# Patient Record
Sex: MALE | Race: WHITE | NOT HISPANIC OR LATINO | Employment: UNEMPLOYED | URBAN - METROPOLITAN AREA
[De-identification: names, ages, dates, MRNs, and addresses within clinical notes are randomized per-mention and may not be internally consistent; named-entity substitution may affect disease eponyms.]

---

## 2017-01-03 ENCOUNTER — OFFICE VISIT (OUTPATIENT)
Dept: OTOLARYNGOLOGY | Facility: CLINIC | Age: 52
End: 2017-01-03

## 2017-01-03 VITALS — BODY MASS INDEX: 24.12 KG/M2 | HEIGHT: 75 IN | WEIGHT: 194 LBS

## 2017-01-03 DIAGNOSIS — D23.39 PAPILLOMA OF NOSE: Primary | ICD-10-CM

## 2017-01-03 DIAGNOSIS — R05.3 CHRONIC COUGH: ICD-10-CM

## 2017-01-03 RX ORDER — BENZONATATE 100 MG/1
100 CAPSULE ORAL 3 TIMES DAILY PRN
Qty: 42 CAPSULE | Refills: 1 | Status: SHIPPED | OUTPATIENT
Start: 2017-01-03 | End: 2017-03-21

## 2017-01-03 ASSESSMENT — PAIN SCALES - GENERAL: PAINLEVEL: MILD PAIN (3)

## 2017-01-03 NOTE — Clinical Note
1/3/2017       RE: Micah Nunez  [address]  [place] [address]     Dear Colleague,    Thank you for referring your patient, Micah Nunez, to the [facility] at [facility]. Please see a copy of my visit note below.    HISTORY OF PRESENT ILLNESS:  Micah Nunez is here for follow-up today.  He is 51 years of age.  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  We spent about 10 minutes today explaining to him inverting papillomas versus polyps.  We have tried to explain this decently well in the past, and we tried again to make the distinction that inverting papillomas are a little bit more of a potential health problem than just simple nasal polyps.  Although both can return, papillomas are usually caused by viruses and things of this nature.  In any case, he comes back now because he is having some bleeding from his nose on the left side with the cold winter weather where his house is dry.  He takes some bacitracin or similar emollients for this presently.  He has no other current complaints.   ROS t:  We looked at the ROS sheet and All other systems were reviewed and are negative.chronic cough for 2-3 weeks as well.        PHYSICAL EXAMINATION:  The patient is alert, oriented x3 and pleasant.  Skin of the face, lips, and neck on him is quite normal.  Oral cavity and oropharynx is clear.        Procedure:Nasal cavity exam with an endoscope reveals that the right side of the nose is entirely clear throughout.  The left side of the nose   shows a small amount of papillomas, maybe 4-5 mm or so, along the floor of the nose by the inferior turbinate but there are no other abnormalities noted.  The ostiomeatal complex is clear.  Neck exam shows no masses, adenopathy or thyromegaly.  Small othere anterior foci as well.      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   These are inverting papillomas.  I will see him again in about six weeks.  We have given him more emollients.  We take these out with an endoscope in the OR under MAC.   They are really towards the front of his nose rather than the posterior. Tessalon perrles given.      FO/ms       Again, thank you for allowing me to participate in the care of your patient.      Sincerely,    Bobo Renteria MD

## 2017-01-03 NOTE — NURSING NOTE
Relevant Diagnosis: nasal papilloma  Teaching Topic:excise of nasal papilloma  Person(s) involved in teaching: Patient     Teaching Concerns Addressed:  Pre op teaching included the need for an H&P, NPO status pre op, hospital routines, expected recovery, activity  restrictions, antimicrobial scrub, s/s of infection, pain control methods and the importance of follow up appointments.  The patient voiced an understanding of all instructions and will call with questions.     Motivation Level:  Asks Questions:   Yes  Eager to Learn:   Yes  Cooperative:   Yes  Receptive (willing/able to accept information):   Yes     Patient  demonstrates understanding of the following:  Reason for the appointment, diagnosis and treatment plan:   Yes  Knowledge of proper use of medications and conditions for which they are ordered (with special attention to potential side effects or drug interactions):   Yes  Which situations necessitate calling provider and whom to contact:   Yes        Proper use and care of  (medical equip, care aids, etc.):   NA  Nutritional needs and diet plan:   Yes  Pain management techniques:   Yes  Patient instructed on hand hygiene:  Yes  How and/when to access community resources:   NA     Infection Prevention:  Patient   demonstrates understanding of the following:  Surgical procedure site care taught   Signs and symptoms of infection taught Yes  Wound care taught Yes     Instructional Materials Used/Given: Pre op booklet.

## 2017-01-03 NOTE — PATIENT INSTRUCTIONS
Nurse teaching given on nasal pappiloma and the patient expresses understanding and acceptance of instructions. Piter Pascual 1/3/2017 11:15 AM

## 2017-01-03 NOTE — NURSING NOTE
Chief Complaint   Patient presents with     RECHECK     following up tumor     Zenia Orourke Medical Assistant

## 2017-01-03 NOTE — PROGRESS NOTES
HISTORY OF PRESENT ILLNESS:  Micah Nunez is here for follow-up today.  He is 51 years of age.  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  We spent about 10 minutes today explaining to him inverting papillomas versus polyps.  We have tried to explain this decently well in the past, and we tried again to make the distinction that inverting papillomas are a little bit more of a potential health problem than just simple nasal polyps.  Although both can return, papillomas are usually caused by viruses and things of this nature.  In any case, he comes back now because he is having some bleeding from his nose on the left side with the cold winter weather where his house is dry.  He takes some bacitracin or similar emollients for this presently.  He has no other current complaints.   ROS t:  We looked at the ROS sheet and All other systems were reviewed and are negative.chronic cough for 2-3 weeks as well.        PHYSICAL EXAMINATION:  The patient is alert, oriented x3 and pleasant.  Skin of the face, lips, and neck on him is quite normal.  Oral cavity and oropharynx is clear.        Procedure:Nasal cavity exam with an endoscope reveals that the right side of the nose is entirely clear throughout.  The left side of the nose   shows a small amount of papillomas, maybe 4-5 mm or so, along the floor of the nose by the inferior turbinate but there are no other abnormalities noted.  The ostiomeatal complex is clear.  Neck exam shows no masses, adenopathy or thyromegaly.  Small othere anterior foci as well.      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  These are inverting papillomas.  I will see him again in about six weeks.  We have given him more emollients.  We take these out with an endoscope in the OR under MAC.   They are really towards the front of his nose rather than the posterior. Tessalon perrles given.      FO/ms

## 2017-01-30 ENCOUNTER — OFFICE VISIT (OUTPATIENT)
Dept: FAMILY MEDICINE | Facility: CLINIC | Age: 52
End: 2017-01-30
Payer: COMMERCIAL

## 2017-01-30 VITALS
DIASTOLIC BLOOD PRESSURE: 81 MMHG | HEIGHT: 75 IN | HEART RATE: 86 BPM | TEMPERATURE: 97.1 F | BODY MASS INDEX: 24.25 KG/M2 | OXYGEN SATURATION: 97 % | SYSTOLIC BLOOD PRESSURE: 135 MMHG | WEIGHT: 195 LBS

## 2017-01-30 DIAGNOSIS — L20.9 ATOPIC DERMATITIS, UNSPECIFIED TYPE: ICD-10-CM

## 2017-01-30 DIAGNOSIS — Z01.818 PREOP GENERAL PHYSICAL EXAM: Primary | ICD-10-CM

## 2017-01-30 DIAGNOSIS — J34.89 NASAL MASS: ICD-10-CM

## 2017-01-30 PROCEDURE — 99214 OFFICE O/P EST MOD 30 MIN: CPT | Performed by: FAMILY MEDICINE

## 2017-01-30 RX ORDER — TRIAMCINOLONE ACETONIDE 1 MG/G
OINTMENT TOPICAL
Qty: 80 G | Refills: 0 | Status: SHIPPED | OUTPATIENT
Start: 2017-01-30 | End: 2017-06-26

## 2017-01-30 NOTE — PATIENT INSTRUCTIONS
Before Your Surgery      Call your surgeon if there is any change in your health. This includes signs of a cold or flu (such as a sore throat, runny nose, cough, rash or fever).    Do not smoke, drink alcohol or take over the counter medicine (unless your surgeon or primary care doctor tells you to) for the 24 hours before and after surgery.    If you take prescribed drugs: Follow your doctor s orders about which medicines to take and which to stop until after surgery.    Eating and drinking prior to surgery: follow the instructions from your surgeon  Take a shower or bath the night before surgery. Use the soap your surgeon gave you to gently clean your skin. If you do not have soap from your surgeon, use your regular soap. Do not shave or scrub the surgery site.  Wear clean pajamas and have clean sheets on your bed.   Presurgery Checklist  You are scheduled to have surgery. The healthcare staff will try to make your stay comfortable. Use the guidelines below to remind yourself what to do before surgery. Be sure to follow any specific pre-op instructions from your surgeon or nurse.  Preparing for Surgery    Ask your surgeon if you ll need a blood transfusion during surgery and if so, how to prepare for it. In some cases, you can donate blood before surgery. If needed, this blood can be given back (transfused) to you during or after surgery.    If you are having abdominal surgery, ask what you need to do to clear your bowel.    Tell your surgeon if you have allergies to any medications or foods.    Arrange for an adult family member or friend to drive you home after surgery. If possible, have someone ready to help you at home as you recover.    Call the surgeon if you get a cold, fever, sore throat, diarrhea, or other health problem just before surgery. Your surgeon can decide whether or not to postpone the surgery.  Medications    Tell your surgeon about all medications you take, including prescription and  over-the-counter products such as herbal remedies and vitamins. Ask if you should continue taking them.    If you take ibuprofen, naproxen, or  blood thinners  such as aspirin, clopidogrel (Plavix), or warfarin (Coumadin), ask your surgeon whether you should stop taking them and how long before surgery you should stop.    You may be told to take antibiotics just before surgery to prevent infection. If so, follow instructions carefully on how to take them.    If you are told to take medications called anticoagulants to prevent blood clots after surgery, be sure to follow the instructions on how to take them.  Stop Smoking  If you smoke, healing may take longer. So at least 2 week(s) before surgery, stop smoking.  Bathing or Showering Before Surgery    If instructed, wash with antibacterial soap. Afterward, do not use lotions or powders.    If you are having surgery on the head, you may be asked to shampoo with antibacterial soap. Follow instructions for doing so.  Do Not Remove Hair from the Surgery Site  Do not shave hair from the incision site, unless you are given specific instructions to do so. Usually, if hair needs to be removed, it will be done at the hospital right before surgery.  Don t Eat or Drink    Your doctor will tell you when to stop eating and drinking. If you do not follow your doctor's instructions, your procedure may be postponed or rescheduled for another day.    If your surgeon tells you to continue any medications, take them with small sips of water.    You can brush your teeth and rinse your mouth, but don t swallow any water.  Day of Surgery    Do not wear makeup. Do not use perfume, deodorant, or hairspray. Remove nail polish and artificial nails.    Leave jewelry (including rings), watches, and other valuables at home.    Be sure to bring health insurance cards or forms and a photo ID.    Bring a list of your medications (include the name, dose, how often you take them, and the time last  dose was taken).    Arrive on time at the hospital or surgery facility.    [id number] Regina Garcia, [address] [place] [address], [place] [address]. All rights reserved. This information is not intended as a substitute for professional medical care. Always follow your healthcare professional's instructions.

## 2017-01-30 NOTE — PROGRESS NOTES
[facility]  [address] [address]  [phone]  Dept: [phone]    PRE-OP EVALUATION:  Today's date: 2017    Micah Nunez (: 1965) presents for pre-operative evaluation assessment as requested by Dr. Bobo Renteria.  He requires evaluation and anesthesia risk assessment prior to undergoing surgery/procedure for treatment of Left Nasal Pappiloma Removal .  Proposed procedure: NASAL ENDOSCOPY    Date of Surgery/ Procedure: 17  Time of Surgery/ Procedure: 8:25am   Hospital/Surgical Facility: [facility]    Primary Physician: Yovany White  Type of Anesthesia Anticipated: Monitor anesthesia care     Patient has a Health Care Directive or Living Will:  NO    1. NO - Do you have a history of heart attack, stroke, stent, bypass or surgery on an artery in the head, neck, heart or legs?  2. NO - Do you ever have any pain or discomfort in your chest?  3. NO - Do you have a history of  Heart Failure?  4. NO - Are you troubled by shortness of breath when: walking on the level, up a slight hill or at night?  5. NO - Do you currently have a cold, bronchitis or other respiratory infection?  6. NO - Do you have a cough, shortness of breath or wheezing?  7. NO - Do you sometimes get pains in the calves of your legs when you walk?  8. NO - Do you or anyone in your family have previous history of blood clots?  9. NO - Do you or does anyone in your family have a serious bleeding problem such as prolonged bleeding following surgeries or cuts?  10. NO - Have you ever had problems with anemia or been told to take iron pills?  11. NO - Have you had any abnormal blood loss such as black, tarry or bloody stools, or abnormal vaginal bleeding?  12. NO - Have you ever had a blood transfusion?  13. NO - Have you or any of your relatives ever had problems with anesthesia?  14. NO - Do you have sleep apnea, excessive snoring or daytime drowsiness?  15. NO - Do you have any  prosthetic heart valves?  16. NO - Do you have prosthetic joints?  17. NO - Is there any chance that you may be pregnant?      HPI:                                                      Brief HPI related to upcoming procedure: Patient is a 51 yr old male here for a pre op evaluation. He has a history of nasal polyps and they reoccur. He denies any acute symptoms today and denies anycough or URI symptoms. Patient reports that he has had no fevers or chills.   He is also requesting refills on his triamcinolone and will like the higher quantity.   No other complaints today.      See problem list for active medical problems.  Problems all longstanding and stable, except as noted/documented.  See ROS for pertinent symptoms related to these conditions.                                                                                                  .    MEDICAL HISTORY:                                                      Patient Active Problem List    Diagnosis Date Noted     Tobacco use disorder 08/12/2015     Priority: Medium     Chemical dependency (H) 07/27/2015     Priority: Medium     Papilloma of nose 03/10/2014     Priority: Medium     Nasal mass 12/17/2013     Priority: Medium     CARDIOVASCULAR SCREENING; LDL GOAL LESS THAN 130 12/11/2013     Priority: Medium      History reviewed. No pertinent past medical history.  Past Surgical History   Procedure Laterality Date     Arthrodesis toe(s)  12/20/2013     Procedure: ARTHRODESIS TOE(S);  Arthrodesis first metatarsophalangeal joint left foot;  Surgeon: Cortez Hayward DPM;  Location: [facility] OR     Endoscopic sinus surgery  1/6/2014     Procedure: ENDOSCOPIC SINUS SURGERY;  Functional Endoscopic Sinus Surgery;  Surgeon: Bobo Renteria MD;  Location:  OR     Endoscopic sinus surgery  7/21/2014     Procedure: ENDOSCOPIC SINUS SURGERY;  Surgeon: Bobo Renteria MD;  Location:  OR     Arthrodesis foot Left 2/17/2015     Procedure: ARTHRODESIS FOOT;   Surgeon: Cortez Hayward DPM;  Location: [facility] OR     Endoscopic sinus surgery N/A 4/6/2015     Procedure: ENDOSCOPIC SINUS SURGERY;  Surgeon: Bobo Renteria MD;  Location:  OR     Endoscopic sinus surgery N/A 8/17/2015     Procedure: ENDOSCOPIC SINUS SURGERY;  Surgeon: Bobo Renteria MD;  Location:  OR     Current Outpatient Prescriptions   Medication Sig Dispense Refill     triamcinolone (KENALOG) 0.1 % ointment Apply sparingly to affected area three times daily for 14 days. 80 g 0     triamcinolone (KENALOG) 0.1 % ointment Apply sparingly to affected area daily 30 g 0     benzonatate (TESSALON) 100 MG capsule Take 1 capsule (100 mg) by mouth 3 times daily as needed for cough 42 capsule 1     OTC products: None, except as noted above    Allergies   Allergen Reactions     Nkda [No Known Drug Allergies]       Latex Allergy: NO    Social History   Substance Use Topics     Smoking status: Current Every Day Smoker -- 1.00 packs/day for 30 years     Types: Cigarettes     Last Attempt to Quit: 04/27/2015     Smokeless tobacco: Never Used     Alcohol Use: No     History   Drug Use     Yes     Comment: 7 years quit Cocaine/methamphetamines       REVIEW OF SYSTEMS:                                                    C: NEGATIVE for fever, chills, change in weight  I: NEGATIVE for worrisome rashes, moles or lesions  E: NEGATIVE for vision changes or irritation  ENT/MOUTH: NEGATIVE for ear, mouth and throat problems and POSITIVE for nasal polyps  R: NEGATIVE for significant cough or SOB  CV: NEGATIVE for chest pain, palpitations or peripheral edema  GI: NEGATIVE for nausea, abdominal pain, heartburn, or change in bowel habits  : NEGATIVE for frequency, dysuria, or hematuria  M: NEGATIVE for significant arthralgias or myalgia  N: NEGATIVE for weakness, dizziness or paresthesias  H: NEGATIVE for bleeding problems  P: NEGATIVE for changes in mood or affect    EXAM:                                             "        /81 mmHg  Pulse 86  Temp(Src) 97.1  F (36.2  C) (Tympanic)  Ht 6' 3\" (1.905 m)  Wt 195 lb (88.451 kg)  BMI 24.37 kg/m2  SpO2 97%    GENERAL APPEARANCE: healthy, alert and no distress     EYES: EOMI, - PERRL     HENT: ear canals and TM's normal and nose and mouth without ulcers or lesions     NECK: no adenopathy, no asymmetry, masses, or scars and thyroid normal to palpation     RESP: lungs clear to auscultation - no rales, rhonchi or wheezes     CV: regular rates and rhythm, normal S1 S2, no S3 or S4 and no murmur, click or rub -     ABDOMEN:  soft, nontender, no HSM or masses and bowel sounds normal     SKIN: no suspicious lesions or rashes     PSYCH: mentation appears normal. and affect normal/bright    DIAGNOSTICS:                                                    EKG: Not indicated due to non-vascular surgery and low risk of event (age <65 and without cardiac risk factors)    Recent Labs   Lab Test  08/12/15   0757  03/30/15   1306  02/10/15   0840   HGB  14.6  14.2   --    PLT  287  318   --    NA   --    --   144   POTASSIUM   --    --   4.1   CR   --    --   0.93        IMPRESSION:                                                    Reason for surgery/procedure: Nasal polyp   Diagnosis/reason for consult: Pre op evaluation    The proposed surgical procedure is considered LOW risk.    REVISED CARDIAC RISK INDEX  The patient has the following serious cardiovascular risks for perioperative complications such as (MI, PE, VFib and 3  AV Block):  No serious cardiac risks  INTERPRETATION: 0 risks: Class I (very low risk - 0.4% complication rate)    The patient has the following additional risks for perioperative complications:  No identified additional risks      ICD-10-CM    1. Preop general physical exam Z01.818    2. Atopic dermatitis, unspecified type L20.9 triamcinolone (KENALOG) 0.1 % ointment   3. Nasal mass R22.0        RECOMMENDATIONS:                                                  "     --Patient is to take all scheduled medications on the day of surgery EXCEPT for modifications listed below.    APPROVAL GIVEN to proceed with proposed procedure, without further diagnostic evaluation       Signed Electronically by: Yovany White MD    Copy of this evaluation report is provided to requesting physician.    [facility] Preop Guidelines

## 2017-01-30 NOTE — MR AVS SNAPSHOT
After Visit Summary   1/30/2017    Micah Nunez    MRN: [id number]           Patient Information     Date Of Birth          1965        Visit Information        Provider Department      1/30/2017 10:00 AM Yovany White MD [facility]        Today's Diagnoses     Preop general physical exam    -  1     Atopic dermatitis, unspecified type           Care Instructions      Before Your Surgery      Call your surgeon if there is any change in your health. This includes signs of a cold or flu (such as a sore throat, runny nose, cough, rash or fever).    Do not smoke, drink alcohol or take over the counter medicine (unless your surgeon or primary care doctor tells you to) for the 24 hours before and after surgery.    If you take prescribed drugs: Follow your doctor s orders about which medicines to take and which to stop until after surgery.    Eating and drinking prior to surgery: follow the instructions from your surgeon  Take a shower or bath the night before surgery. Use the soap your surgeon gave you to gently clean your skin. If you do not have soap from your surgeon, use your regular soap. Do not shave or scrub the surgery site.  Wear clean pajamas and have clean sheets on your bed.   Presurgery Checklist  You are scheduled to have surgery. The healthcare staff will try to make your stay comfortable. Use the guidelines below to remind yourself what to do before surgery. Be sure to follow any specific pre-op instructions from your surgeon or nurse.  Preparing for Surgery    Ask your surgeon if you ll need a blood transfusion during surgery and if so, how to prepare for it. In some cases, you can donate blood before surgery. If needed, this blood can be given back (transfused) to you during or after surgery.    If you are having abdominal surgery, ask what you need to do to clear your bowel.    Tell your surgeon if you have allergies to any medications or  foods.    Arrange for an adult family member or friend to drive you home after surgery. If possible, have someone ready to help you at home as you recover.    Call the surgeon if you get a cold, fever, sore throat, diarrhea, or other health problem just before surgery. Your surgeon can decide whether or not to postpone the surgery.  Medications    Tell your surgeon about all medications you take, including prescription and over-the-counter products such as herbal remedies and vitamins. Ask if you should continue taking them.    If you take ibuprofen, naproxen, or  blood thinners  such as aspirin, clopidogrel (Plavix), or warfarin (Coumadin), ask your surgeon whether you should stop taking them and how long before surgery you should stop.    You may be told to take antibiotics just before surgery to prevent infection. If so, follow instructions carefully on how to take them.    If you are told to take medications called anticoagulants to prevent blood clots after surgery, be sure to follow the instructions on how to take them.  Stop Smoking  If you smoke, healing may take longer. So at least 2 week(s) before surgery, stop smoking.  Bathing or Showering Before Surgery    If instructed, wash with antibacterial soap. Afterward, do not use lotions or powders.    If you are having surgery on the head, you may be asked to shampoo with antibacterial soap. Follow instructions for doing so.  Do Not Remove Hair from the Surgery Site  Do not shave hair from the incision site, unless you are given specific instructions to do so. Usually, if hair needs to be removed, it will be done at the hospital right before surgery.  Don t Eat or Drink    Your doctor will tell you when to stop eating and drinking. If you do not follow your doctor's instructions, your procedure may be postponed or rescheduled for another day.    If your surgeon tells you to continue any medications, take them with small sips of water.    You can brush your  teeth and rinse your mouth, but don t swallow any water.  Day of Surgery    Do not wear makeup. Do not use perfume, deodorant, or hairspray. Remove nail polish and artificial nails.    Leave jewelry (including rings), watches, and other valuables at home.    Be sure to bring health insurance cards or forms and a photo ID.    Bring a list of your medications (include the name, dose, how often you take them, and the time last dose was taken).    Arrive on time at the hospital or surgery facility.    [id number] [facility], [address] [place] [address]. All rights reserved. This information is not intended as a substitute for professional medical care. Always follow your healthcare professional's instructions.            Follow-ups after your visit        Your next 10 appointments already scheduled     Jan 30, 2017 10:00 AM   Pre-Op physical with Yovany [facility] MD   [facility]    [address] [place] [id number]   [phone]            Feb 06, 2017   Procedure with Bobo Renteria MD   [facility] ([facility])    [address] [place] [address]  5th [facility] [address] [phone]           Located in the Clinics and Surgery Center at [address] [place] [address].   parking is very convenient and highly recommended.  is a $6 flat rate fee; no tips accepted.  Both  and self parkers should enter the main arrival plaza from [facility]; parking attendants will direct you based on your parking preference.            Feb 28, 2017  2:00 PM   (Arrive by 1:45 PM)   Return Visit with Bobo Renteria MD   [facility] ([facility])    [address] [place] [address]  4th [facility] [address] [phone]              Who to contact     If you have questions or need follow up information about today's clinic  "visit or your schedule please contact [facility] directly at [phone].  Normal or non-critical lab and imaging results will be communicated to you by MyChart, letter or phone within 4 business days after the clinic has received the results. If you do not hear from us within 7 days, please contact the clinic through [facility] or phone. If you have a critical or abnormal lab result, we will notify you by phone as soon as possible.  Submit refill requests through [facility] or call your pharmacy and they will forward the refill request to us. Please allow 3 business days for your refill to be completed.          Additional Information About Your Visit        MyChart Information     Sermo lets you send messages to your doctor, view your test results, renew your prescriptions, schedule appointments and more. To sign up, go to [email or web address] . Click on \"Log in\" on the left side of the screen, which will take you to the Welcome page. Then click on \"Sign up Now\" on the right side of the page.     You will be asked to enter the access code listed below, as well as some personal information. Please follow the directions to create your username and password.     Your access code is: [email or web address]  Expires: 2017  9:59 AM     Your access code will  in 90 days. If you need help or a new code, please call your [place] clinic or [phone].        Care EveryWhere ID     This is your Care EveryWhere ID. This could be used by other organizations to access your [place] medical records  [id number]        Your Vitals Were     Pulse Temperature Height BMI (Body Mass Index) Pulse Oximetry       86 97.1  F (36.2  C) (Tympanic) 6' 3\" (1.905 m) 24.37 kg/m2 97%        Blood Pressure from Last 3 Encounters:   17 135/81   16 144/86   11/09/15 132/79    Weight from Last 3 Encounters:   17 195 lb (88.451 kg)   17 194 lb (87.998 kg)   16 198 lb 8 oz (90.039 kg)            "   Today, you had the following     No orders found for display         Today's Medication Changes          These changes are accurate as of: 1/30/17  9:59 AM.  If you have any questions, ask your nurse or doctor.               These medicines have changed or have updated prescriptions.        Dose/Directions    * triamcinolone 0.1 % ointment   Commonly known as:  KENALOG   This may have changed:  Another medication with the same name was added. Make sure you understand how and when to take each.   Used for:  Rash and nonspecific skin eruption   Changed by:  Yovany White MD        Apply sparingly to affected area daily   Quantity:  30 g   Refills:  0       * triamcinolone 0.1 % ointment   Commonly known as:  KENALOG   This may have changed:  You were already taking a medication with the same name, and this prescription was added. Make sure you understand how and when to take each.   Used for:  Atopic dermatitis, unspecified type   Changed by:  Yovany White MD        Apply sparingly to affected area three times daily for 14 days.   Quantity:  80 g   Refills:  0       * Notice:  This list has 2 medication(s) that are the same as other medications prescribed for you. Read the directions carefully, and ask your doctor or other care provider to review them with you.         Where to get your medicines      These medications were sent to [facility] [address] [address]     Phone:  [phone]    - triamcinolone 0.1 % ointment             Primary Care Provider Office Phone # Fax #    Yovany White [phone]       [address] [address]        Thank you!     Thank you for choosing [facility]  for your care. Our goal is always to provide you with excellent care. Hearing back from our patients is one way we can continue to improve our services.  Please take a few minutes to complete the written survey that you may receive in the mail after your visit with us. Thank you!             Your Updated Medication List - Protect others around you: Learn how to safely use, store and throw away your medicines at www.disposemymeds.org.          This list is accurate as of: 1/30/17  9:59 AM.  Always use your most recent med list.                   Brand Name Dispense Instructions for use    benzonatate 100 MG capsule    TESSALON    42 capsule    Take 1 capsule (100 mg) by mouth 3 times daily as needed for cough       * triamcinolone 0.1 % ointment    KENALOG    30 g    Apply sparingly to affected area daily       * triamcinolone 0.1 % ointment    KENALOG    80 g    Apply sparingly to affected area three times daily for 14 days.       * Notice:  This list has 2 medication(s) that are the same as other medications prescribed for you. Read the directions carefully, and ask your doctor or other care provider to review them with you.

## 2017-02-06 ENCOUNTER — HOSPITAL ENCOUNTER (OUTPATIENT)
Facility: AMBULATORY SURGERY CENTER | Age: 52
End: 2017-02-06
Attending: OTOLARYNGOLOGY

## 2017-02-06 ENCOUNTER — ANESTHESIA (OUTPATIENT)
Dept: SURGERY | Facility: AMBULATORY SURGERY CENTER | Age: 52
End: 2017-02-06

## 2017-02-06 ENCOUNTER — ANESTHESIA EVENT (OUTPATIENT)
Dept: SURGERY | Facility: AMBULATORY SURGERY CENTER | Age: 52
End: 2017-02-06

## 2017-02-06 VITALS
OXYGEN SATURATION: 97 % | BODY MASS INDEX: 24.25 KG/M2 | HEART RATE: 75 BPM | WEIGHT: 195 LBS | HEIGHT: 75 IN | SYSTOLIC BLOOD PRESSURE: 141 MMHG | RESPIRATION RATE: 16 BRPM | TEMPERATURE: 98.1 F | DIASTOLIC BLOOD PRESSURE: 92 MMHG

## 2017-02-06 DIAGNOSIS — D23.39 PAPILLOMA OF NOSE: Primary | ICD-10-CM

## 2017-02-06 RX ORDER — ECHINACEA PURPUREA EXTRACT 125 MG
2 TABLET ORAL 3 TIMES DAILY
Qty: 30 ML | Refills: 0 | Status: SHIPPED
Start: 2017-02-06 | End: 2017-03-21

## 2017-02-06 RX ORDER — MEPERIDINE HYDROCHLORIDE 25 MG/ML
12.5 INJECTION INTRAMUSCULAR; INTRAVENOUS; SUBCUTANEOUS
Status: DISCONTINUED | OUTPATIENT
Start: 2017-02-06 | End: 2017-02-07 | Stop reason: HOSPADM

## 2017-02-06 RX ORDER — PROPOFOL 10 MG/ML
INJECTION, EMULSION INTRAVENOUS CONTINUOUS PRN
Status: DISCONTINUED | OUTPATIENT
Start: 2017-02-06 | End: 2017-02-06

## 2017-02-06 RX ORDER — OXYCODONE HYDROCHLORIDE 5 MG/1
5-10 TABLET ORAL ONCE
Status: COMPLETED | OUTPATIENT
Start: 2017-02-06 | End: 2017-02-06

## 2017-02-06 RX ORDER — KETAMINE HYDROCHLORIDE 10 MG/ML
INJECTION, SOLUTION INTRAMUSCULAR; INTRAVENOUS PRN
Status: DISCONTINUED | OUTPATIENT
Start: 2017-02-06 | End: 2017-02-06

## 2017-02-06 RX ORDER — SODIUM CHLORIDE, SODIUM LACTATE, POTASSIUM CHLORIDE, CALCIUM CHLORIDE 600; 310; 30; 20 MG/100ML; MG/100ML; MG/100ML; MG/100ML
INJECTION, SOLUTION INTRAVENOUS CONTINUOUS PRN
Status: DISCONTINUED | OUTPATIENT
Start: 2017-02-06 | End: 2017-02-06

## 2017-02-06 RX ORDER — ECHINACEA PURPUREA EXTRACT 125 MG
2 TABLET ORAL DAILY PRN
Qty: 30 ML | Refills: 0 | Status: SHIPPED
Start: 2017-02-06 | End: 2017-02-06

## 2017-02-06 RX ORDER — LIDOCAINE HYDROCHLORIDE 20 MG/ML
INJECTION, SOLUTION INFILTRATION; PERINEURAL PRN
Status: DISCONTINUED | OUTPATIENT
Start: 2017-02-06 | End: 2017-02-06

## 2017-02-06 RX ORDER — LIDOCAINE HYDROCHLORIDE AND EPINEPHRINE 10; 10 MG/ML; UG/ML
INJECTION, SOLUTION INFILTRATION; PERINEURAL PRN
Status: DISCONTINUED | OUTPATIENT
Start: 2017-02-06 | End: 2017-02-06 | Stop reason: HOSPADM

## 2017-02-06 RX ORDER — ONDANSETRON 2 MG/ML
4 INJECTION INTRAMUSCULAR; INTRAVENOUS EVERY 30 MIN PRN
Status: DISCONTINUED | OUTPATIENT
Start: 2017-02-06 | End: 2017-02-07 | Stop reason: HOSPADM

## 2017-02-06 RX ORDER — OXYCODONE HYDROCHLORIDE 5 MG/1
5-10 TABLET ORAL EVERY 4 HOURS PRN
Qty: 20 TABLET | Refills: 0 | Status: SHIPPED | OUTPATIENT
Start: 2017-02-06 | End: 2017-03-21

## 2017-02-06 RX ORDER — NALOXONE HYDROCHLORIDE 0.4 MG/ML
.1-.4 INJECTION, SOLUTION INTRAMUSCULAR; INTRAVENOUS; SUBCUTANEOUS
Status: DISCONTINUED | OUTPATIENT
Start: 2017-02-06 | End: 2017-02-07 | Stop reason: HOSPADM

## 2017-02-06 RX ORDER — ONDANSETRON 4 MG/1
4 TABLET, ORALLY DISINTEGRATING ORAL EVERY 30 MIN PRN
Status: DISCONTINUED | OUTPATIENT
Start: 2017-02-06 | End: 2017-02-07 | Stop reason: HOSPADM

## 2017-02-06 RX ORDER — FENTANYL CITRATE 50 UG/ML
25-50 INJECTION, SOLUTION INTRAMUSCULAR; INTRAVENOUS
Status: DISCONTINUED | OUTPATIENT
Start: 2017-02-06 | End: 2017-02-07 | Stop reason: HOSPADM

## 2017-02-06 RX ORDER — ONDANSETRON 2 MG/ML
INJECTION INTRAMUSCULAR; INTRAVENOUS PRN
Status: DISCONTINUED | OUTPATIENT
Start: 2017-02-06 | End: 2017-02-06

## 2017-02-06 RX ORDER — SODIUM CHLORIDE, SODIUM LACTATE, POTASSIUM CHLORIDE, CALCIUM CHLORIDE 600; 310; 30; 20 MG/100ML; MG/100ML; MG/100ML; MG/100ML
INJECTION, SOLUTION INTRAVENOUS CONTINUOUS
Status: DISCONTINUED | OUTPATIENT
Start: 2017-02-06 | End: 2017-02-07 | Stop reason: HOSPADM

## 2017-02-06 RX ORDER — OXYMETAZOLINE HYDROCHLORIDE 0.05 G/100ML
SPRAY NASAL PRN
Status: DISCONTINUED | OUTPATIENT
Start: 2017-02-06 | End: 2017-02-06 | Stop reason: HOSPADM

## 2017-02-06 RX ORDER — GLYCOPYRROLATE 0.2 MG/ML
INJECTION, SOLUTION INTRAMUSCULAR; INTRAVENOUS PRN
Status: DISCONTINUED | OUTPATIENT
Start: 2017-02-06 | End: 2017-02-06

## 2017-02-06 RX ORDER — LABETALOL HYDROCHLORIDE 5 MG/ML
INJECTION, SOLUTION INTRAVENOUS PRN
Status: DISCONTINUED | OUTPATIENT
Start: 2017-02-06 | End: 2017-02-06

## 2017-02-06 RX ORDER — ACETAMINOPHEN 325 MG/1
325-650 TABLET ORAL EVERY 4 HOURS PRN
Qty: 20 TABLET | Refills: 0 | Status: SHIPPED
Start: 2017-02-06 | End: 2017-07-27

## 2017-02-06 RX ADMIN — KETAMINE HYDROCHLORIDE 20 MG: 10 INJECTION, SOLUTION INTRAMUSCULAR; INTRAVENOUS at 08:34

## 2017-02-06 RX ADMIN — GLYCOPYRROLATE 0.2 MG: 0.2 INJECTION, SOLUTION INTRAMUSCULAR; INTRAVENOUS at 08:30

## 2017-02-06 RX ADMIN — OXYCODONE HYDROCHLORIDE 5 MG: 5 TABLET ORAL at 09:33

## 2017-02-06 RX ADMIN — ONDANSETRON 4 MG: 2 INJECTION INTRAMUSCULAR; INTRAVENOUS at 08:34

## 2017-02-06 RX ADMIN — PROPOFOL 150 MCG/KG/MIN: 10 INJECTION, EMULSION INTRAVENOUS at 08:32

## 2017-02-06 RX ADMIN — LIDOCAINE HYDROCHLORIDE 80 MG: 20 INJECTION, SOLUTION INFILTRATION; PERINEURAL at 08:32

## 2017-02-06 RX ADMIN — SODIUM CHLORIDE, SODIUM LACTATE, POTASSIUM CHLORIDE, CALCIUM CHLORIDE: 600; 310; 30; 20 INJECTION, SOLUTION INTRAVENOUS at 08:24

## 2017-02-06 RX ADMIN — LABETALOL HYDROCHLORIDE 5 MG: 5 INJECTION, SOLUTION INTRAVENOUS at 08:51

## 2017-02-06 RX ADMIN — KETAMINE HYDROCHLORIDE 10 MG: 10 INJECTION, SOLUTION INTRAMUSCULAR; INTRAVENOUS at 08:39

## 2017-02-06 NOTE — ANESTHESIA CARE TRANSFER NOTE
Patient: Micah Nunez    Procedure(s):  Excision of left intranasal papillomas, Right nasal endoscopy - Wound Class: II-Clean Contaminated    Diagnosis: Nasal Pappiloma  Diagnosis Additional Information: No value filed.    Anesthesia Type:   MAC     Note:  Airway :Room Air  Patient transferred to:Phase II  Comments: Awake, spont resp, vss, iv patent  128/78  78  98%  Report to RN      Vitals: (Last set prior to Anesthesia Care Transfer)    CRNA VITALS  2/6/2017 0839 - 2/6/2017 0915      2/6/2017             Pulse: 69    SpO2: 98 %    Resp Rate (set): 10                Electronically Signed By: NEHEMIAS Wooten CRNA  February 6, 2017  9:15 AM

## 2017-02-06 NOTE — IP AVS SNAPSHOT
[facility]    [address] [place] [address]    Phone:  [phone]    Fax:  [phone]                                       After Visit Summary   2/6/2017    Micah Nunez    MRN: [id number]           After Visit Summary Signature Page     I have received my discharge instructions, and my questions have been answered. I have discussed any challenges I see with this plan with the nurse or doctor.    ..........................................................................................................................................  Patient/Patient Representative Signature      ..........................................................................................................................................  Patient Representative Print Name and Relationship to Patient    ..................................................               ................................................  Date                                            Time    ..........................................................................................................................................  Reviewed by Signature/Title    ...................................................              ..............................................  Date                                                            Time

## 2017-02-06 NOTE — OP NOTE
PREOPERATIVE DIAGNOSIS:  Nasal papilloma.        POSTOPERATIVE DIAGNOSIS:  Nasal papilloma.      PROCEDURES PERFORMED:     1) Endoscopic removal of  Left nasal septum papilloma   2.  Right nasal cavity endoscopy exam.      ATTENDING SURGEON:  Bobo Renteria MD      ASSISTANT SURGEON:  Romeo Nicole MD      ANESTHESIA:  MAC.      ESTIMATED BLOOD LOSS:  5 mL      INDICATIONS:  Mr. 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  He underwent multiple resections prior with the last surgery on 08/17/2015.  The path came back negative for malignancy.  He is here today for resection of the papilloma.      PROCEDURE DESCRIPTION:  After properly identifying the patient and obtaining signed informed consent, the patient was transferred to the operative room.  MAC anesthesia was induced, and the patient was ventilated through a nasal cannula.  The patient was placed in a supine position, and the table was turned 90 degrees to facilitate the procedure.  A time-out was carried immediately prior to the procedure to confirm the identity of the patient and correctness of the procedure.  The patient was draped in the usual clean fashion.  Afrin-soaked pledgets and cocaine-soaked pledgets were placed into the patient's bilateral nasal cavities for hemostasis and local anesthesia.  After adequate time, the pledgets were removed, and a 0 degree endoscope was used to inspect the bilateral nasal cavities.  The right nasal cavity was noted to have a septal deviation towards the right, but otherwise the mucosa was normal without evidence of papilloma.  The left nasal cavity was noted to have multiple papillomas at the anterior septum, lateral anterior nasal wall, nasal floor and nasal roof.  All these lesions appeared to be anterior to the middle turbinate head.  One-percent lidocaine with 1:100,000 epinephrine was then injected into the base of the lesions.  The lesions were then removed using  a straight Chao-Cut.  Hemostasis was achieved with cocaine-soaked pledgets.  The samples were sent for pathology.  This concluded the procedure.  The patient was then turned back to the Anesthesia staff and awakened without difficulties.  Dr. Renteria was present during the entire procedure.      COMPLICATIONS:  None.      SPECIMENS:  Left nasal papilloma.      INTRAOPERATIVE FINDINGS:   1.  Multiple papillomatous lesions noted at the left nasal cavity involving the anterior septum, lateral nasal wall, superior nasal roof and nasal floor.   2.  Right nasal cavity was examined under endoscope and noted to be normal.      DISPOSITION:  The patient will be discharged home today with oxycodone and Tylenol for pain.  Nasal saline spray will be prescribed for nasal hygiene.  The patient will be placed on nasal precautions and will follow up in the ENT Clinic with Dr. Renteria on 2017.      Dictated by Romeo Nicole MD   Resident         MARLENE RENTERIA MD       As dictated by ROMEO NICOLE MD            D: 2017 09:25   T: 2017 12:50   MT: parker      Name:     DALLAS HOLLOWAY   MRN:      [id number]        Account:        [id number]   :      1965           Procedure Date: 2017      Document: [id number]

## 2017-02-06 NOTE — BRIEF OP NOTE
[facility]    Brief Operative Note    Pre-operative diagnosis: Nasal Pappiloma  Post-operative diagnosis Same  Procedure: Procedure(s):  Excision of left intranasal papillomas, Right nasal endoscopy - Wound Class: II-Clean Contaminated  Surgeon: Surgeon(s) and Role:     * Bobo Renteria MD - Primary  Anesthesia: Monitor Anesthesia Care   Estimated blood loss: Minimal  Drains: None  Specimens:   ID Type Source Tests Collected by Time Destination   A : Left nasal papillomas-rule out dysplagia Tissue Nose SURGICAL PATHOLOGY EXAM Bobo Renteria MD 2/6/2017  9:06 AM      Findings:   Multiple nasal papillomatous lesions in left anterior nasal cavity. Right nasal cavity normal.  Complications: None.  Implants: None.

## 2017-02-06 NOTE — ANESTHESIA PREPROCEDURE EVALUATION
Anesthesia Evaluation     . Pt has had prior anesthetic. Type: General      ROS/MED HX    ENT/Pulmonary:  - neg pulmonary ROS     Neurologic:  - neg neurologic ROS     Cardiovascular:  - neg cardiovascular ROS   (+) ----. : . . . :. . Previous cardiac testing date:results:date: results:ECG reviewed date: results:NSR date: results:          METS/Exercise Tolerance:  >4 METS   Hematologic:  - neg hematologic  ROS       Musculoskeletal:  - neg musculoskeletal ROS       GI/Hepatic:  - neg GI/hepatic ROS       Renal/Genitourinary:  - ROS Renal section negative       Endo:  - neg endo ROS       Psychiatric:  - neg psychiatric ROS       Infectious Disease:  - neg infectious disease ROS       Malignancy:      - no malignancy   Other:    - neg other ROS           Physical Exam  Normal systems: cardiovascular, pulmonary and dental    Airway   Mallampati: I  TM distance: >3 FB  Neck ROM: full    Dental     Cardiovascular   Rhythm and rate: regular and normal      Pulmonary    breath sounds clear to auscultation                    Anesthesia Plan      History & Physical Review  History and physical reviewed and following examination; no interval change.    ASA Status:  2 .    NPO Status:  > 8 hours    Plan for MAC with Propofol induction. Reason for MAC:  Deep or markedly invasive procedure (G8)  PONV prophylaxis:  Ondansetron (or other 5HT-3)       Postoperative Care  Postoperative pain management:  Multi-modal analgesia.      Consents  Anesthetic plan, risks, benefits and alternatives discussed with:  Patient..                          .

## 2017-02-06 NOTE — DISCHARGE INSTRUCTIONS
[facility]  Home Care Following Anesthesia  For 24 hours after surgery:  1. Get plenty of rest.  A responsible adult must stay with you for at least 24 hours after you leave the surgery center.  2. Do not drive or use heavy equipment.  If you have weakness or tingling, don't drive or use heavy equipment until this feeling goes away.   3. Do not drink alcohol.   4. Avoid strenuous or risky activities.  Ask for help when climbing stairs.  5. You may feel lightheaded.  IF so, sit for a few minutes before standing.  Have someone help you get up.   6. If you have nausea (feel sick to your stomach): Drink only clear liquids such as apple juice, ginger ale, broth or 7-Up.  Rest may also help.  Be sure to drink enough fluids.  Move to a regular diet as you feel able.   7. You may have a slight fever.  Call the doctor if your fever is over 100 F (37.7 C) (taken under the tongue) or lasts longer than 24 hours.  8. You may have a dry mouth, a sore throat, muscle aches or trouble sleeping. These should go away after 24 hours.  9. Do not make important or legal decisions.   Tips for taking pain medications  To get the best pain relief possible, remember these points:    Take pain medications as directed, before pain becomes severe.    Pain medication can upset your stomach: taking it with food may help.    Constipation is a common side effect of pain medication. Drink plenty of  fluids.    Eat foods high in fiber. Take a stool softener if recommended by your doctor or pharmacist.    Do not drink alcohol, drive or operate machinery while taking pain medications.    Ask about other ways to control pain, such as with heat, ice or relaxation.    Call a doctor for any of the followin. Signs of infection (fever, growing tenderness at the surgery site, a large amount of drainage or bleeding, severe pain, foul-smelling drainage, redness, swelling).  2. It has been over 8 to 10 hours since  "surgery and you are still not able to urinate (pass water).  3. Headache for over 24 hours.    Your doctor is:   Dr. Bobo Renteria, ENT Otolaryngology: [phone]               Or dial [phone] and ask for the resident on call for:  ENT Otolaryngology  For emergency care, call the:  [facility]:  [phone] (TTY for hearing impaired: [phone])    1. Medications:  - Resume your home medications. Take pain medications when needed as indicated.  - Use nasal saline spray     2. Wound care:  - Use nasal sling to catch drainage as needed    3. For the next week, no heavy lifting more than 15 pounds, no strenuous activities. No nose blowing. Sneeze with your mouth open.    4. Please call MD or come to the ED for shortness of breath, trouble breathing, inability to tolerate liquids, or signs of infection such as fevers or redness.    Call the [phone] clinic number if you have any questions and concerns during the date and call [phone] at night and ask for \"ENT resident on call\".    6. Follow up with Dr. Renteria as previously scheduled on 2/28/17    "

## 2017-02-06 NOTE — ANESTHESIA POSTPROCEDURE EVALUATION
Patient: Micah Nunez    Procedure(s):  Excision of left intranasal papillomas, Right nasal endoscopy - Wound Class: II-Clean Contaminated    Diagnosis:Nasal Pappiloma  Diagnosis Additional Information: No value filed.    Anesthesia Type:  MAC    Note:  Anesthesia Post Evaluation    Patient location during evaluation: Phase 2  Patient participation: Able to fully participate in evaluation  Level of consciousness: awake and alert  Pain management: adequate  Airway patency: patent  Cardiovascular status: acceptable  Respiratory status: acceptable  Hydration status: acceptable  PONV: none     Anesthetic complications: None          Last vitals:  Filed Vitals:    02/06/17 0918 02/06/17 0933 02/06/17 0951   BP: 128/78 132/96 141/92   Pulse:      Temp: 36.5  C (97.7  F) 36.3  C (97.4  F) 36.7  C (98.1  F)   Resp: 16 16 16   SpO2: 96% 96% 97%         Electronically Signed By: Jordan Cheema DO  February 6, 2017  10:29 AM

## 2017-02-06 NOTE — IP AVS SNAPSHOT
MRN:[id number]                      After Visit Summary   2/6/2017    Micah Nunez    MRN: [id number]           Thank you!     Thank you for choosing [facility] for your care. Our goal is always to provide you with excellent care. Hearing back from our patients is one way we can continue to improve our services. Please take a few minutes to complete the written survey that you may receive in the mail after you visit with us. Thank you!        Patient Information     Date Of Birth          1965        About your hospital stay     You were admitted on:  February 6, 2017 You last received care in the:  [facility]    You were discharged on:  February 6, 2017       Who to Call     For medical emergencies, please call 911.  For non-urgent questions about your medical care, please call your primary care provider or clinic, [phone]  For questions related to your surgery, please call your surgery clinic        Attending Provider     Provider    Bobo Renteria MD       Primary Care Provider Office Phone # Fax #    Rajeshphi Vanita White [phone]       [place] [address]        After Care Instructions     Diet Instructions       Resume pre procedure diet            Discharge Instructions - Lifting restrictions       Lifting Restrictions 15 pounds until seen at Post-op follow up appointment                  Your next 10 appointments already scheduled     Feb 28, 2017  2:00 PM   (Arrive by 1:45 PM)   Return Visit with Bobo Renteria MD   [facility] ([facility])    [address] [address] [phone]              Further instructions from your care team       [facility]  Home Care Following Anesthesia  For 24 hours after surgery:  1. Get plenty of rest.  A responsible adult  must stay with you for at least 24 hours after you leave the surgery center.  2. Do not drive or use heavy equipment.  If you have weakness or tingling, don't drive or use heavy equipment until this feeling goes away.   3. Do not drink alcohol.   4. Avoid strenuous or risky activities.  Ask for help when climbing stairs.  5. You may feel lightheaded.  IF so, sit for a few minutes before standing.  Have someone help you get up.   6. If you have nausea (feel sick to your stomach): Drink only clear liquids such as apple juice, ginger ale, broth or 7-Up.  Rest may also help.  Be sure to drink enough fluids.  Move to a regular diet as you feel able.   7. You may have a slight fever.  Call the doctor if your fever is over 100 F (37.7 C) (taken under the tongue) or lasts longer than 24 hours.  8. You may have a dry mouth, a sore throat, muscle aches or trouble sleeping. These should go away after 24 hours.  9. Do not make important or legal decisions.   Tips for taking pain medications  To get the best pain relief possible, remember these points:    Take pain medications as directed, before pain becomes severe.    Pain medication can upset your stomach: taking it with food may help.    Constipation is a common side effect of pain medication. Drink plenty of  fluids.    Eat foods high in fiber. Take a stool softener if recommended by your doctor or pharmacist.    Do not drink alcohol, drive or operate machinery while taking pain medications.    Ask about other ways to control pain, such as with heat, ice or relaxation.    Call a doctor for any of the followin. Signs of infection (fever, growing tenderness at the surgery site, a large amount of drainage or bleeding, severe pain, foul-smelling drainage, redness, swelling).  2. It has been over 8 to 10 hours since surgery and you are still not able to urinate (pass water).  3. Headache for over 24 hours.    Your doctor is:   Dr. Bobo Renteria, ENT Otolaryngology:  "[phone]               Or dial [phone] and ask for the resident on call for:  ENT Otolaryngology  For emergency care, call the:  [facility]:  [phone] (TTY for hearing impaired: [phone])    1. Medications:  - Resume your home medications. Take pain medications when needed as indicated.  - Use nasal saline spray     2. Wound care:  - Use nasal sling to catch drainage as needed    3. For the next week, no heavy lifting more than 15 pounds, no strenuous activities. No nose blowing. Sneeze with your mouth open.    4. Please call MD or come to the ED for shortness of breath, trouble breathing, inability to tolerate liquids, or signs of infection such as fevers or redness.    Call the [phone] clinic number if you have any questions and concerns during the date and call [phone] at night and ask for \"ENT resident on call\".    6. Follow up with Dr. Renteria as previously scheduled on 2/28/17      Pending Results     No orders found from 2/5/2017 to 2/7/2017.            Admission Information        Provider Department Dept Phone    2/6/2017 Bobo Renteria MD  [facility] [phone]      Your Vitals Were     Blood Pressure Pulse Temperature    128/91 mmHg 75 97.6  F (36.4  C) (Oral)    Respirations Height Weight    16 1.905 m (6' 3\") 88.451 kg (195 lb)    BMI (Body Mass Index) Pulse Oximetry       24.37 kg/m2 95%       [employer] Information     [employer] is an electronic gateway that provides easy, online access to your medical records. With [employer] you can request a clinic appointment, read your test results, renew a prescription or communicate with your care team.     To sign up for [employer] visit the website at [email or web address]   You will be asked to enter the access code listed below, as well as some personal information. Please follow the directions to create your username and password.     Your access code is: [email or web address]  Expires: 4/30/2017  9:59 AM     Your " access code will  in 90 days. If you need help or a new code, please contact your [facility] or call [phone] for assistance.        Care EveryWhere ID     This is your Care EveryWhere ID. This could be used by other organizations to access your [facility] medical records  [id number]           Review of your medicines      START taking        Dose / Directions    acetaminophen 325 MG tablet   Commonly known as:  TYLENOL   Used for:  Papilloma of nose        Dose:  325-650 mg   Take 1-2 tablets (325-650 mg) by mouth every 4 hours as needed for other (mild pain)   Quantity:  20 tablet   Refills:  0       oxyCODONE 5 MG IR tablet   Commonly known as:  ROXICODONE   Used for:  Papilloma of nose        Dose:  5-10 mg   Take 1-2 tablets (5-10 mg) by mouth every 4 hours as needed for pain maximum 12 tablet(s) per day   Quantity:  20 tablet   Refills:  0       sodium chloride 0.65 % nasal spray   Commonly known as:  OCEAN NASAL SPRAY   Used for:  Papilloma of nose        Dose:  2 spray   Spray 2 sprays into both nostrils 3 times daily   Quantity:  30 mL   Refills:  0         CONTINUE these medicines which have NOT CHANGED        Dose / Directions    benzonatate 100 MG capsule   Commonly known as:  TESSALON   Used for:  Chronic cough        Dose:  100 mg   Take 1 capsule (100 mg) by mouth 3 times daily as needed for cough   Quantity:  42 capsule   Refills:  1       * triamcinolone 0.1 % ointment   Commonly known as:  KENALOG   Used for:  Rash and nonspecific skin eruption        Apply sparingly to affected area daily   Quantity:  30 g   Refills:  0       * triamcinolone 0.1 % ointment   Commonly known as:  KENALOG   Used for:  Atopic dermatitis, unspecified type        Apply sparingly to affected area three times daily for 14 days.   Quantity:  80 g   Refills:  0       * Notice:  This list has 2 medication(s) that are the same as other medications prescribed for you. Read the  directions carefully, and ask your doctor or other care provider to review them with you.         Where to get your medicines      These medications were sent to [facility] [place], [place] - [address]  [address], [place] [address]    Hours:  TRANSPLANT PHONE NUMBER [phone] Phone:  [phone]    - acetaminophen 325 MG tablet  - sodium chloride 0.65 % nasal spray      Some of these will need a paper prescription and others can be bought over the counter. Ask your nurse if you have questions.     Bring a paper prescription for each of these medications    - oxyCODONE 5 MG IR tablet             Protect others around you: Learn how to safely use, store and throw away your medicines at www.disposemymeds.org.             Medication List: This is a list of all your medications and when to take them. Check marks below indicate your daily home schedule. Keep this list as a reference.      Medications           Morning Afternoon Evening Bedtime As Needed    acetaminophen 325 MG tablet   Commonly known as:  TYLENOL   Take 1-2 tablets (325-650 mg) by mouth every 4 hours as needed for other (mild pain)                                benzonatate 100 MG capsule   Commonly known as:  TESSALON   Take 1 capsule (100 mg) by mouth 3 times daily as needed for cough                                oxyCODONE 5 MG IR tablet   Commonly known as:  ROXICODONE   Take 1-2 tablets (5-10 mg) by mouth every 4 hours as needed for pain maximum 12 tablet(s) per day                                sodium chloride 0.65 % nasal spray   Commonly known as:  OCEAN NASAL SPRAY   Spray 2 sprays into both nostrils 3 times daily                                * triamcinolone 0.1 % ointment   Commonly known as:  KENALOG   Apply sparingly to affected area daily                                * triamcinolone 0.1 % ointment   Commonly known as:  KENALOG   Apply sparingly to affected area three  times daily for 14 days.                                * Notice:  This list has 2 medication(s) that are the same as other medications prescribed for you. Read the directions carefully, and ask your doctor or other care provider to review them with you.

## 2017-02-08 ENCOUNTER — TELEPHONE (OUTPATIENT)
Dept: OTOLARYNGOLOGY | Facility: CLINIC | Age: 52
End: 2017-02-08

## 2017-02-08 DIAGNOSIS — G89.18 POSTOPERATIVE PAIN: Primary | ICD-10-CM

## 2017-02-08 RX ORDER — OXYCODONE HYDROCHLORIDE 5 MG/1
TABLET ORAL
Qty: 20 TABLET | Refills: 0 | Status: SHIPPED | OUTPATIENT
Start: 2017-02-08 | End: 2017-03-21

## 2017-02-08 NOTE — TELEPHONE ENCOUNTER
Patient calls stating he is still experiencing significant pain-rates his pain as 7/10 on Pain Scale. He is s/p removal of papilloma of nose on 2/6/17 by Dr. Bobo Renteria. Patient states Dr. Renteria has performed surgery on him in the past and has always prescribed qty of 40 tabs oxycodone. Our Resident Romeo Nicole MD prescribed qty of 20. Ok per Dr. Renteria for 20 additional tabs. Rx taken down to our Pharmacy and patient informed he will need come to [facility] for his Oxycodone as Pharmacy requires a paper copy of narcotics.    Winter Rosa, RN Care Coordinator  [facility]  Direct contact: [phone]

## 2017-02-09 LAB — COPATH REPORT: NORMAL

## 2017-02-28 ENCOUNTER — OFFICE VISIT (OUTPATIENT)
Dept: OTOLARYNGOLOGY | Facility: CLINIC | Age: 52
End: 2017-02-28

## 2017-02-28 VITALS — WEIGHT: 196 LBS | BODY MASS INDEX: 24.5 KG/M2

## 2017-02-28 DIAGNOSIS — J34.89 NASAL VESTIBULITIS: Primary | ICD-10-CM

## 2017-02-28 RX ORDER — MUPIROCIN 20 MG/G
OINTMENT TOPICAL
Qty: 22 G | Refills: 0 | Status: SHIPPED | OUTPATIENT
Start: 2017-02-28 | End: 2017-03-10

## 2017-02-28 ASSESSMENT — PAIN SCALES - GENERAL: PAINLEVEL: MILD PAIN (3)

## 2017-02-28 NOTE — NURSING NOTE
Chief Complaint   Patient presents with     RECHECK     powt op     Zenia Orourke Medical Assistant

## 2017-02-28 NOTE — MR AVS SNAPSHOT
After Visit Summary   2017    Micah Nunez    MRN: [id number]           Patient Information     Date Of Birth          1965        Visit Information        Provider Department      2017 2:00 PM Bobo Renteria MD [facility]        Today's Diagnoses     Nasal vestibulitis    -  1       Follow-ups after your visit        Who to contact     Please call your clinic at [phone] to:    Ask questions about your health    Make or cancel appointments    Discuss your medicines    Learn about your test results    Speak to your doctor   If you have compliments or concerns about an experience at your clinic, or if you wish to file a complaint, please contact [facility] Patient Relations at [phone] or email us at [email or web address]         Additional Information About Your Visit        MyChart Information     [employer] is an electronic gateway that provides easy, online access to your medical records. With [employer], you can request a clinic appointment, read your test results, renew a prescription or communicate with your care team.     To sign up for [facility] visit the website at [email or web address]   You will be asked to enter the access code listed below, as well as some personal information. Please follow the directions to create your username and password.     Your access code is: [email or web address]  Expires: 2017  9:59 AM     Your access code will  in 90 days. If you need help or a new code, please contact your [facility] or call [phone] for assistance.        Care EveryWhere ID     This is your Care EveryWhere ID. This could be used by other organizations to access your [place] medical records  [id number]        Your Vitals Were     BMI (Body Mass Index)                   24.5 kg/m2            Blood Pressure from Last 3 Encounters:   17 (!) 141/92   17  135/81   01/14/16 144/86    Weight from Last 3 Encounters:   02/28/17 88.9 kg (196 lb)   02/06/17 88.5 kg (195 lb)   01/30/17 88.5 kg (195 lb)              Today, you had the following     No orders found for display         Today's Medication Changes          These changes are accurate as of: 2/28/17  2:24 PM.  If you have any questions, ask your nurse or doctor.               Start taking these medicines.        Dose/Directions    mupirocin 2 % ointment   Commonly known as:  BACTROBAN   Used for:  Nasal vestibulitis   Started by:  Bobo Renteria MD        Apply a small amount to affected ear 2 times a day for 10 days   Quantity:  22 g   Refills:  0            Where to get your medicines      These medications were sent to [facility] [address] [address]     Phone:  [phone]     mupirocin 2 % ointment                Primary Care Provider Office Phone # Fax #    Yovany White [phone]       [address] [address]        Thank you!     Thank you for choosing [facility]  for your care. Our goal is always to provide you with excellent care. Hearing back from our patients is one way we can continue to improve our services. Please take a few minutes to complete the written survey that you may receive in the mail after your visit with us. Thank you!             Your Updated Medication List - Protect others around you: Learn how to safely use, store and throw away your medicines at www.disposemymeds.org.          This list is accurate as of: 2/28/17  2:24 PM.  Always use your most recent med list.                   Brand Name Dispense Instructions for use    acetaminophen 325 MG tablet    TYLENOL    20 tablet    Take 1-2 tablets (325-650 mg) by mouth every 4 hours as needed for other (mild pain)       benzonatate 100 MG capsule    TESSALON    42 capsule     Take 1 capsule (100 mg) by mouth 3 times daily as needed for cough       mupirocin 2 % ointment    BACTROBAN    22 g    Apply a small amount to affected ear 2 times a day for 10 days       * oxyCODONE 5 MG IR tablet    ROXICODONE    20 tablet    Take 1-2 tablets (5-10 mg) by mouth every 4 hours as needed for pain maximum 12 tablet(s) per day       * oxyCODONE 5 MG IR tablet    ROXICODONE    20 tablet    Take 1-2 tabs(5-10mg)Q4H PRN pain. Maximum of 12 tabs per day       sodium chloride 0.65 % nasal spray    OCEAN NASAL SPRAY    30 mL    Spray 2 sprays into both nostrils 3 times daily       * triamcinolone 0.1 % ointment    KENALOG    30 g    Apply sparingly to affected area daily       * triamcinolone 0.1 % ointment    KENALOG    80 g    Apply sparingly to affected area three times daily for 14 days.       * Notice:  This list has 4 medication(s) that are the same as other medications prescribed for you. Read the directions carefully, and ask your doctor or other care provider to review them with you.

## 2017-02-28 NOTE — LETTER
2/28/2017       RE: Micah Nunez  [address]  [place] [address]     Dear Colleague,    Thank you for referring your patient, Micah Nunez, to the [facility] at [facility]. Please see a copy of my visit note below.    HISTORY OF PRESENT ILLNESS:  Micah Nunez is a pleasant gentleman who is 51 years of age.  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  We did local resection again on him in the  nasal vestibule area.  These were removed.  He has had a little bit of bleeding since the time of surgery, but no other masses or lesions or difficulty breathing through his nose has occurred.      PHYSICAL EXAMINATION:  The patient is alert, oriented x3 and is very pleasant to interact with.  Extraocular motions are intact.  Sclerae are anicteric.  Oral cavity is clear.  Nasal cavity is examined on the left side, and there is some crusting that I see in there.      PROCEDURE:  I sprayed it out with lidocaine and Afrin and then went ahead and used a fair amount of bacitracin inside the nose as well to cover over these areas that were dried out.  He tolerated this very well.  Neck exam is negative as well.      ASSESSMENT:  Patient with a history of inverting papillomas, doing well.      PLAN:  I will see him again in three months.         Again, thank you for allowing me to participate in the care of your patient.      Sincerely,    Bobo Renteria MD

## 2017-02-28 NOTE — PROGRESS NOTES
HISTORY OF PRESENT ILLNESS:  Micah Nunez is a pleasant gentleman who is 51 years of age.  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  We did local resection again on him in the  nasal vestibule area.  These were removed.  He has had a little bit of bleeding since the time of surgery, but no other masses or lesions or difficulty breathing through his nose has occurred.      PHYSICAL EXAMINATION:  The patient is alert, oriented x3 and is very pleasant to interact with.  Extraocular motions are intact.  Sclerae are anicteric.  Oral cavity is clear.  Nasal cavity is examined on the left side, and there is some crusting that I see in there.      PROCEDURE:  I sprayed it out with lidocaine and Afrin and then went ahead and used a fair amount of bacitracin inside the nose as well to cover over these areas that were dried out.  He tolerated this very well.  Neck exam is negative as well.      ASSESSMENT:  Patient with a history of inverting papillomas, doing well.      PLAN:  I will see him again in three months.

## 2017-03-21 ENCOUNTER — HOSPITAL ENCOUNTER (EMERGENCY)
Facility: CLINIC | Age: 52
Discharge: HOME | End: 2017-03-21
Attending: EMERGENCY MEDICINE | Admitting: EMERGENCY MEDICINE
Payer: COMMERCIAL

## 2017-03-21 VITALS
DIASTOLIC BLOOD PRESSURE: 97 MMHG | HEART RATE: 94 BPM | SYSTOLIC BLOOD PRESSURE: 146 MMHG | OXYGEN SATURATION: 97 % | WEIGHT: 195 LBS | RESPIRATION RATE: 18 BRPM | BODY MASS INDEX: 24.37 KG/M2 | TEMPERATURE: 97.5 F

## 2017-03-21 DIAGNOSIS — L03.116 CELLULITIS OF LEFT LOWER EXTREMITY: ICD-10-CM

## 2017-03-21 LAB
ANION GAP SERPL CALCULATED.3IONS-SCNC: 6 MMOL/L (ref 3–14)
BASOPHILS # BLD AUTO: 0 10E9/L (ref 0–0.2)
BASOPHILS NFR BLD AUTO: 0.3 %
BUN SERPL-MCNC: 14 MG/DL (ref 7–30)
CALCIUM SERPL-MCNC: 8.4 MG/DL (ref 8.5–10.1)
CHLORIDE SERPL-SCNC: 105 MMOL/L (ref 94–109)
CO2 SERPL-SCNC: 30 MMOL/L (ref 20–32)
CREAT SERPL-MCNC: 0.98 MG/DL (ref 0.66–1.25)
DIFFERENTIAL METHOD BLD: NORMAL
EOSINOPHIL # BLD AUTO: 0.1 10E9/L (ref 0–0.7)
EOSINOPHIL NFR BLD AUTO: 0.8 %
ERYTHROCYTE [DISTWIDTH] IN BLOOD BY AUTOMATED COUNT: 13.8 % (ref 10–15)
GFR SERPL CREATININE-BSD FRML MDRD: 80 ML/MIN/1.7M2
GLUCOSE SERPL-MCNC: 95 MG/DL (ref 70–99)
HCT VFR BLD AUTO: 44 % (ref 40–53)
HGB BLD-MCNC: 14.6 G/DL (ref 13.3–17.7)
IMM GRANULOCYTES # BLD: 0 10E9/L (ref 0–0.4)
IMM GRANULOCYTES NFR BLD: 0.2 %
LYMPHOCYTES # BLD AUTO: 2.3 10E9/L (ref 0.8–5.3)
LYMPHOCYTES NFR BLD AUTO: 21.5 %
MCH RBC QN AUTO: 29.7 PG (ref 26.5–33)
MCHC RBC AUTO-ENTMCNC: 33.2 G/DL (ref 31.5–36.5)
MCV RBC AUTO: 89 FL (ref 78–100)
MONOCYTES # BLD AUTO: 0.8 10E9/L (ref 0–1.3)
MONOCYTES NFR BLD AUTO: 7.6 %
NEUTROPHILS # BLD AUTO: 7.3 10E9/L (ref 1.6–8.3)
NEUTROPHILS NFR BLD AUTO: 69.6 %
PLATELET # BLD AUTO: 245 10E9/L (ref 150–450)
POTASSIUM SERPL-SCNC: 3.7 MMOL/L (ref 3.4–5.3)
RBC # BLD AUTO: 4.92 10E12/L (ref 4.4–5.9)
SODIUM SERPL-SCNC: 141 MMOL/L (ref 133–144)
WBC # BLD AUTO: 10.5 10E9/L (ref 4–11)

## 2017-03-21 PROCEDURE — 99284 EMERGENCY DEPT VISIT MOD MDM: CPT | Mod: 25

## 2017-03-21 PROCEDURE — 85025 COMPLETE CBC W/AUTO DIFF WBC: CPT | Performed by: EMERGENCY MEDICINE

## 2017-03-21 PROCEDURE — 80048 BASIC METABOLIC PNL TOTAL CA: CPT | Performed by: EMERGENCY MEDICINE

## 2017-03-21 PROCEDURE — 96374 THER/PROPH/DIAG INJ IV PUSH: CPT

## 2017-03-21 PROCEDURE — 99284 EMERGENCY DEPT VISIT MOD MDM: CPT | Performed by: EMERGENCY MEDICINE

## 2017-03-21 PROCEDURE — 25000128 H RX IP 250 OP 636: Performed by: EMERGENCY MEDICINE

## 2017-03-21 RX ORDER — CEFAZOLIN SODIUM 2 G/100ML
2 INJECTION, SOLUTION INTRAVENOUS ONCE
Status: COMPLETED | OUTPATIENT
Start: 2017-03-21 | End: 2017-03-21

## 2017-03-21 RX ORDER — CEPHALEXIN 500 MG/1
1000 CAPSULE ORAL 4 TIMES DAILY
Qty: 56 CAPSULE | Refills: 0 | Status: SHIPPED | OUTPATIENT
Start: 2017-03-21 | End: 2017-03-28

## 2017-03-21 RX ADMIN — CEFAZOLIN SODIUM 2 G: 2 INJECTION, SOLUTION INTRAVENOUS at 21:00

## 2017-03-21 NOTE — ED AVS SNAPSHOT
[facility]    [address] [facility] [address]    Phone:  [phone]    Fax:  [phone]                                       Micah Nunez   MRN: [id number]    Department:  [facility]   Date of Visit:  3/21/2017           Patient Information     Date Of Birth          1965        Your diagnoses for this visit were:     Cellulitis of left lower extremity        You were seen by Trent Graves MD.        Discharge Instructions         Discharge Instructions for Cellulitis  You have been diagnosed with cellulitis. This is an infection in the deepest layer of the skin. In some cases, the infection also affects the muscle. Cellulitis is caused by bacteria. The bacteria can enter the body through broken skin. This can happen with a cut, scratch, animal bite, or an insect bite that has been scratched. You may have been treated in the hospital with antibiotics and fluids. You will likely be given a prescription for antibiotics to take at home. This sheet will help you take care of yourself at home.  Home Care  When you are home:    Take the prescribed antibiotic medication you are given as directed until it is gone. Take it even if you feel better. It treats the infection and stops it from returning. Not taking all of the medication can make future infections hard to treat.    Keep the infected area clean.    When possible, raise the infected area above the level of your heart. This helps keep swelling down.    Talk to your doctor if you are in pain. Ask what kind of over-the-counter medication you can take for pain.    Apply clean bandages as advised.    Take your temperature once a day for a week.    Wash your hands often to prevent spreading the infection.  In the future, wash your hands before and after you touch cuts, scratches, or bandages. This will help prevent infection.   When to Call Your Doctor  Call your doctor immediately if you  have any of the following:    Vomiting    Fever of100.4 F (38 C) or higher, or as directed by your health care provider    Shaking chills    Redness that gets worse in or around the infected area    Swelling of the infected area    Pain that gets worse in or around the infected area    Difficulty or pain when moving the joints above or below the infected area    Discharge or pus draining from the area     1589-7101 [employer]. [address] [place] [address], [place] [address]. All rights reserved. This information is not intended as a substitute for professional medical care. Always follow your healthcare professional's instructions.      Warm packs 5-6 times daily    24 Hour Appointment Hotline       To make an appointment at any [facility] clinic, call [phone] ([phone]). If you don't have a family doctor or clinic, we will help you find one. [facility] clinics are conveniently located to serve the needs of you and your family.             Review of your medicines      START taking        Dose / Directions Last dose taken    cephALEXin 500 MG capsule   Commonly known as:  KEFLEX   Dose:  1000 mg   Quantity:  56 capsule        Take 2 capsules (1,000 mg) by mouth 4 times daily for 7 days   Refills:  0          Our records show that you are taking the medicines listed below. If these are incorrect, please call your family doctor or clinic.        Dose / Directions Last dose taken    acetaminophen 325 MG tablet   Commonly known as:  TYLENOL   Dose:  325-650 mg   Quantity:  20 tablet        Take 1-2 tablets (325-650 mg) by mouth every 4 hours as needed for other (mild pain)   Refills:  0        triamcinolone 0.1 % ointment   Commonly known as:  KENALOG   Quantity:  80 g        Apply sparingly to affected area three times daily for 14 days.   Refills:  0                Prescriptions were sent or printed at these locations (1 Prescription)                   [facility] [address]  [facility]   [address] [facility] [address]    Telephone:  [phone]   Fax:  [phone]   Hours:                  E-Prescribed (1 of 1)         cephALEXin (KEFLEX) 500 MG capsule                Procedures and tests performed during your visit     Basic metabolic panel    CBC with platelets, differential      Orders Needing Specimen Collection     None      Pending Results     No orders found from 3/19/2017 to 3/22/2017.            Pending Culture Results     No orders found from 3/19/2017 to 3/22/2017.             Test Results from your hospital stay     3/21/2017  9:02 PM - Interface, Flexilab Results      Component Results     Component Value Ref Range & Units Status    WBC 10.5 4.0 - 11.0 10e9/L Final    RBC Count 4.92 4.4 - 5.9 10e12/L Final    Hemoglobin 14.6 13.3 - 17.7 g/dL Final    Hematocrit 44.0 40.0 - 53.0 % Final    MCV 89 78 - 100 fl Final    MCH 29.7 26.5 - 33.0 pg Final    MCHC 33.2 31.5 - 36.5 g/dL Final    RDW 13.8 10.0 - 15.0 % Final    Platelet Count 245 150 - 450 10e9/L Final    Diff Method Automated Method  Final    % Neutrophils 69.6 % Final    % Lymphocytes 21.5 % Final    % Monocytes 7.6 % Final    % Eosinophils 0.8 % Final    % Basophils 0.3 % Final    % Immature Granulocytes 0.2 % Final    Absolute Neutrophil 7.3 1.6 - 8.3 10e9/L Final    Absolute Lymphocytes 2.3 0.8 - 5.3 10e9/L Final    Absolute Monocytes 0.8 0.0 - 1.3 10e9/L Final    Absolute Eosinophils 0.1 0.0 - 0.7 10e9/L Final    Absolute Basophils 0.0 0.0 - 0.2 10e9/L Final    Abs Immature Granulocytes 0.0 0 - 0.4 10e9/L Final         3/21/2017  9:16 PM - Interface, Flexilab Results      Component Results     Component Value Ref Range & Units Status    Sodium 141 133 - 144 mmol/L Final    Potassium 3.7 3.4 - 5.3 mmol/L Final    Chloride 105 94 - 109 mmol/L Final    Carbon Dioxide 30 20 - 32 mmol/L Final    Anion Gap 6 3 - 14 mmol/L Final    Glucose 95 70 - 99 mg/dL Final    Urea Nitrogen 14 7 - 30 mg/dL Final     "Creatinine 0.98 0.66 - 1.25 mg/dL Final    GFR Estimate 80 >60 mL/min/1.7m2 Final    Non  GFR Calc    GFR Estimate If Black >90   GFR Calc   >60 mL/min/1.7m2 Final    Calcium 8.4 (L) 8.5 - 10.1 mg/dL Final                Thank you for choosing [facility]       Thank you for choosing [facility] for your care. Our goal is always to provide you with excellent care. Hearing back from our patients is one way we can continue to improve our services. Please take a few minutes to complete the written survey that you may receive in the mail after you visit with us. Thank you!        [employer] Information     [employer] lets you send messages to your doctor, view your test results, renew your prescriptions, schedule appointments and more. To sign up, go to [email or web address] . Click on \"Log in\" on the left side of the screen, which will take you to the Welcome page. Then click on \"Sign up Now\" on the right side of the page.     You will be asked to enter the access code listed below, as well as some personal information. Please follow the directions to create your username and password.     Your access code is: [email or web address]  Expires: 2017 10:59 AM     Your access code will  in 90 days. If you need help or a new code, please call your [facility] clinic or [phone].        Care EveryWhere ID     This is your Care EveryWhere ID. This could be used by other organizations to access your [facility] medical records  [id number]        After Visit Summary       This is your record. Keep this with you and show to your community pharmacist(s) and doctor(s) at your next visit.                  "

## 2017-03-21 NOTE — ED AVS SNAPSHOT
[facility] Emergency Department    [address] [facility] [address]    Phone:  [phone]    Fax:  [phone]                                       Micah Nunez   MRN: [id number]    Department:  [facility] Emergency Department   Date of Visit:  3/21/2017           After Visit Summary Signature Page     I have received my discharge instructions, and my questions have been answered. I have discussed any challenges I see with this plan with the nurse or doctor.    ..........................................................................................................................................  Patient/Patient Representative Signature      ..........................................................................................................................................  Patient Representative Print Name and Relationship to Patient    ..................................................               ................................................  Date                                            Time    ..........................................................................................................................................  Reviewed by Signature/Title    ...................................................              ..............................................  Date                                                            Time
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
## 2017-03-22 NOTE — DISCHARGE INSTRUCTIONS
Discharge Instructions for Cellulitis  You have been diagnosed with cellulitis. This is an infection in the deepest layer of the skin. In some cases, the infection also affects the muscle. Cellulitis is caused by bacteria. The bacteria can enter the body through broken skin. This can happen with a cut, scratch, animal bite, or an insect bite that has been scratched. You may have been treated in the hospital with antibiotics and fluids. You will likely be given a prescription for antibiotics to take at home. This sheet will help you take care of yourself at home.  Home Care  When you are home:    Take the prescribed antibiotic medication you are given as directed until it is gone. Take it even if you feel better. It treats the infection and stops it from returning. Not taking all of the medication can make future infections hard to treat.    Keep the infected area clean.    When possible, raise the infected area above the level of your heart. This helps keep swelling down.    Talk to your doctor if you are in pain. Ask what kind of over-the-counter medication you can take for pain.    Apply clean bandages as advised.    Take your temperature once a day for a week.    Wash your hands often to prevent spreading the infection.  In the future, wash your hands before and after you touch cuts, scratches, or bandages. This will help prevent infection.   When to Call Your Doctor  Call your doctor immediately if you have any of the following:    Vomiting    Fever of100.4 F (38 C) or higher, or as directed by your health care provider    Shaking chills    Redness that gets worse in or around the infected area    Swelling of the infected area    Pain that gets worse in or around the infected area    Difficulty or pain when moving the joints above or below the infected area    Discharge or pus draining from the area     0676-3838 [employer]. [address] [place] [address], [place] [address]. All rights reserved. This  information is not intended as a substitute for professional medical care. Always follow your healthcare professional's instructions.      Warm packs 5-6 times daily
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
## 2017-03-22 NOTE — ED NOTES
DC instructions reviewed with pt states understanding. Escorted pt to pharmacy to get RX. Warning signs reviewed for return to ED. Redness outlined prior to DC.

## 2017-04-05 ENCOUNTER — HOSPITAL ENCOUNTER (EMERGENCY)
Facility: CLINIC | Age: 52
Discharge: HOME | End: 2017-04-05
Attending: STUDENT IN AN ORGANIZED HEALTH CARE EDUCATION/TRAINING PROGRAM | Admitting: STUDENT IN AN ORGANIZED HEALTH CARE EDUCATION/TRAINING PROGRAM
Payer: COMMERCIAL

## 2017-04-05 VITALS
TEMPERATURE: 97.7 F | RESPIRATION RATE: 16 BRPM | WEIGHT: 200 LBS | OXYGEN SATURATION: 98 % | HEIGHT: 76 IN | HEART RATE: 85 BPM | SYSTOLIC BLOOD PRESSURE: 155 MMHG | BODY MASS INDEX: 24.36 KG/M2 | DIASTOLIC BLOOD PRESSURE: 98 MMHG

## 2017-04-05 DIAGNOSIS — K40.90 UNILATERAL INGUINAL HERNIA WITHOUT OBSTRUCTION OR GANGRENE, RECURRENCE NOT SPECIFIED: ICD-10-CM

## 2017-04-05 PROCEDURE — 99283 EMERGENCY DEPT VISIT LOW MDM: CPT | Performed by: STUDENT IN AN ORGANIZED HEALTH CARE EDUCATION/TRAINING PROGRAM

## 2017-04-05 PROCEDURE — 99282 EMERGENCY DEPT VISIT SF MDM: CPT

## 2017-04-05 RX ORDER — HYDROCODONE BITARTRATE AND ACETAMINOPHEN 5; 325 MG/1; MG/1
1-2 TABLET ORAL EVERY 4 HOURS PRN
Qty: 12 TABLET | Refills: 0 | Status: SHIPPED | OUTPATIENT
Start: 2017-04-05 | End: 2017-04-13

## 2017-04-05 NOTE — ED AVS SNAPSHOT
[facility] Emergency [facility]    [address] [facility] [address]    Phone:  [phone]    Fax:  [phone]                                       Micah Nunez   MRN: [id number]    Department:  [facility]   Date of Visit:  4/5/2017           Patient Information     Date Of Birth          1965        Your diagnoses for this visit were:     Unilateral inguinal hernia without obstruction or gangrene, recurrence not specified        You were seen by Frandy La DO.      Follow-up Information     Follow up with [facility] In 3 days.    Specialty:  Surgery    Why:  Followup for reevaluation and managment plan.    Contact information:    [address] [place] [address] [phone]    Additional information:    The medical center is located at   [address] [facility] (between [address] and   [address] in [place], [place] miles north   of [place]).      Discharge References/Attachments     HERNIA (ADULT) (ENGLISH)      24 Hour Appointment Hotline       To make an appointment at any [facility] clinic, call [phone] ([phone]). If you don't have a family doctor or clinic, we will help you find one. [facility] clinics are conveniently located to serve the needs of you and your family.          ED Discharge Orders     GENERAL SURG ADULT REFERRAL       Your provider has referred you to: [facility]: [facility] [place] [phone]   [email or web address]    Please be aware that coverage of these services is subject to the terms and limitations of your health insurance plan.  Call member services at your health plan with any benefit or coverage questions.      Please bring the following with you to your appointment:    (1) Any X-Rays, CTs or MRIs which have been performed.  Contact the facility where they were done to arrange for  prior to your scheduled appointment.   (2) List of  current medications   (3) This referral request   (4) Any documents/labs given to you for this referral                     Review of your medicines      START taking        Dose / Directions Last dose taken    HYDROcodone-acetaminophen 5-325 MG per tablet   Commonly known as:  NORCO   Dose:  1-2 tablet   Quantity:  12 tablet        Take 1-2 tablets by mouth every 4 hours as needed for moderate to severe pain   Refills:  0          Our records show that you are taking the medicines listed below. If these are incorrect, please call your family doctor or clinic.        Dose / Directions Last dose taken    acetaminophen 325 MG tablet   Commonly known as:  TYLENOL   Dose:  325-650 mg   Quantity:  20 tablet        Take 1-2 tablets (325-650 mg) by mouth every 4 hours as needed for other (mild pain)   Refills:  0        triamcinolone 0.1 % ointment   Commonly known as:  KENALOG   Quantity:  80 g        Apply sparingly to affected area three times daily for 14 days.   Refills:  0                Prescriptions were sent or printed at these locations (1 Prescription)                   Other Prescriptions                Printed at Department/Unit printer (1 of 1)         HYDROcodone-acetaminophen (NORCO) 5-325 MG per tablet                Orders Needing Specimen Collection     None      Pending Results     No orders found from 4/3/2017 to 4/6/2017.            Pending Culture Results     No orders found from 4/3/2017 to 4/6/2017.            Test Results From Your Hospital Stay               Thank you for choosing [facility]       Thank you for choosing [facility] for your care. Our goal is always to provide you with excellent care. Hearing back from our patients is one way we can continue to improve our services. Please take a few minutes to complete the written survey that you may receive in the mail after you visit with us. Thank you!        [employer] Information     [employer] lets you send messages to your doctor, view your test  "results, renew your prescriptions, schedule appointments and more. To sign up, go to [email or web address] . Click on \"Log in\" on the left side of the screen, which will take you to the Welcome page. Then click on \"Sign up Now\" on the right side of the page.     You will be asked to enter the access code listed below, as well as some personal information. Please follow the directions to create your username and password.     Your access code is: [email or web address]  Expires: 2017 10:59 AM     Your access code will  in 90 days. If you need help or a new code, please call your [facility] clinic or [phone].        Care EveryWhere ID     This is your Care EveryWhere ID. This could be used by other organizations to access your [facility] medical records  [id number]        After Visit Summary       This is your record. Keep this with you and show to your community pharmacist(s) and doctor(s) at your next visit.                  "

## 2017-04-05 NOTE — ED AVS SNAPSHOT
[facility] Emergency Department    [address] [facility] [address]    Phone:  [phone]    Fax:  [phone]                                       Micah Nunez   MRN: [id number]    Department:  [facility] Emergency Department   Date of Visit:  4/5/2017           After Visit Summary Signature Page     I have received my discharge instructions, and my questions have been answered. I have discussed any challenges I see with this plan with the nurse or doctor.    ..........................................................................................................................................  Patient/Patient Representative Signature      ..........................................................................................................................................  Patient Representative Print Name and Relationship to Patient    ..................................................               ................................................  Date                                            Time    ..........................................................................................................................................  Reviewed by Signature/Title    ...................................................              ..............................................  Date                                                            Time

## 2017-04-06 NOTE — ED PROVIDER NOTES
History     Chief Complaint   Patient presents with     Abdominal Pain     Pt noticed bulge around umbilicus - now with pain and discomfort - states waxes and wanes with heavy lifting etc.      HPI  Micah Nunez is a 51 year old male who presents for evaluation of intermittent left lower abdominal pain with palpable bulge. Patient explains that over the past month he has noticed occasional bold from a his left inguinal region, sometimes painful and tender to palpation, but not actively causing discomfort. Symptoms seemingly correlate with ambulatory activities but does not notice exacerbation with heavy lifting. He denies extension of abdominal bulge into the scrotal or testicular region. He feels somewhat constipated but yet regular frequency of bowel movements without blood. He denies fever/chills, upper abdominal pain, nausea/vomiting, testicular pain or genitourinary symptoms. Of note, he denies history of abdominal surgeries.    I have reviewed the Medications, Allergies, Past Medical and Surgical History, and Social History in the Epic system.    Patient Active Problem List   Diagnosis     CARDIOVASCULAR SCREENING; LDL GOAL LESS THAN 130     Nasal mass     Papilloma of nose     Chemical dependency (H)     Tobacco use disorder       Past Surgical History:   Procedure Laterality Date     ARTHRODESIS FOOT Left 2/17/2015    Procedure: ARTHRODESIS FOOT;  Surgeon: Cortez Hayward DPM;  Location: [facility]     ARTHRODESIS TOE(S)  12/20/2013    Procedure: ARTHRODESIS TOE(S);  Arthrodesis first metatarsophalangeal joint left foot;  Surgeon: Cortez Hayward DPM;  Location: [facility]     ENDOSCOPIC SINUS SURGERY  1/6/2014    Procedure: ENDOSCOPIC SINUS SURGERY;  Functional Endoscopic Sinus Surgery;  Surgeon: Bobo Renteria MD;  [facility]     ENDOSCOPIC SINUS SURGERY  7/21/2014    Procedure: ENDOSCOPIC SINUS SURGERY;  Surgeon: Bobo Renteria MD;  [facility]     ENDOSCOPIC SINUS  SURGERY N/A 4/6/2015    Procedure: ENDOSCOPIC SINUS SURGERY;  Surgeon: Bobo Renteria MD;  [facility]     ENDOSCOPIC SINUS SURGERY N/A 8/17/2015    Procedure: ENDOSCOPIC SINUS SURGERY;  Surgeon: Bobo Renteria MD;  [facility]     NASAL ENDOSCOPY Bilateral 2/6/2017    Procedure: NASAL ENDOSCOPY;  Surgeon: Bobo Renteria MD;  Location:  OR       Social History     Social History     Marital status: Single     Spouse name: N/A     Number of children: N/A     Years of education: N/A     Occupational History     Not on file.     Social History Main Topics     Smoking status: Current Every Day Smoker     Packs/day: 0.50     Years: 30.00     Types: Cigarettes     Smokeless tobacco: Never Used     Alcohol use No      Comment: QUIT MARCH 2016     Drug use: No      Comment: 7 years quit Cocaine/methamphetamines     Sexual activity: Not Currently     Partners: Female     Other Topics Concern     Parent/Sibling W/ Cabg, Mi Or Angioplasty Before 65f 55m? No      Service No     Blood Transfusions No     Caffeine Concern No     Occupational Exposure No     Hobby Hazards No     Sleep Concern No     Stress Concern No     Weight Concern No     Special Diet No     Back Care No     Exercise Yes     Bike Helmet No     Seat Belt Yes     Social History Narrative       Family History   Problem Relation Age of Onset     DIABETES Mother 40     C.A.D. Father 72     Unknown/Adopted No family hx of      Depression No family hx of      Anxiety Disorder No family hx of      Schizophrenia No family hx of      Bipolar Disorder No family hx of      Suicide No family hx of      Substance Abuse No family hx of      Dementia No family hx of      [place] Disease No family hx of      Parkinsonism No family hx of      Autism Spectrum Disorder No family hx of      Intellectual Disability (Mental Retardation) No family hx of      MENTAL ILLNESS No family hx of        Most Recent Immunizations   Administered  "Date(s) Administered     Influenza (IIV3) 12/01/2014     Influenza Vaccine IM 3yrs+ 4 Valent IIV4 12/03/2013     Mantoux 03/03/2014     TDAP Vaccine (Adacel) 12/03/2013       Review of Systems  Constitutional: Negative for fever or chills.  Respiratory: Negative for cough or shortness of breath.  Cardiovascular: Negative for chest pain.  Gastrointestinal: Positive for occasional palpable left lower abdominal bulge with pain. Denies nausea or vomiting.  Genitourinary: Negative for dysuria, hematuria, testicular pain or penile discharge.  Musculoskeletal: Negative for back pain or recent injuries.  Skin: Negative for rash.    All others reviewed and are negative.      Physical Exam   BP: (!) 155/98  Pulse: 85  Temp: 97.7  F (36.5  C)  Resp: 16  Height: 193 cm (6' 4\")  Weight: 90.7 kg (200 lb)  SpO2: 98 %  Physical Exam  Constitutional: Well developed, well nourished. Appears nontoxic and in no acute distress. Resting comfortably on the gurney.  Head: Normocephalic and atraumatic. Symmetric in appearance.  Eyes: Conjunctivae are normal.  Neck: Neck supple.  Cardiovascular: No cyanosis.   Respiratory: Effort normal, no respiratory distress.   Gastrointestinal: Soft, nondistended abdomen. Nontender and without guarding. No rigidity or rebound tenderness. Negative McBurney's point. Negative for Moralez's sign. No palpable pulsatile mass.  Genitourinary: Typical appearance of  penis without evidence of lesions, ulcerations, discharge, or lymphadenopathy. Positive for subtle left-sided inguinal hernia without tenderness or crepitus. No obvious hydrocele, varicocele, testicular swelling or mass. Testicles are oriented vertically. No tenderness to palpation of epididymal region.  Musculoskeletal: Moves all extremities spontaneously and without complaint.  Neuro: Patient is alert.  Skin: Skin is warm and dry, not diaphoretic.      ED Course     ED Course     Procedures             Critical Care time:  none               Labs " Ordered and Resulted from Time of ED Arrival Up to the Time of Departure from the ED - No data to display    Assessments & Plan (with Medical Decision Making)   Micah Nunez is a 51 year old male who presents to the department for evaluation of one month of left sided lower abdominal bulge with occasional discomfort. Patient appears to have a reducible left-sided inguinal hernia without signs of incarceration or strangulation. He has discomfort it generally lasts anywhere from 1-3 hours before resolving, however there is no pain or tenderness in the department. Recommend that he follow up with outpatient surgery clinic to schedule elective correction. Referral order placed.    Prior to discharge, I made it clear that illness can unexpectedly develop/progress so he has been instructed to return to the emergency department for reevaluation of evolving symptoms, fever, persistent pain, tenderness of hernia site, or other concerns. He seems comfortable with the discharge plan we discussed including follow up.    Disclaimer: This note consists of symbols derived from keyboarding, dictation, and/or voice recognition software. As a result, there may be errors in the script that have gone undetected.  Please consider this when interpreting information found in the chart.        I have reviewed the nursing notes.    I have reviewed the findings, diagnosis, plan and need for follow up with the patient.    New Prescriptions    HYDROCODONE-ACETAMINOPHEN (NORCO) 5-325 MG PER TABLET    Take 1-2 tablets by mouth every 4 hours as needed for moderate to severe pain       Final diagnoses:   Unilateral inguinal hernia without obstruction or gangrene, recurrence not specified       4/5/2017   [facility] EMERGENCY DEPARTMENT     Frandy La DO  04/05/17 2210

## 2017-04-07 ENCOUNTER — OFFICE VISIT (OUTPATIENT)
Dept: SURGERY | Facility: CLINIC | Age: 52
End: 2017-04-07
Payer: COMMERCIAL

## 2017-04-07 VITALS
SYSTOLIC BLOOD PRESSURE: 147 MMHG | TEMPERATURE: 97.4 F | WEIGHT: 200 LBS | DIASTOLIC BLOOD PRESSURE: 89 MMHG | HEART RATE: 82 BPM | BODY MASS INDEX: 24.36 KG/M2 | HEIGHT: 76 IN

## 2017-04-07 DIAGNOSIS — K40.20 BILATERAL INGUINAL HERNIA WITHOUT OBSTRUCTION OR GANGRENE, RECURRENCE NOT SPECIFIED: ICD-10-CM

## 2017-04-07 DIAGNOSIS — Z01.810 PREOP CARDIOVASCULAR EXAM: Primary | ICD-10-CM

## 2017-04-07 DIAGNOSIS — R10.32 GROIN PAIN, LEFT: ICD-10-CM

## 2017-04-07 PROCEDURE — 99204 OFFICE O/P NEW MOD 45 MIN: CPT | Performed by: SURGERY

## 2017-04-07 RX ORDER — HYDROCODONE BITARTRATE AND ACETAMINOPHEN 5; 325 MG/1; MG/1
1-2 TABLET ORAL EVERY 6 HOURS PRN
Qty: 30 TABLET | Refills: 0 | Status: SHIPPED | OUTPATIENT
Start: 2017-04-07 | End: 2017-04-07

## 2017-04-07 NOTE — MR AVS SNAPSHOT
"              After Visit Summary   4/7/2017    Micah Nunez    MRN: [id number]           Patient Information     Date Of Birth          1965        Visit Information        Provider Department      4/7/2017 9:30 AM Shay Mercado MD [facility]        Today's Diagnoses     Preop cardiovascular exam    -  1    Groin pain, left        Bilateral inguinal hernia without obstruction or gangrene, recurrence not specified          Care Instructions    Per Dr. Mercado's instructions        Follow-ups after your visit        Your next 10 appointments already scheduled     Apr 12, 2017   Procedure with Shay Mercado MD   [facility] PeriOP [facility] (--)    [address] [address] [phone]           The medical center is located at [address] (between [address] in [place], [place] miles north of [place]).              Who to contact     If you have questions or need follow up information about today's clinic visit or your schedule please contact [facility] directly at [phone].  Normal or non-critical lab and imaging results will be communicated to you by [employer], letter or phone within 4 business days after the clinic has received the results. If you do not hear from us within 7 days, please contact the clinic through [employer] or phone. If you have a critical or abnormal lab result, we will notify you by phone as soon as possible.  Submit refill requests through [employer] or call your pharmacy and they will forward the refill request to us. Please allow 3 business days for your refill to be completed.          Additional Information About Your Visit        [facility] [facility] Suite Information     [employer] lets you send messages to your doctor, view your test results, renew your prescriptions, schedule appointments and more. To sign up, go to [email or web address] . Click on \"Log in\" on the left side of the screen, which will take you to the Welcome page. " "Then click on \"Sign up Now\" on the right side of the page.     You will be asked to enter the access code listed below, as well as some personal information. Please follow the directions to create your username and password.     Your access code is: [email or web address]  Expires: 2017 10:59 AM     Your access code will  in 90 days. If you need help or a new code, please call your [facility] clinic or [phone].        Care EveryWhere ID     This is your Care EveryWhere ID. This could be used by other organizations to access your [facility] medical records  [id number]        Your Vitals Were     Pulse Temperature Height BMI (Body Mass Index)          82 97.4  F (36.3  C) (Oral) 1.93 m (6' 4\") 24.34 kg/m2         Blood Pressure from Last 3 Encounters:   17 147/89   17 (!) 155/98   17 (!) 146/97    Weight from Last 3 Encounters:   17 90.7 kg (200 lb)   17 90.7 kg (200 lb)   17 88.5 kg (195 lb)              We Performed the Following     EKG 12-lead complete w/read - Clinics        Primary Care Provider Office Phone # Fax #    Yovany White [phone]       [facility] [address] [address]        Thank you!     Thank you for choosing [facility]  for your care. Our goal is always to provide you with excellent care. Hearing back from our patients is one way we can continue to improve our services. Please take a few minutes to complete the written survey that you may receive in the mail after your visit with us. Thank you!             Your Updated Medication List - Protect others around you: Learn how to safely use, store and throw away your medicines at www.disposemymeds.org.          This list is accurate as of: 17 11:59 PM.  Always use your most recent med list.                   Brand Name Dispense Instructions for use    acetaminophen 325 MG tablet    TYLENOL    20 tablet    Take 1-2 tablets (325-650 mg) " by mouth every 4 hours as needed for other (mild pain)       HYDROcodone-acetaminophen 5-325 MG per tablet    NORCO    12 tablet    Take 1-2 tablets by mouth every 4 hours as needed for moderate to severe pain       triamcinolone 0.1 % ointment    KENALOG    80 g    Apply sparingly to affected area three times daily for 14 days.

## 2017-04-07 NOTE — LETTER
SURGERYPLANNING/SCHEDULING WORKSHEET                              [facility]  [address] [address] [phone]                          Micah Nunez                :  1965  MRN:  [id number]  Phone: [phone] (home)     Same Day Surgery   Surgeon: Shay Mercado MD  Diagnosis:   Bilateral inguinal hernias  Allergies:  Nkda [no known drug allergies]   A preoperative evaluation and physical will be done by this office.   ====================================================  Surgical Procedure:  General Surgery: Laparoscopic bilateral inguinal hernia repair  Length of Procedure:  1 hour  Type of anesthesia:  General  The proposed surgical procedure is considered LOW risk.  Date of Procedure:___17____    Time: ____9:30am__________       Special Equipment: None  Informed Consent Obtained and Signed:  NO  ====================================================  Instructions to Same Day Surgery Staff  Pneumoboots  Preop Antibiotic:  Ancef 2 gm IV  pre-op within one hour prior to incision For > 80 kg  Preop Pain Meds:  None  Preop Orders:  Routine Standing Orders.  ====================================================  Instructions to the patient:  Preop physical exam scheduled (within 30 days or 7 days prior) with:  Dr. Sifuentes___________________  Clinic:  ____________________                                         Date______________Time_________________________  Come to the hospital at: _[facility] will call with arrival time_________________  HOME PREPARATION:   Shower with Hibiclens the night before or the morning of surgery, gently cleaning skin from neck to feet  Bathe and brush teeth the morning of surgery.  Take medications with a sip of water the morning of surgery:   Check with DrJuan if taking insulin.  May have  a light meal, toast and clear liquids, up to 8 hrs before surgery  May have clear liquids (liquids  one can read through) up to 4 hrs before surgery  NOTHING after 4 hrs before surgery  Stop aspirin 7-10 days before surgery  Stop NSAIDS (Ibuproven, Naproxen, etc) 5 days before surgery  Stop Plavix 7-10 days before surgery  Need to arrange for a     Shay Mercado MD    4/7/2017  This form was electronically signed at chart closure                                                                        Chart Copy

## 2017-04-07 NOTE — PROGRESS NOTES
Asked by Dr. White to consult on patient regarding his left groin mass.    51-year-old male complaining of left groin mass for the past month. Patient does not remember an inciting event but does remember this showed up rather rapidly. It is been increasing in size and is causing him daily discomfort, 2 out of 10. There is some radiation to the left testicle. Pain is considered dull and achy. No other associated symptoms. No aggravating or relieving factors.    Patient Active Problem List   Diagnosis     CARDIOVASCULAR SCREENING; LDL GOAL LESS THAN 130     Nasal mass     Papilloma of nose     Chemical dependency (H)     Tobacco use disorder       History reviewed. No pertinent past medical history.    Past Surgical History:   Procedure Laterality Date     ARTHRODESIS FOOT Left 2/17/2015    Procedure: ARTHRODESIS FOOT;  Surgeon: Cortez Hayward DPM;  Location: [facility]     ARTHRODESIS TOE(S)  12/20/2013    Procedure: ARTHRODESIS TOE(S);  Arthrodesis first metatarsophalangeal joint left foot;  Surgeon: Cortez Hayward DPM;  Location: [facility]     ENDOSCOPIC SINUS SURGERY  1/6/2014    Procedure: ENDOSCOPIC SINUS SURGERY;  Functional Endoscopic Sinus Surgery;  Surgeon: Bobo Renteria MD;  [facility]     ENDOSCOPIC SINUS SURGERY  7/21/2014    Procedure: ENDOSCOPIC SINUS SURGERY;  Surgeon: Bobo Renteria MD;  [facility]     ENDOSCOPIC SINUS SURGERY N/A 4/6/2015    Procedure: ENDOSCOPIC SINUS SURGERY;  Surgeon: Bobo Renteria MD;  [facility]     ENDOSCOPIC SINUS SURGERY N/A 8/17/2015    Procedure: ENDOSCOPIC SINUS SURGERY;  Surgeon: Bobo Renteria MD;  Location:  OR     NASAL ENDOSCOPY Bilateral 2/6/2017    Procedure: NASAL ENDOSCOPY;  Surgeon: Bobo Renteria MD;  Location:  OR       Family History   Problem Relation Age of Onset     DIABETES Mother 40     C.A.D. Father 72     Unknown/Adopted No family hx of      Depression No family hx of      Anxiety Disorder  No family hx of      Schizophrenia No family hx of      Bipolar Disorder No family hx of      Suicide No family hx of      Substance Abuse No family hx of      Dementia No family hx of      [place] Disease No family hx of      Parkinsonism No family hx of      Autism Spectrum Disorder No family hx of      Intellectual Disability (Mental Retardation) No family hx of      MENTAL ILLNESS No family hx of        Social History   Substance Use Topics     Smoking status: Current Every Day Smoker     Packs/day: 0.50     Years: 30.00     Types: Cigarettes     Smokeless tobacco: Never Used     Alcohol use No      Comment: QUIT MARCH 2016        Smoking - Counseled patient on the effects of smoking on surgical issues such as wound healing and infection rates.    History   Drug Use No     Comment: 7 years quit Cocaine/methamphetamines       Current Outpatient Prescriptions   Medication Sig Dispense Refill     HYDROcodone-acetaminophen (NORCO) 5-325 MG per tablet Take 1-2 tablets by mouth every 6 hours as needed 30 tablet 0     HYDROcodone-acetaminophen (NORCO) 5-325 MG per tablet Take 1-2 tablets by mouth every 4 hours as needed for moderate to severe pain 12 tablet 0     acetaminophen (TYLENOL) 325 MG tablet Take 1-2 tablets (325-650 mg) by mouth every 4 hours as needed for other (mild pain) 20 tablet 0     triamcinolone (KENALOG) 0.1 % ointment Apply sparingly to affected area three times daily for 14 days. 80 g 0       Allergies   Allergen Reactions     Nkda [No Known Drug Allergies]        CBC  Recent Labs   Lab Test  03/21/17 2055   WBC  10.5   RBC  4.92   HGB  14.6   HCT  44.0   MCV  89   MCH  29.7   MCHC  33.2   RDW  13.8   PLT  245       BMP  Recent Labs   Lab Test  03/21/17 2055   NA  141   POTASSIUM  3.7   KEVIN  8.4*   CHLORIDE  105   CO2  30   BUN  14   CR  0.98   GLC  95     ROS  Constitutional - Denies fevers, weight loss, malaise, lethargy  Neuro - Denies tremors or seizures  Pulmon - Denies SOB, dyspnea,  "hemoptysis, chronic cough or use of an inhaler  CV - Denies CP, SOB, lower extremity edema, difficulty w/ stairs, has never used NTG  GI - Denies hematemesis, BRBPR, melena, chronic diarrhea or epigastric pain   - Denies hematuria, difficulty voiding, h/o STDs  Hematology - Denies blood clotting disorders, chronic anemias  Dermatology - No melanomas or skin cancers  Rheumatology - No h/o RA  Pysch - Denies depression, bipolar d/o or schizophrenia    Exam:  Patient Vitals for the past 24 hrs:   BP Temp Temp src Pulse Height Weight   04/07/17 0939 147/89 97.4  F (36.3  C) Oral 82 1.93 m (6' 4\") 90.7 kg (200 lb)       General - Alert and Oriented X4, NAD, well nourished  HEENT - Normocephalic, atraumatic, PERRLA, Nose midline, Throat without lesions  Neck - supple, no LAD, Thyroid normal, Carotids without bruits  Lungs - Clear to auscultation bilaterally with good inspiratory effort, no tactile fremitus  CV - Heart RRR, no lift's, thrills, murmurs, rubs, or gallops. Carotid, radial, and femoral pulses 2+ bilaterally  Abdomen - Soft, non-tender, +BS, no hepatosplenomegaly, no palpable masses  Groins - 2+ pulses bilaterally and no LAD, palpable reducible mass on left, palpable strong impulse on right  Neuro - Full ROM, Strength 5/5 and major muscle groups, sensation intact  Extremities - No cyanosis, clubbing or edema    Assessment and plan: 51-year-old male with bilateral inguinal hernias. Patient is a good candidate for laparoscopic bilateral inguinal hernia repair. Risks benefits alternatives and consultations were discussed with the patient including the possibility of infection, bleeding, or hernia recurrence. Patient understood and wished to proceed. I ordered a preoperative EKG. PATIENT IS CLEARED FOR SURGERY.    Shay Mercado MD     EKG normal colon and no additional workup required. Cleared for surgery.    Shay Mercado MD   "

## 2017-04-07 NOTE — NURSING NOTE
"Initial /89 (BP Location: Right arm, Patient Position: Chair, Cuff Size: Adult Regular)  Pulse 82  Temp 97.4  F (36.3  C) (Oral)  Ht 1.93 m (6' 4\")  Wt 90.7 kg (200 lb)  BMI 24.34 kg/m2 Estimated body mass index is 24.34 kg/(m^2) as calculated from the following:    Height as of this encounter: 1.93 m (6' 4\").    Weight as of this encounter: 90.7 kg (200 lb). .    Sussy Garcia MA    "

## 2017-04-10 ENCOUNTER — ANESTHESIA EVENT (OUTPATIENT)
Dept: SURGERY | Facility: CLINIC | Age: 52
End: 2017-04-10
Payer: COMMERCIAL

## 2017-04-12 ENCOUNTER — HOSPITAL ENCOUNTER (OUTPATIENT)
Facility: CLINIC | Age: 52
Discharge: HOME | End: 2017-04-12
Attending: SURGERY | Admitting: SURGERY
Payer: COMMERCIAL

## 2017-04-12 ENCOUNTER — ANESTHESIA (OUTPATIENT)
Dept: SURGERY | Facility: CLINIC | Age: 52
End: 2017-04-12
Payer: COMMERCIAL

## 2017-04-12 ENCOUNTER — TELEPHONE (OUTPATIENT)
Dept: NURSING | Facility: CLINIC | Age: 52
End: 2017-04-12

## 2017-04-12 VITALS
BODY MASS INDEX: 24.36 KG/M2 | RESPIRATION RATE: 16 BRPM | DIASTOLIC BLOOD PRESSURE: 83 MMHG | HEART RATE: 67 BPM | HEIGHT: 76 IN | WEIGHT: 200 LBS | TEMPERATURE: 97.7 F | SYSTOLIC BLOOD PRESSURE: 142 MMHG | OXYGEN SATURATION: 94 %

## 2017-04-12 DIAGNOSIS — G89.18 POST-OP PAIN: Primary | ICD-10-CM

## 2017-04-12 PROCEDURE — 25000125 ZZHC RX 250: Performed by: NURSE ANESTHETIST, CERTIFIED REGISTERED

## 2017-04-12 PROCEDURE — 25800025 ZZH RX 258: Performed by: NURSE ANESTHETIST, CERTIFIED REGISTERED

## 2017-04-12 PROCEDURE — 37000008 ZZH ANESTHESIA TECHNICAL FEE, 1ST 30 MIN: Performed by: SURGERY

## 2017-04-12 PROCEDURE — 36000056 ZZH SURGERY LEVEL 3 1ST 30 MIN: Performed by: SURGERY

## 2017-04-12 PROCEDURE — 25000128 H RX IP 250 OP 636: Performed by: NURSE ANESTHETIST, CERTIFIED REGISTERED

## 2017-04-12 PROCEDURE — 71000027 ZZH RECOVERY PHASE 2 EACH 15 MINS: Performed by: SURGERY

## 2017-04-12 PROCEDURE — C1781 MESH (IMPLANTABLE): HCPCS | Performed by: SURGERY

## 2017-04-12 PROCEDURE — 27110028 ZZH OR GENERAL SUPPLY NON-STERILE: Performed by: SURGERY

## 2017-04-12 PROCEDURE — 71000012 ZZH RECOVERY PHASE 1 LEVEL 1 FIRST HR: Performed by: SURGERY

## 2017-04-12 PROCEDURE — 37000009 ZZH ANESTHESIA TECHNICAL FEE, EACH ADDTL 15 MIN: Performed by: SURGERY

## 2017-04-12 PROCEDURE — 25000566 ZZH SEVOFLURANE, EA 15 MIN: Performed by: SURGERY

## 2017-04-12 PROCEDURE — 25000125 ZZHC RX 250: Performed by: SURGERY

## 2017-04-12 PROCEDURE — 25000132 ZZH RX MED GY IP 250 OP 250 PS 637: Performed by: SURGERY

## 2017-04-12 PROCEDURE — 27210794 ZZH OR GENERAL SUPPLY STERILE: Performed by: SURGERY

## 2017-04-12 PROCEDURE — 36000058 ZZH SURGERY LEVEL 3 EA 15 ADDTL MIN: Performed by: SURGERY

## 2017-04-12 PROCEDURE — 49650 LAP ING HERNIA REPAIR INIT: CPT | Mod: 50 | Performed by: SURGERY

## 2017-04-12 PROCEDURE — 25000128 H RX IP 250 OP 636: Performed by: SURGERY

## 2017-04-12 PROCEDURE — 40000305 ZZH STATISTIC PRE PROC ASSESS I: Performed by: SURGERY

## 2017-04-12 DEVICE — MESH PROGRIP LAPAROSCOPIC 5.9X3.9" PARIETEX SELF-FIX LPG1510: Type: IMPLANTABLE DEVICE | Site: INGUINAL | Status: FUNCTIONAL

## 2017-04-12 RX ORDER — FENTANYL CITRATE 50 UG/ML
INJECTION, SOLUTION INTRAMUSCULAR; INTRAVENOUS PRN
Status: DISCONTINUED | OUTPATIENT
Start: 2017-04-12 | End: 2017-04-12

## 2017-04-12 RX ORDER — DEXAMETHASONE SODIUM PHOSPHATE 4 MG/ML
4 INJECTION, SOLUTION INTRA-ARTICULAR; INTRALESIONAL; INTRAMUSCULAR; INTRAVENOUS; SOFT TISSUE EVERY 10 MIN PRN
Status: DISCONTINUED | OUTPATIENT
Start: 2017-04-12 | End: 2017-04-12 | Stop reason: HOSPADM

## 2017-04-12 RX ORDER — FENTANYL CITRATE 50 UG/ML
25-50 INJECTION, SOLUTION INTRAMUSCULAR; INTRAVENOUS
Status: DISCONTINUED | OUTPATIENT
Start: 2017-04-12 | End: 2017-04-12 | Stop reason: HOSPADM

## 2017-04-12 RX ORDER — NEOSTIGMINE METHYLSULFATE 1 MG/ML
VIAL (ML) INJECTION PRN
Status: DISCONTINUED | OUTPATIENT
Start: 2017-04-12 | End: 2017-04-12

## 2017-04-12 RX ORDER — LIDOCAINE 40 MG/G
CREAM TOPICAL
Status: DISCONTINUED | OUTPATIENT
Start: 2017-04-12 | End: 2017-04-12 | Stop reason: HOSPADM

## 2017-04-12 RX ORDER — LIDOCAINE HYDROCHLORIDE 10 MG/ML
INJECTION, SOLUTION INFILTRATION; PERINEURAL PRN
Status: DISCONTINUED | OUTPATIENT
Start: 2017-04-12 | End: 2017-04-12

## 2017-04-12 RX ORDER — PROPOFOL 10 MG/ML
INJECTION, EMULSION INTRAVENOUS PRN
Status: DISCONTINUED | OUTPATIENT
Start: 2017-04-12 | End: 2017-04-12

## 2017-04-12 RX ORDER — KETOROLAC TROMETHAMINE 30 MG/ML
INJECTION, SOLUTION INTRAMUSCULAR; INTRAVENOUS PRN
Status: DISCONTINUED | OUTPATIENT
Start: 2017-04-12 | End: 2017-04-12

## 2017-04-12 RX ORDER — BUPIVACAINE HYDROCHLORIDE AND EPINEPHRINE 2.5; 5 MG/ML; UG/ML
INJECTION, SOLUTION INFILTRATION; PERINEURAL PRN
Status: DISCONTINUED | OUTPATIENT
Start: 2017-04-12 | End: 2017-04-12 | Stop reason: HOSPADM

## 2017-04-12 RX ORDER — ONDANSETRON 2 MG/ML
INJECTION INTRAMUSCULAR; INTRAVENOUS PRN
Status: DISCONTINUED | OUTPATIENT
Start: 2017-04-12 | End: 2017-04-12

## 2017-04-12 RX ORDER — SODIUM CHLORIDE, SODIUM LACTATE, POTASSIUM CHLORIDE, CALCIUM CHLORIDE 600; 310; 30; 20 MG/100ML; MG/100ML; MG/100ML; MG/100ML
INJECTION, SOLUTION INTRAVENOUS CONTINUOUS
Status: DISCONTINUED | OUTPATIENT
Start: 2017-04-12 | End: 2017-04-12 | Stop reason: HOSPADM

## 2017-04-12 RX ORDER — OXYCODONE AND ACETAMINOPHEN 5; 325 MG/1; MG/1
1-2 TABLET ORAL EVERY 6 HOURS PRN
Qty: 60 TABLET | Refills: 0 | Status: SHIPPED | OUTPATIENT
Start: 2017-04-12 | End: 2017-06-15

## 2017-04-12 RX ORDER — NALOXONE HYDROCHLORIDE 0.4 MG/ML
.1-.4 INJECTION, SOLUTION INTRAMUSCULAR; INTRAVENOUS; SUBCUTANEOUS
Status: DISCONTINUED | OUTPATIENT
Start: 2017-04-12 | End: 2017-04-12 | Stop reason: HOSPADM

## 2017-04-12 RX ORDER — DEXAMETHASONE SODIUM PHOSPHATE 4 MG/ML
INJECTION, SOLUTION INTRA-ARTICULAR; INTRALESIONAL; INTRAMUSCULAR; INTRAVENOUS; SOFT TISSUE PRN
Status: DISCONTINUED | OUTPATIENT
Start: 2017-04-12 | End: 2017-04-12

## 2017-04-12 RX ORDER — OXYCODONE AND ACETAMINOPHEN 5; 325 MG/1; MG/1
1-2 TABLET ORAL EVERY 4 HOURS PRN
Status: DISCONTINUED | OUTPATIENT
Start: 2017-04-12 | End: 2017-04-12 | Stop reason: HOSPADM

## 2017-04-12 RX ORDER — MEPERIDINE HYDROCHLORIDE 50 MG/ML
INJECTION INTRAMUSCULAR; INTRAVENOUS; SUBCUTANEOUS PRN
Status: DISCONTINUED | OUTPATIENT
Start: 2017-04-12 | End: 2017-04-12

## 2017-04-12 RX ORDER — CEFAZOLIN SODIUM 1 G/3ML
1 INJECTION, POWDER, FOR SOLUTION INTRAMUSCULAR; INTRAVENOUS SEE ADMIN INSTRUCTIONS
Status: DISCONTINUED | OUTPATIENT
Start: 2017-04-12 | End: 2017-04-12 | Stop reason: HOSPADM

## 2017-04-12 RX ORDER — GLYCOPYRROLATE 0.2 MG/ML
INJECTION, SOLUTION INTRAMUSCULAR; INTRAVENOUS PRN
Status: DISCONTINUED | OUTPATIENT
Start: 2017-04-12 | End: 2017-04-12

## 2017-04-12 RX ORDER — ONDANSETRON 2 MG/ML
4 INJECTION INTRAMUSCULAR; INTRAVENOUS EVERY 30 MIN PRN
Status: DISCONTINUED | OUTPATIENT
Start: 2017-04-12 | End: 2017-04-12 | Stop reason: HOSPADM

## 2017-04-12 RX ORDER — HYDROMORPHONE HYDROCHLORIDE 1 MG/ML
.3-.5 INJECTION, SOLUTION INTRAMUSCULAR; INTRAVENOUS; SUBCUTANEOUS EVERY 10 MIN PRN
Status: DISCONTINUED | OUTPATIENT
Start: 2017-04-12 | End: 2017-04-12 | Stop reason: HOSPADM

## 2017-04-12 RX ORDER — CEFAZOLIN SODIUM 2 G/100ML
2 INJECTION, SOLUTION INTRAVENOUS
Status: COMPLETED | OUTPATIENT
Start: 2017-04-12 | End: 2017-04-12

## 2017-04-12 RX ORDER — ONDANSETRON 4 MG/1
4 TABLET, ORALLY DISINTEGRATING ORAL EVERY 30 MIN PRN
Status: DISCONTINUED | OUTPATIENT
Start: 2017-04-12 | End: 2017-04-12 | Stop reason: HOSPADM

## 2017-04-12 RX ORDER — MEPERIDINE HYDROCHLORIDE 25 MG/ML
12.5 INJECTION INTRAMUSCULAR; INTRAVENOUS; SUBCUTANEOUS
Status: DISCONTINUED | OUTPATIENT
Start: 2017-04-12 | End: 2017-04-12 | Stop reason: HOSPADM

## 2017-04-12 RX ADMIN — DEXAMETHASONE SODIUM PHOSPHATE 4 MG: 4 INJECTION, SOLUTION INTRA-ARTICULAR; INTRALESIONAL; INTRAMUSCULAR; INTRAVENOUS; SOFT TISSUE at 09:57

## 2017-04-12 RX ADMIN — FENTANYL CITRATE 150 MCG: 50 INJECTION, SOLUTION INTRAMUSCULAR; INTRAVENOUS at 10:13

## 2017-04-12 RX ADMIN — ONDANSETRON 4 MG: 2 INJECTION INTRAMUSCULAR; INTRAVENOUS at 10:47

## 2017-04-12 RX ADMIN — LIDOCAINE HYDROCHLORIDE 1 ML: 10 INJECTION, SOLUTION INFILTRATION; PERINEURAL at 08:56

## 2017-04-12 RX ADMIN — ROCURONIUM BROMIDE 30 MG: 10 INJECTION INTRAVENOUS at 09:57

## 2017-04-12 RX ADMIN — PROPOFOL 130 MG: 10 INJECTION, EMULSION INTRAVENOUS at 09:57

## 2017-04-12 RX ADMIN — SODIUM CHLORIDE, POTASSIUM CHLORIDE, SODIUM LACTATE AND CALCIUM CHLORIDE: 600; 310; 30; 20 INJECTION, SOLUTION INTRAVENOUS at 08:56

## 2017-04-12 RX ADMIN — MIDAZOLAM HYDROCHLORIDE 2 MG: 1 INJECTION, SOLUTION INTRAMUSCULAR; INTRAVENOUS at 09:53

## 2017-04-12 RX ADMIN — GLYCOPYRROLATE 0.2 MG: 0.2 INJECTION, SOLUTION INTRAMUSCULAR; INTRAVENOUS at 10:18

## 2017-04-12 RX ADMIN — CEFAZOLIN SODIUM 2 G: 2 INJECTION, SOLUTION INTRAVENOUS at 09:53

## 2017-04-12 RX ADMIN — MEPERIDINE HYDROCHLORIDE 50 MG: 50 INJECTION, SOLUTION INTRAMUSCULAR; INTRAVENOUS; SUBCUTANEOUS at 10:36

## 2017-04-12 RX ADMIN — ROCURONIUM BROMIDE 10 MG: 10 INJECTION INTRAVENOUS at 10:24

## 2017-04-12 RX ADMIN — LIDOCAINE HYDROCHLORIDE 50 MG: 10 INJECTION, SOLUTION INFILTRATION; PERINEURAL at 09:57

## 2017-04-12 RX ADMIN — FENTANYL CITRATE 100 MCG: 50 INJECTION, SOLUTION INTRAMUSCULAR; INTRAVENOUS at 09:57

## 2017-04-12 RX ADMIN — KETOROLAC TROMETHAMINE 30 MG: 30 INJECTION, SOLUTION INTRAMUSCULAR at 10:48

## 2017-04-12 RX ADMIN — Medication 4 MG: at 10:54

## 2017-04-12 RX ADMIN — OXYCODONE HYDROCHLORIDE AND ACETAMINOPHEN 2 TABLET: 5; 325 TABLET ORAL at 12:32

## 2017-04-12 RX ADMIN — FENTANYL CITRATE 100 MCG: 50 INJECTION, SOLUTION INTRAMUSCULAR; INTRAVENOUS at 09:53

## 2017-04-12 RX ADMIN — GLYCOPYRROLATE 0.6 MG: 0.2 INJECTION, SOLUTION INTRAMUSCULAR; INTRAVENOUS at 10:54

## 2017-04-12 ASSESSMENT — LIFESTYLE VARIABLES: TOBACCO_USE: 1

## 2017-04-12 NOTE — ANESTHESIA PREPROCEDURE EVALUATION
Anesthesia Evaluation     . Pt has had prior anesthetic. Type: General    No history of anesthetic complications          ROS/MED HX    ENT/Pulmonary:     (+)tobacco use, Current use 1 packs/day  , . .    Neurologic:  - neg neurologic ROS     Cardiovascular:  - neg cardiovascular ROS       METS/Exercise Tolerance:  >4 METS   Hematologic:  - neg hematologic  ROS       Musculoskeletal:  - neg musculoskeletal ROS       GI/Hepatic:  - neg GI/hepatic ROS       Renal/Genitourinary:  - ROS Renal section negative       Endo:  - neg endo ROS       Psychiatric:  - neg psychiatric ROS       Infectious Disease:  - neg infectious disease ROS       Malignancy:      - no malignancy   Other: Comment: Hx chem dep                    Physical Exam  Normal systems: cardiovascular and pulmonary    Airway   Mallampati: I  TM distance: >3 FB  Neck ROM: full    Dental   (+) upper dentures    Cardiovascular       Pulmonary                     Anesthesia Plan      History & Physical Review  History and physical reviewed and following examination; no interval change.    ASA Status:  2 .    NPO Status:  > 8 hours    Plan for General and ETT with Intravenous and Propofol induction. Maintenance will be Balanced.    PONV prophylaxis:  Ondansetron (or other 5HT-3) and Dexamethasone or Solumedrol       Postoperative Care  Postoperative pain management:  IV analgesics and Oral pain medications.      Consents  Anesthetic plan, risks, benefits and alternatives discussed with:  Patient.  Use of blood products discussed: No .   .                          .

## 2017-04-12 NOTE — BRIEF OP NOTE
PreOp Dx: L>R inguinal hernias    PostOp Dx: Same    Procedure: lap bilat ing hernia    Surgeon: VICKEY Mercado    Anesthesia: GET Reash CRNA    Findings: L>R inguinal hernias    IVF: 800ml    UOP: n/a    EBL: 5ml      Shay Mercado MD

## 2017-04-12 NOTE — IP AVS SNAPSHOT
MRN:[id number]                      After Visit Summary   4/12/2017    Micah Nunez    MRN: [id number]           Thank you!     Thank you for choosing [facility] for your care. Our goal is always to provide you with excellent care. Hearing back from our patients is one way we can continue to improve our services. Please take a few minutes to complete the written survey that you may receive in the mail after you visit with us. Thank you!        Patient Information     Date Of Birth          1965        About your hospital stay     You were admitted on:  April 12, 2017 You last received care in the:  [facility] PreOP/Phase II    You were discharged on:  April 12, 2017       Who to Call     For medical emergencies, please call 911.  For non-urgent questions about your medical care, please call your primary care provider or clinic, [phone]  For questions related to your surgery, please call your surgery clinic        Attending Provider     Provider Specialty    Shay Mercado MD Surgery       Primary Care Provider Office Phone # Fax #    [place] Vanita White [phone]       [address] [address]        Further instructions from your care team                           Same Day Surgery Discharge Instructions  Special Precautions After Surgery - Adult    1. It is not unusual to feel lightheaded or faint, up to 24 hours after surgery or while taking pain medication.  If you have these symptoms; sit for a few minutes before standing and have someone assist you when getting up.  2. You should rest and relax for the next 24 hours and must have someone stay with you for at least 24 hours after your discharge.  3. DO NOT DRIVE any vehicle or operate mechanical equipment for 24 hours following the end of your surgery.  DO NOT DRIVE while taking narcotic pain medications that have been prescribed by your physician.  If you had a  limb operated on, you must be able to use it fully to drive.  4. DO NOT drink alcoholic beverages for 24 hours following surgery or while taking prescription pain medication.  5. Drink clear liquids (apple juice, ginger ale, broth, 7-Up, etc.).  Progress to your regular diet as you feel able.  6. Any questions call your physician and do not make important decisions for 24 hours.    __________________________________________________________________________________________________________________________________  IMPORTANT NUMBERS:    [facility] Main Number:  [phone], [phone]  Pharmacy:  [phone]  Same Day Surgery:  [phone], Monday - Friday until 8:30 p.m.  Urgent Care:  [phone]  Emergency Room:  [phone]      [facility]:  [phone]                                                                             [facility]:  [phone] option 1  [facility]:  [phone]     OB Clinic:  [phone]   Surgery Specialty Clinic:  [phone]   Home Medical Equipment: [phone]  [facility]:  [phone]    Wash incisions daily with soap and water. Some mild redness or swelling is expected. If draining, cover with dry gauze.    No lifting restrictions.  Ok to lift as pain allows.    Okay to use ice pack over wound as necessary for comfort.    Use pain medication as necessary but avoid constipating side effects. Ibuprofen okay but avoid Tylenol as your pain medication already contains this.    Diet as tolerated. No restrictions.    Follow up with Dr Mercado in 1-2 weeks.    Most people take the rest of the week off and return to work the following Monday. You may return sooner as pain allows. During your follow-up appointment, Dr. Mercado will give you a formal letter for your work with any restrictions detailed.  All disability or other such paperwork will be addressed at that time.                  Pending Results     No orders found from  "4/10/2017 to 2017.            Admission Information     Date & Time Provider Department Dept. Phone    2017 Shay Mercado MD [facility] PreOP/Phase [phone]      Your Vitals Were     Blood Pressure Pulse Temperature Respirations Height Weight    141/84 67 97.7  F (36.5  C) (Oral) 16 1.93 m (6' 4\") 90.7 kg (200 lb)    Pulse Oximetry BMI (Body Mass Index)                94% 24.34 kg/m2          [employer] Information     [employer] lets you send messages to your doctor, view your test results, renew your prescriptions, schedule appointments and more. To sign up, go to [email or web address] . Click on \"Log in\" on the left side of the screen, which will take you to the Welcome page. Then click on \"Sign up Now\" on the right side of the page.     You will be asked to enter the access code listed below, as well as some personal information. Please follow the directions to create your username and password.     Your access code is: [email or web address]  Expires: 2017 10:59 AM     Your access code will  in 90 days. If you need help or a new code, please call your [facility] clinic or [phone].        Care EveryWhere ID     This is your Care EveryWhere ID. This could be used by other organizations to access your [facility] medical records  [id number]           Review of your medicines      START taking        Dose / Directions    oxyCODONE-acetaminophen 5-325 MG per tablet   Commonly known as:  PERCOCET   Used for:  Post-op pain        Dose:  1-2 tablet   Take 1-2 tablets by mouth every 6 hours as needed   Quantity:  60 tablet   Refills:  0         CONTINUE these medicines which have NOT CHANGED        Dose / Directions    acetaminophen 325 MG tablet   Commonly known as:  TYLENOL   Used for:  Papilloma of nose        Dose:  325-650 mg   Take 1-2 tablets (325-650 mg) by mouth every 4 hours as needed for other (mild pain)   Quantity:  20 tablet   Refills:  0       HYDROcodone-acetaminophen 5-325 MG per " tablet   Commonly known as:  NORCO        Dose:  1-2 tablet   Take 1-2 tablets by mouth every 4 hours as needed for moderate to severe pain   Quantity:  12 tablet   Refills:  0       triamcinolone 0.1 % ointment   Commonly known as:  KENALOG   Used for:  Atopic dermatitis, unspecified type        Apply sparingly to affected area three times daily for 14 days.   Quantity:  80 g   Refills:  0            Where to get your medicines      Some of these will need a paper prescription and others can be bought over the counter. Ask your nurse if you have questions.     Bring a paper prescription for each of these medications     oxyCODONE-acetaminophen 5-325 MG per tablet                Protect others around you: Learn how to safely use, store and throw away your medicines at www.disposemymeds.org.             Medication List: This is a list of all your medications and when to take them. Check marks below indicate your daily home schedule. Keep this list as a reference.      Medications           Morning Afternoon Evening Bedtime As Needed    acetaminophen 325 MG tablet   Commonly known as:  TYLENOL   Take 1-2 tablets (325-650 mg) by mouth every 4 hours as needed for other (mild pain)                                HYDROcodone-acetaminophen 5-325 MG per tablet   Commonly known as:  NORCO   Take 1-2 tablets by mouth every 4 hours as needed for moderate to severe pain                                oxyCODONE-acetaminophen 5-325 MG per tablet   Commonly known as:  PERCOCET   Take 1-2 tablets by mouth every 6 hours as needed                                triamcinolone 0.1 % ointment   Commonly known as:  KENALOG   Apply sparingly to affected area three times daily for 14 days.

## 2017-04-12 NOTE — IP AVS SNAPSHOT
[facility] PreOP/Phase II    [address] [address]    Phone:  [phone]    Fax:  [phone]                                       After Visit Summary   4/12/2017    Micah Nunez    MRN: [id number]           After Visit Summary Signature Page     I have received my discharge instructions, and my questions have been answered. I have discussed any challenges I see with this plan with the nurse or doctor.    ..........................................................................................................................................  Patient/Patient Representative Signature      ..........................................................................................................................................  Patient Representative Print Name and Relationship to Patient    ..................................................               ................................................  Date                                            Time    ..........................................................................................................................................  Reviewed by Signature/Title    ...................................................              ..............................................  Date                                                            Time

## 2017-04-12 NOTE — ANESTHESIA POSTPROCEDURE EVALUATION
Patient: Micah Nunez    Procedure(s):  Laparoscopic Bilateral Inguinal Hernia Repair - Wound Class: I-Clean    Diagnosis:bilateral inguinal hernias  Diagnosis Additional Information: No value filed.    Anesthesia Type:  General, ETT    Note:  Anesthesia Post Evaluation    Patient location during evaluation: Bedside  Patient participation: Able to fully participate in evaluation  Level of consciousness: awake and alert  Pain management: adequate  Airway patency: patent  Cardiovascular status: acceptable  Respiratory status: acceptable  Hydration status: acceptable  PONV: none     Anesthetic complications: None          Last vitals:  Vitals:    04/12/17 1205 04/12/17 1212 04/12/17 1228   BP:  141/84 138/79   Pulse:      Resp:  16 16   Temp:      SpO2: 96% 94%          Electronically Signed By: Trish Vicente CRNA, APRN CRNA  April 12, 2017  12:40 PM

## 2017-04-12 NOTE — H&P (VIEW-ONLY)
History     Chief Complaint   Patient presents with     Abdominal Pain     Pt noticed bulge around umbilicus - now with pain and discomfort - states waxes and wanes with heavy lifting etc.      HPI  Micah Nunez is a 51 year old male who presents for evaluation of intermittent left lower abdominal pain with palpable bulge. Patient explains that over the past month he has noticed occasional bold from a his left inguinal region, sometimes painful and tender to palpation, but not actively causing discomfort. Symptoms seemingly correlate with ambulatory activities but does not notice exacerbation with heavy lifting. He denies extension of abdominal bulge into the scrotal or testicular region. He feels somewhat constipated but yet regular frequency of bowel movements without blood. He denies fever/chills, upper abdominal pain, nausea/vomiting, testicular pain or genitourinary symptoms. Of note, he denies history of abdominal surgeries.    I have reviewed the Medications, Allergies, Past Medical and Surgical History, and Social History in the Epic system.    Patient Active Problem List   Diagnosis     CARDIOVASCULAR SCREENING; LDL GOAL LESS THAN 130     Nasal mass     Papilloma of nose     Chemical dependency (H)     Tobacco use disorder       Past Surgical History:   Procedure Laterality Date     ARTHRODESIS FOOT Left 2/17/2015    Procedure: ARTHRODESIS FOOT;  Surgeon: Cortez Hayward DPM;  Location: [facility] OR     ARTHRODESIS TOE(S)  12/20/2013    Procedure: ARTHRODESIS TOE(S);  Arthrodesis first metatarsophalangeal joint left foot;  Surgeon: Cortez Hayward DPM;  Location: [facility] OR     ENDOSCOPIC SINUS SURGERY  1/6/2014    Procedure: ENDOSCOPIC SINUS SURGERY;  Functional Endoscopic Sinus Surgery;  Surgeon: Bobo Renteria MD;  [facility]     ENDOSCOPIC SINUS SURGERY  7/21/2014    Procedure: ENDOSCOPIC SINUS SURGERY;  Surgeon: Bobo Renteria MD;  [facility]     ENDOSCOPIC SINUS  SURGERY N/A 4/6/2015    Procedure: ENDOSCOPIC SINUS SURGERY;  Surgeon: Bobo Renteria MD;  [facility]     ENDOSCOPIC SINUS SURGERY N/A 8/17/2015    Procedure: ENDOSCOPIC SINUS SURGERY;  Surgeon: Bobo Renteria MD;  [facility]     NASAL ENDOSCOPY Bilateral 2/6/2017    Procedure: NASAL ENDOSCOPY;  Surgeon: Bobo Renteria MD;  Location:  OR       Social History     Social History     Marital status: Single     Spouse name: N/A     Number of children: N/A     Years of education: N/A     Occupational History     Not on file.     Social History Main Topics     Smoking status: Current Every Day Smoker     Packs/day: 0.50     Years: 30.00     Types: Cigarettes     Smokeless tobacco: Never Used     Alcohol use No      Comment: QUIT MARCH 2016     Drug use: No      Comment: 7 years quit Cocaine/methamphetamines     Sexual activity: Not Currently     Partners: Female     Other Topics Concern     Parent/Sibling W/ Cabg, Mi Or Angioplasty Before 65f 55m? No      Service No     Blood Transfusions No     Caffeine Concern No     Occupational Exposure No     Hobby Hazards No     Sleep Concern No     Stress Concern No     Weight Concern No     Special Diet No     Back Care No     Exercise Yes     Bike Helmet No     Seat Belt Yes     Social History Narrative       Family History   Problem Relation Age of Onset     DIABETES Mother 40     C.A.D. Father 72     Unknown/Adopted No family hx of      Depression No family hx of      Anxiety Disorder No family hx of      Schizophrenia No family hx of      Bipolar Disorder No family hx of      Suicide No family hx of      Substance Abuse No family hx of      Dementia No family hx of      Long Disease No family hx of      Parkinsonism No family hx of      Autism Spectrum Disorder No family hx of      Intellectual Disability (Mental Retardation) No family hx of      MENTAL ILLNESS No family hx of        Most Recent Immunizations   Administered  "Date(s) Administered     Influenza (IIV3) 12/01/2014     Influenza Vaccine IM 3yrs+ 4 Valent IIV4 12/03/2013     Mantoux 03/03/2014     TDAP Vaccine (Adacel) 12/03/2013       Review of Systems  Constitutional: Negative for fever or chills.  Respiratory: Negative for cough or shortness of breath.  Cardiovascular: Negative for chest pain.  Gastrointestinal: Positive for occasional palpable left lower abdominal bulge with pain. Denies nausea or vomiting.  Genitourinary: Negative for dysuria, hematuria, testicular pain or penile discharge.  Musculoskeletal: Negative for back pain or recent injuries.  Skin: Negative for rash.    All others reviewed and are negative.      Physical Exam   BP: (!) 155/98  Pulse: 85  Temp: 97.7  F (36.5  C)  Resp: 16  Height: 193 cm (6' 4\")  Weight: 90.7 kg (200 lb)  SpO2: 98 %  Physical Exam  Constitutional: Well developed, well nourished. Appears nontoxic and in no acute distress. Resting comfortably on the gurney.  Head: Normocephalic and atraumatic. Symmetric in appearance.  Eyes: Conjunctivae are normal.  Neck: Neck supple.  Cardiovascular: No cyanosis.   Respiratory: Effort normal, no respiratory distress.   Gastrointestinal: Soft, nondistended abdomen. Nontender and without guarding. No rigidity or rebound tenderness. Negative McBurney's point. Negative for Moralez's sign. No palpable pulsatile mass.  Genitourinary: Typical appearance of  penis without evidence of lesions, ulcerations, discharge, or lymphadenopathy. Positive for subtle left-sided inguinal hernia without tenderness or crepitus. No obvious hydrocele, varicocele, testicular swelling or mass. Testicles are oriented vertically. No tenderness to palpation of epididymal region.  Musculoskeletal: Moves all extremities spontaneously and without complaint.  Neuro: Patient is alert.  Skin: Skin is warm and dry, not diaphoretic.      ED Course     ED Course     Procedures             Critical Care time:  none               Labs " Ordered and Resulted from Time of ED Arrival Up to the Time of Departure from the ED - No data to display    Assessments & Plan (with Medical Decision Making)   Micah Nunez is a 51 year old male who presents to the department for evaluation of one month of left sided lower abdominal bulge with occasional discomfort. Patient appears to have a reducible left-sided inguinal hernia without signs of incarceration or strangulation. He has discomfort it generally lasts anywhere from 1-3 hours before resolving, however there is no pain or tenderness in the department. Recommend that he follow up with outpatient surgery clinic to schedule elective correction. Referral order placed.    Prior to discharge, I made it clear that illness can unexpectedly develop/progress so he has been instructed to return to the emergency department for reevaluation of evolving symptoms, fever, persistent pain, tenderness of hernia site, or other concerns. He seems comfortable with the discharge plan we discussed including follow up.    Disclaimer: This note consists of symbols derived from keyboarding, dictation, and/or voice recognition software. As a result, there may be errors in the script that have gone undetected.  Please consider this when interpreting information found in the chart.        I have reviewed the nursing notes.    I have reviewed the findings, diagnosis, plan and need for follow up with the patient.    New Prescriptions    HYDROCODONE-ACETAMINOPHEN (NORCO) 5-325 MG PER TABLET    Take 1-2 tablets by mouth every 4 hours as needed for moderate to severe pain       Final diagnoses:   Unilateral inguinal hernia without obstruction or gangrene, recurrence not specified       4/5/2017   [facility] EMERGENCY DEPARTMENT     Frandy La DO  04/05/17 2210

## 2017-04-12 NOTE — DISCHARGE INSTRUCTIONS
Same Day Surgery Discharge Instructions  Special Precautions After Surgery - Adult    1. It is not unusual to feel lightheaded or faint, up to 24 hours after surgery or while taking pain medication.  If you have these symptoms; sit for a few minutes before standing and have someone assist you when getting up.  2. You should rest and relax for the next 24 hours and must have someone stay with you for at least 24 hours after your discharge.  3. DO NOT DRIVE any vehicle or operate mechanical equipment for 24 hours following the end of your surgery.  DO NOT DRIVE while taking narcotic pain medications that have been prescribed by your physician.  If you had a limb operated on, you must be able to use it fully to drive.  4. DO NOT drink alcoholic beverages for 24 hours following surgery or while taking prescription pain medication.  5. Drink clear liquids (apple juice, ginger ale, broth, 7-Up, etc.).  Progress to your regular diet as you feel able.  6. Any questions call your physician and do not make important decisions for 24 hours.    __________________________________________________________________________________________________________________________________  IMPORTANT NUMBERS:    [facility] Main Number:  [phone], [phone]  Pharmacy:  [phone]  Same Day Surgery:  [phone], Monday - Friday until 8:30 p.m.  Urgent Care:  [phone]  Emergency Room:  [phone]      [facility]:  [phone]                                                                             [facility]:  [phone] option 1  [facility]:  [phone]     [facility] Clinic:  [phone]   [facility]gery Specialty Clinic:  [phone]   Home Medical Equipment: [phone]  [facility]:  [phone]    Wash incisions daily with soap and water. Some mild redness or swelling is expected. If draining, cover with dry gauze.    No lifting restrictions.  Ok to lift as  pain allows.    Okay to use ice pack over wound as necessary for comfort.    Use pain medication as necessary but avoid constipating side effects. Ibuprofen okay but avoid Tylenol as your pain medication already contains this.    Diet as tolerated. No restrictions.    Follow up with Dr Mercado in 1-2 weeks.    Most people take the rest of the week off and return to work the following Monday. You may return sooner as pain allows. During your follow-up appointment, Dr. Mercado will give you a formal letter for your work with any restrictions detailed.  All disability or other such paperwork will be addressed at that time.

## 2017-04-12 NOTE — H&P (VIEW-ONLY)
Asked by Dr. White to consult on patient regarding his left groin mass.    51-year-old male complaining of left groin mass for the past month. Patient does not remember an inciting event but does remember this showed up rather rapidly. It is been increasing in size and is causing him daily discomfort, 2 out of 10. There is some radiation to the left testicle. Pain is considered dull and achy. No other associated symptoms. No aggravating or relieving factors.    Patient Active Problem List   Diagnosis     CARDIOVASCULAR SCREENING; LDL GOAL LESS THAN 130     Nasal mass     Papilloma of nose     Chemical dependency (H)     Tobacco use disorder       History reviewed. No pertinent past medical history.    Past Surgical History:   Procedure Laterality Date     ARTHRODESIS FOOT Left 2/17/2015    Procedure: ARTHRODESIS FOOT;  Surgeon: Cortez Hayward DPM;  Location: [facility]     ARTHRODESIS TOE(S)  12/20/2013    Procedure: ARTHRODESIS TOE(S);  Arthrodesis first metatarsophalangeal joint left foot;  Surgeon: Cortez Hayward DPM;  Location: [facility]     ENDOSCOPIC SINUS SURGERY  1/6/2014    Procedure: ENDOSCOPIC SINUS SURGERY;  Functional Endoscopic Sinus Surgery;  Surgeon: Bobo Renteria MD;  [facility]     ENDOSCOPIC SINUS SURGERY  7/21/2014    Procedure: ENDOSCOPIC SINUS SURGERY;  Surgeon: Bobo Renteria MD;  [facility]     ENDOSCOPIC SINUS SURGERY N/A 4/6/2015    Procedure: ENDOSCOPIC SINUS SURGERY;  Surgeon: Bobo Renteria MD;  [facility]     ENDOSCOPIC SINUS SURGERY N/A 8/17/2015    Procedure: ENDOSCOPIC SINUS SURGERY;  Surgeon: Bobo Renteria MD;  Location:  OR     NASAL ENDOSCOPY Bilateral 2/6/2017    Procedure: NASAL ENDOSCOPY;  Surgeon: Bobo Renteria MD;  Location:  OR       Family History   Problem Relation Age of Onset     DIABETES Mother 40     C.A.D. Father 72     Unknown/Adopted No family hx of      Depression No family hx of      Anxiety Disorder  No family hx of      Schizophrenia No family hx of      Bipolar Disorder No family hx of      Suicide No family hx of      Substance Abuse No family hx of      Dementia No family hx of      [place] Disease No family hx of      Parkinsonism No family hx of      Autism Spectrum Disorder No family hx of      Intellectual Disability (Mental Retardation) No family hx of      MENTAL ILLNESS No family hx of        Social History   Substance Use Topics     Smoking status: Current Every Day Smoker     Packs/day: 0.50     Years: 30.00     Types: Cigarettes     Smokeless tobacco: Never Used     Alcohol use No      Comment: QUIT MARCH 2016        Smoking - Counseled patient on the effects of smoking on surgical issues such as wound healing and infection rates.    History   Drug Use No     Comment: 7 years quit Cocaine/methamphetamines       Current Outpatient Prescriptions   Medication Sig Dispense Refill     HYDROcodone-acetaminophen (NORCO) 5-325 MG per tablet Take 1-2 tablets by mouth every 6 hours as needed 30 tablet 0     HYDROcodone-acetaminophen (NORCO) 5-325 MG per tablet Take 1-2 tablets by mouth every 4 hours as needed for moderate to severe pain 12 tablet 0     acetaminophen (TYLENOL) 325 MG tablet Take 1-2 tablets (325-650 mg) by mouth every 4 hours as needed for other (mild pain) 20 tablet 0     triamcinolone (KENALOG) 0.1 % ointment Apply sparingly to affected area three times daily for 14 days. 80 g 0       Allergies   Allergen Reactions     Nkda [No Known Drug Allergies]        CBC  Recent Labs   Lab Test  03/21/17 2055   WBC  10.5   RBC  4.92   HGB  14.6   HCT  44.0   MCV  89   MCH  29.7   MCHC  33.2   RDW  13.8   PLT  245       BMP  Recent Labs   Lab Test  03/21/17 2055   NA  141   POTASSIUM  3.7   KEVIN  8.4*   CHLORIDE  105   CO2  30   BUN  14   CR  0.98   GLC  95     ROS  Constitutional - Denies fevers, weight loss, malaise, lethargy  Neuro - Denies tremors or seizures  Pulmon - Denies SOB, dyspnea,  "hemoptysis, chronic cough or use of an inhaler  CV - Denies CP, SOB, lower extremity edema, difficulty w/ stairs, has never used NTG  GI - Denies hematemesis, BRBPR, melena, chronic diarrhea or epigastric pain   - Denies hematuria, difficulty voiding, h/o STDs  Hematology - Denies blood clotting disorders, chronic anemias  Dermatology - No melanomas or skin cancers  Rheumatology - No h/o RA  Pysch - Denies depression, bipolar d/o or schizophrenia    Exam:  Patient Vitals for the past 24 hrs:   BP Temp Temp src Pulse Height Weight   04/07/17 0939 147/89 97.4  F (36.3  C) Oral 82 1.93 m (6' 4\") 90.7 kg (200 lb)       General - Alert and Oriented X4, NAD, well nourished  HEENT - Normocephalic, atraumatic, PERRLA, Nose midline, Throat without lesions  Neck - supple, no LAD, Thyroid normal, Carotids without bruits  Lungs - Clear to auscultation bilaterally with good inspiratory effort, no tactile fremitus  CV - Heart RRR, no lift's, thrills, murmurs, rubs, or gallops. Carotid, radial, and femoral pulses 2+ bilaterally  Abdomen - Soft, non-tender, +BS, no hepatosplenomegaly, no palpable masses  Groins - 2+ pulses bilaterally and no LAD, palpable reducible mass on left, palpable strong impulse on right  Neuro - Full ROM, Strength 5/5 and major muscle groups, sensation intact  Extremities - No cyanosis, clubbing or edema    Assessment and plan: 51-year-old male with bilateral inguinal hernias. Patient is a good candidate for laparoscopic bilateral inguinal hernia repair. Risks benefits alternatives and consultations were discussed with the patient including the possibility of infection, bleeding, or hernia recurrence. Patient understood and wished to proceed. I ordered a preoperative EKG. PATIENT IS CLEARED FOR SURGERY.    Shay Mercado MD     EKG normal colon and no additional workup required. Cleared for surgery.    Shay Mercado MD   "

## 2017-04-12 NOTE — ANESTHESIA CARE TRANSFER NOTE
Patient: Micah Nunez    Procedure(s):  Laparoscopic Bilateral Inguinal Hernia Repair - Wound Class: I-Clean    Diagnosis: bilateral inguinal hernias  Diagnosis Additional Information: No value filed.    Anesthesia Type:   General, ETT     Note:  Airway :Nasal Cannula  Patient transferred to:PACU  Comments: Patient's VSS. Spontaneous respirations. Patient awake and oriented. IV patent. Report to RN.      Vitals: (Last set prior to Anesthesia Care Transfer)    CRNA VITALS  4/12/2017 1042 - 4/12/2017 1117      4/12/2017             Pulse: 83    SpO2: 99 %    Resp Rate (observed): 8                Electronically Signed By: NEHEMIAS Dumont CRNA  April 12, 2017  11:17 AM

## 2017-04-13 ENCOUNTER — OFFICE VISIT (OUTPATIENT)
Dept: SURGERY | Facility: CLINIC | Age: 52
End: 2017-04-13
Payer: COMMERCIAL

## 2017-04-13 VITALS
HEART RATE: 91 BPM | DIASTOLIC BLOOD PRESSURE: 80 MMHG | TEMPERATURE: 97.5 F | SYSTOLIC BLOOD PRESSURE: 136 MMHG | RESPIRATION RATE: 18 BRPM

## 2017-04-13 DIAGNOSIS — G89.18 POST-OP PAIN: Primary | ICD-10-CM

## 2017-04-13 PROCEDURE — 99024 POSTOP FOLLOW-UP VISIT: CPT | Performed by: SURGERY

## 2017-04-13 ASSESSMENT — PAIN SCALES - GENERAL: PAINLEVEL: EXTREME PAIN (8)

## 2017-04-13 NOTE — MR AVS SNAPSHOT
"              After Visit Summary   4/13/2017    Micah Nunez    MRN: [id number]           Patient Information     Date Of Birth          1965        Visit Information        Provider Department      4/13/2017 1:15 PM Shay Mercado MD [facility]        Today's Diagnoses     Post-op pain    -  1       Follow-ups after your visit        Follow-up notes from your care team     Return if symptoms worsen or fail to improve.      Your next 10 appointments already scheduled     Apr 13, 2017  1:15 PM CDT   Return Visit with Shay Mercado MD   [facility] ([facility])    [address] [address]   [phone]              Who to contact     If you have questions or need follow up information about today's clinic visit or your schedule please contact [facility] directly at [phone].  Normal or non-critical lab and imaging results will be communicated to you by MyChart, letter or phone within 4 business days after the clinic has received the results. If you do not hear from us within 7 days, please contact the clinic through MyChart or phone. If you have a critical or abnormal lab result, we will notify you by phone as soon as possible.  Submit refill requests through [employer] or call your pharmacy and they will forward the refill request to us. Please allow 3 business days for your refill to be completed.          Additional Information About Your Visit        MyChart Information     [employer] lets you send messages to your doctor, view your test results, renew your prescriptions, schedule appointments and more. To sign up, go to [email or web address] . Click on \"Log in\" on the left side of the screen, which will take you to the Welcome page. Then click on \"Sign up Now\" on the right side of the page.     You will be asked to enter the access code listed below, as well as some personal information. Please follow the directions to " create your username and password.     Your access code is: [email or web address]  Expires: 2017 10:59 AM     Your access code will  in 90 days. If you need help or a new code, please call your [facility] or [phone].        Care EveryWhere ID     This is your Care EveryWhere ID. This could be used by other organizations to access your [place] medical records  [id number]        Your Vitals Were     Pulse Temperature Respirations             91 97.5  F (36.4  C) (Oral) 18          Blood Pressure from Last 3 Encounters:   17 136/80   17 142/83   17 147/89    Weight from Last 3 Encounters:   17 90.7 kg (200 lb)   17 90.7 kg (200 lb)   17 90.7 kg (200 lb)              Today, you had the following     No orders found for display       Primary Care Provider Office Phone # Fax #    P[place] Vanita White [phone]       [facility] [address] [address]        Thank you!     Thank you for choosing [facility]  for your care. Our goal is always to provide you with excellent care. Hearing back from our patients is one way we can continue to improve our services. Please take a few minutes to complete the written survey that you may receive in the mail after your visit with us. Thank you!             Your Updated Medication List - Protect others around you: Learn how to safely use, store and throw away your medicines at www.disposemymeds.org.          This list is accurate as of: 17  1:12 PM.  Always use your most recent med list.                   Brand Name Dispense Instructions for use    acetaminophen 325 MG tablet    TYLENOL    20 tablet    Take 1-2 tablets (325-650 mg) by mouth every 4 hours as needed for other (mild pain)       oxyCODONE-acetaminophen 5-325 MG per tablet    PERCOCET    60 tablet    Take 1-2 tablets by mouth every 6 hours as needed       triamcinolone 0.1 % ointment    KENALOG    80 g     Apply sparingly to affected area three times daily for 14 days.

## 2017-04-13 NOTE — OP NOTE
DATE OF PROCEDURE:  04/12/2017      PREOPERATIVE DIAGNOSIS:  Bilateral inguinal hernias.      POSTOPERATIVE DIAGNOSIS:  Bilateral inguinal hernias.      PROCEDURE:  Laparoscopic bilateral inguinal hernia repair.      SURGEON:  Shay Mercado MD      ASSISTANT:  ZAIN York (needed for expertise in camera operation, retraction, hemostasis and wound closure).      ANESTHESIA:  General.      ANESTHESIOLOGIST:  Sharon Ward CRNA      FINDINGS:  Left greater than right inguinal hernias, successfully repaired laparoscopically.      INDICATIONS:  Micah Nunez is a 51-year-old male seen in my clinic complaining of a left groin mass.  On physical exam, he a large left inguinal hernia as well as a smaller one on the right.      CONSENT:  Risks, benefits, alternatives and complications were discussed with the patient, including the possibility of infection, bleeding, or hernia recurrence.  The patient understood and wished to proceed.      PROCEDURE:  The patient was taken to the operating room and placed in supine position.  General endotracheal anesthesia was induced and surgical timeout was performed.  His lower abdomen was clipped of extraneous hair and cleaned and draped in a sterile manner.  Marcaine 0.25% with epinephrine was used to anesthetize all port sites.  A small subumbilical curvilinear incision was made and the subcutaneous tissues dissected to the fascia.  The anterior fascia of the right rectus muscle was opened, revealing the muscle beneath.  This was pulled laterally, revealing the posterior fascia.  A dissecting balloon trocar was inserted into the preperitoneal space and insufflated under direct vision.  This was then removed and a 10 mm trocar placed into the preperitoneal space.  Carbon dioxide was insufflated to a pressure of 15 mmHg and the patient was placed into Trendelenburg position.  Two additional midline 5 mm trocars were also placed.  Attention was first turned to the midline.   The loose areolar tissue covering the pubic arch was removed revealing the bony structure.  This dissection continued laterally to the right until the right pelvic sidewall musculature was located and this hernia sac was then swept to the bottom of the operative field and removed from the inguinal canal and spermatic cord.  A piece of Covidien ProGrip mesh was then unrolled from superior to inferior, completely covering the inferior defect as well as the Hesselbach's triangle inferior margin of the mesh tucked easily under the peritoneum.  Attention was then turned to the left side.  This hernia was quite large on this side was an indirect.  Sac was removed from the inguinal canal along with a large cord lipoma.  This was swept to the bottom of the operative field.  All cord structures were identified.  The patient also had a direct component in Hesselbach's triangle.  Again, all this tissue was cleared to make room for the mesh.  A piece of Covidien ProGrip mesh was then unrolled from superior to inferior, overlapping the right-sided mesh slightly at midline and covering up Hesselbach's triangle and the inferior defect.  Again, the inferior margin of the mesh lay smoothly against the pelvic sidewall musculature and tucked under the parietoperitoneum.  Finally, 1 liter of warm normal saline solution was used to irrigate out the preperitoneal space.  All trocars were then removed and the air allowed to desufflate.  The fascial defect of the subumbilical port was closed using an 0 Vicryl suture in a figure-of-eight fashion, and this was bolstered with a second 0 Vicryl on top of that and reinjected with Marcaine.  All wounds were irrigated with normal saline and the skin closed using 4-0 Vicryl in a subcuticular fashion and dressed with Dermabond.  The patient tolerated the procedure well, was extubated on the table and transferred to the PACU in stable condition.      PLAN:  Following a stable postoperative course,  the patient will be discharged to home with a regular diet, activity as tolerated.      DISABILITY:  None.      MEDICATIONS:  Prescription written for Percocet.      INSTRUCTIONS:  The patient advised to wash his wounds daily with soap and water.  Follow up with me in 1-2 weeks.      ESTIMATED BLOOD LOSS:  5 mL.      INTRAVENOUS FLUIDS:  800 mL.         GOPI LAMBERT             D: 2017 10:58   T: 2017 20:54   MT: [id number]      Name:     DALLAS HOLLOWAY   MRN:      [id number]        Account:        [id number]   :      1965           Procedure Date: 2017      Document: [id number]       cc: Yovany White MD

## 2017-04-13 NOTE — TELEPHONE ENCOUNTER
"Call Type: Triage Call    Presenting Problem: \"My son had bilateral Inguinal Hernia Surgery  today and he had a hard time urinating, this mika.\" Father says that  when pt. did urinate, bright red blood was urinated. Father says  that he is concerned about this.  Triage Note:  Guideline Title: Postoperative Problems  Recommended Disposition: Call Provider Immediately  Original Inclination: Wanted to speak with a nurse  Override Disposition:  Intended Action: Call PCP/HCP  Physician Contacted: No  Any other unexpected urinary symptoms following urinary tract or abdominal surgery  within timeframe specified by provider ?  YES  Signs of dehydration ? NO  Unconscious now ? NO  Abdominal bloating ? NO  New onset OR worsening bleeding from incision requiring pressure to control ? NO  Depression and no other symptoms ? NO  Severe breathing problems ? NO  Wound separation AND internal organs protrude through wound or surgical incision  (evisceration) ? NO  Hives /Urticaria /Rash ? NO  New or worsening signs and symptoms that may indicate shock ? NO  Neck lump/swelling ? NO  Coughing up large amount of obvious blood (not blood-streaked sputum) ? NO  New seizure now or within last 6 hours ? NO  Continuous or heavy bleeding from operative site and NOT controlled with 10  minutes of steady pressure ? NO  Pain not relieved by pain medication when taken as directed ? NO  Passing red, black or tarry material from rectum AND onset of new signs and  symptoms of hypovolemia ? NO  Wearing cast or splint AND new or worsening pain, swelling, numbness, tingling,  coolness or change in color that is NOT improved by elevation for 30 minutes OR  not resolved within 2 hours ? NO  Vomiting red, bloody or coffee-ground material, more than streaks of blood or  scant amount (not following nosebleed within past day) ? NO  New onset severe pain and pale, discolored or cool below the surgical site  compared to the other extremity ? NO  New or " worsening breathing problems ? NO  Heavy vaginal bleeding (soaking 1 pad/tampon every hour for 2 hours or more) ? NO  Chest discomfort associated with shortness of breath, sweating, odd heartbeats or  different heart rate, nausea, vomiting, lightheadedness, or fainting lasting 5 or  more minutes now or within the last hour ? NO  Chest pain spreading to the shoulders, neck, jaw, in one or both arms, stomach or  back lasting 5 or more minutes now or within the last hour. Pain is NOT  associated with taking a deep breath or a productive cough, movement, or touch to  a localized area. ? NO  Signs and symptoms of anaphylaxis ? NO  Questions or concerns about postoperative wound ? NO  Pressure, fullness, squeezing sensation or pain anywhere in the chest lasting 5 or  more minutes now or within the last hour. Pain is NOT associated with taking a  deep breath or a productive cough, movement, or touch to a localized area on the  chest. ? NO  Sudden onset of focal neurological changes (difficulty speaking, numbness,  weakness, paralysis, loss of coordination, or change in vision such as double  vision or loss of visual field) ? NO  Sudden onset of shortness of breath, chest pain and cough with blood tinged sputum  ? NO  No urination for 12 or more hours ? NO  Abdominal pain, any blood in vomitus or stool, abdominal bloating, new fever or  other cautionary symptoms began after GI surgery or procedure and is lasting  longer than defined in provider specified discharge information. ? NO  Abdominal pain, any blood in vomitus or stool, abdominal bloating, new fever or  other cautionary symptoms began after GI surgery or procedure and is lasting  longer than defined in provider specified discharge information. ? NO  New onset of unbearable pain within last 24 hours ? NO  Any temperature elevation in an immunocompromised individual OR frail elderly with  signs of dehydration ? NO  Unable to retain food or fluids for 24 hours or more  due to recurrent vomiting ? NO  Physician Instructions:  Care Advice: Call  if signs and symptoms of shock develop (such as  unable to stand due to faintness, dizziness, or lightheadedness  new onset of confusion  slow to respond or difficult to awaken  skin is pale, gray, cool, or moist to touch  severe weakness  loss of consciousness).  Go to ED immediately if having dull aching pain, numbness or tingling in  saddle area (perineal) or in legs  IMMEDIATE ACTION  SYMPTOM / CONDITION MANAGEMENT  Avoid fluids containing caffeine (coffee, tea, cola or chocolate), alcohol,  or citrus juice because they may irritate the bladder.  Use of tobacco can  also be a bladder irritant.  Continue to follow your provider's post-procedure instructions for home.  See another provider immediately if unable to talk with your provider  within 1 hour. Consider the closest urgent care or ED if an office or  clinic is not available. Another adult should drive.  Increase intake of fluids. Try to drink 8 oz. (.2 liter) every hour when  awake, unless on restricted fluids for other medical reasons. Include at  least two 8 oz. (.2 liter) glasses of unsweetened cranberry juice each day.  Take sips of fluid or eat ice chips if nauseated or vomiting.

## 2017-04-13 NOTE — NURSING NOTE
"Chief Complaint   Patient presents with     Surgical Followup     Ing hernia 4/12/2017 blood and clots in urine       Initial /80 (BP Location: Left arm, Patient Position: Chair, Cuff Size: Adult Large)  Pulse 91  Temp 97.5  F (36.4  C) (Oral)  Resp 18 Estimated body mass index is 24.34 kg/(m^2) as calculated from the following:    Height as of 4/12/17: 6' 4\" (1.93 m).    Weight as of 4/12/17: 200 lb (90.7 kg).  Medication Reconciliation: complete   Sussy Leggett LPN    "

## 2017-04-13 NOTE — PROGRESS NOTES
51-year-old male is now postoperative day #1 following laparoscopic bilateral inguinal hernia repair. Patient reports 4 episodes of hematuria last night but this seems to be clearing up. This morning he had normal urination with just a few small flecks of clotted blood. He reports pain 4 out of 10 which is controlled by the Percocet. No other associated symptoms. Advised patient that bladder dysfunction following hernia surgery can happen but hematuria is rare. Advised him to push fluids and keep an eye on his urine. Advised patient that blood in the bladder is usually self-limited and will become a problem if it completely blocks off the flow of urine. Patient will return to clinic next week for more formal follow-up.    Shay Mercado MD

## 2017-04-14 ENCOUNTER — TELEPHONE (OUTPATIENT)
Dept: SURGERY | Facility: CLINIC | Age: 52
End: 2017-04-14

## 2017-04-14 NOTE — TELEPHONE ENCOUNTER
Reason for Call:  Patient is calling in states that he had hernia surgery and his scrotum is swollen 3 X normal size     Is this normal?    Detailed comments: see above    Phone Number Patient can be reached at: Home number on file [phone] (home)    Best Time: any    Can we leave a detailed message on this number? YES    Call taken on 4/14/2017 at 11:57 AM by Summer Pascual

## 2017-04-14 NOTE — TELEPHONE ENCOUNTER
Called and spoke with pt regarding note below.     S-(situation): Scrotal swelling.    B-(background): Inguinal hernia repair 4/12 with Dr Mercado.     A-(assessment): Pt states he noticed increase in swelling after being sedentary for the last 30 hrs post surgery. States pain is unchanged. Swelling decreases when he is lying down.     R-(recommendations): Instructed pt that this can be normal after this surgery and with increased activity. Also instructed to lie down with elevation and ice. Pt to seek emergent care if swelling increases, increase in pain, fever, or increased warmth to the area. Verbal understanding.     Tatyana GARCIA RN  [employer] [employer]

## 2017-04-16 ENCOUNTER — TELEPHONE (OUTPATIENT)
Dept: NURSING | Facility: CLINIC | Age: 52
End: 2017-04-16

## 2017-04-17 NOTE — TELEPHONE ENCOUNTER
Call Type: Triage Call    Presenting Problem: Caller reports that he  concerned about getting a  wound infection and  inquires if he should be using antibiotic  cream on sutures; Advised to follow wound care as instructed;  reviewed AVS instructions; Advised  that he has absorbable sutures  and they they should dissolve in 10 - 14 days; reviewed signs of  wound infection; Advised okay to take plain tylenol for pain when  RX for percocet was gone; advised okay to moisturize scrotal skin  that is  dry  from swelling.  Triage Note:  Guideline Title: Postoperative Wound Care  Recommended Disposition: Provide Home/Self Care  Original Inclination: Wanted to speak with a nurse  Override Disposition:  Intended Action: Follow Selfcare / Homecare  Physician Contacted: No  Concerned about mild irritation, bruising, or itching of wound. ?  YES  Needs suture or staple removal ? NO  New onset OR worsening bleeding from incision requiring pressure to control ? NO  Wound separation AND internal organs protrude through wound or surgical incision  (evisceration) ? NO  New or worsening signs and symptoms that may indicate shock ? NO  Continuous or heavy bleeding from operative site and NOT controlled with 10  minutes of steady pressure ? NO  Separation of wound edges without protrusion of tissue (dehiscence) ? NO  Drain does not drain as expected or comes out ? NO  Medical device (pump, infusion catheter) damaged or not functioning as expected  (leaking, not infusing, swelling at insertion site) ? NO  Noticeable localized swelling appearing as a lump near incision. ? NO  Healing of wound taking longer than surgeon provided expectations ? NO  Noticeable scarring of incision or skin changes (keloids, widened scars, and  hypertrophied scars) ? NO  More bleeding from site of instrumentation or procedure OR bleeding lasting longer  than defined in provider specified discharge information ? NO  Wound/incision is becoming progressively more  painful ? NO  Evaluated by provider and has question/concern about their condition, treatment  plan, treatment options, follow-up appointments, or other follow-up care. ? NO  New or increased redness, swelling, pain or purulent drainage (may be foul  smelling) around surgical wound or drain site ? NO  Worsening redness, pain, swelling or purulent drainage at site of recent  intravenous catheter insertion OR at other injection sites ? NO  One or more loose or missing stitches or staples, Steri-Strips or tissue adhesive  comes off in first three days post-surgery ? NO  Physician Instructions:  Care Advice: Call provider if symptoms worsen or new symptoms develop.  CAUTIONS  SYMPTOM / CONDITION MANAGEMENT  Avoid use of scented soaps, talc, shower gel or lotions around wound  because they may cause irritation.  Avoid tension on the wound by limiting movement or straining of the  surrounding tissue to reduce swelling and bruising and to keep wound edges  together to promote healing.  Keep your wound and surrounding skin clean and dry.  Wear loose, comfortable clothing to avoid pressure on the incision.  Try cool compresses to the area to relieve itching.  Call your provider if you do not get relief with home care and are unable  to tolerate the discomfort.  Avoid rubbing or scratching wound or surrounding skin. Tape or Steri-Strips  may cause some irritation to the skin.

## 2017-04-19 ENCOUNTER — OFFICE VISIT (OUTPATIENT)
Dept: SURGERY | Facility: CLINIC | Age: 52
End: 2017-04-19
Payer: COMMERCIAL

## 2017-04-19 VITALS
SYSTOLIC BLOOD PRESSURE: 143 MMHG | HEART RATE: 83 BPM | HEIGHT: 76 IN | DIASTOLIC BLOOD PRESSURE: 94 MMHG | WEIGHT: 200 LBS | TEMPERATURE: 98.1 F | BODY MASS INDEX: 24.36 KG/M2

## 2017-04-19 DIAGNOSIS — G89.18 POST-OP PAIN: Primary | ICD-10-CM

## 2017-04-19 DIAGNOSIS — K40.20 BILATERAL INGUINAL HERNIA WITHOUT OBSTRUCTION OR GANGRENE, RECURRENCE NOT SPECIFIED: ICD-10-CM

## 2017-04-19 PROCEDURE — 99024 POSTOP FOLLOW-UP VISIT: CPT | Performed by: SURGERY

## 2017-04-19 RX ORDER — OXYCODONE AND ACETAMINOPHEN 5; 325 MG/1; MG/1
1-2 TABLET ORAL EVERY 6 HOURS PRN
Qty: 45 TABLET | Refills: 0 | Status: SHIPPED | OUTPATIENT
Start: 2017-04-19 | End: 2017-06-15

## 2017-04-19 NOTE — MR AVS SNAPSHOT
"              After Visit Summary   4/19/2017    Micah Nunez    MRN: [id number]           Patient Information     Date Of Birth          1965        Visit Information        Provider Department      4/19/2017 1:00 PM Shay Mercado MD [facility]        Today's Diagnoses     Post-op pain    -  1    Bilateral inguinal hernia without obstruction or gangrene, recurrence not specified          Care Instructions    Per Dr. Mercado's instructions        Follow-ups after your visit        Follow-up notes from your care team     Return if symptoms worsen or fail to improve.      Your next 10 appointments already scheduled     Apr 20, 2017 10:20 AM CDT   New Visit with Cortez Hayward DPM   [facility] ([facility])    [address]  [address]  [place] [address] [phone]              Who to contact     If you have questions or need follow up information about today's clinic visit or your schedule please contact [facility] directly at [phone].  Normal or non-critical lab and imaging results will be communicated to you by [employer], letter or phone within 4 business days after the clinic has received the results. If you do not hear from us within 7 days, please contact the clinic through [employer] or phone. If you have a critical or abnormal lab result, we will notify you by phone as soon as possible.  Submit refill requests through [employer] or call your pharmacy and they will forward the refill request to us. Please allow 3 business days for your refill to be completed.          Additional Information About Your Visit        [email or web address] Information     [employer] lets you send messages to your doctor, view your test results, renew your prescriptions, schedule appointments and more. To sign up, go to [email or web address] . Click on \"Log in\" on the left side of the screen, which will take you to the Welcome page. Then click on " "\"Sign up Now\" on the right side of the page.     You will be asked to enter the access code listed below, as well as some personal information. Please follow the directions to create your username and password.     Your access code is: [email or web address]  Expires: 2017 10:59 AM     Your access code will  in 90 days. If you need help or a new code, please call your [facility] clinic or [phone].        Care EveryWhere ID     This is your Care EveryWhere ID. This could be used by other organizations to access your [facility] medical records  [id number]        Your Vitals Were     Pulse Temperature Height BMI (Body Mass Index)          83 98.1  F (36.7  C) (Oral) 1.93 m (6' 4\") 24.34 kg/m2         Blood Pressure from Last 3 Encounters:   17 (!) 143/94   17 136/80   17 142/83    Weight from Last 3 Encounters:   17 90.7 kg (200 lb)   17 90.7 kg (200 lb)   17 90.7 kg (200 lb)              Today, you had the following     No orders found for display         Today's Medication Changes          These changes are accurate as of: 17  1:11 PM.  If you have any questions, ask your nurse or doctor.               These medicines have changed or have updated prescriptions.        Dose/Directions    * oxyCODONE-acetaminophen 5-325 MG per tablet   Commonly known as:  PERCOCET   This may have changed:  Another medication with the same name was added. Make sure you understand how and when to take each.   Used for:  Post-op pain        Dose:  1-2 tablet   Take 1-2 tablets by mouth every 6 hours as needed   Quantity:  60 tablet   Refills:  0       * oxyCODONE-acetaminophen 5-325 MG per tablet   Commonly known as:  PERCOCET   This may have changed:  You were already taking a medication with the same name, and this prescription was added. Make sure you understand how and when to take each.   Used for:  Post-op pain   Changed by:  Shay Mercado MD        Dose:  1-2 tablet   Take 1-2 tablets by " mouth every 6 hours as needed   Quantity:  45 tablet   Refills:  0       * Notice:  This list has 2 medication(s) that are the same as other medications prescribed for you. Read the directions carefully, and ask your doctor or other care provider to review them with you.         Where to get your medicines      Some of these will need a paper prescription and others can be bought over the counter.  Ask your nurse if you have questions.     Bring a paper prescription for each of these medications     oxyCODONE-acetaminophen 5-325 MG per tablet                Primary Care Provider Office Phone # Fax #    Yovany White [phone]       [facility] [address] [address]        Thank you!     Thank you for choosing [facility]  for your care. Our goal is always to provide you with excellent care. Hearing back from our patients is one way we can continue to improve our services. Please take a few minutes to complete the written survey that you may receive in the mail after your visit with us. Thank you!             Your Updated Medication List - Protect others around you: Learn how to safely use, store and throw away your medicines at www.disposemymeds.org.          This list is accurate as of: 4/19/17  1:11 PM.  Always use your most recent med list.                   Brand Name Dispense Instructions for use    acetaminophen 325 MG tablet    TYLENOL    20 tablet    Take 1-2 tablets (325-650 mg) by mouth every 4 hours as needed for other (mild pain)       * oxyCODONE-acetaminophen 5-325 MG per tablet    PERCOCET    60 tablet    Take 1-2 tablets by mouth every 6 hours as needed       * oxyCODONE-acetaminophen 5-325 MG per tablet    PERCOCET    45 tablet    Take 1-2 tablets by mouth every 6 hours as needed       triamcinolone 0.1 % ointment    KENALOG    80 g    Apply sparingly to affected area three times daily for 14 days.       * Notice:  This list has  2 medication(s) that are the same as other medications prescribed for you. Read the directions carefully, and ask your doctor or other care provider to review them with you.

## 2017-04-19 NOTE — NURSING NOTE
"Initial BP (!) 143/94 (BP Location: Right arm, Patient Position: Chair, Cuff Size: Adult Large)  Pulse 83  Temp 98.1  F (36.7  C) (Oral)  Ht 1.93 m (6' 4\")  Wt 90.7 kg (200 lb)  BMI 24.34 kg/m2 Estimated body mass index is 24.34 kg/(m^2) as calculated from the following:    Height as of this encounter: 1.93 m (6' 4\").    Weight as of this encounter: 90.7 kg (200 lb). .    Sussy Garcia MA    "

## 2017-04-20 ENCOUNTER — OFFICE VISIT (OUTPATIENT)
Dept: PODIATRY | Facility: CLINIC | Age: 52
End: 2017-04-20
Payer: COMMERCIAL

## 2017-04-20 ENCOUNTER — RADIANT APPOINTMENT (OUTPATIENT)
Dept: GENERAL RADIOLOGY | Facility: CLINIC | Age: 52
End: 2017-04-20
Attending: PODIATRIST
Payer: COMMERCIAL

## 2017-04-20 VITALS — BODY MASS INDEX: 24.36 KG/M2 | WEIGHT: 200 LBS | HEIGHT: 76 IN

## 2017-04-20 DIAGNOSIS — M79.671 BILATERAL FOOT PAIN: Primary | ICD-10-CM

## 2017-04-20 DIAGNOSIS — M79.672 BILATERAL FOOT PAIN: Primary | ICD-10-CM

## 2017-04-20 DIAGNOSIS — M79.672 BILATERAL FOOT PAIN: ICD-10-CM

## 2017-04-20 DIAGNOSIS — M20.21 HALLUX RIGIDUS, RIGHT FOOT: ICD-10-CM

## 2017-04-20 DIAGNOSIS — M79.671 BILATERAL FOOT PAIN: ICD-10-CM

## 2017-04-20 PROCEDURE — 73630 X-RAY EXAM OF FOOT: CPT | Mod: RT

## 2017-04-20 PROCEDURE — 99213 OFFICE O/P EST LOW 20 MIN: CPT | Performed by: PODIATRIST

## 2017-04-20 NOTE — LETTER
4/20/2017       RE: Micah Nunez  [address]   [place] [address]           Dear Colleague,    Thank you for referring your patient, Micah Nunez, to the [facility]. Please see a copy of my visit note below.    PATIENT HISTORY:  Micah Nunez is a 51 year old male who presents to clinic for a painful both foot .  The patient describes the pain as sharp and aching.  The patient relates pain is located to the big toe joint of the right foot.  The patient relates the pain has been present for the past several months.  The patient relates pain with ambulation.  The patient has tried different shoes with little relief.       REVIEW OF SYSTEMS:  Constitutional, HEENT, cardiovascular, pulmonary, GI, , musculoskeletal, neuro, skin, endocrine and psych systems are negative, except as otherwise noted.     PAST MEDICAL HISTORY: No past medical history on file.     PAST SURGICAL HISTORY:   Past Surgical History:   Procedure Laterality Date     ARTHRODESIS FOOT Left 2/17/2015    Procedure: ARTHRODESIS FOOT;  Surgeon: Cortez Hayward DPM;  Location: [facility]     ARTHRODESIS TOE(S)  12/20/2013    Procedure: ARTHRODESIS TOE(S);  Arthrodesis first metatarsophalangeal joint left foot;  Surgeon: Cortez Hayward DPM;  Location: [facility]     ENDOSCOPIC SINUS SURGERY  1/6/2014    Procedure: ENDOSCOPIC SINUS SURGERY;  Functional Endoscopic Sinus Surgery;  Surgeon: Bobo Renteria MD;  [facility]     ENDOSCOPIC SINUS SURGERY  7/21/2014    Procedure: ENDOSCOPIC SINUS SURGERY;  Surgeon: Bobo Renteira MD;  [facility]     ENDOSCOPIC SINUS SURGERY N/A 4/6/2015    Procedure: ENDOSCOPIC SINUS SURGERY;  Surgeon: Bobo Renteria MD;  [facility]     ENDOSCOPIC SINUS SURGERY N/A 8/17/2015    Procedure: ENDOSCOPIC SINUS SURGERY;  Surgeon: Bobo Renteria MD;  [facility]     LAPAROSCOPIC HERNIORRHAPHY INGUINAL  BILATERAL Bilateral 4/12/2017    Procedure: LAPAROSCOPIC HERNIORRHAPHY INGUINAL BILATERAL;  Surgeon: Shay Mercado MD;  Location: [facility]     NASAL ENDOSCOPY Bilateral 2/6/2017    Procedure: NASAL ENDOSCOPY;  Surgeon: Bobo Renteria MD;  Location:  OR        MEDICATIONS:   Current Outpatient Prescriptions:      order for DME, Equipment being ordered: Dynaflex insert, Disp: 1 Units, Rfl: 0     oxyCODONE-acetaminophen (PERCOCET) 5-325 MG per tablet, Take 1-2 tablets by mouth every 6 hours as needed, Disp: 45 tablet, Rfl: 0     oxyCODONE-acetaminophen (PERCOCET) 5-325 MG per tablet, Take 1-2 tablets by mouth every 6 hours as needed, Disp: 60 tablet, Rfl: 0     acetaminophen (TYLENOL) 325 MG tablet, Take 1-2 tablets (325-650 mg) by mouth every 4 hours as needed for other (mild pain), Disp: 20 tablet, Rfl: 0     triamcinolone (KENALOG) 0.1 % ointment, Apply sparingly to affected area three times daily for 14 days., Disp: 80 g, Rfl: 0  No current facility-administered medications for this visit.     Facility-Administered Medications Ordered in Other Visits:      Self Administer Medications: Behavioral Services, , Does not apply, See Admin Instructions, Brunilda Jorgensen, [facility]     ALLERGIES:    Allergies   Allergen Reactions     Nkda [No Known Drug Allergies]         SOCIAL HISTORY:   Social History     Social History     Marital status: Single     Spouse name: N/A     Number of children: N/A     Years of education: N/A     Occupational History     Not on file.     Social History Main Topics     Smoking status: Current Every Day Smoker     Packs/day: 1.00     Years: 30.00     Types: Cigarettes     Smokeless tobacco: Never Used     Alcohol use No      Comment: QUIT MARCH 2016     Drug use: No      Comment: 7 years quit Cocaine/methamphetamines     Sexual activity: Not Currently     Partners: Female     Other Topics Concern     Parent/Sibling W/ Cabg, Mi Or Angioplasty Before 65f 55m? No      Service No      "Blood Transfusions No     Caffeine Concern No     Occupational Exposure No     Hobby Hazards No     Sleep Concern No     Stress Concern No     Weight Concern No     Special Diet No     Back Care No     Exercise Yes     Bike Helmet No     Seat Belt Yes     Social History Narrative        FAMILY HISTORY:   Family History   Problem Relation Age of Onset     DIABETES Mother 40     C.A.D. Father 72     Unknown/Adopted No family hx of      Depression No family hx of      Anxiety Disorder No family hx of      Schizophrenia No family hx of      Bipolar Disorder No family hx of      Suicide No family hx of      Substance Abuse No family hx of      Dementia No family hx of      [place] Disease No family hx of      Parkinsonism No family hx of      Autism Spectrum Disorder No family hx of      Intellectual Disability (Mental Retardation) No family hx of      MENTAL ILLNESS No family hx of         EXAM:Vitals: Ht 1.93 m (6' 4\")  Wt 90.7 kg (200 lb)  BMI 24.34 kg/m2  BMI= Body mass index is 24.34 kg/(m^2).        General appearance: Patient is alert and fully cooperative with history & exam.  No sign of distress is noted during the visit.     Psychiatric: Affect is pleasant & appropriate.  Patient appears motivated to improve health.     Respiratory: Breathing is regular & unlabored while sitting.     HEENT: Hearing is intact to spoken word.  Speech is clear.  No gross evidence of visual impairment that would impact ambulation.     Dermatologic: Skin is intact to both lower extremities without significant lesions, rash or abrasion.  No paronychia or evidence of soft tissue infection is noted.     Vascular: DP & PT pulses are intact & regular bilaterally.  No significant edema or varicosities noted.  CFT and skin temperature is normal to both lower extremities.     Neurologic: Lower extremity sensation is intact to light touch.  No evidence of weakness or contracture in the lower extremities.  No evidence of neuropathy.   "   Musculoskeletal: Patient is ambulatory without assistive device or brace.  No gross ankle deformity noted.  No foot or ankle joint effusion is noted.  Noted limited range of motion of the first metatarsal phalangeal joint on the right. One notes pain with range of motion of the first metatarsal phalangeal joint right foot. No surrounding erythema noted.    Radiographs were evaluated including AP, lateral and medial oblique views of the right and left foot reveals arthrodesis of the first metatarsophalangeal joint on the left foot. One notes degenerative changes to the first metatarsophalangeal joint, right foot. No cortical erosions or periosteal elevation.  All joint margins appear stable.  There is no apparent fracture or tumor formation noted.  There is no evidence of foreign body.    ASSESSMENT / PLAN:     ICD-10-CM    1. Bilateral foot pain M79.671 BL X-RAY FOOT 3+ VW    M79.672    2. Hallux rigidus, right foot M20.21 order for DME       I have explained to Micah  about the conditions.  We discussed the nature of the condition as well as the treatment plan and expected length of recovery.  At this time, the patient was instructed on icing, stretching, tissue massage and support.  The patient was fitted with a Dynaflex insert that will aid in offloading the tension forces to the soft tissues and prevent further inflammation.   The patient will return in four weeks for reevaluation if the symptoms do not resolve.        Disclaimer: This note consists of symbols derived from keyboarding, dictation and/or voice recognition software. As a result, there may be errors in the script that have gone undetected. Please consider this when interpreting information found in this chart.       DEBI Hayward, ERNESTINA.P.CHOCO., F.A.C.F.A.S.        Again, thank you for allowing me to participate in the care of your patient.        Sincerely,              Cortez Hayward DPM

## 2017-04-20 NOTE — MR AVS SNAPSHOT
After Visit Summary   4/20/2017    Micah Nunez    MRN: [id number]           Patient Information     Date Of Birth          1965        Visit Information        Provider Department      4/20/2017 10:20 AM Cortez Hayward DPM [facility] [place]        Today's Diagnoses     Bilateral foot pain    -  1    Hallux rigidus, right foot          Care Instructions    OSTEOARTHRITIS OF THE FOOT & ANKLE  Osteoarthritis is a condition characterized by the breakdown and eventual loss of cartilage in one or more joints. Cartilage (the connective tissue found at the end of the bones in the joints) protects and cushions the bones during movement. When cartilage deteriorates or is lost, symptoms develop that can restrict one s ability to easily perform daily activities.  Osteoarthritis is also known as degenerative arthritis, reflecting its nature to develop as part of the aging process. As the most common form of arthritis, osteoarthritis affects millions of Americans. Some people refer to osteoarthritis simply as arthritis, even though there are many different types of arthritis.  Osteoarthritis appears at various joints throughout the body, including the hands, feet, spine, hips, and knees. In the foot, the disease most frequently occurs in the big toe, although it is also often found in the midfoot and ankle.  CAUSES  Osteoarthritis is considered a  wear and tear  disease because the cartilage in the joint wears down with repeated stress and use over time. As the cartilage deteriorates and gets thinner, the bones lose their protective covering and eventually may rub together, causing pain and inflammation of the joint.  An injury may also lead to osteoarthritis, although it may take months or years after the injury for the condition to develop. For example, osteoarthritis in the big toe is often caused by kicking or jamming the toe, or by dropping something on the toe.  Osteoarthritis in the midfoot is often caused by dropping something on it, or by a sprain or fracture. In the ankle, osteoarthritis is usually caused by a fracture and occasionally by a severe sprain.  Sometimes osteoarthritis develops as a result of abnormal foot mechanics such as flat feet or high arches. A flat foot causes less stability in the ligaments (bands of tissue that connect bones), resulting in excessive strain on the joints, which can cause arthritis. A high arch is rigid and lacks mobility, causing a jamming of joints that creates an increased risk of arthritis.  SYMPTOMS  People with osteoarthritis in the foot or ankle experience, in varying degrees, one or more of the following: Pain and stiffness in the joint, swelling in or near the joint, or difficulty walking or bending the joint.   Some patients with osteoarthritis also develop a bone spur (a bony protrusion) at the affected joint. Shoe pressure may cause pain at the site of a bone spur, and in some cases blisters or calluses may form over its surface. Bone spurs can also limit the movement of the joint.    DIAGNOSIS  In diagnosing osteoarthritis, the foot and ankle surgeon will examine the foot thoroughly, looking for swelling in the joint, limited mobility, and pain with movement. In some cases, deformity and/or enlargement (spur) of the joint may be noted. X-rays may be ordered to evaluate the extent of the disease.  NON-SURGICAL TREATMENT  To help relieve symptoms, the surgeon may begin treating osteoarthritis with one or more of the following non-surgical approaches:  Oral medications. Nonsteroidal anti-inflammatory drugs (NSAIDs), such as ibuprofen, are often helpful in reducing the inflammation and pain. Occasionally a prescription for a steroid medication is needed to adequately reduce symptoms.   Orthotic devices. Custom orthotic devices (shoe inserts) are often prescribed to provide support to improve the foot s mechanics or  cushioning to help minimize pain.   Bracing. Bracing, which restricts motion and supports the joint, can reduce pain during walking and help prevent further deformity.   Immobilization. Protecting the foot from movement by wearing a cast or removable cast-boot may be necessary to allow the inflammation to resolve.   Steroid injections. In some cases, steroid injections are applied to the affected joint to deliver anti-inflammatory medication.   Physical therapy. Exercises to strengthen the muscles, especially when the osteoarthritis occurs in the ankle, may give the patient greater stability and help avoid injury that might worsen the condition.   DO I NEED SURGERY?  When osteoarthritis has progressed substantially or failed to improve with non-surgical treatment, surgery may be recommended. In advanced cases, surgery may be the only option. The goal of surgery is to decrease pain and improve function. The foot and ankle surgeon will consider a number of factors when selecting the procedure best suited to the patient s condition and lifestyle.          Follow-ups after your visit        Follow-up notes from your care team     Return if symptoms worsen or fail to improve.      Who to contact     If you have questions or need follow up information about today's clinic visit or your schedule please contact [facility] directly at [phone].  Normal or non-critical lab and imaging results will be communicated to you by MyChart, letter or phone within 4 business days after the clinic has received the results. If you do not hear from us within 7 days, please contact the clinic through [employer] or phone. If you have a critical or abnormal lab result, we will notify you by phone as soon as possible.  Submit refill requests through [employer] or call your pharmacy and they will forward the refill request to us. Please allow 3 business days for your refill to be completed.          Additional  "Information About Your Visit        MyChart Information     [employer] lets you send messages to your doctor, view your test results, renew your prescriptions, schedule appointments and more. To sign up, go to [email or web address] . Click on \"Log in\" on the left side of the screen, which will take you to the Welcome page. Then click on \"Sign up Now\" on the right side of the page.     You will be asked to enter the access code listed below, as well as some personal information. Please follow the directions to create your username and password.     Your access code is: [email or web address]  Expires: 2017 10:59 AM     Your access code will  in 90 days. If you need help or a new code, please call your [facility] clinic or [phone].        Care EveryWhere ID     This is your Care EveryWhere ID. This could be used by other organizations to access your [facility] medical records  [id number]        Your Vitals Were     Height BMI (Body Mass Index)                1.93 m (6' 4\") 24.34 kg/m2           Blood Pressure from Last 3 Encounters:   17 (!) 143/94   17 136/80   17 142/83    Weight from Last 3 Encounters:   17 90.7 kg (200 lb)   17 90.7 kg (200 lb)   17 90.7 kg (200 lb)                 Today's Medication Changes          These changes are accurate as of: 17 11:11 AM.  If you have any questions, ask your nurse or doctor.               Start taking these medicines.        Dose/Directions    order for DME   Used for:  Hallux rigidus, right foot   Started by:  Cortez Hayward DPM        Equipment being ordered: Dynaflex insert   Quantity:  1 Units   Refills:  0            Where to get your medicines      Some of these will need a paper prescription and others can be bought over the counter.  Ask your nurse if you have questions.     Bring a paper prescription for each of these medications     order for DME                Primary Care Provider Office Phone # Fax #    " Yovany White [phone]       [facility] [address] [facility] [address]        Thank you!     Thank you for choosing [facility]  for your care. Our goal is always to provide you with excellent care. Hearing back from our patients is one way we can continue to improve our services. Please take a few minutes to complete the written survey that you may receive in the mail after your visit with us. Thank you!             Your Updated Medication List - Protect others around you: Learn how to safely use, store and throw away your medicines at www.disposemymeds.org.          This list is accurate as of: 4/20/17 11:11 AM.  Always use your most recent med list.                   Brand Name Dispense Instructions for use    acetaminophen 325 MG tablet    TYLENOL    20 tablet    Take 1-2 tablets (325-650 mg) by mouth every 4 hours as needed for other (mild pain)       order for DME     1 Units    Equipment being ordered: Dynaflex insert       * oxyCODONE-acetaminophen 5-325 MG per tablet    PERCOCET    60 tablet    Take 1-2 tablets by mouth every 6 hours as needed       * oxyCODONE-acetaminophen 5-325 MG per tablet    PERCOCET    45 tablet    Take 1-2 tablets by mouth every 6 hours as needed       triamcinolone 0.1 % ointment    KENALOG    80 g    Apply sparingly to affected area three times daily for 14 days.       * Notice:  This list has 2 medication(s) that are the same as other medications prescribed for you. Read the directions carefully, and ask your doctor or other care provider to review them with you.

## 2017-04-20 NOTE — PROGRESS NOTES
PATIENT HISTORY:  Micah Nunez is a 51 year old male who presents to clinic for a painful both foot .  The patient describes the pain as sharp and aching.  The patient relates pain is located to the big toe joint of the right foot.  The patient relates the pain has been present for the past several months.  The patient relates pain with ambulation.  The patient has tried different shoes with little relief.       REVIEW OF SYSTEMS:  Constitutional, HEENT, cardiovascular, pulmonary, GI, , musculoskeletal, neuro, skin, endocrine and psych systems are negative, except as otherwise noted.     PAST MEDICAL HISTORY: No past medical history on file.     PAST SURGICAL HISTORY:   Past Surgical History:   Procedure Laterality Date     ARTHRODESIS FOOT Left 2/17/2015    Procedure: ARTHRODESIS FOOT;  Surgeon: Cortez Hayward DPM;  Location: [facility] OR     ARTHRODESIS TOE(S)  12/20/2013    Procedure: ARTHRODESIS TOE(S);  Arthrodesis first metatarsophalangeal joint left foot;  Surgeon: Cortez Hayward DPM;  Location: [facility] OR     ENDOSCOPIC SINUS SURGERY  1/6/2014    Procedure: ENDOSCOPIC SINUS SURGERY;  Functional Endoscopic Sinus Surgery;  Surgeon: Bobo Renteria MD;  [facility]     ENDOSCOPIC SINUS SURGERY  7/21/2014    Procedure: ENDOSCOPIC SINUS SURGERY;  Surgeon: Bobo Renteria MD;  [facility]     ENDOSCOPIC SINUS SURGERY N/A 4/6/2015    Procedure: ENDOSCOPIC SINUS SURGERY;  Surgeon: Bobo Renteria MD;  [facility]     ENDOSCOPIC SINUS SURGERY N/A 8/17/2015    Procedure: ENDOSCOPIC SINUS SURGERY;  Surgeon: Bobo Renteria MD;  [facility]     LAPAROSCOPIC HERNIORRHAPHY INGUINAL BILATERAL Bilateral 4/12/2017    Procedure: LAPAROSCOPIC HERNIORRHAPHY INGUINAL BILATERAL;  Surgeon: Shay Mercado MD;  Location: [facility] OR     NASAL ENDOSCOPY Bilateral 2/6/2017    Procedure: NASAL ENDOSCOPY;  Surgeon: Bobo Renteria MD;  Location:  OR        MEDICATIONS:   Current  Outpatient Prescriptions:      order for DME, Equipment being ordered: Dynaflex insert, Disp: 1 Units, Rfl: 0     oxyCODONE-acetaminophen (PERCOCET) 5-325 MG per tablet, Take 1-2 tablets by mouth every 6 hours as needed, Disp: 45 tablet, Rfl: 0     oxyCODONE-acetaminophen (PERCOCET) 5-325 MG per tablet, Take 1-2 tablets by mouth every 6 hours as needed, Disp: 60 tablet, Rfl: 0     acetaminophen (TYLENOL) 325 MG tablet, Take 1-2 tablets (325-650 mg) by mouth every 4 hours as needed for other (mild pain), Disp: 20 tablet, Rfl: 0     triamcinolone (KENALOG) 0.1 % ointment, Apply sparingly to affected area three times daily for 14 days., Disp: 80 g, Rfl: 0  No current facility-administered medications for this visit.     Facility-Administered Medications Ordered in Other Visits:      Self Administer Medications: Behavioral Services, , Does not apply, See Admin Instructions, Brunilda Jorgensen, [facility]     ALLERGIES:    Allergies   Allergen Reactions     Nkda [No Known Drug Allergies]         SOCIAL HISTORY:   Social History     Social History     Marital status: Single     Spouse name: N/A     Number of children: N/A     Years of education: N/A     Occupational History     Not on file.     Social History Main Topics     Smoking status: Current Every Day Smoker     Packs/day: 1.00     Years: 30.00     Types: Cigarettes     Smokeless tobacco: Never Used     Alcohol use No      Comment: QUIT MARCH 2016     Drug use: No      Comment: 7 years quit Cocaine/methamphetamines     Sexual activity: Not Currently     Partners: Female     Other Topics Concern     Parent/Sibling W/ Cabg, Mi Or Angioplasty Before 65f 55m? No      Service No     Blood Transfusions No     Caffeine Concern No     Occupational Exposure No     Hobby Hazards No     Sleep Concern No     Stress Concern No     Weight Concern No     Special Diet No     Back Care No     Exercise Yes     Bike Helmet No     Seat Belt Yes     Social History Narrative       "  FAMILY HISTORY:   Family History   Problem Relation Age of Onset     DIABETES Mother 40     C.A.D. Father 72     Unknown/Adopted No family hx of      Depression No family hx of      Anxiety Disorder No family hx of      Schizophrenia No family hx of      Bipolar Disorder No family hx of      Suicide No family hx of      Substance Abuse No family hx of      Dementia No family hx of      Sharkey Disease No family hx of      Parkinsonism No family hx of      Autism Spectrum Disorder No family hx of      Intellectual Disability (Mental Retardation) No family hx of      MENTAL ILLNESS No family hx of         EXAM:Vitals: Ht 1.93 m (6' 4\")  Wt 90.7 kg (200 lb)  BMI 24.34 kg/m2  BMI= Body mass index is 24.34 kg/(m^2).        General appearance: Patient is alert and fully cooperative with history & exam.  No sign of distress is noted during the visit.     Psychiatric: Affect is pleasant & appropriate.  Patient appears motivated to improve health.     Respiratory: Breathing is regular & unlabored while sitting.     HEENT: Hearing is intact to spoken word.  Speech is clear.  No gross evidence of visual impairment that would impact ambulation.     Dermatologic: Skin is intact to both lower extremities without significant lesions, rash or abrasion.  No paronychia or evidence of soft tissue infection is noted.     Vascular: DP & PT pulses are intact & regular bilaterally.  No significant edema or varicosities noted.  CFT and skin temperature is normal to both lower extremities.     Neurologic: Lower extremity sensation is intact to light touch.  No evidence of weakness or contracture in the lower extremities.  No evidence of neuropathy.     Musculoskeletal: Patient is ambulatory without assistive device or brace.  No gross ankle deformity noted.  No foot or ankle joint effusion is noted.  Noted limited range of motion of the first metatarsal phalangeal joint on the right. One notes pain with range of motion of the first " metatarsal phalangeal joint right foot. No surrounding erythema noted.    Radiographs were evaluated including AP, lateral and medial oblique views of the right and left foot reveals arthrodesis of the first metatarsophalangeal joint on the left foot. One notes degenerative changes to the first metatarsophalangeal joint, right foot. No cortical erosions or periosteal elevation.  All joint margins appear stable.  There is no apparent fracture or tumor formation noted.  There is no evidence of foreign body.    ASSESSMENT / PLAN:     ICD-10-CM    1. Bilateral foot pain M79.671 BL X-RAY FOOT 3+ VW    M79.672    2. Hallux rigidus, right foot M20.21 order for DME       I have explained to Micah  about the conditions.  We discussed the nature of the condition as well as the treatment plan and expected length of recovery.  At this time, the patient was instructed on icing, stretching, tissue massage and support.  The patient was fitted with a Dynaflex insert that will aid in offloading the tension forces to the soft tissues and prevent further inflammation.   The patient will return in four weeks for reevaluation if the symptoms do not resolve.        Disclaimer: This note consists of symbols derived from keyboarding, dictation and/or voice recognition software. As a result, there may be errors in the script that have gone undetected. Please consider this when interpreting information found in this chart.       DEBI Hayward D.P.M., [email or web address].

## 2017-04-20 NOTE — PATIENT INSTRUCTIONS
OSTEOARTHRITIS OF THE FOOT & ANKLE  Osteoarthritis is a condition characterized by the breakdown and eventual loss of cartilage in one or more joints. Cartilage (the connective tissue found at the end of the bones in the joints) protects and cushions the bones during movement. When cartilage deteriorates or is lost, symptoms develop that can restrict one s ability to easily perform daily activities.  Osteoarthritis is also known as degenerative arthritis, reflecting its nature to develop as part of the aging process. As the most common form of arthritis, osteoarthritis affects millions of Americans. Some people refer to osteoarthritis simply as arthritis, even though there are many different types of arthritis.  Osteoarthritis appears at various joints throughout the body, including the hands, feet, spine, hips, and knees. In the foot, the disease most frequently occurs in the big toe, although it is also often found in the midfoot and ankle.  CAUSES  Osteoarthritis is considered a  wear and tear  disease because the cartilage in the joint wears down with repeated stress and use over time. As the cartilage deteriorates and gets thinner, the bones lose their protective covering and eventually may rub together, causing pain and inflammation of the joint.  An injury may also lead to osteoarthritis, although it may take months or years after the injury for the condition to develop. For example, osteoarthritis in the big toe is often caused by kicking or jamming the toe, or by dropping something on the toe. Osteoarthritis in the midfoot is often caused by dropping something on it, or by a sprain or fracture. In the ankle, osteoarthritis is usually caused by a fracture and occasionally by a severe sprain.  Sometimes osteoarthritis develops as a result of abnormal foot mechanics such as flat feet or high arches. A flat foot causes less stability in the ligaments (bands of tissue that connect bones), resulting in excessive  strain on the joints, which can cause arthritis. A high arch is rigid and lacks mobility, causing a jamming of joints that creates an increased risk of arthritis.  SYMPTOMS  People with osteoarthritis in the foot or ankle experience, in varying degrees, one or more of the following: Pain and stiffness in the joint, swelling in or near the joint, or difficulty walking or bending the joint.   Some patients with osteoarthritis also develop a bone spur (a bony protrusion) at the affected joint. Shoe pressure may cause pain at the site of a bone spur, and in some cases blisters or calluses may form over its surface. Bone spurs can also limit the movement of the joint.    DIAGNOSIS  In diagnosing osteoarthritis, the foot and ankle surgeon will examine the foot thoroughly, looking for swelling in the joint, limited mobility, and pain with movement. In some cases, deformity and/or enlargement (spur) of the joint may be noted. X-rays may be ordered to evaluate the extent of the disease.  NON-SURGICAL TREATMENT  To help relieve symptoms, the surgeon may begin treating osteoarthritis with one or more of the following non-surgical approaches:  Oral medications. Nonsteroidal anti-inflammatory drugs (NSAIDs), such as ibuprofen, are often helpful in reducing the inflammation and pain. Occasionally a prescription for a steroid medication is needed to adequately reduce symptoms.   Orthotic devices. Custom orthotic devices (shoe inserts) are often prescribed to provide support to improve the foot s mechanics or cushioning to help minimize pain.   Bracing. Bracing, which restricts motion and supports the joint, can reduce pain during walking and help prevent further deformity.   Immobilization. Protecting the foot from movement by wearing a cast or removable cast-boot may be necessary to allow the inflammation to resolve.   Steroid injections. In some cases, steroid injections are applied to the affected joint to deliver  anti-inflammatory medication.   Physical therapy. Exercises to strengthen the muscles, especially when the osteoarthritis occurs in the ankle, may give the patient greater stability and help avoid injury that might worsen the condition.   DO I NEED SURGERY?  When osteoarthritis has progressed substantially or failed to improve with non-surgical treatment, surgery may be recommended. In advanced cases, surgery may be the only option. The goal of surgery is to decrease pain and improve function. The foot and ankle surgeon will consider a number of factors when selecting the procedure best suited to the patient s condition and lifestyle.

## 2017-06-15 ENCOUNTER — HOSPITAL ENCOUNTER (EMERGENCY)
Facility: CLINIC | Age: 52
Discharge: HOME | End: 2017-06-16
Attending: EMERGENCY MEDICINE | Admitting: EMERGENCY MEDICINE
Payer: COMMERCIAL

## 2017-06-15 DIAGNOSIS — N43.3 HYDROCELE IN ADULT: ICD-10-CM

## 2017-06-15 DIAGNOSIS — L08.9: ICD-10-CM

## 2017-06-15 DIAGNOSIS — S80.922D: ICD-10-CM

## 2017-06-15 DIAGNOSIS — K40.90 UNILATERAL INGUINAL HERNIA WITHOUT OBSTRUCTION OR GANGRENE, RECURRENCE NOT SPECIFIED: ICD-10-CM

## 2017-06-15 PROCEDURE — 99282 EMERGENCY DEPT VISIT SF MDM: CPT

## 2017-06-15 PROCEDURE — 99283 EMERGENCY DEPT VISIT LOW MDM: CPT | Performed by: EMERGENCY MEDICINE
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
## 2017-06-15 NOTE — ED AVS SNAPSHOT
[facility] Emergency Department    [address] [address]    Phone:  [phone]    Fax:  [phone]                                       Micah Nunez   MRN: [id number]    Department:  [facility] Emergency Department   Date of Visit:  6/15/2017           After Visit Summary Signature Page     I have received my discharge instructions, and my questions have been answered. I have discussed any challenges I see with this plan with the nurse or doctor.    ..........................................................................................................................................  Patient/Patient Representative Signature      ..........................................................................................................................................  Patient Representative Print Name and Relationship to Patient    ..................................................               ................................................  Date                                            Time    ..........................................................................................................................................  Reviewed by Signature/Title    ...................................................              ..............................................  Date                                                            Time

## 2017-06-16 VITALS
BODY MASS INDEX: 24.87 KG/M2 | HEIGHT: 75 IN | RESPIRATION RATE: 18 BRPM | WEIGHT: 200 LBS | DIASTOLIC BLOOD PRESSURE: 98 MMHG | TEMPERATURE: 97.8 F | SYSTOLIC BLOOD PRESSURE: 148 MMHG | OXYGEN SATURATION: 95 %

## 2017-06-16 PROCEDURE — 25000132 ZZH RX MED GY IP 250 OP 250 PS 637: Performed by: EMERGENCY MEDICINE

## 2017-06-16 RX ORDER — CEPHALEXIN 500 MG/1
500 CAPSULE ORAL ONCE
Status: COMPLETED | OUTPATIENT
Start: 2017-06-16 | End: 2017-06-16

## 2017-06-16 RX ORDER — CEPHALEXIN 500 MG/1
500 CAPSULE ORAL 4 TIMES DAILY
Qty: 40 CAPSULE | Refills: 0 | Status: SHIPPED | OUTPATIENT
Start: 2017-06-16 | End: 2017-06-26

## 2017-06-16 RX ADMIN — CEPHALEXIN 500 MG: 500 CAPSULE ORAL at 00:43
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
## 2017-06-21 ENCOUNTER — OFFICE VISIT (OUTPATIENT)
Dept: SURGERY | Facility: CLINIC | Age: 52
End: 2017-06-21
Payer: COMMERCIAL

## 2017-06-21 VITALS
DIASTOLIC BLOOD PRESSURE: 96 MMHG | TEMPERATURE: 97.8 F | SYSTOLIC BLOOD PRESSURE: 145 MMHG | WEIGHT: 200 LBS | HEIGHT: 76 IN | HEART RATE: 74 BPM | BODY MASS INDEX: 24.36 KG/M2

## 2017-06-21 PROCEDURE — 99024 POSTOP FOLLOW-UP VISIT: CPT | Performed by: SURGERY

## 2017-06-21 NOTE — MR AVS SNAPSHOT
After Visit Summary   6/21/2017    Micah Nunez    MRN: [id number]           Patient Information     Date Of Birth          1965        Visit Information        Provider Department      6/21/2017 10:15 AM Shay Mercado MD [facility]        Today's Diagnoses     Seroma    -  1      Care Instructions    Per Dr. Mercado's instructions          Follow-ups after your visit        Your next 10 appointments already scheduled     Jun 21, 2017 10:15 AM CDT   New Visit with Shay Mercado MD   [facility] ([facility])    [address] [address]   [phone]            Jun 26, 2017 10:00 AM CDT   SHORT with Yovany White MD   [facility] ([facility])    [address] [address]   [phone]            Jul 18, 2017  7:30 AM CDT   (Arrive by 7:15 AM)   Return Visit with Bobo Renteria MD   [facility] ([facility])    [address] [place] [address] [phone]              Who to contact     If you have questions or need follow up information about today's clinic visit or your schedule please contact [facility] directly at [phone].  Normal or non-critical lab and imaging results will be communicated to you by Butter Systemshart, letter or phone within 4 business days after the clinic has received the results. If you do not hear from us within 7 days, please contact the clinic through [employer] System[employer] or phone. If you have a critical or abnormal lab result, we will notify you by phone as soon as possible.  Submit refill requests through 15[employer]NOW or call your pharmacy and they will forward the refill request to us. Please allow 3 business days for your refill to be completed.          Additional Information About Your Visit        15MinutesNOW Information     [employer]NOW lets you send messages to your doctor, view  "your test results, renew your prescriptions, schedule appointments and more. To sign up, go to [email or web address] . Click on \"Log in\" on the left side of the screen, which will take you to the Welcome page. Then click on \"Sign up Now\" on the right side of the page.     You will be asked to enter the access code listed below, as well as some personal information. Please follow the directions to create your username and password.     Your access code is: [email or web address]  Expires: 2017  2:22 PM     Your access code will  in 90 days. If you need help or a new code, please call your [facility] clinic or [phone].        Care EveryWhere ID     This is your Care EveryWhere ID. This could be used by other organizations to access your [facility] medical records  [id number]        Your Vitals Were     Pulse Temperature Height BMI (Body Mass Index)          74 97.8  F (36.6  C) (Oral) 1.93 m (6' 4\") 24.34 kg/m2         Blood Pressure from Last 3 Encounters:   17 (!) 145/96   17 (!) 148/98   17 (!) 143/94    Weight from Last 3 Encounters:   17 90.7 kg (200 lb)   06/15/17 90.7 kg (200 lb)   17 90.7 kg (200 lb)              Today, you had the following     No orders found for display       Primary Care Provider Office Phone # Fax #    Yovany Vanita White [phone]       [facility] [address] [place] [address]        Equal Access to Services     FAUSTINA HAN : Hadii evan Costello, mumtaz estevez, chencho kaalmajackson paula. So [facility] [phone].    ATENCIÓN: Si habla español, tiene a espinoza disposición servicios gratuitos de asistencia lingüística. Llame al [phone].    We comply with applicable federal civil rights laws and [place] laws. We do not discriminate on the basis of race, color, national origin, age, disability sex, sexual orientation or gender identity.            Thank you! "     Thank you for choosing [facility]  for your care. Our goal is always to provide you with excellent care. Hearing back from our patients is one way we can continue to improve our services. Please take a few minutes to complete the written survey that you may receive in the mail after your visit with us. Thank you!             Your Updated Medication List - Protect others around you: Learn how to safely use, store and throw away your medicines at www.disposemymeds.org.          This list is accurate as of: 6/21/17 10:02 AM.  Always use your most recent med list.                   Brand Name Dispense Instructions for use Diagnosis    acetaminophen 325 MG tablet    TYLENOL    20 tablet    Take 1-2 tablets (325-650 mg) by mouth every 4 hours as needed for other (mild pain)    Papilloma of nose       cephALEXin 500 MG capsule    KEFLEX    40 capsule    Take 1 capsule (500 mg) by mouth 4 times daily for 10 days        order for DME     1 Units    Equipment being ordered: Dynaflex insert    Hallux rigidus, right foot       triamcinolone 0.1 % ointment    KENALOG    80 g    Apply sparingly to affected area three times daily for 14 days.    Atopic dermatitis, unspecified type

## 2017-06-21 NOTE — PROGRESS NOTES
"51-year-old male is now 2 months out from a laparoscopic bilateral inguinal hernia repair. During that repair of the left-sided indirect hernia was quite large. He reports a lump in his left groin that has been present almost since after surgery. It has persisted. It is not causing pain or discomfort but is present.    Exam:  Patient Vitals for the past 24 hrs:   BP Temp Temp src Pulse Height Weight   06/21/17 0953 (!) 145/96 97.8  F (36.6  C) Oral 74 1.93 m (6' 4\") 90.7 kg (200 lb)     Left groin with firm palpable nonreducible mass, nontender      Assessment and plan: A 51-year-old male with left groin mass, I suspect this is a seroma buildup from the potential space left after hernia repair. Advised patient that these things usually go away although this seems to be persisting. Recommended conservative treatment and patient will wait another couple of months and it has not gone down by then, we'll order a CT scan to evaluate and likely drain in clinic. Patient understood and would like to pursue this conservative treatment course. Return to clinic in 2 months if the mass is unchanged.    Shay Mercado MD   "

## 2017-06-26 ENCOUNTER — OFFICE VISIT (OUTPATIENT)
Dept: FAMILY MEDICINE | Facility: CLINIC | Age: 52
End: 2017-06-26
Payer: COMMERCIAL

## 2017-06-26 VITALS
HEART RATE: 83 BPM | BODY MASS INDEX: 24.36 KG/M2 | DIASTOLIC BLOOD PRESSURE: 88 MMHG | TEMPERATURE: 97.4 F | SYSTOLIC BLOOD PRESSURE: 136 MMHG | HEIGHT: 76 IN | WEIGHT: 200 LBS

## 2017-06-26 DIAGNOSIS — T30.0 BURN, FIRST DEGREE: Primary | ICD-10-CM

## 2017-06-26 DIAGNOSIS — L20.9 ATOPIC DERMATITIS, UNSPECIFIED TYPE: ICD-10-CM

## 2017-06-26 PROCEDURE — 99213 OFFICE O/P EST LOW 20 MIN: CPT | Performed by: FAMILY MEDICINE

## 2017-06-26 RX ORDER — TRIAMCINOLONE ACETONIDE 1 MG/G
OINTMENT TOPICAL
Qty: 80 G | Refills: 0 | Status: SHIPPED | OUTPATIENT
Start: 2017-06-26 | End: 2017-10-30
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
## 2017-06-26 NOTE — PATIENT INSTRUCTIONS
Thank you for choosing [facility].  You may be receiving a survey in the mail from Franky Doss regarding your visit today.  Please take a few minutes to complete and return the survey to let us know how we are doing.      If you have questions or concerns, please contact us via [employer] or you can contact your care team at [phone].    Our Clinic hours are:  Monday 6:40 am  to 7:00 pm  Tuesday -Friday 6:40 am to 5:00 pm    The [place] outpatient lab hours are:  Monday - Friday 6:10 am to 4:45 pm  Saturdays 7:00 am to 11:00 am  Appointments are required, call [phone]    If you have clinical questions after hours or would like to schedule an appointment,  call the clinic at [phone].
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
## 2017-07-18 ENCOUNTER — OFFICE VISIT (OUTPATIENT)
Dept: OTOLARYNGOLOGY | Facility: CLINIC | Age: 52
End: 2017-07-18

## 2017-07-18 VITALS — BODY MASS INDEX: 26.77 KG/M2 | HEIGHT: 73 IN | WEIGHT: 202 LBS

## 2017-07-18 DIAGNOSIS — D23.39 PAPILLOMA OF NOSE: Primary | ICD-10-CM

## 2017-07-18 ASSESSMENT — PAIN SCALES - GENERAL: PAINLEVEL: MILD PAIN (3)

## 2017-07-18 NOTE — PATIENT INSTRUCTIONS
Return in 3 months. Plan for surgery in December for cleaning out the nasal papillomas. Appointment follow up papillomas 10/10/2017 11:30     Please call/contact with further questions/concerns.     Sincerely,    HAMLET Osullivan R.N.    ENT appointment scheduling [phone] option 1  ENT Triage Team  [phone] option 3  ENT  Fax: [phone]     Concerns for after clinic hours/weekend, that can not wait until clinic is open, [phone] option 4 ask for the ENT resident on call.     Address: [facility]  ENT clinic    [address] [place] [address]   4th Floor

## 2017-07-18 NOTE — LETTER
7/18/2017       RE: Micah Nunez  [address]    [facility] [address]     Dear Colleague,    Thank you for referring your patient, Micah Nunez, to the [facility] EAR NOSE AND THROAT at [facility]. Please see a copy of my visit note below.    HISTORY OF PRESENT ILLNESS:  Micah Nunez is here for follow-up today.  He is 51 years of age.  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   Doing well and left sidedd bleeding at times       ROS t:  We looked at the ROS sheet and All other systems were reviewed and are negative.chronic cough for 2-3 weeks as well.        PHYSICAL EXAMINATION:  The patient is alert, oriented x3 and pleasant.  Skin of the face, lips, and neck on him is quite normal.  Oral cavity and oropharynx is clear. Neck without masses voice and breathing normal.         Procedure:Nasal cavity exam with an endoscope reveals that the right side of the nose is entirely clear throughout.  The left side of the nose   shows a small amount of synechiae. Along lef inferior turbinate.         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   Doing well, revisit in fall and perhaps small surgery again as welll.    FO/ms         Again, thank you for allowing me to participate in the care of your patient.      Sincerely,    Bobo Renteria MD
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
## 2017-07-18 NOTE — MR AVS SNAPSHOT
After Visit Summary   7/18/2017    Micah Nunez    MRN: [id number]           Patient Information     Date Of Birth          1965        Visit Information        Provider Department      7/18/2017 7:30 AM Bobo Renteria MD [facility] Ear Nose and Throat        Care Instructions      Return in 3 months. Plan for surgery in December for cleaning out the nasal papillomas. Appointment follow up papillomas 10/10/2017 11:30     Please call/contact with further questions/concerns.     Sincerely,    HAMLET Osullivan R.N.    ENT appointment scheduling [phone] option 1  ENT Triage Team  [phone] option 3  ENT  Fax: [phone]     Concerns for after clinic hours/weekend, that can not wait until clinic is open, [phone] option 4 ask for the ENT resident on call.     Address: [facility]  ENT clinic    [address] [place] [address]   4th Floor               Follow-ups after your visit        Follow-up notes from your care team     Return in about 3 months (around 10/18/2017).      Who to contact     Please call your clinic at [phone] to:    Ask questions about your health    Make or cancel appointments    Discuss your medicines    Learn about your test results    Speak to your doctor   If you have compliments or concerns about an experience at your clinic, or if you wish to file a complaint, please contact [facility] Patient Relations at [phone] or email us at [email or web address]         Additional Information About Your Visit        MyChart Information     [employer] is an electronic gateway that provides easy, online access to your medical records. With [employer], you can request a clinic appointment, read your test results, renew a prescription or communicate with your care team.     To sign up for [employer] visit the website at [email or web address]   You will be asked to enter the access code listed below, as well as some  "personal information. Please follow the directions to create your username and password.     Your access code is: [email or web address]  Expires: 2017  2:22 PM     Your access code will  in 90 days. If you need help or a new code, please contact your [facility] or call [phone] for assistance.        Care EveryWhere ID     This is your Care EveryWhere ID. This could be used by other organizations to access your [place] medical records  [id number]        Your Vitals Were     Height BMI (Body Mass Index)                1.85 m (6' 0.83\") 26.77 kg/m2           Blood Pressure from Last 3 Encounters:   17 136/88   17 (!) 145/96   17 (!) 148/98    Weight from Last 3 Encounters:   17 91.6 kg (202 lb)   17 90.7 kg (200 lb)   17 90.7 kg (200 lb)              Today, you had the following     No orders found for display       Primary Care Provider Office Phone # Fax #    Yovany White [phone]       [place] [address]        Equal Access to Services     FAUSTINA HAN : Hadii aad ku hadasho Somaryanaali, waaxda luqadaha, qaybta kaalmada adeegyada, waxmarcos donato. So [facility] [phone].    ATENCIÓN: Si habla español, tiene a espinoza disposición servicios gratuitos de asistencia lingüística. Llame al [phone].    We comply with applicable federal civil rights laws and [place] laws. We do not discriminate on the basis of race, color, national origin, age, disability sex, sexual orientation or gender identity.            Thank you!     Thank you for choosing [facility] EAR NOSE AND THROAT  for your care. Our goal is always to provide you with excellent care. Hearing back from our patients is one way we can continue to improve our services. Please take a few minutes to complete the written survey that you may receive in the mail after your visit with us. Thank you!   "           Your Updated Medication List - Protect others around you: Learn how to safely use, store and throw away your medicines at www.disposemymeds.org.          This list is accurate as of: 7/18/17  8:20 AM.  Always use your most recent med list.                   Brand Name Dispense Instructions for use Diagnosis    acetaminophen 325 MG tablet    TYLENOL    20 tablet    Take 1-2 tablets (325-650 mg) by mouth every 4 hours as needed for other (mild pain)    Papilloma of nose       order for DME     1 Units    Equipment being ordered: Dynaflex insert    Hallux rigidus, right foot       triamcinolone 0.1 % ointment    KENALOG    80 g    Apply sparingly to affected area three times daily for 14 days.    Atopic dermatitis, unspecified type

## 2017-07-27 ENCOUNTER — HOSPITAL ENCOUNTER (EMERGENCY)
Facility: CLINIC | Age: 52
Discharge: HOME | End: 2017-07-27
Attending: PHYSICIAN ASSISTANT | Admitting: PHYSICIAN ASSISTANT
Payer: COMMERCIAL

## 2017-07-27 VITALS
WEIGHT: 200 LBS | BODY MASS INDEX: 24.36 KG/M2 | SYSTOLIC BLOOD PRESSURE: 159 MMHG | HEIGHT: 76 IN | RESPIRATION RATE: 16 BRPM | TEMPERATURE: 97.8 F | OXYGEN SATURATION: 96 % | DIASTOLIC BLOOD PRESSURE: 99 MMHG

## 2017-07-27 DIAGNOSIS — M54.50 ACUTE MIDLINE LOW BACK PAIN WITHOUT SCIATICA: Primary | ICD-10-CM

## 2017-07-27 PROCEDURE — 25000128 H RX IP 250 OP 636: Performed by: PHYSICIAN ASSISTANT

## 2017-07-27 PROCEDURE — 99284 EMERGENCY DEPT VISIT MOD MDM: CPT | Performed by: PHYSICIAN ASSISTANT

## 2017-07-27 PROCEDURE — 99285 EMERGENCY DEPT VISIT HI MDM: CPT | Mod: 25

## 2017-07-27 PROCEDURE — 96372 THER/PROPH/DIAG INJ SC/IM: CPT

## 2017-07-27 RX ORDER — OXYCODONE AND ACETAMINOPHEN 5; 325 MG/1; MG/1
1-2 TABLET ORAL EVERY 6 HOURS PRN
Qty: 15 TABLET | Refills: 0 | Status: SHIPPED | OUTPATIENT
Start: 2017-07-27 | End: 2017-08-02

## 2017-07-27 RX ORDER — KETOROLAC TROMETHAMINE 30 MG/ML
60 INJECTION, SOLUTION INTRAMUSCULAR; INTRAVENOUS ONCE
Status: COMPLETED | OUTPATIENT
Start: 2017-07-27 | End: 2017-07-27

## 2017-07-27 RX ORDER — CYCLOBENZAPRINE HCL 10 MG
5-10 TABLET ORAL 3 TIMES DAILY PRN
Qty: 15 TABLET | Refills: 0 | Status: SHIPPED | OUTPATIENT
Start: 2017-07-27 | End: 2017-11-01

## 2017-07-27 RX ADMIN — KETOROLAC TROMETHAMINE 60 MG: 30 INJECTION, SOLUTION INTRAMUSCULAR at 13:50

## 2017-07-27 RX ADMIN — HYDROMORPHONE HYDROCHLORIDE 1 MG: 1 INJECTION, SOLUTION INTRAMUSCULAR; INTRAVENOUS; SUBCUTANEOUS at 13:46

## 2017-07-27 ASSESSMENT — ENCOUNTER SYMPTOMS
CARDIOVASCULAR NEGATIVE: 1
GASTROINTESTINAL NEGATIVE: 1
NEUROLOGICAL NEGATIVE: 1
CONSTITUTIONAL NEGATIVE: 1
BACK PAIN: 1
RESPIRATORY NEGATIVE: 1

## 2017-07-27 NOTE — ED PROVIDER NOTES
History     Chief Complaint   Patient presents with     Back Pain     onset of backpainyesterday wirking in garage, today increae pain diff walking     HPI  Micah Nunez is a 51 year old male with history of chemical dependency who presents with complaints of diffuse low back pain since yesterday.  Patient states he was working in the garage and was bending over and lifting boxes moving them from one location to another when he developed severe back pain in the middle of his low back.  He denies any radiation of pain.  He states he has never had back pain like this.  He states his back pain is worse with moving, bending, and walking.  He has been using his mother's walker to assist with ambulation.  Denies saddle anesthesia, bowel or bladder incontinence, lower extremity numbness/tingling/weakness.  Gait is steady but guarded.  Denies fevers, chills, nausea, vomiting, abdominal pain, urinary symptoms, or leg pain/swelling.  Patient attempted to take Advil without improvement in his pain.  Patient is a smoker.    I have reviewed the Medications, Allergies, Past Medical and Surgical History, and Social History in the Epic system.    Allergies:   Allergies   Allergen Reactions     Nkda [No Known Drug Allergies]          Current Facility-Administered Medications on File Prior to Encounter:  Self Administer Medications: Behavioral Services     Current Outpatient Prescriptions on File Prior to Encounter:  triamcinolone (KENALOG) 0.1 % ointment Apply sparingly to affected area three times daily for 14 days.   order for DME Equipment being ordered: Dynaflex insert       Patient Active Problem List   Diagnosis     CARDIOVASCULAR SCREENING; LDL GOAL LESS THAN 130     Nasal mass     Papilloma of nose     Chemical dependency (H)     Tobacco use disorder       Past Surgical History:   Procedure Laterality Date     ARTHRODESIS FOOT Left 2/17/2015    Procedure: ARTHRODESIS FOOT;  Surgeon: Cortez Hayward DPM;   "Location: [facility]     ARTHRODESIS TOE(S)  12/20/2013    Procedure: ARTHRODESIS TOE(S);  Arthrodesis first metatarsophalangeal joint left foot;  Surgeon: Cortez Hayward DPM;  Location: [facility]     ENDOSCOPIC SINUS SURGERY  1/6/2014    Procedure: ENDOSCOPIC SINUS SURGERY;  Functional Endoscopic Sinus Surgery;  Surgeon: Bobo Renteria MD;  Location: [facility]     ENDOSCOPIC SINUS SURGERY  7/21/2014    Procedure: ENDOSCOPIC SINUS SURGERY;  Surgeon: Bobo Renteria MD;  [facility]     ENDOSCOPIC SINUS SURGERY N/A 4/6/2015    Procedure: ENDOSCOPIC SINUS SURGERY;  Surgeon: Bobo Renteria MD;  [facility]     ENDOSCOPIC SINUS SURGERY N/A 8/17/2015    Procedure: ENDOSCOPIC SINUS SURGERY;  Surgeon: Bobo Renteria MD;  Location: [facility]     ENT SURGERY       LAPAROSCOPIC HERNIORRHAPHY INGUINAL BILATERAL Bilateral 4/12/2017    Procedure: LAPAROSCOPIC HERNIORRHAPHY INGUINAL BILATERAL;  Surgeon: Shay Mercado MD;  Location: [facility]     NASAL ENDOSCOPY Bilateral 2/6/2017    Procedure: NASAL ENDOSCOPY;  Surgeon: Bobo Renteria MD;  Location:  OR     NASAL/SINUS POLYPECTOMY       PE TUBES         Social History   Substance Use Topics     Smoking status: Current Every Day Smoker     Packs/day: 0.50     Years: 30.00     Types: Cigarettes     Start date: 1/1/1980     Smokeless tobacco: Never Used     Alcohol use No      Comment: QUIT MARCH 2016       Most Recent Immunizations   Administered Date(s) Administered     Influenza (IIV3) 12/01/2014     Influenza Vaccine IM 3yrs+ 4 Valent IIV4 12/03/2013     Mantoux 03/03/2014     TDAP Vaccine (Adacel) 12/03/2013       BMI: Estimated body mass index is 24.34 kg/(m^2) as calculated from the following:    Height as of this encounter: 1.93 m (6' 4\").    Weight as of this encounter: 90.7 kg (200 lb).      Review of Systems   Constitutional: Negative.    Respiratory: Negative.    Cardiovascular: Negative.    Gastrointestinal: Negative.    Genitourinary: " "Negative.    Musculoskeletal: Positive for back pain.   Skin: Negative.    Neurological: Negative.    All other systems reviewed and are negative.      Physical Exam   BP: 146/88  Heart Rate: 79  Temp: 97.8  F (36.6  C)  Resp: 16  Height: 193 cm (6' 4\")  Weight: 90.7 kg (200 lb)  SpO2: 98 %  Physical Exam   Constitutional: He appears well-developed and well-nourished. No distress.   HENT:   Head: Normocephalic and atraumatic.   Eyes: Conjunctivae are normal.   Neck: Normal range of motion. Neck supple.   Cardiovascular: Normal rate, regular rhythm and normal heart sounds.    Pulmonary/Chest: Effort normal and breath sounds normal.   Abdominal: Soft. There is no tenderness.   Musculoskeletal: Normal range of motion.        Cervical back: Normal. He exhibits normal range of motion, no tenderness and no bony tenderness.        Thoracic back: Normal. He exhibits normal range of motion, no tenderness and no bony tenderness.        Lumbar back: He exhibits tenderness. He exhibits normal range of motion and no bony tenderness.   No midline back tenderness on exam.  There is diffuse lumbar paraspinal muscle tenderness to palpation.     Neurological: He is alert. He has normal strength. He displays no atrophy and no tremor. No cranial nerve deficit or sensory deficit. He exhibits normal muscle tone. Coordination and gait normal.   Reflex Scores:       Patellar reflexes are 2+ on the right side and 2+ on the left side.  Skin: Skin is warm and dry. No rash noted.       ED Course     ED Course     Procedures      Assessments & Plan (with Medical Decision Making)     DDx: acute muscle strain, muscle spasm, herniated disc, cauda equina, spinal fracture, spinal stenosis, sciatica, degenerative disease, ligamentous injury, spondylolisthesis, epidural abscess, osteomyelitis, AAA, abdominal etiologies, nephrolithiasis, pyelonephritis     Pt is a 51 year old male with history of chemical dependency who presents with complaints of " diffuse low back pain since yesterday.  Patient states he was working in the garage and was bending over and lifting boxes moving them from one location to another when he developed severe back pain in the middle of his low back.  He denies any radiation of pain.  He states he has never had back pain like this.  He states his back pain is worse with moving, bending, and walking.  He has been using his mother's walker to assist with ambulation.  Pt is afebrile on arrival.  No midline back tenderness on exam.  There is diffuse lumbar paraspinal muscle tenderness to palpation.  No evidence of cauda equina.  Denies saddle anesthesia, bowel or bladder incontinence, lower extremity numbness/tingling/weakness.  Normal lower extremity strength.  Gait is steady.  Suspect muscular spasm/strain or even disc herniation.  No indication for emergent MRI imaging today as pt has no new trauma or neurologic symptoms or objective findings of weakness or signs of cauda equina on exam.  Patient was given IM Toradol and Dilaudid here.  Encouraged continued symptomatic treatments at home.  Hand-outs were provided.    Patient was sent with Flexeril and Percocet and was instructed to follow-up with PCP if no improvement in 5-7 days for continued care and management or sooner if new or worsening symptoms.  He is to return to the ED for persistent and/or worsening symptoms.  Patient expressed understanding of the diagnosis and plan and was discharged home in good condition.    I have reviewed the nursing notes.    I have reviewed the findings, diagnosis, plan and need for follow up with the patient.    Discharge Medication List as of 7/27/2017  2:28 PM      START taking these medications    Details   oxyCODONE-acetaminophen (PERCOCET) 5-325 MG per tablet Take 1-2 tablets by mouth every 6 hours as needed for pain, Disp-15 tablet, R-0, Local Print      cyclobenzaprine (FLEXERIL) 10 MG tablet Take 0.5-1 tablets (5-10 mg) by mouth 3 times daily  as needed for muscle spasms, Disp-15 tablet, R-0, E-Prescribe             Final diagnoses:   Acute midline low back pain without sciatica       7/27/2017   [facility] EMERGENCY DEPARTMENT     Dottie Veliz PA-C  07/27/17 1831       Dottie Veliz PA-C  07/27/17 1832       Dottie Veliz PA-C  07/27/17 1832

## 2017-07-27 NOTE — ED AVS SNAPSHOT
[facility] Emergency Department    [address] [address]    Phone:  [phone]    Fax:  [phone]                                       Micah Nunez   MRN: [id number]    Department:  [facility] Emergency Department   Date of Visit:  7/27/2017           Patient Information     Date Of Birth          1965        Your diagnoses for this visit were:     Acute midline low back pain without sciatica        You were seen by Dottie Veliz PA-C.      Follow-up Information     Follow up with Yovany White MD. Call in 5 days.    Specialty:  Family Practice    Why:  As needed, For persistent symptoms    Contact information:    [address] [id number] [phone]          Follow up with [facility] Emergency Department.    Specialty:  EMERGENCY MEDICINE    Why:  As needed, If symptoms worsen    Contact information:    [address] [place] [id number] [phone]    Additional information:    The medical center is located at   [address] [place] [address] (between [facility] and   [facility] [place], [place] miles north   of [place]).      Discharge References/Attachments     BACK SPRAIN/STRAIN (ENGLISH)      Future Appointments        Provider Department Dept Phone Center    10/10/2017 11:30 AM Bobo Renteria MD [facility] [phone] [facility]      24 Hour Appointment Hotline       To make an appointment at any [facility], call [phone] ([phone]). If you don't have a family doctor or clinic, we will help you find one. [facility] are conveniently located to serve the needs of you and your family.             Review of your medicines      START taking        Dose / Directions Last dose taken    cyclobenzaprine 10 MG tablet   Commonly known as:  FLEXERIL   Dose:  5-10 mg   Quantity:  15 tablet        Take 0.5-1 tablets (5-10 mg) by mouth 3 times daily as needed for  muscle spasms   Refills:  0        oxyCODONE-acetaminophen 5-325 MG per tablet   Commonly known as:  PERCOCET   Dose:  1-2 tablet   Quantity:  15 tablet        Take 1-2 tablets by mouth every 6 hours as needed for pain   Refills:  0          Our records show that you are taking the medicines listed below. If these are incorrect, please call your family doctor or clinic.        Dose / Directions Last dose taken    ADVIL PO   Dose:  800 mg        Take 800 mg by mouth daily as needed for moderate pain   Refills:  0        order for DME   Quantity:  1 Units        Equipment being ordered: Dynaflex insert   Refills:  0        triamcinolone 0.1 % ointment   Commonly known as:  KENALOG   Quantity:  80 g        Apply sparingly to affected area three times daily for 14 days.   Refills:  0                Prescriptions were sent or printed at these locations (2 Prescriptions)                   [facility] [place], [place] - [address]   [address] [address]    Telephone:  [phone]   Fax:  [phone]   Hours:                  E-Prescribed (1 of 2)         cyclobenzaprine (FLEXERIL) 10 MG tablet                 Printed at Department/Unit printer (1 of 2)         oxyCODONE-acetaminophen (PERCOCET) 5-325 MG per tablet                Orders Needing Specimen Collection     None      Pending Results     No orders found from 7/25/2017 to 7/28/2017.            Pending Culture Results     No orders found from 7/25/2017 to 7/28/2017.            Pending Results Instructions     If you had any lab results that were not finalized at the time of your Discharge, you can call the ED Lab Result RN at [phone]. You will be contacted by this team for any positive Lab results or changes in treatment. The nurses are available 7 days a week from 10A to 6:30P.  You can leave a message 24 hours per day and they will return your call.        Test Results From Your Hospital Stay               Thank you for  "choosing [facility]       Thank you for choosing [facility] for your care. Our goal is always to provide you with excellent care. Hearing back from our patients is one way we can continue to improve our services. Please take a few minutes to complete the written survey that you may receive in the mail after you visit with us. Thank you!        [employer] Information     [employer] lets you send messages to your doctor, view your test results, renew your prescriptions, schedule appointments and more. To sign up, go to [email or web address] . Click on \"Log in\" on the left side of the screen, which will take you to the Welcome page. Then click on \"Sign up Now\" on the right side of the page.     You will be asked to enter the access code listed below, as well as some personal information. Please follow the directions to create your username and password.     Your access code is: [email or web address]  Expires: 2017  2:22 PM     Your access code will  in 90 days. If you need help or a new code, please call your [facility] clinic or [phone].        Care EveryWhere ID     This is your Care EveryWhere ID. This could be used by other organizations to access your [facility] medical records  [id number]        Equal Access to Services     FAUSTINA HAN : Courtney byrdo Sosiena, waaxda luqadaha, qaybta kaalmada adeegyada, jackson donato. So [facility] [phone].    ATENCIÓN: Si habla español, tiene a espinoza disposición servicios gratuitos de asistencia lingüística. Llame al [phone].    We comply with applicable federal civil rights laws and [place] laws. We do not discriminate on the basis of race, color, national origin, age, disability sex, sexual orientation or gender identity.            After Visit Summary       This is your record. Keep this with you and show to your community pharmacist(s) and doctor(s) at your next visit.                  "

## 2017-07-27 NOTE — ED NOTES
Pt having lower back pain in both sides, started while moving boxes yesterday.  Pain has continued.  Having sharp pain with standing, makes him want to go to the ground.  Pain is only in lower back.  Denies pain radiating to back.  Came in via wheelchair.  Pt denies any tenderness in back.

## 2017-07-27 NOTE — ED NOTES
Pt having pain with standing, though doing better than he was on arrival.  Pt left via wheelchair.

## 2017-07-27 NOTE — ED AVS SNAPSHOT
[facility] Emergency Department    [address] [address]    Phone:  [phone]    Fax:  [phone]                                       Micah Nunez   MRN: [id number]    Department:  [facility] Emergency Department   Date of Visit:  7/27/2017           After Visit Summary Signature Page     I have received my discharge instructions, and my questions have been answered. I have discussed any challenges I see with this plan with the nurse or doctor.    ..........................................................................................................................................  Patient/Patient Representative Signature      ..........................................................................................................................................  Patient Representative Print Name and Relationship to Patient    ..................................................               ................................................  Date                                            Time    ..........................................................................................................................................  Reviewed by Signature/Title    ...................................................              ..............................................  Date                                                            Time

## 2017-08-02 ENCOUNTER — HOSPITAL ENCOUNTER (EMERGENCY)
Facility: CLINIC | Age: 52
Discharge: HOME | End: 2017-08-02
Attending: STUDENT IN AN ORGANIZED HEALTH CARE EDUCATION/TRAINING PROGRAM | Admitting: STUDENT IN AN ORGANIZED HEALTH CARE EDUCATION/TRAINING PROGRAM
Payer: COMMERCIAL

## 2017-08-02 VITALS
OXYGEN SATURATION: 95 % | TEMPERATURE: 97.6 F | RESPIRATION RATE: 16 BRPM | DIASTOLIC BLOOD PRESSURE: 86 MMHG | HEIGHT: 76 IN | SYSTOLIC BLOOD PRESSURE: 145 MMHG

## 2017-08-02 DIAGNOSIS — M54.50 ACUTE MIDLINE LOW BACK PAIN WITHOUT SCIATICA: ICD-10-CM

## 2017-08-02 LAB
ALBUMIN UR-MCNC: 30 MG/DL
APPEARANCE UR: ABNORMAL
BILIRUB UR QL STRIP: NEGATIVE
COLOR UR AUTO: YELLOW
GLUCOSE UR STRIP-MCNC: NEGATIVE MG/DL
HGB UR QL STRIP: NEGATIVE
KETONES UR STRIP-MCNC: 5 MG/DL
LEUKOCYTE ESTERASE UR QL STRIP: ABNORMAL
MUCOUS THREADS #/AREA URNS LPF: PRESENT /LPF
NITRATE UR QL: NEGATIVE
PH UR STRIP: 6 PH (ref 5–7)
RBC #/AREA URNS AUTO: 2 /HPF (ref 0–2)
SP GR UR STRIP: 1.03 (ref 1–1.03)
URN SPEC COLLECT METH UR: ABNORMAL
UROBILINOGEN UR STRIP-MCNC: 2 MG/DL (ref 0–2)
WBC #/AREA URNS AUTO: 10 /HPF (ref 0–2)

## 2017-08-02 PROCEDURE — 96372 THER/PROPH/DIAG INJ SC/IM: CPT

## 2017-08-02 PROCEDURE — 99284 EMERGENCY DEPT VISIT MOD MDM: CPT | Performed by: STUDENT IN AN ORGANIZED HEALTH CARE EDUCATION/TRAINING PROGRAM

## 2017-08-02 PROCEDURE — 81001 URINALYSIS AUTO W/SCOPE: CPT | Performed by: STUDENT IN AN ORGANIZED HEALTH CARE EDUCATION/TRAINING PROGRAM

## 2017-08-02 PROCEDURE — 99284 EMERGENCY DEPT VISIT MOD MDM: CPT | Mod: 25

## 2017-08-02 PROCEDURE — 25000128 H RX IP 250 OP 636: Performed by: STUDENT IN AN ORGANIZED HEALTH CARE EDUCATION/TRAINING PROGRAM

## 2017-08-02 RX ORDER — KETOROLAC TROMETHAMINE 30 MG/ML
30 INJECTION, SOLUTION INTRAMUSCULAR; INTRAVENOUS ONCE
Status: COMPLETED | OUTPATIENT
Start: 2017-08-02 | End: 2017-08-02

## 2017-08-02 RX ORDER — OXYCODONE AND ACETAMINOPHEN 5; 325 MG/1; MG/1
1 TABLET ORAL EVERY 4 HOURS PRN
Qty: 8 TABLET | Refills: 0 | Status: SHIPPED | OUTPATIENT
Start: 2017-08-02 | End: 2017-10-11

## 2017-08-02 RX ADMIN — KETOROLAC TROMETHAMINE 30 MG: 30 INJECTION, SOLUTION INTRAMUSCULAR at 17:47

## 2017-08-02 NOTE — ED AVS SNAPSHOT
[facility] Emergency Department    [address] [address]    Phone:  [phone]    Fax:  [phone]                                       Micah Nunez   MRN: [id number]    Department:  [facility] Emergency Department   Date of Visit:  8/2/2017           After Visit Summary Signature Page     I have received my discharge instructions, and my questions have been answered. I have discussed any challenges I see with this plan with the nurse or doctor.    ..........................................................................................................................................  Patient/Patient Representative Signature      ..........................................................................................................................................  Patient Representative Print Name and Relationship to Patient    ..................................................               ................................................  Date                                            Time    ..........................................................................................................................................  Reviewed by Signature/Title    ...................................................              ..............................................  Date                                                            Time

## 2017-08-02 NOTE — ED PROVIDER NOTES
History     Chief Complaint   Patient presents with     Back Pain     was seen in urgent care last week for back pain but pain is still there.      HPI  Micah Nunez is a 51 year old male who presents for evaluation of persistent low back pain. Records confirm that the patient was seen in the department on 7/27/17 for evaluation of mid line lumbar back pain after carrying heavy boxes around his garage. His pain temporarily improved prescription Flexeril and Percocet, however he states over the past 24 hours it has again increased in severity. He denies recent fever/chills, traumatic injury, sensory deficits, focal weakness, fecal incontinence or urinary retention. Of note, he denies IV drug use, HIV/AIDS, or immunocompromise status.    I have reviewed the Medications, Allergies, Past Medical and Surgical History, and Social History in the Epic system.    Patient Active Problem List   Diagnosis     CARDIOVASCULAR SCREENING; LDL GOAL LESS THAN 130     Nasal mass     Papilloma of nose     Chemical dependency (H)     Tobacco use disorder       Past Surgical History:   Procedure Laterality Date     ARTHRODESIS FOOT Left 2/17/2015    Procedure: ARTHRODESIS FOOT;  Surgeon: Cortez Hayward DPM;  Location: [facility]     ARTHRODESIS TOE(S)  12/20/2013    Procedure: ARTHRODESIS TOE(S);  Arthrodesis first metatarsophalangeal joint left foot;  Surgeon: Cortez Hayward DPM;  Location: [facility]     ENDOSCOPIC SINUS SURGERY  1/6/2014    Procedure: ENDOSCOPIC SINUS SURGERY;  Functional Endoscopic Sinus Surgery;  Surgeon: Bobo Renteria MD;  Location:  OR     ENDOSCOPIC SINUS SURGERY  7/21/2014    Procedure: ENDOSCOPIC SINUS SURGERY;  Surgeon: Bobo Renteria MD;  [facility]     ENDOSCOPIC SINUS SURGERY N/A 4/6/2015    Procedure: ENDOSCOPIC SINUS SURGERY;  Surgeon: Bobo Renteria MD;  [facility]     ENDOSCOPIC SINUS SURGERY N/A 8/17/2015    Procedure: ENDOSCOPIC SINUS SURGERY;  Surgeon:  Bobo Renteria MD;  [facility]     ENT SURGERY       LAPAROSCOPIC HERNIORRHAPHY INGUINAL BILATERAL Bilateral 4/12/2017    Procedure: LAPAROSCOPIC HERNIORRHAPHY INGUINAL BILATERAL;  Surgeon: Shay Mercado MD;  Location: [facility]     NASAL ENDOSCOPY Bilateral 2/6/2017    Procedure: NASAL ENDOSCOPY;  Surgeon: Bobo Renteria MD;  [facility]     NASAL/SINUS POLYPECTOMY       PE TUBES         Social History     Social History     Marital status: Single     Spouse name: N/A     Number of children: N/A     Years of education: N/A     Occupational History     Not on file.     Social History Main Topics     Smoking status: Current Every Day Smoker     Packs/day: 0.50     Years: 30.00     Types: Cigarettes     Start date: 1/1/1980     Smokeless tobacco: Never Used     Alcohol use No      Comment: QUIT MARCH 2016     Drug use: No      Comment: 7 years quit Cocaine/methamphetamines     Sexual activity: Yes     Partners: Female     Birth control/ protection: None     Other Topics Concern     Parent/Sibling W/ Cabg, Mi Or Angioplasty Before 65f 55m? No      Service No     Blood Transfusions No     Caffeine Concern No     Occupational Exposure No     Hobby Hazards No     Sleep Concern No     Stress Concern No     Weight Concern No     Special Diet No     Back Care No     Exercise Yes     Bike Helmet No     Seat Belt Yes     Social History Narrative       Family History   Problem Relation Age of Onset     DIABETES Mother 40     C.A.D. Father 72     Unknown/Adopted No family hx of      Depression No family hx of      Anxiety Disorder No family hx of      Schizophrenia No family hx of      Bipolar Disorder No family hx of      Suicide No family hx of      Substance Abuse No family hx of      Dementia No family hx of      Laurens Disease No family hx of      Parkinsonism No family hx of      Autism Spectrum Disorder No family hx of      Intellectual Disability (Mental Retardation) No family hx of       "MENTAL ILLNESS No family hx of      Bleeding Disorder No family hx of        Most Recent Immunizations   Administered Date(s) Administered     Influenza (IIV3) 12/01/2014     Influenza Vaccine IM 3yrs+ 4 Valent IIV4 12/03/2013     Mantoux 03/03/2014     TDAP Vaccine (Adacel) 12/03/2013         Review of Systems  Constitutional: Negative for fever or chills.  Respiratory: Negative for shortness of breath.  Cardiovascular: Negative for chest pain.  Gastrointestinal: Negative for abdominal pain, nausea, or vomiting.  Genitourinary: Negative for dysuria, hematuria, urinary retention or incontinence area  Musculoskeletal: Positive for midline lumbar back pain, nonradiating.  Neurological: Negative for sensory deficits or weakness.    All others reviewed and are negative.      Physical Exam   BP: 145/86  Heart Rate: 85  Temp: 97.6  F (36.4  C)  Resp: 16  Height: 193 cm (6' 4\")  SpO2: 95 %  Physical Exam  Constitutional: Well developed, well nourished. Appears nontoxic but moderate discomfort.  Head: Normocephalic and atraumatic. Symmetric in appearance.  Eyes: Conjunctivae are normal.  Neck: Neck supple.  Cardiovascular: No cyanosis. RRR. No audible murmurs noted.   Respiratory: Effort normal, no respiratory distress. CTAB without diminished regions.   Gastrointestinal: Soft, nondistended abdomen. Nontender and without guarding. No rigidity or rebound tenderness. Negative McBurney's point. Negative for Moralez's sign. No palpable pulsatile mass.  Musculoskeletal: Moves all extremities spontaneously and without complaint. No step-offs noted and no tenderness to palpation of midline cervical, thoracic, or lumbosacral vertebra. Hip flexion, knee extension, dorsiflexion/extensor halliucis (L5), and plantar flexion (S1) intact and equal bilaterally. Sensation of medial leg, dorsum of foot, and planter aspect of foot are intact bilaterally and without evidence of saddle anesthesia. 2/4 reflexes of knees.  Neuro: Patient is " alert.  Skin: Skin is warm and dry, not diaphoretic.  Psych: Appears to have a normal mood and affect.      ED Course     ED Course     Procedures            Critical Care time:  none               Results for orders placed or performed during the hospital encounter of 08/02/17 (from the past 24 hour(s))   UA with Microscopic   Result Value Ref Range    Color Urine Yellow     Appearance Urine Slightly Cloudy     Glucose Urine Negative NEG mg/dL    Bilirubin Urine Negative NEG    Ketones Urine 5 (A) NEG mg/dL    Specific Gravity Urine 1.029 1.003 - 1.035    Blood Urine Negative NEG    pH Urine 6.0 5.0 - 7.0 pH    Protein Albumin Urine 30 (A) NEG mg/dL    Urobilinogen mg/dL 2.0 0.0 - 2.0 mg/dL    Nitrite Urine Negative NEG    Leukocyte Esterase Urine Trace (A) NEG    Source Midstream Urine     WBC Urine 10 (H) 0 - 2 /HPF    RBC Urine 2 0 - 2 /HPF    Mucous Urine Present (A) NEG /LPF         Assessments & Plan (with Medical Decision Making)   Micah Nunez is a 51 year old male who presents to the department for evaluation of low back pain. He was seen one week ago for similar symptoms, stated that it temporarily improved before seemingly worsening over the past 24 hours. Patient does not have typical signs/symptoms of fracture, cauda equina syndrome, spinal cord compression, metastatic mass, epidural abscess/hematoma, vertebral osteomyelitis or discitis. Urinalysis without blood, unlikely pyelonephritis or ureterolithiasis. Clinical impression is that his symptoms are likely musculoskeletal in etiology, possible muscular strain or even herniated disc. Recommend that he follow up with primary care provider at scheduled appointment for Friday, A/4/17. An outpatient MRI order was also placed in case symptoms do not rapidly improve. Prior to discharge, I made it clear that illness can unexpectedly develop/progress so he has been instructed to return to the emergency department for reevaluation of evolving symptoms,  change in severity, neurologic symptoms, or other concerns. He seems comfortable with the discharge plan we discussed including follow up.    Disclaimer: This note consists of symbols derived from keyboarding, dictation, and/or voice recognition software. As a result, there may be errors in the script that have gone undetected.  Please consider this when interpreting information found in the chart.        I have reviewed the nursing notes.    I have reviewed the findings, diagnosis, plan and need for follow up with the patient.        New Prescriptions    No medications on file       Final diagnoses:   Acute midline low back pain without sciatica       8/2/2017   [facility] EMERGENCY DEPARTMENT     Frandy La DO  08/02/17 [id number]

## 2017-08-02 NOTE — ED AVS SNAPSHOT
[facility] Emergency Department    [address] [address]    Phone:  [phone]    Fax:  [phone]                                       Micah Nunez   MRN: [id number]    Department:  [facility] Emergency Department   Date of Visit:  8/2/2017           Patient Information     Date Of Birth          1965        Your diagnoses for this visit were:     Acute midline low back pain without sciatica        You were seen by Frandy La DO.      Follow-up Information     Follow up with Yovany White MD. Go in 2 days.    Specialty:  Family Practice    Why:  Followup at your scheduled appointment Friday, 8/4/17, for reevaluation and managment plan.    Contact information:    [facility]  [address] [address]  [phone]          Discharge Instructions         Possible Causes of Low Back or Leg Pain    The symptoms in your back or leg may be due to pressure on a nerve. This pressure may be caused by a damaged disk or by abnormal bone growth. Either way, you may feel pain, burning, tingling, or numbness. If you have pressure on a nerve that connects to the sciatic nerve, pain may shoot down your leg.    Pressure from the disk  Constant wear and tear can weaken a disk over time and cause back pain. The disk can then be damaged by a sudden movement or injury. If its soft center begins to bulge, the disk may press on a nerve. Or the outside of the disk may tear, and the soft center may squeeze through and pinch a nerve.    Pressure from bone  As a disk wears out, the vertebrae right above and below the disk begin to touch. This can put pressure on a nerve. Often, abnormal bone (called bone spurs) grows where the vertebrae rub against each other. This can cause the foramen or the spinal canal to narrow (called stenosis) and press against a nerve.  Date Last Reviewed: 10/4/2015    0466-7408 [employer]. [address] [place] [address]  Road, [place] [address]. All rights reserved. This information is not intended as a substitute for professional medical care. Always follow your healthcare professional's instructions.          Future Appointments        Provider Department Dept Phone Center    8/4/2017 10:20 AM Amalia Da Silva MD [facility] [phone] [facility]    10/10/2017 11:30 AM Bobo Renteria MD [facility] [phone] [facility]      24 Hour Appointment Hotline       To make an appointment at any [facility], call [phone] ([phone]). If you don't have a family doctor or clinic, we will help you find one. [facility] are conveniently located to serve the needs of you and your family.          ED Discharge Orders     MRI Lumbar spine w/o contrast       Administration of IV contrast (contrast agent, dose, and amount) will be tailored to this examination per the appropriate written protocol listed in the Protocol E-Book, or by the supervising imaging provider.                     Review of your medicines      CONTINUE these medicines which may have CHANGED, or have new prescriptions. If we are uncertain of the size of tablets/capsules you have at home, strength may be listed as something that might have changed.        Dose / Directions Last dose taken    oxyCODONE-acetaminophen 5-325 MG per tablet   Commonly known as:  PERCOCET   Dose:  1 tablet   What changed:    - how much to take  - when to take this   Quantity:  8 tablet        Take 1 tablet by mouth every 4 hours as needed for pain   Refills:  0          Our records show that you are taking the medicines listed below. If these are incorrect, please call your family doctor or clinic.        Dose / Directions Last dose taken    ADVIL PO   Dose:  800 mg        Take 800 mg by mouth daily as needed for moderate pain   Refills:  0        cyclobenzaprine 10 MG tablet   Commonly known as:  FLEXERIL   Dose:  5-10 mg   Quantity:  15 tablet         Take 0.5-1 tablets (5-10 mg) by mouth 3 times daily as needed for muscle spasms   Refills:  0        order for DME   Quantity:  1 Units        Equipment being ordered: Dynaflex insert   Refills:  0        triamcinolone 0.1 % ointment   Commonly known as:  KENALOG   Quantity:  80 g        Apply sparingly to affected area three times daily for 14 days.   Refills:  0                Prescriptions were sent or printed at these locations (1 Prescription)                   Other Prescriptions                Printed at Department/Unit printer (1 of 1)         oxyCODONE-acetaminophen (PERCOCET) 5-325 MG per tablet                Procedures and tests performed during your visit     UA with Microscopic      Orders Needing Specimen Collection     None      Pending Results     No orders found from 7/31/2017 to 8/3/2017.            Pending Culture Results     No orders found from 7/31/2017 to 8/3/2017.            Pending Results Instructions     If you had any lab results that were not finalized at the time of your Discharge, you can call the ED Lab Result RN at [phone]. You will be contacted by this team for any positive Lab results or changes in treatment. The nurses are available 7 days a week from 10A to 6:30P.  You can leave a message 24 hours per day and they will return your call.        Test Results From Your Hospital Stay        8/2/2017  6:30 PM      Component Results     Component Value Ref Range & Units Status    Color Urine Yellow  Final    Appearance Urine Slightly Cloudy  Final    Glucose Urine Negative NEG mg/dL Final    Bilirubin Urine Negative NEG Final    Ketones Urine 5 (A) NEG mg/dL Final    Specific Gravity Urine 1.029 1.003 - 1.035 Final    Blood Urine Negative NEG Final    pH Urine 6.0 5.0 - 7.0 pH Final    Protein Albumin Urine 30 (A) NEG mg/dL Final    Urobilinogen mg/dL 2.0 0.0 - 2.0 mg/dL Final    Nitrite Urine Negative NEG Final    Leukocyte Esterase Urine Trace (A) NEG Final    Source  "Midstream Urine  Final    WBC Urine 10 (H) 0 - 2 /HPF Final    RBC Urine 2 0 - 2 /HPF Final    Mucous Urine Present (A) NEG /LPF Final                Thank you for choosing [facility]       Thank you for choosing [facility] for your care. Our goal is always to provide you with excellent care. Hearing back from our patients is one way we can continue to improve our services. Please take a few minutes to complete the written survey that you may receive in the mail after you visit with us. Thank you!        [employer] Information     [employer] lets you send messages to your doctor, view your test results, renew your prescriptions, schedule appointments and more. To sign up, go to [email or web address] . Click on \"Log in\" on the left side of the screen, which will take you to the Welcome page. Then click on \"Sign up Now\" on the right side of the page.     You will be asked to enter the access code listed below, as well as some personal information. Please follow the directions to create your username and password.     Your access code is: [email or web address]  Expires: 2017  2:22 PM     Your access code will  in 90 days. If you need help or a new code, please call your [facility] clinic or [phone].        Care EveryWhere ID     This is your Care EveryWhere ID. This could be used by other organizations to access your [facility] medical records  [id number]        Equal Access to Services     FAUSTINA HAN : Hadii evan blackwood Sosiena, waaxda luqadaha, qaybta kaalmada adevictoriano, jackson arnold . So [facility] [phone].    ATENCIÓN: Si habla español, tiene a espinoza disposición servicios gratuitos de asistencia lingüística. Llame al [phone].    We comply with applicable federal civil rights laws and [place] laws. We do not discriminate on the basis of race, color, national origin, age, disability sex, sexual orientation or gender identity.            After Visit Summary       This is your record. " Keep this with you and show to your community pharmacist(s) and doctor(s) at your next visit.

## 2017-08-04 ENCOUNTER — HOSPITAL ENCOUNTER (OUTPATIENT)
Dept: MRI IMAGING | Facility: CLINIC | Age: 52
Discharge: HOME | End: 2017-08-04
Attending: STUDENT IN AN ORGANIZED HEALTH CARE EDUCATION/TRAINING PROGRAM | Admitting: STUDENT IN AN ORGANIZED HEALTH CARE EDUCATION/TRAINING PROGRAM
Payer: COMMERCIAL

## 2017-08-04 ENCOUNTER — OFFICE VISIT (OUTPATIENT)
Dept: FAMILY MEDICINE | Facility: CLINIC | Age: 52
End: 2017-08-04
Payer: COMMERCIAL

## 2017-08-04 VITALS
BODY MASS INDEX: 24.34 KG/M2 | SYSTOLIC BLOOD PRESSURE: 130 MMHG | WEIGHT: 200 LBS | TEMPERATURE: 96.9 F | DIASTOLIC BLOOD PRESSURE: 88 MMHG | HEART RATE: 86 BPM

## 2017-08-04 DIAGNOSIS — M54.50 ACUTE MIDLINE LOW BACK PAIN WITHOUT SCIATICA: ICD-10-CM

## 2017-08-04 DIAGNOSIS — Z12.11 SPECIAL SCREENING FOR MALIGNANT NEOPLASMS, COLON: Primary | ICD-10-CM

## 2017-08-04 PROCEDURE — 72148 MRI LUMBAR SPINE W/O DYE: CPT

## 2017-08-04 PROCEDURE — 99214 OFFICE O/P EST MOD 30 MIN: CPT | Performed by: FAMILY MEDICINE

## 2017-08-04 RX ORDER — IBUPROFEN 600 MG/1
600 TABLET, FILM COATED ORAL EVERY 6 HOURS
Qty: 40 TABLET | Refills: 0 | Status: SHIPPED | OUTPATIENT
Start: 2017-08-04 | End: 2019-08-13

## 2017-08-04 ASSESSMENT — PAIN SCALES - GENERAL: PAINLEVEL: MILD PAIN (3)

## 2017-08-04 NOTE — NURSING NOTE
"Initial BP (!) 134/100 (BP Location: Left arm, Patient Position: Chair, Cuff Size: Adult Large)  Pulse 86  Temp 96.9  F (36.1  C) (Tympanic)  Wt 200 lb (90.7 kg)  BMI 24.34 kg/m2 Estimated body mass index is 24.34 kg/(m^2) as calculated from the following:    Height as of 8/2/17: 6' 4\" (1.93 m).    Weight as of this encounter: 200 lb (90.7 kg). .    Va Keen    "

## 2017-08-04 NOTE — PROGRESS NOTES
a  SUBJECTIVE:                                                    Micah Nunez is 51 year old male   Chief Complaint   Patient presents with     Hospital F/U     ER 8/2/17      ED/UC Followup:    Facility:  [facility]  Date of visit: 8/2/17  Reason for visit: Back pain  Current Status: still having pain, had a MRI         Problem list and histories reviewed & adjusted, as indicated.  Additional history:  Sitting at desk at work for 3 weeks, same position.  Moving boxed in garage for dad and had pain that dropped him to the ground, lasted about an hour, went to ed.  Happened several more times.  Seen in ED twice, MRI today, not read yet.    Patient Active Problem List   Diagnosis     CARDIOVASCULAR SCREENING; LDL GOAL LESS THAN 130     Nasal mass     Papilloma of nose     Chemical dependency (H)     Tobacco use disorder     Past Surgical History:   Procedure Laterality Date     ARTHRODESIS FOOT Left 2/17/2015    Procedure: ARTHRODESIS FOOT;  Surgeon: Cortez Hayward DPM;  Location: [facility]     ARTHRODESIS TOE(S)  12/20/2013    Procedure: ARTHRODESIS TOE(S);  Arthrodesis first metatarsophalangeal joint left foot;  Surgeon: Cortez Hayward DPM;  Location: [facility]     ENDOSCOPIC SINUS SURGERY  1/6/2014    Procedure: ENDOSCOPIC SINUS SURGERY;  Functional Endoscopic Sinus Surgery;  Surgeon: Bobo Renteria MD;  Location:  OR     ENDOSCOPIC SINUS SURGERY  7/21/2014    Procedure: ENDOSCOPIC SINUS SURGERY;  Surgeon: Bobo Renteria MD;  Location:  OR     ENDOSCOPIC SINUS SURGERY N/A 4/6/2015    Procedure: ENDOSCOPIC SINUS SURGERY;  Surgeon: Bobo Renteria MD;  Location:  OR     ENDOSCOPIC SINUS SURGERY N/A 8/17/2015    Procedure: ENDOSCOPIC SINUS SURGERY;  Surgeon: Bobo Renteria MD;  Location:  OR     ENT SURGERY       LAPAROSCOPIC HERNIORRHAPHY INGUINAL BILATERAL Bilateral 4/12/2017    Procedure: LAPAROSCOPIC HERNIORRHAPHY INGUINAL BILATERAL;  Surgeon: Shay Mercado MD;   Location: [facility]     NASAL ENDOSCOPY Bilateral 2/6/2017    Procedure: NASAL ENDOSCOPY;  Surgeon: Bobo Renteria MD;  [facility]     NASAL/SINUS POLYPECTOMY       PE TUBES         Social History   Substance Use Topics     Smoking status: Current Every Day Smoker     Packs/day: 0.50     Years: 30.00     Types: Cigarettes     Start date: 1/1/1980     Smokeless tobacco: Never Used     Alcohol use No      Comment: QUIT MARCH 2016     Family History   Problem Relation Age of Onset     DIABETES Mother 40     C.A.D. Father 72     Unknown/Adopted No family hx of      Depression No family hx of      Anxiety Disorder No family hx of      Schizophrenia No family hx of      Bipolar Disorder No family hx of      Suicide No family hx of      Substance Abuse No family hx of      Dementia No family hx of      Homero Disease No family hx of      Parkinsonism No family hx of      Autism Spectrum Disorder No family hx of      Intellectual Disability (Mental Retardation) No family hx of      MENTAL ILLNESS No family hx of      Bleeding Disorder No family hx of          Current Outpatient Prescriptions   Medication Sig Dispense Refill     oxyCODONE-acetaminophen (PERCOCET) 5-325 MG per tablet Take 1 tablet by mouth every 4 hours as needed for pain 8 tablet 0     Ibuprofen (ADVIL PO) Take 800 mg by mouth daily as needed for moderate pain       cyclobenzaprine (FLEXERIL) 10 MG tablet Take 0.5-1 tablets (5-10 mg) by mouth 3 times daily as needed for muscle spasms (Patient not taking: Reported on 8/4/2017) 15 tablet 0     triamcinolone (KENALOG) 0.1 % ointment Apply sparingly to affected area three times daily for 14 days. 80 g 0     order for DME Equipment being ordered: Dynaflex insert 1 Units 0     Allergies   Allergen Reactions     Nkda [No Known Drug Allergies]      Recent Labs   Lab Test  03/21/17 2055  02/10/15   0840  12/03/13   0851   LDL   --   101  124   HDL   --   40*   --    TRIG   --   161*   --    CR  0.98   0.93   --    GFRESTIMATED  80  87   --    GFRESTBLACK  >90   GFR Calc    >90   GFR Calc     --    POTASSIUM  3.7  4.1   --       BP Readings from Last 3 Encounters:   08/04/17 130/88   08/02/17 145/86   07/27/17 (!) 159/99    Wt Readings from Last 3 Encounters:   08/04/17 200 lb (90.7 kg)   07/27/17 200 lb (90.7 kg)   07/18/17 202 lb (91.6 kg)         ROS:  Constitutional, HEENT, cardiovascular, pulmonary, gi and gu systems are negative, except as otherwise noted.    OBJECTIVE:                                                    /88  Pulse 86  Temp 96.9  F (36.1  C) (Tympanic)  Wt 200 lb (90.7 kg)  BMI 24.34 kg/m2  GENERAL APPEARANCE ADULT: Alert, no acute distress, very talkative  MS: extremities normal, no peripheral edema  back exam: normal posture, full ROM, tenderness to palpitation lower lumbar spine   SKIN: no suspicious lesions or rashes  NEURO: Alert, oriented, speech and mentation normal, Cranial nerves 2-12 are normal., Reflexes 2+ and symmetrical at biceps, triceps, brachioradialis, knees and ankles, Gait including heel, toe and tandem gait are normal  PSYCH: mentation appears normal., affect and mood normal  Diagnostic Test Results:  none      ASSESSMENT/PLAN:                                                    1. Special screening for malignant neoplasms, colon  - Fecal colorectal cancer screen (FIT); Future    2. Acute midline low back pain without sciatica  Trial physical therapy and scheduled OTC pain meds.  Awaiting MRI reading.    - PHYSICAL THERAPY REFERRAL  - ibuprofen (ADVIL/MOTRIN) 600 MG tablet; Take 1 tablet (600 mg) by mouth every 6 hours  Dispense: 40 tablet; Refill: 0    Amalia Da Silva MD  [facility]

## 2017-08-04 NOTE — NURSING NOTE
"Initial /88  Pulse 86  Temp 96.9  F (36.1  C) (Tympanic)  Wt 200 lb (90.7 kg)  BMI 24.34 kg/m2 Estimated body mass index is 24.34 kg/(m^2) as calculated from the following:    Height as of 8/2/17: 6' 4\" (1.93 m).    Weight as of this encounter: 200 lb (90.7 kg). .  Va Keen    "

## 2017-08-09 NOTE — PROGRESS NOTES
"Please inform patient that test result MRI showed multilevel degenerative disease ( \"arthritis \") The lump is a hemangioma which is a benign collection of blood vessels.  I will recommend that this be watched. He also has some disc narrowing at L3 - S1   Yovany White M.D."

## 2017-10-10 ENCOUNTER — OFFICE VISIT (OUTPATIENT)
Dept: OTOLARYNGOLOGY | Facility: CLINIC | Age: 52
End: 2017-10-10

## 2017-10-10 VITALS — HEIGHT: 76 IN | WEIGHT: 207 LBS | BODY MASS INDEX: 25.21 KG/M2

## 2017-10-10 DIAGNOSIS — D23.39 PAPILLOMA OF NOSE: Primary | ICD-10-CM

## 2017-10-10 ASSESSMENT — PAIN SCALES - GENERAL: PAINLEVEL: SEVERE PAIN (6)

## 2017-10-10 NOTE — LETTER
10/10/2017       RE: Micah Nunez  [address]    [facility] [address]     Dear Colleague,    Thank you for referring your patient, Micah Nunez, to the [facility] EAR NOSE AND THROAT at [facility]. Please see a copy of my visit note below.    HISTORY OF PRESENT ILLNESS: Micah Nunez is a 51 year old male with a history of left sided nasal papillomas and there is no some decreased breathing so probable need for additional surgery of the nasal vault. No pain or bleeding.     Last 2 Scores for Patient-Answered VHI Questionnaire  No flowsheet data found.    Last 2 Scores for Patient-Answered CSI Questionnaire  No flowsheet data found.      Last 2 Scores for Patient-Answered EAT Questionnaire  No flowsheet data found.        PAST MEDICAL HISTORY:   Past Medical History:   Diagnosis Date     Bleeding disorder (H)      Chemical dependency (H)      Skin disease        PAST SURGICAL HISTORY:   Past Surgical History:   Procedure Laterality Date     ARTHRODESIS FOOT Left 2/17/2015    Procedure: ARTHRODESIS FOOT;  Surgeon: Cortez Hayward DPM;  Location: [facility]     ARTHRODESIS TOE(S)  12/20/2013    Procedure: ARTHRODESIS TOE(S);  Arthrodesis first metatarsophalangeal joint left foot;  Surgeon: Cortez Hayward DPM;  Location: [facility]     ENDOSCOPIC SINUS SURGERY  1/6/2014    Procedure: ENDOSCOPIC SINUS SURGERY;  Functional Endoscopic Sinus Surgery;  Surgeon: Bobo Renteria MD;  Location:  OR     ENDOSCOPIC SINUS SURGERY  7/21/2014    Procedure: ENDOSCOPIC SINUS SURGERY;  Surgeon: Bobo Renteria MD;  [facility]     ENDOSCOPIC SINUS SURGERY N/A 4/6/2015    Procedure: ENDOSCOPIC SINUS SURGERY;  Surgeon: Bobo Renteria MD;  [facility]     ENDOSCOPIC SINUS SURGERY N/A 8/17/2015    Procedure: ENDOSCOPIC SINUS SURGERY;  Surgeon: Bobo Renteria MD;  [facility]     ENT SURGERY       LAPAROSCOPIC  HERNIORRHAPHY INGUINAL BILATERAL Bilateral 4/12/2017    Procedure: LAPAROSCOPIC HERNIORRHAPHY INGUINAL BILATERAL;  Surgeon: Shay Mercado MD;  Location: [facility]     NASAL ENDOSCOPY Bilateral 2/6/2017    Procedure: NASAL ENDOSCOPY;  Surgeon: Bobo Renteria MD;  [facility]     NASAL/SINUS POLYPECTOMY       PE TUBES         FAMILY HISTORY:   Family History   Problem Relation Age of Onset     DIABETES Mother 40     C.A.D. Father 72     Unknown/Adopted No family hx of      Depression No family hx of      Anxiety Disorder No family hx of      Schizophrenia No family hx of      Bipolar Disorder No family hx of      Suicide No family hx of      Substance Abuse No family hx of      Dementia No family hx of      Edwards Disease No family hx of      Parkinsonism No family hx of      Autism Spectrum Disorder No family hx of      Intellectual Disability (Mental Retardation) No family hx of      MENTAL ILLNESS No family hx of      Bleeding Disorder No family hx of        SOCIAL HISTORY:   Social History   Substance Use Topics     Smoking status: Current Every Day Smoker     Packs/day: 0.50     Years: 30.00     Types: Cigarettes     Start date: 1/1/1980     Smokeless tobacco: Never Used     Alcohol use No      Comment: QUIT MARCH 2016       REVIEW OF SYSTEMS: Ten point review of systems was performed and is negative except for:   [facility] ROS 10/10/2017   Constitutional Problems with sleep, Unexplained fever or night sweats   Neurology Numbness, Headache   Ears, Nose, Throat Nasal congestion or drainage, Sore throat, Trouble swallowing   Cardiopulmonary Cough, Chest pain   Gastrointestinal/Genitourinary Heartburn/indigestion, Unexplained nausea/vomiting, Constipation   Musculoskeletal Sore or stiff joints, Back pain   Allergy/Immunology Skin changes   Hematologic Bleeding problems   Endocrine Frequent urination        ALLERGIES: Nkda [no known drug allergies]    MEDICATIONS:   Current Outpatient Prescriptions    Medication Sig Dispense Refill     ibuprofen (ADVIL/MOTRIN) 600 MG tablet Take 1 tablet (600 mg) by mouth every 6 hours 40 tablet 0     oxyCODONE-acetaminophen (PERCOCET) 5-325 MG per tablet Take 1 tablet by mouth every 4 hours as needed for pain 8 tablet 0     Ibuprofen (ADVIL PO) Take 800 mg by mouth daily as needed for moderate pain       cyclobenzaprine (FLEXERIL) 10 MG tablet Take 0.5-1 tablets (5-10 mg) by mouth 3 times daily as needed for muscle spasms 15 tablet 0     triamcinolone (KENALOG) 0.1 % ointment Apply sparingly to affected area three times daily for 14 days. 80 g 0     order for DME Equipment being ordered: Dynaflex insert 1 Units 0         PHYSICAL EXAMINATION:  He  is awake, alert and in no apparent distress.    His tympanic membranes are clear and intact bilaterally. External auditory canals are clear.  Nasal exam shows a mild septal deviation without obstruction.  Examination of the oral cavity shows no suspicious lesions.  There is symmetric movement of the tongue and soft palate.    The oropharynx is clear.  His neck is supple without significant adenopathy.  Pulse is regular.  Upper airway is clear.  Cranial nerves II-XII are grossly intact.       Scope rigid;  Left nare with papillomas at the squamo columnar junction and along the antyer inferior turbinate and septum.    IMPRESSION:    Nasal papillomas  PLAN:    We discussed minor surgery under MAC in the next few weeks limited to the left side.     Bobo Renteria

## 2017-10-10 NOTE — NURSING NOTE
Relevant Diagnosis: history of nasal papillomas   Teaching Topic:    Person(s) involved in teaching: Patient     Teaching Concerns Addressed:  Pre op teaching included the need for an H&P, NPO status pre op, hospital routines, expected recovery, activity  restrictions, antimicrobial scrub, s/s of infection, pain control methods and the importance of follow up appointments.  The patient voiced an understanding of all instructions and will call with questions.     Motivation Level:  Asks Questions:   Yes  Eager to Learn:   Yes  Cooperative:   Yes  Receptive (willing/able to accept information):   Yes     Patient  demonstrates understanding of the following:  Reason for the appointment, diagnosis and treatment plan:   Yes  Knowledge of proper use of medications and conditions for which they are ordered (with special attention to potential side effects or drug interactions):   Yes  Which situations necessitate calling provider and whom to contact:   Yes        Proper use and care of  (medical equip, care aids, etc.):   NA  Nutritional needs and diet plan:   Yes  Pain management techniques:   Yes  Patient instructed on hand hygiene:  Yes  How and/when to access community resources:   NA     Infection Prevention:  Patient   demonstrates understanding of the following:  Surgical procedure site care taught   Signs and symptoms of infection taught Yes  Wound care taught Yes     Instructional Materials Used/Given: Pre op booklet.

## 2017-10-10 NOTE — MR AVS SNAPSHOT
After Visit Summary   10/10/2017    Micah Nunez    MRN: [id number]           Patient Information     Date Of Birth          1965        Visit Information        Provider Department      10/10/2017 11:30 AM Bobo Renteria MD [facility] Ear Nose and Throat        Today's Diagnoses     Papilloma of nose    -  1      Care Instructions    Nurse teaching given on excision of nasal papillomas and the patient expresses understanding and acceptance of instructions. Piter Pascual 10/10/2017 11:58 AM          Follow-ups after your visit        Your next 10 appointments already scheduled     Nov 06, 2017   Procedure with Bobo Renteria MD   [facility] ([facility])    [address] [place] [address]  [address] [facility] [address] [phone]           Located in the Clinics and Surgery Center at [address] [place] [address].   parking is very convenient and highly recommended.  is a $6 flat rate fee.  Both  and self parkers should enter the main arrival plaza from [facility]; parking attendants will direct you based on your parking preference.              Who to contact     Please call your clinic at [phone] to:    Ask questions about your health    Make or cancel appointments    Discuss your medicines    Learn about your test results    Speak to your doctor   If you have compliments or concerns about an experience at your clinic, or if you wish to file a complaint, please contact [facility] Patient Relations at [phone] or email us at [email or web address]         Additional Information About Your Visit        MyChart Information     Admify is an electronic gateway that provides easy, online access to your medical records. With Admify, you can request a clinic appointment, read your test results, renew a prescription or communicate with your care team.    "  To sign up for [employer] visit the website at [email or web address]   You will be asked to enter the access code listed below, as well as some personal information. Please follow the directions to create your username and password.     Your access code is: [email or web address]  Expires: 2017  6:30 AM     Your access code will  in 90 days. If you need help or a new code, please contact your [facility] or call [phone] for assistance.        Care EveryWhere ID     This is your Care EveryWhere ID. This could be used by other organizations to access your [facility] medical records  [id number]        Your Vitals Were     Height BMI (Body Mass Index)                1.93 m (6' 3.98\") 25.21 kg/m2           Blood Pressure from Last 3 Encounters:   17 130/88   17 145/86   17 (!) 159/99    Weight from Last 3 Encounters:   10/10/17 93.9 kg (207 lb)   17 90.7 kg (200 lb)   17 90.7 kg (200 lb)              We Performed the Following     NASAL ENDOSCOPY, DIAGNOSTIC     Nga-Operative Worksheet (Head & Neck)        Primary Care Provider Office Phone # Fax #    Marcellein Vanita White [phone] [address] [address]        Equal Access to Services     [facility] : Hadii eavn cerrato hadasho Somaryanaali, waaxda luqadaha, qaybta kaalmada ceci, jackson donato. So [facility] [phone].    ATENCIÓN: Si jovannila español, tiene a espinoza disposición servicios gratuitos de asistencia lingüística. Llame al [phone].    We comply with applicable federal civil rights laws and [place] laws. We do not discriminate on the basis of race, color, national origin, age, disability, sex, sexual orientation, or gender identity.            Thank you!     Thank you for choosing [facility] EAR NOSE AND THROAT  for your care. Our goal is always to provide you with excellent care. Hearing back from our patients is one way we " can continue to improve our services. Please take a few minutes to complete the written survey that you may receive in the mail after your visit with us. Thank you!             Your Updated Medication List - Protect others around you: Learn how to safely use, store and throw away your medicines at www.disposemymeds.org.          This list is accurate as of: 10/10/17  1:13 PM.  Always use your most recent med list.                   Brand Name Dispense Instructions for use Diagnosis    * ADVIL PO      Take 800 mg by mouth daily as needed for moderate pain        * ibuprofen 600 MG tablet    ADVIL/MOTRIN    40 tablet    Take 1 tablet (600 mg) by mouth every 6 hours    Acute midline low back pain without sciatica       cyclobenzaprine 10 MG tablet    FLEXERIL    15 tablet    Take 0.5-1 tablets (5-10 mg) by mouth 3 times daily as needed for muscle spasms        order for DME     1 Units    Equipment being ordered: Dynaflex insert    Hallux rigidus, right foot       oxyCODONE-acetaminophen 5-325 MG per tablet    PERCOCET    8 tablet    Take 1 tablet by mouth every 4 hours as needed for pain        triamcinolone 0.1 % ointment    KENALOG    80 g    Apply sparingly to affected area three times daily for 14 days.    Atopic dermatitis, unspecified type       * Notice:  This list has 2 medication(s) that are the same as other medications prescribed for you. Read the directions carefully, and ask your doctor or other care provider to review them with you.

## 2017-10-10 NOTE — PROGRESS NOTES
HISTORY OF PRESENT ILLNESS: Micah Nunez is a 51 year old male with a history of left sided nasal papillomas and there is no some decreased breathing so probable need for additional surgery of the nasal vault. No pain or bleeding.     Last 2 Scores for Patient-Answered VHI Questionnaire  No flowsheet data found.    Last 2 Scores for Patient-Answered CSI Questionnaire  No flowsheet data found.      Last 2 Scores for Patient-Answered EAT Questionnaire  No flowsheet data found.        PAST MEDICAL HISTORY:   Past Medical History:   Diagnosis Date     Bleeding disorder (H)      Chemical dependency (H)      Skin disease        PAST SURGICAL HISTORY:   Past Surgical History:   Procedure Laterality Date     ARTHRODESIS FOOT Left 2/17/2015    Procedure: ARTHRODESIS FOOT;  Surgeon: Cortez Hayward DPM;  Location: [facility]     ARTHRODESIS TOE(S)  12/20/2013    Procedure: ARTHRODESIS TOE(S);  Arthrodesis first metatarsophalangeal joint left foot;  Surgeon: Cortez Hayward DPM;  Location: [facility]     ENDOSCOPIC SINUS SURGERY  1/6/2014    Procedure: ENDOSCOPIC SINUS SURGERY;  Functional Endoscopic Sinus Surgery;  Surgeon: Bobo Renteria MD;  Location:  OR     ENDOSCOPIC SINUS SURGERY  7/21/2014    Procedure: ENDOSCOPIC SINUS SURGERY;  Surgeon: Bobo Renteria MD;  Location:  OR     ENDOSCOPIC SINUS SURGERY N/A 4/6/2015    Procedure: ENDOSCOPIC SINUS SURGERY;  Surgeon: Bobo Renteria MD;  Location:  OR     ENDOSCOPIC SINUS SURGERY N/A 8/17/2015    Procedure: ENDOSCOPIC SINUS SURGERY;  Surgeon: Bobo Renteria MD;  Location:  OR     ENT SURGERY       LAPAROSCOPIC HERNIORRHAPHY INGUINAL BILATERAL Bilateral 4/12/2017    Procedure: LAPAROSCOPIC HERNIORRHAPHY INGUINAL BILATERAL;  Surgeon: Shay Mercado MD;  Location: [facility]     NASAL ENDOSCOPY Bilateral 2/6/2017    Procedure: NASAL ENDOSCOPY;  Surgeon: Bobo Renteria MD;  Location:  OR     NASAL/SINUS POLYPECTOMY       PE TUBES          FAMILY HISTORY:   Family History   Problem Relation Age of Onset     DIABETES Mother 40     C.A.D. Father 72     Unknown/Adopted No family hx of      Depression No family hx of      Anxiety Disorder No family hx of      Schizophrenia No family hx of      Bipolar Disorder No family hx of      Suicide No family hx of      Substance Abuse No family hx of      Dementia No family hx of      [place] Disease No family hx of      Parkinsonism No family hx of      Autism Spectrum Disorder No family hx of      Intellectual Disability (Mental Retardation) No family hx of      MENTAL ILLNESS No family hx of      Bleeding Disorder No family hx of        SOCIAL HISTORY:   Social History   Substance Use Topics     Smoking status: Current Every Day Smoker     Packs/day: 0.50     Years: 30.00     Types: Cigarettes     Start date: 1/1/1980     Smokeless tobacco: Never Used     Alcohol use No      Comment: QUIT MARCH 2016       REVIEW OF SYSTEMS: Ten point review of systems was performed and is negative except for:   [facility] E[facility] ROS 10/10/2017   Constitutional Problems with sleep, Unexplained fever or night sweats   Neurology Numbness, Headache   Ears, Nose, Throat Nasal congestion or drainage, Sore throat, Trouble swallowing   Cardiopulmonary Cough, Chest pain   Gastrointestinal/Genitourinary Heartburn/indigestion, Unexplained nausea/vomiting, Constipation   Musculoskeletal Sore or stiff joints, Back pain   Allergy/Immunology Skin changes   Hematologic Bleeding problems   Endocrine Frequent urination        ALLERGIES: Nkda [no known drug allergies]    MEDICATIONS:   Current Outpatient Prescriptions   Medication Sig Dispense Refill     ibuprofen (ADVIL/MOTRIN) 600 MG tablet Take 1 tablet (600 mg) by mouth every 6 hours 40 tablet 0     oxyCODONE-acetaminophen (PERCOCET) 5-325 MG per tablet Take 1 tablet by mouth every 4 hours as needed for pain 8 tablet 0     Ibuprofen (ADVIL PO) Take 800 mg by mouth daily as needed for moderate  pain       cyclobenzaprine (FLEXERIL) 10 MG tablet Take 0.5-1 tablets (5-10 mg) by mouth 3 times daily as needed for muscle spasms 15 tablet 0     triamcinolone (KENALOG) 0.1 % ointment Apply sparingly to affected area three times daily for 14 days. 80 g 0     order for DME Equipment being ordered: Dynaflex insert 1 Units 0         PHYSICAL EXAMINATION:  He  is awake, alert and in no apparent distress.    His tympanic membranes are clear and intact bilaterally. External auditory canals are clear.  Nasal exam shows a mild septal deviation without obstruction.  Examination of the oral cavity shows no suspicious lesions.  There is symmetric movement of the tongue and soft palate.    The oropharynx is clear.  His neck is supple without significant adenopathy.  Pulse is regular.  Upper airway is clear.  Cranial nerves II-XII are grossly intact.       Scope rigid;  Left nare with papillomas at the squamo columnar junction and along the antyer inferior turbinate and septum.    IMPRESSION:    Nasal papillomas  PLAN:    We discussed minor surgery under MAC in the next few weeks limited to the left side.     Bobo Renteria

## 2017-10-10 NOTE — PATIENT INSTRUCTIONS
Nurse teaching given on excision of nasal papillomas and the patient expresses understanding and acceptance of instructions. Piter Pascual 10/10/2017 11:58 AM

## 2017-10-11 ENCOUNTER — OFFICE VISIT (OUTPATIENT)
Dept: SURGERY | Facility: CLINIC | Age: 52
End: 2017-10-11
Payer: COMMERCIAL

## 2017-10-11 VITALS
HEART RATE: 81 BPM | HEIGHT: 76 IN | WEIGHT: 207 LBS | SYSTOLIC BLOOD PRESSURE: 175 MMHG | TEMPERATURE: 97.5 F | BODY MASS INDEX: 25.21 KG/M2 | DIASTOLIC BLOOD PRESSURE: 109 MMHG

## 2017-10-11 DIAGNOSIS — N43.3 HYDROCELE, UNSPECIFIED HYDROCELE TYPE: Primary | ICD-10-CM

## 2017-10-11 PROCEDURE — 99213 OFFICE O/P EST LOW 20 MIN: CPT | Performed by: SURGERY

## 2017-10-11 NOTE — PROGRESS NOTES
"51-year-old male underwent a laparoscopic bilateral inguinal hernia repair several months ago. Patient developed a seroma in his left groin which has not resolved and has settled into the left scrotum. This is moderately enlarged and interferes with his activities of daily living. It is nontender.    Exam:  Patient Vitals for the past 24 hrs:   BP Temp Temp src Pulse Height Weight   10/11/17 1400 (!) 175/109 97.5  F (36.4  C) Oral 81 1.93 m (6' 4\") 93.9 kg (207 lb)     Enlargement of left hemiscrotum.      Assessment and plan: 51-year-old male with hydrocele in the left scrotum. This is likely residual hernia sac from the previous hernia repair which is filled with fluid and is not resolving on its own. I reassured the patient that this was benign and could be corrected although I would have a urologist do this as it is quite low in the scrotum. Patient understood and will likely follow up with a urologist early next year.    Shay Mercado MD   "

## 2017-10-11 NOTE — NURSING NOTE
"Initial BP (!) 175/109 (BP Location: Right arm, Patient Position: Chair, Cuff Size: Adult Regular)  Pulse 81  Temp 97.5  F (36.4  C) (Oral)  Ht 1.93 m (6' 4\")  Wt 93.9 kg (207 lb)  BMI 25.2 kg/m2 Estimated body mass index is 25.2 kg/(m^2) as calculated from the following:    Height as of this encounter: 1.93 m (6' 4\").    Weight as of this encounter: 93.9 kg (207 lb). .    Sussy Garcia MA    "

## 2017-10-11 NOTE — MR AVS SNAPSHOT
After Visit Summary   10/11/2017    Micah Nunez    MRN: [id number]           Patient Information     Date Of Birth          1965        Visit Information        Provider Department      10/11/2017 2:00 PM Shay Mercado MD [facility]        Today's Diagnoses     Hydrocele, unspecified hydrocele type    -  1      Care Instructions    Per Dr. Mercado's instructions          Follow-ups after your visit        Your next 10 appointments already scheduled     Oct 12, 2017 10:20 AM CDT   Pre-Op physical with Yovany White MD   [facility] ([facility]    [address] [facility] [address]   [phone]            Nov 06, 2017   Procedure with Bobo Renteria MD   [facility] ([facility])    [address] [place] [address]  [address]th [facility] [id number] [phone]           Located in the Clinics [facility] at [address] [place] [address].   parking is very convenient and highly recommended.  is a $6 flat rate fee.  Both  and self parkers should enter the main arrival plaza from [facility]; parking attendants will direct you based on your parking preference.            Nov 14, 2017  9:00 AM CST   (Arrive by 8:45 AM)   Return Visit with Bobo Renteria MD   [facility] ([facility])    [address] [place] [address]  4th [facility] [id number] [phone]            Nov 21, 2017 11:00 AM CST   New Visit with NATHALIE Carr MD   [facility] ([facility]    [address] [facility] [address]   [phone]              Who to contact     If you have questions or need follow up information about today's clinic visit or your schedule please contact [facility] directly at [phone].  Normal or non-critical lab and imaging results will be  "communicated to you by MyChart, letter or phone within 4 business days after the clinic has received the results. If you do not hear from us within 7 days, please contact the clinic through [employer] or phone. If you have a critical or abnormal lab result, we will notify you by phone as soon as possible.  Submit refill requests through [employer] or call your pharmacy and they will forward the refill request to us. Please allow 3 business days for your refill to be completed.          Additional Information About Your Visit        [employer] Information     [employer] lets you send messages to your doctor, view your test results, renew your prescriptions, schedule appointments and more. To sign up, go to [email or web address] . Click on \"Log in\" on the left side of the screen, which will take you to the Welcome page. Then click on \"Sign up Now\" on the right side of the page.     You will be asked to enter the access code listed below, as well as some personal information. Please follow the directions to create your username and password.     Your access code is: [email or web address]  Expires: 2017  6:30 AM     Your access code will  in 90 days. If you need help or a new code, please call your [facility] clinic or [phone].        Care EveryWhere ID     This is your Care EveryWhere ID. This could be used by other organizations to access your [facility] medical records  [id number]        Your Vitals Were     Pulse Temperature Height BMI (Body Mass Index)          81 97.5  F (36.4  C) (Oral) 1.93 m (6' 4\") 25.2 kg/m2         Blood Pressure from Last 3 Encounters:   10/11/17 (!) 175/109   17 130/88   17 145/86    Weight from Last 3 Encounters:   10/11/17 93.9 kg (207 lb)   10/10/17 93.9 kg (207 lb)   17 90.7 kg (200 lb)              Today, you had the following     No orders found for display       Primary Care Provider Office Phone # Fax #    Yovany White [phone]       " [address] [facility] [address]        Equal Access to Services     FAUSTINA HAN : Hadii aad ku hadysabelgabrielle Pravin, wafernandoda luqjasha, qamazinta kamagy horacioclintalanna, waxmarcos nachoin hayaafartun leonardtorri estrellaterrell donato. So [facility] [phone].    ATENCIÓN: Si habla español, tiene a espinoza disposición servicios gratuitos de asistencia lingüística. Llame al [phone].    We comply with applicable federal civil rights laws and [place] laws. We do not discriminate on the basis of race, color, national origin, age, disability, sex, sexual orientation, or gender identity.            Thank you!     Thank you for choosing [facility]  for your care. Our goal is always to provide you with excellent care. Hearing back from our patients is one way we can continue to improve our services. Please take a few minutes to complete the written survey that you may receive in the mail after your visit with us. Thank you!             Your Updated Medication List - Protect others around you: Learn how to safely use, store and throw away your medicines at www.disposemymeds.org.          This list is accurate as of: 10/11/17  2:30 PM.  Always use your most recent med list.                   Brand Name Dispense Instructions for use Diagnosis    * ADVIL PO      Take 800 mg by mouth daily as needed for moderate pain        * ibuprofen 600 MG tablet    ADVIL/MOTRIN    40 tablet    Take 1 tablet (600 mg) by mouth every 6 hours    Acute midline low back pain without sciatica       cyclobenzaprine 10 MG tablet    FLEXERIL    15 tablet    Take 0.5-1 tablets (5-10 mg) by mouth 3 times daily as needed for muscle spasms        order for DME     1 Units    Equipment being ordered: Dynaflex insert    Hallux rigidus, right foot       triamcinolone 0.1 % ointment    KENALOG    80 g    Apply sparingly to affected area three times daily for 14 days.    Atopic dermatitis, unspecified type       * Notice:  This list has 2 medication(s) that are the same as  other medications prescribed for you. Read the directions carefully, and ask your doctor or other care provider to review them with you.

## 2017-10-12 ENCOUNTER — OFFICE VISIT (OUTPATIENT)
Dept: FAMILY MEDICINE | Facility: CLINIC | Age: 52
End: 2017-10-12
Payer: COMMERCIAL

## 2017-10-12 VITALS
SYSTOLIC BLOOD PRESSURE: 162 MMHG | HEIGHT: 76 IN | TEMPERATURE: 97.2 F | WEIGHT: 203 LBS | DIASTOLIC BLOOD PRESSURE: 106 MMHG | BODY MASS INDEX: 24.72 KG/M2 | HEART RATE: 85 BPM

## 2017-10-12 DIAGNOSIS — Z01.818 PREOP GENERAL PHYSICAL EXAM: Primary | ICD-10-CM

## 2017-10-12 DIAGNOSIS — J33.9 NASAL POLYP: ICD-10-CM

## 2017-10-12 DIAGNOSIS — I10 BENIGN ESSENTIAL HYPERTENSION: ICD-10-CM

## 2017-10-12 PROCEDURE — 99215 OFFICE O/P EST HI 40 MIN: CPT | Performed by: FAMILY MEDICINE

## 2017-10-12 RX ORDER — LISINOPRIL/HYDROCHLOROTHIAZIDE 10-12.5 MG
1 TABLET ORAL DAILY
Qty: 30 TABLET | Refills: 3 | Status: SHIPPED | OUTPATIENT
Start: 2017-10-12 | End: 2017-10-30

## 2017-10-12 NOTE — PATIENT INSTRUCTIONS
Before Your Surgery      Call your surgeon if there is any change in your health. This includes signs of a cold or flu (such as a sore throat, runny nose, cough, rash or fever).    Do not smoke, drink alcohol or take over the counter medicine (unless your surgeon or primary care doctor tells you to) for the 24 hours before and after surgery.    If you take prescribed drugs: Follow your doctor s orders about which medicines to take and which to stop until after surgery.    Eating and drinking prior to surgery: follow the instructions from your surgeon  Take a shower or bath the night before surgery. Use the soap your surgeon gave you to gently clean your skin. If you do not have soap from your surgeon, use your regular soap. Do not shave or scrub the surgery site.  Wear clean pajamas and have clean sheets on your bed.   Presurgery Checklist  You are scheduled to have surgery. The healthcare staff will try to make your stay comfortable. Use the guidelines below to remind yourself what to do before surgery. Be sure to follow any specific pre-op instructions from your surgeon or nurse.  Preparing for Surgery  Ask your surgeon if you ll need a blood transfusion during surgery and if so, how to prepare for it. In some cases, you can donate blood before surgery. If needed, this blood can be given back (transfused) to you during or after surgery.  If you are having abdominal surgery, ask what you need to do to clear your bowel.  Tell your surgeon if you have allergies to any medications or foods.  Arrange for an adult family member or friend to drive you home after surgery. If possible, have someone ready to help you at home as you recover.  Call the surgeon if you get a cold, fever, sore throat, diarrhea, or other health problem just before surgery. Your surgeon can decide whether or not to postpone the surgery.  Medications  Tell your surgeon about all medications you take, including prescription and over-the-counter  products such as herbal remedies and vitamins. Ask if you should continue taking them.  If you take ibuprofen, naproxen, or  blood thinners  such as aspirin, clopidogrel (Plavix), or warfarin (Coumadin), ask your surgeon whether you should stop taking them and how long before surgery you should stop.  You may be told to take antibiotics just before surgery to prevent infection. If so, follow instructions carefully on how to take them.  If you are told to take medications called anticoagulants to prevent blood clots after surgery, be sure to follow the instructions on how to take them.  Stop Smoking  If you smoke, healing may take longer. So at least 2 week(s) before surgery, stop smoking.  Bathing or Showering Before Surgery  If instructed, wash with antibacterial soap. Afterward, do not use lotions or powders.  If you are having surgery on the head, you may be asked to shampoo with antibacterial soap. Follow instructions for doing so.  Do Not Remove Hair from the Surgery Site  Do not shave hair from the incision site, unless you are given specific instructions to do so. Usually, if hair needs to be removed, it will be done at the hospital right before surgery.  Don t Eat or Drink  Your doctor will tell you when to stop eating and drinking. If you do not follow your doctor's instructions, your procedure may be postponed or rescheduled for another day.  If your surgeon tells you to continue any medications, take them with small sips of water.  You can brush your teeth and rinse your mouth, but don t swallow any water.  Day of Surgery  Do not wear makeup. Do not use perfume, deodorant, or hairspray. Remove nail polish and artificial nails.  Leave jewelry (including rings), watches, and other valuables at home.  Be sure to bring health insurance cards or forms and a photo ID.  Bring a list of your medications (include the name, dose, how often you take them, and the time last dose was taken).  Arrive on time at the  hospital or surgery facility.    8518-6481 [facility], [address] [place] [address], [place] [address]. All rights reserved. This information is not intended as a substitute for professional medical care. Always follow your healthcare professional's instructions.

## 2017-10-12 NOTE — MR AVS SNAPSHOT
After Visit Summary   10/12/2017    Micah Nunez    MRN: [id number]           Patient Information     Date Of Birth          1965        Visit Information        Provider Department      10/12/2017 10:20 AM Yovany White MD [facility]        Today's Diagnoses     Preop general physical exam    -  1    Benign essential hypertension          Care Instructions      Before Your Surgery      Call your surgeon if there is any change in your health. This includes signs of a cold or flu (such as a sore throat, runny nose, cough, rash or fever).    Do not smoke, drink alcohol or take over the counter medicine (unless your surgeon or primary care doctor tells you to) for the 24 hours before and after surgery.    If you take prescribed drugs: Follow your doctor s orders about which medicines to take and which to stop until after surgery.    Eating and drinking prior to surgery: follow the instructions from your surgeon  Take a shower or bath the night before surgery. Use the soap your surgeon gave you to gently clean your skin. If you do not have soap from your surgeon, use your regular soap. Do not shave or scrub the surgery site.  Wear clean pajamas and have clean sheets on your bed.   Presurgery Checklist  You are scheduled to have surgery. The healthcare staff will try to make your stay comfortable. Use the guidelines below to remind yourself what to do before surgery. Be sure to follow any specific pre-op instructions from your surgeon or nurse.  Preparing for Surgery  Ask your surgeon if you ll need a blood transfusion during surgery and if so, how to prepare for it. In some cases, you can donate blood before surgery. If needed, this blood can be given back (transfused) to you during or after surgery.  If you are having abdominal surgery, ask what you need to do to clear your bowel.  Tell your surgeon if you have allergies to any medications or foods.  Arrange for an  adult family member or friend to drive you home after surgery. If possible, have someone ready to help you at home as you recover.  Call the surgeon if you get a cold, fever, sore throat, diarrhea, or other health problem just before surgery. Your surgeon can decide whether or not to postpone the surgery.  Medications  Tell your surgeon about all medications you take, including prescription and over-the-counter products such as herbal remedies and vitamins. Ask if you should continue taking them.  If you take ibuprofen, naproxen, or  blood thinners  such as aspirin, clopidogrel (Plavix), or warfarin (Coumadin), ask your surgeon whether you should stop taking them and how long before surgery you should stop.  You may be told to take antibiotics just before surgery to prevent infection. If so, follow instructions carefully on how to take them.  If you are told to take medications called anticoagulants to prevent blood clots after surgery, be sure to follow the instructions on how to take them.  Stop Smoking  If you smoke, healing may take longer. So at least 2 week(s) before surgery, stop smoking.  Bathing or Showering Before Surgery  If instructed, wash with antibacterial soap. Afterward, do not use lotions or powders.  If you are having surgery on the head, you may be asked to shampoo with antibacterial soap. Follow instructions for doing so.  Do Not Remove Hair from the Surgery Site  Do not shave hair from the incision site, unless you are given specific instructions to do so. Usually, if hair needs to be removed, it will be done at the hospital right before surgery.  Don t Eat or Drink  Your doctor will tell you when to stop eating and drinking. If you do not follow your doctor's instructions, your procedure may be postponed or rescheduled for another day.  If your surgeon tells you to continue any medications, take them with small sips of water.  You can brush your teeth and rinse your mouth, but don t swallow  any water.  Day of Surgery  Do not wear makeup. Do not use perfume, deodorant, or hairspray. Remove nail polish and artificial nails.  Leave jewelry (including rings), watches, and other valuables at home.  Be sure to bring health insurance cards or forms and a photo ID.  Bring a list of your medications (include the name, dose, how often you take them, and the time last dose was taken).  Arrive on time at the hospital or surgery facility.    1740-8144 [facility], [address] [place] [address]. All rights reserved. This information is not intended as a substitute for professional medical care. Always follow your healthcare professional's instructions.              Follow-ups after your visit        Your next 10 appointments already scheduled     Nov 06, 2017   Procedure with Bobo Renteria MD   [facility] ([facility] [place])    [address] [place] [address]  [address] [facility] [address] [phone]           Located in the Clinics and Surgery Center at [address] [place] [address].   parking is very convenient and highly recommended.  is a $6 flat rate fee.  Both  and self parkers should enter the main arrival plaza from [facility]; parking attendants will direct you based on your parking preference.            Nov 14, 2017  9:00 AM CST   (Arrive by 8:45 AM)   Return Visit with Bobo Renteria MD   [facility] ([facility] [place])    [address] [place] [address]  [address] [facility] [address]   [phone]            Nov 21, 2017 11:00 AM CST   New Visit with NATHALIE Carr MD   [facility] ([facility])    [address] [facility] [address] [phone]              Future tests that were ordered for you today     Open Future Orders        Priority Expected Expires Ordered    Basic metabolic panel Routine  10/12/2018 10/12/2017           "  Who to contact     If you have questions or need follow up information about today's clinic visit or your schedule please contact [facility] directly at [phone].  Normal or non-critical lab and imaging results will be communicated to you by MyChart, letter or phone within 4 business days after the clinic has received the results. If you do not hear from us within 7 days, please contact the clinic through MyChart or phone. If you have a critical or abnormal lab result, we will notify you by phone as soon as possible.  Submit refill requests through [email or web address] or call your pharmacy and they will forward the refill request to us. Please allow 3 business days for your refill to be completed.          Additional Information About Your Visit        [email or web address] Information     [email or web address] lets you send messages to your doctor, view your test results, renew your prescriptions, schedule appointments and more. To sign up, go to [email or web address] . Click on \"Log in\" on the left side of the screen, which will take you to the Welcome page. Then click on \"Sign up Now\" on the right side of the page.     You will be asked to enter the access code listed below, as well as some personal information. Please follow the directions to create your username and password.     Your access code is: [email or web address]  Expires: 2017  6:30 AM     Your access code will  in 90 days. If you need help or a new code, please call your [facility] clinic or [phone].        Care EveryWhere ID     This is your Care EveryWhere ID. This could be used by other organizations to access your [facility] medical records  [id number]        Your Vitals Were     Pulse Temperature Height BMI (Body Mass Index)          85 97.2  F (36.2  C) (Tympanic) 6' 3.75\" (1.924 m) 24.87 kg/m2         Blood Pressure from Last 3 Encounters:   10/12/17 (!) 160/94   10/11/17 (!) 175/109   17 130/88    Weight from Last 3 Encounters:   10/12/17 203 lb " (92.1 kg)   10/11/17 207 lb (93.9 kg)   10/10/17 207 lb (93.9 kg)                 Today's Medication Changes          These changes are accurate as of: 10/12/17 10:48 AM.  If you have any questions, ask your nurse or doctor.               Start taking these medicines.        Dose/Directions    lisinopril-hydrochlorothiazide 10-12.5 MG per tablet   Commonly known as:  PRINZIDE/ZESTORETIC   Used for:  Benign essential hypertension   Started by:  Yovany White MD        Dose:  1 tablet   Take 1 tablet by mouth daily   Quantity:  30 tablet   Refills:  3            Where to get your medicines      These medications were sent to [facility] [address] [facility]  [address] [place] [address]     Phone:  [phone]     lisinopril-hydrochlorothiazide 10-12.5 MG per tablet                Primary Care Provider Office Phone # Fax #    Yovany White [phone] [address] [facility] [address]        Equal Access to Services     REBECCA [facility] : Hadii evan cerrato hadasho Sosiena, waaxda luqadaha, qaybta kaalmada adevictoriano, jackson donato. So [facility] [phone].    ATENCIÓN: Si habla español, tiene a espinoza disposición servicios gratuitos de asistencia lingüística. [place] al [phone].    We comply with applicable federal civil rights laws and [place] laws. We do not discriminate on the basis of race, color, national origin, age, disability, sex, sexual orientation, or gender identity.            Thank you!     Thank you for choosing [facility]  for your care. Our goal is always to provide you with excellent care. Hearing back from our patients is one way we can continue to improve our services. Please take a few minutes to complete the written survey that you may receive in the mail after your visit with us. Thank you!             Your Updated Medication List - Protect others around you: Learn how to safely  use, store and throw away your medicines at www.disposemymeds.org.          This list is accurate as of: 10/12/17 10:48 AM.  Always use your most recent med list.                   Brand Name Dispense Instructions for use Diagnosis    * ADVIL PO      Take 800 mg by mouth daily as needed for moderate pain        * ibuprofen 600 MG tablet    ADVIL/MOTRIN    40 tablet    Take 1 tablet (600 mg) by mouth every 6 hours    Acute midline low back pain without sciatica       cyclobenzaprine 10 MG tablet    FLEXERIL    15 tablet    Take 0.5-1 tablets (5-10 mg) by mouth 3 times daily as needed for muscle spasms        lisinopril-hydrochlorothiazide 10-12.5 MG per tablet    PRINZIDE/ZESTORETIC    30 tablet    Take 1 tablet by mouth daily    Benign essential hypertension       order for DME     1 Units    Equipment being ordered: Dynaflex insert    Hallux rigidus, right foot       triamcinolone 0.1 % ointment    KENALOG    80 g    Apply sparingly to affected area three times daily for 14 days.    Atopic dermatitis, unspecified type       * Notice:  This list has 2 medication(s) that are the same as other medications prescribed for you. Read the directions carefully, and ask your doctor or other care provider to review them with you.

## 2017-10-12 NOTE — PROGRESS NOTES
[facility]  [address] [facility] [place] [address]  [phone]  Dept: [phone]    PRE-OP EVALUATION:  Today's date: 10/12/2017    Micah Nunez (: 1965) presents for pre-operative evaluation assessment as requested by Dr. Renteria.  He requires evaluation and anesthesia risk assessment prior to undergoing surgery/procedure for treatment of nasal Polyp removal  .  Proposed procedure: Excision of Left Nasal Papilloma    Date of Surgery/ Procedure: 17  Time of Surgery/ Procedure: 9:05  Hospital/Surgical Facility: [facility]    Primary Physician: Yovany White  Type of Anesthesia Anticipated: Local with MAC    Patient has a Health Care Directive or Living Will:  NO    1. NO - Do you have a history of heart attack, stroke, stent, bypass or surgery on an artery in the head, neck, heart or legs?  2. NO - Do you ever have any pain or discomfort in your chest?  3. NO - Do you have a history of  Heart Failure?  4. yes - Are you troubled by shortness of breath when: walking on the level, up a slight hill or at night?patient has had for a while   5. Yes  - Do you currently have a cold, bronchitis or other respiratory infection?URI  6. Yes  - Do you have a cough, shortness of breath or wheezing?cough and wheezing   7. NO - Do you sometimes get pains in the calves of your legs when you walk?  8. NO - Do you or anyone in your family have previous history of blood clots?  9. yes - Do you or does anyone in your family have a serious bleeding problem such as prolonged bleeding following surgeries or cuts?nose bleeds   10. NO - Have you ever had problems with anemia or been told to take iron pills?  11. NO - Have you had any abnormal blood loss such as black, tarry or bloody stools, or abnormal vaginal bleeding?  12. NO - Have you ever had a blood transfusion?  13. NO - Have you or any of your relatives ever had problems with anesthesia?  14. NO - Do you have sleep apnea,  excessive snoring or daytime drowsiness?  15. NO - Do you have any prosthetic heart valves?  16.Yes - Do you have prosthetic joints?L foot   17. NO - Is there any chance that you may be pregnant?        HPI:                                                      Brief HPI related to upcoming procedure: Patient is a 51 yr old male here for a pre op evaluation. He is having excision of left nasal polyp. Patient has had these polpys recurrently for years. He admits that the smoking is responsible for this. He is trying to quit. Patient denies any acute symptoms today . His blood pressure is quite high today. Patient reports that he has had no medication for this but he has noticed that his blood pressure has been climbing for the last couple of months. He is opened to taking medication to control this.       HYPERTENSION - Patient has longstanding history of mod-severe HTN , currently denies any symptoms referable to elevated blood pressure. Specifically denies chest pain, palpitations, dyspnea, orthopnea, PND or peripheral edema. Blood pressure readings have not been in normal range. Current medication regimen is as listed below. Patient denies any side effects of medication.                                                                                                                                                                                          .    MEDICAL HISTORY:                                                    Patient Active Problem List    Diagnosis Date Noted     Benign essential hypertension 10/18/2017     Priority: Medium     Tobacco use disorder 08/12/2015     Priority: Medium     Chemical dependency (H) 07/27/2015     Priority: Medium     Papilloma of nose 03/10/2014     Priority: Medium     Nasal mass 12/17/2013     Priority: Medium     CARDIOVASCULAR SCREENING; LDL GOAL LESS THAN 130 12/11/2013     Priority: Medium      Past Medical History:   Diagnosis Date     Bleeding disorder (H)       Chemical dependency (H)      Skin disease      Past Surgical History:   Procedure Laterality Date     ARTHRODESIS FOOT Left 2/17/2015    Procedure: ARTHRODESIS FOOT;  Surgeon: Cortez Hayward DPM;  Location: [facility]     ARTHRODESIS TOE(S)  12/20/2013    Procedure: ARTHRODESIS TOE(S);  Arthrodesis first metatarsophalangeal joint left foot;  Surgeon: Cortez Hayward DPM;  Location: [facility]     ENDOSCOPIC SINUS SURGERY  1/6/2014    Procedure: ENDOSCOPIC SINUS SURGERY;  Functional Endoscopic Sinus Surgery;  Surgeon: Bobo Renteria MD;  [facility]     ENDOSCOPIC SINUS SURGERY  7/21/2014    Procedure: ENDOSCOPIC SINUS SURGERY;  Surgeon: Bobo Renteria MD;  [facility]     ENDOSCOPIC SINUS SURGERY N/A 4/6/2015    Procedure: ENDOSCOPIC SINUS SURGERY;  Surgeon: Bobo Renteria MD;  [facility]     ENDOSCOPIC SINUS SURGERY N/A 8/17/2015    Procedure: ENDOSCOPIC SINUS SURGERY;  Surgeon: Bobo eRnteria MD;  [facility]     ENT SURGERY       LAPAROSCOPIC HERNIORRHAPHY INGUINAL BILATERAL Bilateral 4/12/2017    Procedure: LAPAROSCOPIC HERNIORRHAPHY INGUINAL BILATERAL;  Surgeon: Shay Mercado MD;  Location: [facility]     NASAL ENDOSCOPY Bilateral 2/6/2017    Procedure: NASAL ENDOSCOPY;  Surgeon: Bobo Renteria MD;  Location:  OR     NASAL/SINUS POLYPECTOMY       PE TUBES       Current Outpatient Prescriptions   Medication Sig Dispense Refill     [DISCONTINUED] lisinopril-hydrochlorothiazide (PRINZIDE/ZESTORETIC) 10-12.5 MG per tablet Take 1 tablet by mouth daily 30 tablet 3     lisinopril-hydrochlorothiazide (PRINZIDE/ZESTORETIC) 10-12.5 MG per tablet Take 1 tablet by mouth daily 90 tablet 3     triamcinolone (KENALOG) 0.1 % ointment Apply sparingly to affected area three times daily for 14 days. 80 g 0     ibuprofen (ADVIL/MOTRIN) 600 MG tablet Take 1 tablet (600 mg) by mouth every 6 hours 40 tablet 0     Ibuprofen (ADVIL PO) Take 800 mg by mouth daily as needed for  "moderate pain       cyclobenzaprine (FLEXERIL) 10 MG tablet Take 0.5-1 tablets (5-10 mg) by mouth 3 times daily as needed for muscle spasms (Patient not taking: Reported on 10/11/2017) 15 tablet 0     order for DME Equipment being ordered: Dynaflex insert 1 Units 0     OTC products: None, except as noted above    Allergies   Allergen Reactions     Nkda [No Known Drug Allergies]       Latex Allergy: NO    Social History   Substance Use Topics     Smoking status: Current Every Day Smoker     Packs/day: 0.50     Years: 30.00     Types: Cigarettes     Start date: 1/1/1980     Smokeless tobacco: Never Used     Alcohol use No      Comment: QUIT MARCH 2016     History   Drug Use No     Comment: 7 years quit Cocaine/methamphetamines       REVIEW OF SYSTEMS:                                                    C: NEGATIVE for fever, chills, change in weight  I: NEGATIVE for worrisome rashes, moles or lesions  E: NEGATIVE for vision changes or irritation  ENT/MOUTH: POSITIVE for nasal congestion and nasal polyps  R: NEGATIVE for significant cough or SOB  B: NEGATIVE for masses, tenderness or discharge  CV: NEGATIVE for chest pain, palpitations or peripheral edema  GI: NEGATIVE for nausea, abdominal pain, heartburn, or change in bowel habits  : NEGATIVE for frequency, dysuria, or hematuria  M: NEGATIVE for significant arthralgias or myalgia  H: NEGATIVE for bleeding problems  P: NEGATIVE for changes in mood or affect    EXAM:                                                    BP (!) 162/106  Pulse 85  Temp 97.2  F (36.2  C) (Tympanic)  Ht 6' 3.75\" (1.924 m)  Wt 203 lb (92.1 kg)  BMI 24.87 kg/m2    GENERAL APPEARANCE: healthy, alert and no distress     EYES: EOMI,  PERRL     HENT: ear canals and TM's normal and nose and mouth without ulcers or lesions     NECK: no adenopathy, no asymmetry, masses, or scars and thyroid normal to palpation     RESP: lungs clear to auscultation - no rales, rhonchi or wheezes     CV: regular " rates and rhythm, normal S1 S2, no S3 or S4 and no murmur, click or rub     ABDOMEN:  soft, nontender, no HSM or masses and bowel sounds normal     MS: extremities normal- no gross deformities noted, no evidence of inflammation in joints, FROM in all extremities.     SKIN: no suspicious lesions or rashes     PSYCH: mentation appears normal. and affect normal/bright     LYMPHATICS: No axillary, cervical, or supraclavicular nodes    DIAGNOSTICS:                                                    EKG: Not indicated due to non-vascular surgery and low risk of event (age <65 and without cardiac risk factors)    Recent Labs   Lab Test  03/21/17   2055  08/12/15   0757   02/10/15   0840   HGB  14.6  14.6   < >   --    PLT  245  287   < >   --    NA  141   --    --   144   POTASSIUM  3.7   --    --   4.1   CR  0.98   --    --   0.93    < > = values in this interval not displayed.        IMPRESSION:                                                    Reason for surgery/procedure: Nasal polyps   Diagnosis/reason for consult: Pre op evaluation     The proposed surgical procedure is considered LOW risk.    REVISED CARDIAC RISK INDEX  The patient has the following serious cardiovascular risks for perioperative complications such as (MI, PE, VFib and 3  AV Block):  No serious cardiac risks  INTERPRETATION: 0 risks: Class I (very low risk - 0.4% complication rate)    The patient has the following additional risks for perioperative complications:  No identified additional risks      ICD-10-CM    1. Preop general physical exam Z01.818 Basic metabolic panel   2. Benign essential hypertension I10 Basic metabolic panel     DISCONTINUED: lisinopril-hydrochlorothiazide (PRINZIDE/ZESTORETIC) 10-12.5 MG per tablet   3. Nasal polyp J33.9        RECOMMENDATIONS:                                                      --Consult hospital rounder / IM to assist post-op medical management    Cardiovascular Risk  Performs 4 METs exercise without  symptoms (Light housework (dusting, washing dishes) and Climb a flight of stairs) .       Pulmonary Risk  Incentive spirometry post op  Respiratory Therapy (Respiratory Care IP Consult)  post op  NG tube decompression if abdominal distension or significant vomiting     Patient started on blood pressure medication at this time.   Asked to come back on Wednesday for a recheck of Blood pressure.   --Patient is to take all scheduled medications on the day of surgery EXCEPT for modifications listed below.    APPROVAL Given ( patient came back for BP recheck and BP is better.) Signed Electronically by: Yovany White MD    Copy of this evaluation report is provided to requesting physician.    [facility] Preop Guidelines

## 2017-10-18 ENCOUNTER — TELEPHONE (OUTPATIENT)
Dept: FAMILY MEDICINE | Facility: CLINIC | Age: 52
End: 2017-10-18

## 2017-10-18 ENCOUNTER — OFFICE VISIT (OUTPATIENT)
Dept: FAMILY MEDICINE | Facility: CLINIC | Age: 52
End: 2017-10-18
Payer: COMMERCIAL

## 2017-10-18 VITALS — DIASTOLIC BLOOD PRESSURE: 86 MMHG | SYSTOLIC BLOOD PRESSURE: 139 MMHG | HEART RATE: 73 BPM

## 2017-10-18 DIAGNOSIS — I10 BENIGN ESSENTIAL HYPERTENSION: ICD-10-CM

## 2017-10-18 DIAGNOSIS — E87.6 HYPOKALEMIA: Primary | ICD-10-CM

## 2017-10-18 DIAGNOSIS — I10 BENIGN ESSENTIAL HYPERTENSION: Primary | ICD-10-CM

## 2017-10-18 DIAGNOSIS — Z01.818 PREOP GENERAL PHYSICAL EXAM: ICD-10-CM

## 2017-10-18 LAB
ANION GAP SERPL CALCULATED.3IONS-SCNC: 7 MMOL/L (ref 3–14)
BUN SERPL-MCNC: 15 MG/DL (ref 7–30)
CALCIUM SERPL-MCNC: 9.3 MG/DL (ref 8.5–10.1)
CHLORIDE SERPL-SCNC: 99 MMOL/L (ref 94–109)
CO2 SERPL-SCNC: 29 MMOL/L (ref 20–32)
CREAT SERPL-MCNC: 1.02 MG/DL (ref 0.66–1.25)
GFR SERPL CREATININE-BSD FRML MDRD: 77 ML/MIN/1.7M2
GLUCOSE SERPL-MCNC: 91 MG/DL (ref 70–99)
POTASSIUM SERPL-SCNC: 3.1 MMOL/L (ref 3.4–5.3)
SODIUM SERPL-SCNC: 135 MMOL/L (ref 133–144)

## 2017-10-18 PROCEDURE — 36415 COLL VENOUS BLD VENIPUNCTURE: CPT | Performed by: FAMILY MEDICINE

## 2017-10-18 PROCEDURE — 99207 ZZC NO CHARGE NURSE ONLY: CPT

## 2017-10-18 PROCEDURE — 80048 BASIC METABOLIC PNL TOTAL CA: CPT | Performed by: FAMILY MEDICINE

## 2017-10-18 NOTE — TELEPHONE ENCOUNTER
The pt is here for an RN Bp Check after a preop visit on 10/12/17. The pt was started on Lisinpril/Hctz 10/12.5mg. He reports taking this medication for 5 days without difficulty. He also reports stopping smoking x2 days.   BP checked x2 this visit. See those readings below.    10/18/17 10:55 AM  10/18/17 11:00 AM     BP  143/90   139/86     BP Location  Left arm   Left arm     Patient Position  Chair   Chair     Cuff Size  Adult Regular   Adult Regular     Pulse  77   73        Please review and advise.   Thank you,   Urvashi Le RN

## 2017-10-18 NOTE — NURSING NOTE
The pt is here for an RN Bp Check after a preop visit on 10/12/17. The pt was started on Lisinpril/Hctz 10/12.5mg. He reports taking this medication for 5 days without difficulty. He also reports stopping smoking x2 days.   BP checked x2 this visit. See those readings below.    10/18/17 10:55 AM  10/18/17 11:00 AM     BP  143/90   139/86     BP Location  Left arm   Left arm     Patient Position  Chair   Chair     Cuff Size  Adult Regular   Adult Regular     Pulse  77   73        I will route this information to the provider for review.   Urvashi Le RN

## 2017-10-18 NOTE — MR AVS SNAPSHOT
After Visit Summary   10/18/2017    Micah Nunez    MRN: [id number]           Patient Information     Date Of Birth          1965        Visit Information        Provider Department      10/18/2017 10:30 AM BETO SALDIVAR/[facility]        Today's Diagnoses     Benign essential hypertension    -  1       Follow-ups after your visit        Your next 10 appointments already scheduled     Nov 06, 2017   Procedure with Bobo Renteria MD   [facility] ([facility])    [address] [place] [address]  [address] [facility] [id number] [phone]           Located in the Clinics [facility] at [address] [place] [address].   parking is very convenient and highly recommended.  is a $6 flat rate fee.  Both  and self parkers should enter the main arrival plaza from [facility]; parking attendants will direct you based on your parking preference.            Nov 07, 2017  2:30 PM CST   New Visit with NATHALIE Carr MD   [facility]    [address] [facility] [address]   [phone]            Nov 14, 2017  9:00 AM CST   (Arrive by 8:45 AM)   Return Visit with Bobo Renteria MD   [facility] ([facility])    [address] [place] [address]  [address] [facility] [id number] [phone]              Who to contact     If you have questions or need follow up information about today's clinic visit or your schedule please contact [facility] directly at [phone].  Normal or non-critical lab and imaging results will be communicated to you by MyChart, letter or phone within 4 business days after the clinic has received the results. If you do not hear from us within 7 days, please contact the clinic through MyChart or phone. If you have a critical or abnormal lab result, we will notify you by phone  "as soon as possible.  Submit refill requests through [employer] or call your pharmacy and they will forward the refill request to us. Please allow 3 business days for your refill to be completed.          Additional Information About Your Visit        [employer] Information     [employer] lets you send messages to your doctor, view your test results, renew your prescriptions, schedule appointments and more. To sign up, go to [email or web address] . Click on \"Log in\" on the left side of the screen, which will take you to the Welcome page. Then click on \"Sign up Now\" on the right side of the page.     You will be asked to enter the access code listed below, as well as some personal information. Please follow the directions to create your username and password.     Your access code is: [email or web address]  Expires: 2017  6:30 AM     Your access code will  in 90 days. If you need help or a new code, please call your [facility] clinic or [phone].        Care EveryWhere ID     This is your Care EveryWhere ID. This could be used by other organizations to access your [facility] medical records  [id number]        Your Vitals Were     Pulse                   73            Blood Pressure from Last 3 Encounters:   10/18/17 139/86   10/12/17 (!) 162/106   10/11/17 (!) 175/109    Weight from Last 3 Encounters:   10/12/17 203 lb (92.1 kg)   10/11/17 207 lb (93.9 kg)   10/10/17 207 lb (93.9 kg)              Today, you had the following     No orders found for display       Primary Care Provider Office Phone # Fax #    Yovany White [phone] [address] [facility] [address]        Equal Access to Services     FAUSTINA HAN : Courtney Costello, mumtaz estevez, jackson bernardo. So [facility] [phone].    ATENCIÓN: Si habla español, tiene a espinoza disposición servicios gratuitos de asistencia lingüística. Llame al [phone].    We " comply with applicable federal civil rights laws and [place] laws. We do not discriminate on the basis of race, color, national origin, age, disability, sex, sexual orientation, or gender identity.            Thank you!     Thank you for choosing [facility]  for your care. Our goal is always to provide you with excellent care. Hearing back from our patients is one way we can continue to improve our services. Please take a few minutes to complete the written survey that you may receive in the mail after your visit with us. Thank you!             Your Updated Medication List - Protect others around you: Learn how to safely use, store and throw away your medicines at www.disposemymeds.org.          This list is accurate as of: 10/18/17 11:20 AM.  Always use your most recent med list.                   Brand Name Dispense Instructions for use Diagnosis    * ADVIL PO      Take 800 mg by mouth daily as needed for moderate pain        * ibuprofen 600 MG tablet    ADVIL/MOTRIN    40 tablet    Take 1 tablet (600 mg) by mouth every 6 hours    Acute midline low back pain without sciatica       cyclobenzaprine 10 MG tablet    FLEXERIL    15 tablet    Take 0.5-1 tablets (5-10 mg) by mouth 3 times daily as needed for muscle spasms        lisinopril-hydrochlorothiazide 10-12.5 MG per tablet    PRINZIDE/ZESTORETIC    30 tablet    Take 1 tablet by mouth daily    Benign essential hypertension       order for DME     1 Units    Equipment being ordered: Dynaflex insert    Hallux rigidus, right foot       triamcinolone 0.1 % ointment    KENALOG    80 g    Apply sparingly to affected area three times daily for 14 days.    Atopic dermatitis, unspecified type       * Notice:  This list has 2 medication(s) that are the same as other medications prescribed for you. Read the directions carefully, and ask your doctor or other care provider to review them with you.

## 2017-10-19 NOTE — TELEPHONE ENCOUNTER
BP noted. Asked to continue with present dose of medication . Potassium was a bit low on labs , asked to increase fluid intake and return in two weeks for a recheck of BMP

## 2017-10-24 NOTE — TELEPHONE ENCOUNTER
Patient notified.  He prefers to follow up with Dr. White next week.  He wants to discuss his blood pressure further.    Dee Dee Rayo RN

## 2017-10-30 ENCOUNTER — OFFICE VISIT (OUTPATIENT)
Dept: FAMILY MEDICINE | Facility: CLINIC | Age: 52
End: 2017-10-30
Payer: COMMERCIAL

## 2017-10-30 VITALS
TEMPERATURE: 97.5 F | HEART RATE: 90 BPM | HEIGHT: 76 IN | SYSTOLIC BLOOD PRESSURE: 138 MMHG | BODY MASS INDEX: 23.62 KG/M2 | WEIGHT: 194 LBS | DIASTOLIC BLOOD PRESSURE: 81 MMHG

## 2017-10-30 DIAGNOSIS — I10 BENIGN ESSENTIAL HYPERTENSION: ICD-10-CM

## 2017-10-30 DIAGNOSIS — L20.9 ATOPIC DERMATITIS, UNSPECIFIED TYPE: ICD-10-CM

## 2017-10-30 PROCEDURE — 99214 OFFICE O/P EST MOD 30 MIN: CPT | Performed by: FAMILY MEDICINE

## 2017-10-30 RX ORDER — LISINOPRIL/HYDROCHLOROTHIAZIDE 10-12.5 MG
1 TABLET ORAL DAILY
Qty: 90 TABLET | Refills: 3 | Status: SHIPPED | OUTPATIENT
Start: 2017-10-30 | End: 2019-01-08

## 2017-10-30 RX ORDER — TRIAMCINOLONE ACETONIDE 1 MG/G
OINTMENT TOPICAL
Qty: 80 G | Refills: 0 | Status: SHIPPED | OUTPATIENT
Start: 2017-10-30 | End: 2017-12-14

## 2017-10-30 NOTE — PROGRESS NOTES
SUBJECTIVE:   Micah Nunez is a 51 year old male who presents to clinic today for the following health issues:      Hypertension Follow-up      Outpatient blood pressures are being checked at clinic .  Results are 1385/89.    Low Salt Diet: low salt        Amount of exercise or physical activity: 2-3 days/week for an average of 15-30 minutes    Problems taking medications regularly: No    Medication side effects: none    Diet: soft food     Medication Followup of Kenalog and lisinopril/HCTZ    Taking Medication as prescribed: yes    Side Effects:  None    Medication Helping Symptoms:  yes         Patient is here for blood pressure recheck. Patient was started on Lisinopril - hydrochlorothiazide about two weeks ago and his BP is much better. He denies any side effects to the medication .   His surgery is coming up in a couple of weeks.     Problem list and histories reviewed & adjusted, as indicated.  Additional history: as documented    Patient Active Problem List   Diagnosis     CARDIOVASCULAR SCREENING; LDL GOAL LESS THAN 130     Nasal mass     Papilloma of nose     Chemical dependency (H)     Tobacco use disorder     Benign essential hypertension     Past Surgical History:   Procedure Laterality Date     ARTHRODESIS FOOT Left 2/17/2015    Procedure: ARTHRODESIS FOOT;  Surgeon: Cortez Hayward DPM;  Location: [facility]     ARTHRODESIS TOE(S)  12/20/2013    Procedure: ARTHRODESIS TOE(S);  Arthrodesis first metatarsophalangeal joint left foot;  Surgeon: Cortez Hayward DPM;  Location: [facility]     ENDOSCOPIC SINUS SURGERY  1/6/2014    Procedure: ENDOSCOPIC SINUS SURGERY;  Functional Endoscopic Sinus Surgery;  Surgeon: Bobo Renteria MD;  [facility]     ENDOSCOPIC SINUS SURGERY  7/21/2014    Procedure: ENDOSCOPIC SINUS SURGERY;  Surgeon: Bobo Renteria MD;  [facility]     ENDOSCOPIC SINUS SURGERY N/A 4/6/2015    Procedure: ENDOSCOPIC SINUS SURGERY;  Surgeon: Bobo Renteria  MD [place];  [facility]     ENDOSCOPIC SINUS SURGERY N/A 8/17/2015    Procedure: ENDOSCOPIC SINUS SURGERY;  Surgeon: Bobo Renteria MD;  [facility]     ENT SURGERY       LAPAROSCOPIC HERNIORRHAPHY INGUINAL BILATERAL Bilateral 4/12/2017    Procedure: LAPAROSCOPIC HERNIORRHAPHY INGUINAL BILATERAL;  Surgeon: Shay Mercado MD;  Location: [facility]     NASAL ENDOSCOPY Bilateral 2/6/2017    Procedure: NASAL ENDOSCOPY;  Surgeon: Bobo Renteria MD;  [facility]     NASAL/SINUS POLYPECTOMY       PE TUBES         Social History   Substance Use Topics     Smoking status: Current Every Day Smoker     Packs/day: 0.50     Years: 30.00     Types: Cigarettes     Start date: 1/1/1980     Smokeless tobacco: Never Used     Alcohol use No      Comment: QUIT MARCH 2016     Family History   Problem Relation Age of Onset     DIABETES Mother 40     C.A.D. Father 72     Unknown/Adopted No family hx of      Depression No family hx of      Anxiety Disorder No family hx of      Schizophrenia No family hx of      Bipolar Disorder No family hx of      Suicide No family hx of      Substance Abuse No family hx of      Dementia No family hx of      Hart Disease No family hx of      Parkinsonism No family hx of      Autism Spectrum Disorder No family hx of      Intellectual Disability (Mental Retardation) No family hx of      MENTAL ILLNESS No family hx of      Bleeding Disorder No family hx of          Current Outpatient Prescriptions   Medication Sig Dispense Refill     lisinopril-hydrochlorothiazide (PRINZIDE/ZESTORETIC) 10-12.5 MG per tablet Take 1 tablet by mouth daily 90 tablet 3     triamcinolone (KENALOG) 0.1 % ointment Apply sparingly to affected area three times daily for 14 days. 80 g 0     [DISCONTINUED] lisinopril-hydrochlorothiazide (PRINZIDE/ZESTORETIC) 10-12.5 MG per tablet Take 1 tablet by mouth daily 30 tablet 3     ibuprofen (ADVIL/MOTRIN) 600 MG tablet Take 1 tablet (600 mg) by mouth every 6 hours  "40 tablet 0     Ibuprofen (ADVIL PO) Take 800 mg by mouth daily as needed for moderate pain       cyclobenzaprine (FLEXERIL) 10 MG tablet Take 0.5-1 tablets (5-10 mg) by mouth 3 times daily as needed for muscle spasms (Patient not taking: Reported on 10/11/2017) 15 tablet 0     order for DME Equipment being ordered: Dynaflex insert 1 Units 0     Allergies   Allergen Reactions     Nkda [No Known Drug Allergies]      BP Readings from Last 3 Encounters:   10/30/17 138/81   10/18/17 139/86   10/12/17 (!) 162/106    Wt Readings from Last 3 Encounters:   10/30/17 194 lb (88 kg)   10/12/17 203 lb (92.1 kg)   10/11/17 207 lb (93.9 kg)                  Labs reviewed in EPIC        Reviewed and updated as needed this visit by clinical staffTobacco  Allergies  Med Hx  Surg Hx  Fam Hx  Soc Hx      Reviewed and updated as needed this visit by Provider         ROS:  Constitutional, HEENT, cardiovascular, pulmonary, gi and gu systems are negative, except as otherwise noted.      OBJECTIVE:   /81 (BP Location: Right arm, Cuff Size: Adult Regular)  Pulse 90  Temp 97.5  F (36.4  C) (Tympanic)  Ht 6' 3.75\" (1.924 m)  Wt 194 lb (88 kg)  BMI 23.77 kg/m2  Body mass index is 23.77 kg/(m^2).  GENERAL: healthy, alert and no distress  EYES: Eyes grossly normal to inspection, PERRL and conjunctivae and sclerae normal  HENT: ear canals and TM's normal, nose and mouth without ulcers or lesions  NECK: no adenopathy, no asymmetry, masses, or scars and thyroid normal to palpation  RESP: lungs clear to auscultation - no rales, rhonchi or wheezes  CV: regular rate and rhythm, normal S1 S2, no S3 or S4, no murmur, click or rub, no peripheral edema and peripheral pulses strong  ABDOMEN: soft, nontender, no hepatosplenomegaly, no masses and bowel sounds normal  MS: no gross musculoskeletal defects noted, no edema    Diagnostic Test Results:  none     ASSESSMENT/PLAN:         1. Benign essential hypertension  Medication faxed with more " refills. Asked to follow up in 1-2 months   - lisinopril-hydrochlorothiazide (PRINZIDE/ZESTORETIC) 10-12.5 MG per tablet; Take 1 tablet by mouth daily  Dispense: 90 tablet; Refill: 3    2. Atopic dermatitis, unspecified type  Medication faxed   - triamcinolone (KENALOG) 0.1 % ointment; Apply sparingly to affected area three times daily for 14 days.  Dispense: 80 g; Refill: 0    FUTURE APPOINTMENTS:       - Follow-up visit as needed    Yovany White MD  [facility]

## 2017-10-30 NOTE — MR AVS SNAPSHOT
After Visit Summary   10/30/2017    Micah Nunez    MRN: [id number]           Patient Information     Date Of Birth          1965        Visit Information        Provider Department      10/30/2017 10:00 AM Yovany White MD [facility]        Today's Diagnoses     Benign essential hypertension        Atopic dermatitis, unspecified type          Care Instructions          Thank you for choosing [facility].  You may be receiving a survey in the mail from [facility] regarding your visit today.  Please take a few minutes to complete and return the survey to let us know how we are doing.      If you have questions or concerns, please contact us via [employer] or you can contact your care team at [phone].    Our Clinic hours are:  Monday 6:40 am  to 7:00 pm  Tuesday -Friday 6:40 am to 5:00 pm    The [place] outpatient lab hours are:  Monday - Friday 6:10 am to 4:45 pm  Saturdays 7:00 am to 11:00 am  Appointments are required, call [phone]    If you have clinical questions after hours or would like to schedule an appointment,  call the clinic at [phone].          Follow-ups after your visit        Your next 10 appointments already scheduled     Nov 06, 2017   Procedure with Bobo Renteria MD   [facility] ([facility])    [address] [place] [id number] [phone]           Located in the Clinics and Surgery Center at [address] [place] [address].   parking is very convenient and highly recommended.  is a $6 flat rate fee.  Both  and self parkers should enter the main arrival plaza from [facility]; parking attendants will direct you based on your parking preference.            Nov 07, 2017  2:30 PM CST   New Visit with NATHALIE Carr MD   [facility] ([facility])    [address] [facility] [address]  "  [phone]            2017  9:00 AM CST   (Arrive by 8:45 AM)   Return Visit with Bobo Renteria MD   [facility] ([facility])    [address] [place] [address] [phone]              Who to contact     If you have questions or need follow up information about today's clinic visit or your schedule please contact [facility] directly at [phone].  Normal or non-critical lab and imaging results will be communicated to you by [employer], letter or phone within 4 business days after the clinic has received the results. If you do not hear from us within 7 days, please contact the clinic through [employer] or phone. If you have a critical or abnormal lab result, we will notify you by phone as soon as possible.  Submit refill requests through [employer] or call your pharmacy and they will forward the refill request to us. Please allow 3 business days for your refill to be completed.          Additional Information About Your Visit        [employer] Information     [employer] lets you send messages to your doctor, view your test results, renew your prescriptions, schedule appointments and more. To sign up, go to [email or web address] . Click on \"Log in\" on the left side of the screen, which will take you to the Welcome page. Then click on \"Sign up Now\" on the right side of the page.     You will be asked to enter the access code listed below, as well as some personal information. Please follow the directions to create your username and password.     Your access code is: [email or web address]  Expires: 2017  6:30 AM     Your access code will  in 90 days. If you need help or a new code, please call your [place] clinic or [phone].        Care EveryWhere ID     This is your Care EveryWhere ID. This could be used by other organizations to access your [place] medical records  [id number]        Your Vitals Were     Pulse " "Temperature Height BMI (Body Mass Index)          90 97.5  F (36.4  C) (Tympanic) 6' 3.75\" (1.924 m) 23.77 kg/m2         Blood Pressure from Last 3 Encounters:   10/30/17 138/81   10/18/17 139/86   10/12/17 (!) 162/106    Weight from Last 3 Encounters:   10/30/17 194 lb (88 kg)   10/12/17 203 lb (92.1 kg)   10/11/17 207 lb (93.9 kg)              Today, you had the following     No orders found for display         Where to get your medicines      These medications were sent to [facility] [address] [facility]  [address] [facility] [address]     Phone:  [phone]     lisinopril-hydrochlorothiazide 10-12.5 MG per tablet    triamcinolone 0.1 % ointment          Primary Care Provider Office Phone # Fax #    Marcellein Vanita White [phone] [address] [facility] [address]        Equal Access to Services     REBECCA HAN : Hadii evan blackwood Sosiena, waaxda luessence, qaybta kaalchelsy ornelas, jackson donato. [facility] [phone].    ATENCIÓN: Si habla español, tiene a espinoza disposición servicios gratuitos de asistencia lingüística. [facility] [phone].    We comply with applicable federal civil rights laws and [place] laws. We do not discriminate on the basis of race, color, national origin, age, disability, sex, sexual orientation, or gender identity.            Thank you!     Thank you for choosing [facility]  for your care. Our goal is always to provide you with excellent care. Hearing back from our patients is one way we can continue to improve our services. Please take a few minutes to complete the written survey that you may receive in the mail after your visit with us. Thank you!             Your Updated Medication List - Protect others around you: Learn how to safely use, store and throw away your medicines at www.disposemymeds.org.          This list is accurate as of: 10/30/17 10:16 AM.  Always use " your most recent med list.                   Brand Name Dispense Instructions for use Diagnosis    * ADVIL PO      Take 800 mg by mouth daily as needed for moderate pain        * ibuprofen 600 MG tablet    ADVIL/MOTRIN    40 tablet    Take 1 tablet (600 mg) by mouth every 6 hours    Acute midline low back pain without sciatica       cyclobenzaprine 10 MG tablet    FLEXERIL    15 tablet    Take 0.5-1 tablets (5-10 mg) by mouth 3 times daily as needed for muscle spasms        lisinopril-hydrochlorothiazide 10-12.5 MG per tablet    PRINZIDE/ZESTORETIC    90 tablet    Take 1 tablet by mouth daily    Benign essential hypertension       order for DME     1 Units    Equipment being ordered: Dynaflex insert    Hallux rigidus, right foot       triamcinolone 0.1 % ointment    KENALOG    80 g    Apply sparingly to affected area three times daily for 14 days.    Atopic dermatitis, unspecified type       * Notice:  This list has 2 medication(s) that are the same as other medications prescribed for you. Read the directions carefully, and ask your doctor or other care provider to review them with you.

## 2017-10-30 NOTE — PATIENT INSTRUCTIONS
Thank you for choosing [facility].  You may be receiving a survey in the mail from [facility] Doss regarding your visit today.  Please take a few minutes to complete and return the survey to let us know how we are doing.      If you have questions or concerns, please contact us via [employer] or you can contact your care team at [phone].    Our Clinic hours are:  Monday 6:40 am  to 7:00 pm  Tuesday -Friday 6:40 am to 5:00 pm    The [place] outpatient lab hours are:  Monday - Friday 6:10 am to 4:45 pm  Saturdays 7:00 am to 11:00 am  Appointments are required, call [phone]    If you have clinical questions after hours or would like to schedule an appointment,  call the clinic at [phone].

## 2017-10-30 NOTE — NURSING NOTE
"Chief Complaint   Patient presents with     Hypertension       Initial /81 (BP Location: Right arm, Cuff Size: Adult Regular)  Pulse 90  Temp 97.5  F (36.4  C) (Tympanic)  Ht 6' 3.75\" (1.924 m)  Wt 194 lb (88 kg)  BMI 23.77 kg/m2 Estimated body mass index is 23.77 kg/(m^2) as calculated from the following:    Height as of this encounter: 6' 3.75\" (1.924 m).    Weight as of this encounter: 194 lb (88 kg).  Medication Reconciliation: complete  "

## 2017-11-06 ENCOUNTER — ANESTHESIA (OUTPATIENT)
Dept: SURGERY | Facility: AMBULATORY SURGERY CENTER | Age: 52
End: 2017-11-06

## 2017-11-06 ENCOUNTER — HOSPITAL ENCOUNTER (OUTPATIENT)
Facility: AMBULATORY SURGERY CENTER | Age: 52
End: 2017-11-06
Attending: OTOLARYNGOLOGY

## 2017-11-06 ENCOUNTER — ANESTHESIA EVENT (OUTPATIENT)
Dept: SURGERY | Facility: AMBULATORY SURGERY CENTER | Age: 52
End: 2017-11-06

## 2017-11-06 VITALS
SYSTOLIC BLOOD PRESSURE: 119 MMHG | OXYGEN SATURATION: 97 % | HEIGHT: 76 IN | RESPIRATION RATE: 22 BRPM | WEIGHT: 194 LBS | HEART RATE: 73 BPM | DIASTOLIC BLOOD PRESSURE: 78 MMHG | BODY MASS INDEX: 23.62 KG/M2 | TEMPERATURE: 97.5 F

## 2017-11-06 DIAGNOSIS — G89.18 POST-OP PAIN: Primary | ICD-10-CM

## 2017-11-06 DIAGNOSIS — D23.39 PAPILLOMA OF NOSE: ICD-10-CM

## 2017-11-06 LAB — POTASSIUM SERPL-SCNC: 3.7 MMOL/L (ref 3.4–5.3)

## 2017-11-06 RX ORDER — ACETAMINOPHEN 325 MG/1
975 TABLET ORAL ONCE
Status: DISCONTINUED | OUTPATIENT
Start: 2017-11-06 | End: 2017-11-06 | Stop reason: HOSPADM

## 2017-11-06 RX ORDER — FENTANYL CITRATE 50 UG/ML
25-50 INJECTION, SOLUTION INTRAMUSCULAR; INTRAVENOUS
Status: DISCONTINUED | OUTPATIENT
Start: 2017-11-06 | End: 2017-11-06 | Stop reason: HOSPADM

## 2017-11-06 RX ORDER — KETOROLAC TROMETHAMINE 30 MG/ML
30 INJECTION, SOLUTION INTRAMUSCULAR; INTRAVENOUS EVERY 6 HOURS PRN
Status: DISCONTINUED | OUTPATIENT
Start: 2017-11-06 | End: 2017-11-07 | Stop reason: HOSPADM

## 2017-11-06 RX ORDER — SODIUM CHLORIDE, SODIUM LACTATE, POTASSIUM CHLORIDE, CALCIUM CHLORIDE 600; 310; 30; 20 MG/100ML; MG/100ML; MG/100ML; MG/100ML
INJECTION, SOLUTION INTRAVENOUS CONTINUOUS
Status: DISCONTINUED | OUTPATIENT
Start: 2017-11-06 | End: 2017-11-07 | Stop reason: HOSPADM

## 2017-11-06 RX ORDER — AMOXICILLIN 250 MG
1-2 CAPSULE ORAL 2 TIMES DAILY
Qty: 30 TABLET | Refills: 0 | Status: SHIPPED | OUTPATIENT
Start: 2017-11-06 | End: 2018-04-26

## 2017-11-06 RX ORDER — ONDANSETRON 2 MG/ML
4 INJECTION INTRAMUSCULAR; INTRAVENOUS EVERY 30 MIN PRN
Status: DISCONTINUED | OUTPATIENT
Start: 2017-11-06 | End: 2017-11-07 | Stop reason: HOSPADM

## 2017-11-06 RX ORDER — PROPOFOL 10 MG/ML
INJECTION, EMULSION INTRAVENOUS PRN
Status: DISCONTINUED | OUTPATIENT
Start: 2017-11-06 | End: 2017-11-06

## 2017-11-06 RX ORDER — OXYMETAZOLINE HYDROCHLORIDE 0.05 G/100ML
2-3 SPRAY NASAL PRN
Qty: 1 BOTTLE | Refills: 0 | Status: SHIPPED | OUTPATIENT
Start: 2017-11-06 | End: 2018-05-21

## 2017-11-06 RX ORDER — FENTANYL CITRATE 50 UG/ML
INJECTION, SOLUTION INTRAMUSCULAR; INTRAVENOUS PRN
Status: DISCONTINUED | OUTPATIENT
Start: 2017-11-06 | End: 2017-11-06

## 2017-11-06 RX ORDER — DEXAMETHASONE SODIUM PHOSPHATE 4 MG/ML
4 INJECTION, SOLUTION INTRA-ARTICULAR; INTRALESIONAL; INTRAMUSCULAR; INTRAVENOUS; SOFT TISSUE EVERY 10 MIN PRN
Status: DISCONTINUED | OUTPATIENT
Start: 2017-11-06 | End: 2017-11-07 | Stop reason: HOSPADM

## 2017-11-06 RX ORDER — ALBUTEROL SULFATE 0.83 MG/ML
2.5 SOLUTION RESPIRATORY (INHALATION) EVERY 4 HOURS PRN
Status: DISCONTINUED | OUTPATIENT
Start: 2017-11-06 | End: 2017-11-06 | Stop reason: HOSPADM

## 2017-11-06 RX ORDER — ACETAMINOPHEN 325 MG/1
650 TABLET ORAL EVERY 4 HOURS PRN
Qty: 30 TABLET | Refills: 0 | Status: SHIPPED | OUTPATIENT
Start: 2017-11-06 | End: 2019-08-13

## 2017-11-06 RX ORDER — LIDOCAINE HYDROCHLORIDE AND EPINEPHRINE 10; 10 MG/ML; UG/ML
INJECTION, SOLUTION INFILTRATION; PERINEURAL PRN
Status: DISCONTINUED | OUTPATIENT
Start: 2017-11-06 | End: 2017-11-06 | Stop reason: HOSPADM

## 2017-11-06 RX ORDER — ONDANSETRON 2 MG/ML
INJECTION INTRAMUSCULAR; INTRAVENOUS PRN
Status: DISCONTINUED | OUTPATIENT
Start: 2017-11-06 | End: 2017-11-06

## 2017-11-06 RX ORDER — OXYCODONE HYDROCHLORIDE 5 MG/1
5-10 TABLET ORAL
Status: DISCONTINUED | OUTPATIENT
Start: 2017-11-06 | End: 2017-11-07 | Stop reason: HOSPADM

## 2017-11-06 RX ORDER — LIDOCAINE HYDROCHLORIDE 20 MG/ML
INJECTION, SOLUTION INFILTRATION; PERINEURAL PRN
Status: DISCONTINUED | OUTPATIENT
Start: 2017-11-06 | End: 2017-11-06

## 2017-11-06 RX ORDER — OXYCODONE HYDROCHLORIDE 5 MG/1
5-10 TABLET ORAL
Qty: 15 TABLET | Refills: 0 | Status: SHIPPED | OUTPATIENT
Start: 2017-11-06 | End: 2017-12-12

## 2017-11-06 RX ORDER — MEPERIDINE HYDROCHLORIDE 25 MG/ML
12.5 INJECTION INTRAMUSCULAR; INTRAVENOUS; SUBCUTANEOUS
Status: DISCONTINUED | OUTPATIENT
Start: 2017-11-06 | End: 2017-11-07 | Stop reason: HOSPADM

## 2017-11-06 RX ORDER — DEXAMETHASONE SODIUM PHOSPHATE 4 MG/ML
INJECTION, SOLUTION INTRA-ARTICULAR; INTRALESIONAL; INTRAMUSCULAR; INTRAVENOUS; SOFT TISSUE PRN
Status: DISCONTINUED | OUTPATIENT
Start: 2017-11-06 | End: 2017-11-06

## 2017-11-06 RX ORDER — ONDANSETRON 4 MG/1
4 TABLET, ORALLY DISINTEGRATING ORAL EVERY 30 MIN PRN
Status: DISCONTINUED | OUTPATIENT
Start: 2017-11-06 | End: 2017-11-07 | Stop reason: HOSPADM

## 2017-11-06 RX ORDER — NALOXONE HYDROCHLORIDE 0.4 MG/ML
.1-.4 INJECTION, SOLUTION INTRAMUSCULAR; INTRAVENOUS; SUBCUTANEOUS
Status: DISCONTINUED | OUTPATIENT
Start: 2017-11-06 | End: 2017-11-07 | Stop reason: HOSPADM

## 2017-11-06 RX ORDER — GABAPENTIN 300 MG/1
300 CAPSULE ORAL ONCE
Status: DISCONTINUED | OUTPATIENT
Start: 2017-11-06 | End: 2017-11-06 | Stop reason: HOSPADM

## 2017-11-06 RX ORDER — FENTANYL CITRATE 50 UG/ML
25-50 INJECTION, SOLUTION INTRAMUSCULAR; INTRAVENOUS
Status: DISCONTINUED | OUTPATIENT
Start: 2017-11-06 | End: 2017-11-07 | Stop reason: HOSPADM

## 2017-11-06 RX ORDER — HYDROMORPHONE HYDROCHLORIDE 1 MG/ML
.3-.5 INJECTION, SOLUTION INTRAMUSCULAR; INTRAVENOUS; SUBCUTANEOUS EVERY 10 MIN PRN
Status: DISCONTINUED | OUTPATIENT
Start: 2017-11-06 | End: 2017-11-07 | Stop reason: HOSPADM

## 2017-11-06 RX ORDER — SODIUM CHLORIDE, SODIUM LACTATE, POTASSIUM CHLORIDE, CALCIUM CHLORIDE 600; 310; 30; 20 MG/100ML; MG/100ML; MG/100ML; MG/100ML
INJECTION, SOLUTION INTRAVENOUS CONTINUOUS
Status: DISCONTINUED | OUTPATIENT
Start: 2017-11-06 | End: 2017-11-06 | Stop reason: HOSPADM

## 2017-11-06 RX ORDER — PROPOFOL 10 MG/ML
INJECTION, EMULSION INTRAVENOUS CONTINUOUS PRN
Status: DISCONTINUED | OUTPATIENT
Start: 2017-11-06 | End: 2017-11-06

## 2017-11-06 RX ORDER — SODIUM CHLORIDE, SODIUM LACTATE, POTASSIUM CHLORIDE, CALCIUM CHLORIDE 600; 310; 30; 20 MG/100ML; MG/100ML; MG/100ML; MG/100ML
INJECTION, SOLUTION INTRAVENOUS CONTINUOUS PRN
Status: DISCONTINUED | OUTPATIENT
Start: 2017-11-06 | End: 2017-11-06

## 2017-11-06 RX ADMIN — PROPOFOL 40 MG: 10 INJECTION, EMULSION INTRAVENOUS at 11:06

## 2017-11-06 RX ADMIN — ONDANSETRON 4 MG: 2 INJECTION INTRAMUSCULAR; INTRAVENOUS at 11:00

## 2017-11-06 RX ADMIN — FENTANYL CITRATE 25 MCG: 50 INJECTION, SOLUTION INTRAMUSCULAR; INTRAVENOUS at 11:03

## 2017-11-06 RX ADMIN — PROPOFOL 40 MG: 10 INJECTION, EMULSION INTRAVENOUS at 11:03

## 2017-11-06 RX ADMIN — LIDOCAINE HYDROCHLORIDE 60 MG: 20 INJECTION, SOLUTION INFILTRATION; PERINEURAL at 11:00

## 2017-11-06 RX ADMIN — PROPOFOL 30 MG: 10 INJECTION, EMULSION INTRAVENOUS at 11:12

## 2017-11-06 RX ADMIN — DEXAMETHASONE SODIUM PHOSPHATE 4 MG: 4 INJECTION, SOLUTION INTRA-ARTICULAR; INTRALESIONAL; INTRAMUSCULAR; INTRAVENOUS; SOFT TISSUE at 11:00

## 2017-11-06 RX ADMIN — PROPOFOL 100 MCG/KG/MIN: 10 INJECTION, EMULSION INTRAVENOUS at 10:58

## 2017-11-06 RX ADMIN — FENTANYL CITRATE 25 MCG: 50 INJECTION, SOLUTION INTRAMUSCULAR; INTRAVENOUS at 11:19

## 2017-11-06 RX ADMIN — SODIUM CHLORIDE, SODIUM LACTATE, POTASSIUM CHLORIDE, CALCIUM CHLORIDE: 600; 310; 30; 20 INJECTION, SOLUTION INTRAVENOUS at 10:48

## 2017-11-06 ASSESSMENT — LIFESTYLE VARIABLES: TOBACCO_USE: 1

## 2017-11-06 NOTE — ANESTHESIA CARE TRANSFER NOTE
Patient: Micah Nunez    Procedure(s):  Excision of Left Nasal Papilloma - Wound Class: I-Clean    Diagnosis: Nasal Papillomas  Diagnosis Additional Information: No value filed.    Anesthesia Type:   No value filed.     Note:  Airway :Room Air  Patient transferred to:Phase II  Comments: Arrive Phase II, Stable, Airway Intact  126/83, 69,20,98%,97.5  All questions answered.  Handoff Report: Identifed the Patient, Identified the Reponsible Provider, Reviewed the pertinent medical history, Discussed the surgical course, Reviewed Intra-OP anesthesia mangement and issues during anesthesia, Set expectations for post-procedure period and Allowed opportunity for questions and acknowledgement of understanding      Vitals: (Last set prior to Anesthesia Care Transfer)    CRNA VITALS  11/6/2017 1106 - 11/6/2017 1141      11/6/2017             Pulse: 85    Ht Rate: 78    SpO2: 100 %    Resp Rate (set): 10                Electronically Signed By: NEHEMIAS Munoz CRNA  November 6, 2017  11:41 AM

## 2017-11-06 NOTE — IP AVS SNAPSHOT
MRN:[id number]                      After Visit Summary   11/6/2017    Micah Nunez    MRN: [id number]           Thank you!     Thank you for choosing [facility] for your care. Our goal is always to provide you with excellent care. Hearing back from our patients is one way we can continue to improve our services. Please take a few minutes to complete the written survey that you may receive in the mail after you visit with us. Thank you!        Patient Information     Date Of Birth          1965        About your hospital stay     You were admitted on:  November 6, 2017 [facility]    You were discharged on:  November 6, 2017       Who to Call     For medical emergencies, please call 911.  For non-urgent questions about your medical care, please call your primary care provider or clinic, [phone]  For questions related to your surgery, please call your surgery clinic        Attending Provider     Provider Bobo Mcelroy MD Otolaryngology       Primary Care Provider Office Phone # Fax #    [place] Vanita White [phone]      After Care Instructions     Diet Instructions       Resume pre procedure diet            Discharge Instructions       Patient to follow up with surgeon on 11/14/17 as scheduled            Discharge Instructions - Lifting restrictions       Lifting Restrictions 10 pounds until seen at Post-op follow up appointment            No Alcohol       For 24 hours following procedure            No Aspirin, Ibuprofen or Naproxen products       for 7 - 10 days following surgery            No blowing nose           No driving or operating machinery       until the day after procedure            Notify Physician        Seek medical attention for bleeding from nose not relieved by afrin and pinching nose for 20 minutes                  Your next 10 appointments already scheduled      Nov 07, 2017  2:30 PM CST   New Visit with NATHALIE Carr MD   [facility]    [address]  [facility] [address] [phone]            Nov 14, 2017  9:00 AM CST   (Arrive by 8:45 AM)   Return Visit with Bobo Renteria MD   [facility] ([facility])    [address]  [address] [facility] [address] [phone]              Further instructions from your care team       [facility]  Home Care Following Anesthesia  For 24 hours after surgery:  1. Get plenty of rest.  A responsible adult must stay with you for at least 24 hours after you leave the surgery center.  2. Do not drive or use heavy equipment.  If you have weakness or tingling, don't drive or use heavy equipment until this feeling goes away.   3. Do not drink alcohol.   4. Avoid strenuous or risky activities.  Ask for help when climbing stairs.  5. You may feel lightheaded.  IF so, sit for a few minutes before standing.  Have someone help you get up.   6. If you have nausea (feel sick to your stomach): Drink only clear liquids such as apple juice, ginger ale, broth or 7-Up.  Rest may also help.  Be sure to drink enough fluids.  Move to a regular diet as you feel able.   7. You may have a slight fever.  Call the doctor if your fever is over 100 F (37.7 C) (taken under the tongue) or lasts longer than 24 hours.  8. You may have a dry mouth, a sore throat, muscle aches or trouble sleeping. These should go away after 24 hours.  9. Do not make important or legal decisions.     Tips for taking pain medications  To get the best pain relief possible, remember these points:    Take pain medications as directed, before pain becomes severe.    Pain medication can upset your stomach: taking it with food may help.    Constipation is a common side effect of pain medication. Drink plenty of  fluids.    Eat foods high in  fiber. Take a stool softener if recommended by your doctor or pharmacist.    Do not drink alcohol, drive or operate machinery while taking pain medications.    Ask about other ways to control pain, such as with heat, ice or relaxation.    Tylenol/Acetaminophen Consumption  To help encourage the safe use of acetaminophen, the makers of TYLENOL  have lowered the maximum daily dose for single-ingredient Extra Strength TYLENOL  (acetaminophen) products sold in the [place] from 8 pills per day (4,000 mg) to 6 pills per day (3,000 mg). The dosing interval has also changed from 2 pills every 4-6 hours to 2 pills every 6 hours.    If you feel your pain relief is insufficient, you may take Tylenol/Acetaminophen in addition to your narcotic pain medication.     Be careful not to exceed 3,000 mg of Tylenol/Acetaminophen in a 24 hour period from all sources.    If you are taking extra strength Tylenol/acetaminophen (500 mg), the maximum dose is 6 tablets in 24 hours.    If you are taking regular strength acetaminophen (325 mg), the maximum dose is 9 tablets in 24 hours.    Call a doctor for any of the followin. Signs of infection (fever, growing tenderness at the surgery site, a large amount of drainage or bleeding, severe pain, foul-smelling drainage, redness, swelling).  2. It has been over 8 to 10 hours since surgery and you are still not able to urinate (pass water).  3. Headache for over 24 hours.  4. Numbness, tingling or weakness the day after surgery (if you had spinal anesthesia).  Your doctor is:  ***  Dr. Bobo Renteria, ENT Otolaryngology: [phone]               Or dial [phone] and ask for the resident on call for:  ENT Otolaryngology  For emergency care, call the:  [facility]:  [phone] (TTY for hearing impaired: [phone])                Pending Results     No orders found from 2017 to 2017.            Admission Information     Date & Time Provider Department Dept.  "Phone    2017 Bobo Renteria MD [facility] [phone]      Your Vitals Were     Blood Pressure Pulse Temperature Respirations Height Weight    118/78 77 97.5  F (36.4  C) (Oral) 16 1.924 m (6' 3.75\") 88 kg (194 lb)    Pulse Oximetry BMI (Body Mass Index)                98% 23.77 kg/m2          [employer] Information     [employer] is an electronic gateway that provides easy, online access to your medical records. With [employer], you can request a clinic appointment, read your test results, renew a prescription or communicate with your care team.     To sign up for [employer] visit the website at [email or web address]   You will be asked to enter the access code listed below, as well as some personal information. Please follow the directions to create your username and password.     Your access code is: [email or web address]  Expires: 2017  5:30 AM     Your access code will  in 90 days. If you need help or a new code, please contact your [facility] or call [phone] for assistance.        Care EveryWhere ID     This is your Care EveryWhere ID. This could be used by other organizations to access your [place] medical records  [id number]        Equal Access to Services     FAUSTINA HAN AH: Hadkimber blackwood Sosiena, waaxda luqadaha, qaybta kaalmada adeegyada, jackson arnold . So [facility] [phone].    ATENCIÓN: Si habla español, tiene a espinoza disposición servicios gratuitos de asistencia lingüística. Llame al [phone].    We comply with applicable federal civil rights laws and [place] laws. We do not discriminate on the basis of race, color, national origin, age, disability, sex, sexual orientation, or gender identity.               Review of your medicines      UNREVIEWED medicines. Ask your doctor about these medicines        Dose / Directions    * ADVIL PO        Dose:  800 mg   Take 800 mg by mouth daily as " needed for moderate pain   Refills:  0       * ibuprofen 600 MG tablet   Commonly known as:  ADVIL/MOTRIN   Used for:  Acute midline low back pain without sciatica        Dose:  600 mg   Take 1 tablet (600 mg) by mouth every 6 hours   Quantity:  40 tablet   Refills:  0       * Notice:  This list has 2 medication(s) that are the same as other medications prescribed for you. Read the directions carefully, and ask your doctor or other care provider to review them with you.      START taking        Dose / Directions    acetaminophen 325 MG tablet   Commonly known as:  TYLENOL   Used for:  Post-op pain        Dose:  650 mg   Take 2 tablets (650 mg) by mouth every 4 hours as needed for other (mild pain)   Quantity:  30 tablet   Refills:  0       oxyCODONE IR 5 MG tablet   Commonly known as:  ROXICODONE   Used for:  Post-op pain        Dose:  5-10 mg   Take 1-2 tablets (5-10 mg) by mouth every 3 hours as needed for pain or other (Moderate to Severe)   Quantity:  15 tablet   Refills:  0       oxymetazoline 0.05 % spray   Commonly known as:  AFRIN NASAL SPRAY   Used for:  Papilloma of nose        Dose:  2-3 spray   Spray 2-3 sprays into both nostrils as needed for other (for nasal bleeding only)   Quantity:  1 Bottle   Refills:  0       senna-docusate 8.6-50 MG per tablet   Commonly known as:  SENOKOT-S;PERICOLACE   Used for:  Post-op pain        Dose:  1-2 tablet   Take 1-2 tablets by mouth 2 times daily Take while on oral narcotics to prevent or treat constipation.   Quantity:  30 tablet   Refills:  0         CONTINUE these medicines which have NOT CHANGED        Dose / Directions    lisinopril-hydrochlorothiazide 10-12.5 MG per tablet   Commonly known as:  PRINZIDE/ZESTORETIC   Used for:  Benign essential hypertension        Dose:  1 tablet   Take 1 tablet by mouth daily   Quantity:  90 tablet   Refills:  3       order for DME   Used for:  Hallux rigidus, right foot        Equipment being ordered: Dynaflex insert    Quantity:  1 Units   Refills:  0       triamcinolone 0.1 % ointment   Commonly known as:  KENALOG   Used for:  Atopic dermatitis, unspecified type        Apply sparingly to affected area three times daily for 14 days.   Quantity:  80 g   Refills:  0            Where to get your medicines      These medications were sent to [place], [place] - [address]  [address], [facility] [address]    Hours:  TRANSPLANT PHONE NUMBER [phone] Phone:  [phone]     acetaminophen 325 MG tablet    oxymetazoline 0.05 % spray    senna-docusate 8.6-50 MG per tablet         Some of these will need a paper prescription and others can be bought over the counter. Ask your nurse if you have questions.     Bring a paper prescription for each of these medications     oxyCODONE IR 5 MG tablet                Protect others around you: Learn how to safely use, store and throw away your medicines at www.disposemymeds.org.             Medication List: This is a list of all your medications and when to take them. Check marks below indicate your daily home schedule. Keep this list as a reference.      Medications           Morning Afternoon Evening Bedtime As Needed    acetaminophen 325 MG tablet   Commonly known as:  TYLENOL   Take 2 tablets (650 mg) by mouth every 4 hours as needed for other (mild pain)                                * ADVIL PO   Take 800 mg by mouth daily as needed for moderate pain                                * ibuprofen 600 MG tablet   Commonly known as:  ADVIL/MOTRIN   Take 1 tablet (600 mg) by mouth every 6 hours                                lisinopril-hydrochlorothiazide 10-12.5 MG per tablet   Commonly known as:  PRINZIDE/ZESTORETIC   Take 1 tablet by mouth daily                                order for DME   Equipment being ordered: Dynaflex insert                                oxyCODONE IR 5 MG tablet   Commonly known as:  ROXICODONE    Take 1-2 tablets (5-10 mg) by mouth every 3 hours as needed for pain or other (Moderate to Severe)                                oxymetazoline 0.05 % spray   Commonly known as:  AFRIN NASAL SPRAY   Spray 2-3 sprays into both nostrils as needed for other (for nasal bleeding only)                                senna-docusate 8.6-50 MG per tablet   Commonly known as:  SENOKOT-S;PERICOLACE   Take 1-2 tablets by mouth 2 times daily Take while on oral narcotics to prevent or treat constipation.                                triamcinolone 0.1 % ointment   Commonly known as:  KENALOG   Apply sparingly to affected area three times daily for 14 days.                                * Notice:  This list has 2 medication(s) that are the same as other medications prescribed for you. Read the directions carefully, and ask your doctor or other care provider to review them with you.

## 2017-11-06 NOTE — OP NOTE
DATE OF PROCEDURE: 11/6/17    PREOPERATIVE DIAGNOSIS:  Left nasal papilloma.      POSTOPERATIVE DIAGNOSIS:  Left nasal papilloma.      PROCEDURE:  Endoscopic excision of left nasal papilloma.      STAFF SURGEON:  Bobo Renteria MD      :  Chris Feliz MD, PGY-3       ANESTHESIA:  MAC.      COMPLICATIONS:  None.      ESTIMATED BLOOD LOSS:  10 mL      SPECIMENS:  Left nasal papilloma for permanent pathology.      INDICATION FOR PROCEDURE:  Mr. Micah Nunez is a 51-year-old male who was seen in clinic by Dr. Renteria for ongoing left nasal papilloma.  The patient had previously undergone excision of papillomas in the past for nasal obstruction.  The patient presented with symptoms of left nasal obstruction.  The above-mentioned procedure was recommended after discussing the risks, benefits and alternatives of surgery.  The patient elected to proceed with surgery.      FINDINGS:  The patient has extensive papillomas to the left nasal cavity at the squamocolumnar junction along the anterior inferior turbinate and the septum.      DESCRIPTION OF PROCEDURE:  The patient was seen in the preoperative area, and informed consent was obtained after discussing the risks and benefits of this scheduled procedure.  The surgical site was marked.  The patient was brought into the operating room by the Anesthesia team.  The patient was identified using 3 identifiers.  The patient was placed supine on the operating table.  The Anesthesia team then began induction of MAC anesthesia.  The patient was then draped in the usual sterile fashion once adequate anesthetic was achieved.  A time-out procedure was conducted that correctly identified the patient, site and procedure.      A 0 degree endoscope was used to visualize the left nasal cavity.  The nasal cavity was anesthetized with 1% lidocaine with 1:100,000 epinephrine.  A total of 3 ml was injected.  Cocaine-soaked pledgets were then placed in the nasal  cavity for additional decongestion.  The pledgets were then removed then the zero degree endoscope was used to visualize the left nasal cavity.  There were papillomas on the left inferior turbinate and nasal septum.  Using the cut-through forceps and straight Blakesley, the papillomas were gently excised.  After adequate excision of the papillomas, the specimen was passed off the field and collected for permanent pathology.  Cocaine-soaked pledgets were then placed into the left nasal cavity for hemostasis.  The pledgets were removed, and Surgiflo was placed into the left nasal cavity.  Unfortunately, the patient coughed vigorously and coughed up most of the Surgiflo, but adequate hemostasis was noted once he stopped coughing. Additional Surgiflo was not placed in the nasal cavity.  The oral cavity and oropharynx were suctioned using an orogastric tube.  Hemostasis was achieved at the conclusion of the procedure.  All surgical counts were correct.  The patient was then turned over to the Anesthesia team for awakening.  The patient was transferred to the PACU in stable condition.      Dr. Bobo Renteria was present and scrubbed for the entire duration of the procedure.      Dictated by Jody To MD, PGY-3   Resident         BOBO RENTERIA MD       As dictated by JODY TO MD            D: 2017 11:52   T: 2017 13:05   MT: parker      Name:     DALLAS HOLLOWAY   MRN:      [id number]        Account:        [id number]   :      1965           Procedure Date: 2017      Document: [id number]

## 2017-11-06 NOTE — BRIEF OP NOTE
[facility]    Brief Operative Note    Pre-operative diagnosis: Nasal Papillomas  Post-operative diagnosis * No post-op diagnosis entered *  Procedure: Procedure(s):  Excision of Left Nasal Papilloma - Wound Class: I-Clean  Surgeon: Surgeon(s) and Role:     * Bobo Renteria MD - Primary  Anesthesia: Combined MAC with Local   Estimated blood loss: 10 ml  Drains: None  Specimens:   ID Type Source Tests Collected by Time Destination   A : Left nasal cavity papilloma Tissue Nose SURGICAL PATHOLOGY EXAM Bobo Renteria MD 11/6/2017 11:37 AM      Findings:   Left nasal cavity papillomas.  Complications: None.  Implants: None.

## 2017-11-06 NOTE — DISCHARGE INSTRUCTIONS
[facility]  Home Care Following Anesthesia  For 24 hours after surgery:  1. Get plenty of rest.  A responsible adult must stay with you for at least 24 hours after you leave the surgery center.  2. Do not drive or use heavy equipment.  If you have weakness or tingling, don't drive or use heavy equipment until this feeling goes away.   3. Do not drink alcohol.   4. Avoid strenuous or risky activities.  Ask for help when climbing stairs.  5. You may feel lightheaded.  IF so, sit for a few minutes before standing.  Have someone help you get up.   6. If you have nausea (feel sick to your stomach): Drink only clear liquids such as apple juice, ginger ale, broth or 7-Up.  Rest may also help.  Be sure to drink enough fluids.  Move to a regular diet as you feel able.   7. You may have a slight fever.  Call the doctor if your fever is over 100 F (37.7 C) (taken under the tongue) or lasts longer than 24 hours.  8. You may have a dry mouth, a sore throat, muscle aches or trouble sleeping. These should go away after 24 hours.  9. Do not make important or legal decisions.     Tips for taking pain medications  To get the best pain relief possible, remember these points:    Take pain medications as directed, before pain becomes severe.    Pain medication can upset your stomach: taking it with food may help.    Constipation is a common side effect of pain medication. Drink plenty of  fluids.    Eat foods high in fiber. Take a stool softener if recommended by your doctor or pharmacist.    Do not drink alcohol, drive or operate machinery while taking pain medications.    Ask about other ways to control pain, such as with heat, ice or relaxation.    Tylenol/Acetaminophen Consumption  To help encourage the safe use of acetaminophen, the makers of TYLENOL  have lowered the maximum daily dose for single-ingredient Extra Strength TYLENOL  (acetaminophen) products sold in the [place] from 8 pills per day  (4,000 mg) to 6 pills per day (3,000 mg). The dosing interval has also changed from 2 pills every 4-6 hours to 2 pills every 6 hours.    If you feel your pain relief is insufficient, you may take Tylenol/Acetaminophen in addition to your narcotic pain medication.     Be careful not to exceed 3,000 mg of Tylenol/Acetaminophen in a 24 hour period from all sources.    If you are taking extra strength Tylenol/acetaminophen (500 mg), the maximum dose is 6 tablets in 24 hours.    If you are taking regular strength acetaminophen (325 mg), the maximum dose is 9 tablets in 24 hours.    Call a doctor for any of the followin. Signs of infection (fever, growing tenderness at the surgery site, a large amount of drainage or bleeding, severe pain, foul-smelling drainage, redness, swelling).  2. It has been over 8 to 10 hours since surgery and you are still not able to urinate (pass water).  3. Headache for over 24 hours.  4. Numbness, tingling or weakness the day after surgery (if you had spinal anesthesia).  Your doctor is:  ***  Dr. Bobo Renteria, ENT Otolaryngology: [phone]               Or dial [phone] and ask for the resident on call for:  ENT Otolaryngology  For emergency care, call the:  [facility]:  [phone] (TTY for hearing impaired: [phone])

## 2017-11-06 NOTE — ANESTHESIA PREPROCEDURE EVALUATION
Anesthesia Evaluation     .             ROS/MED HX    ENT/Pulmonary:  - neg pulmonary ROS   (+)tobacco use, Current use , . .    Neurologic:  - neg neurologic ROS     Cardiovascular:  - neg cardiovascular ROS   (+) hypertension----. : . . . :. .       METS/Exercise Tolerance:     Hematologic:  - neg hematologic  ROS       Musculoskeletal:  - neg musculoskeletal ROS       GI/Hepatic:  - neg GI/hepatic ROS   (+) GERD       Renal/Genitourinary:  - ROS Renal section negative       Endo:  - neg endo ROS       Psychiatric:  - neg psychiatric ROS       Infectious Disease:  - neg infectious disease ROS       Malignancy:      - no malignancy   Other: Comment: Chem dep hx:  Hx of cocaine and meth. Currently sober.   - neg other ROS                 Physical Exam  Normal systems: cardiovascular, pulmonary and dental    Airway   Mallampati: II  TM distance: >3 FB  Neck ROM: full    Dental     Cardiovascular       Pulmonary                     Anesthesia Plan      History & Physical Review  History and physical reviewed and following examination; no interval change.    ASA Status:  2 .    NPO Status:  > 8 hours    Plan for MAC with Intravenous induction. Maintenance will be TIVA.  Reason for MAC:  Deep or markedly invasive procedure (G8) and Procedure to face, neck, head or breast  PONV prophylaxis:  Ondansetron (or other 5HT-3) and Dexamethasone or Solumedrol       Postoperative Care  Postoperative pain management:  IV analgesics and Oral pain medications.      Consents  Anesthetic plan, risks, benefits and alternatives discussed with:  Patient..                          .

## 2017-11-06 NOTE — ANESTHESIA POSTPROCEDURE EVALUATION
Patient: Micah Nunez    Procedure(s):  Excision of Left Nasal Papilloma - Wound Class: II-Clean Contaminated    Diagnosis:Nasal Papillomas  Diagnosis Additional Information: No value filed.    Anesthesia Type:  No value filed.    Note:  Anesthesia Post Evaluation    Patient location during evaluation: PACU  Patient participation: Able to fully participate in evaluation  Level of consciousness: awake  Pain management: adequate  Airway patency: patent  Cardiovascular status: acceptable  Respiratory status: acceptable  Hydration status: balanced  PONV: none     Anesthetic complications: None          Last vitals:  Vitals:    11/06/17 1139 11/06/17 1158 11/06/17 1209   BP: 126/83 118/78 119/78   Pulse: 74 77 73   Resp: 16  22   Temp: 36.4  C (97.5  F)     SpO2: 98% 98% 97%         Electronically Signed By: Alejandro Yoon MD  November 6, 2017  4:45 PM

## 2017-11-06 NOTE — IP AVS SNAPSHOT
[facility]    [address] [place] [address]    Phone:  [phone]    Fax:  [phone]                                       After Visit Summary   11/6/2017    Micah Nunez    MRN: [id number]           After Visit Summary Signature Page     I have received my discharge instructions, and my questions have been answered. I have discussed any challenges I see with this plan with the nurse or doctor.    ..........................................................................................................................................  Patient/Patient Representative Signature      ..........................................................................................................................................  Patient Representative Print Name and Relationship to Patient    ..................................................               ................................................  Date                                            Time    ..........................................................................................................................................  Reviewed by Signature/Title    ...................................................              ..............................................  Date                                                            Time

## 2017-11-07 ENCOUNTER — OFFICE VISIT (OUTPATIENT)
Dept: UROLOGY | Facility: CLINIC | Age: 52
End: 2017-11-07
Payer: COMMERCIAL

## 2017-11-07 VITALS
SYSTOLIC BLOOD PRESSURE: 145 MMHG | RESPIRATION RATE: 14 BRPM | HEART RATE: 89 BPM | DIASTOLIC BLOOD PRESSURE: 82 MMHG | HEIGHT: 76 IN | WEIGHT: 194 LBS | BODY MASS INDEX: 23.62 KG/M2

## 2017-11-07 DIAGNOSIS — N43.3 LEFT HYDROCELE: Primary | ICD-10-CM

## 2017-11-07 PROCEDURE — 99202 OFFICE O/P NEW SF 15 MIN: CPT | Performed by: UROLOGY

## 2017-11-07 NOTE — MR AVS SNAPSHOT
After Visit Summary   11/7/2017    Micah Nunez    MRN: [id number]           Patient Information     Date Of Birth          1965        Visit Information        Provider Department      11/7/2017 2:30 PM NATHALIE Carr MD [facility]        Today's Diagnoses     Left hydrocele    -  1      Care Instructions    Per Physician's instructions            Follow-ups after your visit        Your next 10 appointments already scheduled     Nov 10, 2017 10:15 AM CST   US TESTICULAR AND SCROTUM WITH DOPPLER LIMITED with WYUS2   [facility] Ultrasound ([facility])    [address] [facility] [address]   [phone]           Please bring a list of your medicines (including vitamins, minerals and over-the-counter drugs). Also, tell your doctor about any allergies you may have. Wear comfortable clothes and leave your valuables at home.  You do not need to do anything special to prepare for your exam.  Please call the Imaging Department at your exam site with any questions.            Nov 14, 2017  9:00 AM CST   (Arrive by 8:45 AM)   Return Visit with Bobo Renteria MD   [facility] ([facility])    [address] [place] [id number] [phone]              Future tests that were ordered for you today     Open Future Orders        Priority Expected Expires Ordered    US Testicular & Scrotum w Doppler Ltd Routine  11/7/2018 11/7/2017            Who to contact     If you have questions or need follow up information about today's clinic visit or your schedule please contact [facility] directly at [phone].  Normal or non-critical lab and imaging results will be communicated to you by MyChart, letter or phone within 4 business days after the clinic has received the results. If you do not hear from us within 7 days, please contact the clinic through MyChart or phone. If you  "have a critical or abnormal lab result, we will notify you by phone as soon as possible.  Submit refill requests through Me[employer]u or call your pharmacy and they will forward the refill request to us. Please allow 3 business days for your refill to be completed.          Additional Information About Your Visit        Anna Lozabaihart Information     Meitu lets you send messages to your doctor, view your test results, renew your prescriptions, schedule appointments and more. To sign up, go to [email or web address] . Click on \"Log in\" on the left side of the screen, which will take you to the Welcome page. Then click on \"Sign up Now\" on the right side of the page.     You will be asked to enter the access code listed below, as well as some personal information. Please follow the directions to create your username and password.     Your access code is: [email or web address]  Expires: 2017  5:30 AM     Your access code will  in 90 days. If you need help or a new code, please call your [place] clinic or [phone].        Care EveryWhere ID     This is your Care EveryWhere ID. This could be used by other organizations to access your [place] medical records  [id number]        Your Vitals Were     Pulse Respirations Height BMI (Body Mass Index)          89 14 1.924 m (6' 3.75\") 23.77 kg/m2         Blood Pressure from Last 3 Encounters:   17 145/82   17 119/78   10/30/17 138/81    Weight from Last 3 Encounters:   17 88 kg (194 lb)   17 88 kg (194 lb)   10/30/17 88 kg (194 lb)               Primary Care Provider Office Phone # Fax #    Yovany White [phone] [address] [facility] [address]        Equal Access to Services     FAUSTINA HAN : Courtney Costello, mumtaz estevez, jackson bernardo. [facility] [phone].    ATENCIÓN: Si habla darby, tiene a espinoza disposición servicios gratuitos de " asistencia lingüística. [place] al [phone].    We comply with applicable federal civil rights laws and [place] laws. We do not discriminate on the basis of race, color, national origin, age, disability, sex, sexual orientation, or gender identity.            Thank you!     Thank you for choosing [facility]  for your care. Our goal is always to provide you with excellent care. Hearing back from our patients is one way we can continue to improve our services. Please take a few minutes to complete the written survey that you may receive in the mail after your visit with us. Thank you!             Your Updated Medication List - Protect others around you: Learn how to safely use, store and throw away your medicines at www.disposemymeds.org.          This list is accurate as of: 11/7/17 11:59 PM.  Always use your most recent med list.                   Brand Name Dispense Instructions for use Diagnosis    acetaminophen 325 MG tablet    TYLENOL    30 tablet    Take 2 tablets (650 mg) by mouth every 4 hours as needed for other (mild pain)    Post-op pain       * ADVIL PO      Take 800 mg by mouth daily as needed for moderate pain        * ibuprofen 600 MG tablet    ADVIL/MOTRIN    40 tablet    Take 1 tablet (600 mg) by mouth every 6 hours    Acute midline low back pain without sciatica       lisinopril-hydrochlorothiazide 10-12.5 MG per tablet    PRINZIDE/ZESTORETIC    90 tablet    Take 1 tablet by mouth daily    Benign essential hypertension       order for DME     1 Units    Equipment being ordered: Dynaflex insert    Hallux rigidus, right foot       oxyCODONE IR 5 MG tablet    ROXICODONE    15 tablet    Take 1-2 tablets (5-10 mg) by mouth every 3 hours as needed for pain or other (Moderate to Severe)    Post-op pain       oxymetazoline 0.05 % spray    AFRIN NASAL SPRAY    1 Bottle    Spray 2-3 sprays into both nostrils as needed for other (for nasal bleeding only)    Papilloma of nose        senna-docusate 8.6-50 MG per tablet    SENOKOT-S;PERICOLACE    30 tablet    Take 1-2 tablets by mouth 2 times daily Take while on oral narcotics to prevent or treat constipation.    Post-op pain       triamcinolone 0.1 % ointment    KENALOG    80 g    Apply sparingly to affected area three times daily for 14 days.    Atopic dermatitis, unspecified type       * Notice:  This list has 2 medication(s) that are the same as other medications prescribed for you. Read the directions carefully, and ask your doctor or other care provider to review them with you.

## 2017-11-07 NOTE — NURSING NOTE
"Chief Complaint   Patient presents with     Consult For     testicular swelling since hernia repair DOS 4/12/17       Initial /82 (BP Location: Right arm, Patient Position: Chair, Cuff Size: Adult Regular)  Pulse 89  Resp 14  Ht 1.924 m (6' 3.75\")  Wt 88 kg (194 lb)  BMI 23.77 kg/m2 Estimated body mass index is 23.77 kg/(m^2) as calculated from the following:    Height as of this encounter: 1.924 m (6' 3.75\").    Weight as of this encounter: 88 kg (194 lb).  Medication Reconciliation: complete     Kassi Mario MA      "

## 2017-11-08 LAB — COPATH REPORT: NORMAL

## 2017-11-08 NOTE — PROGRESS NOTES
Appointment source: New Patient  Patient name: Micah Nunez  Urology Staff: Buster Carr MD    Subjective: This is a 51 year old year old male complaining of left scrotal enlargement following hernia surgery    Objective:  Had bilateral hernia surgery in April of this year and developed left scrotal swelling.    On examination today he has what appears to be a left hemiscrotal hydrocele. Painless to palpation.    Plan:  Scrotal ultrasound has been ordered.    Total time 20 minutes, counseling 15 minutes discussing hydrocele management

## 2017-11-10 ENCOUNTER — HOSPITAL ENCOUNTER (OUTPATIENT)
Dept: ULTRASOUND IMAGING | Facility: CLINIC | Age: 52
Discharge: HOME | End: 2017-11-10
Attending: UROLOGY | Admitting: UROLOGY
Payer: COMMERCIAL

## 2017-11-10 DIAGNOSIS — N43.3 LEFT HYDROCELE: ICD-10-CM

## 2017-11-10 PROCEDURE — 93976 VASCULAR STUDY: CPT

## 2017-11-16 ENCOUNTER — HOSPITAL ENCOUNTER (EMERGENCY)
Facility: CLINIC | Age: 52
Discharge: HOME | End: 2017-11-16
Attending: FAMILY MEDICINE | Admitting: FAMILY MEDICINE
Payer: COMMERCIAL

## 2017-11-16 ENCOUNTER — APPOINTMENT (OUTPATIENT)
Dept: ULTRASOUND IMAGING | Facility: CLINIC | Age: 52
End: 2017-11-16
Attending: FAMILY MEDICINE
Payer: COMMERCIAL

## 2017-11-16 VITALS
HEIGHT: 75 IN | OXYGEN SATURATION: 98 % | TEMPERATURE: 98.2 F | DIASTOLIC BLOOD PRESSURE: 89 MMHG | SYSTOLIC BLOOD PRESSURE: 140 MMHG | RESPIRATION RATE: 16 BRPM

## 2017-11-16 DIAGNOSIS — N43.3 LEFT HYDROCELE: ICD-10-CM

## 2017-11-16 PROCEDURE — 93976 VASCULAR STUDY: CPT

## 2017-11-16 PROCEDURE — 99284 EMERGENCY DEPT VISIT MOD MDM: CPT | Mod: Z6 | Performed by: FAMILY MEDICINE

## 2017-11-16 PROCEDURE — 99284 EMERGENCY DEPT VISIT MOD MDM: CPT | Mod: 25 | Performed by: FAMILY MEDICINE

## 2017-11-16 RX ORDER — OXYCODONE HYDROCHLORIDE 5 MG/1
5-10 TABLET ORAL EVERY 6 HOURS PRN
Qty: 20 TABLET | Refills: 0 | Status: SHIPPED | OUTPATIENT
Start: 2017-11-16 | End: 2017-12-12

## 2017-11-16 NOTE — DISCHARGE INSTRUCTIONS
Use ibuprofen 400-600 mg up to 4 times per day if needed for pain. Stop if it is causing nausea or abdominal pain.   Add acetaminophen 500 mg 1-2 pills up to every 4 hours if needed for pain.   You may add oxycodone 5 mg, 1-2 tablets up to every 6 hours if needed for pain.  Try to use this primarily only at night to help with sleep.    Do not use alcohol, operate machinery, drive, or climb on ladders for 8 hours after taking oxycodone. Use docusate (100mg) 2 times a day to prevent constipation while on narcotics.  Elevate the scrotum as much as possible above the abdomen.  Follow-up as planned next Tuesday for surgery with Dr. Carr.  Return if worsening symptoms.

## 2017-11-16 NOTE — ED AVS SNAPSHOT
[facility] Emergency Department    [address] [facility] [address]    Phone:  [phone]    Fax:  [phone]                                       Micah Nunez   MRN: [id number]    Department:  [facility] Emergency Department   Date of Visit:  11/16/2017           After Visit Summary Signature Page     I have received my discharge instructions, and my questions have been answered. I have discussed any challenges I see with this plan with the nurse or doctor.    ..........................................................................................................................................  Patient/Patient Representative Signature      ..........................................................................................................................................  Patient Representative Print Name and Relationship to Patient    ..................................................               ................................................  Date                                            Time    ..........................................................................................................................................  Reviewed by Signature/Title    ...................................................              ..............................................  Date                                                            Time

## 2017-11-16 NOTE — ED PROVIDER NOTES
History     Chief Complaint   Patient presents with     Post-op Problem     Pt had hernia surgery in April, 2017.  Had complications with surgery, swollen scrotum since then.  Pt seeling urology and told he needs another procedure.  Pain started in L hernia site and L scrotum pain, radiated around to back last night.  Pain in surgical site right now.      HPI    Micah Nunez is a 51 year old male who presents with scrotal pain.  He had bilateral laparoscopic inguinal herniorrhaphies in April of this year and following that had persistent pain.  No scrotal swelling and had consultation with Dr. Carr in urology and subsequent ultrasound.  This showed a hydrocele.  This was likely acquired from surgery.  He is going to have surgery for the hydrocele next week with Dr. Carr.  He comes in today because he's had a significant increase in the size of the scrotum and a significant increase in pain.  The pain is in the scrotum and in the area of the hydrocele and radiates up into the groin rather than the other way around.  He has not noticed a lump in the area of the prior hernia.  He has not had nausea, vomiting, abdominal pain.  He has not had dysuria or urinary frequency.    Problem List:    Patient Active Problem List    Diagnosis Date Noted     Benign essential hypertension 10/18/2017     Priority: Medium     Tobacco use disorder 08/12/2015     Priority: Medium     Chemical dependency (H) 07/27/2015     Priority: Medium     Papilloma of nose 03/10/2014     Priority: Medium     Nasal mass 12/17/2013     Priority: Medium     CARDIOVASCULAR SCREENING; LDL GOAL LESS THAN 130 12/11/2013     Priority: Medium        Past Medical History:    Past Medical History:   Diagnosis Date     Bleeding disorder (H)      Chemical dependency (H)      Gastroesophageal reflux disease      Hypertension      Skin disease        Past Surgical History:    Past Surgical History:   Procedure Laterality Date     ARTHRODESIS FOOT Left  2/17/2015    Procedure: ARTHRODESIS FOOT;  Surgeon: Cortez Hayward DPM;  Location: [facility]     ARTHRODESIS TOE(S)  12/20/2013    Procedure: ARTHRODESIS TOE(S);  Arthrodesis first metatarsophalangeal joint left foot;  Surgeon: Cortez Hayward DPM;  Location: [facility] OR     ENDOSCOPIC SINUS SURGERY  1/6/2014    Procedure: ENDOSCOPIC SINUS SURGERY;  Functional Endoscopic Sinus Surgery;  Surgeon: Bobo Renteria MD;  Location:  OR     ENDOSCOPIC SINUS SURGERY  7/21/2014    Procedure: ENDOSCOPIC SINUS SURGERY;  Surgeon: Bobo Renteria MD;  [facility]     ENDOSCOPIC SINUS SURGERY N/A 4/6/2015    Procedure: ENDOSCOPIC SINUS SURGERY;  Surgeon: Bobo Renteria MD;  Location:  OR     ENDOSCOPIC SINUS SURGERY N/A 8/17/2015    Procedure: ENDOSCOPIC SINUS SURGERY;  Surgeon: Bobo Renteria MD;  [facility]     ENT SURGERY       EXCISE NASAL MASS Left 11/6/2017    Procedure: EXCISE NASAL MASS;  Excision of Left Nasal Papilloma;  Surgeon: Bobo Renteria MD;  Location:  OR     LAPAROSCOPIC HERNIORRHAPHY INGUINAL BILATERAL Bilateral 4/12/2017    Procedure: LAPAROSCOPIC HERNIORRHAPHY INGUINAL BILATERAL;  Surgeon: Shay Mercado MD;  Location: [facility] OR     NASAL ENDOSCOPY Bilateral 2/6/2017    Procedure: NASAL ENDOSCOPY;  Surgeon: Bobo Renteria MD;  [facility]     NASAL/SINUS POLYPECTOMY       PE TUBES         Family History:    Family History   Problem Relation Age of Onset     DIABETES Mother 40     C.A.D. Father 72     Unknown/Adopted No family hx of      Depression No family hx of      Anxiety Disorder No family hx of      Schizophrenia No family hx of      Bipolar Disorder No family hx of      Suicide No family hx of      Substance Abuse No family hx of      Dementia No family hx of      [place] Disease No family hx of      Parkinsonism No family hx of      Autism Spectrum Disorder No family hx of      Intellectual Disability (Mental Retardation) No family hx of   "    MENTAL ILLNESS No family hx of      Bleeding Disorder No family hx of        Social History:  Marital Status:  Single [1]  Social History   Substance Use Topics     Smoking status: Current Every Day Smoker     Packs/day: 0.50     Years: 30.00     Types: Cigarettes     Start date: 1/1/1980     Smokeless tobacco: Never Used      Comment: 5 per day     Alcohol use No      Comment: QUIT MARCH 2016        Medications:      oxyCODONE IR (ROXICODONE) 5 MG tablet   oxyCODONE IR (ROXICODONE) 5 MG tablet   senna-docusate (SENOKOT-S;PERICOLACE) 8.6-50 MG per tablet   acetaminophen (TYLENOL) 325 MG tablet   oxymetazoline (AFRIN NASAL SPRAY) 0.05 % spray   lisinopril-hydrochlorothiazide (PRINZIDE/ZESTORETIC) 10-12.5 MG per tablet   triamcinolone (KENALOG) 0.1 % ointment   ibuprofen (ADVIL/MOTRIN) 600 MG tablet   Ibuprofen (ADVIL PO)   order for DME     Review of Systems  Further problem focused system review negative.    Physical Exam   BP: 140/87  Heart Rate: 86  Temp: 98.2  F (36.8  C)  Resp: 16  Height: 190.5 cm (6' 3\")  SpO2: 98 %      Physical Exam  Nursing note and vitals were reviewed.  Constitutional: Awake and alert, healthy appearing 51-year-old no acute distress, who does not appear acutely ill, and who answers questions appropriately and cooperates with examination.  Neurological: Alert, oriented, thought content logical, coherent   Skin: Warm, dry, no rashes.  Psychiatric: Affect broad and appropriate.  Genitourinary: The scrotum is enlarged to baseball size on the left side and significantly tender consistent with hydrocele.  I cannot adequately assess the left testicle or epididymis given the tense swelling.  There is no evidence of a hernia on the left though exam is somewhat limited by the hydrocele.  Right testicle is without significant tenderness or enlargement.  The laparoscopy incision site in the umbilicus is clean, dry, intact.      ED Course     ED Course     Procedures               Critical Care " time:  none               Results for orders placed or performed during the hospital encounter of 11/16/17   US Testicular and Scrotum    Narrative    US TESTICULAR AND SCROTUM 11/16/2017 4:09 PM    HISTORY: Left-sided pain.    TECHNIQUE: Assessment includes the testicles and epididymides. Other  potential intrascrotal abnormalities including fluid, hernia, or  varicocele are also looked for. Finally, Doppler spectral waveform  analysis of the testicles is performed.    COMPARISON: 11/10/2017.    FINDINGS: The testicles are normal and show symmetrical blood flow.  The right epididymis is normal. The left epididymis is not visualized.  There is a large complex left hydrocele present that contains several  septations. It is difficult to measure size due to it being larger  than the field of view of the ultrasound transducer. It measures at  least 7.4 cm in maximal dimension and this may be an underestimation.  It is suspected to be similar in size to what was seen before or  possibly slightly smaller. The complexity seen today however is new  from the prior study. Bilateral varicoceles are present. They weren't  readily apparent on the prior study.      Impression    IMPRESSION: Large left complex hydrocele.    WINTER DE GUZMAN MD         Assessments & Plan (with Medical Decision Making)     51-year-old male presents with large left hydrocele.  I suspect this is the source of his pain and the pain is due to rapid increase in size.  It is time in the emergency department I encouraged him to lie recumbent with the scrotum elevated above the abdomen and this significantly improved his discomfort.  I discussed his case with Dr. Llanos.  I do not see evidence that he's had recurrence of his hernia though it a little bit difficult to assess.  I think his pain is due to the increasing hydrocele.  Ultrasound showed good blood flow to the testicle.  The epididymis on that side is not able to be visualized but I have low suspicion  for epididymitis given an alternative explanation for his pain.  We will treat his pain and have him elevate the scrotum and he will follow up for surgery as planned next week.  He will return if he cannot control his pain.  He does have a history of chemical dependency but his use of narcotics in the past year has been limited and appropriately associated with acute surgical procedures and not excessive.  Given a prescription for a limited number of additional pain medication until surgery.    I have reviewed the nursing notes.    I have reviewed the findings, diagnosis, plan and need for follow up with the patient.       New Prescriptions    OXYCODONE IR (ROXICODONE) 5 MG TABLET    Take 1-2 tablets (5-10 mg) by mouth every 6 hours as needed for pain       Final diagnoses:   Left hydrocele       11/16/2017   [facility] EMERGENCY DEPARTMENT     Renan Velasco MD  11/16/17 [id number]

## 2017-11-16 NOTE — ED NOTES
Left abdominal pain radiating into left testicle started 2-3 days ago, increase pain the last 24 hours.   Pt reports unable to sleep due to pain.   Scheduled for procedure on Tuesday to drain fluid from left testicle.  Denies fevers.

## 2017-11-16 NOTE — ED AVS SNAPSHOT
[facility] Emergency Department    [address] [address]    Phone:  [phone]    Fax:  [phone]                                       Micah Nunez   MRN: [id number]    Department:  [facility] Emergency Department   Date of Visit:  11/16/2017           Patient Information     Date Of Birth          1965        Your diagnoses for this visit were:     Left hydrocele        You were seen by Renan Velasco MD.        Discharge Instructions       Use ibuprofen 400-600 mg up to 4 times per day if needed for pain. Stop if it is causing nausea or abdominal pain.   Add acetaminophen 500 mg 1-2 pills up to every 4 hours if needed for pain.   You may add oxycodone 5 mg, 1-2 tablets up to every 6 hours if needed for pain.  Try to use this primarily only at night to help with sleep.    Do not use alcohol, operate machinery, drive, or climb on ladders for 8 hours after taking oxycodone. Use docusate (100mg) 2 times a day to prevent constipation while on narcotics.  Elevate the scrotum as much as possible above the abdomen.  Follow-up as planned next Tuesday for surgery with Dr. Carr.  Return if worsening symptoms.    Future Appointments        Provider Department Dept Phone Center    11/21/2017 9:45 AM NATHALIE Carr MD, MD [facility] [phone] [facility]    12/12/2017 7:30 AM Bobo Renteria MD [facility] [phone] [facility]      24 Hour Appointment Hotline       To make an appointment at any [facility] ([facility] call [phone] ([phone]). If you don't have a family doctor or clinic, we will help you find one. [facility] are conveniently located to serve the needs of you and your family.             Review of your medicines      CONTINUE these medicines which may have CHANGED, or have new prescriptions. If we are uncertain of the size of tablets/capsules you have at home, strength may be listed as something that might have  changed.        Dose / Directions Last dose taken    * oxyCODONE IR 5 MG tablet   Commonly known as:  ROXICODONE   Dose:  5-10 mg   What changed:  Another medication with the same name was added. Make sure you understand how and when to take each.   Quantity:  15 tablet        Take 1-2 tablets (5-10 mg) by mouth every 3 hours as needed for pain or other (Moderate to Severe)   Refills:  0        * oxyCODONE IR 5 MG tablet   Commonly known as:  ROXICODONE   Dose:  5-10 mg   What changed:  You were already taking a medication with the same name, and this prescription was added. Make sure you understand how and when to take each.   Quantity:  20 tablet        Take 1-2 tablets (5-10 mg) by mouth every 6 hours as needed for pain   Refills:  0        * Notice:  This list has 2 medication(s) that are the same as other medications prescribed for you. Read the directions carefully, and ask your doctor or other care provider to review them with you.      Our records show that you are taking the medicines listed below. If these are incorrect, please call your family doctor or clinic.        Dose / Directions Last dose taken    acetaminophen 325 MG tablet   Commonly known as:  TYLENOL   Dose:  650 mg   Quantity:  30 tablet        Take 2 tablets (650 mg) by mouth every 4 hours as needed for other (mild pain)   Refills:  0        * ADVIL PO   Dose:  800 mg        Take 800 mg by mouth daily as needed for moderate pain   Refills:  0        * ibuprofen 600 MG tablet   Commonly known as:  ADVIL/MOTRIN   Dose:  600 mg   Quantity:  40 tablet        Take 1 tablet (600 mg) by mouth every 6 hours   Refills:  0        lisinopril-hydrochlorothiazide 10-12.5 MG per tablet   Commonly known as:  PRINZIDE/ZESTORETIC   Dose:  1 tablet   Quantity:  90 tablet        Take 1 tablet by mouth daily   Refills:  3        order for DME   Quantity:  1 Units        Equipment being ordered: Dynaflex insert   Refills:  0        oxymetazoline 0.05 % spray    Commonly known as:  AFRIN NASAL SPRAY   Dose:  2-3 spray   Quantity:  1 Bottle        Spray 2-3 sprays into both nostrils as needed for other (for nasal bleeding only)   Refills:  0        senna-docusate 8.6-50 MG per tablet   Commonly known as:  SENOKOT-S;PERICOLACE   Dose:  1-2 tablet   Quantity:  30 tablet        Take 1-2 tablets by mouth 2 times daily Take while on oral narcotics to prevent or treat constipation.   Refills:  0        triamcinolone 0.1 % ointment   Commonly known as:  KENALOG   Quantity:  80 g        Apply sparingly to affected area three times daily for 14 days.   Refills:  0        * Notice:  This list has 2 medication(s) that are the same as other medications prescribed for you. Read the directions carefully, and ask your doctor or other care provider to review them with you.            Prescriptions were sent or printed at these locations (1 Prescription)                   Other Prescriptions                Printed at Department/Unit printer (1 of 1)         oxyCODONE IR (ROXICODONE) 5 MG tablet                Procedures and tests performed during your visit     US Testicular and Scrotum      Orders Needing Specimen Collection     None      Pending Results     No orders found from 11/14/2017 to 11/17/2017.            Pending Culture Results     No orders found from 11/14/2017 to 11/17/2017.            Pending Results Instructions     If you had any lab results that were not finalized at the time of your Discharge, you can call the ED Lab Result RN at [phone]. You will be contacted by this team for any positive Lab results or changes in treatment. The nurses are available 7 days a week from 10A to 6:30P.  You can leave a message 24 hours per day and they will return your call.        Test Results From Your Hospital Stay        11/16/2017  4:23 PM      Narrative     US TESTICULAR AND SCROTUM 11/16/2017 4:09 PM    HISTORY: Left-sided pain.    TECHNIQUE: Assessment includes the testicles  "and epididymides. Other  potential intrascrotal abnormalities including fluid, hernia, or  varicocele are also looked for. Finally, Doppler spectral waveform  analysis of the testicles is performed.    COMPARISON: 11/10/2017.    FINDINGS: The testicles are normal and show symmetrical blood flow.  The right epididymis is normal. The left epididymis is not visualized.  There is a large complex left hydrocele present that contains several  septations. It is difficult to measure size due to it being larger  than the field of view of the ultrasound transducer. It measures at  least 7.4 cm in maximal dimension and this may be an underestimation.  It is suspected to be similar in size to what was seen before or  possibly slightly smaller. The complexity seen today however is new  from the prior study. Bilateral varicoceles are present. They weren't  readily apparent on the prior study.        Impression     IMPRESSION: Large left complex hydrocele.    WINTER DE GUZMAN MD                Thank you for choosing [facility]       Thank you for choosing [facility] for your care. Our goal is always to provide you with excellent care. Hearing back from our patients is one way we can continue to improve our services. Please take a few minutes to complete the written survey that you may receive in the mail after you visit with us. Thank you!        [employer] Information     [employer] lets you send messages to your doctor, view your test results, renew your prescriptions, schedule appointments and more. To sign up, go to [email or web address] . Click on \"Log in\" on the left side of the screen, which will take you to the Welcome page. Then click on \"Sign up Now\" on the right side of the page.     You will be asked to enter the access code listed below, as well as some personal information. Please follow the directions to create your username and password.     Your access code is: [email or web address]  Expires: 12/25/2017  5:30 AM     Your access code " will  in 90 days. If you need help or a new code, please call your [place] clinic or [phone].        Care EveryWhere ID     This is your Care EveryWhere ID. This could be used by other organizations to access your [place] medical records  [id number]        Equal Access to Services     FAUSTINA HAN : Courtney blackwood Sosiena, waaxda luqadaha, qaybta kaalmada ceci, jackson donato. So [facility] [phone].    ATENCIÓN: Si habla español, tiene a espinoza disposición servicios gratuitos de asistencia lingüística. Llame al [phone].    We comply with applicable federal civil rights laws and [place] laws. We do not discriminate on the basis of race, color, national origin, age, disability, sex, sexual orientation, or gender identity.            After Visit Summary       This is your record. Keep this with you and show to your community pharmacist(s) and doctor(s) at your next visit.

## 2017-11-21 ENCOUNTER — OFFICE VISIT (OUTPATIENT)
Dept: UROLOGY | Facility: CLINIC | Age: 52
End: 2017-11-21
Payer: COMMERCIAL

## 2017-11-21 VITALS
HEART RATE: 79 BPM | RESPIRATION RATE: 16 BRPM | WEIGHT: 194 LBS | BODY MASS INDEX: 24.12 KG/M2 | SYSTOLIC BLOOD PRESSURE: 135 MMHG | HEIGHT: 75 IN | DIASTOLIC BLOOD PRESSURE: 80 MMHG

## 2017-11-21 DIAGNOSIS — N43.3 HYDROCELE IN ADULT: Primary | ICD-10-CM

## 2017-11-21 PROCEDURE — 99207 ZZC NO CHARGE LOS: CPT | Performed by: UROLOGY

## 2017-11-21 PROCEDURE — 55000 DRAINAGE OF HYDROCELE: CPT | Performed by: UROLOGY

## 2017-11-21 NOTE — NURSING NOTE
Drained in a sterile procedure 100cc of fluid from a hydrocele aspiration performed by Dr. Carr.    Heena Gold, CMA

## 2017-11-21 NOTE — NURSING NOTE
"Chief Complaint   Patient presents with     Consult     drain hydrocele       Initial /80 (BP Location: Right arm, Patient Position: Chair, Cuff Size: Adult Regular)  Pulse 79  Resp 16  Ht 1.905 m (6' 3\")  Wt 88 kg (194 lb)  BMI 24.25 kg/m2 Estimated body mass index is 24.25 kg/(m^2) as calculated from the following:    Height as of this encounter: 1.905 m (6' 3\").    Weight as of this encounter: 88 kg (194 lb).  Medication Reconciliation: complete     Heena Gold CMA      "

## 2017-11-21 NOTE — PROGRESS NOTES
Appointment source: Established Patient  Patient name: Micah Nunez  Urology Staff: Buster Carr MD    Subjective: This is a 51 year old year old male returning for follow up of a left hydrocele    Objective:  The hydrocele was drained percutaneously.    Plan:  Return in two to three weeks for follow up.

## 2017-11-21 NOTE — MR AVS SNAPSHOT
"              After Visit Summary   11/21/2017    Micah Nunez    MRN: [id number]           Patient Information     Date Of Birth          1965        Visit Information        Provider Department      11/21/2017 9:45 AM NATHALIE Carr MD [facility]        Today's Diagnoses     Hydrocele in adult    -  1      Care Instructions    Per Physician's instructions            Follow-ups after your visit        Your next 10 appointments already scheduled     Dec 12, 2017  7:30 AM CST   (Arrive by 7:15 AM)   Return Visit with Bobo Renteria MD   [facility] ([facility])    [address] [place] [address] [phone]              Who to contact     If you have questions or need follow up information about today's clinic visit or your schedule please contact [facility] directly at [phone].  Normal or non-critical lab and imaging results will be communicated to you by MyChart, letter or phone within 4 business days after the clinic has received the results. If you do not hear from us within 7 days, please contact the clinic through MyChart or phone. If you have a critical or abnormal lab result, we will notify you by phone as soon as possible.  Submit refill requests through [employer] or call your pharmacy and they will forward the refill request to us. Please allow 3 business days for your refill to be completed.          Additional Information About Your Visit        MyChart Information     [employer] lets you send messages to your doctor, view your test results, renew your prescriptions, schedule appointments and more. To sign up, go to [email or web address] . Click on \"Log in\" on the left side of the screen, which will take you to the Welcome page. Then click on \"Sign up Now\" on the right side of the page.     You will be asked to enter the access code listed below, as well as some personal information. " "Please follow the directions to create your username and password.     Your access code is: [email or web address]  Expires: 2017  5:30 AM     Your access code will  in 90 days. If you need help or a new code, please call your [place] clinic or [phone].        Care EveryWhere ID     This is your Care EveryWhere ID. This could be used by other organizations to access your [place] medical records  [id number]        Your Vitals Were     Pulse Respirations Height BMI (Body Mass Index)          79 16 1.905 m (6' 3\") 24.25 kg/m2         Blood Pressure from Last 3 Encounters:   17 135/80   17 140/89   17 145/82    Weight from Last 3 Encounters:   17 88 kg (194 lb)   17 88 kg (194 lb)   17 88 kg (194 lb)              We Performed the Following     PUNCTURE ASPIRATION HYDROCELE W/WO MED INJECTION        Primary Care Provider Office Phone # Fax #    Marcellein Vanita White [phone] [address] [address]        Equal Access to Services     FAUSTINA HAN AH: Hadii evan cerrato hadasho Somaryanaali, waaxda luqadaha, qaybta kaalmada adeegyada, jackson donato. So [facility] [phone].    ATENCIÓN: Si habla español, tiene a espinoza disposición servicios gratuitos de asistencia lingüística. Dexterame al [phone].    We comply with applicable federal civil rights laws and [place] laws. We do not discriminate on the basis of race, color, national origin, age, disability, sex, sexual orientation, or gender identity.            Thank you!     Thank you for choosing [facility]  for your care. Our goal is always to provide you with excellent care. Hearing back from our patients is one way we can continue to improve our services. Please take a few minutes to complete the written survey that you may receive in the mail after your visit with us. Thank you!             Your Updated Medication List - Protect others around you: Learn " how to safely use, store and throw away your medicines at www.disposemymeds.org.          This list is accurate as of: 11/21/17  5:40 PM.  Always use your most recent med list.                   Brand Name Dispense Instructions for use Diagnosis    acetaminophen 325 MG tablet    TYLENOL    30 tablet    Take 2 tablets (650 mg) by mouth every 4 hours as needed for other (mild pain)    Post-op pain       * ADVIL PO      Take 800 mg by mouth daily as needed for moderate pain        * ibuprofen 600 MG tablet    ADVIL/MOTRIN    40 tablet    Take 1 tablet (600 mg) by mouth every 6 hours    Acute midline low back pain without sciatica       lisinopril-hydrochlorothiazide 10-12.5 MG per tablet    PRINZIDE/ZESTORETIC    90 tablet    Take 1 tablet by mouth daily    Benign essential hypertension       order for DME     1 Units    Equipment being ordered: Dynaflex insert    Hallux rigidus, right foot       * oxyCODONE IR 5 MG tablet    ROXICODONE    15 tablet    Take 1-2 tablets (5-10 mg) by mouth every 3 hours as needed for pain or other (Moderate to Severe)    Post-op pain       * oxyCODONE IR 5 MG tablet    ROXICODONE    20 tablet    Take 1-2 tablets (5-10 mg) by mouth every 6 hours as needed for pain        oxymetazoline 0.05 % spray    AFRIN NASAL SPRAY    1 Bottle    Spray 2-3 sprays into both nostrils as needed for other (for nasal bleeding only)    Papilloma of nose       senna-docusate 8.6-50 MG per tablet    SENOKOT-S;PERICOLACE    30 tablet    Take 1-2 tablets by mouth 2 times daily Take while on oral narcotics to prevent or treat constipation.    Post-op pain       triamcinolone 0.1 % ointment    KENALOG    80 g    Apply sparingly to affected area three times daily for 14 days.    Atopic dermatitis, unspecified type       * Notice:  This list has 4 medication(s) that are the same as other medications prescribed for you. Read the directions carefully, and ask your doctor or other care provider to review them with you.

## 2017-11-22 ENCOUNTER — TELEPHONE (OUTPATIENT)
Dept: UROLOGY | Facility: CLINIC | Age: 52
End: 2017-11-22

## 2017-11-22 NOTE — TELEPHONE ENCOUNTER
Reason for Call:  Other call back    Detailed comments: pt calling stating he had a hydrocele procedure done yesterday, this morning his left testicle is filled back up w/ fluid and where he has hernia surgery 8 months ago he is having sharp pain. Not sure if he should come back in     Phone Number Patient can be reached at: Home number on file [phone] (home)    Best Time: any     Can we leave a detailed message on this number? YES    Call taken on 11/22/2017 at 11:03 AM by Diane Santana

## 2017-11-22 NOTE — TELEPHONE ENCOUNTER
"Pt reports that he had left hydrocele drained yesterday and he reports it is filling back up today.  He mentions he is post laparoscopic bilateral inguinal hernia repair (04-12-17) and he failed to report that he has been experiencing pain in the area of hernia repair that radiates around to his back, this has been going on for the last couple of days prior to visit yesterday.  He denies doing any heavy lifting and rates his pain 6-7/10 and with activity it goes up to \"8\".  He was taking oxycodone for pain and this then caused problems with bowel movement's.  He started to take stool softener and I advised him to take Miralax as well that his colon could be full of stool which could be causing some of his discomfort.  He says that he is also having trouble with passing his urine, he says he has to sit awhile on the toilet and concentrate to pass his urine and stool.  Advised pt that he will need to be seen back in ER if his symptoms progressively worsen.  Pt would like to be seen on Monday with Dr. Carr if he can get through the holiday weekend.  Advised pt do not hesitate to be seen in urgent care/ er for worsening symptoms and he agrees with this plan.     Kenia Giraldo  [facility] RN  "

## 2017-11-22 NOTE — TELEPHONE ENCOUNTER
Dr Carr returned call.  He asked for the pt to come in this coming week for an appt.  Appt made on Tuesday at 10am, pt notified.    Lisa Zapata CMA

## 2017-11-28 ENCOUNTER — OFFICE VISIT (OUTPATIENT)
Dept: UROLOGY | Facility: CLINIC | Age: 52
End: 2017-11-28
Payer: COMMERCIAL

## 2017-11-28 VITALS — DIASTOLIC BLOOD PRESSURE: 98 MMHG | HEART RATE: 90 BPM | SYSTOLIC BLOOD PRESSURE: 138 MMHG | RESPIRATION RATE: 20 BRPM

## 2017-11-28 DIAGNOSIS — L08.9 LOCAL INFECTION OF WOUND: ICD-10-CM

## 2017-11-28 DIAGNOSIS — N43.3 HYDROCELE IN ADULT: Primary | ICD-10-CM

## 2017-11-28 DIAGNOSIS — T14.8XXA LOCAL INFECTION OF WOUND: ICD-10-CM

## 2017-11-28 PROCEDURE — 55000 DRAINAGE OF HYDROCELE: CPT | Performed by: UROLOGY

## 2017-11-28 NOTE — PROGRESS NOTES
Appointment source: Established Patient  Patient name: Micah Nunez  Urology Staff: Buster Carr MD    Subjective: This is a 51 year old year old male returning for follow up of post hernia hydrocele    Objective:  Hydrocele returned almost immediately.    Also had developed a persistent drainage at the umbilical laparoscopy wound site.    Assessment:  Laparoscopy wound infection and post hernia left hydrocele.    Plan:  Will have him see general surgery about the wound infection and will follow up in January to consider surgical excision of the hydrocele.    Total time 15 minutes, counseling 10 minutes discussing hydrocele and wound infection

## 2017-11-28 NOTE — NURSING NOTE
"Chief Complaint   Patient presents with     RECHECK     Hydrocele        Initial BP (!) 138/98 (BP Location: Right arm, Patient Position: Chair, Cuff Size: Adult Regular)  Pulse 90  Resp 20 Estimated body mass index is 24.25 kg/(m^2) as calculated from the following:    Height as of 11/21/17: 1.905 m (6' 3\").    Weight as of 11/21/17: 88 kg (194 lb).  BP completed using cuff size: regular      Lisa Zapata CMA     "

## 2017-11-28 NOTE — MR AVS SNAPSHOT
After Visit Summary   11/28/2017    Micah Nunez    MRN: [id number]           Patient Information     Date Of Birth          1965        Visit Information        Provider Department      11/28/2017 10:00 AM NATHALIE Carr MD [facility]        Today's Diagnoses     Hydrocele in adult    -  1    Local infection of wound          Care Instructions    Per Physician's instructions            Follow-ups after your visit        Your next 10 appointments already scheduled     Dec 06, 2017 10:00 AM CST   Return Visit with Shay Mercado MD   [facility] ([facility]    [address] [address]   [phone]            Dec 12, 2017  7:30 AM CST   (Arrive by 7:15 AM)   Return Visit with Bobo Renteria MD   [facility] Nose [facility] Th[facility] ([facility])    [address] [place] [address] [phone]            Jan 09, 2018 10:45 AM CST   Return Visit with NATHALIE Carr MD   [facility] ([facility]    [address] [address]   [phone]              Who to contact     If you have questions or need follow up information about today's clinic visit or your schedule please contact [facility] directly at [phone].  Normal or non-critical lab and imaging results will be communicated to you by MyChart, letter or phone within 4 business days after the clinic has received the results. If you do not hear from us within 7 days, please contact the clinic through MyChart or phone. If you have a critical or abnormal lab result, we will notify you by phone as soon as possible.  Submit refill requests through [employer] or call your pharmacy and they will forward the refill request to us. Please allow 3 business days for your refill to be completed.          Additional Information About Your Visit        MyChart Information      "[employer] lets you send messages to your doctor, view your test results, renew your prescriptions, schedule appointments and more. To sign up, go to [email or web address] . Click on \"Log in\" on the left side of the screen, which will take you to the Welcome page. Then click on \"Sign up Now\" on the right side of the page.     You will be asked to enter the access code listed below, as well as some personal information. Please follow the directions to create your username and password.     Your access code is: [email or web address]  Expires: 2017  5:30 AM     Your access code will  in 90 days. If you need help or a new code, please call your [place] clinic or [phone].        Care EveryWhere ID     This is your Care EveryWhere ID. This could be used by other organizations to access your [place] medical records  [id number]        Your Vitals Were     Pulse Respirations                90 20           Blood Pressure from Last 3 Encounters:   17 (!) 138/98   17 135/80   17 140/89    Weight from Last 3 Encounters:   17 88 kg (194 lb)   17 88 kg (194 lb)   17 88 kg (194 lb)              Today, you had the following     No orders found for display       Primary Care Provider Office Phone # Fax #    Yovany Vanita White [phone] [address] [address]        Equal Access to Services     [facility] AH: Hadii evan ku hadasho Soomaali, waaxda luqadaha, qaybta kaalmada adeegyada, jackson donato. So [facility] [phone].    ATENCIÓN: Si jovannila darby, tiene a espinoza disposición servicios gratuitos de asistencia lingüística. Llame al [phone].    We comply with applicable federal civil rights laws and [place] laws. We do not discriminate on the basis of race, color, national origin, age, disability, sex, sexual orientation, or gender identity.            Thank you!     Thank you for choosing [facility]  " for your care. Our goal is always to provide you with excellent care. Hearing back from our patients is one way we can continue to improve our services. Please take a few minutes to complete the written survey that you may receive in the mail after your visit with us. Thank you!             Your Updated Medication List - Protect others around you: Learn how to safely use, store and throw away your medicines at www.disposemymeds.org.          This list is accurate as of: 11/28/17 10:24 AM.  Always use your most recent med list.                   Brand Name Dispense Instructions for use Diagnosis    acetaminophen 325 MG tablet    TYLENOL    30 tablet    Take 2 tablets (650 mg) by mouth every 4 hours as needed for other (mild pain)    Post-op pain       * ADVIL PO      Take 800 mg by mouth daily as needed for moderate pain        * ibuprofen 600 MG tablet    ADVIL/MOTRIN    40 tablet    Take 1 tablet (600 mg) by mouth every 6 hours    Acute midline low back pain without sciatica       lisinopril-hydrochlorothiazide 10-12.5 MG per tablet    PRINZIDE/ZESTORETIC    90 tablet    Take 1 tablet by mouth daily    Benign essential hypertension       order for DME     1 Units    Equipment being ordered: Dynaflex insert    Hallux rigidus, right foot       * oxyCODONE IR 5 MG tablet    ROXICODONE    15 tablet    Take 1-2 tablets (5-10 mg) by mouth every 3 hours as needed for pain or other (Moderate to Severe)    Post-op pain       * oxyCODONE IR 5 MG tablet    ROXICODONE    20 tablet    Take 1-2 tablets (5-10 mg) by mouth every 6 hours as needed for pain        oxymetazoline 0.05 % spray    AFRIN NASAL SPRAY    1 Bottle    Spray 2-3 sprays into both nostrils as needed for other (for nasal bleeding only)    Papilloma of nose       senna-docusate 8.6-50 MG per tablet    SENOKOT-S;PERICOLACE    30 tablet    Take 1-2 tablets by mouth 2 times daily Take while on oral narcotics to prevent or treat constipation.    Post-op pain        triamcinolone 0.1 % ointment    KENALOG    80 g    Apply sparingly to affected area three times daily for 14 days.    Atopic dermatitis, unspecified type       * Notice:  This list has 4 medication(s) that are the same as other medications prescribed for you. Read the directions carefully, and ask your doctor or other care provider to review them with you.

## 2017-12-08 ENCOUNTER — OFFICE VISIT (OUTPATIENT)
Dept: SURGERY | Facility: CLINIC | Age: 52
End: 2017-12-08
Payer: COMMERCIAL

## 2017-12-08 VITALS
RESPIRATION RATE: 18 BRPM | WEIGHT: 194 LBS | BODY MASS INDEX: 24.12 KG/M2 | HEIGHT: 75 IN | SYSTOLIC BLOOD PRESSURE: 139 MMHG | HEART RATE: 84 BPM | DIASTOLIC BLOOD PRESSURE: 78 MMHG | TEMPERATURE: 97.7 F

## 2017-12-08 DIAGNOSIS — T81.40XA POSTOPERATIVE INFECTION, INITIAL ENCOUNTER: Primary | ICD-10-CM

## 2017-12-08 DIAGNOSIS — Z09 POSTOP CHECK: ICD-10-CM

## 2017-12-08 PROCEDURE — 99212 OFFICE O/P EST SF 10 MIN: CPT | Performed by: SURGERY

## 2017-12-08 RX ORDER — CEPHALEXIN 500 MG/1
500 CAPSULE ORAL 4 TIMES DAILY
Qty: 40 CAPSULE | Refills: 0 | Status: SHIPPED | OUTPATIENT
Start: 2017-12-08 | End: 2018-01-09

## 2017-12-08 NOTE — NURSING NOTE
"Chief Complaint   Patient presents with     RECHECK     bilateral inguinal herniorrhaphy site for infection - surgery done on 4/12/2017 - has pus coming out right now       Initial /78 (BP Location: Right arm, Patient Position: Chair, Cuff Size: Adult Regular)  Pulse 84  Temp 97.7  F (36.5  C) (Oral)  Resp 18  Ht 1.905 m (6' 3\")  Wt 88 kg (194 lb)  BMI 24.25 kg/m2 Estimated body mass index is 24.25 kg/(m^2) as calculated from the following:    Height as of this encounter: 1.905 m (6' 3\").    Weight as of this encounter: 88 kg (194 lb).  Medication Reconciliation: complete     Heena Gold CMA      "

## 2017-12-08 NOTE — MR AVS SNAPSHOT
"              After Visit Summary   12/8/2017    Micah Nunez    MRN: [id number]           Patient Information     Date Of Birth          1965        Visit Information        Provider Department      12/8/2017 8:45 AM Shay Mercado MD [facility]        Today's Diagnoses     Postoperative infection, initial encounter    -  1    Postop check          Care Instructions    Per Physician's instructions            Follow-ups after your visit        Your next 10 appointments already scheduled     Dec 12, 2017  7:30 AM CST   (Arrive by 7:15 AM)   Return Visit with Bobo Renteria MD   [facility] N[facility] ([facility])    [address]  4th [address]  [facility] [address] [phone]            Jan 09, 2018 10:45 AM CST   Return Visit with NATHALIE Carr MD   [facility] ([facility])    [address] [address] [phone]              Who to contact     If you have questions or need follow up information about today's clinic visit or your schedule please contact [facility] directly at [phone].  Normal or non-critical lab and imaging results will be communicated to you by MyChart, letter or phone within 4 business days after the clinic has received the results. If you do not hear from us within 7 days, please contact the clinic through [employer] or phone. If you have a critical or abnormal lab result, we will notify you by phone as soon as possible.  Submit refill requests through [employer] or call your pharmacy and they will forward the refill request to us. Please allow 3 business days for your refill to be completed.          Additional Information About Your Visit        MyChart Information     [employer] lets you send messages to your doctor, view your test results, renew your prescriptions, schedule appointments and more. To sign up, go to [email or web address] . Click on \"Log " "in\" on the left side of the screen, which will take you to the Welcome page. Then click on \"Sign up Now\" on the right side of the page.     You will be asked to enter the access code listed below, as well as some personal information. Please follow the directions to create your username and password.     Your access code is: [email or web address]  Expires: 2017  5:30 AM     Your access code will  in 90 days. If you need help or a new code, please call your [place] clinic or [phone].        Care EveryWhere ID     This is your Care EveryWhere ID. This could be used by other organizations to access your [facility] medical records  [id number]        Your Vitals Were     Pulse Temperature Respirations Height BMI (Body Mass Index)       84 97.7  F (36.5  C) (Oral) 18 1.905 m (6' 3\") 24.25 kg/m2        Blood Pressure from Last 3 Encounters:   17 139/78   17 (!) 138/98   17 135/80    Weight from Last 3 Encounters:   17 88 kg (194 lb)   17 88 kg (194 lb)   17 88 kg (194 lb)              Today, you had the following     No orders found for display         Today's Medication Changes          These changes are accurate as of: 17  9:17 AM.  If you have any questions, ask your nurse or doctor.               Start taking these medicines.        Dose/Directions    cephALEXin 500 MG capsule   Commonly known as:  KEFLEX   Used for:  Postoperative infection, initial encounter   Started by:  Shay Mercado MD        Dose:  500 mg   Take 1 capsule (500 mg) by mouth 4 times daily   Quantity:  40 capsule   Refills:  0            Where to get your medicines      These medications were sent to [facility] [place], [place] - [address]  [address] [address]     Phone:  [phone]     cephALEXin 500 MG capsule                Primary Care Provider Office Phone # Fax #    Yovany White [phone] [address] " [place] [address]        Equal Access to Services     FAUSTINA HAN : Hadii evan cerrato carsongabrielle Pravin, waaxda luqadaha, qaybta kaalmada ceci, jackson donato. So [facility] [phone].    ATENCIÓN: Si habla español, tiene a espinoza disposición servicios gratuitos de asistencia lingüística. Llame al [phone].    We comply with applicable federal civil rights laws and [place] laws. We do not discriminate on the basis of race, color, national origin, age, disability, sex, sexual orientation, or gender identity.            Thank you!     Thank you for choosing [facility]  for your care. Our goal is always to provide you with excellent care. Hearing back from our patients is one way we can continue to improve our services. Please take a few minutes to complete the written survey that you may receive in the mail after your visit with us. Thank you!             Your Updated Medication List - Protect others around you: Learn how to safely use, store and throw away your medicines at www.disposemymeds.org.          This list is accurate as of: 12/8/17  9:17 AM.  Always use your most recent med list.                   Brand Name Dispense Instructions for use Diagnosis    acetaminophen 325 MG tablet    TYLENOL    30 tablet    Take 2 tablets (650 mg) by mouth every 4 hours as needed for other (mild pain)    Post-op pain       * ADVIL PO      Take 800 mg by mouth daily as needed for moderate pain        * ibuprofen 600 MG tablet    ADVIL/MOTRIN    40 tablet    Take 1 tablet (600 mg) by mouth every 6 hours    Acute midline low back pain without sciatica       cephALEXin 500 MG capsule    KEFLEX    40 capsule    Take 1 capsule (500 mg) by mouth 4 times daily    Postoperative infection, initial encounter       lisinopril-hydrochlorothiazide 10-12.5 MG per tablet    PRINZIDE/ZESTORETIC    90 tablet    Take 1 tablet by mouth daily    Benign essential hypertension       order for DME     1 Units     Equipment being ordered: Dynaflex insert    Hallux rigidus, right foot       * oxyCODONE IR 5 MG tablet    ROXICODONE    15 tablet    Take 1-2 tablets (5-10 mg) by mouth every 3 hours as needed for pain or other (Moderate to Severe)    Post-op pain       * oxyCODONE IR 5 MG tablet    ROXICODONE    20 tablet    Take 1-2 tablets (5-10 mg) by mouth every 6 hours as needed for pain        oxymetazoline 0.05 % spray    AFRIN NASAL SPRAY    1 Bottle    Spray 2-3 sprays into both nostrils as needed for other (for nasal bleeding only)    Papilloma of nose       senna-docusate 8.6-50 MG per tablet    SENOKOT-S;PERICOLACE    30 tablet    Take 1-2 tablets by mouth 2 times daily Take while on oral narcotics to prevent or treat constipation.    Post-op pain       triamcinolone 0.1 % ointment    KENALOG    80 g    Apply sparingly to affected area three times daily for 14 days.    Atopic dermatitis, unspecified type       * Notice:  This list has 4 medication(s) that are the same as other medications prescribed for you. Read the directions carefully, and ask your doctor or other care provider to review them with you.

## 2017-12-08 NOTE — LETTER
12/8/2017         RE: Micah Nunez  [address]    [place] [address]        Dear Colleague,    Thank you for referring your patient, Micah Nunez, to the [facility]. Please see a copy of my visit note below.    Patient here for evaluation of umbilical wound. Patient had surgery 8 months ago and now reports some drainage from the umbilical incision. On physical exam it is erythematous and there is some weeping of the fluid but there is no obvious break in the skin. I feel an induration underneath the skin. Prescription written for 10 days of Keflex. Patient will follow up me again in 10 days to 2 weeks if this is not healed. I may need to open up the skin.      Shay Mercado MD     Again, thank you for allowing me to participate in the care of your patient.        Sincerely,        Shay Mercado MD

## 2017-12-08 NOTE — PROGRESS NOTES
Patient here for evaluation of umbilical wound. Patient had surgery 8 months ago and now reports some drainage from the umbilical incision. On physical exam it is erythematous and there is some weeping of the fluid but there is no obvious break in the skin. I feel an induration underneath the skin. Prescription written for 10 days of Keflex. Patient will follow up me again in 10 days to 2 weeks if this is not healed. I may need to open up the skin.      Shay Mercado MD

## 2017-12-12 ENCOUNTER — OFFICE VISIT (OUTPATIENT)
Dept: OTOLARYNGOLOGY | Facility: CLINIC | Age: 52
End: 2017-12-12

## 2017-12-12 VITALS — BODY MASS INDEX: 22.89 KG/M2 | WEIGHT: 188 LBS | HEIGHT: 76 IN

## 2017-12-12 DIAGNOSIS — D36.9 INVERTED PAPILLOMA: Primary | ICD-10-CM

## 2017-12-12 ASSESSMENT — PAIN SCALES - GENERAL: PAINLEVEL: NO PAIN (0)

## 2017-12-12 NOTE — PROGRESS NOTES
HISTORY OF PRESENT ILLNESS:  Micah Nunez is a pleasant gentleman who is 52 years of age.  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  We did local resection again on him in the  nasal vestibule area.  These were removed.   Marcia this is the best breathing has been on the left. .      PHYSICAL EXAMINATION:  The patient is alert, oriented x3 and is very pleasant to interact with.  Extraocular motions are intact.  Sclerae are anicteric.  Oral cavity is clear.  Nasal cavity is examined on the left side, and there is some crusting that I see in there. Neck exam is negative as well. Cranial nerves intact.      PROCEDURE:  I sprayed it out with lidocaine and Afrin and then went ahead and  Examined with a rigid scope left debridement of small amounts of crustin performed microscopi papilloma in a few locations less than 1mm.. In size,. .  He tolerated this very well.       ASSESSMENT:  Patient with a history of inverting papillomas, doing well.      PLAN:  I will see him again in 4 months.

## 2017-12-12 NOTE — MR AVS SNAPSHOT
After Visit Summary   12/12/2017    Micah Nunez    MRN: [id number]           Patient Information     Date Of Birth          1965        Visit Information        Provider Department      12/12/2017 7:30 AM Bobo Renteria MD [facility]        Care Instructions    Please follow up to see Dr Renteria in about 4 months.  Piter Pascual ,RN  [phone]              Follow-ups after your visit        Your next 10 appointments already scheduled     Jan 09, 2018 10:45 AM CST   Return Visit with NATHALIE Carr MD   [facility]    [address] [address]   [phone]            Apr 17, 2018 11:30 AM CDT   (Arrive by 11:15 AM)   Return Visit with MD CHOCO Blanca [facility] N[facility] Throat ([facility])    [address] [address] [phone]              Who to contact     Please call your clinic at [phone] to:    Ask questions about your health    Make or cancel appointments    Discuss your medicines    Learn about your test results    Speak to your doctor   If you have compliments or concerns about an experience at your clinic, or if you wish to file a complaint, please contact [facility] Patient Relations at [phone] or email us at [email or web address]         Additional Information About Your Visit        MyChart Information     [employer] is an electronic gateway that provides easy, online access to your medical records. With [employer], you can request a clinic appointment, read your test results, renew a prescription or communicate with your care team.     To sign up for [employer] visit the website at [email or web address]   You will be asked to enter the access code listed below, as well as some personal information. Please follow the directions to create your username and  "password.     Your access code is: [email or web address]  Expires: 2017  5:30 AM     Your access code will  in 90 days. If you need help or a new code, please contact your [facility] or call [phone] for assistance.        Care EveryWhere ID     This is your Care EveryWhere ID. This could be used by other organizations to access your [facility] medical records  [id number]        Your Vitals Were     Height BMI (Body Mass Index)                1.94 m (6' 4.38\") 22.66 kg/m2           Blood Pressure from Last 3 Encounters:   17 139/78   17 (!) 138/98   17 135/80    Weight from Last 3 Encounters:   17 85.3 kg (188 lb)   17 88 kg (194 lb)   17 88 kg (194 lb)              Today, you had the following     No orders found for display       Primary Care Provider Office Phone # Fax #    Yovany White [phone] [address] [address]        Equal Access to Services     [facility] : Hadii evan ku hadasho Somaryanaali, waaxda luqadaha, qaybta kaalmada adeegyada, jackson donato. So [facility] [phone].    ATENCIÓN: Si habla español, tiene a espinoza disposición servicios gratuitos de asistencia lingüística. Llame al [phone].    We comply with applicable federal civil rights laws and [place] laws. We do not discriminate on the basis of race, color, national origin, age, disability, sex, sexual orientation, or gender identity.            Thank you!     Thank you for choosing [facility]  for your care. Our goal is always to provide you with excellent care. Hearing back from our patients is one way we can continue to improve our services. Please take a few minutes to complete the written survey that you may receive in the mail after your visit with us. Thank you!             Your Updated Medication List - Protect others around you: Learn how to safely use, store and throw " away your medicines at www.disposemymeds.org.          This list is accurate as of: 12/12/17  8:11 AM.  Always use your most recent med list.                   Brand Name Dispense Instructions for use Diagnosis    acetaminophen 325 MG tablet    TYLENOL    30 tablet    Take 2 tablets (650 mg) by mouth every 4 hours as needed for other (mild pain)    Post-op pain       * ADVIL PO      Take 800 mg by mouth daily as needed for moderate pain        * ibuprofen 600 MG tablet    ADVIL/MOTRIN    40 tablet    Take 1 tablet (600 mg) by mouth every 6 hours    Acute midline low back pain without sciatica       cephALEXin 500 MG capsule    KEFLEX    40 capsule    Take 1 capsule (500 mg) by mouth 4 times daily    Postoperative infection, initial encounter       lisinopril-hydrochlorothiazide 10-12.5 MG per tablet    PRINZIDE/ZESTORETIC    90 tablet    Take 1 tablet by mouth daily    Benign essential hypertension       order for DME     1 Units    Equipment being ordered: Dynaflex insert    Hallux rigidus, right foot       oxymetazoline 0.05 % spray    AFRIN NASAL SPRAY    1 Bottle    Spray 2-3 sprays into both nostrils as needed for other (for nasal bleeding only)    Papilloma of nose       senna-docusate 8.6-50 MG per tablet    SENOKOT-S;PERICOLACE    30 tablet    Take 1-2 tablets by mouth 2 times daily Take while on oral narcotics to prevent or treat constipation.    Post-op pain       triamcinolone 0.1 % ointment    KENALOG    80 g    Apply sparingly to affected area three times daily for 14 days.    Atopic dermatitis, unspecified type       * Notice:  This list has 2 medication(s) that are the same as other medications prescribed for you. Read the directions carefully, and ask your doctor or other care provider to review them with you.

## 2017-12-12 NOTE — LETTER
12/12/2017       RE: Micah Nunez  [address]    [place] [address]     Dear Colleague,    Thank you for referring your patient, Micah Nunez, to the [facility] at [facility]. Please see a copy of my visit note below.    HISTORY OF PRESENT ILLNESS:  Micah Nunez is a pleasant gentleman who is 52 years of age.  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  We did local resection again on him in the  nasal vestibule area.  These were removed.   Marcia this is the best breathing has been on the left. .      PHYSICAL EXAMINATION:  The patient is alert, oriented x3 and is very pleasant to interact with.  Extraocular motions are intact.  Sclerae are anicteric.  Oral cavity is clear.  Nasal cavity is examined on the left side, and there is some crusting that I see in there. Neck exam is negative as well. Cranial nerves intact.      PROCEDURE:  I sprayed it out with lidocaine and Afrin and then went ahead and  Examined with a rigid scope left debridement of small amounts of crustin performed microscopi papilloma in a few locations less than 1mm.. In size,. .  He tolerated this very well.       ASSESSMENT:  Patient with a history of inverting papillomas, doing well.      PLAN:  I will see him again in 4 months.         Again, thank you for allowing me to participate in the care of your patient.      Sincerely,    Bobo Renteria MD

## 2017-12-14 ENCOUNTER — TELEPHONE (OUTPATIENT)
Dept: UROLOGY | Facility: CLINIC | Age: 52
End: 2017-12-14

## 2017-12-14 DIAGNOSIS — L20.9 ATOPIC DERMATITIS, UNSPECIFIED TYPE: ICD-10-CM

## 2017-12-14 NOTE — TELEPHONE ENCOUNTER
Forms obtained and will have Dr Carr assist with completion when back in clinic on Monday.     Kassi Mario MA

## 2017-12-14 NOTE — TELEPHONE ENCOUNTER
Reason for Call:  Form, our goal is to have forms completed with 72 hours, however, some forms may require a visit or additional information.    Type of letter, form or note:  medical    Who is the form from?: Patient    Where did the form come from: Patient or family brought in       What clinic location was the form placed at?: [facility] (ENT, Neurology, Rheumatology, Surgery, Urology, Vascular Surgery)    Where the form was placed: Given to MA/RN    What number is listed as a contact on the form?: [phone]       Additional comments: please complete the form and  Fax back to [phone]    Call taken on 12/14/2017 at 2:10 PM by Diane Santana

## 2017-12-18 NOTE — TELEPHONE ENCOUNTER
Kenolog 0.1% ointment    Routing refill request to provider for review/approval because:  Drug not on the [facility] refill protocol     Elvi PINTO Rn

## 2017-12-19 RX ORDER — TRIAMCINOLONE ACETONIDE 1 MG/G
OINTMENT TOPICAL
Qty: 80 G | Refills: 0 | Status: SHIPPED | OUTPATIENT
Start: 2017-12-19 | End: 2018-03-01

## 2018-01-02 ENCOUNTER — TELEPHONE (OUTPATIENT)
Dept: SURGERY | Facility: CLINIC | Age: 53
End: 2018-01-02

## 2018-01-02 NOTE — TELEPHONE ENCOUNTER
"VORB Dr Mercado;    No more Abx, pt states he should return next week to see if needs I&D.    Pt advised.  He states \"he is the doctor\" ... \"I see Dr Carr next week and he will take care of me\".  Courtney Hackett RN  [facility]    "

## 2018-01-02 NOTE — TELEPHONE ENCOUNTER
Reason for Call:  Medication or medication refill:    Do you use a [facility]?  Name of the pharmacy and phone number for the current request:  [facility] [phone]    Name of the medication requested: Keflex   Last Written Prescription Date: 12/8/2017  Last Fill Quantity: 40,  # refills: 0   Last Office Visit with [facility] or [facility] prescribing provider: 10/30/2017                                         Next 5 appointments (look out 90 days)     Jan 09, 2018 10:45 AM CST   Return Visit with [facility]ATHALIE Carr MD   [facility]    [address] [address]   [phone]                 Other request: Pt is calling asking for 7 more days of his antibiotic.  He has an appt with Dr. Carr on 1/9/2018.  Please advise.    Can we leave a detailed message on this number? YES    Phone number patient can be reached at: Home number on file [phone] (home)    Best Time: any    Call taken on 1/2/2018 at 11:44 AM by Kandace Sesay

## 2018-01-02 NOTE — TELEPHONE ENCOUNTER
"Discussed with patient reason for request.  States it is still draining and not healed yet.  Per Dr Mercado office note;  If umbilical wound not healed after 10-14 days then needs return appt to \"open it up\".  Pt advised of need for return visit if not healed.  Advised RN will inquire appt date and time with Dr Mercado and call patient back.  Courtney Hackett RN  [facility]    "

## 2018-01-09 ENCOUNTER — OFFICE VISIT (OUTPATIENT)
Dept: UROLOGY | Facility: CLINIC | Age: 53
End: 2018-01-09
Payer: COMMERCIAL

## 2018-01-09 VITALS
BODY MASS INDEX: 24.36 KG/M2 | HEART RATE: 76 BPM | SYSTOLIC BLOOD PRESSURE: 143 MMHG | DIASTOLIC BLOOD PRESSURE: 87 MMHG | WEIGHT: 200 LBS | HEIGHT: 76 IN | RESPIRATION RATE: 18 BRPM

## 2018-01-09 DIAGNOSIS — N43.3 HYDROCELE IN ADULT: Primary | ICD-10-CM

## 2018-01-09 PROCEDURE — 99213 OFFICE O/P EST LOW 20 MIN: CPT | Performed by: UROLOGY

## 2018-01-09 NOTE — NURSING NOTE
"Chief Complaint   Patient presents with     RECHECK     Hydrocele        Initial /87 (BP Location: Right arm, Patient Position: Chair, Cuff Size: Adult Regular)  Pulse 76  Resp 18  Ht 1.93 m (6' 4\")  Wt 90.7 kg (200 lb)  BMI 24.34 kg/m2 Estimated body mass index is 24.34 kg/(m^2) as calculated from the following:    Height as of this encounter: 1.93 m (6' 4\").    Weight as of this encounter: 90.7 kg (200 lb).  BP completed using cuff size: regular      Lisa Zapata CMA     "

## 2018-01-09 NOTE — MR AVS SNAPSHOT
"              After Visit Summary   1/9/2018    Micah Nunez    MRN: [id number]           Patient Information     Date Of Birth          1965        Visit Information        Provider Department      1/9/2018 10:45 AM NATHALIE Carr MD [facility]        Today's Diagnoses     Hydrocele in adult    -  1      Care Instructions    Per Physician's instructions            Follow-ups after your visit        Your next 10 appointments already scheduled     Jan 11, 2018  9:00 AM CST   Return Visit with Cortez Hayward DPM   [facility] ([facility])    [address]  [address]  [place] [address]   [phone]            Apr 17, 2018 11:30 AM CDT   (Arrive by 11:15 AM)   Return Visit with Bobo Renteria MD   [facility] ([facility] [place])    [address] [address] [phone]              Who to contact     If you have questions or need follow up information about today's clinic visit or your schedule please contact [facility] directly at [phone].  Normal or non-critical lab and imaging results will be communicated to you by MyChart, letter or phone within 4 business days after the clinic has received the results. If you do not hear from us within 7 days, please contact the clinic through [employer] or phone. If you have a critical or abnormal lab result, we will notify you by phone as soon as possible.  Submit refill requests through [employer] or call your pharmacy and they will forward the refill request to us. Please allow 3 business days for your refill to be completed.          Additional Information About Your Visit        MyChart Information     [employer] lets you send messages to your doctor, view your test results, renew your prescriptions, schedule appointments and more. To sign up, go to [email or web address] . Click on \"Log in\" on the " "left side of the screen, which will take you to the Welcome page. Then click on \"Sign up Now\" on the right side of the page.     You will be asked to enter the access code listed below, as well as some personal information. Please follow the directions to create your username and password.     Your access code is: [email or web address]  Expires: 4/10/2018 12:41 PM     Your access code will  in 90 days. If you need help or a new code, please call your [place] clinic or [phone].        Care EveryWhere ID     This is your Care EveryWhere ID. This could be used by other organizations to access your [place] medical records  [id number]        Your Vitals Were     Pulse Respirations Height BMI (Body Mass Index)          76 18 1.93 m (6' 4\") 24.34 kg/m2         Blood Pressure from Last 3 Encounters:   18 143/87   17 139/78   17 (!) 138/98    Weight from Last 3 Encounters:   18 90.7 kg (200 lb)   17 85.3 kg (188 lb)   17 88 kg (194 lb)              Today, you had the following     No orders found for display       Primary Care Provider Office Phone # Fax #    Rajeshphi Vanita White [phone] [address] [address]        Equal Access to Services     FAUSTINA HAN AH: Hadii evan cerrato hadasho Somaryanaali, waaxda luqadaha, qaybta kaalmada adeegyada, jackson arnold . So [facility] [phone].    ATENCIÓN: Si habla español, tiene a espinoza disposición servicios gratuitos de asistencia lingüística. Llame al [phone].    We comply with applicable federal civil rights laws and [place] laws. We do not discriminate on the basis of race, color, national origin, age, disability, sex, sexual orientation, or gender identity.            Thank you!     Thank you for choosing [facility]  for your care. Our goal is always to provide you with excellent care. Hearing back from our patients is one way we can continue to improve our " services. Please take a few minutes to complete the written survey that you may receive in the mail after your visit with us. Thank you!             Your Updated Medication List - Protect others around you: Learn how to safely use, store and throw away your medicines at www.disposemymeds.org.          This list is accurate as of: 1/9/18 11:59 PM.  Always use your most recent med list.                   Brand Name Dispense Instructions for use Diagnosis    acetaminophen 325 MG tablet    TYLENOL    30 tablet    Take 2 tablets (650 mg) by mouth every 4 hours as needed for other (mild pain)    Post-op pain       * ADVIL PO      Take 800 mg by mouth daily as needed for moderate pain        * ibuprofen 600 MG tablet    ADVIL/MOTRIN    40 tablet    Take 1 tablet (600 mg) by mouth every 6 hours    Acute midline low back pain without sciatica       lisinopril-hydrochlorothiazide 10-12.5 MG per tablet    PRINZIDE/ZESTORETIC    90 tablet    Take 1 tablet by mouth daily    Benign essential hypertension       order for DME     1 Units    Equipment being ordered: Dynaflex insert    Hallux rigidus, right foot       oxymetazoline 0.05 % spray    AFRIN NASAL SPRAY    1 Bottle    Spray 2-3 sprays into both nostrils as needed for other (for nasal bleeding only)    Papilloma of nose       senna-docusate 8.6-50 MG per tablet    SENOKOT-S;PERICOLACE    30 tablet    Take 1-2 tablets by mouth 2 times daily Take while on oral narcotics to prevent or treat constipation.    Post-op pain       triamcinolone 0.1 % ointment    KENALOG    80 g    APPLY SPARINGLY TO AFFECTED AREA THREE TIMES A DAY FOR 14 DAYS    Atopic dermatitis, unspecified type       * Notice:  This list has 2 medication(s) that are the same as other medications prescribed for you. Read the directions carefully, and ask your doctor or other care provider to review them with you.

## 2018-01-10 NOTE — PROGRESS NOTES
Appointment source: Established Patient  Patient name: Micah Nunez  Urology Staff: Buster Carr MD    Subjective: This is a 52 year old year old male returning for follow up of recently drained (percutaneously) hydrocele thought to be associated with recent hernia surgery.    Objective:  Hydrocele is no longer present. Also has had resolution of wound infection at umbilical port site.    Assessment:  Resolution of both the hydrocele and wound drainage.    Plan:  Return PRN.    Total time 15 minutes, counseling 10 minutes discussing hydrocele.

## 2018-01-11 ENCOUNTER — OFFICE VISIT (OUTPATIENT)
Dept: PODIATRY | Facility: CLINIC | Age: 53
End: 2018-01-11
Payer: COMMERCIAL

## 2018-01-11 VITALS — BODY MASS INDEX: 24.36 KG/M2 | HEIGHT: 76 IN | HEART RATE: 80 BPM | WEIGHT: 200 LBS

## 2018-01-11 DIAGNOSIS — M77.8 CAPSULITIS OF LEFT FOOT: ICD-10-CM

## 2018-01-11 DIAGNOSIS — M20.21 HALLUX RIGIDUS OF BOTH FEET: Primary | ICD-10-CM

## 2018-01-11 DIAGNOSIS — M20.22 HALLUX RIGIDUS OF BOTH FEET: Primary | ICD-10-CM

## 2018-01-11 PROCEDURE — 99213 OFFICE O/P EST LOW 20 MIN: CPT | Performed by: PODIATRIST

## 2018-01-11 NOTE — NURSING NOTE
"Chief Complaint   Patient presents with     RECHECK     Pt would like new orthotics        Initial Pulse 80  Ht 6' 4\" (1.93 m)  Wt 200 lb (90.7 kg)  BMI 24.34 kg/m2 Estimated body mass index is 24.34 kg/(m^2) as calculated from the following:    Height as of this encounter: 6' 4\" (1.93 m).    Weight as of this encounter: 200 lb (90.7 kg).  Medication Reconciliation: complete     Quin Hairston MA        "

## 2018-01-11 NOTE — MR AVS SNAPSHOT
After Visit Summary   1/11/2018    Micah Nunez    MRN: [id number]           Patient Information     Date Of Birth          1965        Visit Information        Provider Department      1/11/2018 9:00 AM Cortez Hayward DPM [facility]        Today's Diagnoses     Hallux rigidus of both feet    -  1      Care Instructions    Initial musculoskeletal treatment recommendation:    1.  Stretch the calf muscles as instructed once an hour.  2.  Ice the injured area in the evening; 20 min on/off.  3.  Take antiinflammatory medication as directed.  4.  Massage the soft tissues around the injured area in the morning to loosen the tissue  5.  Wear supportive foot wear and/or arch supports (rigid not cushion).      If no improvement in symptoms within four to six weeks, return to clinic for reevaluation.            Follow-ups after your visit        Additional Services     ORTHOTICS REFERRAL       Please be aware that coverage of these services is subject to the terms and limitations of your health insurance plan.  Call member services at your health plan with any benefit or coverage questions.      Please bring the following to your appointment:    >>   Any x-rays, CTs or MRIs which have been performed.  Contact the facility where they were done to arrange for  prior to your scheduled appointment.  Any new CT, MRI or other procedures ordered by your specialist must be performed at a [facility] facility or coordinated by your clinic's referral office.    >>   List of current medications   >>   This referral request   >>   Any documents/labs given to you for this referral    ==This Referral PRINTS in the [facility] (ORTHOTICS & PROSTHETICS) Central scheduling office ==     The [facility] Central Scheduling staff will contact patient to arrange appointments. Central Scheduling Phone #:  [place]  [phone]     Orthotics: Foot  "Orthotics with first ray extension                  Follow-up notes from your care team     Return if symptoms worsen or fail to improve.      Your next 10 appointments already scheduled     2018 11:30 AM CDT   (Arrive by 11:15 AM)   Return Visit with Bobo Renteria MD   [facility] ([facility] [place])    [address] [facility] [address] [phone]              Who to contact     If you have questions or need follow up information about today's clinic visit or your schedule please contact [facility] directly at [phone].  Normal or non-critical lab and imaging results will be communicated to you by MyChart, letter or phone within 4 business days after the clinic has received the results. If you do not hear from us within 7 days, please contact the clinic through MyChart or phone. If you have a critical or abnormal lab result, we will notify you by phone as soon as possible.  Submit refill requests through [employer] or call your pharmacy and they will forward the refill request to us. Please allow 3 business days for your refill to be completed.          Additional Information About Your Visit        [employer] Information     [employer] lets you send messages to your doctor, view your test results, renew your prescriptions, schedule appointments and more. To sign up, go to [email or web address] . Click on \"Log in\" on the left side of the screen, which will take you to the Welcome page. Then click on \"Sign up Now\" on the right side of the page.     You will be asked to enter the access code listed below, as well as some personal information. Please follow the directions to create your username and password.     Your access code is: [email or web address]  Expires: 4/10/2018 12:41 PM     Your access code will  in 90 days. If you need help or a new code, please call your [place] clinic or [phone].        Care " "EveryWhere ID     This is your Care EveryWhere ID. This could be used by other organizations to access your [place] medical records  [id number]        Your Vitals Were     Pulse Height BMI (Body Mass Index)             80 6' 4\" (1.93 m) 24.34 kg/m2          Blood Pressure from Last 3 Encounters:   01/09/18 143/87   12/08/17 139/78   11/28/17 (!) 138/98    Weight from Last 3 Encounters:   01/11/18 200 lb (90.7 kg)   01/09/18 200 lb (90.7 kg)   12/12/17 188 lb (85.3 kg)              We Performed the Following     ORTHOTICS REFERRAL          Today's Medication Changes          These changes are accurate as of: 1/11/18  9:18 AM.  If you have any questions, ask your nurse or doctor.               These medicines have changed or have updated prescriptions.        Dose/Directions    * order for DME   This may have changed:  Another medication with the same name was added. Make sure you understand how and when to take each.   Used for:  Hallux rigidus, right foot   Changed by:  Cortez Hayward DPM        Equipment being ordered: Dynaflex insert   Quantity:  1 Units   Refills:  0       * order for DME   This may have changed:  You were already taking a medication with the same name, and this prescription was added. Make sure you understand how and when to take each.   Used for:  Hallux rigidus of both feet   Changed by:  Cortez Hayward DPM        Equipment being ordered: Dynaflex insert   Quantity:  2 Units   Refills:  0       * Notice:  This list has 2 medication(s) that are the same as other medications prescribed for you. Read the directions carefully, and ask your doctor or other care provider to review them with you.         Where to get your medicines      Some of these will need a paper prescription and others can be bought over the counter.  Ask your nurse if you have questions.     Bring a paper prescription for each of these medications     order for DME                Primary Care Provider Office " Phone # Fax #    Rajeshphi Vanita White [phone] [address] [address]        Equal Access to Services     FAUSTINA HAN : Hadii aad ku hadysabelgabrielle Maylinsiena, wafernandoda luqmelyssa, qaybta kajodieda ceci, jackson nachoin hayaafartun leonardtorri reginapaytonterrell donato. So [facility] [phone].    ATENCIÓN: Si jovannila español, tiene a espinoza disposición servicios gratuitos de asistencia lingüística. Llame al [phone].    We comply with applicable federal civil rights laws and [place] laws. We do not discriminate on the basis of race, color, national origin, age, disability, sex, sexual orientation, or gender identity.            Thank you!     Thank you for choosing [facility]  for your care. Our goal is always to provide you with excellent care. Hearing back from our patients is one way we can continue to improve our services. Please take a few minutes to complete the written survey that you may receive in the mail after your visit with us. Thank you!             Your Updated Medication List - Protect others around you: Learn how to safely use, store and throw away your medicines at www.disposemymeds.org.          This list is accurate as of: 1/11/18  9:18 AM.  Always use your most recent med list.                   Brand Name Dispense Instructions for use Diagnosis    acetaminophen 325 MG tablet    TYLENOL    30 tablet    Take 2 tablets (650 mg) by mouth every 4 hours as needed for other (mild pain)    Post-op pain       * ADVIL PO      Take 800 mg by mouth daily as needed for moderate pain        * ibuprofen 600 MG tablet    ADVIL/MOTRIN    40 tablet    Take 1 tablet (600 mg) by mouth every 6 hours    Acute midline low back pain without sciatica       lisinopril-hydrochlorothiazide 10-12.5 MG per tablet    PRINZIDE/ZESTORETIC    90 tablet    Take 1 tablet by mouth daily    Benign essential hypertension       * order for DME     1 Units    Equipment being ordered:  Dynaflex insert    Hallux rigidus, right foot       * order for DME     2 Units    Equipment being ordered: Dynaflex insert    Hallux rigidus of both feet       oxymetazoline 0.05 % spray    AFRIN NASAL SPRAY    1 Bottle    Spray 2-3 sprays into both nostrils as needed for other (for nasal bleeding only)    Papilloma of nose       senna-docusate 8.6-50 MG per tablet    SENOKOT-S;PERICOLACE    30 tablet    Take 1-2 tablets by mouth 2 times daily Take while on oral narcotics to prevent or treat constipation.    Post-op pain       triamcinolone 0.1 % ointment    KENALOG    80 g    APPLY SPARINGLY TO AFFECTED AREA THREE TIMES A DAY FOR 14 DAYS    Atopic dermatitis, unspecified type       * Notice:  This list has 4 medication(s) that are the same as other medications prescribed for you. Read the directions carefully, and ask your doctor or other care provider to review them with you.

## 2018-01-11 NOTE — LETTER
1/11/2018         RE: Micah Nunez  [address]    [place] [address]        Dear Colleague,    Thank you for referring your patient, Micah Nunez, to the [facility]. Please see a copy of my visit note below.    Micah returns to the office for reevaluation of the right and left foot.  The patient relates following the instructions given at the last visit with noted less pain.  The patient relates overall more  improvement in pain and function of the right and left foot.  The patient relates no other problems.    PAST MEDICAL HISTORY:   Past Medical History:   Diagnosis Date     Bleeding disorder (H)      Chemical dependency (H)      Gastroesophageal reflux disease      Hypertension      Skin disease        BMI= Body mass index is 24.34 kg/(m^2).          Physical Exam:    General: The patient appears to have a pleasant mental affect.    Lower extremity physical exam:  Neurovascular status is intact with palpable pedal pulses and intact epicritic sensations.  Muscular exam is within normal limits to major muscle groups.  Integument is intact.      One notes decreased edema.  One notes pain on palpation over the second metatarsophalangeal joint bilaterally, left greater than right.  No surrounding erythema noted.         Assessment:      ICD-10-CM    1. Hallux rigidus of both feet M20.21 ORTHOTICS REFERRAL    M20.22 order for DME   2. Capsulitis of left foot M77.52        Plan:  I have explained to Micah about the conditions.  At this time, the patient was instructed on icing, stretching, tissue massage and support.  The patient was referred to [facility] for custom orthotics that will aid in offloading the tension forces to the soft tissues and prevent further inflammation.   The patient was fitted with a Dynaflex insert that will aid in offloading the tension forces to the soft tissues and prevent further  inflammation.  The patient will return in four weeks for reevaluation if the symptoms do not resolve.        Disclaimer: This note consists of symbols derived from keyboarding, dictation and/or voice recognition software. As a result, there may be errors in the script that have gone undetected. Please consider this when interpreting information found in this chart.       DEBI Hayward D.P.M., F.MOE.F.A.S.      Again, thank you for allowing me to participate in the care of your patient.        Sincerely,        Cortez Hayward DPM

## 2018-01-11 NOTE — PROGRESS NOTES
Micah returns to the office for reevaluation of the right and left foot.  The patient relates following the instructions given at the last visit with noted less pain.  The patient relates overall more  improvement in pain and function of the right and left foot.  The patient relates no other problems.    PAST MEDICAL HISTORY:   Past Medical History:   Diagnosis Date     Bleeding disorder (H)      Chemical dependency (H)      Gastroesophageal reflux disease      Hypertension      Skin disease        BMI= Body mass index is 24.34 kg/(m^2).          Physical Exam:    General: The patient appears to have a pleasant mental affect.    Lower extremity physical exam:  Neurovascular status is intact with palpable pedal pulses and intact epicritic sensations.  Muscular exam is within normal limits to major muscle groups.  Integument is intact.      One notes decreased edema.  One notes pain on palpation over the second metatarsophalangeal joint bilaterally, left greater than right.  No surrounding erythema noted.         Assessment:      ICD-10-CM    1. Hallux rigidus of both feet M20.21 ORTHOTICS REFERRAL    M20.22 order for DME   2. Capsulitis of left foot M77.52        Plan:  I have explained to Micah about the conditions.  At this time, the patient was instructed on icing, stretching, tissue massage and support.  The patient was referred to [facility] for custom orthotics that will aid in offloading the tension forces to the soft tissues and prevent further inflammation.   The patient was fitted with a Dynaflex insert that will aid in offloading the tension forces to the soft tissues and prevent further inflammation.  The patient will return in four weeks for reevaluation if the symptoms do not resolve.        Disclaimer: This note consists of symbols derived from keyboarding, dictation and/or voice recognition software. As a result, there may be errors in the script that have gone undetected.  Please consider this when interpreting information found in this chart.       DEBI Hayward D.P.M., PARTH.F.KJS.

## 2018-03-01 DIAGNOSIS — L20.9 ATOPIC DERMATITIS, UNSPECIFIED TYPE: ICD-10-CM

## 2018-03-01 RX ORDER — TRIAMCINOLONE ACETONIDE 1 MG/G
OINTMENT TOPICAL
Qty: 80 G | Refills: 0 | Status: SHIPPED | OUTPATIENT
Start: 2018-03-01 | End: 2018-04-03

## 2018-03-01 NOTE — TELEPHONE ENCOUNTER
"Requested Prescriptions   Pending Prescriptions Disp Refills     triamcinolone (KENALOG) 0.1 % ointment [Pharmacy Med Name: TRIAMCINOLONE ACETONIDE 0.1% OINT]  Last Written Prescription Date:  12/19/17  Last Fill Quantity: 80g,  # refills: 0   Last office visit: 10/30/2017 with prescribing provider:  10/12/17   Future Office Visit:     80 g 0     Sig: APPLY SPARINGLY TO AFFECTED AREA THREE TIMES A DAY FOR 14 DAYS    Topical Steroid Protocol Passed    3/1/2018  9:43 AM       Passed - Patient is age 6 or older       Passed - Authorizing prescriber's most recent note related to this medication read.       Passed - High potency steroid not ordered       Passed - Recent or future visit with authorizing provider's specialty    Patient had office visit in the last year or has a visit in the next 30 days with authorizing provider.  See \"Patient Info\" tab in inbasket, or \"Choose Columns\" in Meds & Orders section of the refill encounter.               "

## 2018-04-03 DIAGNOSIS — L20.9 ATOPIC DERMATITIS, UNSPECIFIED TYPE: ICD-10-CM

## 2018-04-03 RX ORDER — TRIAMCINOLONE ACETONIDE 1 MG/G
OINTMENT TOPICAL
Qty: 80 G | Refills: 0 | Status: SHIPPED | OUTPATIENT
Start: 2018-04-03 | End: 2019-06-03

## 2018-04-03 NOTE — TELEPHONE ENCOUNTER
Prescription approved per [facility] Refill Protocol.    Connie URBANO RN

## 2018-04-04 ENCOUNTER — OFFICE VISIT (OUTPATIENT)
Dept: FAMILY MEDICINE | Facility: CLINIC | Age: 53
End: 2018-04-04
Payer: COMMERCIAL

## 2018-04-04 VITALS
BODY MASS INDEX: 24.58 KG/M2 | SYSTOLIC BLOOD PRESSURE: 133 MMHG | WEIGHT: 191.5 LBS | HEART RATE: 77 BPM | DIASTOLIC BLOOD PRESSURE: 88 MMHG | RESPIRATION RATE: 14 BRPM | HEIGHT: 74 IN | TEMPERATURE: 97.9 F

## 2018-04-04 DIAGNOSIS — Z71.6 ENCOUNTER FOR SMOKING CESSATION COUNSELING: ICD-10-CM

## 2018-04-04 DIAGNOSIS — K92.1 BLOOD IN STOOL: Primary | ICD-10-CM

## 2018-04-04 PROCEDURE — 99213 OFFICE O/P EST LOW 20 MIN: CPT | Performed by: FAMILY MEDICINE

## 2018-04-04 NOTE — MR AVS SNAPSHOT
"              After Visit Summary   4/4/2018    Micah Nunez    MRN: [id number]           Patient Information     Date Of Birth          1965        Visit Information        Provider Department      4/4/2018 10:20 AM Yovany White MD [facility]         Follow-ups after your visit        Your next 10 appointments already scheduled     Apr 24, 2018 10:00 AM CDT   (Arrive by 9:45 AM)   Return Visit with Bobo Renteria MD   [facility] ([facility] [place])    [address] [place] [address] [phone]              Who to contact     If you have questions or need follow up information about today's clinic visit or your schedule please contact [facility] directly at [phone].  Normal or non-critical lab and imaging results will be communicated to you by MyChart, letter or phone within 4 business days after the clinic has received the results. If you do not hear from us within 7 days, please contact the clinic through MyChart or phone. If you have a critical or abnormal lab result, we will notify you by phone as soon as possible.  Submit refill requests through Zentyal or call your pharmacy and they will forward the refill request to us. Please allow 3 business days for your refill to be completed.          Additional Information About Your Visit        MyChart Information     Zentyal lets you send messages to your doctor, view your test results, renew your prescriptions, schedule appointments and more. To sign up, go to [email or web address] . Click on \"Log in\" on the left side of the screen, which will take you to the Welcome page. Then click on \"Sign up Now\" on the right side of the page.     You will be asked to enter the access code listed below, as well as some personal information. Please follow the directions to create your username and password.     Your access code is: " "[email or web address]  Expires: 4/10/2018  1:41 PM     Your access code will  in 90 days. If you need help or a new code, please call your [place] clinic or [phone].        Care EveryWhere ID     This is your Care EveryWhere ID. This could be used by other organizations to access your [place] medical records  [id number]        Your Vitals Were     Pulse Temperature Respirations Height BMI (Body Mass Index)       77 97.9  F (36.6  C) (Tympanic) 14 6' 2.37\" (1.889 m) 24.34 kg/m2        Blood Pressure from Last 3 Encounters:   18 133/88   18 143/87   17 139/78    Weight from Last 3 Encounters:   18 191 lb 8 oz (86.9 kg)   18 200 lb (90.7 kg)   18 200 lb (90.7 kg)              Today, you had the following     No orders found for display       Primary Care Provider Office Phone # Fax #    Yovany White [phone] [address] [facility] [address]        Equal Access to Services     FAUSTINA HAN AH: Hadii evan blackwood Sosiena, wafernandoda luessence, qaybta kaalmada adeclintda, jackson donato. So [facility] [phone].    ATENCIÓN: Si habla español, tiene a espinoza disposición servicios gratuitos de asistencia lingüística. Llame al [phone].    We comply with applicable federal civil rights laws and [place] laws. We do not discriminate on the basis of race, color, national origin, age, disability, sex, sexual orientation, or gender identity.            Thank you!     Thank you for choosing [facility]  for your care. Our goal is always to provide you with excellent care. Hearing back from our patients is one way we can continue to improve our services. Please take a few minutes to complete the written survey that you may receive in the mail after your visit with us. Thank you!             Your Updated Medication List - Protect others around you: Learn how to safely use, store and throw away your medicines at " www.disposemymeds.org.          This list is accurate as of 4/4/18 11:11 AM.  Always use your most recent med list.                   Brand Name Dispense Instructions for use Diagnosis    acetaminophen 325 MG tablet    TYLENOL    30 tablet    Take 2 tablets (650 mg) by mouth every 4 hours as needed for other (mild pain)    Post-op pain       ibuprofen 600 MG tablet    ADVIL/MOTRIN    40 tablet    Take 1 tablet (600 mg) by mouth every 6 hours    Acute midline low back pain without sciatica       lisinopril-hydrochlorothiazide 10-12.5 MG per tablet    PRINZIDE/ZESTORETIC    90 tablet    Take 1 tablet by mouth daily    Benign essential hypertension       order for DME     2 Units    Equipment being ordered: Dynaflex insert    Hallux rigidus of both feet       oxymetazoline 0.05 % spray    AFRIN NASAL SPRAY    1 Bottle    Spray 2-3 sprays into both nostrils as needed for other (for nasal bleeding only)    Papilloma of nose       senna-docusate 8.6-50 MG per tablet    SENOKOT-S;PERICOLACE    30 tablet    Take 1-2 tablets by mouth 2 times daily Take while on oral narcotics to prevent or treat constipation.    Post-op pain       triamcinolone 0.1 % ointment    KENALOG    80 g    APPLY SPARINGLY TO AFFECTED AREA THREE TIMES A DAY FOR 14 DAYS    Atopic dermatitis, unspecified type

## 2018-04-04 NOTE — NURSING NOTE
"Chief Complaint   Patient presents with     Forms     Patient needs some paperwork signed today.  Request for Medical Opinion.     Health Maintenance     FIT due.  Ordered 8/4/17.  Patient given another FIT test today and asked to complete in two weeks.     Rectal Problem     Patient reports blood in stool three weeks ago (second incident).  Believes he was straining to hard.       Initial /88  Pulse 77  Temp 97.9  F (36.6  C) (Tympanic)  Resp 14  Ht 6' 2.37\" (1.889 m)  Wt 191 lb 8 oz (86.9 kg)  BMI 24.34 kg/m2 Estimated body mass index is 24.34 kg/(m^2) as calculated from the following:    Height as of this encounter: 6' 2.37\" (1.889 m).    Weight as of this encounter: 191 lb 8 oz (86.9 kg).    Medication Reconciliation:  complete    Magda Forrester CMA (AAMA)  "

## 2018-04-04 NOTE — PROGRESS NOTES
SUBJECTIVE:   Micah Nunez is a 52 year old male who presents to clinic today for the following health issues:      Chief Complaint   Patient presents with     Forms     Patient needs some paperwork signed today.  Request for Medical Opinion.     Health Maintenance     FIT due.  Ordered 8/4/17.  Patient given another FIT test today and asked to complete in two weeks.     Rectal Problem     Patient reports blood in stool three weeks ago (second incident).  Believes he was straining to hard.         Blood in Stool      Duration: three weeks ago     Description (location/character/radiation): Patient usually has one BM daily, however, three weeks ago passed blood in stool for a two week period.    Intensity:  mild    Accompanying signs and symptoms: bright red blood in stool and on toilet paper.    History (similar episodes/previous evaluation): about five years ago had another episode of blood in stool.    Precipitating or alleviating factors: Rx stool softener daily.    Therapies tried and outcome: see above.     Patient is a 52 yr old male here for recheck of his symptoms and has some forms to fill out . He is needing some assistance from the [facility] while he is sorting his health issues out.   Patient also mentioned that he had blood in his tool a few weeks ago. He did say he was constipated and his stools were hard. No more episodes of blood in stool. Says he has chronic constipation and needs a stool softner on occasions to  Help him go .  Also talked about his struggle to quit smoking and he is willing to quit but will like to quit cold turkey .          Problem list and histories reviewed & adjusted, as indicated.  Additional history: as documented    Patient Active Problem List   Diagnosis     CARDIOVASCULAR SCREENING; LDL GOAL LESS THAN 130     Nasal mass     Papilloma of nose     Chemical dependency (H)     Tobacco use disorder     Benign essential hypertension     Past Surgical History:   Procedure  Laterality Date     ARTHRODESIS FOOT Left 2/17/2015    Procedure: ARTHRODESIS FOOT;  Surgeon: Cortez Hayward DPM;  Location: [facility] OR     ARTHRODESIS TOE(S)  12/20/2013    Procedure: ARTHRODESIS TOE(S);  Arthrodesis first metatarsophalangeal joint left foot;  Surgeon: Cortez Hayward DPM;  Location: [facility] OR     ENDOSCOPIC SINUS SURGERY  1/6/2014    Procedure: ENDOSCOPIC SINUS SURGERY;  Functional Endoscopic Sinus Surgery;  Surgeon: Bobo Renteria MD;  [facility]     ENDOSCOPIC SINUS SURGERY  7/21/2014    Procedure: ENDOSCOPIC SINUS SURGERY;  Surgeon: Bobo Renteria MD;  [facility]     ENDOSCOPIC SINUS SURGERY N/A 4/6/2015    Procedure: ENDOSCOPIC SINUS SURGERY;  Surgeon: Bobo Renteria MD;  Location:  OR     ENDOSCOPIC SINUS SURGERY N/A 8/17/2015    Procedure: ENDOSCOPIC SINUS SURGERY;  Surgeon: Bobo Renteria MD;  Location:  OR     ENT SURGERY       EXCISE NASAL MASS Left 11/6/2017    Procedure: EXCISE NASAL MASS;  Excision of Left Nasal Papilloma;  Surgeon: Bobo Renteria MD;  [facility]     LAPAROSCOPIC HERNIORRHAPHY INGUINAL BILATERAL Bilateral 4/12/2017    Procedure: LAPAROSCOPIC HERNIORRHAPHY INGUINAL BILATERAL;  Surgeon: Shay Mercado MD;  Location: [facility] OR     NASAL ENDOSCOPY Bilateral 2/6/2017    Procedure: NASAL ENDOSCOPY;  Surgeon: Bobo Renteria MD;  [facility] OR     NASAL/SINUS POLYPECTOMY       PE TUBES         Social History   Substance Use Topics     Smoking status: Current Every Day Smoker     Packs/day: 1.00     Years: 30.00     Types: Cigarettes     Start date: 1/1/1980     Smokeless tobacco: Never Used      Comment: 5 per day     Alcohol use No      Comment: QUIT MARCH 2016     Family History   Problem Relation Age of Onset     DIABETES Mother 40     C.A.D. Father 72     Unknown/Adopted No family hx of      Depression No family hx of      Anxiety Disorder No family hx of      Schizophrenia No family hx of      Bipolar Disorder  No family hx of      Suicide No family hx of      Substance Abuse No family hx of      Dementia No family hx of      Hammond Disease No family hx of      Parkinsonism No family hx of      Autism Spectrum Disorder No family hx of      Intellectual Disability (Mental Retardation) No family hx of      MENTAL ILLNESS No family hx of      Bleeding Disorder No family hx of          Current Outpatient Prescriptions   Medication Sig Dispense Refill     triamcinolone (KENALOG) 0.1 % ointment APPLY SPARINGLY TO AFFECTED AREA THREE TIMES A DAY FOR 14 DAYS 80 g 0     senna-docusate (SENOKOT-S;PERICOLACE) 8.6-50 MG per tablet Take 1-2 tablets by mouth 2 times daily Take while on oral narcotics to prevent or treat constipation. 30 tablet 0     lisinopril-hydrochlorothiazide (PRINZIDE/ZESTORETIC) 10-12.5 MG per tablet Take 1 tablet by mouth daily 90 tablet 3     order for DME Equipment being ordered: Dynaflex insert 2 Units 0     acetaminophen (TYLENOL) 325 MG tablet Take 2 tablets (650 mg) by mouth every 4 hours as needed for other (mild pain) 30 tablet 0     oxymetazoline (AFRIN NASAL SPRAY) 0.05 % spray Spray 2-3 sprays into both nostrils as needed for other (for nasal bleeding only) (Patient not taking: Reported on 1/11/2018) 1 Bottle 0     ibuprofen (ADVIL/MOTRIN) 600 MG tablet Take 1 tablet (600 mg) by mouth every 6 hours 40 tablet 0     Allergies   Allergen Reactions     Nkda [No Known Drug Allergies]      BP Readings from Last 3 Encounters:   04/04/18 133/88   01/09/18 143/87   12/08/17 139/78    Wt Readings from Last 3 Encounters:   04/04/18 191 lb 8 oz (86.9 kg)   01/11/18 200 lb (90.7 kg)   01/09/18 200 lb (90.7 kg)                  Labs reviewed in EPIC    Reviewed and updated as needed this visit by clinical staff  Tobacco  Allergies       Reviewed and updated as needed this visit by Provider         ROS:  Constitutional, HEENT, cardiovascular, pulmonary, gi and gu systems are negative, except as otherwise  "noted.    OBJECTIVE:     /88  Pulse 77  Temp 97.9  F (36.6  C) (Tympanic)  Resp 14  Ht 6' 2.37\" (1.889 m)  Wt 191 lb 8 oz (86.9 kg)  BMI 24.34 kg/m2  Body mass index is 24.34 kg/(m^2).  GENERAL: healthy, alert and no distress  EYES: Eyes grossly normal to inspection, PERRL and conjunctivae and sclerae normal  HENT: ear canals and TM's normal, nose and mouth without ulcers or lesions  NECK: no adenopathy, no asymmetry, masses, or scars and thyroid normal to palpation  RESP: lungs clear to auscultation - no rales, rhonchi or wheezes  CV: regular rate and rhythm, normal S1 S2, no S3 or S4, no murmur, click or rub, no peripheral edema and peripheral pulses strong  MS: no gross musculoskeletal defects noted, no edema    Diagnostic Test Results:  none     ASSESSMENT/PLAN:       (K92.1) Blood in stool  (primary encounter diagnosis)  Comment: Had one episode. He thinks it was from hard stools. He is due for a FIT test   Plan: FIT test provided     (Z71.6,  Z72.0) Encounter for smoking cessation counseling  Comment: Patient says he will like to quit cold turkey   Plan: As above    Forms filled out for patient     FUTURE APPOINTMENTS:       - Follow-up visit as needed    Yovany White MD  [facility]  "

## 2018-04-21 ENCOUNTER — HOSPITAL ENCOUNTER (EMERGENCY)
Facility: CLINIC | Age: 53
Discharge: HOME | End: 2018-04-21
Attending: EMERGENCY MEDICINE | Admitting: EMERGENCY MEDICINE
Payer: COMMERCIAL

## 2018-04-21 ENCOUNTER — APPOINTMENT (OUTPATIENT)
Dept: GENERAL RADIOLOGY | Facility: CLINIC | Age: 53
End: 2018-04-21
Attending: EMERGENCY MEDICINE
Payer: COMMERCIAL

## 2018-04-21 VITALS
DIASTOLIC BLOOD PRESSURE: 85 MMHG | SYSTOLIC BLOOD PRESSURE: 171 MMHG | HEART RATE: 77 BPM | RESPIRATION RATE: 16 BRPM | TEMPERATURE: 98 F | OXYGEN SATURATION: 97 % | HEIGHT: 75 IN

## 2018-04-21 DIAGNOSIS — S99.921A TOE INJURY, RIGHT, INITIAL ENCOUNTER: ICD-10-CM

## 2018-04-21 PROCEDURE — 73630 X-RAY EXAM OF FOOT: CPT | Mod: RT

## 2018-04-21 PROCEDURE — 99283 EMERGENCY DEPT VISIT LOW MDM: CPT

## 2018-04-21 PROCEDURE — 99283 EMERGENCY DEPT VISIT LOW MDM: CPT | Mod: Z6 | Performed by: EMERGENCY MEDICINE

## 2018-04-21 NOTE — ED AVS SNAPSHOT
[facility] Emergency Department    [address] [address]    Phone:  [phone]    Fax:  [phone]                                       Micah Nunez   MRN: [id number]    Department:  [facility] Emergency Department   Date of Visit:  4/21/2018           After Visit Summary Signature Page     I have received my discharge instructions, and my questions have been answered. I have discussed any challenges I see with this plan with the nurse or doctor.    ..........................................................................................................................................  Patient/Patient Representative Signature      ..........................................................................................................................................  Patient Representative Print Name and Relationship to Patient    ..................................................               ................................................  Date                                            Time    ..........................................................................................................................................  Reviewed by Signature/Title    ...................................................              ..............................................  Date                                                            Time

## 2018-04-21 NOTE — ED AVS SNAPSHOT
[facility] Emergency Department    [address] [address]    Phone:  [phone]    Fax:  [phone]                                       Micah Nunez   MRN: [id number]    Department:  [facility] Emergency Department   Date of Visit:  4/21/2018           Patient Information     Date Of Birth          1965        Your diagnoses for this visit were:     Toe injury, right, initial encounter- 2nd toe        You were seen by Philipp Gravse DO.        Discharge Instructions       Activity and ambulation as tolerated  Elevate to reduce swelling  Ice 20-30 minutes 3 times daily to reduce swelling      Your next 10 appointments already scheduled     Apr 24, 2018 10:00 AM CDT   (Arrive by 9:45 AM)   Return Visit with Bobo Renteria MD   [facility] ([facility] [place])    [address] [place] [id number] [phone]            Apr 26, 2018 10:00 AM CDT   New Visit with Cortez Hayward DPM   [facility] ([facility])    [address]  [address]  [facility] [address] [phone]              24 Hour Appointment Hotline       To make an appointment at any [facility], call [phone]). If you don't have a family doctor or clinic, we will help you find one. [place] clinics are conveniently located to serve the needs of you and your family.             Review of your medicines      Our records show that you are taking the medicines listed below. If these are incorrect, please call your family doctor or clinic.        Dose / Directions Last dose taken    acetaminophen 325 MG tablet   Commonly known as:  TYLENOL   Dose:  650 mg   Quantity:  30 tablet        Take 2 tablets (650 mg) by mouth every 4 hours as needed for other (mild pain)   Refills:  0        ibuprofen 600 MG tablet   Commonly known as:  ADVIL/MOTRIN   Dose:  600  mg   Quantity:  40 tablet        Take 1 tablet (600 mg) by mouth every 6 hours   Refills:  0        lisinopril-hydrochlorothiazide 10-12.5 MG per tablet   Commonly known as:  PRINZIDE/ZESTORETIC   Dose:  1 tablet   Quantity:  90 tablet        Take 1 tablet by mouth daily   Refills:  3        order for DME   Quantity:  2 Units        Equipment being ordered: Dynaflex insert   Refills:  0        oxymetazoline 0.05 % spray   Commonly known as:  AFRIN NASAL SPRAY   Dose:  2-3 spray   Quantity:  1 Bottle        Spray 2-3 sprays into both nostrils as needed for other (for nasal bleeding only)   Refills:  0        senna-docusate 8.6-50 MG per tablet   Commonly known as:  SENOKOT-S;PERICOLACE   Dose:  1-2 tablet   Quantity:  30 tablet        Take 1-2 tablets by mouth 2 times daily Take while on oral narcotics to prevent or treat constipation.   Refills:  0        triamcinolone 0.1 % ointment   Commonly known as:  KENALOG   Quantity:  80 g        APPLY SPARINGLY TO AFFECTED AREA THREE TIMES A DAY FOR 14 DAYS   Refills:  0                Procedures and tests performed during your visit     XR Foot Right G/E 3 Views      Orders Needing Specimen Collection     None      Pending Results     Date and Time Order Name Status Description    4/21/2018 2122 XR Foot Right G/E 3 Views Preliminary             Pending Culture Results     No orders found from 4/19/2018 to 4/22/2018.            Pending Results Instructions     If you had any lab results that were not finalized at the time of your Discharge, you can call the ED Lab Result RN at [phone]. You will be contacted by this team for any positive Lab results or changes in treatment. The nurses are available 7 days a week from 10A to 6:30P.  You can leave a message 24 hours per day and they will return your call.        Test Results From Your Hospital Stay        4/21/2018  9:53 PM      Narrative     FOOT THREE VIEWS RIGHT  4/21/2018 9:49 PM     HISTORY: Second toe injury.  "    COMPARISON: 2017    FINDINGS: Moderate first MTP degenerative change. The Lisfranc joint  appears unremarkable in these views. The plantar arch is unremarkable  in these views. Possible fracture of the dorsal aspect proximal  phalanx of the first digit best seen on oblique view. There are no  worrisome bony lesions.        Impression     IMPRESSION: Possible fracture of the base of the proximal phalanx of  first digit, there are some cortical irregularities here and I cannot  exclude that this is chronic.                Thank you for choosing [facility]       Thank you for choosing [facility] for your care. Our goal is always to provide you with excellent care. Hearing back from our patients is one way we can continue to improve our services. Please take a few minutes to complete the written survey that you may receive in the mail after you visit with us. Thank you!        [employer] Information     [employer] lets you send messages to your doctor, view your test results, renew your prescriptions, schedule appointments and more. To sign up, go to [email or web address] . Click on \"Log in\" on the left side of the screen, which will take you to the Welcome page. Then click on \"Sign up Now\" on the right side of the page.     You will be asked to enter the access code listed below, as well as some personal information. Please follow the directions to create your username and password.     Your access code is: [email or web address]  Expires: 2018 10:01 PM     Your access code will  in 90 days. If you need help or a new code, please call your [facility] clinic or [phone].        Care EveryWhere ID     This is your Care EveryWhere ID. This could be used by other organizations to access your [facility] medical records  [id number]        Equal Access to Services     FAUSTINA HAN AH: Courtney Costello, mumtaz estevez, jackson bernardo. So [facility] " [phone].    ATENCIÓN: Si habla español, tiene a espinoza disposición servicios gratuitos de asistencia lingüística. Llame al [phone].    We comply with applicable federal civil rights laws and [place] laws. We do not discriminate on the basis of race, color, national origin, age, disability, sex, sexual orientation, or gender identity.            After Visit Summary       This is your record. Keep this with you and show to your community pharmacist(s) and doctor(s) at your next visit.

## 2018-04-22 NOTE — DISCHARGE INSTRUCTIONS
Activity and ambulation as tolerated  Elevate to reduce swelling  Ice 20-30 minutes 3 times daily to reduce swelling

## 2018-04-22 NOTE — ED NOTES
Dropped a shelf on Right second toe yesterday, has had problems with his foot joints in the past. Has an apt with podiatrist on Thursday but reports the pain is too bad to wait until then. CMS is intact. Very tender to palpation

## 2018-04-22 NOTE — ED PROVIDER NOTES
History     Chief Complaint   Patient presents with     Foot Pain     dropped a shelf on 2nd right toe yesterday     HPI  Micah Nunez is a 52 year old male who presents with right second toe pain.  Dropped a metal shelf on the toe yesterday.  Rates pain 7/10 intensity.  Is able to bear weight but transitioning more weight to the heel versus the toe forefoot area.  No prior injury or surgery to the right foot but has had numerous osteotomies on the left foot.  Nondiabetic.  No history for neuropathy.    Problem List:    Patient Active Problem List    Diagnosis Date Noted     Benign essential hypertension 10/18/2017     Priority: Medium     Tobacco use disorder 08/12/2015     Priority: Medium     Chemical dependency (H) 07/27/2015     Priority: Medium     Papilloma of nose 03/10/2014     Priority: Medium     Nasal mass 12/17/2013     Priority: Medium     CARDIOVASCULAR SCREENING; LDL GOAL LESS THAN 130 12/11/2013     Priority: Medium        Past Medical History:    Past Medical History:   Diagnosis Date     Bleeding disorder (H)      Chemical dependency (H)      Gastroesophageal reflux disease      Hypertension      Skin disease        Past Surgical History:    Past Surgical History:   Procedure Laterality Date     ARTHRODESIS FOOT Left 2/17/2015    Procedure: ARTHRODESIS FOOT;  Surgeon: Cortez Hayward DPM;  Location: [facility]     ARTHRODESIS TOE(S)  12/20/2013    Procedure: ARTHRODESIS TOE(S);  Arthrodesis first metatarsophalangeal joint left foot;  Surgeon: Cortez Hayward DPM;  Location: [facility]     ENDOSCOPIC SINUS SURGERY  1/6/2014    Procedure: ENDOSCOPIC SINUS SURGERY;  Functional Endoscopic Sinus Surgery;  Surgeon: Bobo Renteria MD;  Location:  OR     ENDOSCOPIC SINUS SURGERY  7/21/2014    Procedure: ENDOSCOPIC SINUS SURGERY;  Surgeon: Bobo Renteria MD;  Location:  OR     ENDOSCOPIC SINUS SURGERY N/A 4/6/2015    Procedure: ENDOSCOPIC SINUS SURGERY;  Surgeon: Bobo Renteria  MD G[place];  Location: [facility]     ENDOSCOPIC SINUS SURGERY N/A 8/17/2015    Procedure: ENDOSCOPIC SINUS SURGERY;  Surgeon: Bobo Renteria MD;  [facility]: [facility] OR     ENT SURGERY       EXCISE NASAL MASS Left 11/6/2017    Procedure: EXCISE NASAL MASS;  Excision of Left Nasal Papilloma;  Surgeon: Bobo Renteria MD;  [facility]     LAPAROSCOPIC HERNIORRHAPHY INGUINAL BILATERAL Bilateral 4/12/2017    Procedure: LAPAROSCOPIC HERNIORRHAPHY INGUINAL BILATERAL;  Surgeon: Shay Mercado MD;  Location: [facility]     NASAL ENDOSCOPY Bilateral 2/6/2017    Procedure: NASAL ENDOSCOPY;  Surgeon: Bobo Renteria MD;  [facility]     NASAL/SINUS POLYPECTOMY       PE TUBES         Family History:    Family History   Problem Relation Age of Onset     DIABETES Mother 40     C.A.D. Father 72     Unknown/Adopted No family hx of      Depression No family hx of      Anxiety Disorder No family hx of      Schizophrenia No family hx of      Bipolar Disorder No family hx of      Suicide No family hx of      Substance Abuse No family hx of      Dementia No family hx of      Homero Disease No family hx of      Parkinsonism No family hx of      Autism Spectrum Disorder No family hx of      Intellectual Disability (Mental Retardation) No family hx of      MENTAL ILLNESS No family hx of      Bleeding Disorder No family hx of        Social History:  Marital Status:  Single [1]  Social History   Substance Use Topics     Smoking status: Current Every Day Smoker     Packs/day: 1.00     Years: 30.00     Types: Cigarettes     Start date: 1/1/1980     Smokeless tobacco: Never Used      Comment: 5 per day     Alcohol use No      Comment: QUIT MARCH 2016        Medications:      acetaminophen (TYLENOL) 325 MG tablet   ibuprofen (ADVIL/MOTRIN) 600 MG tablet   lisinopril-hydrochlorothiazide (PRINZIDE/ZESTORETIC) 10-12.5 MG per tablet   order for DME   oxymetazoline (AFRIN NASAL SPRAY) 0.05 % spray   senna-docusate  "(SENOKOT-S;PERICOLACE) 8.6-50 MG per tablet   triamcinolone (KENALOG) 0.1 % ointment         Review of Systems  All pertinent positives and negatives are documented in the HPI.  All others reviewed and are negative .    Physical Exam   BP: 171/85  Pulse: 77  Temp: 98  F (36.7  C)  Resp: 16  Height: 190.5 cm (6' 3\")  SpO2: 97 %      Physical Exam  Vital signs reviewed  Right to left foot comparison shows left foot to be shorter than the right foot from previous osteotomies.  Right second toe is erythematous and swollen.  No ecchymosis  No injury to the other toes or foot.  No break in skin  No sign for cellulitis  No nail injury in the second toe  ED Course     ED Course     Procedures          Results for orders placed or performed during the hospital encounter of 04/21/18   XR Foot Right G/E 3 Views    Narrative    FOOT THREE VIEWS RIGHT  4/21/2018 9:49 PM     HISTORY: Second toe injury.     COMPARISON: April 20, 2017    FINDINGS: Moderate first MTP degenerative change. The Lisfranc joint  appears unremarkable in these views. The plantar arch is unremarkable  in these views. Possible fracture of the dorsal aspect proximal  phalanx of the first digit best seen on oblique view. There are no  worrisome bony lesions.      Impression    IMPRESSION: Possible fracture of the base of the proximal phalanx of  first digit, there are some cortical irregularities here and I cannot  exclude that this is chronic.       Assessments & Plan (with Medical Decision Making)  52-year-old male presents with a injury to the second toe right foot.  Dropped a metal shelf on the toe yesterday.  Examination noted soft tissue swelling.  No break in the skin.  Sensation intact.  X-ray showed no acute osseous injury or joint injury.  X-ray did note some moderate first MTP degenerative change.  There also was a questionable fracture involving the dorsal aspect of the proximal phalanx little reexamination of the foot did not elicit any pain over " this area suspect this is a chronic finding.  Patient remains ambulatory.  Advised ice to reduce swelling.  Elevate when possible.     I have reviewed the nursing notes.    I have reviewed the findings, diagnosis, plan and need for follow up with the patient.      New Prescriptions    No medications on file       Final diagnoses:   Toe injury, right, initial encounter- 2nd toe       4/21/2018   [facility] EMERGENCY DEPARTMENT     Philipp Graves DO  04/21/18 9822

## 2018-04-24 ENCOUNTER — OFFICE VISIT (OUTPATIENT)
Dept: OTOLARYNGOLOGY | Facility: CLINIC | Age: 53
End: 2018-04-24
Payer: COMMERCIAL

## 2018-04-24 VITALS — WEIGHT: 196 LBS | HEIGHT: 74 IN | BODY MASS INDEX: 25.15 KG/M2

## 2018-04-24 DIAGNOSIS — J34.89 NASAL MASS: Primary | ICD-10-CM

## 2018-04-24 ASSESSMENT — PAIN SCALES - GENERAL: PAINLEVEL: NO PAIN (0)

## 2018-04-24 NOTE — NURSING NOTE
"Chief Complaint   Patient presents with     RECHECK     follow up nasal papilloma      Height 1.88 m (6' 2.02\"), weight 88.9 kg (196 lb).    Navjot Toney LPN    "

## 2018-04-24 NOTE — MR AVS SNAPSHOT
After Visit Summary   2018    Micah Nunez    MRN: [id number]           Patient Information     Date Of Birth          1965        Visit Information        Provider Department      2018 10:00 AM Bobo Renteria MD [facility] Ear Nose and Throat        Care Instructions    Patient will call us to schedule a lefty endoscopic removal of papillomas             Follow-ups after your visit        Your next 10 appointments already scheduled     2018 10:00 AM CDT   New Visit with Cortez Hayward DPM   [facility] ([facility])    [address]  [address]  [place] [address]   [phone]              Who to contact     Please call your clinic at [phone] to:    Ask questions about your health    Make or cancel appointments    Discuss your medicines    Learn about your test results    Speak to your doctor            Additional Information About Your Visit        MyChart Information     XDCt is an electronic gateway that provides easy, online access to your medical records. With AmberAds, you can request a clinic appointment, read your test results, renew a prescription or communicate with your care team.     To sign up for XDCt visit the website at [email or web address]   You will be asked to enter the access code listed below, as well as some personal information. Please follow the directions to create your username and password.     Your access code is: [email or web address]  Expires: 2018 10:01 PM     Your access code will  in 90 days. If you need help or a new code, please contact your [facility] or call [phone] for assistance.        Care EveryWhere ID     This is your Care EveryWhere ID. This could be used by other organizations to access your [place] medical records  [id number]        Your Vitals Were     Height BMI (Body Mass Index)                1.88 m  "(6' 2.02\") 25.15 kg/m2           Blood Pressure from Last 3 Encounters:   04/21/18 171/85   04/04/18 133/88   01/09/18 143/87    Weight from Last 3 Encounters:   04/24/18 88.9 kg (196 lb)   04/04/18 86.9 kg (191 lb 8 oz)   01/11/18 90.7 kg (200 lb)              Today, you had the following     No orders found for display       Primary Care Provider Office Phone # Fax #    [facility] Vanita White [phone] [address] [facility] [address]        Equal Access to Services     [facility] HospitalEDEN : Courtney Costello, mumtaz estevez, chencho ornelas, jackson arnold . So [facility] [phone].    ATENCIÓN: Si jovannila darby, tiene a espinoza disposición servicios gratuitos de asistencia lingüística. Llame al [phone].    We comply with applicable federal civil rights laws and [place] laws. We do not discriminate on the basis of race, color, national origin, age, disability, sex, sexual orientation, or gender identity.            Thank you!     Thank you for choosing [facility] E[facility] NOSE AND THROAT  for your care. Our goal is always to provide you with excellent care. Hearing back from our patients is one way we can continue to improve our services. Please take a few minutes to complete the written survey that you may receive in the mail after your visit with us. Thank you!             Your Updated Medication List - Protect others around you: Learn how to safely use, store and throw away your medicines at www.disposemymeds.org.          This list is accurate as of 4/24/18 10:46 AM.  Always use your most recent med list.                   Brand Name Dispense Instructions for use Diagnosis    acetaminophen 325 MG tablet    TYLENOL    30 tablet    Take 2 tablets (650 mg) by mouth every 4 hours as needed for other (mild pain)    Post-op pain       ibuprofen 600 MG tablet    ADVIL/MOTRIN    40 tablet    Take 1 tablet (600 mg) by mouth every 6 hours    Acute " midline low back pain without sciatica       lisinopril-hydrochlorothiazide 10-12.5 MG per tablet    PRINZIDE/ZESTORETIC    90 tablet    Take 1 tablet by mouth daily    Benign essential hypertension       order for DME     2 Units    Equipment being ordered: Dynaflex insert    Hallux rigidus of both feet       oxymetazoline 0.05 % spray    AFRIN NASAL SPRAY    1 Bottle    Spray 2-3 sprays into both nostrils as needed for other (for nasal bleeding only)    Papilloma of nose       senna-docusate 8.6-50 MG per tablet    SENOKOT-S;PERICOLACE    30 tablet    Take 1-2 tablets by mouth 2 times daily Take while on oral narcotics to prevent or treat constipation.    Post-op pain       triamcinolone 0.1 % ointment    KENALOG    80 g    APPLY SPARINGLY TO AFFECTED AREA THREE TIMES A DAY FOR 14 DAYS    Atopic dermatitis, unspecified type

## 2018-04-24 NOTE — LETTER
4/24/2018       RE: Micah Nunez  [address]    [place] [address]     Dear Colleague,    Thank you for referring your patient, Micah Nunez, to the [facility] EAR NOSE AND THROAT at [facility]. Please see a copy of my visit note below.    HISTORY OF PRESENT ILLNESS:  Micah Nunez is 52 years of age.  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  He has required multiple resections, but we have not wanted to do a destructive procedure inside of his nose due to the excessive crusting and things of this nature.  He has never had anything like cancer.  He is a heavy smoker.  He has tried many times to quit in the past.   He has no new complaints at the present time today except he is having a little bit of trouble breathing on the left side.      PHYSICAL EXAMINATION:  The patient is alert, oriented x3 and pleasant.  Skin of the face, lips, and neck on him is quite normal.  He has a tan today.  He was recently in [place].  No actinic changes.  Oral cavity and oropharynx is otherwise clear.  Neck exam shows no masses, adenopathy or thyromegaly.  On today's exam with an endoscope of the right nasal endoscopy is normal.  On the left side, there is clearly some papillomatosis that is blocking his nasal valve on the left side.  It is not ulcerating and it is not with poor appearing features.  It looks otherwise reasonable, but it does block his nasal valve.      ASSESSMENT:  Patient with left nasal papillomatosis. inverting papilloma.      PLAN:  We will remove this in the operating room at his convenience after he sees his family doctor about smoking cessation.      FO/ms         Again, thank you for allowing me to participate in the care of your patient.      Sincerely,    Bobo Renteria MD

## 2018-04-26 ENCOUNTER — OFFICE VISIT (OUTPATIENT)
Dept: PODIATRY | Facility: CLINIC | Age: 53
End: 2018-04-26
Payer: COMMERCIAL

## 2018-04-26 VITALS
BODY MASS INDEX: 25.15 KG/M2 | HEART RATE: 78 BPM | HEIGHT: 74 IN | DIASTOLIC BLOOD PRESSURE: 96 MMHG | WEIGHT: 196 LBS | SYSTOLIC BLOOD PRESSURE: 149 MMHG

## 2018-04-26 DIAGNOSIS — M20.21 HALLUX RIGIDUS, RIGHT FOOT: Primary | ICD-10-CM

## 2018-04-26 PROCEDURE — 99213 OFFICE O/P EST LOW 20 MIN: CPT | Performed by: PODIATRIST

## 2018-04-26 NOTE — PROGRESS NOTES
Micah returns to the office for reevaluation of the right foot.  The patient relates following the instructions given at the last visit with noted more pain.  The patient relates overall less  improvement in pain and function of the right foot.  The patient relates injuring the right foot recently.    PAST MEDICAL HISTORY:   Past Medical History:   Diagnosis Date     Benign essential hypertension 10/18/2017     Bleeding disorder (H)      CARDIOVASCULAR SCREENING; LDL GOAL LESS THAN 130 12/11/2013     Chemical dependency (H)      Gastroesophageal reflux disease      Hypertension      Nasal mass 12/17/2013     Papilloma of nose 3/10/2014     Skin disease        BMI= Body mass index is 25.15 kg/(m^2).        Physical Exam:    General: The patient appears to have a pleasant mental affect.    Lower extremity physical exam:  Neurovascular status is intact with palpable pedal pulses and intact epicritic sensations.  Muscular exam is within normal limits to major muscle groups.  Integument is intact.      One notes decreased edema.  One notes pain on palpation over the dorsal aspect of the first metatarsophalangeal joint on the right.    Radiograph evaluation including weightbearing AP, lateral and medial oblique views of the right foot reveals a fracture of the dorsal medial aspect of the proximal phalanx of the great toe.    Assessment:      ICD-10-CM    1. Hallux rigidus, right foot M20.21        Plan:  I have explained to Micah about the conditions.  At this time, the patient was instructed to wear his Dynaflex insert that will aid in offloading the tension forces to the soft tissues and prevent further inflammation.    The patient will return in four weeks for reevaluation if the symptoms do not resolve.      Disclaimer: This note consists of symbols derived from keyboarding, dictation and/or voice recognition software. As a result, there may be errors in the script that have gone undetected. Please consider this  when interpreting information found in this chart.       DEBI Hayward D.P.M., PARTH.NEEL.

## 2018-04-26 NOTE — NURSING NOTE
"Chief Complaint   Patient presents with     RECHECK     Right foot pain. 300# vikas fell on toe. XR done 4/21/18. Patient has questions about left foot       Initial BP (!) 149/96 (BP Location: Right arm, Patient Position: Sitting, Cuff Size: Adult Regular)  Pulse 78  Ht 6' 2.02\" (1.88 m)  Wt 196 lb (88.9 kg)  BMI 25.15 kg/m2 Estimated body mass index is 25.15 kg/(m^2) as calculated from the following:    Height as of this encounter: 6' 2.02\" (1.88 m).    Weight as of this encounter: 196 lb (88.9 kg).  Medication Reconciliation: complete     Carlos HERCULES CMA      "

## 2018-04-26 NOTE — LETTER
4/26/2018         RE: Micah Nunez  [address]    [place] [address]        Dear Colleague,    Thank you for referring your patient, Micah Nunez, to the [place] [facility]. Please see a copy of my visit note below.    Micah returns to the office for reevaluation of the right foot.  The patient relates following the instructions given at the last visit with noted more pain.  The patient relates overall less  improvement in pain and function of the right foot.  The patient relates injuring the right foot recently.    PAST MEDICAL HISTORY:   Past Medical History:   Diagnosis Date     Benign essential hypertension 10/18/2017     Bleeding disorder (H)      CARDIOVASCULAR SCREENING; LDL GOAL LESS THAN 130 12/11/2013     Chemical dependency (H)      Gastroesophageal reflux disease      Hypertension      Nasal mass 12/17/2013     Papilloma of nose 3/10/2014     Skin disease        BMI= Body mass index is 25.15 kg/(m^2).        Physical Exam:    General: The patient appears to have a pleasant mental affect.    Lower extremity physical exam:  Neurovascular status is intact with palpable pedal pulses and intact epicritic sensations.  Muscular exam is within normal limits to major muscle groups.  Integument is intact.      One notes decreased edema.  One notes pain on palpation over the dorsal aspect of the first metatarsophalangeal joint on the right.    Radiograph evaluation including weightbearing AP, lateral and medial oblique views of the right foot reveals a fracture of the dorsal medial aspect of the proximal phalanx of the great toe.    Assessment:      ICD-10-CM    1. Hallux rigidus, right foot M20.21        Plan:  I have explained to Micah about the conditions.  At this time, the patient was instructed to wear his Dynaflex insert that will aid in offloading the tension forces to the soft tissues and prevent further inflammation.    The patient  will return in four weeks for reevaluation if the symptoms do not resolve.      Disclaimer: This note consists of symbols derived from keyboarding, dictation and/or voice recognition software. As a result, there may be errors in the script that have gone undetected. Please consider this when interpreting information found in this chart.       DEBI Hayward D.P.M., F.MOE.F.A.S.      Again, thank you for allowing me to participate in the care of your patient.        Sincerely,        Cortez Hayward DPM

## 2018-04-26 NOTE — MR AVS SNAPSHOT
"              After Visit Summary   2018    Micah Nunez    MRN: [id number]           Patient Information     Date Of Birth          1965        Visit Information        Provider Department      2018 10:00 AM Cortez Hayward DPM [facility]        Today's Diagnoses     Hallux rigidus, right foot    -  1       Follow-ups after your visit        Who to contact     If you have questions or need follow up information about today's clinic visit or your schedule please contact [facility] directly at [phone].  Normal or non-critical lab and imaging results will be communicated to you by IP Streethart, letter or phone within 4 business days after the clinic has received the results. If you do not hear from us within 7 days, please contact the clinic through IP Streethart or phone. If you have a critical or abnormal lab result, we will notify you by phone as soon as possible.  Submit refill requests through Yorxs or call your pharmacy and they will forward the refill request to us. Please allow 3 business days for your refill to be completed.          Additional Information About Your Visit        MyChart Information     Yorxs lets you send messages to your doctor, view your test results, renew your prescriptions, schedule appointments and more. To sign up, go to [email or web address] . Click on \"Log in\" on the left side of the screen, which will take you to the Welcome page. Then click on \"Sign up Now\" on the right side of the page.     You will be asked to enter the access code listed below, as well as some personal information. Please follow the directions to create your username and password.     Your access code is: [email or web address]  Expires: 2018 10:01 PM     Your access code will  in 90 days. If you need help or a new code, please call your [place] clinic or [phone].        Care EveryWhere ID     This is your Care " "EveryWhere ID. This could be used by other organizations to access your [place] medical records  [id number]        Your Vitals Were     Pulse Height BMI (Body Mass Index)             78 6' 2.02\" (1.88 m) 25.15 kg/m2          Blood Pressure from Last 3 Encounters:   04/26/18 (!) 149/96   04/21/18 171/85   04/04/18 133/88    Weight from Last 3 Encounters:   04/26/18 196 lb (88.9 kg)   04/24/18 196 lb (88.9 kg)   04/04/18 191 lb 8 oz (86.9 kg)              Today, you had the following     No orders found for display       Primary Care Provider Office Phone # Fax #    Yovany White [phone] [address] [facility] [address] [address]        Equal Access to Services     FAUSTINA HAN : Hadii evan blackwood Sosiena, waaxda luqadaha, qaybta kaalmada adetorriyada, jackson arnold . So [facility] [phone].    ATENCIÓN: Si habla español, tiene a espinoza disposición servicios gratuitos de asistencia lingüística. Llame al [phone].    We comply with applicable federal civil rights laws and [place] laws. We do not discriminate on the basis of race, color, national origin, age, disability, sex, sexual orientation, or gender identity.            Thank you!     Thank you for choosing [place] [facility]  for your care. Our goal is always to provide you with excellent care. Hearing back from our patients is one way we can continue to improve our services. Please take a few minutes to complete the written survey that you may receive in the mail after your visit with us. Thank you!             Your Updated Medication List - Protect others around you: Learn how to safely use, store and throw away your medicines at www.disposemymeds.org.          This list is accurate as of 4/26/18 11:59 PM.  Always use your most recent med list.                   Brand Name Dispense Instructions for use Diagnosis    acetaminophen 325 MG tablet    TYLENOL    30 tablet    Take " 2 tablets (650 mg) by mouth every 4 hours as needed for other (mild pain)    Post-op pain       ibuprofen 600 MG tablet    ADVIL/MOTRIN    40 tablet    Take 1 tablet (600 mg) by mouth every 6 hours    Acute midline low back pain without sciatica       lisinopril-hydrochlorothiazide 10-12.5 MG per tablet    PRINZIDE/ZESTORETIC    90 tablet    Take 1 tablet by mouth daily    Benign essential hypertension       order for DME     2 Units    Equipment being ordered: Dynaflex insert    Hallux rigidus of both feet       oxymetazoline 0.05 % spray    AFRIN NASAL SPRAY    1 Bottle    Spray 2-3 sprays into both nostrils as needed for other (for nasal bleeding only)    Papilloma of nose       triamcinolone 0.1 % ointment    KENALOG    80 g    APPLY SPARINGLY TO AFFECTED AREA THREE TIMES A DAY FOR 14 DAYS    Atopic dermatitis, unspecified type

## 2018-05-01 ENCOUNTER — TELEPHONE (OUTPATIENT)
Dept: OTOLARYNGOLOGY | Facility: CLINIC | Age: 53
End: 2018-05-01

## 2018-05-01 DIAGNOSIS — D23.39 PAPILLOMA OF NOSE: Primary | ICD-10-CM

## 2018-05-01 NOTE — TELEPHONE ENCOUNTER
Called patient and scheduled surgery on 5/21/18 @ [facility].  Patient will schedule preop H&P with PCP. Surgery packet will be mailed.

## 2018-05-14 ENCOUNTER — OFFICE VISIT (OUTPATIENT)
Dept: FAMILY MEDICINE | Facility: CLINIC | Age: 53
End: 2018-05-14
Payer: COMMERCIAL

## 2018-05-14 VITALS
WEIGHT: 193 LBS | SYSTOLIC BLOOD PRESSURE: 133 MMHG | DIASTOLIC BLOOD PRESSURE: 85 MMHG | BODY MASS INDEX: 24.77 KG/M2 | TEMPERATURE: 97.4 F | RESPIRATION RATE: 16 BRPM | HEART RATE: 72 BPM

## 2018-05-14 DIAGNOSIS — Z12.11 SPECIAL SCREENING FOR MALIGNANT NEOPLASMS, COLON: ICD-10-CM

## 2018-05-14 DIAGNOSIS — L30.9 ECZEMA, UNSPECIFIED TYPE: ICD-10-CM

## 2018-05-14 DIAGNOSIS — Z01.818 PREOP GENERAL PHYSICAL EXAM: Primary | ICD-10-CM

## 2018-05-14 DIAGNOSIS — J33.9 NASAL POLYP: ICD-10-CM

## 2018-05-14 LAB
ANION GAP SERPL CALCULATED.3IONS-SCNC: 4 MMOL/L (ref 3–14)
BASOPHILS # BLD AUTO: 0 10E9/L (ref 0–0.2)
BASOPHILS NFR BLD AUTO: 0.4 %
BUN SERPL-MCNC: 11 MG/DL (ref 7–30)
CALCIUM SERPL-MCNC: 8.7 MG/DL (ref 8.5–10.1)
CHLORIDE SERPL-SCNC: 102 MMOL/L (ref 94–109)
CO2 SERPL-SCNC: 29 MMOL/L (ref 20–32)
CREAT SERPL-MCNC: 1.18 MG/DL (ref 0.66–1.25)
DIFFERENTIAL METHOD BLD: NORMAL
EOSINOPHIL # BLD AUTO: 0.2 10E9/L (ref 0–0.7)
EOSINOPHIL NFR BLD AUTO: 1.9 %
ERYTHROCYTE [DISTWIDTH] IN BLOOD BY AUTOMATED COUNT: 13.1 % (ref 10–15)
GFR SERPL CREATININE-BSD FRML MDRD: 65 ML/MIN/1.7M2
GLUCOSE SERPL-MCNC: 95 MG/DL (ref 70–99)
HCT VFR BLD AUTO: 47.2 % (ref 40–53)
HGB BLD-MCNC: 15.7 G/DL (ref 13.3–17.7)
LYMPHOCYTES # BLD AUTO: 2.1 10E9/L (ref 0.8–5.3)
LYMPHOCYTES NFR BLD AUTO: 21.3 %
MCH RBC QN AUTO: 30.7 PG (ref 26.5–33)
MCHC RBC AUTO-ENTMCNC: 33.3 G/DL (ref 31.5–36.5)
MCV RBC AUTO: 92 FL (ref 78–100)
MONOCYTES # BLD AUTO: 0.9 10E9/L (ref 0–1.3)
MONOCYTES NFR BLD AUTO: 9.7 %
NEUTROPHILS # BLD AUTO: 6.4 10E9/L (ref 1.6–8.3)
NEUTROPHILS NFR BLD AUTO: 66.7 %
PLATELET # BLD AUTO: 260 10E9/L (ref 150–450)
POTASSIUM SERPL-SCNC: 3.5 MMOL/L (ref 3.4–5.3)
RBC # BLD AUTO: 5.11 10E12/L (ref 4.4–5.9)
SODIUM SERPL-SCNC: 135 MMOL/L (ref 133–144)
WBC # BLD AUTO: 9.6 10E9/L (ref 4–11)

## 2018-05-14 PROCEDURE — 99214 OFFICE O/P EST MOD 30 MIN: CPT | Performed by: FAMILY MEDICINE

## 2018-05-14 PROCEDURE — 80048 BASIC METABOLIC PNL TOTAL CA: CPT | Performed by: FAMILY MEDICINE

## 2018-05-14 PROCEDURE — 85025 COMPLETE CBC W/AUTO DIFF WBC: CPT | Performed by: FAMILY MEDICINE

## 2018-05-14 PROCEDURE — 36415 COLL VENOUS BLD VENIPUNCTURE: CPT | Performed by: FAMILY MEDICINE

## 2018-05-14 RX ORDER — TRIAMCINOLONE ACETONIDE 1 MG/G
OINTMENT TOPICAL
Qty: 454 G | Refills: 0 | Status: SHIPPED | OUTPATIENT
Start: 2018-05-14 | End: 2019-01-08

## 2018-05-14 NOTE — MR AVS SNAPSHOT
After Visit Summary   5/14/2018    Micah Nunez    MRN: [id number]           Patient Information     Date Of Birth          1965        Visit Information        Provider Department      5/14/2018 10:00 AM Yovany White MD [facility]        Today's Diagnoses     Preop general physical exam    -  1    Special screening for malignant neoplasms, colon        Eczema, unspecified type          Care Instructions      Before Your Surgery      Call your surgeon if there is any change in your health. This includes signs of a cold or flu (such as a sore throat, runny nose, cough, rash or fever).    Do not smoke, drink alcohol or take over the counter medicine (unless your surgeon or primary care doctor tells you to) for the 24 hours before and after surgery.    If you take prescribed drugs: Follow your doctor s orders about which medicines to take and which to stop until after surgery.    Eating and drinking prior to surgery: follow the instructions from your surgeon  Take a shower or bath the night before surgery. Use the soap your surgeon gave you to gently clean your skin. If you do not have soap from your surgeon, use your regular soap. Do not shave or scrub the surgery site.  Wear clean pajamas and have clean sheets on your bed.   Presurgery Checklist  You are scheduled to have surgery. The healthcare staff will try to make your stay comfortable. Use the guidelines below to remind yourself what to do before surgery. Be sure to follow any specific pre-op instructions from your surgeon or nurse.  Preparing for Surgery  Ask your surgeon if you ll need a blood transfusion during surgery and if so, how to prepare for it. In some cases, you can donate blood before surgery. If needed, this blood can be given back (transfused) to you during or after surgery.  If you are having abdominal surgery, ask what you need to do to clear your bowel.  Tell your surgeon if you have  allergies to any medications or foods.  Arrange for an adult family member or friend to drive you home after surgery. If possible, have someone ready to help you at home as you recover.  Call the surgeon if you get a cold, fever, sore throat, diarrhea, or other health problem just before surgery. Your surgeon can decide whether or not to postpone the surgery.  Medications  Tell your surgeon about all medications you take, including prescription and over-the-counter products such as herbal remedies and vitamins. Ask if you should continue taking them.  If you take ibuprofen, naproxen, or  blood thinners  such as aspirin, clopidogrel (Plavix), or warfarin (Coumadin), ask your surgeon whether you should stop taking them and how long before surgery you should stop.  You may be told to take antibiotics just before surgery to prevent infection. If so, follow instructions carefully on how to take them.  If you are told to take medications called anticoagulants to prevent blood clots after surgery, be sure to follow the instructions on how to take them.  Stop Smoking  If you smoke, healing may take longer. So at least 2 week(s) before surgery, stop smoking.  Bathing or Showering Before Surgery  If instructed, wash with antibacterial soap. Afterward, do not use lotions or powders.  If you are having surgery on the head, you may be asked to shampoo with antibacterial soap. Follow instructions for doing so.  Do Not Remove Hair from the Surgery Site  Do not shave hair from the incision site, unless you are given specific instructions to do so. Usually, if hair needs to be removed, it will be done at the hospital right before surgery.  Don t Eat or Drink  Your doctor will tell you when to stop eating and drinking. If you do not follow your doctor's instructions, your procedure may be postponed or rescheduled for another day.  If your surgeon tells you to continue any medications, take them with small sips of water.  You can  brush your teeth and rinse your mouth, but don t swallow any water.  Day of Surgery  Do not wear makeup. Do not use perfume, deodorant, or hairspray. Remove nail polish and artificial nails.  Leave jewelry (including rings), watches, and other valuables at home.  Be sure to bring health insurance cards or forms and a photo ID.  Bring a list of your medications (include the name, dose, how often you take them, and the time last dose was taken).  Arrive on time at the hospital or surgery facility.    5224-3503 [employer]. [address] [place] [address]. All rights reserved. This information is not intended as a substitute for professional medical care. Always follow your healthcare professional's instructions.  This information has been modified by your health care provider with permission from the publisher.              Follow-ups after your visit        Your next 10 appointments already scheduled     May 21, 2018   Procedure with Bobo Renteria MD   [facility] ([facility])    [address] [place] [address]  [facility] [id number] [phone]           Located in the Clinics and Surgery Center at [address] [place] [address].   parking is very convenient and highly recommended.  is a $6 flat rate fee.  Both  and self parkers should enter the main arrival plaza from [facility]; parking attendants will direct you based on your parking preference.            Jun 05, 2018  3:45 PM CDT   (Arrive by 3:30 PM)   Return Visit with Bobo Renteria MD   [facility] ([facility])    [address] [place] [address]  [facility] [id number] [phone]              Future tests that were ordered for you today     Open Future Orders        Priority Expected Expires Ordered    Fecal colorectal cancer screen (FIT) Routine 6/4/2018 8/6/2018 5/14/2018            Who  "to contact     If you have questions or need follow up information about today's clinic visit or your schedule please contact [facility] directly at [phone].  Normal or non-critical lab and imaging results will be communicated to you by MyChart, letter or phone within 4 business days after the clinic has received the results. If you do not hear from us within 7 days, please contact the clinic through MyChart or phone. If you have a critical or abnormal lab result, we will notify you by phone as soon as possible.  Submit refill requests through [employer] or call your pharmacy and they will forward the refill request to us. Please allow 3 business days for your refill to be completed.          Additional Information About Your Visit        [employer] Information     [employer] lets you send messages to your doctor, view your test results, renew your prescriptions, schedule appointments and more. To sign up, go to [email or web address] . Click on \"Log in\" on the left side of the screen, which will take you to the Welcome page. Then click on \"Sign up Now\" on the right side of the page.     You will be asked to enter the access code listed below, as well as some personal information. Please follow the directions to create your username and password.     Your access code is: [email or web address]  Expires: 2018 10:01 PM     Your access code will  in 90 days. If you need help or a new code, please call your [facility] clinic or [phone].        Care EveryWhere ID     This is your Care EveryWhere ID. This could be used by other organizations to access your [facility] medical records  [id number]        Your Vitals Were     Pulse Temperature Respirations BMI (Body Mass Index)          72 97.4  F (36.3  C) (Tympanic) 16 24.77 kg/m2         Blood Pressure from Last 3 Encounters:   18 133/85   18 (!) 149/96   18 171/85    Weight from Last 3 Encounters:   18 193 lb (87.5 kg)   18 196 " lb (88.9 kg)   04/24/18 196 lb (88.9 kg)              We Performed the Following     Basic metabolic panel     CBC with platelets differential          Today's Medication Changes          These changes are accurate as of 5/14/18 10:33 AM.  If you have any questions, ask your nurse or doctor.               These medicines have changed or have updated prescriptions.        Dose/Directions    * triamcinolone 0.1 % ointment   Commonly known as:  KENALOG   This may have changed:  Another medication with the same name was added. Make sure you understand how and when to take each.   Used for:  Atopic dermatitis, unspecified type   Changed by:  Yovany White MD        APPLY SPARINGLY TO AFFECTED AREA THREE TIMES A DAY FOR 14 DAYS   Quantity:  80 g   Refills:  0       * triamcinolone 0.1 % ointment   Commonly known as:  KENALOG   This may have changed:  You were already taking a medication with the same name, and this prescription was added. Make sure you understand how and when to take each.   Used for:  Eczema, unspecified type   Changed by:  Yovany White MD        Apply sparingly to affected area three times daily for 14 days.   Quantity:  454 g   Refills:  0       * Notice:  This list has 2 medication(s) that are the same as other medications prescribed for you. Read the directions carefully, and ask your doctor or other care provider to review them with you.         Where to get your medicines      These medications were sent to [facility] [address]  [address] [facility] [address]     Phone:  [phone]     triamcinolone 0.1 % ointment                Primary Care Provider Office Phone # Fax #    Yovany hWite [phone]       [address] [facility] [address]        Equal Access to Services     FAUSTINA HAN AH: mumtaz López qaybta kaalmada adeegyada, waxay idiin hayaan adeeg  lucho burtaafartun ah. So [facility] [phone].    ATENCIÓN: Si seb pastor, tiene a espinoza disposición servicios gratuitos de asistencia lingüística. [place] foreman [phone].    We comply with applicable federal civil rights laws and [place] laws. We do not discriminate on the basis of race, color, national origin, age, disability, sex, sexual orientation, or gender identity.            Thank you!     Thank you for choosing [facility]  for your care. Our goal is always to provide you with excellent care. Hearing back from our patients is one way we can continue to improve our services. Please take a few minutes to complete the written survey that you may receive in the mail after your visit with us. Thank you!             Your Updated Medication List - Protect others around you: Learn how to safely use, store and throw away your medicines at www.disposemymeds.org.          This list is accurate as of 5/14/18 10:33 AM.  Always use your most recent med list.                   Brand Name Dispense Instructions for use Diagnosis    acetaminophen 325 MG tablet    TYLENOL    30 tablet    Take 2 tablets (650 mg) by mouth every 4 hours as needed for other (mild pain)    Post-op pain       ibuprofen 600 MG tablet    ADVIL/MOTRIN    40 tablet    Take 1 tablet (600 mg) by mouth every 6 hours    Acute midline low back pain without sciatica       lisinopril-hydrochlorothiazide 10-12.5 MG per tablet    PRINZIDE/ZESTORETIC    90 tablet    Take 1 tablet by mouth daily    Benign essential hypertension       order for DME     2 Units    Equipment being ordered: Dynaflex insert    Hallux rigidus of both feet       oxymetazoline 0.05 % spray    AFRIN NASAL SPRAY    1 Bottle    Spray 2-3 sprays into both nostrils as needed for other (for nasal bleeding only)    Papilloma of nose       * triamcinolone 0.1 % ointment    KENALOG    80 g    APPLY SPARINGLY TO AFFECTED AREA THREE TIMES A DAY FOR 14 DAYS    Atopic dermatitis, unspecified  type       * triamcinolone 0.1 % ointment    KENALOG    454 g    Apply sparingly to affected area three times daily for 14 days.    Eczema, unspecified type       * Notice:  This list has 2 medication(s) that are the same as other medications prescribed for you. Read the directions carefully, and ask your doctor or other care provider to review them with you.

## 2018-05-14 NOTE — LETTER
May 15, 2018      Micah Nunez  [address]    [facility] [id number]        Dear ,    We are writing to inform you of your test results.    Your test results fall within the expected range(s) .    Resulted Orders   CBC with platelets differential   Result Value Ref Range    WBC 9.6 4.0 - 11.0 10e9/L    RBC Count 5.11 4.4 - 5.9 10e12/L    Hemoglobin 15.7 13.3 - 17.7 g/dL    Hematocrit 47.2 40.0 - 53.0 %    MCV 92 78 - 100 fl    MCH 30.7 26.5 - 33.0 pg    MCHC 33.3 31.5 - 36.5 g/dL    RDW 13.1 10.0 - 15.0 %    Platelet Count 260 150 - 450 10e9/L    Diff Method Automated Method     % Neutrophils 66.7 %    % Lymphocytes 21.3 %    % Monocytes 9.7 %    % Eosinophils 1.9 %    % Basophils 0.4 %    Absolute Neutrophil 6.4 1.6 - 8.3 10e9/L    Absolute Lymphocytes 2.1 0.8 - 5.3 10e9/L    Absolute Monocytes 0.9 0.0 - 1.3 10e9/L    Absolute Eosinophils 0.2 0.0 - 0.7 10e9/L    Absolute Basophils 0.0 0.0 - 0.2 10e9/L   Basic metabolic panel   Result Value Ref Range    Sodium 135 133 - 144 mmol/L    Potassium 3.5 3.4 - 5.3 mmol/L    Chloride 102 94 - 109 mmol/L    Carbon Dioxide 29 20 - 32 mmol/L    Anion Gap 4 3 - 14 mmol/L    Glucose 95 70 - 99 mg/dL    Urea Nitrogen 11 7 - 30 mg/dL    Creatinine 1.18 0.66 - 1.25 mg/dL    GFR Estimate 65 >60 mL/min/1.7m2      Comment:      Non  GFR Calc    GFR Estimate If Black 78 >60 mL/min/1.7m2      Comment:       GFR Calc    Calcium 8.7 8.5 - 10.1 mg/dL       If you have any questions or concerns, please call the clinic at the number listed above.       Sincerely,        Yovany White MD/brian

## 2018-05-14 NOTE — PATIENT INSTRUCTIONS
Before Your Surgery      Call your surgeon if there is any change in your health. This includes signs of a cold or flu (such as a sore throat, runny nose, cough, rash or fever).    Do not smoke, drink alcohol or take over the counter medicine (unless your surgeon or primary care doctor tells you to) for the 24 hours before and after surgery.    If you take prescribed drugs: Follow your doctor s orders about which medicines to take and which to stop until after surgery.    Eating and drinking prior to surgery: follow the instructions from your surgeon  Take a shower or bath the night before surgery. Use the soap your surgeon gave you to gently clean your skin. If you do not have soap from your surgeon, use your regular soap. Do not shave or scrub the surgery site.  Wear clean pajamas and have clean sheets on your bed.   Presurgery Checklist  You are scheduled to have surgery. The healthcare staff will try to make your stay comfortable. Use the guidelines below to remind yourself what to do before surgery. Be sure to follow any specific pre-op instructions from your surgeon or nurse.  Preparing for Surgery  Ask your surgeon if you ll need a blood transfusion during surgery and if so, how to prepare for it. In some cases, you can donate blood before surgery. If needed, this blood can be given back (transfused) to you during or after surgery.  If you are having abdominal surgery, ask what you need to do to clear your bowel.  Tell your surgeon if you have allergies to any medications or foods.  Arrange for an adult family member or friend to drive you home after surgery. If possible, have someone ready to help you at home as you recover.  Call the surgeon if you get a cold, fever, sore throat, diarrhea, or other health problem just before surgery. Your surgeon can decide whether or not to postpone the surgery.  Medications  Tell your surgeon about all medications you take, including prescription and over-the-counter  products such as herbal remedies and vitamins. Ask if you should continue taking them.  If you take ibuprofen, naproxen, or  blood thinners  such as aspirin, clopidogrel (Plavix), or warfarin (Coumadin), ask your surgeon whether you should stop taking them and how long before surgery you should stop.  You may be told to take antibiotics just before surgery to prevent infection. If so, follow instructions carefully on how to take them.  If you are told to take medications called anticoagulants to prevent blood clots after surgery, be sure to follow the instructions on how to take them.  Stop Smoking  If you smoke, healing may take longer. So at least 2 week(s) before surgery, stop smoking.  Bathing or Showering Before Surgery  If instructed, wash with antibacterial soap. Afterward, do not use lotions or powders.  If you are having surgery on the head, you may be asked to shampoo with antibacterial soap. Follow instructions for doing so.  Do Not Remove Hair from the Surgery Site  Do not shave hair from the incision site, unless you are given specific instructions to do so. Usually, if hair needs to be removed, it will be done at the hospital right before surgery.  Don t Eat or Drink  Your doctor will tell you when to stop eating and drinking. If you do not follow your doctor's instructions, your procedure may be postponed or rescheduled for another day.  If your surgeon tells you to continue any medications, take them with small sips of water.  You can brush your teeth and rinse your mouth, but don t swallow any water.  Day of Surgery  Do not wear makeup. Do not use perfume, deodorant, or hairspray. Remove nail polish and artificial nails.  Leave jewelry (including rings), watches, and other valuables at home.  Be sure to bring health insurance cards or forms and a photo ID.  Bring a list of your medications (include the name, dose, how often you take them, and the time last dose was taken).  Arrive on time at the  hospital or surgery facility.    [id number] [employer]. [address] [place] [address], [place] [address]. All rights reserved. This information is not intended as a substitute for professional medical care. Always follow your healthcare professional's instructions.  This information has been modified by your health care provider with permission from the publisher.

## 2018-05-14 NOTE — PROGRESS NOTES
[facility]  [address] [place] [address]  [phone]  Dept: [phone]    PRE-OP EVALUATION:  Today's date: 2018    Micah Nunez (: 1965) presents for pre-operative evaluation assessment as requested by Dr. Renteria.  He requires evaluation and anesthesia risk assessment prior to undergoing surgery/procedure for treatment of Nose .    Proposed Surgery/ Procedure: Nasal Endoscopy, CO2 Laser Excision of Left Nasal Papilloma  Date of Surgery/ Procedure: 2018  Time of Surgery/ Procedure: 10:55am  Hospital/Surgical Facility: [facility]  Fax number for surgical facility:   Primary Physician: Yovany White  Type of Anesthesia Anticipated: General    Patient has a Health Care Directive or Living Will:  NO    1. NO - Do you have a history of heart attack, stroke, stent, bypass or surgery on an artery in the head, neck, heart or legs?  2. NO - Do you ever have any pain or discomfort in your chest?  3. NO - Do you have a history of  Heart Failure?  4. YES - ARE YOUR TROUBLED BY SHORTNESS OF BREATH WHEN WALKING ON THE LEVEL, UP A SLIGHT HILL OR AT NIGHT? Sometimes but patient states it is not a problem.  5. NO - Do you currently have a cold, bronchitis or other respiratory infection?  6. NO - Do you have a cough, shortness of breath or wheezing?  7. NO - Do you sometimes get pains in the calves of your legs when you walk?  8. NO - Do you or anyone in your family have previous history of blood clots?  9. NO - Do you or does anyone in your family have a serious bleeding problem such as prolonged bleeding following surgeries or cuts?  10. NO - Have you ever had problems with anemia or been told to take iron pills?  11. NO - Have you had any abnormal blood loss such as black, tarry or bloody stools, or abnormal vaginal bleeding?  12. NO - Have you ever had a blood transfusion?  13. NO - Have you or any of your relatives ever had problems with  anesthesia?  14. NO - Do you have sleep apnea, excessive snoring or daytime drowsiness?  15. NO - Do you have any prosthetic heart valves?  16. YES - DO YOU HAVE PROSTHETIC JOINTS? Titanium joint in left foot toes.  17. NO - Is there any chance that you may be pregnant?      HPI:     HPI related to upcoming procedure: Patient is a 52 yr old male here for a pre op evaluation for surgery for nasal polyps . Patient has hypertension which is well controlled on blood pressure medication . He feels well today and denies any acute symptoms .      See problem list for active medical problems.  Problems all longstanding and stable, except as noted/documented.  See ROS for pertinent symptoms related to these conditions.                                                                                                                                                          .    MEDICAL HISTORY:     Patient Active Problem List    Diagnosis Date Noted     Benign essential hypertension 10/18/2017     Priority: Medium     Tobacco use disorder 08/12/2015     Priority: Medium     Chemical dependency (H) 07/27/2015     Priority: Medium     Papilloma of nose 03/10/2014     Priority: Medium     Nasal mass 12/17/2013     Priority: Medium     CARDIOVASCULAR SCREENING; LDL GOAL LESS THAN 130 12/11/2013     Priority: Medium      Past Medical History:   Diagnosis Date     Benign essential hypertension 10/18/2017     Bleeding disorder (H)      CARDIOVASCULAR SCREENING; LDL GOAL LESS THAN 130 12/11/2013     Chemical dependency (H)      Gastroesophageal reflux disease      Hypertension      Nasal mass 12/17/2013     Papilloma of nose 3/10/2014     Skin disease      Past Surgical History:   Procedure Laterality Date     ARTHRODESIS FOOT Left 2/17/2015    Procedure: ARTHRODESIS FOOT;  Surgeon: Cortez Hayward DPM;  Location: [facility]     ARTHRODESIS TOE(S)  12/20/2013    Procedure: ARTHRODESIS TOE(S);  Arthrodesis first metatarsophalangeal  joint left foot;  Surgeon: Cortez Hayward DPM;  Location: [facility]     ENDOSCOPIC SINUS SURGERY  1/6/2014    Procedure: ENDOSCOPIC SINUS SURGERY;  Functional Endoscopic Sinus Surgery;  Surgeon: Bobo Renteria MD;  Location: [facility]     ENDOSCOPIC SINUS SURGERY  7/21/2014    Procedure: ENDOSCOPIC SINUS SURGERY;  Surgeon: Bobo Renteria MD;  Location: [facility]     ENDOSCOPIC SINUS SURGERY N/A 4/6/2015    Procedure: ENDOSCOPIC SINUS SURGERY;  Surgeon: Bobo Renteria MD;  Location: [facility]     ENDOSCOPIC SINUS SURGERY N/A 8/17/2015    Procedure: ENDOSCOPIC SINUS SURGERY;  Surgeon: Bobo Renteria MD;  Location: [facility]     ENT SURGERY       EXCISE NASAL MASS Left 11/6/2017    Procedure: EXCISE NASAL MASS;  Excision of Left Nasal Papilloma;  Surgeon: Bobo Renteria MD;  [facility]     LAPAROSCOPIC HERNIORRHAPHY INGUINAL BILATERAL Bilateral 4/12/2017    Procedure: LAPAROSCOPIC HERNIORRHAPHY INGUINAL BILATERAL;  Surgeon: Shay Mercado MD;  Location: [facility]     NASAL ENDOSCOPY Bilateral 2/6/2017    Procedure: NASAL ENDOSCOPY;  Surgeon: Bobo Renteria MD;  Location: [facility]     NASAL/SINUS POLYPECTOMY       PE TUBES       Current Outpatient Prescriptions   Medication Sig Dispense Refill     lisinopril-hydrochlorothiazide (PRINZIDE/ZESTORETIC) 10-12.5 MG per tablet Take 1 tablet by mouth daily 90 tablet 3     triamcinolone (KENALOG) 0.1 % ointment APPLY SPARINGLY TO AFFECTED AREA THREE TIMES A DAY FOR 14 DAYS 80 g 0     triamcinolone (KENALOG) 0.1 % ointment Apply sparingly to affected area three times daily for 14 days. 454 g 0     acetaminophen (TYLENOL) 325 MG tablet Take 2 tablets (650 mg) by mouth every 4 hours as needed for other (mild pain) (Patient not taking: Reported on 5/14/2018) 30 tablet 0     ibuprofen (ADVIL/MOTRIN) 600 MG tablet Take 1 tablet (600 mg) by mouth every 6 hours (Patient not taking: Reported on 4/26/2018) 40 tablet 0     order for DME Equipment being ordered:  Dynaflex insert (Patient not taking: Reported on 5/14/2018) 2 Units 0     oxymetazoline (AFRIN NASAL SPRAY) 0.05 % spray Spray 2-3 sprays into both nostrils as needed for other (for nasal bleeding only) (Patient not taking: Reported on 4/26/2018) 1 Bottle 0     OTC products: None, except as noted above    Allergies   Allergen Reactions     Nkda [No Known Drug Allergies]       Latex Allergy: NO    Social History   Substance Use Topics     Smoking status: Current Every Day Smoker     Packs/day: 1.00     Years: 30.00     Types: Cigarettes     Start date: 1/1/1980     Smokeless tobacco: Never Used      Comment: 5 per day     Alcohol use No      Comment: QUIT MARCH 2016     History   Drug Use No     Comment: 7 years quit Cocaine/methamphetamines       REVIEW OF SYSTEMS:   CONSTITUTIONAL: NEGATIVE for fever, chills, change in weight  INTEGUMENTARY/SKIN: NEGATIVE for worrisome rashes, moles or lesions  EYES: NEGATIVE for vision changes or irritation  ENT/MOUTH: POSITIVE for nasal congestion  RESP: NEGATIVE for significant cough or SOB  CV: NEGATIVE for chest pain, palpitations or peripheral edema  GI: NEGATIVE for nausea, abdominal pain, heartburn, or change in bowel habits  : NEGATIVE for frequency, dysuria, or hematuria  MUSCULOSKELETAL: NEGATIVE for significant arthralgias or myalgia  NEURO: NEGATIVE for weakness, dizziness or paresthesias  ENDOCRINE: NEGATIVE for temperature intolerance, skin/hair changes  HEME: NEGATIVE for bleeding problems  PSYCHIATRIC: NEGATIVE for changes in mood or affect    EXAM:   /85  Pulse 72  Temp 97.4  F (36.3  C) (Tympanic)  Resp 16  Wt 193 lb (87.5 kg)  BMI 24.77 kg/m2    GENERAL APPEARANCE: healthy, alert and no distress     EYES: EOMI, PERRL     HENT: ear canals and TM's normal and nose and mouth without ulcers or lesions     NECK: no adenopathy, no asymmetry, masses, or scars and thyroid normal to palpation     RESP: lungs clear to auscultation - no rales, rhonchi or  wheezes     CV: regular rates and rhythm, normal S1 S2, no S3 or S4 and no murmur, click or rub     ABDOMEN:  soft, nontender, no HSM or masses and bowel sounds normal     MS: extremities normal- no gross deformities noted, no evidence of inflammation in joints, FROM in all extremities.     SKIN: no suspicious lesions or rashes     PSYCH: mentation appears normal. and affect normal/bright    DIAGNOSTICS:   No labs or EKG required for low risk surgery (cataract, skin procedure, breast biopsy, etc)    Recent Labs   Lab Test  11/06/17   0830  10/18/17   1125  03/21/17   2055  08/12/15   0757   HGB   --    --   14.6  14.6   PLT   --    --   245  287   NA   --   135  141   --    POTASSIUM  3.7  3.1*  3.7   --    CR   --   1.02  0.98   --         IMPRESSION:   Reason for surgery/procedure: Nasal polyps  Diagnosis/reason for consult: Pre op evaluation     The proposed surgical procedure is considered LOW risk.    REVISED CARDIAC RISK INDEX  The patient has the following serious cardiovascular risks for perioperative complications such as (MI, PE, VFib and 3  AV Block):  No serious cardiac risks  INTERPRETATION: 0 risks: Class I (very low risk - 0.4% complication rate)    The patient has the following additional risks for perioperative complications:  No identified additional risks  The ASCVD Risk score (Alden LUNA Jr, et al., 2013) failed to calculate for the following reasons:    Cannot find a previous HDL lab    Cannot find a previous total cholesterol lab      ICD-10-CM    1. Preop general physical exam Z01.818 CBC with platelets differential     Basic metabolic panel   2. Special screening for malignant neoplasms, colon Z12.11 Fecal colorectal cancer screen (FIT)   3. Eczema, unspecified type L30.9 triamcinolone (KENALOG) 0.1 % ointment   4. Nasal polyp J33.9        RECOMMENDATIONS:       Cardiovascular Risk  Performs 4 METs exercise without symptoms (Light housework (dusting, washing dishes) and Climb a flight of stairs)  .       Pulmonary Risk  Incentive spirometry post op  Respiratory Therapy (Respiratory Care IP Consult)  post op  NG tube decompression if abdominal distension or significant vomiting       --Patient is to take all scheduled medications on the day of surgery EXCEPT for modifications listed below.    Anticoagulant or Antiplatelet Medication Use  ASPIRIN: Discontinue ASA 7-10 days prior to procedure to reduce bleeding risk.  It should be resumed post-operatively.  NSAIDS: Ibuprofen (Motrin):    Stop 5 days prior to surgery        APPROVAL GIVEN to proceed with proposed procedure, without further diagnostic evaluation       Signed Electronically by: Yovany White MD    Copy of this evaluation report is provided to requesting physician.    [facility] Preop Guidelines    Revised Cardiac Risk Index

## 2018-05-15 NOTE — PROGRESS NOTES
Please inform patient that test result was within normal parameters.   Thank you.     Yovany White M.D.

## 2018-05-18 ENCOUNTER — ANESTHESIA EVENT (OUTPATIENT)
Dept: SURGERY | Facility: AMBULATORY SURGERY CENTER | Age: 53
End: 2018-05-18

## 2018-05-21 ENCOUNTER — SURGERY (OUTPATIENT)
Age: 53
End: 2018-05-21

## 2018-05-21 ENCOUNTER — HOSPITAL ENCOUNTER (OUTPATIENT)
Facility: AMBULATORY SURGERY CENTER | Age: 53
End: 2018-05-21
Attending: OTOLARYNGOLOGY
Payer: COMMERCIAL

## 2018-05-21 ENCOUNTER — ANESTHESIA (OUTPATIENT)
Dept: SURGERY | Facility: AMBULATORY SURGERY CENTER | Age: 53
End: 2018-05-21

## 2018-05-21 VITALS
DIASTOLIC BLOOD PRESSURE: 97 MMHG | SYSTOLIC BLOOD PRESSURE: 144 MMHG | RESPIRATION RATE: 10 BRPM | OXYGEN SATURATION: 100 % | TEMPERATURE: 98.6 F

## 2018-05-21 DIAGNOSIS — D14.0 INVERTED PAPILLOMA OF LATERAL NASAL WALL: Primary | ICD-10-CM

## 2018-05-21 RX ORDER — SODIUM CHLORIDE, SODIUM LACTATE, POTASSIUM CHLORIDE, CALCIUM CHLORIDE 600; 310; 30; 20 MG/100ML; MG/100ML; MG/100ML; MG/100ML
INJECTION, SOLUTION INTRAVENOUS CONTINUOUS
Status: DISCONTINUED | OUTPATIENT
Start: 2018-05-21 | End: 2018-05-21 | Stop reason: HOSPADM

## 2018-05-21 RX ORDER — BACITRACIN ZINC 500 [USP'U]/G
OINTMENT TOPICAL PRN
Status: DISCONTINUED | OUTPATIENT
Start: 2018-05-21 | End: 2018-05-21 | Stop reason: HOSPADM

## 2018-05-21 RX ORDER — OXYCODONE HYDROCHLORIDE 5 MG/1
5 TABLET ORAL ONCE
Status: COMPLETED | OUTPATIENT
Start: 2018-05-21 | End: 2018-05-21

## 2018-05-21 RX ORDER — ONDANSETRON 2 MG/ML
INJECTION INTRAMUSCULAR; INTRAVENOUS PRN
Status: DISCONTINUED | OUTPATIENT
Start: 2018-05-21 | End: 2018-05-21

## 2018-05-21 RX ORDER — FENTANYL CITRATE 50 UG/ML
INJECTION, SOLUTION INTRAMUSCULAR; INTRAVENOUS PRN
Status: DISCONTINUED | OUTPATIENT
Start: 2018-05-21 | End: 2018-05-21

## 2018-05-21 RX ORDER — PROPOFOL 10 MG/ML
INJECTION, EMULSION INTRAVENOUS PRN
Status: DISCONTINUED | OUTPATIENT
Start: 2018-05-21 | End: 2018-05-21

## 2018-05-21 RX ORDER — LIDOCAINE HYDROCHLORIDE 20 MG/ML
INJECTION, SOLUTION INFILTRATION; PERINEURAL PRN
Status: DISCONTINUED | OUTPATIENT
Start: 2018-05-21 | End: 2018-05-21

## 2018-05-21 RX ORDER — OXYMETAZOLINE HYDROCHLORIDE 0.05 G/100ML
SPRAY NASAL PRN
Status: DISCONTINUED | OUTPATIENT
Start: 2018-05-21 | End: 2018-05-21 | Stop reason: HOSPADM

## 2018-05-21 RX ORDER — ACETAMINOPHEN 325 MG/1
975 TABLET ORAL ONCE
Status: COMPLETED | OUTPATIENT
Start: 2018-05-21 | End: 2018-05-21

## 2018-05-21 RX ORDER — LIDOCAINE HYDROCHLORIDE AND EPINEPHRINE 10; 10 MG/ML; UG/ML
INJECTION, SOLUTION INFILTRATION; PERINEURAL PRN
Status: DISCONTINUED | OUTPATIENT
Start: 2018-05-21 | End: 2018-05-21 | Stop reason: HOSPADM

## 2018-05-21 RX ORDER — OXYCODONE HYDROCHLORIDE 5 MG/1
5-10 TABLET ORAL
Qty: 15 TABLET | Refills: 0 | Status: SHIPPED | OUTPATIENT
Start: 2018-05-21 | End: 2018-12-13

## 2018-05-21 RX ORDER — NALOXONE HYDROCHLORIDE 0.4 MG/ML
.1-.4 INJECTION, SOLUTION INTRAMUSCULAR; INTRAVENOUS; SUBCUTANEOUS
Status: DISCONTINUED | OUTPATIENT
Start: 2018-05-21 | End: 2018-05-22 | Stop reason: HOSPADM

## 2018-05-21 RX ORDER — DEXAMETHASONE SODIUM PHOSPHATE 10 MG/ML
INJECTION, SOLUTION INTRAMUSCULAR; INTRAVENOUS PRN
Status: DISCONTINUED | OUTPATIENT
Start: 2018-05-21 | End: 2018-05-21

## 2018-05-21 RX ORDER — PROPOFOL 10 MG/ML
INJECTION, EMULSION INTRAVENOUS CONTINUOUS PRN
Status: DISCONTINUED | OUTPATIENT
Start: 2018-05-21 | End: 2018-05-21

## 2018-05-21 RX ORDER — ECHINACEA PURPUREA EXTRACT 125 MG
1 TABLET ORAL DAILY PRN
Qty: 1 BOTTLE | Refills: 1 | Status: SHIPPED | OUTPATIENT
Start: 2018-05-21 | End: 2019-01-08

## 2018-05-21 RX ORDER — FENTANYL CITRATE 50 UG/ML
25-50 INJECTION, SOLUTION INTRAMUSCULAR; INTRAVENOUS
Status: DISCONTINUED | OUTPATIENT
Start: 2018-05-21 | End: 2018-05-21 | Stop reason: HOSPADM

## 2018-05-21 RX ORDER — ONDANSETRON 2 MG/ML
4 INJECTION INTRAMUSCULAR; INTRAVENOUS EVERY 30 MIN PRN
Status: DISCONTINUED | OUTPATIENT
Start: 2018-05-21 | End: 2018-05-22 | Stop reason: HOSPADM

## 2018-05-21 RX ORDER — GABAPENTIN 300 MG/1
300 CAPSULE ORAL ONCE
Status: COMPLETED | OUTPATIENT
Start: 2018-05-21 | End: 2018-05-21

## 2018-05-21 RX ORDER — SODIUM CHLORIDE, SODIUM LACTATE, POTASSIUM CHLORIDE, CALCIUM CHLORIDE 600; 310; 30; 20 MG/100ML; MG/100ML; MG/100ML; MG/100ML
INJECTION, SOLUTION INTRAVENOUS CONTINUOUS
Status: DISCONTINUED | OUTPATIENT
Start: 2018-05-21 | End: 2018-05-22 | Stop reason: HOSPADM

## 2018-05-21 RX ORDER — HYDRALAZINE HYDROCHLORIDE 20 MG/ML
INJECTION INTRAMUSCULAR; INTRAVENOUS PRN
Status: DISCONTINUED | OUTPATIENT
Start: 2018-05-21 | End: 2018-05-21

## 2018-05-21 RX ORDER — OXYMETAZOLINE HYDROCHLORIDE 0.05 G/100ML
2-3 SPRAY NASAL 2 TIMES DAILY PRN
Qty: 1 BOTTLE | Refills: 0 | Status: SHIPPED | OUTPATIENT
Start: 2018-05-21 | End: 2019-01-08

## 2018-05-21 RX ORDER — LIDOCAINE 40 MG/G
CREAM TOPICAL
Status: DISCONTINUED | OUTPATIENT
Start: 2018-05-21 | End: 2018-05-21 | Stop reason: HOSPADM

## 2018-05-21 RX ORDER — ONDANSETRON 4 MG/1
4 TABLET, ORALLY DISINTEGRATING ORAL EVERY 30 MIN PRN
Status: DISCONTINUED | OUTPATIENT
Start: 2018-05-21 | End: 2018-05-22 | Stop reason: HOSPADM

## 2018-05-21 RX ADMIN — FENTANYL CITRATE 50 MCG: 50 INJECTION, SOLUTION INTRAMUSCULAR; INTRAVENOUS at 11:34

## 2018-05-21 RX ADMIN — LIDOCAINE HYDROCHLORIDE 100 MG: 20 INJECTION, SOLUTION INFILTRATION; PERINEURAL at 11:36

## 2018-05-21 RX ADMIN — ONDANSETRON 4 MG: 2 INJECTION INTRAMUSCULAR; INTRAVENOUS at 10:50

## 2018-05-21 RX ADMIN — OXYMETAZOLINE HYDROCHLORIDE 30 ML: 0.05 SPRAY NASAL at 10:52

## 2018-05-21 RX ADMIN — PROPOFOL: 10 INJECTION, EMULSION INTRAVENOUS at 11:29

## 2018-05-21 RX ADMIN — ACETAMINOPHEN 975 MG: 325 TABLET ORAL at 09:58

## 2018-05-21 RX ADMIN — OXYCODONE HYDROCHLORIDE 5 MG: 5 TABLET ORAL at 12:03

## 2018-05-21 RX ADMIN — DEXAMETHASONE SODIUM PHOSPHATE 10 MG: 10 INJECTION, SOLUTION INTRAMUSCULAR; INTRAVENOUS at 10:50

## 2018-05-21 RX ADMIN — GABAPENTIN 300 MG: 300 CAPSULE ORAL at 09:58

## 2018-05-21 RX ADMIN — PROPOFOL 160 MG: 10 INJECTION, EMULSION INTRAVENOUS at 10:43

## 2018-05-21 RX ADMIN — Medication 20 MG: at 11:03

## 2018-05-21 RX ADMIN — PROPOFOL 200 MCG/KG/MIN: 10 INJECTION, EMULSION INTRAVENOUS at 10:42

## 2018-05-21 RX ADMIN — BACITRACIN ZINC 1 G: 500 OINTMENT TOPICAL at 11:50

## 2018-05-21 RX ADMIN — LIDOCAINE HYDROCHLORIDE 80 MG: 20 INJECTION, SOLUTION INFILTRATION; PERINEURAL at 10:42

## 2018-05-21 RX ADMIN — Medication 50 MG: at 10:44

## 2018-05-21 RX ADMIN — HYDRALAZINE HYDROCHLORIDE 10 MG: 20 INJECTION INTRAMUSCULAR; INTRAVENOUS at 11:20

## 2018-05-21 RX ADMIN — Medication 10 MG: at 11:21

## 2018-05-21 RX ADMIN — Medication 10 MG: at 11:29

## 2018-05-21 RX ADMIN — PROPOFOL 40 MG: 10 INJECTION, EMULSION INTRAVENOUS at 10:48

## 2018-05-21 RX ADMIN — LIDOCAINE HYDROCHLORIDE AND EPINEPHRINE 2 ML: 10; 10 INJECTION, SOLUTION INFILTRATION; PERINEURAL at 11:00

## 2018-05-21 RX ADMIN — FENTANYL CITRATE 100 MCG: 50 INJECTION, SOLUTION INTRAMUSCULAR; INTRAVENOUS at 10:42

## 2018-05-21 RX ADMIN — SODIUM CHLORIDE, SODIUM LACTATE, POTASSIUM CHLORIDE, CALCIUM CHLORIDE: 600; 310; 30; 20 INJECTION, SOLUTION INTRAVENOUS at 10:36

## 2018-05-21 RX ADMIN — FENTANYL CITRATE 50 MCG: 50 INJECTION, SOLUTION INTRAMUSCULAR; INTRAVENOUS at 11:49

## 2018-05-21 ASSESSMENT — LIFESTYLE VARIABLES: TOBACCO_USE: 1

## 2018-05-21 NOTE — IP AVS SNAPSHOT
[facility]    [address] [place] [id number]    Phone:  [phone]    Fax:  [phone]                                       After Visit Summary   5/21/2018    Micah Nunez    MRN: [id number]           After Visit Summary Signature Page     I have received my discharge instructions, and my questions have been answered. I have discussed any challenges I see with this plan with the nurse or doctor.    ..........................................................................................................................................  Patient/Patient Representative Signature      ..........................................................................................................................................  Patient Representative Print Name and Relationship to Patient    ..................................................               ................................................  Date                                            Time    ..........................................................................................................................................  Reviewed by Signature/Title    ...................................................              ..............................................  Date                                                            Time

## 2018-05-21 NOTE — IP AVS SNAPSHOT
MRN:[id number]                      After Visit Summary   5/21/2018    Micah Nunez    MRN: [id number]           Thank you!     Thank you for choosing [facility] for your care. Our goal is always to provide you with excellent care. Hearing back from our patients is one way we can continue to improve our services. Please take a few minutes to complete the written survey that you may receive in the mail after you visit with us. Thank you!        Patient Information     Date Of Birth          1965        About your hospital stay     You were admitted on:  May 21, 2018 You last received care in the:  [facility]    You were discharged on:  May 21, 2018       Who to Call     For medical emergencies, please call 911.  For non-urgent questions about your medical care, please call your primary care provider or clinic, [phone]  For questions related to your surgery, please call your surgery clinic        Attending Provider     Provider Specialty    Bobo Renteria MD Otolaryngology       Primary Care Provider Office Phone # Fax #    Rajeshphi Vanita White [phone]      After Care Instructions     Diet Instructions       Resume pre procedure diet            Discharge Instructions       Patient to follow up with surgeon 6/5/18            Notify Physician        If bleeding                  Your next 10 appointments already scheduled     Jun 05, 2018  3:45 PM CDT   (Arrive by 3:30 PM)   Return Visit with Bobo Renteria MD   [facility] ([facility])    [address] [place] [address] [phone]              Further instructions from your care team       [facility]  Home Care Following Anesthesia  For 24 hours after surgery:  1. Get plenty of rest.  A responsible adult must stay with you for at least 24 hours after you leave  the surgery center.  2. Do not drive or use heavy equipment.  If you have weakness or tingling, don't drive or use heavy equipment until this feeling goes away.   3. Do not drink alcohol.   4. Avoid strenuous or risky activities.  Ask for help when climbing stairs.  5. You may feel lightheaded.  IF so, sit for a few minutes before standing.  Have someone help you get up.   6. If you have nausea (feel sick to your stomach): Drink only clear liquids such as apple juice, ginger ale, broth or 7-Up.  Rest may also help.  Be sure to drink enough fluids.  Move to a regular diet as you feel able.   7. You may have a slight fever.  Call the doctor if your fever is over 100 F (37.7 C) (taken under the tongue) or lasts longer than 24 hours.  8. You may have a dry mouth, a sore throat, muscle aches or trouble sleeping. These should go away after 24 hours.  9. Do not make important or legal decisions.            Tips for taking pain medications  To get the best pain relief possible, remember these points:    Take pain medications as directed, before pain becomes severe.    Pain medication can upset your stomach: taking it with food may help.    Constipation is a common side effect of pain medication. Drink plenty of  fluids.    Eat foods high in fiber. Take a stool softener if recommended by your doctor or pharmacist.    Do not drink alcohol, drive or operate machinery while taking pain medications.    Ask about other ways to control pain, such as with heat, ice or relaxation.    Tylenol/Acetaminophen Consumption  To help encourage the safe use of acetaminophen, the makers of TYLENOL  have lowered the maximum daily dose for single-ingredient Extra Strength TYLENOL  (acetaminophen) products sold in the [place] from 8 pills per day (4,000 mg) to 6 pills per day (3,000 mg). The dosing interval has also changed from 2 pills every 4-6 hours to 2 pills every 6 hours.    If you feel your pain relief is insufficient, you may take  Tylenol/Acetaminophen in addition to your narcotic pain medication.     Be careful not to exceed 3,000 mg of Tylenol/Acetaminophen in a 24 hour period from all sources.    If you are taking extra strength Tylenol/acetaminophen (500 mg), the maximum dose is 6 tablets in 24 hours.    If you are taking regular strength acetaminophen (325 mg), the maximum dose is 9 tablets in 24 hours.    Call a doctor for any of the followin. Signs of infection (fever, growing tenderness at the surgery site, a large amount of drainage or bleeding, severe pain, foul-smelling drainage, redness, swelling).  2. It has been over 8 to 10 hours since surgery and you are still not able to urinate (pass water).  3. Headache for over 24 hours.    Your doctor is:       Dr. Bobo Renteria, ENT Otolaryngology: [phone]               Or dial [phone] and ask for the resident on call for:  ENT Otolaryngology  For emergency care, call the:  [facility]:  [phone] (TTY for hearing impaired: [phone])                Pending Results     No orders found from 2018 to 2018.            Admission Information     Date & Time Provider Department Dept. Phone    2018 Bobo Renteria MD [facility] [phone]      Your Vitals Were     Blood Pressure Temperature Respirations Pulse Oximetry          139/85 98  F (36.7  C) (Temporal) 12 96%        MyChart Information     SecureLink is an electronic gateway that provides easy, online access to your medical records. With SecureLink, you can request a clinic appointment, read your test results, renew a prescription or communicate with your care team.     To sign up for SecureLink visit the website at [email or web address]   You will be asked to enter the access code listed below, as well as some personal information. Please follow the directions to create your username and password.     Your access code is: [email or web address]  Expires:  2018 10:01 PM     Your access code will  in 90 days. If you need help or a new code, please contact your [facility] or call [phone] for assistance.        Care EveryWhere ID     This is your Care EveryWhere ID. This could be used by other organizations to access your [facility] medical records  [id number]        Equal Access to Services     FAUSTINA HAN : Hadii aad ku hadasho Soomaali, waaxda luqadaha, qaybta kaalmada adeegyada, waxay nachoin hayaan adetorri lucho laherson . So [facility] [phone].    ATENCIÓN: Si habla español, tiene a espinoza disposición servicios gratuitos de asistencia lingüística. [place] al [phone].    We comply with applicable federal civil rights laws and [place] laws. We do not discriminate on the basis of race, color, national origin, age, disability, sex, sexual orientation, or gender identity.               Review of your medicines      START taking        Dose / Directions    oxyCODONE IR 5 MG tablet   Commonly known as:  ROXICODONE   Used for:  Inverted papilloma of lateral nasal wall        Dose:  5-10 mg   Take 1-2 tablets (5-10 mg) by mouth every 3 hours as needed for pain or other (Moderate to Severe)   Quantity:  15 tablet   Refills:  0       sodium chloride 0.65 % nasal spray   Commonly known as:  AFRIN SALINE NASAL MIST   Used for:  Inverted papilloma of lateral nasal wall        Dose:  1 spray   Spray 1 spray into both nostrils daily as needed for congestion   Quantity:  1 Bottle   Refills:  1         CONTINUE these medicines which may have CHANGED, or have new prescriptions. If we are uncertain of the size of tablets/capsules you have at home, strength may be listed as something that might have changed.        Dose / Directions    oxymetazoline 0.05 % spray   Commonly known as:  AFRIN NASAL SPRAY   This may have changed:    - when to take this  - reasons to take this   Used for:  Inverted papilloma of lateral nasal wall        Dose:   2-3 spray   Spray 2-3 sprays into both nostrils 2 times daily as needed for congestion   Quantity:  1 Bottle   Refills:  0         CONTINUE these medicines which have NOT CHANGED        Dose / Directions    acetaminophen 325 MG tablet   Commonly known as:  TYLENOL   Used for:  Post-op pain        Dose:  650 mg   Take 2 tablets (650 mg) by mouth every 4 hours as needed for other (mild pain)   Quantity:  30 tablet   Refills:  0       ibuprofen 600 MG tablet   Commonly known as:  ADVIL/MOTRIN   Used for:  Acute midline low back pain without sciatica        Dose:  600 mg   Take 1 tablet (600 mg) by mouth every 6 hours   Quantity:  40 tablet   Refills:  0       lisinopril-hydrochlorothiazide 10-12.5 MG per tablet   Commonly known as:  PRINZIDE/ZESTORETIC   Used for:  Benign essential hypertension        Dose:  1 tablet   Take 1 tablet by mouth daily   Quantity:  90 tablet   Refills:  3       order for DME   Used for:  Hallux rigidus of both feet        Equipment being ordered: Dynaflex insert   Quantity:  2 Units   Refills:  0       * triamcinolone 0.1 % ointment   Commonly known as:  KENALOG   Used for:  Atopic dermatitis, unspecified type        APPLY SPARINGLY TO AFFECTED AREA THREE TIMES A DAY FOR 14 DAYS   Quantity:  80 g   Refills:  0       * triamcinolone 0.1 % ointment   Commonly known as:  KENALOG   Used for:  Eczema, unspecified type        Apply sparingly to affected area three times daily for 14 days.   Quantity:  454 g   Refills:  0       * Notice:  This list has 2 medication(s) that are the same as other medications prescribed for you. Read the directions carefully, and ask your doctor or other care provider to review them with you.         Where to get your medicines      These medications were sent to [place], [place] - [address] [place] [address] [address] [address]    Hours:  TRANSPLANT PHONE NUMBER [phone] Phone:  [phone]      oxymetazoline 0.05 % spray    sodium chloride 0.65 % nasal spray         Some of these will need a paper prescription and others can be bought over the counter. Ask your nurse if you have questions.     Bring a paper prescription for each of these medications     oxyCODONE IR 5 MG tablet                Protect others around you: Learn how to safely use, store and throw away your medicines at www.disposemymeds.org.        Information about OPIOIDS     PRESCRIPTION OPIOIDS: WHAT YOU NEED TO KNOW   You have a prescription for an opioid (narcotic) pain medicine. Opioids can cause addiction. If you have a history of chemical dependency of any type, you are at a higher risk of becoming addicted to opioids. Only take this medicine after all other options have been tried. Take it for as short a time and as few doses as possible.     Do not:    Drive. If you drive while taking these medicines, you could be arrested for driving under the influence (DUI).    Operate heavy machinery    Do any other dangerous activities while taking these medicines.     Drink any alcohol while taking these medicines.      Take with any other medicines that contain acetaminophen. Read all labels carefully. Look for the word  acetaminophen  or  Tylenol.  Ask your pharmacist if you have questions or are unsure.    Store your pills in a secure place, locked if possible. We will not replace any lost or stolen medicine. If you don t finish your medicine, please throw away (dispose) as directed by your pharmacist. The [place] Pollution Control Agency has more information about safe disposal: https://www.pca.[facility].[place].us/living-green/managing-unwanted-medications    All opioids tend to cause constipation. Drink plenty of water and eat foods that have a lot of fiber, such as fruits, vegetables, prune juice, apple juice and high-fiber cereal. Take a laxative (Miralax, milk of magnesia, Colace, Senna) if you don t move your bowels at least every other  day.              Medication List: This is a list of all your medications and when to take them. Check marks below indicate your daily home schedule. Keep this list as a reference.      Medications           Morning Afternoon Evening Bedtime As Needed    acetaminophen 325 MG tablet   Commonly known as:  TYLENOL   Take 2 tablets (650 mg) by mouth every 4 hours as needed for other (mild pain)   Last time this was given:  975 mg on 5/21/2018  9:58 AM                                ibuprofen 600 MG tablet   Commonly known as:  ADVIL/MOTRIN   Take 1 tablet (600 mg) by mouth every 6 hours                                lisinopril-hydrochlorothiazide 10-12.5 MG per tablet   Commonly known as:  PRINZIDE/ZESTORETIC   Take 1 tablet by mouth daily                                order for DME   Equipment being ordered: Dynaflex insert                                oxyCODONE IR 5 MG tablet   Commonly known as:  ROXICODONE   Take 1-2 tablets (5-10 mg) by mouth every 3 hours as needed for pain or other (Moderate to Severe)   Last time this was given:  5 mg on 5/21/2018 12:03 PM                                oxymetazoline 0.05 % spray   Commonly known as:  AFRIN NASAL SPRAY   Spray 2-3 sprays into both nostrils 2 times daily as needed for congestion   Last time this was given:  30 mLs on 5/21/2018 10:52 AM                                sodium chloride 0.65 % nasal spray   Commonly known as:  AFRIN SALINE NASAL MIST   Spray 1 spray into both nostrils daily as needed for congestion                                * triamcinolone 0.1 % ointment   Commonly known as:  KENALOG   APPLY SPARINGLY TO AFFECTED AREA THREE TIMES A DAY FOR 14 DAYS                                * triamcinolone 0.1 % ointment   Commonly known as:  KENALOG   Apply sparingly to affected area three times daily for 14 days.                                * Notice:  This list has 2 medication(s) that are the same as other medications prescribed for you. Read the  directions carefully, and ask your doctor or other care provider to review them with you.

## 2018-05-21 NOTE — BRIEF OP NOTE
[facility]    Brief Operative Note    Pre-operative diagnosis: Nasal Papillomas  Post-operative diagnosis * No post-op diagnosis entered *  Procedure: Procedure(s):  Nasal Endoscopy, CO2 Laser Excision of Left Nasal Papilloma - Wound Class: I-Clean  Surgeon: Surgeon(s) and Role:     * Bobo Renteria MD - Primary  Anesthesia: General   Estimated blood loss: Less than 50 ml  Drains: None  Specimens: * No specimens in log *  Findings:   Papillomatous mucosa along septum and lateral nasal sidewall.   Complications: None.  Implants: None.

## 2018-05-21 NOTE — ANESTHESIA CARE TRANSFER NOTE
Patient: Micah Nunez    Procedure(s):  Nasal Endoscopy, CO2 Laser Excision of Left Nasal Papilloma - Wound Class: I-Clean    Diagnosis: Nasal Papillomas  Diagnosis Additional Information: No value filed.    Anesthesia Type:   General, LMA     Note:  Airway :Room Air  Patient transferred to:PACU  Comments: Arrive PACU, Stable, Airway Intact  142/87, 78,16,97,98.1  All questions answered.      Vitals: (Last set prior to Anesthesia Care Transfer)    CRNA VITALS  5/21/2018 1118 - 5/21/2018 1154      5/21/2018             EKG: NSR                Electronically Signed By: NEHEMIAS Logan CRNA  May 21, 2018  11:54 AM

## 2018-05-21 NOTE — DISCHARGE INSTRUCTIONS
[facility]  Home Care Following Anesthesia  For 24 hours after surgery:  1. Get plenty of rest.  A responsible adult must stay with you for at least 24 hours after you leave the surgery center.  2. Do not drive or use heavy equipment.  If you have weakness or tingling, don't drive or use heavy equipment until this feeling goes away.   3. Do not drink alcohol.   4. Avoid strenuous or risky activities.  Ask for help when climbing stairs.  5. You may feel lightheaded.  IF so, sit for a few minutes before standing.  Have someone help you get up.   6. If you have nausea (feel sick to your stomach): Drink only clear liquids such as apple juice, ginger ale, broth or 7-Up.  Rest may also help.  Be sure to drink enough fluids.  Move to a regular diet as you feel able.   7. You may have a slight fever.  Call the doctor if your fever is over 100 F (37.7 C) (taken under the tongue) or lasts longer than 24 hours.  8. You may have a dry mouth, a sore throat, muscle aches or trouble sleeping. These should go away after 24 hours.  9. Do not make important or legal decisions.            Tips for taking pain medications  To get the best pain relief possible, remember these points:    Take pain medications as directed, before pain becomes severe.    Pain medication can upset your stomach: taking it with food may help.    Constipation is a common side effect of pain medication. Drink plenty of  fluids.    Eat foods high in fiber. Take a stool softener if recommended by your doctor or pharmacist.    Do not drink alcohol, drive or operate machinery while taking pain medications.    Ask about other ways to control pain, such as with heat, ice or relaxation.    Tylenol/Acetaminophen Consumption  To help encourage the safe use of acetaminophen, the makers of TYLENOL  have lowered the maximum daily dose for single-ingredient Extra Strength TYLENOL  (acetaminophen) products sold in the [place] from 8 pills  per day (4,000 mg) to 6 pills per day (3,000 mg). The dosing interval has also changed from 2 pills every 4-6 hours to 2 pills every 6 hours.    If you feel your pain relief is insufficient, you may take Tylenol/Acetaminophen in addition to your narcotic pain medication.     Be careful not to exceed 3,000 mg of Tylenol/Acetaminophen in a 24 hour period from all sources.    If you are taking extra strength Tylenol/acetaminophen (500 mg), the maximum dose is 6 tablets in 24 hours.    If you are taking regular strength acetaminophen (325 mg), the maximum dose is 9 tablets in 24 hours.    Call a doctor for any of the followin. Signs of infection (fever, growing tenderness at the surgery site, a large amount of drainage or bleeding, severe pain, foul-smelling drainage, redness, swelling).  2. It has been over 8 to 10 hours since surgery and you are still not able to urinate (pass water).  3. Headache for over 24 hours.    Your doctor is:       Dr. Bobo Renteria, ENT Otolaryngology: [phone]               Or dial [phone] and ask for the resident on call for:  ENT Otolaryngology  For emergency care, call the:  [facility]:  [phone] (TTY for hearing impaired: [phone])

## 2018-05-21 NOTE — ANESTHESIA PREPROCEDURE EVALUATION
Anesthesia Evaluation     . Pt has had prior anesthetic. Type: MAC    No history of anesthetic complications          ROS/MED HX    ENT/Pulmonary:  - neg pulmonary ROS   (+)tobacco use, Current use , . .    Neurologic:  - neg neurologic ROS     Cardiovascular:  - neg cardiovascular ROS   (+) hypertension----. : . . . :. .       METS/Exercise Tolerance:     Hematologic:  - neg hematologic  ROS       Musculoskeletal:  - neg musculoskeletal ROS       GI/Hepatic:  - neg GI/hepatic ROS   (+) GERD       Renal/Genitourinary:  - ROS Renal section negative       Endo:  - neg endo ROS       Psychiatric:  - neg psychiatric ROS       Infectious Disease:  - neg infectious disease ROS       Malignancy:      - no malignancy   Other: Comment: Chem dep hx:  Hx of cocaine and meth. Currently sober.   - neg other ROS                                Anesthesia Plan      History & Physical Review      ASA Status:  3 .        Plan for General and LMA          Postoperative Care      Consents                          .

## 2018-05-21 NOTE — ANESTHESIA POSTPROCEDURE EVALUATION
Patient: Micah Nunez    Procedure(s):  Nasal Endoscopy, CO2 Laser Excision of Left Nasal Papilloma - Wound Class: I-Clean    Diagnosis:Nasal Papillomas  Diagnosis Additional Information: No value filed.    Anesthesia Type:  General, LMA    Note:  Anesthesia Post Evaluation    Patient location during evaluation: PACU  Patient participation: Able to fully participate in evaluation  Level of consciousness: awake and alert  Pain management: adequate  Airway patency: patent  Cardiovascular status: acceptable  Respiratory status: acceptable  Hydration status: acceptable  PONV: none     Anesthetic complications: None          Last vitals:  Vitals:    05/21/18 1200 05/21/18 1210 05/21/18 1231   BP: 139/85 139/85 (!) 144/97   Resp: 12 9 10   Temp:  37.2  C (99  F) 37  C (98.6  F)   SpO2: 96% 96% 100%         Electronically Signed By: Yuki Maria MD  May 21, 2018  1:37 PM

## 2018-05-27 ENCOUNTER — HOSPITAL ENCOUNTER (EMERGENCY)
Facility: CLINIC | Age: 53
Discharge: HOME | End: 2018-05-27
Attending: PHYSICIAN ASSISTANT | Admitting: PHYSICIAN ASSISTANT
Payer: COMMERCIAL

## 2018-05-27 VITALS
HEART RATE: 90 BPM | DIASTOLIC BLOOD PRESSURE: 78 MMHG | OXYGEN SATURATION: 96 % | WEIGHT: 195 LBS | BODY MASS INDEX: 25.02 KG/M2 | SYSTOLIC BLOOD PRESSURE: 112 MMHG | TEMPERATURE: 98.1 F | RESPIRATION RATE: 16 BRPM

## 2018-05-27 DIAGNOSIS — G89.18 ACUTE POST-OPERATIVE PAIN: ICD-10-CM

## 2018-05-27 PROCEDURE — G0463 HOSPITAL OUTPT CLINIC VISIT: HCPCS

## 2018-05-27 PROCEDURE — 99213 OFFICE O/P EST LOW 20 MIN: CPT | Performed by: PHYSICIAN ASSISTANT

## 2018-05-27 RX ORDER — OXYCODONE AND ACETAMINOPHEN 5; 325 MG/1; MG/1
1 TABLET ORAL EVERY 4 HOURS PRN
Qty: 5 TABLET | Refills: 0 | Status: SHIPPED | OUTPATIENT
Start: 2018-05-27 | End: 2018-12-13

## 2018-05-27 NOTE — ED AVS SNAPSHOT
[facility]    [address] [facility] [address]    Phone:  [phone]    Fax:  [phone]                                       Micah Nunez   MRN: [id number]    Department:  [facility]   Date of Visit:  5/27/2018           After Visit Summary Signature Page     I have received my discharge instructions, and my questions have been answered. I have discussed any challenges I see with this plan with the nurse or doctor.    ..........................................................................................................................................  Patient/Patient Representative Signature      ..........................................................................................................................................  Patient Representative Print Name and Relationship to Patient    ..................................................               ................................................  Date                                            Time    ..........................................................................................................................................  Reviewed by Signature/Title    ...................................................              ..............................................  Date                                                            Time

## 2018-05-27 NOTE — ED AVS SNAPSHOT
[facility] Emergency Department    [address] [address]    Phone:  [phone]    Fax:  [phone]                                       Micah Nunez   MRN: [id number]    Department:  [facility] Emergency Department   Date of Visit:  5/27/2018           Patient Information     Date Of Birth          1965        Your diagnoses for this visit were:     Acute post-operative pain        You were seen by Vivi Jackosn PA-C.      Follow-up Information     Follow up with Bobo Renteria MD. Call in 2 days.    Specialty:  Otolaryngology    Why:  for further medication management needs    Contact information:    [address]  [facility] [address] [phone]        Your next 10 appointments already scheduled     Jun 05, 2018  3:45 PM CDT   (Arrive by 3:30 PM)   Return Visit with Bobo Renteria MD   [facility] ([facility])    [address]  4th Floor  [place] [address] [phone]              24 Hour Appointment Hotline       To make an appointment at any [facility], call [phone] ([phone]). If you don't have a family doctor or clinic, we will help you find one. [place] clinics are conveniently located to serve the needs of you and your family.             Review of your medicines      START taking        Dose / Directions Last dose taken    oxyCODONE-acetaminophen 5-325 MG per tablet   Commonly known as:  PERCOCET   Dose:  1 tablet   Quantity:  5 tablet        Take 1 tablet by mouth every 4 hours as needed for pain   Refills:  0          Our records show that you are taking the medicines listed below. If these are incorrect, please call your family doctor or clinic.        Dose / Directions Last dose taken    acetaminophen 325 MG tablet   Commonly known as:  TYLENOL   Dose:  650 mg   Quantity:  30 tablet        Take 2 tablets (650 mg) by mouth every 4 hours as needed for  other (mild pain)   Refills:  0        ibuprofen 600 MG tablet   Commonly known as:  ADVIL/MOTRIN   Dose:  600 mg   Quantity:  40 tablet        Take 1 tablet (600 mg) by mouth every 6 hours   Refills:  0        lisinopril-hydrochlorothiazide 10-12.5 MG per tablet   Commonly known as:  PRINZIDE/ZESTORETIC   Dose:  1 tablet   Quantity:  90 tablet        Take 1 tablet by mouth daily   Refills:  3        order for DME   Quantity:  2 Units        Equipment being ordered: Dynaflex insert   Refills:  0        oxyCODONE IR 5 MG tablet   Commonly known as:  ROXICODONE   Dose:  5-10 mg   Quantity:  15 tablet        Take 1-2 tablets (5-10 mg) by mouth every 3 hours as needed for pain or other (Moderate to Severe)   Refills:  0        oxymetazoline 0.05 % spray   Commonly known as:  AFRIN NASAL SPRAY   Dose:  2-3 spray   Quantity:  1 Bottle        Spray 2-3 sprays into both nostrils 2 times daily as needed for congestion   Refills:  0        sodium chloride 0.65 % nasal spray   Commonly known as:  AFRIN SALINE NASAL MIST   Dose:  1 spray   Quantity:  1 Bottle        Spray 1 spray into both nostrils daily as needed for congestion   Refills:  1        * triamcinolone 0.1 % ointment   Commonly known as:  KENALOG   Quantity:  80 g        APPLY SPARINGLY TO AFFECTED AREA THREE TIMES A DAY FOR 14 DAYS   Refills:  0        * triamcinolone 0.1 % ointment   Commonly known as:  KENALOG   Quantity:  454 g        Apply sparingly to affected area three times daily for 14 days.   Refills:  0        * Notice:  This list has 2 medication(s) that are the same as other medications prescribed for you. Read the directions carefully, and ask your doctor or other care provider to review them with you.            Information about OPIOIDS     PRESCRIPTION OPIOIDS: WHAT YOU NEED TO KNOW   You have a prescription for an opioid (narcotic) pain medicine. Opioids can cause addiction. If you have a history of chemical dependency of any type, you are at a  higher risk of becoming addicted to opioids. Only take this medicine after all other options have been tried. Take it for as short a time and as few doses as possible.     Do not:    Drive. If you drive while taking these medicines, you could be arrested for driving under the influence (DUI).    Operate heavy machinery    Do any other dangerous activities while taking these medicines.     Drink any alcohol while taking these medicines.      Take with any other medicines that contain acetaminophen. Read all labels carefully. Look for the word  acetaminophen  or  Tylenol.  Ask your pharmacist if you have questions or are unsure.    Store your pills in a secure place, locked if possible. We will not replace any lost or stolen medicine. If you don t finish your medicine, please throw away (dispose) as directed by your pharmacist. The [place] [employer] has more information about safe disposal: https://www.pca.[facility]/living-green/managing-unwanted-medications    All opioids tend to cause constipation. Drink plenty of water and eat foods that have a lot of fiber, such as fruits, vegetables, prune juice, apple juice and high-fiber cereal. Take a laxative (Miralax, milk of magnesia, Colace, Senna) if you don t move your bowels at least every other day.         Prescriptions were sent or printed at these locations (1 Prescription)                   [address] [address]    Telephone:  [phone]   Fax:  [phone]   Hours:  TRANSPLANT PHONE NUMBER [phone]                Printed at Department/Unit printer (1 of 1)         oxyCODONE-acetaminophen (PERCOCET) 5-325 MG per tablet                Orders Needing Specimen Collection     None      Pending Results     No orders found from 5/25/2018 to 5/28/2018.            Pending Culture Results     No orders found from 5/25/2018 to 5/28/2018.  "           Pending Results Instructions     If you had any lab results that were not finalized at the time of your Discharge, you can call the ED Lab Result RN at [phone]. You will be contacted by this team for any positive Lab results or changes in treatment. The nurses are available 7 days a week from 10A to 6:30P.  You can leave a message 24 hours per day and they will return your call.        Test Results From Your Hospital Stay               Thank you for choosing [facility]       Thank you for choosing [facility] for your care. Our goal is always to provide you with excellent care. Hearing back from our patients is one way we can continue to improve our services. Please take a few minutes to complete the written survey that you may receive in the mail after you visit with us. Thank you!        [facility] Information     [facility] lets you send messages to your doctor, view your test results, renew your prescriptions, schedule appointments and more. To sign up, go to [email or web address] . Click on \"Log in\" on the left side of the screen, which will take you to the Welcome page. Then click on \"Sign up Now\" on the right side of the page.     You will be asked to enter the access code listed below, as well as some personal information. Please follow the directions to create your username and password.     Your access code is: [email or web address]  Expires: 2018 10:01 PM     Your access code will  in 90 days. If you need help or a new code, please call your [facility] clinic or [phone].        Care EveryWhere ID     This is your Care EveryWhere ID. This could be used by other organizations to access your [facility] medical records  [id number]        Equal Access to Services     REBECCA HAN : Courtney Costello, mumtaz estevez, jackson bernardo. So [facility] [phone].    ATENCIÓN: Si habla español, tiene a espinoza disposición servicios gratuitos de " asistencia lingüística. [place] al [phone].    We comply with applicable federal civil rights laws and [place] laws. We do not discriminate on the basis of race, color, national origin, age, disability, sex, sexual orientation, or gender identity.            After Visit Summary       This is your record. Keep this with you and show to your community pharmacist(s) and doctor(s) at your next visit.

## 2018-05-28 NOTE — ED PROVIDER NOTES
History     Chief Complaint   Patient presents with     Post-op Problem     had nasal surgery, reports ongoing bleeding and pain, out of meds     HPI  Micah Nunez is a 52 year old male who is in urgent care with concern over postoperative pain.  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  He states that he has had this surgery several times previously however provider elected to use an alternative technique this most recent time.  He states that his pain has been more severe in the past.  He additionally had persistent bleeding for several days however that has discontinued since last night.  He denies any fever, chills, myalgias, headache, sore throat, problems swallowing, cough, dyspnea, wheezing.  He has been attempting to treat with Tylenol.  He has not taken any OTC NSAIDs due to recommendations of the ENT provider.  Did place a call to ENT earlier this week requesting additional pain meds, however epic records do not indicate any callback was given    Problem List:    Patient Active Problem List    Diagnosis Date Noted     Benign essential hypertension 10/18/2017     Priority: Medium     Tobacco use disorder 08/12/2015     Priority: Medium     Chemical dependency (H) 07/27/2015     Priority: Medium     Papilloma of nose 03/10/2014     Priority: Medium     Nasal mass 12/17/2013     Priority: Medium     CARDIOVASCULAR SCREENING; LDL GOAL LESS THAN 130 12/11/2013     Priority: Medium        Past Medical History:    Past Medical History:   Diagnosis Date     Benign essential hypertension 10/18/2017     Bleeding disorder (H)      CARDIOVASCULAR SCREENING; LDL GOAL LESS THAN 130 12/11/2013     Chemical dependency (H)      Gastroesophageal reflux disease      Hypertension      Nasal mass 12/17/2013     Papilloma of nose 3/10/2014     Skin disease        Past Surgical History:    Past Surgical History:   Procedure Laterality Date     ARTHRODESIS FOOT Left 2/17/2015     Procedure: ARTHRODESIS FOOT;  Surgeon: Cortez Hayward DPM;  Location: [facility] OR     ARTHRODESIS TOE(S)  12/20/2013    Procedure: ARTHRODESIS TOE(S);  Arthrodesis first metatarsophalangeal joint left foot;  Surgeon: Cortez Hayward DPM;  Location: [facility] OR     ENDOSCOPIC SINUS SURGERY  1/6/2014    Procedure: ENDOSCOPIC SINUS SURGERY;  Functional Endoscopic Sinus Surgery;  Surgeon: Bobo Renteria MD;  Location:  OR     ENDOSCOPIC SINUS SURGERY  7/21/2014    Procedure: ENDOSCOPIC SINUS SURGERY;  Surgeon: Bobo Renteria MD;  Location:  OR     ENDOSCOPIC SINUS SURGERY N/A 4/6/2015    Procedure: ENDOSCOPIC SINUS SURGERY;  Surgeon: Bobo Renteria MD;  Location:  OR     ENDOSCOPIC SINUS SURGERY N/A 8/17/2015    Procedure: ENDOSCOPIC SINUS SURGERY;  Surgeon: Bobo Renteria MD;  [facility]: [facility]     ENT SURGERY       EXCISE NASAL MASS Left 11/6/2017    Procedure: EXCISE NASAL MASS;  Excision of Left Nasal Papilloma;  Surgeon: Bobo Renteria MD;  Location:  OR     LAPAROSCOPIC HERNIORRHAPHY INGUINAL BILATERAL Bilateral 4/12/2017    Procedure: LAPAROSCOPIC HERNIORRHAPHY INGUINAL BILATERAL;  Surgeon: Shay Mercado MD;  Location: [facility] OR     NASAL ENDOSCOPY Bilateral 2/6/2017    Procedure: NASAL ENDOSCOPY;  Surgeon: Bobo Renteria MD;  Location:  OR     NASAL ENDOSCOPY N/A 5/21/2018    Procedure: NASAL ENDOSCOPY;  Nasal Endoscopy, CO2 Laser Excision of Left Nasal Papilloma;  Surgeon: Bobo Renteria MD;  Location:  OR     NASAL/SINUS POLYPECTOMY       PE TUBES         Family History:    Family History   Problem Relation Age of Onset     DIABETES Mother 40     C.A.D. Father 72     Unknown/Adopted No family hx of      Depression No family hx of      Anxiety Disorder No family hx of      Schizophrenia No family hx of      Bipolar Disorder No family hx of      Suicide No family hx of      Substance Abuse No family hx of      Dementia No family hx of      [place]  Disease No family hx of      Parkinsonism No family hx of      Autism Spectrum Disorder No family hx of      Intellectual Disability (Mental Retardation) No family hx of      MENTAL ILLNESS No family hx of      Bleeding Disorder No family hx of        Social History:  Marital Status:  Single [1]  Social History   Substance Use Topics     Smoking status: Current Every Day Smoker     Packs/day: 1.00     Years: 30.00     Types: Cigarettes     Start date: 1/1/1980     Smokeless tobacco: Never Used      Comment: 5 per day     Alcohol use No      Comment: QUIT MARCH 2016        Medications:      oxyCODONE-acetaminophen (PERCOCET) 5-325 MG per tablet   acetaminophen (TYLENOL) 325 MG tablet   ibuprofen (ADVIL/MOTRIN) 600 MG tablet   lisinopril-hydrochlorothiazide (PRINZIDE/ZESTORETIC) 10-12.5 MG per tablet   order for DME   oxyCODONE IR (ROXICODONE) 5 MG tablet   oxymetazoline (AFRIN NASAL SPRAY) 0.05 % spray   sodium chloride (AFRIN SALINE NASAL MIST) 0.65 % nasal spray   triamcinolone (KENALOG) 0.1 % ointment   triamcinolone (KENALOG) 0.1 % ointment     Review of Systems  CONSTITUTIONAL:NEGATIVE for fever, chills, change in weight  INTEGUMENTARY/SKIN: NEGATIVE for worrisome rashes, moles or lesions  EYES: NEGATIVE for vision changes or irritation  ENT/MOUTH: POSITIVE for nasal pain, epistaxis NEGATIVE for sore throat, ear pain   RESP:NEGATIVE for significant cough or SOBN  Physical Exam   BP: 112/78  Pulse: 90  Temp: 98.1  F (36.7  C)  Resp: 16  Weight: 88.5 kg (195 lb)  SpO2: 96 %  Physical Exam    GENERAL APPEARANCE: healthy, alert and no distress  EYES: EOMI,  PERRL, conjunctiva clear  HENT: ear canals and TM's normal.  There is dried blood, granulation tissue present in the left nares, nares is patent.  No septal hematoma.  Left maxillary sinus tenderness to palpation.  Posterior pharynx is nonerythematous without exudate  NECK: supple, nontender, no lymphadenopathy  RESP: lungs clear to auscultation - no rales,  rhonchi or wheezes  CV: regular rates and rhythm, normal S1 S2, no murmur noted  SKIN: no suspicious lesions or rashes  52-year-old male presents to the urgent care with concern over acuteED Course     ED Course     Procedures          Critical Care time:  none        No results found for this or any previous visit (from the past 24 hour(s)).    Medications - No data to display    Assessments & Plan (with Medical Decision Making)     I have reviewed the nursing notes.    I have reviewed the findings, diagnosis, plan and need for follow up with the patient.       Discharge Medication List as of 5/27/2018  2:37 PM      START taking these medications    Details   oxyCODONE-acetaminophen (PERCOCET) 5-325 MG per tablet Take 1 tablet by mouth every 4 hours as needed for pain, Disp-5 tablet, R-0, Local Print           Final diagnoses:   Acute post-operative pain     52-year-old male presents to urgent care with concern over postoperative pain after having repair of inverted papilloma of his left nares within the last week.  He had stable vital signs upon arrival.  Physical exam findings as described above did show some postoperative changes in the left nares.  I discussed with patient that I would expect his pain to be improving at this point and would not recommend relying on opioids especially if he has not had them in the last several days.  However, I did agree to provide a small number of tablets (five) to cover until he can talk to his surgeon after Holiday weekend.  He was instructed that surgeon must manage his pain needs from here out, no additionally refills from the UC or ER.  Follow up with surgeon in two days.  Worrisome reasons to return to ER/UC sooner discussed.      Disclaimer: This note consists of symbols derived from keyboarding, dictation, and/or voice recognition software. As a result, there may be errors in the script that have gone undetected.  Please consider this when interpreting information  found in the chart.    5/27/2018   [facility] EMERGENCY DEPARTMENT     Vivi Jackson, ERIN  05/30/18 2354

## 2018-05-29 ENCOUNTER — PATIENT OUTREACH (OUTPATIENT)
Dept: CARE COORDINATION | Facility: CLINIC | Age: 53
End: 2018-05-29

## 2018-05-29 NOTE — PROGRESS NOTES
Clinic Care Coordination Contact    Situation: Patient chart reviewed by care coordinator.    Background: Patient triggered on S DC list as 50% risk score and having 2 ED visits in 90 days (6 in 1 year).  First visit  for toe pain/injury after dropping something on his foot in April, second visit was documented as ED but provider note clearly states it was UC. He was needing pain meds as he had a recent nasal surgery and ran out of pain meds.He was given 5 oxycodone to hold him until he sees surgeon for f/u.There are no other indicators on risk score.      Assessment: Patient is borderline for ED usage over the last year but has had only 1 visit in 90 days and it is not related to a chronic illness.     Plan/Recommendations: RN CC will not outreach at this time.    Nam GUZMÁN,RN- BC  Clinic Care Coordinator  [facility]  Phone: [phone]

## 2018-05-31 ENCOUNTER — TELEPHONE (OUTPATIENT)
Dept: OTOLARYNGOLOGY | Facility: CLINIC | Age: 53
End: 2018-05-31

## 2018-05-31 NOTE — TELEPHONE ENCOUNTER
The patient is doing great today, breathing well and no bloody noses. He is using the saline rinses liberally. Several days after surgery he pain was still significant so her returned to the ER on 5-27 and did receive 5 more oxycodone tabs. He is scheduled for in clinic follow up but he is asking about holding off on that for now as he has had this surgery about 1/2 dozen times before and is very familiar with healing and knowing when he is doing  Well and when he is not. This will be reviewed with Dr Renteria and we will get back to Micah.  Piter Pascual ,AICHA  [phone]

## 2018-06-14 NOTE — OP NOTE
Procedure Date: 2018      DATE OF SERVICE:  2018      PREOPERATIVE DIAGNOSIS:  Nasal papillomas.      POSTOPERATIVE DIAGNOSIS:  Inverting papillomas left nose.      PROCEDURES:  Endoscopic resection left nose papillomas.      SURGEON:  Bobo Renteria MD, David Romero.      BLOOD LOSS:  Minimal.      COMPLICATIONS:  None.      INDICATIONS FOR SURGERY:  Dallas Nunez has been undergoing local resections of inverting papillomas in his nose.  They have not turned into cancer.  They have been present in an usual place along his anterior nasal wall and along the inferior turbinates.  We have not want to do a radical resection on these for the simple reason of causing him another problem like ozena. He comes to the operating room now to have this addressed.      DESCRIPTION OF PROCEDURE:  After informed consent was obtained, the patient was brought to the operating room and general endotracheal anesthesia was established.  The nose was examined with an endoscope.  The endoscope was used to examine left nose.  We used both a CO2 laser at 5 garcia as well as upbiting forceps, straight biting forceps and we went ahead and debrided the inside of his nose of these papillomas.  There were some along the anterior septum that we took off.  There were some along the inferior turbinates we took off.  We went also into the ostiomeatal complex and the ostiomeatal complex on the left side.  There were a few at the anterior turbinate tip and these were removed as well.  They were taken out in their entirety.  We worked our way all the way back to the nasopharynx.  There was minimal to no papilloma left inside of his nose.  The patient was then awakened after Afrin and packing was applied.         BOBO RENTERIA MD             D: 2018   T: 2018   MT: MARCELINO      Name:     DALLAS NUNEZ   MRN:      [id number]        Account:        [id number]   :      1965           Procedure Date: 2018       Document: [id number]

## 2018-10-29 ENCOUNTER — HOSPITAL ENCOUNTER (EMERGENCY)
Facility: CLINIC | Age: 53
Discharge: HOME | End: 2018-10-29
Attending: PHYSICIAN ASSISTANT | Admitting: PHYSICIAN ASSISTANT
Payer: COMMERCIAL

## 2018-10-29 ENCOUNTER — APPOINTMENT (OUTPATIENT)
Dept: GENERAL RADIOLOGY | Facility: CLINIC | Age: 53
End: 2018-10-29
Attending: PHYSICIAN ASSISTANT
Payer: COMMERCIAL

## 2018-10-29 VITALS
BODY MASS INDEX: 24.38 KG/M2 | RESPIRATION RATE: 14 BRPM | DIASTOLIC BLOOD PRESSURE: 99 MMHG | SYSTOLIC BLOOD PRESSURE: 168 MMHG | TEMPERATURE: 97.9 F | OXYGEN SATURATION: 98 % | WEIGHT: 190 LBS

## 2018-10-29 DIAGNOSIS — S99.922A INJURY OF TOE, LEFT, INITIAL ENCOUNTER: ICD-10-CM

## 2018-10-29 DIAGNOSIS — T23.011A: ICD-10-CM

## 2018-10-29 DIAGNOSIS — L03.113 CELLULITIS OF RIGHT UPPER EXTREMITY: ICD-10-CM

## 2018-10-29 PROCEDURE — G0463 HOSPITAL OUTPT CLINIC VISIT: HCPCS | Performed by: PHYSICIAN ASSISTANT

## 2018-10-29 PROCEDURE — 99214 OFFICE O/P EST MOD 30 MIN: CPT | Mod: Z6 | Performed by: PHYSICIAN ASSISTANT

## 2018-10-29 PROCEDURE — 73660 X-RAY EXAM OF TOE(S): CPT | Mod: LT

## 2018-10-29 RX ORDER — CEPHALEXIN 500 MG/1
500 CAPSULE ORAL 3 TIMES DAILY
Qty: 21 CAPSULE | Refills: 0 | Status: SHIPPED | OUTPATIENT
Start: 2018-10-29 | End: 2018-11-05

## 2018-10-29 RX ORDER — MUPIROCIN 20 MG/G
OINTMENT TOPICAL 3 TIMES DAILY
Qty: 22 G | Refills: 1 | Status: SHIPPED | OUTPATIENT
Start: 2018-10-29 | End: 2018-11-03

## 2018-10-29 ASSESSMENT — ENCOUNTER SYMPTOMS: FEVER: 0
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
## 2018-10-29 NOTE — ED AVS SNAPSHOT
[facility]    [address] [facility] [address]    Phone:  [phone]    Fax:  [phone]                                       Micah Nunez   MRN: [id number]    Department:  [facility]   Date of Visit:  10/29/2018           After Visit Summary Signature Page     I have received my discharge instructions, and my questions have been answered. I have discussed any challenges I see with this plan with the nurse or doctor.    ..........................................................................................................................................  Patient/Patient Representative Signature      ..........................................................................................................................................  Patient Representative Print Name and Relationship to Patient    ..................................................               ................................................  Date                                   Time    ..........................................................................................................................................  Reviewed by Signature/Title    ...................................................              ..............................................  Date                                               Time          22EPIC Rev 08/18
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
## 2018-10-30 NOTE — DISCHARGE INSTRUCTIONS
Active range of motion exercises encouraged  Continue and finish antibiotics  Daily dressing changes until healed  Ibuprofen or Tylenol as needed for pain  Follow up with primary care provider in 2 days for recheck.     Use Medication as directed    Follow up with PCP or return to care in 1 weeks follow up for toe pain.    Return to clinic sooner or go to Emergency Room if symptoms worsen or change or signs of infection occur (redness, red streaking, warmth, increased pain, increased swelling, purulent drainage, or fevers occur.)    May use acetaminophen, ibuprofen prn.    Patient voiced understanding of instructions given.

## 2018-11-11 ENCOUNTER — HOSPITAL ENCOUNTER (EMERGENCY)
Facility: CLINIC | Age: 53
Discharge: HOME | End: 2018-11-11
Attending: FAMILY MEDICINE | Admitting: FAMILY MEDICINE
Payer: COMMERCIAL

## 2018-11-11 VITALS
DIASTOLIC BLOOD PRESSURE: 104 MMHG | OXYGEN SATURATION: 99 % | SYSTOLIC BLOOD PRESSURE: 174 MMHG | RESPIRATION RATE: 20 BRPM | TEMPERATURE: 98.6 F | HEART RATE: 87 BPM

## 2018-11-11 DIAGNOSIS — T23.011A: ICD-10-CM

## 2018-11-11 PROCEDURE — 99282 EMERGENCY DEPT VISIT SF MDM: CPT | Performed by: FAMILY MEDICINE

## 2018-11-11 PROCEDURE — 99282 EMERGENCY DEPT VISIT SF MDM: CPT | Mod: Z6 | Performed by: FAMILY MEDICINE

## 2018-11-11 NOTE — ED AVS SNAPSHOT
[facility]    [address] [facility] [address]    Phone:  [phone]    Fax:  [phone]                                       Micah Nunez   MRN: [id number]    Department:  [facility]   Date of Visit:  11/11/2018           After Visit Summary Signature Page     I have received my discharge instructions, and my questions have been answered. I have discussed any challenges I see with this plan with the nurse or doctor.    ..........................................................................................................................................  Patient/Patient Representative Signature      ..........................................................................................................................................  Patient Representative Print Name and Relationship to Patient    ..................................................               ................................................  Date                                   Time    ..........................................................................................................................................  Reviewed by Signature/Title    ...................................................              ..............................................  Date                                               Time          22EPIC Rev 08/18

## 2018-11-11 NOTE — DISCHARGE INSTRUCTIONS
Return to the Emergency Room if the following occurs:     Worsened pain, expanding redness and swelling, fever, or for any concern at anytime.    Or, follow-up with the following provider as we discussed:     Return to your primary doctor as needed.    Medications discussed:    None new.  No changes.    If you received pain-relieving or sedating medication during your time in the ER, avoid alcohol, driving automobiles, or working with machinery.  Also, a responsible adult must stay with you.        Call the Nurse Advice Line at [phone] or [phone] for any concern at anytime.
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
## 2018-11-12 ENCOUNTER — PATIENT OUTREACH (OUTPATIENT)
Dept: CARE COORDINATION | Facility: CLINIC | Age: 53
End: 2018-11-12
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
## 2018-11-13 NOTE — DISCHARGE INSTRUCTIONS
Possible Causes of Low Back or Leg Pain    The symptoms in your back or leg may be due to pressure on a nerve. This pressure may be caused by a damaged disk or by abnormal bone growth. Either way, you may feel pain, burning, tingling, or numbness. If you have pressure on a nerve that connects to the sciatic nerve, pain may shoot down your leg.    Pressure from the disk  Constant wear and tear can weaken a disk over time and cause back pain. The disk can then be damaged by a sudden movement or injury. If its soft center begins to bulge, the disk may press on a nerve. Or the outside of the disk may tear, and the soft center may squeeze through and pinch a nerve.    Pressure from bone  As a disk wears out, the vertebrae right above and below the disk begin to touch. This can put pressure on a nerve. Often, abnormal bone (called bone spurs) grows where the vertebrae rub against each other. This can cause the foramen or the spinal canal to narrow (called stenosis) and press against a nerve.  Date Last Reviewed: 10/4/2015    [id number] [employer]. [address] [place] [address], [place] [address]. All rights reserved. This information is not intended as a substitute for professional medical care. Always follow your healthcare professional's instructions.        
Detail Level: Zone
Recommendation Preamble: The following recommendations were made during the visit:

## 2018-12-13 ENCOUNTER — OFFICE VISIT (OUTPATIENT)
Dept: PODIATRY | Facility: CLINIC | Age: 53
End: 2018-12-13
Payer: COMMERCIAL

## 2018-12-13 VITALS
DIASTOLIC BLOOD PRESSURE: 92 MMHG | HEART RATE: 81 BPM | RESPIRATION RATE: 18 BRPM | HEIGHT: 74 IN | WEIGHT: 195 LBS | BODY MASS INDEX: 25.03 KG/M2 | SYSTOLIC BLOOD PRESSURE: 155 MMHG

## 2018-12-13 DIAGNOSIS — M20.21 HALLUX RIGIDUS OF BOTH FEET: Primary | ICD-10-CM

## 2018-12-13 DIAGNOSIS — M20.22 HALLUX RIGIDUS OF BOTH FEET: Primary | ICD-10-CM

## 2018-12-13 PROCEDURE — 99213 OFFICE O/P EST LOW 20 MIN: CPT | Performed by: PODIATRIST

## 2018-12-13 ASSESSMENT — MIFFLIN-ST. JEOR: SCORE: 1799.26

## 2018-12-13 NOTE — LETTER
12/13/2018         RE: Micah Nunez  [address]  [address]  [facility] [address]        Dear Colleague,    Thank you for referring your patient, Micah Nunez, to the [facility]. Please see a copy of my visit note below.    Micah returns to the office for reevaluation of the right and left foot.  The patient relates following the instructions given at the last visit with noted less pain.  The patient relates overall more  improvement in pain and function of the right and left foot.  The patient relates no other problems.    PAST MEDICAL HISTORY:   Past Medical History:   Diagnosis Date     Benign essential hypertension 10/18/2017     Bleeding disorder (H)      CARDIOVASCULAR SCREENING; LDL GOAL LESS THAN 130 12/11/2013     Chemical dependency (H)      Gastroesophageal reflux disease      Hypertension      Nasal mass 12/17/2013     Papilloma of nose 3/10/2014     Skin disease        BMI= Body mass index is 25.04 kg/m .        Physical Exam:    General: The patient appears to have a pleasant mental affect.    Lower extremity physical exam:  Neurovascular status is intact with palpable pedal pulses and intact epicritic sensations.  Muscular exam is within normal limits to major muscle groups.  Integument is intact.      One notes decreased edema.  One notes no pain on palpation over the first metatarsal phalangeal joint on the left.  No erythema noted.         Assessment:      ICD-10-CM    1. Hallux rigidus of both feet M20.21     M20.22        Plan:  I have explained to Micah about the conditions.  At this time, the patient was instructed on icing, stretching, tissue massage and support.  The patient was referred to [facility] for custom orthotics that will aid in offloading the tension forces to the soft tissues and prevent further inflammation.   The patient will return in four weeks for reevaluation if the symptoms do not  resolve.      Disclaimer: This note consists of symbols derived from keyboarding, dictation and/or voice recognition software. As a result, there may be errors in the script that have gone undetected. Please consider this when interpreting information found in this chart.       DEBI Hayward D.P.M., F.KJC.F.A.S.      Again, thank you for allowing me to participate in the care of your patient.        Sincerely,        Cortez Hayward DPM

## 2018-12-13 NOTE — PROGRESS NOTES
Micah returns to the office for reevaluation of the right and left foot.  The patient relates following the instructions given at the last visit with noted less pain.  The patient relates overall more  improvement in pain and function of the right and left foot.  The patient relates no other problems.    PAST MEDICAL HISTORY:   Past Medical History:   Diagnosis Date     Benign essential hypertension 10/18/2017     Bleeding disorder (H)      CARDIOVASCULAR SCREENING; LDL GOAL LESS THAN 130 12/11/2013     Chemical dependency (H)      Gastroesophageal reflux disease      Hypertension      Nasal mass 12/17/2013     Papilloma of nose 3/10/2014     Skin disease        BMI= Body mass index is 25.04 kg/m .        Physical Exam:    General: The patient appears to have a pleasant mental affect.    Lower extremity physical exam:  Neurovascular status is intact with palpable pedal pulses and intact epicritic sensations.  Muscular exam is within normal limits to major muscle groups.  Integument is intact.      One notes decreased edema.  One notes no pain on palpation over the first metatarsal phalangeal joint on the left.  No erythema noted.         Assessment:      ICD-10-CM    1. Hallux rigidus of both feet M20.21     M20.22        Plan:  I have explained to Micah about the conditions.  At this time, the patient was instructed on icing, stretching, tissue massage and support.  The patient was referred to [facility] for custom orthotics that will aid in offloading the tension forces to the soft tissues and prevent further inflammation.   The patient will return in four weeks for reevaluation if the symptoms do not resolve.      Disclaimer: This note consists of symbols derived from keyboarding, dictation and/or voice recognition software. As a result, there may be errors in the script that have gone undetected. Please consider this when interpreting information found in this chart.       DEBI Gerber  DIMA Hayward, PARTH.F.AJuanS.

## 2018-12-13 NOTE — NURSING NOTE
"Chief Complaint   Patient presents with     RECHECK     left foot after surgery 1 year ago and get a referral for orthotics       Initial BP (!) 155/92 (BP Location: Left arm, Patient Position: Chair, Cuff Size: Adult Regular)   Pulse 81   Resp 18   Ht 1.88 m (6' 2\")   Wt 88.5 kg (195 lb)   BMI 25.04 kg/m   Estimated body mass index is 25.04 kg/m  as calculated from the following:    Height as of this encounter: 1.88 m (6' 2\").    Weight as of this encounter: 88.5 kg (195 lb).  Medications and allergies reviewed.    Heena DO, CMA    "

## 2019-01-01 ENCOUNTER — HOSPITAL ENCOUNTER (EMERGENCY)
Facility: CLINIC | Age: 54
Discharge: HOME | End: 2019-01-01
Attending: PHYSICIAN ASSISTANT | Admitting: PHYSICIAN ASSISTANT
Payer: COMMERCIAL

## 2019-01-01 ENCOUNTER — APPOINTMENT (OUTPATIENT)
Dept: GENERAL RADIOLOGY | Facility: CLINIC | Age: 54
End: 2019-01-01
Attending: PHYSICIAN ASSISTANT
Payer: COMMERCIAL

## 2019-01-01 VITALS
HEIGHT: 76 IN | WEIGHT: 195 LBS | DIASTOLIC BLOOD PRESSURE: 103 MMHG | OXYGEN SATURATION: 94 % | TEMPERATURE: 98 F | BODY MASS INDEX: 23.75 KG/M2 | SYSTOLIC BLOOD PRESSURE: 168 MMHG

## 2019-01-01 DIAGNOSIS — J06.9 VIRAL URI WITH COUGH: ICD-10-CM

## 2019-01-01 PROCEDURE — 99213 OFFICE O/P EST LOW 20 MIN: CPT | Performed by: PHYSICIAN ASSISTANT

## 2019-01-01 PROCEDURE — G0463 HOSPITAL OUTPT CLINIC VISIT: HCPCS | Mod: 25

## 2019-01-01 PROCEDURE — 71046 X-RAY EXAM CHEST 2 VIEWS: CPT

## 2019-01-01 RX ORDER — BENZONATATE 200 MG/1
200 CAPSULE ORAL 3 TIMES DAILY PRN
Qty: 21 CAPSULE | Refills: 0 | Status: SHIPPED | OUTPATIENT
Start: 2019-01-01 | End: 2019-06-03

## 2019-01-01 ASSESSMENT — ENCOUNTER SYMPTOMS
FEVER: 1
COUGH: 1
ARTHRALGIAS: 1

## 2019-01-01 ASSESSMENT — MIFFLIN-ST. JEOR: SCORE: 1831.01

## 2019-01-01 NOTE — ED AVS SNAPSHOT
[facility] Emergency Department  [address] [facility] [address]  Phone:  [phone]  Fax:  [phone]                                    Micah Nunez   MRN: [id number]    Department:  [facility]   Date of Visit:  1/1/2019           After Visit Summary Signature Page    I have received my discharge instructions, and my questions have been answered. I have discussed any challenges I see with this plan with the nurse or doctor.    ..........................................................................................................................................  Patient/Patient Representative Signature      ..........................................................................................................................................  Patient Representative Print Name and Relationship to Patient    ..................................................               ................................................  Date                                   Time    ..........................................................................................................................................  Reviewed by Signature/Title    ...................................................              ..............................................  Date                                               Time          22EPIC Rev 08/18

## 2019-01-02 NOTE — ED PROVIDER NOTES
History     Chief Complaint   Patient presents with     Cough     for 3 days, unable to sleep, fever     HPI  Micah Nunez is a 53 year old male who presents with complaints of productive cough for the past 3 days.  Pt also c/o associated subjective fevers, congestion, and body aches.  He states he has been unable to sleep and his abdominal muscles are hurting from all his coughing.  Denies rash, neck pain/stiffness, chest pain, or shortness of breath.  Pt has history of tobacco abuse.  He denies ill contacts.      Problem List:    Patient Active Problem List    Diagnosis Date Noted     Benign essential hypertension 10/18/2017     Priority: Medium     Tobacco use disorder 08/12/2015     Priority: Medium     Chemical dependency (H) 07/27/2015     Priority: Medium     Papilloma of nose 03/10/2014     Priority: Medium     Nasal mass 12/17/2013     Priority: Medium     CARDIOVASCULAR SCREENING; LDL GOAL LESS THAN 130 12/11/2013     Priority: Medium        Past Medical History:    Past Medical History:   Diagnosis Date     Benign essential hypertension 10/18/2017     Bleeding disorder (H)      CARDIOVASCULAR SCREENING; LDL GOAL LESS THAN 130 12/11/2013     Chemical dependency (H)      Gastroesophageal reflux disease      Hypertension      Nasal mass 12/17/2013     Papilloma of nose 3/10/2014     Skin disease        Past Surgical History:    Past Surgical History:   Procedure Laterality Date     ARTHRODESIS FOOT Left 2/17/2015    Procedure: ARTHRODESIS FOOT;  Surgeon: Cortez Hayward DPM;  Location: [facility]     ARTHRODESIS TOE(S)  12/20/2013    Procedure: ARTHRODESIS TOE(S);  Arthrodesis first metatarsophalangeal joint left foot;  Surgeon: Cortez Hayward DPM;  Location: [facility]     ENDOSCOPIC SINUS SURGERY  1/6/2014    Procedure: ENDOSCOPIC SINUS SURGERY;  Functional Endoscopic Sinus Surgery;  Surgeon: Boob Renteria MD;  Location:  OR     ENDOSCOPIC SINUS SURGERY  7/21/2014    Procedure:  ENDOSCOPIC SINUS SURGERY;  Surgeon: Bobo Renteria MD;  [facility]     ENDOSCOPIC SINUS SURGERY N/A 4/6/2015    Procedure: ENDOSCOPIC SINUS SURGERY;  Surgeon: Bobo Renteria MD;  [facility]     ENDOSCOPIC SINUS SURGERY N/A 8/17/2015    Procedure: ENDOSCOPIC SINUS SURGERY;  Surgeon: Bobo Renteria MD;  [facility]     ENT SURGERY       EXCISE NASAL MASS Left 11/6/2017    Procedure: EXCISE NASAL MASS;  Excision of Left Nasal Papilloma;  Surgeon: Bobo Renteria MD;  [facility]     LAPAROSCOPIC HERNIORRHAPHY INGUINAL BILATERAL Bilateral 4/12/2017    Procedure: LAPAROSCOPIC HERNIORRHAPHY INGUINAL BILATERAL;  Surgeon: Shay Mercado MD;  Location: [facility]     NASAL ENDOSCOPY Bilateral 2/6/2017    Procedure: NASAL ENDOSCOPY;  Surgeon: Bobo Renteria MD;  [facility]     NASAL ENDOSCOPY N/A 5/21/2018    Procedure: NASAL ENDOSCOPY;  Nasal Endoscopy, CO2 Laser Excision of Left Nasal Papilloma;  Surgeon: Bobo Renteria MD;  [facility]     NASAL/SINUS POLYPECTOMY       PE TUBES         Family History:    Family History   Problem Relation Age of Onset     Diabetes Mother 40     C.A.D. Father 72     Unknown/Adopted No family hx of      Depression No family hx of      Anxiety Disorder No family hx of      Schizophrenia No family hx of      Bipolar Disorder No family hx of      Suicide No family hx of      Substance Abuse No family hx of      Dementia No family hx of      Leon Disease No family hx of      Parkinsonism No family hx of      Autism Spectrum Disorder No family hx of      Intellectual Disability (Mental Retardation) No family hx of      Mental Illness No family hx of      Bleeding Disorder No family hx of        Social History:  Marital Status:  Single [1]  Social History     Tobacco Use     Smoking status: Current Every Day Smoker     Packs/day: 1.00     Years: 30.00     Pack years: 30.00     Types: Cigarettes     Start date: 1/1/1980      "Smokeless tobacco: Never Used     Tobacco comment: 5 per day   Substance Use Topics     Alcohol use: Yes     Drug use: No     Comment: 7 years quit Cocaine/methamphetamines        Medications:      benzonatate (TESSALON) 200 MG capsule   acetaminophen (TYLENOL) 325 MG tablet   ibuprofen (ADVIL/MOTRIN) 600 MG tablet   lisinopril-hydrochlorothiazide (PRINZIDE/ZESTORETIC) 10-12.5 MG per tablet   order for DME   oxymetazoline (AFRIN NASAL SPRAY) 0.05 % spray   sodium chloride (AFRIN SALINE NASAL MIST) 0.65 % nasal spray   triamcinolone (KENALOG) 0.1 % ointment   triamcinolone (KENALOG) 0.1 % ointment         Review of Systems   Constitutional: Positive for fever.   HENT: Positive for congestion.    Respiratory: Positive for cough.    Musculoskeletal: Positive for arthralgias.   All other systems reviewed and are negative.      Physical Exam   BP: (!) 168/103  Heart Rate: 89  Temp: 98  F (36.7  C)  Height: 193 cm (6' 4\")  Weight: 88.5 kg (195 lb)  SpO2: 94 %      Physical Exam   Constitutional: He is oriented to person, place, and time. He appears well-developed and well-nourished.  Non-toxic appearance. No distress.   HENT:   Head: Normocephalic and atraumatic.   Right Ear: Hearing, tympanic membrane, external ear and ear canal normal.   Left Ear: Hearing, tympanic membrane, external ear and ear canal normal.   Nose: Mucosal edema present.   Mouth/Throat: Uvula is midline and mucous membranes are normal. No uvula swelling. Posterior oropharyngeal erythema present. No oropharyngeal exudate, posterior oropharyngeal edema or tonsillar abscesses.   Eyes: Conjunctivae and EOM are normal. Pupils are equal, round, and reactive to light.   Neck: Normal range of motion and full passive range of motion without pain. Neck supple. No neck rigidity. Normal range of motion present.   Cardiovascular: Normal rate, regular rhythm and normal heart sounds.   Pulmonary/Chest: Effort normal and breath sounds normal. No stridor. No " respiratory distress. He has no decreased breath sounds. He has no wheezes. He has no rhonchi. He has no rales.   Musculoskeletal: Normal range of motion.   Lymphadenopathy:     He has no cervical adenopathy.   Neurological: He is alert and oriented to person, place, and time.   Skin: Skin is warm and dry. No rash noted.       ED Course        Procedures    Results for orders placed or performed during the hospital encounter of 01/01/19 (from the past 24 hour(s))   XR Chest 2 Views    Narrative    XR CHEST TWO VIEWS   1/1/2019 7:52 PM     HISTORY: Cough, fevers.    COMPARISON: None.      Impression    IMPRESSION: No acute abnormality. Lungs are well-inflated and clear.  Heart size is normal.    MAEVE GEE MD       Medications - No data to display    Assessments & Plan (with Medical Decision Making)     Pt is a 53 year old male who presents with complaints of productive cough for the past 3 days.  Pt also c/o associated subjective fevers, congestion, and body aches.  He states he has been unable to sleep and his abdominal muscles are hurting from all his coughing.  Pt is afebrile on arrival.  Exam as above.  Chest x-ray was negative for pneumonia or acute pathology.  Discussed results with patient.  Encouraged symptomatic treatments at home.  Return precautions were reviewed.  Hand-outs were provided.    Patient was sent with Tessalon and was instructed to follow-up with PCP if no improvement in 5-7 days for continued care and management or sooner if new or worsening symptoms.  He is to return to the ED for persistent and/or worsening symptoms.  Patient expressed understanding of the diagnosis and plan and was discharged home in good condition.    I have reviewed the nursing notes.    I have reviewed the findings, diagnosis, plan and need for follow up with the patient.       Medication List      Started    benzonatate 200 MG capsule  Commonly known as:  TESSALON  200 mg, Oral, 3 TIMES DAILY PRN            Final  diagnoses:   Viral URI with cough       1/1/2019   [facility] EMERGENCY DEPARTMENT      Disclaimer:  This note consists of symbols derived from keyboarding, dictation and/or voice recognition software.  As a result, there may be errors in the script that have gone undetected.  Please consider this when interpreting information found in this chart.     Dottie Veliz PA-C  01/01/19 [id number]

## 2019-01-08 ENCOUNTER — OFFICE VISIT (OUTPATIENT)
Dept: UROLOGY | Facility: CLINIC | Age: 54
End: 2019-01-08
Payer: COMMERCIAL

## 2019-01-08 ENCOUNTER — OFFICE VISIT (OUTPATIENT)
Dept: FAMILY MEDICINE | Facility: CLINIC | Age: 54
End: 2019-01-08
Payer: COMMERCIAL

## 2019-01-08 VITALS
OXYGEN SATURATION: 99 % | BODY MASS INDEX: 24.9 KG/M2 | TEMPERATURE: 97.7 F | RESPIRATION RATE: 16 BRPM | HEART RATE: 91 BPM | SYSTOLIC BLOOD PRESSURE: 158 MMHG | DIASTOLIC BLOOD PRESSURE: 96 MMHG | HEIGHT: 74 IN | WEIGHT: 194 LBS

## 2019-01-08 VITALS
RESPIRATION RATE: 18 BRPM | TEMPERATURE: 97.7 F | HEART RATE: 81 BPM | SYSTOLIC BLOOD PRESSURE: 156 MMHG | DIASTOLIC BLOOD PRESSURE: 90 MMHG

## 2019-01-08 DIAGNOSIS — Z23 NEED FOR VACCINATION: ICD-10-CM

## 2019-01-08 DIAGNOSIS — Z13.220 LIPID SCREENING: ICD-10-CM

## 2019-01-08 DIAGNOSIS — N43.3 HYDROCELE IN ADULT: Primary | ICD-10-CM

## 2019-01-08 DIAGNOSIS — I10 BENIGN ESSENTIAL HYPERTENSION: ICD-10-CM

## 2019-01-08 DIAGNOSIS — Z12.11 SCREENING FOR COLON CANCER: ICD-10-CM

## 2019-01-08 DIAGNOSIS — Z00.00 ENCOUNTER FOR ROUTINE ADULT HEALTH EXAMINATION WITHOUT ABNORMAL FINDINGS: Primary | ICD-10-CM

## 2019-01-08 DIAGNOSIS — D14.0 INVERTED PAPILLOMA OF LATERAL NASAL WALL: ICD-10-CM

## 2019-01-08 DIAGNOSIS — Z11.59 ENCOUNTER FOR HEPATITIS C SCREENING TEST FOR LOW RISK PATIENT: ICD-10-CM

## 2019-01-08 PROCEDURE — 99396 PREV VISIT EST AGE 40-64: CPT | Performed by: FAMILY MEDICINE

## 2019-01-08 PROCEDURE — 99213 OFFICE O/P EST LOW 20 MIN: CPT | Performed by: UROLOGY

## 2019-01-08 RX ORDER — LISINOPRIL/HYDROCHLOROTHIAZIDE 10-12.5 MG
1 TABLET ORAL DAILY
Qty: 90 TABLET | Refills: 3 | Status: SHIPPED | OUTPATIENT
Start: 2019-01-08 | End: 2020-05-28

## 2019-01-08 RX ORDER — ECHINACEA PURPUREA EXTRACT 125 MG
1 TABLET ORAL DAILY PRN
Qty: 100 ML | Refills: 1 | Status: SHIPPED | OUTPATIENT
Start: 2019-01-08 | End: 2019-08-13

## 2019-01-08 RX ORDER — OXYMETAZOLINE HYDROCHLORIDE 0.05 G/100ML
2-3 SPRAY NASAL 2 TIMES DAILY PRN
Qty: 1 BOTTLE | Refills: 0 | Status: SHIPPED | OUTPATIENT
Start: 2019-01-08 | End: 2019-08-13

## 2019-01-08 ASSESSMENT — MIFFLIN-ST. JEOR: SCORE: 1794.73

## 2019-01-08 NOTE — PROGRESS NOTES
SUBJECTIVE:   CC: Micah Nunez is an 53 year old male who presents for preventive health visit.   Chief Complaint   Patient presents with     Physical     Hypertension     Refill Request     Blood Draw     patient is not fasting      Forms     for [facility]       Patient is a 53 yr old male here for his annual physical . He has a past medical history significant for hypertension ,inverted papilloma of lateral nasal wall . He also has tobacco dependence and has tried very hard to quit smoking. He reports that he stopped taking his blood pressure medication on his own.His blood pressure is high today.    Patient has longstanding history of mod-severe HTN , currently denies any symptoms referable to elevated blood pressure. Specifically denies chest pain, palpitations, dyspnea, orthopnea, PND or peripheral edema. Blood pressure readings have been in  high range. Current medication regimen is as listed below. Patient denies any side effects of medication.                                                                                                                                                                                          .      He will like refills on the blood pressure medication. He is also due for colon cancer screening and labs .       Healthy Habits:    Do you get at least three servings of calcium containing foods daily (dairy, green leafy vegetables, etc.)? yes    Amount of exercise or daily activities, outside of work: 5-7 day(s) per week    Problems taking medications regularly patient is out of his bp medication     Medication side effects: No    Have you had an eye exam in the past two years? yes    Do you see a dentist twice per year? yes    Do you have sleep apnea, excessive snoring or daytime drowsiness?no      Medication Followup of All medication     Taking Medication as prescribed: yes    Side Effects:  None    Medication Helping Symptoms:  yes     Hypertension  Follow-up      Outpatient blood pressures are not being checked.    Low Salt Diet: low salt      Today's PHQ-2 Score:   PHQ-2 ( 1999 [employer]) 1/8/2019 4/24/2018   Q1: Little interest or pleasure in doing things 0 0   Q2: Feeling down, depressed or hopeless 0 0   PHQ-2 Score 0 0       Abuse: Current or Past(Physical, Sexual or Emotional)- No  Do you feel safe in your environment? Yes    Social History     Tobacco Use     Smoking status: Current Every Day Smoker     Packs/day: 1.00     Years: 30.00     Pack years: 30.00     Types: Cigarettes     Start date: 1/1/1980     Smokeless tobacco: Never Used     Tobacco comment: 5 per day   Substance Use Topics     Alcohol use: Yes     If you drink alcohol do you typically have >3 drinks per day or >7 drinks per week? No                      Last PSA: No results found for: PSA    Reviewed orders with patient. Reviewed health maintenance and updated orders accordingly - Yes  Labs reviewed in EPIC  BP Readings from Last 3 Encounters:   01/08/19 156/90   01/08/19 (!) 158/96   01/01/19 (!) 168/103    Wt Readings from Last 3 Encounters:   01/08/19 88 kg (194 lb)   01/01/19 88.5 kg (195 lb)   12/13/18 88.5 kg (195 lb)                  Patient Active Problem List   Diagnosis     CARDIOVASCULAR SCREENING; LDL GOAL LESS THAN 130     Nasal mass     Papilloma of nose     Chemical dependency (H)     Tobacco use disorder     Benign essential hypertension     Past Surgical History:   Procedure Laterality Date     ARTHRODESIS FOOT Left 2/17/2015    Procedure: ARTHRODESIS FOOT;  Surgeon: Cortez Hayward DPM;  Location: [facility]     ARTHRODESIS TOE(S)  12/20/2013    Procedure: ARTHRODESIS TOE(S);  Arthrodesis first metatarsophalangeal joint left foot;  Surgeon: Cortez Hayward DPM;  Location: [facility]     ENDOSCOPIC SINUS SURGERY  1/6/2014    Procedure: ENDOSCOPIC SINUS SURGERY;  Functional Endoscopic Sinus Surgery;  Surgeon: Bobo Renteria MD;  Location:  OR     ENDOSCOPIC SINUS  SURGERY  7/21/2014    Procedure: ENDOSCOPIC SINUS SURGERY;  Surgeon: Bobo Renteria MD;  [facility]     ENDOSCOPIC SINUS SURGERY N/A 4/6/2015    Procedure: ENDOSCOPIC SINUS SURGERY;  Surgeon: Bobo Renteria MD;  [facility]     ENDOSCOPIC SINUS SURGERY N/A 8/17/2015    Procedure: ENDOSCOPIC SINUS SURGERY;  Surgeon: Bobo Renteria MD;  [facility]     ENT SURGERY       EXCISE NASAL MASS Left 11/6/2017    Procedure: EXCISE NASAL MASS;  Excision of Left Nasal Papilloma;  Surgeon: Bobo Renteria MD;  [facility]     LAPAROSCOPIC HERNIORRHAPHY INGUINAL BILATERAL Bilateral 4/12/2017    Procedure: LAPAROSCOPIC HERNIORRHAPHY INGUINAL BILATERAL;  Surgeon: Shay Mercado MD;  Location: [facility]     NASAL ENDOSCOPY Bilateral 2/6/2017    Procedure: NASAL ENDOSCOPY;  Surgeon: Bobo Renteria MD;  [facility]     NASAL ENDOSCOPY N/A 5/21/2018    Procedure: NASAL ENDOSCOPY;  Nasal Endoscopy, CO2 Laser Excision of Left Nasal Papilloma;  Surgeon: Bobo Renteria MD;  [facility]     NASAL/SINUS POLYPECTOMY       PE TUBES         Social History     Tobacco Use     Smoking status: Current Every Day Smoker     Packs/day: 1.00     Years: 30.00     Pack years: 30.00     Types: Cigarettes     Start date: 1/1/1980     Smokeless tobacco: Never Used     Tobacco comment: 5 per day   Substance Use Topics     Alcohol use: Yes     Family History   Problem Relation Age of Onset     Diabetes Mother 40     C.A.D. Father 72     Unknown/Adopted No family hx of      Depression No family hx of      Anxiety Disorder No family hx of      Schizophrenia No family hx of      Bipolar Disorder No family hx of      Suicide No family hx of      Substance Abuse No family hx of      Dementia No family hx of      Pickaway Disease No family hx of      Parkinsonism No family hx of      Autism Spectrum Disorder No family hx of      Intellectual Disability (Mental Retardation) No family hx of      Mental  Illness No family hx of      Bleeding Disorder No family hx of          Current Outpatient Medications   Medication Sig Dispense Refill     benzonatate (TESSALON) 200 MG capsule Take 1 capsule (200 mg) by mouth 3 times daily as needed for cough 21 capsule 0     ibuprofen (ADVIL/MOTRIN) 600 MG tablet Take 1 tablet (600 mg) by mouth every 6 hours 40 tablet 0     lisinopril-hydrochlorothiazide (PRINZIDE/ZESTORETIC) 10-12.5 MG tablet Take 1 tablet by mouth daily 90 tablet 3     oxymetazoline (AFRIN NASAL SPRAY) 0.05 % nasal spray Spray 2-3 sprays into both nostrils 2 times daily as needed for congestion 1 Bottle 0     triamcinolone (KENALOG) 0.1 % ointment APPLY SPARINGLY TO AFFECTED AREA THREE TIMES A DAY FOR 14 DAYS 80 g 0     acetaminophen (TYLENOL) 325 MG tablet Take 2 tablets (650 mg) by mouth every 4 hours as needed for other (mild pain) 30 tablet 0     order for DME Equipment being ordered: Dynaflex insert 2 Units 0     sodium chloride (AFRIN SALINE NASAL MIST) 0.65 % nasal spray Spray 1 spray into both nostrils daily as needed for congestion 100 mL 1     No Known Allergies    Reviewed and updated as needed this visit by clinical staff  Tobacco  Allergies  Meds  Problems  Med Hx  Surg Hx  Fam Hx         Reviewed and updated as needed this visit by Provider  Tobacco  Allergies  Meds  Problems  Med Hx  Surg Hx  Fam Hx        Past Medical History:   Diagnosis Date     Benign essential hypertension 10/18/2017     Bleeding disorder (H)      CARDIOVASCULAR SCREENING; LDL GOAL LESS THAN 130 12/11/2013     Chemical dependency (H)      Gastroesophageal reflux disease      Hypertension      Nasal mass 12/17/2013     Papilloma of nose 3/10/2014     Skin disease       Past Surgical History:   Procedure Laterality Date     ARTHRODESIS FOOT Left 2/17/2015    Procedure: ARTHRODESIS FOOT;  Surgeon: Cortez Hayward DPM;  Location: [facility]     ARTHRODESIS TOE(S)  12/20/2013    Procedure: ARTHRODESIS TOE(S);   Arthrodesis first metatarsophalangeal joint left foot;  Surgeon: Cortez Hayward DPM;  [facility]: [facility]     ENDOSCOPIC SINUS SURGERY  1/6/2014    Procedure: ENDOSCOPIC SINUS SURGERY;  Functional Endoscopic Sinus Surgery;  Surgeon: Bobo Renteria MD;  [facility]     ENDOSCOPIC SINUS SURGERY  7/21/2014    Procedure: ENDOSCOPIC SINUS SURGERY;  Surgeon: Bobo Renteria MD;  [facility]     ENDOSCOPIC SINUS SURGERY N/A 4/6/2015    Procedure: ENDOSCOPIC SINUS SURGERY;  Surgeon: Bobo Renteria MD;  [facility]     ENDOSCOPIC SINUS SURGERY N/A 8/17/2015    Procedure: ENDOSCOPIC SINUS SURGERY;  Surgeon: Bobo Renteria MD;  [facility]     ENT SURGERY       EXCISE NASAL MASS Left 11/6/2017    Procedure: EXCISE NASAL MASS;  Excision of Left Nasal Papilloma;  Surgeon: Bobo Renteria MD;  [facility]     LAPAROSCOPIC HERNIORRHAPHY INGUINAL BILATERAL Bilateral 4/12/2017    Procedure: LAPAROSCOPIC HERNIORRHAPHY INGUINAL BILATERAL;  Surgeon: Shay Mercado MD;  [facility]     NASAL ENDOSCOPY Bilateral 2/6/2017    Procedure: NASAL ENDOSCOPY;  Surgeon: Bobo Renteria MD;  [facility]     NASAL ENDOSCOPY N/A 5/21/2018    Procedure: NASAL ENDOSCOPY;  Nasal Endoscopy, CO2 Laser Excision of Left Nasal Papilloma;  Surgeon: Bobo Renteria MD;  [facility]     NASAL/SINUS POLYPECTOMY       PE TUBES         ROS:  CONSTITUTIONAL: NEGATIVE for fever, chills, change in weight  INTEGUMENTARY/SKIN: NEGATIVE for worrisome rashes, moles or lesions  EYES: NEGATIVE for vision changes or irritation  ENT: NEGATIVE for ear, mouth and throat problems  RESP: NEGATIVE for significant cough or SOB  CV: NEGATIVE for chest pain, palpitations or peripheral edema  GI: NEGATIVE for nausea, abdominal pain, heartburn, or change in bowel habits   male: negative for dysuria, hematuria, decreased urinary stream, erectile dysfunction, urethral discharge  MUSCULOSKELETAL: NEGATIVE  "for significant arthralgias or myalgia  NEURO: NEGATIVE for weakness, dizziness or paresthesias  PSYCHIATRIC: NEGATIVE for changes in mood or affect    OBJECTIVE:   BP (!) 158/96   Pulse 91   Temp 97.7  F (36.5  C) (Tympanic)   Resp 16   Ht 1.88 m (6' 2\")   Wt 88 kg (194 lb)   SpO2 99%   BMI 24.91 kg/m    EXAM:  GENERAL: healthy, alert and no distress  EYES: Eyes grossly normal to inspection, PERRL and conjunctivae and sclerae normal  HENT: ear canals and TM's normal, nose and mouth without ulcers or lesions  NECK: no adenopathy, no asymmetry, masses, or scars and thyroid normal to palpation  RESP: lungs clear to auscultation - no rales, rhonchi or wheezes  CV: regular rate and rhythm, normal S1 S2, no S3 or S4, no murmur, click or rub, no peripheral edema and peripheral pulses strong  ABDOMEN: soft, nontender, no hepatosplenomegaly, no masses and bowel sounds normal  MS: no gross musculoskeletal defects noted, no edema  SKIN: no suspicious lesions or rashes  PSYCH: mentation appears normal, affect normal/bright    Diagnostic Test Results:  No results found for this or any previous visit (from the past 24 hour(s)).    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 Asked that patient come back in two weeks for BP recheck.   Plan: lisinopril-hydrochlorothiazide         (PRINZIDE/ZESTORETIC) 10-12.5 MG tablet, Basic         metabolic panel, **Basic metabolic panel FUTURE        anytime      (Z12.11) Screening for colon cancer  Comment: .Patient will be " "notified of results  Plan: Fecal colorectal cancer screen (FIT)      (Z13.220) Lipid screening  Comment: .Patient will be notified of results  Plan: Lipid panel reflex to direct LDL Fasting, Lipid        panel reflex to direct LDL Fasting      (Z11.59) Encounter for hepatitis C screening test for low risk patient  Comment: .Patient will be notified of results  Plan: Hepatitis C Screen Reflex to RNA FUTURE         anytime            (Z23) Need for vaccination  Comment: Immunization updated  Plan: As above     COUNSELING:  Reviewed preventive health counseling, as reflected in patient instructions       Regular exercise       Healthy diet/nutrition       Vision screening       Consider Hep C screening for patients born between 1945 and 1965    BP Readings from Last 1 Encounters:   01/08/19 156/90     Estimated body mass index is 24.91 kg/m  as calculated from the following:    Height as of this encounter: 1.88 m (6' 2\").    Weight as of this encounter: 88 kg (194 lb).           reports that he has been smoking cigarettes.  He started smoking about 39 years ago. He has a 30.00 pack-year smoking history. he has never used smokeless tobacco.  Tobacco Cessation Action Plan: Shared Medical Visit / Group Education    Counseling Resources:  ATP IV Guidelines  Pooled Cohorts Equation Calculator  FRAX Risk Assessment  ICSI Preventive Guidelines  Dietary Guidelines for Americans, 2010  USDA's MyPlate  ASA Prophylaxis  Lung CA Screening    Yovany White MD  [facility]  "

## 2019-01-08 NOTE — NURSING NOTE
"Chief Complaint   Patient presents with     RECHECK     hydrocele        Initial /90 (BP Location: Right arm, Patient Position: Chair, Cuff Size: Adult Regular)   Pulse 81   Temp 97.7  F (36.5  C) (Tympanic)   Resp 18  Estimated body mass index is 24.91 kg/m  as calculated from the following:    Height as of an earlier encounter on 1/8/19: 1.88 m (6' 2\").    Weight as of an earlier encounter on 1/8/19: 88 kg (194 lb).  BP completed using cuff size: regular  Medications and allergies reviewed.      Lisa WILHELM CMA     "

## 2019-01-08 NOTE — PATIENT INSTRUCTIONS
Thank you for choosing [facility].  You may be receiving a survey in the mail from [employer] regarding your visit today.  Please take a few minutes to complete and return the survey to let us know how we are doing.      If you have questions or concerns, please contact us via [employer] or you can contact your care team at [phone].    Our Clinic hours are:  Monday 6:40 am  to 7:00 pm  Tuesday -Friday 6:40 am to 5:00 pm    The [place] outpatient lab hours are:  Monday - Friday 6:10 am to 4:45 pm  Saturdays 7:00 am to 11:00 am  Appointments are required, call [phone]    If you have clinical questions after hours or would like to schedule an appointment,  call the clinic at [phone].  Preventive Health Recommendations  Male Ages 50 - 64    Yearly exam:             See your health care provider every year in order to  o   Review health changes.   o   Discuss preventive care.    o   Review your medicines if your doctor has prescribed any.     Have a cholesterol test every 5 years, or more frequently if you are at risk for high cholesterol/heart disease.     Have a diabetes test (fasting glucose) every three years. If you are at risk for diabetes, you should have this test more often.     Have a colonoscopy at age 50, or have a yearly FIT test (stool test). These exams will check for colon cancer.      Talk with your health care provider about whether or not a prostate cancer screening test (PSA) is right for you.    You should be tested each year for STDs (sexually transmitted diseases), if you re at risk.     Shots: Get a flu shot each year. Get a tetanus shot every 10 years.     Nutrition:    Eat at least 5 servings of fruits and vegetables daily.     Eat whole-grain bread, whole-wheat pasta and brown rice instead of white grains and rice.     Get adequate Calcium and Vitamin D.     Lifestyle    Exercise for at least 150 minutes a week (30 minutes a day, 5 days a week). This will help you  control your weight and prevent disease.     Limit alcohol to one drink per day.     No smoking.     Wear sunscreen to prevent skin cancer.     See your dentist every six months for an exam and cleaning.     See your eye doctor every 1 to 2 years.

## 2019-01-09 NOTE — PROGRESS NOTES
Appointment source: Established Patient  Patient name: Micah Nunez  Urology Staff: Buster Carr MD    Subjective: This is a 53 year old year old male returning for follow up of hydrocele drainage that developed after hernia surgery.    Objective:  Repeat examination reveals only a small degree of hydrocele recurrence which is not producing any discomfort.    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    Plan:  Return PRN    Total time 15 minutes, counseling 10 minutes discussing hydrocele management

## 2019-01-16 ENCOUNTER — TELEPHONE (OUTPATIENT)
Dept: FAMILY MEDICINE | Facility: CLINIC | Age: 54
End: 2019-01-16

## 2019-01-16 NOTE — TELEPHONE ENCOUNTER
Panel Management Review      Patient has the following on his problem list:       Composite cancer screening  Chart review shows that this patient is due/due soon for the following Fecal Colorectal (FIT)  Summary:    Patient is due/failing the following:   FIT and LDL    Action needed:   calll patient in 1 mo for fit test     Type of outreach:    Phone, spoke to patient.  Patient will come in 1-17-19 for labs appt was made call in 1 mo for fit test     Questions for provider review:    None                                                                                                                                    Jaylene Conklin CMA     Chart routed to  .

## 2019-01-17 DIAGNOSIS — I10 BENIGN ESSENTIAL HYPERTENSION: ICD-10-CM

## 2019-01-17 DIAGNOSIS — Z11.59 ENCOUNTER FOR HEPATITIS C SCREENING TEST FOR LOW RISK PATIENT: ICD-10-CM

## 2019-01-17 DIAGNOSIS — Z13.220 LIPID SCREENING: ICD-10-CM

## 2019-01-17 LAB
ANION GAP SERPL CALCULATED.3IONS-SCNC: 3 MMOL/L (ref 3–14)
BUN SERPL-MCNC: 13 MG/DL (ref 7–30)
CALCIUM SERPL-MCNC: 8.9 MG/DL (ref 8.5–10.1)
CHLORIDE SERPL-SCNC: 104 MMOL/L (ref 94–109)
CHOLEST SERPL-MCNC: 220 MG/DL
CO2 SERPL-SCNC: 30 MMOL/L (ref 20–32)
CREAT SERPL-MCNC: 1.11 MG/DL (ref 0.66–1.25)
GFR SERPL CREATININE-BSD FRML MDRD: 75 ML/MIN/{1.73_M2}
GLUCOSE SERPL-MCNC: 115 MG/DL (ref 70–99)
HDLC SERPL-MCNC: 53 MG/DL
LDLC SERPL CALC-MCNC: 115 MG/DL
NONHDLC SERPL-MCNC: 167 MG/DL
POTASSIUM SERPL-SCNC: 3.9 MMOL/L (ref 3.4–5.3)
SODIUM SERPL-SCNC: 137 MMOL/L (ref 133–144)
TRIGL SERPL-MCNC: 259 MG/DL

## 2019-01-17 PROCEDURE — 36415 COLL VENOUS BLD VENIPUNCTURE: CPT | Performed by: FAMILY MEDICINE

## 2019-01-17 PROCEDURE — 80048 BASIC METABOLIC PNL TOTAL CA: CPT | Performed by: FAMILY MEDICINE

## 2019-01-17 PROCEDURE — 86803 HEPATITIS C AB TEST: CPT | Performed by: FAMILY MEDICINE

## 2019-01-17 PROCEDURE — 80061 LIPID PANEL: CPT | Performed by: FAMILY MEDICINE

## 2019-01-18 LAB — HCV AB SERPL QL IA: NONREACTIVE

## 2019-01-23 DIAGNOSIS — E78.5 HYPERLIPIDEMIA LDL GOAL <100: Primary | ICD-10-CM

## 2019-01-23 RX ORDER — ATORVASTATIN CALCIUM 20 MG/1
20 TABLET, FILM COATED ORAL DAILY
Qty: 90 TABLET | Refills: 3 | Status: SHIPPED | OUTPATIENT
Start: 2019-01-23 | End: 2020-05-28

## 2019-01-23 NOTE — RESULT ENCOUNTER NOTE
Please inform patient that test result showed very high cholesterol.I would recommend starting a cholesterol medication as this values puts patient at risk for cardiovascular events like heart attacks or strokes. I will fax this to patient's pharmacy . Glucose was also a bit high, he may be pre diabetic.         Thank you.     Yovany White M.D.

## 2019-06-03 ENCOUNTER — OFFICE VISIT (OUTPATIENT)
Dept: FAMILY MEDICINE | Facility: CLINIC | Age: 54
End: 2019-06-03
Payer: COMMERCIAL

## 2019-06-03 VITALS
HEIGHT: 74 IN | RESPIRATION RATE: 14 BRPM | OXYGEN SATURATION: 98 % | WEIGHT: 195 LBS | HEART RATE: 80 BPM | DIASTOLIC BLOOD PRESSURE: 102 MMHG | SYSTOLIC BLOOD PRESSURE: 144 MMHG | TEMPERATURE: 97.6 F | BODY MASS INDEX: 25.03 KG/M2

## 2019-06-03 DIAGNOSIS — I10 UNCONTROLLED HYPERTENSION: Primary | ICD-10-CM

## 2019-06-03 DIAGNOSIS — L20.9 ATOPIC DERMATITIS, UNSPECIFIED TYPE: ICD-10-CM

## 2019-06-03 PROCEDURE — 99213 OFFICE O/P EST LOW 20 MIN: CPT | Performed by: FAMILY MEDICINE

## 2019-06-03 RX ORDER — TRIAMCINOLONE ACETONIDE 1 MG/G
OINTMENT TOPICAL
Qty: 80 G | Refills: 1 | Status: SHIPPED | OUTPATIENT
Start: 2019-06-03 | End: 2020-11-20

## 2019-06-03 ASSESSMENT — MIFFLIN-ST. JEOR: SCORE: 1799.26

## 2019-06-03 NOTE — PATIENT INSTRUCTIONS
Thank you for choosing [facility].  You may be receiving an email and/or telephone survey request from [facility] [employer] regarding your visit today.  Please take a few minutes to respond to the survey to let us know how we are doing.      If you have questions or concerns, please contact us via [employer] [facility] [place] or you can contact your care team at [phone].    Our Clinic hours are:  Monday 6:40 am  to 7:00 pm  Tuesday -Friday 6:40 am to 5:00 pm    The [place] outpatient lab hours are:  Monday - Friday 6:10 am to 4:45 pm  Saturdays 7:00 am to 11:00 am  Appointments are required, call [phone]    If you have clinical questions after hours or would like to schedule an appointment,  call the clinic at [phone].  Patient Education

## 2019-06-03 NOTE — PROGRESS NOTES
Subjective     Micah Nunez is a 53 year old male who presents to clinic today for the following health issues:    53 yr old male here for refills on triamcinolone. He uses this for atopic dermatitis. Patient reports no side effects and cream has been quite effective.   Patient has a past medical history that is significant for hypertension. Patient states that he has not been taking his medication. His blood pressure is quite high today. He will start taking his medication again. Patient was educated on risks of uncontrolled hypertension .    HPI   Medication Followup of triamcinolone     Taking Medication as prescribed: yes    Side Effects:  None    Medication Helping Symptoms:  yes         Patient Active Problem List   Diagnosis     CARDIOVASCULAR SCREENING; LDL GOAL LESS THAN 130     Nasal mass     Papilloma of nose     Chemical dependency (H)     Tobacco use disorder     Benign essential hypertension     Past Surgical History:   Procedure Laterality Date     ARTHRODESIS FOOT Left 2/17/2015    Procedure: ARTHRODESIS FOOT;  Surgeon: Cortez Hayward DPM;  Location: [facility] OR     ARTHRODESIS TOE(S)  12/20/2013    Procedure: ARTHRODESIS TOE(S);  Arthrodesis first metatarsophalangeal joint left foot;  Surgeon: Cortez Hayward DPM;  Location: [facility] OR     ENDOSCOPIC SINUS SURGERY  1/6/2014    Procedure: ENDOSCOPIC SINUS SURGERY;  Functional Endoscopic Sinus Surgery;  Surgeon: Bobo Renteria MD;  Location:  OR     ENDOSCOPIC SINUS SURGERY  7/21/2014    Procedure: ENDOSCOPIC SINUS SURGERY;  Surgeon: Bobo Renteria MD;  Location:  OR     ENDOSCOPIC SINUS SURGERY N/A 4/6/2015    Procedure: ENDOSCOPIC SINUS SURGERY;  Surgeon: Bobo Renteria MD;  Location:  OR     ENDOSCOPIC SINUS SURGERY N/A 8/17/2015    Procedure: ENDOSCOPIC SINUS SURGERY;  Surgeon: Bobo Renteria MD;  Location:  OR     ENT SURGERY       EXCISE NASAL MASS Left 11/6/2017    Procedure: EXCISE NASAL MASS;   Excision of Left Nasal Papilloma;  Surgeon: Bobo Renteria MD;  [facility]     LAPAROSCOPIC HERNIORRHAPHY INGUINAL BILATERAL Bilateral 4/12/2017    Procedure: LAPAROSCOPIC HERNIORRHAPHY INGUINAL BILATERAL;  Surgeon: Shay Mercado MD;  Location: [facility] OR     NASAL ENDOSCOPY Bilateral 2/6/2017    Procedure: NASAL ENDOSCOPY;  Surgeon: Bobo Renteria MD;  [facility]     NASAL ENDOSCOPY N/A 5/21/2018    Procedure: NASAL ENDOSCOPY;  Nasal Endoscopy, CO2 Laser Excision of Left Nasal Papilloma;  Surgeon: Bobo Renteria MD;  [facility] OR     NASAL/SINUS POLYPECTOMY       PE TUBES         Social History     Tobacco Use     Smoking status: Current Every Day Smoker     Packs/day: 1.00     Years: 30.00     Pack years: 30.00     Types: Cigarettes     Start date: 1/1/1980     Smokeless tobacco: Never Used     Tobacco comment: 5 per day   Substance Use Topics     Alcohol use: Yes     Family History   Problem Relation Age of Onset     Diabetes Mother 40     C.A.D. Father 72     Unknown/Adopted No family hx of      Depression No family hx of      Anxiety Disorder No family hx of      Schizophrenia No family hx of      Bipolar Disorder No family hx of      Suicide No family hx of      Substance Abuse No family hx of      Dementia No family hx of      [place] Disease No family hx of      Parkinsonism No family hx of      Autism Spectrum Disorder No family hx of      Intellectual Disability (Mental Retardation) No family hx of      Mental Illness No family hx of      Bleeding Disorder No family hx of          Current Outpatient Medications   Medication Sig Dispense Refill     atorvastatin (LIPITOR) 20 MG tablet Take 1 tablet (20 mg) by mouth daily 90 tablet 3     ibuprofen (ADVIL/MOTRIN) 600 MG tablet Take 1 tablet (600 mg) by mouth every 6 hours 40 tablet 0     lisinopril-hydrochlorothiazide (PRINZIDE/ZESTORETIC) 10-12.5 MG tablet Take 1 tablet by mouth daily 90 tablet 3     sodium chloride (AFRIN  "SALINE NASAL MIST) 0.65 % nasal spray Spray 1 spray into both nostrils daily as needed for congestion 100 mL 1     triamcinolone (KENALOG) 0.1 % external ointment APPLY SPARINGLY TO AFFECTED AREA THREE TIMES A DAY FOR 14 DAYS 80 g 1     acetaminophen (TYLENOL) 325 MG tablet Take 2 tablets (650 mg) by mouth every 4 hours as needed for other (mild pain) 30 tablet 0     order for DME Equipment being ordered: Dynaflex insert 2 Units 0     oxymetazoline (AFRIN NASAL SPRAY) 0.05 % nasal spray Spray 2-3 sprays into both nostrils 2 times daily as needed for congestion 1 Bottle 0     No Known Allergies  BP Readings from Last 3 Encounters:   06/03/19 (!) 144/102   01/08/19 156/90   01/08/19 (!) 158/96    Wt Readings from Last 3 Encounters:   06/03/19 88.5 kg (195 lb)   01/08/19 88 kg (194 lb)   01/01/19 88.5 kg (195 lb)                      Reviewed and updated as needed this visit by Provider         Review of Systems   ROS COMP: Constitutional, HEENT, cardiovascular, pulmonary, gi and gu systems are negative, except as otherwise noted.      Objective    BP (!) 144/102   Pulse 80   Temp 97.6  F (36.4  C) (Tympanic)   Resp 14   Ht 1.88 m (6' 2\")   Wt 88.5 kg (195 lb)   SpO2 98%   BMI 25.04 kg/m    Body mass index is 25.04 kg/m .  Physical Exam   GENERAL: healthy, alert and no distress  EYES: Eyes grossly normal to inspection, PERRL and conjunctivae and sclerae normal  HENT: ear canals and TM's normal, nose and mouth without ulcers or lesions  NECK: no adenopathy, no asymmetry, masses, or scars and thyroid normal to palpation  RESP: lungs clear to auscultation - no rales, rhonchi or wheezes  CV: regular rate and rhythm, normal S1 S2, no S3 or S4, no murmur, click or rub, no peripheral edema and peripheral pulses strong  ABDOMEN: soft, nontender, no hepatosplenomegaly, no masses and bowel sounds normal  MS: no gross musculoskeletal defects noted, no edema    Diagnostic Test Results:  none         Assessment & Plan     1. " "Atopic dermatitis, unspecified type  Medication is refilled   - triamcinolone (KENALOG) 0.1 % external ointment; APPLY SPARINGLY TO AFFECTED AREA THREE TIMES A DAY FOR 14 DAYS  Dispense: 80 g; Refill: 1    2. Uncontrolled hypertension  Patient encouraged to start taking his medication again.He has a pre op at the end of the month. We will recheck his BP at the time.,        Tobacco Cessation:   reports that he has been smoking cigarettes.  He started smoking about 39 years ago. He has a 30.00 pack-year smoking history. He has never used smokeless tobacco.        BMI:   Estimated body mass index is 25.04 kg/m  as calculated from the following:    Height as of this encounter: 1.88 m (6' 2\").    Weight as of this encounter: 88.5 kg (195 lb).           FUTURE APPOINTMENTS:       - Follow-up visit in 3 weeks     No follow-ups on file.    Yovany White MD  [facility] [place] [facility]      "

## 2019-07-30 ENCOUNTER — TELEPHONE (OUTPATIENT)
Dept: OTOLARYNGOLOGY | Facility: CLINIC | Age: 54
End: 2019-07-30

## 2019-07-30 ENCOUNTER — OFFICE VISIT (OUTPATIENT)
Dept: OTOLARYNGOLOGY | Facility: CLINIC | Age: 54
End: 2019-07-30
Payer: COMMERCIAL

## 2019-07-30 VITALS
HEIGHT: 74 IN | RESPIRATION RATE: 15 BRPM | HEART RATE: 86 BPM | BODY MASS INDEX: 24.64 KG/M2 | WEIGHT: 192 LBS | DIASTOLIC BLOOD PRESSURE: 101 MMHG | SYSTOLIC BLOOD PRESSURE: 159 MMHG

## 2019-07-30 DIAGNOSIS — D36.9 INVERTED PAPILLOMA: Primary | ICD-10-CM

## 2019-07-30 ASSESSMENT — MIFFLIN-ST. JEOR: SCORE: 1785.66

## 2019-07-30 ASSESSMENT — PAIN SCALES - GENERAL: PAINLEVEL: MILD PAIN (3)

## 2019-07-30 NOTE — PATIENT INSTRUCTIONS
1.  You were seen in the ENT Clinic today by Dr. Renteria.  If you have any questions or concerns after your appointment, please call [phone].    2.  Plan is to move forward with a Left Sided Functional Endoscopic Sinus Surgery. This is an outpatient procedure that is done in the operating room. You will need a  the day of surgery.    3. You will need to consult with your primary care MD for a pre-op physical.  Forms for this were sent home with you today.    4. Anjana, our surgery scheduler will call you with a date and time for the procedure. You can also reach her at  [phone]    5.  Please return to the clinic one week after your surgery.        Please call our clinic for any questions, concerns, and/or worsening symptoms.      Clinic #[phone]       Option 1 for scheduling.    Thank you for allowing us to be apart of your care!    DON Lu    If you need to reach me my direct line is: [phone]      What to Expect After Endoscopic Sinus Surgery:    Bleeding:  It is normal to have some bloody discharge for the first 3-5 days after sinus surgery, especially after you irrigate your sinuses.  If steady bleeding occurs after surgery, tilt your head back slightly and breathe through your nose gently.  You may dab your nose with tissue but avoid any nose blowing.  If this does not stop the bleeding you may use Afrin spray.  Several sprays will usually stop any bleeding.  If Afrin fails to stop steady nasal bleeding then you should call our office or the on call doctor (see contact below).  Pain:  You should expect some nasal and sinus pressure and pain for the first several days after surgery.  This may feel like a sinus infection or a dull ache in your sinuses.  Extra-strength Tylenol is often all that is needed for mild post-operative discomfort. You should avoid aspirin and NSAIDs such as Motrin, Advil, and Aleve (see below). If Tylenol is not sufficient to control the pain, you should  use the post-operative pain medication prescribed by your doctor.   Fatigue:  You can expect to feel very tired for the first week after surgery.  This is normal and most patients plan on taking at least 1 week off of work to recover.  Every patient is different and some return to work sooner.  Nasal congestion and discharge:  You will have nasal congestion and discharge for the first few weeks after surgery.  Your nasal passage and breathing should return to normal 2-3 weeks after surgery.  Postoperative visits:  You will have a certain number of postoperative visits depending on what surgery you have.  During these visits we will clean your nose and sinuses of fluid and blood left behind after surgery.  These visits are very important to aid the healing process so it is essential that you attend all those scheduled for you.  There is some discomfort involved with the cleaning so it is best to take a pain medication (described above) 45 minutes before your visit.    What to Avoid After Endoscopic Sinus Surgery:  Nose Blowing and Straining:  You should avoid straining, heavy lifting (> 20 lbs) and nose blowing for at least 10 days after surgery.  Straining or nose blowing soon after surgery may cause bleeding.  You can resume 50% of your regular exercise regimen at 1 week after surgery and your normal routine 2 weeks after surgery.  Aspirin or Non-steroidal Anti-inflammatory (NSAIDs) medications:  Aspirin and NSAIDs such as Motrin, Advil, and Aleve should be stopped 2 weeks prior to surgery.  Aspirin and NSAIDs such may cause bleeding and should be avoided for 2 weeks after surgery.  Steroid Nasal Sprays:  If you were taking nasal steroid sprays prior to surgery you should avoid using these for at least 2 weeks after sinus surgery to allow the lining of the nose and sinuses to heal.  Your doctor will tell you when it is safe to restart this medicine.    POST OPERATIVE CARE INSTRUCTIONS  Nasal Saline Spray: Nasal  saline mist spray can be used every 2-3 hours after surgery and can make your nose more comfortable after surgery.  These sprays (Ayr, Ocean, Simple Saline) are over-the-counter medications and can be purchased in any pharmacy.  Sinus irrigations:  Do these only if you have been doing them already. If you have been irrigating, you can re-start the sinus irrigations with the sinus rinse kits (NeilMed Sinus Rinse Kit)  72 hours after surgery.  This should be performed at least twice daily.  .  You can expect some bloody discharge with the irrigations for the first few days after surgery.    When to Call After Surgery:  Fever after the day of surgery higher than 101 F  Constant clear watery discharge after the first week of surgery  Sudden visual changes or eye swelling  Severe headache or neck stiffness  Severe diarrhea  Steady, brisk nose bleeding that doesn t get better after using Afrin    BEFORE SURGERY:    -NO IBUPROFEN , MOTRIN, ALEVE, GARLIC SUPPLEMENTS, ASPIRIN PRODUCTS  OR FISH OIL FOR 7 DAYS PRIOR TO SURGERY. Tylenol is fine, generally.( You will need specific instructions from primary care if on blood thinners).    -Read pre-operative pamphlets/ booklets related to your procedure, call or contact the clinic with questions.    - Pre-op History and Physical to be done by primary care physician within 30 days of surgery date. This form is in the packet.    - Restrictions on food and fluid the day of surgery as per packet. Generally, nothing solid to eat 8 hours prior to the procedure. Okay to have clear liquids up to 2 hours before (this includes; water, apple juice, black coffee without any cream or sugar).    - Pre-op soap, neck down, directions are in your packet. Use the night before surgery and the day of surgery.     - You will need a  the day of surgery.    - You will need someone to remain with you for 24 hours following your procedure.       AFTER SURGERY:    - You will follow up one week after  surgery.     - Call [phone] for scheduling or general questions     For urgent needs after hours call [phone]. You will speak with the hospital  and should ask to have the ENT resident on call paged.    - Please contact our clinic if you note any of the following:          -Foul smelling drainage from your surgical site          -Persistent pain after pain medication          -Fever>100 degrees x 24 hours or longer          -Significant dizziness, especially of new onset          -Any questions or concerns about your care    Kala HOOD RN

## 2019-07-30 NOTE — LETTER
"2019       RE: Micah Nunez  [id number]  [facility] [address]     Dear Colleague,    Thank you for referring your patient, Micah Nunez, to the [facility] EAR NOSE AND THROAT at [facility]. Please see a copy of my visit note below.      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 The patient was last seen on 18 with plan for surgical intervention after the patient discussed smoking cessation with his primary care provider. Today he reports some left nasal pain and bleeding with difficulty breathing. He is still smoking.     Review of Systems:   Pertinent items are noted in HPI or as in patient entered ROS below, remainder of complete ROS is negative.    ENT ROS 2017   Constitutional -   Neurology -   Ears, Nose, Throat Nasal congestion or drainage   Cardiopulmonary Cough   Gastrointestinal/Genitourinary -   Musculoskeletal -   Allergy/Immunology -   Hematologic -   Endocrine -        Physical Exam:   BP (!) 159/101 (BP Location: Right arm, Patient Position: Sitting, Cuff Size: Adult Large)   Pulse 86   Resp 15   Ht 1.88 m (6' 2\")   Wt 87.1 kg (192 lb)   BMI 24.65 kg/m        PHYSICAL EXAMINATION:    Constitutional:  The patient was unaccompanied, well-groomed, and in no acute distress.    Skin:  Warm and pink.    Neurologic:  Alert and oriented x 3.  CN's III-XII within normal limits.  Voice normal.   Psychiatric:  The patient's affect was calm, cooperative, and appropriate.    Respiratory:  Breathing comfortably without stridor or exertion of accessory muscles.    Eyes: " Extraocular movement intact.    Head:  Normocephalic and atraumatic.  No lesions or scars.    Ears:  Pinnae and tragus non-tender.  EAC's and TM's were clear.     Nose:  Sinuses were non-tender.  Anterior rhinoscopy revealed midline septum and absence of purulence or polyps.    OC/OP:  Normal tongue, floor of mouth, buccal mucosa, and palate.  No lesions or masses on inspection or palpation.  No abnormal lymph tissue in the oropharynx.     Neck:  Supple with normal laryngeal and tracheal landmarks.  The parotid beds were without masses.  No palpable thyroid.  Lymphatic:  There is no palpable lymphadenopathy in the neck.       Fiberoptic Endoscopy:  Consent for fiberoptic laryngoscopy was obtained, and we confirmed correctness of procedure and identity of patient.  Fiberoptic laryngoscopy was indicated due to nasal papillomatosis and inverting papilloma.  The nose was topically decongested and anesthetized.  The fiberoptic laryngoscope was passed under endoscopic vision.  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     Assessment and Plan:  There are no diagnoses linked to this encounter.   Patient with a history of left nasal papillomatosis and inverting papilloma who presents for follow up. Flexible endoscopy showed extensive papillomas in the left naris. Plan for CT scan of sinuses and left sided endoscopic sinus surgery.        Scribe Disclosure:  I, Chong Chavez, am serving as a scribe to document services personally performed by Bobo Renteria MD at this visit, based upon the provider's statements to me. All documentation has been reviewed by the aforementioned provider prior to being entered into the official medical record.      Again, thank you for allowing me to participate in the care of your patient.      Sincerely,    Bobo Renteria MD

## 2019-07-30 NOTE — PROGRESS NOTES
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 The patient was last seen on 18 with plan for surgical intervention after the patient discussed smoking cessation with his primary care provider. Today he reports some left nasal pain and bleeding with difficulty breathing. He is still smoking.     Review of Systems:   Pertinent items are noted in HPI or as in patient entered ROS below, remainder of complete ROS is negative.    ENT ROS 2017   Constitutional -   Neurology -   Ears, Nose, Throat Nasal congestion or drainage   Cardiopulmonary Cough   Gastrointestinal/Genitourinary -   Musculoskeletal -   Allergy/Immunology -   Hematologic -   Endocrine -        Physical Exam:   BP (!) 159/101 (BP Location: Right arm, Patient Position: Sitting, Cuff Size: Adult Large)   Pulse 86   Resp 15   Ht 1.88 m (6' 2\")   Wt 87.1 kg (192 lb)   BMI 24.65 kg/m       PHYSICAL EXAMINATION:    Constitutional:  The patient was unaccompanied, well-groomed, and in no acute distress.    Skin:  Warm and pink.    Neurologic:  Alert and oriented x 3.  CN's III-XII within normal limits.  Voice normal.   Psychiatric:  The patient's affect was calm, cooperative, and appropriate.    Respiratory:  Breathing comfortably without stridor or exertion of accessory muscles.    Eyes: Extraocular movement intact.    Head:  Normocephalic and atraumatic.  No lesions or scars.    Ears:  Pinnae and tragus non-tender.  EAC's and TM's were clear.     Nose:  Sinuses were non-tender.  Anterior rhinoscopy revealed midline septum and absence of purulence or polyps.    OC/OP:  Normal tongue, " floor of mouth, buccal mucosa, and palate.  No lesions or masses on inspection or palpation.  No abnormal lymph tissue in the oropharynx.     Neck:  Supple with normal laryngeal and tracheal landmarks.  The parotid beds were without masses.  No palpable thyroid.  Lymphatic:  There is no palpable lymphadenopathy in the neck.       Fiberoptic Endoscopy:  Consent for fiberoptic laryngoscopy was obtained, and we confirmed correctness of procedure and identity of patient.  Fiberoptic laryngoscopy was indicated due to nasal papillomatosis and inverting papilloma.  The nose was topically decongested and anesthetized.  The fiberoptic laryngoscope was passed under endoscopic vision.  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     Assessment and Plan:  There are no diagnoses linked to this encounter.   Patient with a history of left nasal papillomatosis and inverting papilloma who presents for follow up. Flexible endoscopy showed extensive papillomas in the left naris. Plan for CT scan of sinuses and left sided endoscopic sinus surgery.        Scribe Disclosure:  Chong AUSTIN, am serving as a scribe to document services personally performed by Bobo Renteria MD at this visit, based upon the provider's statements to me. All documentation has been reviewed by the aforementioned provider prior to being entered into the official medical record.

## 2019-07-31 ENCOUNTER — TELEPHONE (OUTPATIENT)
Dept: FAMILY MEDICINE | Facility: CLINIC | Age: 54
End: 2019-07-31

## 2019-08-01 NOTE — TELEPHONE ENCOUNTER
([facility]) [place] [facility] Opinion form received, started and routed to Dr. White. Patient requests completion ASAP but does have appt 8/5/19 at 10:40am

## 2019-08-05 ENCOUNTER — OFFICE VISIT (OUTPATIENT)
Dept: FAMILY MEDICINE | Facility: CLINIC | Age: 54
End: 2019-08-05
Payer: COMMERCIAL

## 2019-08-05 VITALS
DIASTOLIC BLOOD PRESSURE: 98 MMHG | HEART RATE: 87 BPM | RESPIRATION RATE: 18 BRPM | TEMPERATURE: 97.6 F | HEIGHT: 74 IN | OXYGEN SATURATION: 99 % | SYSTOLIC BLOOD PRESSURE: 144 MMHG | WEIGHT: 188 LBS | BODY MASS INDEX: 24.13 KG/M2

## 2019-08-05 DIAGNOSIS — Z01.818 PREOP GENERAL PHYSICAL EXAM: Primary | ICD-10-CM

## 2019-08-05 DIAGNOSIS — I10 BENIGN ESSENTIAL HYPERTENSION: ICD-10-CM

## 2019-08-05 DIAGNOSIS — J33.9 NASAL POLYPS: ICD-10-CM

## 2019-08-05 PROCEDURE — 99214 OFFICE O/P EST MOD 30 MIN: CPT | Performed by: FAMILY MEDICINE

## 2019-08-05 ASSESSMENT — MIFFLIN-ST. JEOR: SCORE: 1767.51

## 2019-08-05 NOTE — NURSING NOTE
"Initial BP (!) 144/98   Pulse 87   Temp 97.6  F (36.4  C) (Tympanic)   Resp 18   Ht 1.88 m (6' 2\")   Wt 85.3 kg (188 lb)   SpO2 99%   BMI 24.14 kg/m   Estimated body mass index is 24.14 kg/m  as calculated from the following:    Height as of this encounter: 1.88 m (6' 2\").    Weight as of this encounter: 85.3 kg (188 lb). .    Va Keen    "

## 2019-08-05 NOTE — PROGRESS NOTES
[facility] [place] [facility]  [address] [facility] [address]  [phone]  Dept: [phone]    PRE-OP EVALUATION:  Today's date: 2019    Micah Nunez (: 1965) presents for pre-operative evaluation assessment as requested by Dr. Renteria.  He requires evaluation and anesthesia risk assessment prior to undergoing surgery/procedure for treatment of Left Sided  Sinus.    Proposed Surgery/ Procedure: Left Sided Functional Endoscopic Sinus Surgery with Shaver  Date of Surgery/ Procedure: 2019  Time of Surgery/ Procedure: 1:05PM  Hospital/Surgical Facility:  OR  Primary Physician: Yovany White  Type of Anesthesia Anticipated: General    Patient has a Health Care Directive or Living Will:  NO    1. NO - Do you have a history of heart attack, stroke, stent, bypass or surgery on an artery in the head, neck, heart or legs?  2. SOMETIMES - Do you ever have any pain or discomfort in your chest?  3. NO - Do you have a history of  Heart Failure?  4. YES - Are you troubled by shortness of breath when: walking on the level, up a slight hill or at night?  5. YES a cold - Do you currently have a cold, bronchitis or other respiratory infection?  6. Yes - Do you have a cough, shortness of breath or wheezing?  7. NO - Do you sometimes get pains in the calves of your legs when you walk?  8. NO - Do you or anyone in your family have previous history of blood clots?  9. NO - Do you or does anyone in your family have a serious bleeding problem such as prolonged bleeding following surgeries or cuts?  10. NO - Have you ever had problems with anemia or been told to take iron pills?  11. NO - Have you had any abnormal blood loss such as black, tarry or bloody stools, or abnormal vaginal bleeding?  12. NO - Have you ever had a blood transfusion?  13. NO - Have you or any of your relatives ever had problems with anesthesia?  14. NO - Do you have sleep apnea, excessive  snoring or daytime drowsiness?  15. NO - Do you have any prosthetic heart valves?  16. YES- Titanium Joints - Do you have prosthetic joints?  17. NO - Is there any chance that you may be pregnant?      HPI:     HPI related to upcoming procedure:     Patient is a 53 yr old male here for a pre op . He is having nasal/sinus surgery for chronic nasal  polyps. Patient has hypertension and has not been taking his medication. Patient denies any other symptoms today and feels well.       See problem list for active medical problems.  Problems all longstanding and stable, except as noted/documented.  See ROS for pertinent symptoms related to these conditions.      MEDICAL HISTORY:     Patient Active Problem List    Diagnosis Date Noted     Benign essential hypertension 10/18/2017     Priority: Medium     Tobacco use disorder 08/12/2015     Priority: Medium     Chemical dependency (H) 07/27/2015     Priority: Medium     Papilloma of nose 03/10/2014     Priority: Medium     Nasal mass 12/17/2013     Priority: Medium     CARDIOVASCULAR SCREENING; LDL GOAL LESS THAN 130 12/11/2013     Priority: Medium      Past Medical History:   Diagnosis Date     Benign essential hypertension 10/18/2017     Bleeding disorder (H)      CARDIOVASCULAR SCREENING; LDL GOAL LESS THAN 130 12/11/2013     Chemical dependency (H)      Gastroesophageal reflux disease      Hypertension      Nasal mass 12/17/2013     Papilloma of nose 3/10/2014     Skin disease      Past Surgical History:   Procedure Laterality Date     ARTHRODESIS FOOT Left 2/17/2015    Procedure: ARTHRODESIS FOOT;  Surgeon: Cortez Hayward DPM;  Location: [facility] OR     ARTHRODESIS TOE(S)  12/20/2013    Procedure: ARTHRODESIS TOE(S);  Arthrodesis first metatarsophalangeal joint left foot;  Surgeon: Cortez Hayward DPM;  Location: [facility] OR     ENDOSCOPIC SINUS SURGERY  1/6/2014    Procedure: ENDOSCOPIC SINUS SURGERY;  Functional Endoscopic Sinus Surgery;  Surgeon: Bobo Renteria  MD [place];  Location: [facility]     ENDOSCOPIC SINUS SURGERY  7/21/2014    Procedure: ENDOSCOPIC SINUS SURGERY;  Surgeon: Bobo Renteria MD;  [facility]     ENDOSCOPIC SINUS SURGERY N/A 4/6/2015    Procedure: ENDOSCOPIC SINUS SURGERY;  Surgeon: Bobo Renteria MD;  Location: [facility]     ENDOSCOPIC SINUS SURGERY N/A 8/17/2015    Procedure: ENDOSCOPIC SINUS SURGERY;  Surgeon: Bobo Renteria MD;  Location: [facility] OR     ENT SURGERY       EXCISE NASAL MASS Left 11/6/2017    Procedure: EXCISE NASAL MASS;  Excision of Left Nasal Papilloma;  Surgeon: Bobo Renteria MD;  [facility]     LAPAROSCOPIC HERNIORRHAPHY INGUINAL BILATERAL Bilateral 4/12/2017    Procedure: LAPAROSCOPIC HERNIORRHAPHY INGUINAL BILATERAL;  Surgeon: Shay Mercado MD;  Location: [facility]     NASAL ENDOSCOPY Bilateral 2/6/2017    Procedure: NASAL ENDOSCOPY;  Surgeon: Bobo Renteria MD;  [facility]     NASAL ENDOSCOPY N/A 5/21/2018    Procedure: NASAL ENDOSCOPY;  Nasal Endoscopy, CO2 Laser Excision of Left Nasal Papilloma;  Surgeon: Bobo Renteria MD;  [facility] OR     NASAL/SINUS POLYPECTOMY       PE TUBES       Current Outpatient Medications   Medication Sig Dispense Refill     acetaminophen (TYLENOL) 325 MG tablet Take 2 tablets (650 mg) by mouth every 4 hours as needed for other (mild pain) (Patient not taking: Reported on 7/30/2019) 30 tablet 0     atorvastatin (LIPITOR) 20 MG tablet Take 1 tablet (20 mg) by mouth daily 90 tablet 3     ibuprofen (ADVIL/MOTRIN) 600 MG tablet Take 1 tablet (600 mg) by mouth every 6 hours (Patient not taking: Reported on 7/30/2019) 40 tablet 0     lisinopril-hydrochlorothiazide (PRINZIDE/ZESTORETIC) 10-12.5 MG tablet Take 1 tablet by mouth daily 90 tablet 3     order for DME Equipment being ordered: Dynaflex insert (Patient not taking: Reported on 7/30/2019) 2 Units 0     oxymetazoline (AFRIN NASAL SPRAY) 0.05 % nasal spray Spray 2-3 sprays into both nostrils 2 times daily  "as needed for congestion (Patient not taking: Reported on 7/30/2019) 1 Bottle 0     sodium chloride (AFRIN SALINE NASAL MIST) 0.65 % nasal spray Spray 1 spray into both nostrils daily as needed for congestion (Patient not taking: Reported on 7/30/2019) 100 mL 1     triamcinolone (KENALOG) 0.1 % external ointment APPLY SPARINGLY TO AFFECTED AREA THREE TIMES A DAY FOR 14 DAYS (Patient not taking: Reported on 8/5/2019) 80 g 1     OTC products: None, except as noted above    No Known Allergies   Latex Allergy: NO    Social History     Tobacco Use     Smoking status: Current Every Day Smoker     Packs/day: 1.00     Years: 30.00     Pack years: 30.00     Types: Cigarettes     Start date: 1/1/1980     Smokeless tobacco: Never Used     Tobacco comment: 1 pack/day   Substance Use Topics     Alcohol use: Yes     History   Drug Use No     Comment: 7 years quit Cocaine/methamphetamines       REVIEW OF SYSTEMS:   CONSTITUTIONAL: NEGATIVE for fever, chills, change in weight  INTEGUMENTARY/SKIN: NEGATIVE for worrisome rashes, moles or lesions  EYES: NEGATIVE for vision changes or irritation  ENT/MOUTH: POSITIVE for nasal congestion and sinus pressure  RESP: NEGATIVE for significant cough or SOB  CV: NEGATIVE for chest pain, palpitations or peripheral edema  GI: NEGATIVE for nausea, abdominal pain, heartburn, or change in bowel habits  : NEGATIVE for frequency, dysuria, or hematuria  MUSCULOSKELETAL: NEGATIVE for significant arthralgias or myalgia  ENDOCRINE: NEGATIVE for temperature intolerance, skin/hair changes  HEME: NEGATIVE for bleeding problems  PSYCHIATRIC: NEGATIVE for changes in mood or affect    EXAM:   BP (!) 144/98   Pulse 87   Temp 97.6  F (36.4  C) (Tympanic)   Resp 18   Ht 1.88 m (6' 2\")   Wt 85.3 kg (188 lb)   SpO2 99%   BMI 24.14 kg/m      GENERAL APPEARANCE: healthy, alert and no distress     EYES: EOMI,  PERRL     HENT: ear canals and TM's normal and nose and mouth without ulcers or lesions     NECK: no " adenopathy, no asymmetry, masses, or scars and thyroid normal to palpation     RESP: lungs clear to auscultation - no rales, rhonchi or wheezes     CV: regular rates and rhythm, normal S1 S2, no S3 or S4 and no murmur, click or rub     ABDOMEN:  soft, nontender, no HSM or masses and bowel sounds normal     MS: extremities normal- no gross deformities noted, no evidence of inflammation in joints, FROM in all extremities.     SKIN: no suspicious lesions or rashes     PSYCH: mentation appears normal. and affect normal/bright     LYMPHATICS: No cervical adenopathy    DIAGNOSTICS:   EKG: Not indicated due to non-vascular surgery and low risk of event (age <65 and without cardiac risk factors)    Recent Labs   Lab Test 01/17/19  1013 05/14/18  1033  03/21/17  2055   HGB  --  15.7  --  14.6   PLT  --  260  --  245    135   < > 141   POTASSIUM 3.9 3.5   < > 3.7   CR 1.11 1.18   < > 0.98    < > = values in this interval not displayed.        IMPRESSION:   Reason for surgery/procedure: Nasal Polyps   Diagnosis/reason for consult: Pre op evaluation     The proposed surgical procedure is considered LOW risk.    REVISED CARDIAC RISK INDEX  The patient has the following serious cardiovascular risks for perioperative complications such as (MI, PE, VFib and 3  AV Block):  No serious cardiac risks  INTERPRETATION: 0 risks: Class I (very low risk - 0.4% complication rate)    The patient has the following additional risks for perioperative complications:  No identified additional risks      ICD-10-CM    1. Preop general physical exam Z01.818    2. Nasal polyps J33.9    3. Benign essential hypertension I10        RECOMMENDATIONS:       Cardiovascular Risk  Performs 4 METs exercise without symptoms (Light housework (dusting, washing dishes) and Climb a flight of stairs) .       Pulmonary Risk  Incentive spirometry post op  Respiratory Therapy (Respiratory Care IP Consult)  post op  NG tube decompression if abdominal distension or  significant vomiting       --Patient is to take all scheduled medications on the day of surgery EXCEPT for modifications listed below.    ACE Inhibitor or Angiotensin Receptor Blocker (ARB) Use  Ace inhibitor or Angiotensin Receptor Blocker (ARB) and should HOLD this medication for the 24 hours prior to surgery.      APPROVAL Given - Patient came in for BP recheck today and BP within normal limits.  BP Readings from Last 3 Encounters:   08/09/19 134/82   08/05/19 (!) 144/98   07/30/19 (!) 159/101        Signed Electronically by: Yovany White MD    Copy of this evaluation report is provided to requesting physician.    [facility] Preop Guidelines    Revised Cardiac Risk Index

## 2019-08-05 NOTE — NURSING NOTE
"Initial BP (!) 158/102   Pulse 87   Temp 97.6  F (36.4  C) (Tympanic)   Resp 18   Ht 1.88 m (6' 2\")   Wt 85.3 kg (188 lb)   SpO2 99%   BMI 24.14 kg/m   Estimated body mass index is 24.14 kg/m  as calculated from the following:    Height as of this encounter: 1.88 m (6' 2\").    Weight as of this encounter: 85.3 kg (188 lb). .      "

## 2019-08-09 ENCOUNTER — ALLIED HEALTH/NURSE VISIT (OUTPATIENT)
Dept: FAMILY MEDICINE | Facility: CLINIC | Age: 54
End: 2019-08-09
Payer: COMMERCIAL

## 2019-08-09 ENCOUNTER — TELEPHONE (OUTPATIENT)
Dept: FAMILY MEDICINE | Facility: CLINIC | Age: 54
End: 2019-08-09

## 2019-08-09 VITALS — SYSTOLIC BLOOD PRESSURE: 134 MMHG | DIASTOLIC BLOOD PRESSURE: 82 MMHG | HEART RATE: 74 BPM

## 2019-08-09 DIAGNOSIS — Z01.30 BP CHECK: Primary | ICD-10-CM

## 2019-08-09 PROCEDURE — 99207 ZZC NO CHARGE NURSE ONLY: CPT

## 2019-08-09 NOTE — TELEPHONE ENCOUNTER
Patient notified that PCP reports she will addend his pre op report.  Patient verbalized understanding.    Elvi PINTO Rn

## 2019-08-09 NOTE — TELEPHONE ENCOUNTER
S-(situation): BP Check    B-(background): 08/05/19 [place]: APPROVAL PENDING :BP high today , needs to return for a BP recheck in a few days , has been off his BP medications.   BP at /102 and 144/98    A-(assessment):   Vital Signs 8/9/2019 8/9/2019   Systolic 142 134   Diastolic 90 82   Pulse 74 74   Manual    No side effects noted from medication.     R-(recommendations): Routing to provider. OK to addend pre-op?     LENNY MontemayorN, RN

## 2019-08-16 ENCOUNTER — ANESTHESIA EVENT (OUTPATIENT)
Dept: SURGERY | Facility: AMBULATORY SURGERY CENTER | Age: 54
End: 2019-08-16

## 2019-08-19 ENCOUNTER — HOSPITAL ENCOUNTER (OUTPATIENT)
Facility: AMBULATORY SURGERY CENTER | Age: 54
End: 2019-08-19
Attending: OTOLARYNGOLOGY
Payer: COMMERCIAL

## 2019-08-19 ENCOUNTER — ANESTHESIA (OUTPATIENT)
Dept: SURGERY | Facility: AMBULATORY SURGERY CENTER | Age: 54
End: 2019-08-19

## 2019-08-19 VITALS
HEART RATE: 80 BPM | TEMPERATURE: 97.9 F | SYSTOLIC BLOOD PRESSURE: 143 MMHG | DIASTOLIC BLOOD PRESSURE: 90 MMHG | HEIGHT: 76 IN | RESPIRATION RATE: 16 BRPM | OXYGEN SATURATION: 96 % | BODY MASS INDEX: 22.77 KG/M2 | WEIGHT: 187 LBS

## 2019-08-19 DIAGNOSIS — D23.39 PAPILLOMA OF NOSE: Primary | ICD-10-CM

## 2019-08-19 RX ORDER — PROPOFOL 10 MG/ML
INJECTION, EMULSION INTRAVENOUS PRN
Status: DISCONTINUED | OUTPATIENT
Start: 2019-08-19 | End: 2019-08-19

## 2019-08-19 RX ORDER — BACITRACIN ZINC 500 [USP'U]/G
OINTMENT TOPICAL 2 TIMES DAILY
Qty: 14 G | Refills: 0 | Status: SHIPPED | OUTPATIENT
Start: 2019-08-19 | End: 2020-05-28

## 2019-08-19 RX ORDER — NALOXONE HYDROCHLORIDE 0.4 MG/ML
.1-.4 INJECTION, SOLUTION INTRAMUSCULAR; INTRAVENOUS; SUBCUTANEOUS
Status: DISCONTINUED | OUTPATIENT
Start: 2019-08-19 | End: 2019-08-20 | Stop reason: HOSPADM

## 2019-08-19 RX ORDER — PROPOFOL 10 MG/ML
INJECTION, EMULSION INTRAVENOUS CONTINUOUS PRN
Status: DISCONTINUED | OUTPATIENT
Start: 2019-08-19 | End: 2019-08-19

## 2019-08-19 RX ORDER — DEXAMETHASONE SODIUM PHOSPHATE 10 MG/ML
INJECTION, SOLUTION INTRAMUSCULAR; INTRAVENOUS PRN
Status: DISCONTINUED | OUTPATIENT
Start: 2019-08-19 | End: 2019-08-19

## 2019-08-19 RX ORDER — SODIUM CHLORIDE, SODIUM LACTATE, POTASSIUM CHLORIDE, CALCIUM CHLORIDE 600; 310; 30; 20 MG/100ML; MG/100ML; MG/100ML; MG/100ML
INJECTION, SOLUTION INTRAVENOUS CONTINUOUS
Status: DISCONTINUED | OUTPATIENT
Start: 2019-08-19 | End: 2019-08-20 | Stop reason: HOSPADM

## 2019-08-19 RX ORDER — LIDOCAINE 40 MG/G
CREAM TOPICAL
Status: DISCONTINUED | OUTPATIENT
Start: 2019-08-19 | End: 2019-08-19 | Stop reason: HOSPADM

## 2019-08-19 RX ORDER — FENTANYL CITRATE 50 UG/ML
INJECTION, SOLUTION INTRAMUSCULAR; INTRAVENOUS PRN
Status: DISCONTINUED | OUTPATIENT
Start: 2019-08-19 | End: 2019-08-19

## 2019-08-19 RX ORDER — OXYMETAZOLINE HYDROCHLORIDE 0.05 G/100ML
SPRAY NASAL
Qty: 1 BOTTLE | Refills: 0 | Status: SHIPPED | OUTPATIENT
Start: 2019-08-19 | End: 2020-05-28

## 2019-08-19 RX ORDER — MEPERIDINE HYDROCHLORIDE 25 MG/ML
12.5 INJECTION INTRAMUSCULAR; INTRAVENOUS; SUBCUTANEOUS
Status: DISCONTINUED | OUTPATIENT
Start: 2019-08-19 | End: 2019-08-20 | Stop reason: HOSPADM

## 2019-08-19 RX ORDER — LIDOCAINE HYDROCHLORIDE AND EPINEPHRINE 10; 10 MG/ML; UG/ML
INJECTION, SOLUTION INFILTRATION; PERINEURAL PRN
Status: DISCONTINUED | OUTPATIENT
Start: 2019-08-19 | End: 2019-08-19 | Stop reason: HOSPADM

## 2019-08-19 RX ORDER — KETOROLAC TROMETHAMINE 30 MG/ML
30 INJECTION, SOLUTION INTRAMUSCULAR; INTRAVENOUS EVERY 6 HOURS PRN
Status: DISCONTINUED | OUTPATIENT
Start: 2019-08-19 | End: 2019-08-20 | Stop reason: HOSPADM

## 2019-08-19 RX ORDER — ONDANSETRON 2 MG/ML
4 INJECTION INTRAMUSCULAR; INTRAVENOUS EVERY 30 MIN PRN
Status: DISCONTINUED | OUTPATIENT
Start: 2019-08-19 | End: 2019-08-20 | Stop reason: HOSPADM

## 2019-08-19 RX ORDER — SODIUM CHLORIDE, SODIUM LACTATE, POTASSIUM CHLORIDE, CALCIUM CHLORIDE 600; 310; 30; 20 MG/100ML; MG/100ML; MG/100ML; MG/100ML
INJECTION, SOLUTION INTRAVENOUS CONTINUOUS
Status: DISCONTINUED | OUTPATIENT
Start: 2019-08-19 | End: 2019-08-19 | Stop reason: HOSPADM

## 2019-08-19 RX ORDER — ONDANSETRON 4 MG/1
4 TABLET, ORALLY DISINTEGRATING ORAL EVERY 30 MIN PRN
Status: DISCONTINUED | OUTPATIENT
Start: 2019-08-19 | End: 2019-08-20 | Stop reason: HOSPADM

## 2019-08-19 RX ORDER — LIDOCAINE HYDROCHLORIDE 20 MG/ML
INJECTION, SOLUTION INFILTRATION; PERINEURAL PRN
Status: DISCONTINUED | OUTPATIENT
Start: 2019-08-19 | End: 2019-08-19

## 2019-08-19 RX ORDER — ONDANSETRON 2 MG/ML
INJECTION INTRAMUSCULAR; INTRAVENOUS PRN
Status: DISCONTINUED | OUTPATIENT
Start: 2019-08-19 | End: 2019-08-19

## 2019-08-19 RX ORDER — OXYMETAZOLINE HYDROCHLORIDE 0.05 G/100ML
SPRAY NASAL PRN
Status: DISCONTINUED | OUTPATIENT
Start: 2019-08-19 | End: 2019-08-19 | Stop reason: HOSPADM

## 2019-08-19 RX ORDER — FENTANYL CITRATE 50 UG/ML
25-50 INJECTION, SOLUTION INTRAMUSCULAR; INTRAVENOUS
Status: DISCONTINUED | OUTPATIENT
Start: 2019-08-19 | End: 2019-08-19 | Stop reason: HOSPADM

## 2019-08-19 RX ORDER — OXYCODONE HYDROCHLORIDE 5 MG/1
5 TABLET ORAL EVERY 6 HOURS PRN
Qty: 15 TABLET | Refills: 0 | Status: SHIPPED | OUTPATIENT
Start: 2019-08-19 | End: 2019-08-22

## 2019-08-19 RX ORDER — ACETAMINOPHEN 325 MG/1
975 TABLET ORAL ONCE
Status: COMPLETED | OUTPATIENT
Start: 2019-08-19 | End: 2019-08-19

## 2019-08-19 RX ORDER — SODIUM CHLORIDE, SODIUM LACTATE, POTASSIUM CHLORIDE, CALCIUM CHLORIDE 600; 310; 30; 20 MG/100ML; MG/100ML; MG/100ML; MG/100ML
INJECTION, SOLUTION INTRAVENOUS CONTINUOUS PRN
Status: DISCONTINUED | OUTPATIENT
Start: 2019-08-19 | End: 2019-08-19

## 2019-08-19 RX ORDER — GABAPENTIN 300 MG/1
300 CAPSULE ORAL ONCE
Status: COMPLETED | OUTPATIENT
Start: 2019-08-19 | End: 2019-08-19

## 2019-08-19 RX ORDER — OXYCODONE HYDROCHLORIDE 5 MG/1
5 TABLET ORAL EVERY 4 HOURS PRN
Status: DISCONTINUED | OUTPATIENT
Start: 2019-08-19 | End: 2019-08-20 | Stop reason: HOSPADM

## 2019-08-19 RX ADMIN — SODIUM CHLORIDE, SODIUM LACTATE, POTASSIUM CHLORIDE, CALCIUM CHLORIDE: 600; 310; 30; 20 INJECTION, SOLUTION INTRAVENOUS at 12:14

## 2019-08-19 RX ADMIN — GABAPENTIN 300 MG: 300 CAPSULE ORAL at 12:14

## 2019-08-19 RX ADMIN — SODIUM CHLORIDE, SODIUM LACTATE, POTASSIUM CHLORIDE, CALCIUM CHLORIDE: 600; 310; 30; 20 INJECTION, SOLUTION INTRAVENOUS at 13:42

## 2019-08-19 RX ADMIN — Medication 50 MG: at 13:57

## 2019-08-19 RX ADMIN — DEXAMETHASONE SODIUM PHOSPHATE 10 MG: 10 INJECTION, SOLUTION INTRAMUSCULAR; INTRAVENOUS at 13:57

## 2019-08-19 RX ADMIN — PROPOFOL 180 MG: 10 INJECTION, EMULSION INTRAVENOUS at 13:56

## 2019-08-19 RX ADMIN — FENTANYL CITRATE 50 MCG: 50 INJECTION, SOLUTION INTRAMUSCULAR; INTRAVENOUS at 14:03

## 2019-08-19 RX ADMIN — FENTANYL CITRATE 50 MCG: 50 INJECTION, SOLUTION INTRAMUSCULAR; INTRAVENOUS at 14:19

## 2019-08-19 RX ADMIN — PROPOFOL 150 MCG/KG/MIN: 10 INJECTION, EMULSION INTRAVENOUS at 13:57

## 2019-08-19 RX ADMIN — ACETAMINOPHEN 975 MG: 325 TABLET ORAL at 12:14

## 2019-08-19 RX ADMIN — ONDANSETRON 4 MG: 2 INJECTION INTRAMUSCULAR; INTRAVENOUS at 13:57

## 2019-08-19 RX ADMIN — LIDOCAINE HYDROCHLORIDE 100 MG: 20 INJECTION, SOLUTION INFILTRATION; PERINEURAL at 13:56

## 2019-08-19 ASSESSMENT — MIFFLIN-ST. JEOR: SCORE: 1794.73

## 2019-08-19 NOTE — PROGRESS NOTES
Brief ENT Note    Spoke with patient regarding discharge pain medication plan. Patient reports no chronic history of opioid abuse saying he only takes pain medication after surgeries. He also reports that his father is the planned post-op  today.    Based on the above discussion, we will prescribe 15 tablets of oxycodone to be used 5 mg q6h prn.    Lila Villegas MD PGY5

## 2019-08-19 NOTE — OR NURSING
Pt informed writer that his original plan following surgery was to drive himself home. Pt verbalizes awareness that the policy after anesthesia is that a pt is not to drive for 24 hours.  Pt states his father, who is 84, is not a good  and will not be able to drive them home today. Pt admits he doesn't feel able to drive today as planned (pt informed again that after anesthesia he is not to drive). Dr. Conway at bedside to speak with pt regarding transport home. Pt father also at bedside, pt father states he is able to drive. Pt agreeable to his father driving home. Pt also requesting narcotics to go home with. Dr. Villegas paged with pt requests. Will continue to monitor.

## 2019-08-19 NOTE — DISCHARGE INSTRUCTIONS
975 mg tylenol taken at 12:15 pm    [facility]  Home Care Following Anesthesia  For 24 hours after surgery:  1. Get plenty of rest.  A responsible adult must stay with you for at least 24 hours after you leave the surgery center.  2. Do not drive or use heavy equipment.  If you have weakness or tingling, don't drive or use heavy equipment until this feeling goes away.   3. Do not drink alcohol.   4. Avoid strenuous or risky activities.  Ask for help when climbing stairs.  5. You may feel lightheaded.  IF so, sit for a few minutes before standing.  Have someone help you get up.   6. If you have nausea (feel sick to your stomach): Drink only clear liquids such as apple juice, ginger ale, broth or 7-Up.  Rest may also help.  Be sure to drink enough fluids.  Move to a regular diet as you feel able.   7. You may have a slight fever.  Call the doctor if your fever is over 100 F (37.7 C) (taken under the tongue) or lasts longer than 24 hours.  8. You may have a dry mouth, a sore throat, muscle aches or trouble sleeping. These should go away after 24 hours.  9. Do not make important or legal decisions.               Tips for taking pain medications  To get the best pain relief possible, remember these points:    Take pain medications as directed, before pain becomes severe.    Pain medication can upset your stomach: taking it with food may help.    Constipation is a common side effect of pain medication. Drink plenty of  fluids.    Eat foods high in fiber. Take a stool softener if recommended by your doctor or pharmacist.    Do not drink alcohol, drive or operate machinery while taking pain medications.    Ask about other ways to control pain, such as with heat, ice or relaxation.    Tylenol/Acetaminophen Consumption  To help encourage the safe use of acetaminophen, the makers of TYLENOL  have lowered the maximum daily dose for single-ingredient Extra Strength TYLENOL  (acetaminophen)  products sold in the [place] from 8 pills per day (4,000 mg) to 6 pills per day (3,000 mg). The dosing interval has also changed from 2 pills every 4-6 hours to 2 pills every 6 hours.    If you feel your pain relief is insufficient, you may take Tylenol/Acetaminophen in addition to your narcotic pain medication.     Be careful not to exceed 3,000 mg of Tylenol/Acetaminophen in a 24 hour period from all sources.    If you are taking extra strength Tylenol/acetaminophen (500 mg), the maximum dose is 6 tablets in 24 hours.    If you are taking regular strength acetaminophen (325 mg), the maximum dose is 9 tablets in 24 hours.    Call a doctor for any of the followin. Signs of infection (fever, growing tenderness at the surgery site, a large amount of drainage or bleeding, severe pain, foul-smelling drainage, redness, swelling).  2. It has been over 8 to 10 hours since surgery and you are still not able to urinate (pass water).  3. Headache for over 24 hours.  4. Numbness, tingling or weakness the day after surgery (if you had spinal anesthesia).  Your doctor is:       Dr. Bobo Renteria, ENT Otolaryngology: [phone]               Or dial [phone] and ask for the resident on call for:  ENT Otolaryngology  For emergency care, call the:  [facility]:  [phone] (TTY for hearing impaired: [phone])

## 2019-08-19 NOTE — ANESTHESIA PREPROCEDURE EVALUATION
Anesthesia Pre-Procedure Evaluation    Patient: Micah Nunez   MRN:     [id number] Gender:   male   Age:    53 year old :      1965        Preoperative Diagnosis: Schneiderian Papilloma   Procedure(s):  Left Sided Functional Endoscopic Sinus Surgery with Shaver     Past Medical History:   Diagnosis Date     Benign essential hypertension 10/18/2017     Bleeding disorder (H)      CARDIOVASCULAR SCREENING; LDL GOAL LESS THAN 130 2013     Chemical dependency (H)      Gastroesophageal reflux disease      Hypertension      Nasal mass 2013     Papilloma of nose 3/10/2014     Skin disease       Past Surgical History:   Procedure Laterality Date     ARTHRODESIS FOOT Left 2015    Procedure: ARTHRODESIS FOOT;  Surgeon: oCrtez Hayward DPM;  Location: [facility] OR     ARTHRODESIS TOE(S)  2013    Procedure: ARTHRODESIS TOE(S);  Arthrodesis first metatarsophalangeal joint left foot;  Surgeon: Cortez Hayward DPM;  Location: [facility] OR     ENDOSCOPIC SINUS SURGERY  2014    Procedure: ENDOSCOPIC SINUS SURGERY;  Functional Endoscopic Sinus Surgery;  Surgeon: Bobo Renteria MD;  Location:  OR     ENDOSCOPIC SINUS SURGERY  2014    Procedure: ENDOSCOPIC SINUS SURGERY;  Surgeon: Bobo Renteria MD;  Location:  OR     ENDOSCOPIC SINUS SURGERY N/A 2015    Procedure: ENDOSCOPIC SINUS SURGERY;  Surgeon: Bobo Renteria MD;  Location:  OR     ENDOSCOPIC SINUS SURGERY N/A 2015    Procedure: ENDOSCOPIC SINUS SURGERY;  Surgeon: Bobo Renteria MD;  Location:  OR     ENT SURGERY       EXCISE NASAL MASS Left 2017    Procedure: EXCISE NASAL MASS;  Excision of Left Nasal Papilloma;  Surgeon: Bobo Renteria MD;  Location:  OR     LAPAROSCOPIC HERNIORRHAPHY INGUINAL BILATERAL Bilateral 2017    Procedure: LAPAROSCOPIC HERNIORRHAPHY INGUINAL BILATERAL;  Surgeon: Shay Mercado MD;  Location: [facility]     NASAL ENDOSCOPY Bilateral 2017     Procedure: NASAL ENDOSCOPY;  Surgeon: Bobo Retneria MD;  Location: [facility]     NASAL ENDOSCOPY N/A 5/21/2018    Procedure: NASAL ENDOSCOPY;  Nasal Endoscopy, CO2 Laser Excision of Left Nasal Papilloma;  Surgeon: Bobo Renteria MD;  Location: [facility]     NASAL/SINUS POLYPECTOMY       PE TUBES            Anesthesia Evaluation     . Pt has had prior anesthetic. Type: General    No history of anesthetic complications          ROS/MED HX    ENT/Pulmonary:  - neg pulmonary ROS     Neurologic:  - neg neurologic ROS     Cardiovascular:     (+) hypertension----. : . . . :. .       METS/Exercise Tolerance:  4 - Raking leaves, gardening   Hematologic:  - neg hematologic  ROS       Musculoskeletal:  - neg musculoskeletal ROS       GI/Hepatic:     (+) GERD       Renal/Genitourinary:  - ROS Renal section negative       Endo:  - neg endo ROS       Psychiatric:  - neg psychiatric ROS       Infectious Disease:  - neg infectious disease ROS       Malignancy:      - no malignancy   Other:                         PHYSICAL EXAM:   Mental Status/Neuro: A/A/O   Airway: Facies: Feasible  Mallampati: I  Mouth/Opening: Full  TM distance: > 6 cm  Neck ROM: Full   Respiratory: Auscultation: CTAB     Resp. Rate: Normal     Resp. Effort: Normal      CV: Rhythm: Regular  Rate: Age appropriate  Heart: Normal Sounds  Edema: None   Comments:      Dental: Normal Dentition                LABS:  CBC:   Lab Results   Component Value Date    WBC 9.6 05/14/2018    WBC 10.5 03/21/2017    HGB 15.7 05/14/2018    HGB 14.6 03/21/2017    HCT 47.2 05/14/2018    HCT 44.0 03/21/2017     05/14/2018     03/21/2017     BMP:   Lab Results   Component Value Date     01/17/2019     05/14/2018    POTASSIUM 3.9 01/17/2019    POTASSIUM 3.5 05/14/2018    CHLORIDE 104 01/17/2019    CHLORIDE 102 05/14/2018    CO2 30 01/17/2019    CO2 29 05/14/2018    BUN 13 01/17/2019    BUN 11 05/14/2018    CR 1.11 01/17/2019    CR 1.18 05/14/2018    GLC  "115 (H) 01/17/2019    GLC 95 05/14/2018     COAGS: No results found for: PTT, INR, FIBR  POC: No results found for: BGM, HCG, HCGS  OTHER:   Lab Results   Component Value Date    KEVIN 8.9 01/17/2019        Preop Vitals    BP Readings from Last 3 Encounters:   08/19/19 (!) 148/100   08/09/19 134/82   08/05/19 (!) 144/98    Pulse Readings from Last 3 Encounters:   08/09/19 74   08/05/19 87   07/30/19 86      Resp Readings from Last 3 Encounters:   08/19/19 16   08/05/19 18   07/30/19 15    SpO2 Readings from Last 3 Encounters:   08/19/19 97%   08/05/19 99%   06/03/19 98%      Temp Readings from Last 1 Encounters:   08/19/19 36.3  C (97.4  F) (Oral)    Ht Readings from Last 1 Encounters:   08/19/19 1.93 m (6' 4\")      Wt Readings from Last 1 Encounters:   08/19/19 84.8 kg (187 lb)    Estimated body mass index is 22.76 kg/m  as calculated from the following:    Height as of this encounter: 1.93 m (6' 4\").    Weight as of this encounter: 84.8 kg (187 lb).     LDA:  Peripheral IV 08/19/19 Left Lower forearm (Active)   Site Assessment WDL 8/19/2019 12:25 PM   Line Status Infusing 8/19/2019 12:25 PM   Phlebitis Scale 0-->no symptoms 8/19/2019 12:25 PM   Infiltration Scale 0 8/19/2019 12:25 PM   Extravasation? No 8/19/2019 12:25 PM   Dressing Intervention New dressing  8/19/2019 12:25 PM   Number of days: 0        Assessment:   ASA SCORE: 2        Smoking Status:  Active Smoker       - patient did not smoke on day of surgery       - instructed to abstain from smoking on day of procedure   NPO Status: NPO Appropriate     Plan:   Anes. Type:  General   Pre-Medication: None   Induction:  IV (Standard)   Airway: ETT; Oral   Access/Monitoring: PIV   Maintenance: Balanced     Postop Plan:   Postop Pain: Opioids  Postop Sedation/Airway: Not planned  Disposition: Outpatient     PONV Management:   Adult Risk Factors:, Postop Opioids   Prevention: Ondansetron, Dexamethasone     CONSENT: Direct conversation   Plan and risks discussed " with: Patient                      Dez Conway MD, MD

## 2019-08-19 NOTE — ANESTHESIA POSTPROCEDURE EVALUATION
Anesthesia POST Procedure Evaluation    Patient: Micah Nunez   MRN:     [id number] Gender:   male   Age:    53 year old :      1965        Preoperative Diagnosis: Schneiderian Papilloma   Procedure(s):  Bilateral Functional Endoscopic Sinus Surgery, Right Nasal Endoscopy and Excision of Left Nasal Mass   Postop Comments: No value filed.       Anesthesia Type:  Not documented  General    Reportable Event: NO     PAIN: Uncomplicated   Sign Out status: Comfortable, Well controlled pain     PONV: No PONV   Sign Out status:  No Nausea or Vomiting     Neuro/Psych: Uneventful perioperative course   Sign Out Status: Preoperative baseline; Age appropriate mentation     Airway/Resp.: Uneventful perioperative course   Sign Out Status: Non labored breathing, age appropriate RR; Resp. Status within EXPECTED Parameters     CV: Uneventful perioperative course   Sign Out status: Appropriate BP and perfusion indices; Appropriate HR/Rhythm     Disposition:   Sign Out in:  PACU  Disposition:  Phase II; Home  Recovery Course: Uneventful  Follow-Up: Not required           Last Anesthesia Record Vitals:  CRNA VITALS  2019 1405 - 2019 1505      2019             Pulse:  85    SpO2:  99 %    Resp Rate (observed):  10    Resp Rate (set):  10          Last PACU Vitals:  Vitals Value Taken Time   /92 2019  2:45 PM   Temp 36.3  C (97.3  F) 2019  2:38 PM   Pulse 76 2019  2:45 PM   Resp 17 2019  2:54 PM   SpO2 96 % 2019  2:54 PM   Temp src     NIBP     Pulse     SpO2     Resp     Temp     Ht Rate     Temp 2     Vitals shown include unvalidated device data.      Electronically Signed By: Dez Conway MD, MD, 2019, 3:13 PM

## 2019-08-19 NOTE — OP NOTE
DATE OF PROCEDURE: 08/19/19    SURGEON: Bobo Renteria MD    RESIDENT SURGEON: Lila Villegas MD    PRE-OPERATIVE DIAGNOSIS: Left nasal polyposis    POST-OPERATIVE DIAGNOSIS:  Same    PROCEDURE:     1. Right nasal endoscopy  2. Left nasal endoscopy with excision of left nasal cavity papillomas    ANESTHESIA: GETA    INDICATIONS: This is a 53 year old male with a longstanding history of left nasal cavity papillomatosis. Unfortunately, he continues to smoke and has developed recurrence of his left nasal cavity papillomatosis. Therefore, the above procedure was indicated. The indications, alternatives, risks, and benefits were discussed and all questions were answered.  The patient consented to the above procedure.    FINDINGS: The right nasal cavity had a very minor area of papilloma on the right posterior inferior turbinate. This was very small and non-obstructive and therefore, we decided no to intervene. The left nasal cavity had papillomatosis circumferentially in the nasal vestibule at the mucocutaneous junction. This involved the anterior septal mucosa and the head of the inferior turbinate. There was a small focus of papilloma on the mid aspect of the left inferior turbinate. The rest of the left nasal cavity was free of papilloma.     DESCRIPTION OF PROCEDURE: After informed consent was obtained in the pre-operative area, the patient was brought to the operating room and placed in the supine position.  Following induction, the patient was intubated orotracheally. Monitoring lines were placed as appropriate. The table was then turned 90 degrees in preparation for surgery.   Afrin-soaked pledgets were placed in the bilateral nasal passages.  The image-guidance system was set-up and used throughout the entirety of the case. A time-out was performed to confirm the identity of this patient, the procedure to be done, and the site of surgery. The pledgets were removed and the nose was then visualized endoscopically.  Findings as above. We then turned our attention to the left nasal cavity. Approximately 5 mL of 1% Xylocaine with 1:100,000 epinephrine was injected into the papilloma. A combination of straight and up-biting mandy-cut forceps and Blakelsy forceps were used to remove the papilloma from the anterior septal mucosa, the head of the inferior turbinate and the mid-portion of the left inferior turbinate. Suction monopolar electrocautery was used to further ablate the papilloma on the anterior septum and the mid-portion of the left inferior turbinate. Of note, we did not use cautery on the head of the left inferior turbinate so as not to cause synechiae formation in the future. The left nasal cavity was irrigated and suctioned. An orogastric tube was used to decompress the stomach contents.  This concluded our procedure and the patient was turned back over to the care of the Anesthesia team.  The patient tolerated the procedure well and no complications were encountered.    Dr. Renteria was present for all critical portions of this procedure.    Lila Villegas MD

## 2019-08-19 NOTE — BRIEF OP NOTE
[facility]    Brief Operative Note    Pre-operative diagnosis: Schneiderian Papilloma  Post-operative diagnosis * No post-op diagnosis entered *  Procedure: Procedure(s):  Bilateral Functional Endoscopic Sinus Surgery, Right Nasal Endoscopy and Excision of Left Nasal Mass  Surgeon: Surgeon(s) and Role:     * Bobo Renteria MD - Primary  Anesthesia: General   Estimated blood loss: Less than 10 ml  Drains: None  Specimens:   ID Type Source Tests Collected by Time Destination   A : Left Sinonasal contents Tissue Sinus Contents, Left SURGICAL PATHOLOGY EXAM Bobo Renteria MD 8/19/2019  2:18 PM      Findings:   See op note  Complications: None.  Implants:  * No implants in log *

## 2019-08-19 NOTE — ANESTHESIA CARE TRANSFER NOTE
Patient: Micah Nunez    Procedure(s):  Bilateral Functional Endoscopic Sinus Surgery, Right Nasal Endoscopy and Excision of Left Nasal Mass    Diagnosis: Schneiderian Papilloma  Diagnosis Additional Information: No value filed.    Anesthesia Type:   General     Note:  Airway :Room Air  Patient transferred to:PACU  Comments: VSS and WNL, comfortable, no PONV, report to Aleksandra RNHandoff Report: Identifed the Patient, Identified the Reponsible Provider, Reviewed the pertinent medical history, Discussed the surgical course, Reviewed Intra-OP anesthesia mangement and issues during anesthesia, Set expectations for post-procedure period and Allowed opportunity for questions and acknowledgement of understanding      Vitals: (Last set prior to Anesthesia Care Transfer)    CRNA VITALS  8/19/2019 1405 - 8/19/2019 1440      8/19/2019             Pulse:  85    SpO2:  99 %    Resp Rate (observed):  10    Resp Rate (set):  10                Electronically Signed By: NEHEMIAS Galloway CRNA  August 19, 2019  2:40 PM

## 2019-08-20 LAB — COPATH REPORT: NORMAL

## 2019-09-03 ENCOUNTER — TELEPHONE (OUTPATIENT)
Dept: FAMILY MEDICINE | Facility: CLINIC | Age: 54
End: 2019-09-03

## 2020-05-28 ENCOUNTER — HOSPITAL ENCOUNTER (EMERGENCY)
Facility: CLINIC | Age: 55
Discharge: HOME | End: 2020-05-28
Attending: NURSE PRACTITIONER | Admitting: NURSE PRACTITIONER
Payer: MEDICAID

## 2020-05-28 VITALS
BODY MASS INDEX: 23.75 KG/M2 | HEIGHT: 76 IN | OXYGEN SATURATION: 96 % | TEMPERATURE: 97.6 F | RESPIRATION RATE: 16 BRPM | SYSTOLIC BLOOD PRESSURE: 160 MMHG | DIASTOLIC BLOOD PRESSURE: 91 MMHG | WEIGHT: 195 LBS | HEART RATE: 79 BPM

## 2020-05-28 DIAGNOSIS — N39.0 UTI (URINARY TRACT INFECTION): ICD-10-CM

## 2020-05-28 DIAGNOSIS — R33.9 URINARY RETENTION WITH INCOMPLETE BLADDER EMPTYING: ICD-10-CM

## 2020-05-28 LAB
ALBUMIN SERPL-MCNC: 3.3 G/DL (ref 3.4–5)
ALBUMIN UR-MCNC: 300 MG/DL
ALP SERPL-CCNC: 110 U/L (ref 40–150)
ALT SERPL W P-5'-P-CCNC: 35 U/L (ref 0–70)
ANION GAP SERPL CALCULATED.3IONS-SCNC: 7 MMOL/L (ref 3–14)
APPEARANCE UR: ABNORMAL
AST SERPL W P-5'-P-CCNC: 35 U/L (ref 0–45)
BASOPHILS # BLD AUTO: 0.1 10E9/L (ref 0–0.2)
BASOPHILS NFR BLD AUTO: 0.5 %
BILIRUB SERPL-MCNC: 0.6 MG/DL (ref 0.2–1.3)
BILIRUB UR QL STRIP: NEGATIVE
BUN SERPL-MCNC: 12 MG/DL (ref 7–30)
CALCIUM SERPL-MCNC: 8.8 MG/DL (ref 8.5–10.1)
CHLORIDE SERPL-SCNC: 107 MMOL/L (ref 94–109)
CO2 SERPL-SCNC: 28 MMOL/L (ref 20–32)
COLOR UR AUTO: ABNORMAL
CREAT SERPL-MCNC: 1.01 MG/DL (ref 0.66–1.25)
DIFFERENTIAL METHOD BLD: NORMAL
EOSINOPHIL # BLD AUTO: 0.1 10E9/L (ref 0–0.7)
EOSINOPHIL NFR BLD AUTO: 0.9 %
ERYTHROCYTE [DISTWIDTH] IN BLOOD BY AUTOMATED COUNT: 13.4 % (ref 10–15)
GFR SERPL CREATININE-BSD FRML MDRD: 84 ML/MIN/{1.73_M2}
GLUCOSE SERPL-MCNC: 121 MG/DL (ref 70–99)
GLUCOSE UR STRIP-MCNC: NEGATIVE MG/DL
HCT VFR BLD AUTO: 42.6 % (ref 40–53)
HGB BLD-MCNC: 14.2 G/DL (ref 13.3–17.7)
HGB UR QL STRIP: ABNORMAL
IMM GRANULOCYTES # BLD: 0 10E9/L (ref 0–0.4)
IMM GRANULOCYTES NFR BLD: 0.3 %
INR PPP: 1.09 (ref 0.86–1.14)
KETONES UR STRIP-MCNC: NEGATIVE MG/DL
LEUKOCYTE ESTERASE UR QL STRIP: ABNORMAL
LYMPHOCYTES # BLD AUTO: 2.1 10E9/L (ref 0.8–5.3)
LYMPHOCYTES NFR BLD AUTO: 19.4 %
MCH RBC QN AUTO: 29.9 PG (ref 26.5–33)
MCHC RBC AUTO-ENTMCNC: 33.3 G/DL (ref 31.5–36.5)
MCV RBC AUTO: 90 FL (ref 78–100)
MONOCYTES # BLD AUTO: 0.8 10E9/L (ref 0–1.3)
MONOCYTES NFR BLD AUTO: 7.2 %
NEUTROPHILS # BLD AUTO: 7.9 10E9/L (ref 1.6–8.3)
NEUTROPHILS NFR BLD AUTO: 71.7 %
NITRATE UR QL: NEGATIVE
NRBC # BLD AUTO: 0 10*3/UL
NRBC BLD AUTO-RTO: 0 /100
PH UR STRIP: 6 PH (ref 5–7)
PLATELET # BLD AUTO: 254 10E9/L (ref 150–450)
POTASSIUM SERPL-SCNC: 3.4 MMOL/L (ref 3.4–5.3)
PROT SERPL-MCNC: 6.5 G/DL (ref 6.8–8.8)
RBC # BLD AUTO: 4.75 10E12/L (ref 4.4–5.9)
RBC #/AREA URNS AUTO: >182 /HPF (ref 0–2)
SODIUM SERPL-SCNC: 142 MMOL/L (ref 133–144)
SOURCE: ABNORMAL
SP GR UR STRIP: 1.01 (ref 1–1.03)
UROBILINOGEN UR STRIP-MCNC: NORMAL MG/DL (ref 0–2)
WBC # BLD AUTO: 10.9 10E9/L (ref 4–11)
WBC #/AREA URNS AUTO: 3 /HPF (ref 0–5)

## 2020-05-28 PROCEDURE — 87086 URINE CULTURE/COLONY COUNT: CPT | Performed by: NURSE PRACTITIONER

## 2020-05-28 PROCEDURE — 51702 INSERT TEMP BLADDER CATH: CPT | Performed by: NURSE PRACTITIONER

## 2020-05-28 PROCEDURE — 96375 TX/PRO/DX INJ NEW DRUG ADDON: CPT | Performed by: NURSE PRACTITIONER

## 2020-05-28 PROCEDURE — 85025 COMPLETE CBC W/AUTO DIFF WBC: CPT | Performed by: NURSE PRACTITIONER

## 2020-05-28 PROCEDURE — 99284 EMERGENCY DEPT VISIT MOD MDM: CPT | Mod: 25 | Performed by: NURSE PRACTITIONER

## 2020-05-28 PROCEDURE — 25800030 ZZH RX IP 258 OP 636: Performed by: NURSE PRACTITIONER

## 2020-05-28 PROCEDURE — 99284 EMERGENCY DEPT VISIT MOD MDM: CPT | Mod: Z6 | Performed by: NURSE PRACTITIONER

## 2020-05-28 PROCEDURE — 96361 HYDRATE IV INFUSION ADD-ON: CPT | Mod: 59 | Performed by: NURSE PRACTITIONER

## 2020-05-28 PROCEDURE — 81003 URINALYSIS AUTO W/O SCOPE: CPT | Performed by: NURSE PRACTITIONER

## 2020-05-28 PROCEDURE — 25000128 H RX IP 250 OP 636: Performed by: NURSE PRACTITIONER

## 2020-05-28 PROCEDURE — 80053 COMPREHEN METABOLIC PANEL: CPT | Performed by: NURSE PRACTITIONER

## 2020-05-28 PROCEDURE — 51798 US URINE CAPACITY MEASURE: CPT | Performed by: NURSE PRACTITIONER

## 2020-05-28 PROCEDURE — 25000125 ZZHC RX 250: Performed by: NURSE PRACTITIONER

## 2020-05-28 PROCEDURE — 85610 PROTHROMBIN TIME: CPT | Performed by: NURSE PRACTITIONER

## 2020-05-28 PROCEDURE — 96365 THER/PROPH/DIAG IV INF INIT: CPT | Performed by: NURSE PRACTITIONER

## 2020-05-28 RX ORDER — SULFAMETHOXAZOLE/TRIMETHOPRIM 800-160 MG
1 TABLET ORAL 2 TIMES DAILY
Qty: 28 TABLET | Refills: 0 | Status: SHIPPED | OUTPATIENT
Start: 2020-05-28 | End: 2020-06-11

## 2020-05-28 RX ORDER — SODIUM CHLORIDE, SODIUM LACTATE, POTASSIUM CHLORIDE, CALCIUM CHLORIDE 600; 310; 30; 20 MG/100ML; MG/100ML; MG/100ML; MG/100ML
1000 INJECTION, SOLUTION INTRAVENOUS CONTINUOUS
Status: DISCONTINUED | OUTPATIENT
Start: 2020-05-28 | End: 2020-05-29 | Stop reason: HOSPADM

## 2020-05-28 RX ORDER — LIDOCAINE HYDROCHLORIDE 20 MG/ML
JELLY TOPICAL ONCE
Status: COMPLETED | OUTPATIENT
Start: 2020-05-28 | End: 2020-05-28

## 2020-05-28 RX ORDER — CEFTRIAXONE SODIUM 1 G/50ML
1 INJECTION, SOLUTION INTRAVENOUS ONCE
Status: COMPLETED | OUTPATIENT
Start: 2020-05-28 | End: 2020-05-28

## 2020-05-28 RX ORDER — KETOROLAC TROMETHAMINE 15 MG/ML
15 INJECTION, SOLUTION INTRAMUSCULAR; INTRAVENOUS ONCE
Status: COMPLETED | OUTPATIENT
Start: 2020-05-28 | End: 2020-05-28

## 2020-05-28 RX ADMIN — SODIUM CHLORIDE, POTASSIUM CHLORIDE, SODIUM LACTATE AND CALCIUM CHLORIDE 1000 ML: 600; 310; 30; 20 INJECTION, SOLUTION INTRAVENOUS at 19:09

## 2020-05-28 RX ADMIN — SODIUM CHLORIDE 500 ML: 9 INJECTION, SOLUTION INTRAVENOUS at 21:27

## 2020-05-28 RX ADMIN — CEFTRIAXONE SODIUM 1 G: 1 INJECTION, SOLUTION INTRAVENOUS at 21:27

## 2020-05-28 RX ADMIN — KETOROLAC TROMETHAMINE 15 MG: 15 INJECTION, SOLUTION INTRAMUSCULAR; INTRAVENOUS at 21:34

## 2020-05-28 RX ADMIN — SODIUM CHLORIDE, POTASSIUM CHLORIDE, SODIUM LACTATE AND CALCIUM CHLORIDE 1000 ML: 600; 310; 30; 20 INJECTION, SOLUTION INTRAVENOUS at 20:33

## 2020-05-28 RX ADMIN — LIDOCAINE HYDROCHLORIDE: 20 JELLY TOPICAL at 20:22

## 2020-05-28 ASSESSMENT — MIFFLIN-ST. JEOR: SCORE: 1826.01

## 2020-05-28 NOTE — ED NOTES
Blood in urine since this AM, going small amounts, some urgency, feels like needs to go but only has a little blood come out each time, does not feel urge to go at this time

## 2020-05-28 NOTE — ED AVS SNAPSHOT
[facility]  [address] [address]  Phone:  [phone]  Fax:  [phone]                                    Micah Nunez   MRN: [id number]    Department:  [facility]   Date of Visit:  5/28/2020           After Visit Summary Signature Page    I have received my discharge instructions, and my questions have been answered. I have discussed any challenges I see with this plan with the nurse or doctor.    ..........................................................................................................................................  Patient/Patient Representative Signature      ..........................................................................................................................................  Patient Representative Print Name and Relationship to Patient    ..................................................               ................................................  Date                                   Time    ..........................................................................................................................................  Reviewed by Signature/Title    ...................................................              ..............................................  Date                                               Time          22EPIC Rev 08/18

## 2020-05-28 NOTE — ED PROVIDER NOTES
History     Chief Complaint   Patient presents with     Hematuria     started this AM  no fever no cough     Pain with urinatation      HPI  Micah Nunez is a 54 year old male with past medical history of alcohol dependence, tobacco use disorder, hypertension not currently on medication who presents emergency department with a 1 day history of hematuria and suprapubic discomfort.  Patient denies any previous episodes of similar etiology.  Patient reports approximately 1 month ago he had a 1 day history of suprapubic discomfort without dysuria or hematuria that resolved in 1 day.  Patient denies any fever, aches, chills, sweats, nausea, vomiting, flank pain, CVA tenderness.  Patient reports that he is last normal bowel movement was 3 days ago   Bowel movement yesterday was loose and no blood   Bowel movement today was loose and blood was noted on tissue with wiping but not noted on stool.  Patient reports drinking 3-4 servings of vodka daily; patient denies any other recreational drug use.  Patient does admit to being a tobacco user.  Patient reports feeling well otherwise and denies having seen anyone for his current symptoms.  Patient denies ear pain, eye pain, throat pain, cough, wheezing, shortness of breath, speech difficulty, mental confusion, thoughts of harming self.    Allergies:  No Known Allergies    Problem List:    Patient Active Problem List    Diagnosis Date Noted     Benign essential hypertension 10/18/2017     Priority: Medium     Tobacco use disorder 08/12/2015     Priority: Medium     Chemical dependency (H) 07/27/2015     Priority: Medium     Papilloma of nose 03/10/2014     Priority: Medium     Nasal mass 12/17/2013     Priority: Medium     CARDIOVASCULAR SCREENING; LDL GOAL LESS THAN 130 12/11/2013     Priority: Medium        Past Medical History:    Past Medical History:   Diagnosis Date     Benign essential hypertension 10/18/2017     Bleeding disorder (H)      CARDIOVASCULAR  SCREENING; LDL GOAL LESS THAN 130 12/11/2013     Chemical dependency (H)      Gastroesophageal reflux disease      Hypertension      Nasal mass 12/17/2013     Papilloma of nose 3/10/2014     Skin disease        Past Surgical History:    Past Surgical History:   Procedure Laterality Date     ARTHRODESIS FOOT Left 2/17/2015    Procedure: ARTHRODESIS FOOT;  Surgeon: Cortez Hayward DPM;  Location: [facility]     ARTHRODESIS TOE(S)  12/20/2013    Procedure: ARTHRODESIS TOE(S);  Arthrodesis first metatarsophalangeal joint left foot;  Surgeon: Cortez Hayward DPM;  Location: [facility]     ENDOSCOPIC SINUS SURGERY  1/6/2014    Procedure: ENDOSCOPIC SINUS SURGERY;  Functional Endoscopic Sinus Surgery;  Surgeon: Bobo Renteria MD;  Location:  OR     ENDOSCOPIC SINUS SURGERY  7/21/2014    Procedure: ENDOSCOPIC SINUS SURGERY;  Surgeon: Bobo Renteria MD;  Location:  OR     ENDOSCOPIC SINUS SURGERY N/A 4/6/2015    Procedure: ENDOSCOPIC SINUS SURGERY;  Surgeon: Bobo Renteria MD;  Location:  OR     ENDOSCOPIC SINUS SURGERY N/A 8/17/2015    Procedure: ENDOSCOPIC SINUS SURGERY;  Surgeon: Bobo Renteria MD;  Location:  OR     ENDOSCOPIC SINUS SURGERY Bilateral 8/19/2019    Procedure: Bilateral Functional Endoscopic Sinus Surgery, Right Nasal Endoscopy and Excision of Left Nasal Mass;  Surgeon: Bobo Renteria MD;  Location:  OR     ENT SURGERY       EXCISE NASAL MASS Left 11/6/2017    Procedure: EXCISE NASAL MASS;  Excision of Left Nasal Papilloma;  Surgeon: Bobo Renteria MD;  Location:  OR     LAPAROSCOPIC HERNIORRHAPHY INGUINAL BILATERAL Bilateral 4/12/2017    Procedure: LAPAROSCOPIC HERNIORRHAPHY INGUINAL BILATERAL;  Surgeon: Shay Mercado MD;  Location: [facility]     NASAL ENDOSCOPY Bilateral 2/6/2017    Procedure: NASAL ENDOSCOPY;  Surgeon: Bobo Renteria MD;  Location:  OR     NASAL ENDOSCOPY N/A 5/21/2018    Procedure: NASAL ENDOSCOPY;  Nasal Endoscopy, CO2 Laser  "Excision of Left Nasal Papilloma;  Surgeon: Bobo Renteria MD;  [facility]     NASAL/SINUS POLYPECTOMY       PE TUBES         Family History:    Family History   Problem Relation Age of Onset     Diabetes Mother 40     C.A.D. Father 72     Unknown/Adopted No family hx of      Depression No family hx of      Anxiety Disorder No family hx of      Schizophrenia No family hx of      Bipolar Disorder No family hx of      Suicide No family hx of      Substance Abuse No family hx of      Dementia No family hx of      [place] Disease No family hx of      Parkinsonism No family hx of      Autism Spectrum Disorder No family hx of      Intellectual Disability (Mental Retardation) No family hx of      Mental Illness No family hx of      Bleeding Disorder No family hx of        Social History:  Marital Status:  Single [1]  Social History     Tobacco Use     Smoking status: Current Every Day Smoker     Packs/day: 1.00     Years: 30.00     Pack years: 30.00     Types: Cigarettes     Start date: 1/1/1980     Smokeless tobacco: Never Used     Tobacco comment: 1 pack/day   Substance Use Topics     Alcohol use: Yes     Drug use: No     Comment: 7 years quit Cocaine/methamphetamines        Medications:    sulfamethoxazole-trimethoprim (BACTRIM DS) 800-160 MG tablet  order for DME  triamcinolone (KENALOG) 0.1 % external ointment          Review of Systems  As mentioned above in the history present illness. All other systems were reviewed and are negative.    Physical Exam   BP: (!) 155/84  Pulse: 92  Temp: 97.6  F (36.4  C)  Resp: 16  Height: 193 cm (6' 4\")  Weight: 88.5 kg (195 lb)  SpO2: 97 %      Physical Exam  Vitals signs and nursing note reviewed.   Constitutional:       General: He is not in acute distress (mild distress).     Appearance: Normal appearance. He is well-developed and normal weight. He is not ill-appearing, toxic-appearing or diaphoretic.   HENT:      Head: Normocephalic and atraumatic.      Right " Ear: Tympanic membrane, ear canal and external ear normal.      Left Ear: Tympanic membrane, ear canal and external ear normal.      Nose: Nose normal.      Mouth/Throat:      Mouth: Mucous membranes are dry.      Pharynx: Uvula midline.   Eyes:      General:         Right eye: No discharge.         Left eye: No discharge.      Conjunctiva/sclera: Conjunctivae normal.   Neck:      Thyroid: No thyromegaly.   Cardiovascular:      Rate and Rhythm: Normal rate and regular rhythm.      Heart sounds: Normal heart sounds. No murmur. No friction rub. No gallop.    Pulmonary:      Effort: Pulmonary effort is normal.      Breath sounds: Normal breath sounds. No stridor. No wheezing.   Abdominal:      General: Bowel sounds are normal. There is no distension.      Palpations: Abdomen is soft. There is no mass.      Tenderness: There is abdominal tenderness in the suprapubic area. There is no right CVA tenderness, left CVA tenderness, guarding or rebound. Negative signs include Moralez's sign.   Lymphadenopathy:      Cervical: No cervical adenopathy.   Skin:     General: Skin is warm.      Capillary Refill: Capillary refill takes less than 2 seconds.      Findings: No rash.   Neurological:      General: No focal deficit present.      Mental Status: He is alert and oriented to person, place, and time.   Psychiatric:         Mood and Affect: Mood normal.         Behavior: Behavior is cooperative.         ED Course     ED Course as of May 28 2241   Thu May 28, 2020   1955 Patient reports increased suprapubic pressure and urinary urgency.  Brought to the bathroom.  And following urination still reports urinary urgency and suprapubic pressure.  Ordered bladder scan.      2006 Bladder scan reveals 523 mL's, will order Ortez placement.      2041 Phone call with urologist and spoke with Dr. Hope.  Consultation regarding hematuria, urinary retention without signs of urosepsis but with positive signs of hematuria and leukocytes.   Recommendation is to maintain Ortez, initiate antibiotics, follow-up with urology for catheter removal in 7 to 14 days.  No need for Flomax.        Procedures    Results for orders placed or performed during the hospital encounter of 05/28/20 (from the past 24 hour(s))   UA reflex to Microscopic   Result Value Ref Range    Color Urine Red     Appearance Urine Slightly Cloudy     Glucose Urine Negative NEG^Negative mg/dL    Bilirubin Urine Negative NEG^Negative    Ketones Urine Negative NEG^Negative mg/dL    Specific Gravity Urine 1.015 1.003 - 1.035    Blood Urine Large (A) NEG^Negative    pH Urine 6.0 5.0 - 7.0 pH    Protein Albumin Urine 300 (A) NEG^Negative mg/dL    Urobilinogen mg/dL Normal 0.0 - 2.0 mg/dL    Nitrite Urine Negative NEG^Negative    Leukocyte Esterase Urine Moderate (A) NEG^Negative    Source Midstream Urine     WBC Urine 3 0 - 5 /HPF    RBC Urine >182 (H) 0 - 2 /HPF   CBC with platelets differential   Result Value Ref Range    WBC 10.9 4.0 - 11.0 10e9/L    RBC Count 4.75 4.4 - 5.9 10e12/L    Hemoglobin 14.2 13.3 - 17.7 g/dL    Hematocrit 42.6 40.0 - 53.0 %    MCV 90 78 - 100 fl    MCH 29.9 26.5 - 33.0 pg    MCHC 33.3 31.5 - 36.5 g/dL    RDW 13.4 10.0 - 15.0 %    Platelet Count 254 150 - 450 10e9/L    Diff Method Automated Method     % Neutrophils 71.7 %    % Lymphocytes 19.4 %    % Monocytes 7.2 %    % Eosinophils 0.9 %    % Basophils 0.5 %    % Immature Granulocytes 0.3 %    Nucleated RBCs 0 0 /100    Absolute Neutrophil 7.9 1.6 - 8.3 10e9/L    Absolute Lymphocytes 2.1 0.8 - 5.3 10e9/L    Absolute Monocytes 0.8 0.0 - 1.3 10e9/L    Absolute Eosinophils 0.1 0.0 - 0.7 10e9/L    Absolute Basophils 0.1 0.0 - 0.2 10e9/L    Abs Immature Granulocytes 0.0 0 - 0.4 10e9/L    Absolute Nucleated RBC 0.0    INR   Result Value Ref Range    INR 1.09 0.86 - 1.14   Comprehensive metabolic panel   Result Value Ref Range    Sodium 142 133 - 144 mmol/L    Potassium 3.4 3.4 - 5.3 mmol/L    Chloride 107 94 - 109  mmol/L    Carbon Dioxide 28 20 - 32 mmol/L    Anion Gap 7 3 - 14 mmol/L    Glucose 121 (H) 70 - 99 mg/dL    Urea Nitrogen 12 7 - 30 mg/dL    Creatinine 1.01 0.66 - 1.25 mg/dL    GFR Estimate 84 >60 mL/min/[1.73_m2]    GFR Estimate If Black >90 >60 mL/min/[1.73_m2]    Calcium 8.8 8.5 - 10.1 mg/dL    Bilirubin Total 0.6 0.2 - 1.3 mg/dL    Albumin 3.3 (L) 3.4 - 5.0 g/dL    Protein Total 6.5 (L) 6.8 - 8.8 g/dL    Alkaline Phosphatase 110 40 - 150 U/L    ALT 35 0 - 70 U/L    AST 35 0 - 45 U/L       Medications   lactated ringers BOLUS 1,000 mL (0 mLs Intravenous Stopped 5/28/20 2032)     Followed by   lactated ringers BOLUS 1,000 mL (0 mLs Intravenous Stopped 5/28/20 2127)     Followed by   lactated ringers infusion (has no administration in time range)   lidocaine (XYLOCAINE) 2 % external gel ( Topical Given 5/28/20 2022)   cefTRIAXone in d5w (ROCEPHIN) intermittent infusion 1 g (0 g Intravenous Stopped 5/28/20 2215)   0.9% sodium chloride BOLUS (0 mLs Intravenous Stopped 5/28/20 2215)   ketorolac (TORADOL) injection 15 mg (15 mg Intravenous Given 5/28/20 2134)       Assessments & Plan (with Medical Decision Making)  Micah Nunez is a 54 year old male with past medical history of alcohol dependence, tobacco use disorder, hypertension not currently on medication who presents emergency department with a 1 day history of hematuria and suprapubic discomfort.  On initial exam patient is nontoxic in appearance and appears mildly uncomfortable.  Patient's temperature is 97.6, heart rate is 92, O2 saturation 97%.  Patient has suprapubic discomfort and reporting hematuria.  Urinalysis ordered to evaluate for possible urinary tract infection and patient reporting post void urgency.  Initially patient appeared dehydrated and patient was rehydrated with fluids.  However patient continued to have post void urgency and there was concern for urinary retention.  A postvoid bladder scan was obtained and 523 mL was noted in the  bladder.  Catheter was placed  for 600 cc of urine.  Subsequently  urology was called for consultation due to complexity and reviewed that CBC was obtained and no sign of urosepsis at this time and patient not having flank pain or CVA tenderness and renal function is unremarkable at this time.  Reviewed urinalysis revealing large volumes of blood with moderate leukocyte esterase.  Discussed initiating Rocephin tonight followed up by Bactrim twice daily for 10 to 14 days and follow-up with urology.  Urologist agreed with this plan.  Did discuss possibility of Flomax and at this point in time not indicated.  Reviewed all of these findings with patient and will leave Ortez catheter in place due to the urinary retention and as per urology.  Patient given Rocephin in the emergency room.  Patient initiated on Bactrim twice daily.  Patient given instructions on catheter care.  Patient discharged in stable condition.     I have reviewed the nursing notes.    I have reviewed the findings, diagnosis, plan and need for follow up with the patient.  New Prescriptions    SULFAMETHOXAZOLE-TRIMETHOPRIM (BACTRIM DS) 800-160 MG TABLET    Take 1 tablet by mouth 2 times daily for 14 days       Final diagnoses:   UTI (urinary tract infection)   Urinary retention with incomplete bladder emptying       5/28/2020   [facility] EMERGENCY DEPARTMENT     Love Mackay, NEHEMIAS CNP  05/28/20 3470

## 2020-05-29 LAB
BACTERIA SPEC CULT: NO GROWTH
SPECIMEN SOURCE: NORMAL

## 2020-05-29 NOTE — DISCHARGE INSTRUCTIONS
Please go  the prescription at our pharmacy tonight.  Start the Bactrim tonight and drink with a large glass of water.  Continue taking this medication twice daily for 2 weeks.  You have received catheter care instructions from the nursing staff.  Return if you develop fever of 101.5 or greater.  Return if the Ortez stops draining urine.  Return if you have sudden worsening abdominal pain.  Follow-up with Dr. Carr on Tuesday for a telemedicine visit.

## 2020-05-30 ENCOUNTER — HOSPITAL ENCOUNTER (EMERGENCY)
Facility: CLINIC | Age: 55
Discharge: HOME | End: 2020-05-30
Attending: EMERGENCY MEDICINE | Admitting: EMERGENCY MEDICINE
Payer: MEDICAID

## 2020-05-30 VITALS
SYSTOLIC BLOOD PRESSURE: 143 MMHG | DIASTOLIC BLOOD PRESSURE: 72 MMHG | BODY MASS INDEX: 23.74 KG/M2 | OXYGEN SATURATION: 98 % | RESPIRATION RATE: 18 BRPM | TEMPERATURE: 98 F | WEIGHT: 195 LBS

## 2020-05-30 DIAGNOSIS — R31.0 GROSS HEMATURIA: ICD-10-CM

## 2020-05-30 PROCEDURE — 99284 EMERGENCY DEPT VISIT MOD MDM: CPT | Mod: Z6 | Performed by: EMERGENCY MEDICINE

## 2020-05-30 PROCEDURE — 99282 EMERGENCY DEPT VISIT SF MDM: CPT | Performed by: EMERGENCY MEDICINE

## 2020-05-30 NOTE — ED TRIAGE NOTES
Pt here with blood in the seals catheter for the last 2 days and is still dark blood in the seals. Pt states that he has been getting light headed also.

## 2020-05-30 NOTE — ED PROVIDER NOTES
History     Chief Complaint   Patient presents with     Catheter Problem     HPI  Micah Nunez is a 54 year old male who presents with gross hematuria.  He began having blood in his urine 2 days ago.  He is a smoker.  He was seen here had post void residual in the 500-600 cc range.  Ortez catheter placed, urinalysis greater 182 red cells, no evidence for infection, urine culture no growth to date, prophylactically started on Bactrim.  Presents again today secondary to ongoing gross hematuria.  Denies abdominal pain, flank pain, penile pain.  Reports compliance with antibiotic.  Denies seeing any significant clots in his Ortez bag.  Requests repeat instructions on caring for Ortez and bag.    Allergies:  No Known Allergies    Problem List:    Patient Active Problem List    Diagnosis Date Noted     Benign essential hypertension 10/18/2017     Priority: Medium     Tobacco use disorder 08/12/2015     Priority: Medium     Chemical dependency (H) 07/27/2015     Priority: Medium     Papilloma of nose 03/10/2014     Priority: Medium     Nasal mass 12/17/2013     Priority: Medium     CARDIOVASCULAR SCREENING; LDL GOAL LESS THAN 130 12/11/2013     Priority: Medium        Past Medical History:    Past Medical History:   Diagnosis Date     Benign essential hypertension 10/18/2017     Bleeding disorder (H)      CARDIOVASCULAR SCREENING; LDL GOAL LESS THAN 130 12/11/2013     Chemical dependency (H)      Gastroesophageal reflux disease      Hypertension      Nasal mass 12/17/2013     Papilloma of nose 3/10/2014     Skin disease        Past Surgical History:    Past Surgical History:   Procedure Laterality Date     ARTHRODESIS FOOT Left 2/17/2015    Procedure: ARTHRODESIS FOOT;  Surgeon: Cortez Hayward DPM;  Location: [facility]     ARTHRODESIS TOE(S)  12/20/2013    Procedure: ARTHRODESIS TOE(S);  Arthrodesis first metatarsophalangeal joint left foot;  Surgeon: Cortez Hayward DPM;  Location: [facility]     ENDOSCOPIC  SINUS SURGERY  1/6/2014    Procedure: ENDOSCOPIC SINUS SURGERY;  Functional Endoscopic Sinus Surgery;  Surgeon: Bobo Renteria MD;  [facility]     ENDOSCOPIC SINUS SURGERY  7/21/2014    Procedure: ENDOSCOPIC SINUS SURGERY;  Surgeon: Bobo Renteria MD;  [facility]     ENDOSCOPIC SINUS SURGERY N/A 4/6/2015    Procedure: ENDOSCOPIC SINUS SURGERY;  Surgeon: Bobo Renteria MD;  [facility]     ENDOSCOPIC SINUS SURGERY N/A 8/17/2015    Procedure: ENDOSCOPIC SINUS SURGERY;  Surgeon: Bobo Renteria MD;  [facility]     ENDOSCOPIC SINUS SURGERY Bilateral 8/19/2019    Procedure: Bilateral Functional Endoscopic Sinus Surgery, Right Nasal Endoscopy and Excision of Left Nasal Mass;  Surgeon: Bobo Renteria MD;  [facility]     ENT SURGERY       EXCISE NASAL MASS Left 11/6/2017    Procedure: EXCISE NASAL MASS;  Excision of Left Nasal Papilloma;  Surgeon: Bobo Renteria MD;  [facility]     LAPAROSCOPIC HERNIORRHAPHY INGUINAL BILATERAL Bilateral 4/12/2017    Procedure: LAPAROSCOPIC HERNIORRHAPHY INGUINAL BILATERAL;  Surgeon: Shay Mercado MD;  Location: [facility]     NASAL ENDOSCOPY Bilateral 2/6/2017    Procedure: NASAL ENDOSCOPY;  Surgeon: Bobo Renteria MD;  [facility]     NASAL ENDOSCOPY N/A 5/21/2018    Procedure: NASAL ENDOSCOPY;  Nasal Endoscopy, CO2 Laser Excision of Left Nasal Papilloma;  Surgeon: Bobo Renteria MD;  [facility]     NASAL/SINUS POLYPECTOMY       PE TUBES         Family History:    Family History   Problem Relation Age of Onset     Diabetes Mother 40     C.A.D. Father 72     Unknown/Adopted No family hx of      Depression No family hx of      Anxiety Disorder No family hx of      Schizophrenia No family hx of      Bipolar Disorder No family hx of      Suicide No family hx of      Substance Abuse No family hx of      Dementia No family hx of      [place] Disease No family hx of      Parkinsonism No family hx of       Autism Spectrum Disorder No family hx of      Intellectual Disability (Mental Retardation) No family hx of      Mental Illness No family hx of      Bleeding Disorder No family hx of        Social History:  Marital Status:  Single [1]  Social History     Tobacco Use     Smoking status: Current Every Day Smoker     Packs/day: 1.00     Years: 30.00     Pack years: 30.00     Types: Cigarettes     Start date: 1/1/1980     Smokeless tobacco: Never Used     Tobacco comment: 1 pack/day   Substance Use Topics     Alcohol use: Yes     Drug use: No     Comment: 7 years quit Cocaine/methamphetamines        Medications:    order for DME  sulfamethoxazole-trimethoprim (BACTRIM DS) 800-160 MG tablet  triamcinolone (KENALOG) 0.1 % external ointment          Review of Systems  Problem focused review of systems otherwise negative    Physical Exam   BP: (!) 143/72  Heart Rate: 105  Temp: 98  F (36.7  C)  Resp: 18  Weight: 88.5 kg (195 lb)  SpO2: 98 %      Physical Exam  Nontoxic-appearing no respiratory distress alert and oriented  Skin pink warm and dry  Abdomen soft nontender bowel sounds positive  Ortez catheter draining grossly bloody urine without clots  ED Course        Procedures               Critical Care time:  none               No results found for this or any previous visit (from the past 24 hour(s)).    Medications - No data to display    Assessments & Plan (with Medical Decision Making)  Gross hematuria smoker Ortez catheter no clots presented 2 days ago with retention.  No evidence for infection culture negative to date.  He can stop the Bactrim.  He does take Excedrin migraine and is unclear whether this has aspirin or acetaminophen, recommend discontinuing any aspirin use.  Follow-up urology.  Return criteria reviewed     I have reviewed the nursing notes.    I have reviewed the findings, diagnosis, plan and need for follow up with the patient.          New Prescriptions    No medications on file       Final  diagnoses:   Gross hematuria       5/30/2020   [facility] EMERGENCY DEPARTMENT     Trent Graves MD  05/30/20 1520

## 2020-05-30 NOTE — DISCHARGE INSTRUCTIONS
Avoid/discontinue aspirin if that is what you are taking    You may discontinue antibiotic    Follow-up urology, return here for urinary retention, fever, pain or other concern

## 2020-05-30 NOTE — ED AVS SNAPSHOT
[facility] Emergency Department  [address] [address]  Phone:  [phone]  Fax:  [phone]                                    Micah Nunez   MRN: [id number]    Department:  [facility] Emergency Department   Date of Visit:  5/30/2020           After Visit Summary Signature Page    I have received my discharge instructions, and my questions have been answered. I have discussed any challenges I see with this plan with the nurse or doctor.    ..........................................................................................................................................  Patient/Patient Representative Signature      ..........................................................................................................................................  Patient Representative Print Name and Relationship to Patient    ..................................................               ................................................  Date                                   Time    ..........................................................................................................................................  Reviewed by Signature/Title    ...................................................              ..............................................  Date                                               Time          22EPIC Rev 08/18

## 2020-06-02 ENCOUNTER — VIRTUAL VISIT (OUTPATIENT)
Dept: UROLOGY | Facility: CLINIC | Age: 55
End: 2020-06-02

## 2020-06-02 ENCOUNTER — TELEPHONE (OUTPATIENT)
Dept: UROLOGY | Facility: CLINIC | Age: 55
End: 2020-06-02

## 2020-06-02 DIAGNOSIS — R31.0 GROSS HEMATURIA: Primary | ICD-10-CM

## 2020-06-02 PROCEDURE — 99207 ZZC NO BILLABLE SERVICE THIS VISIT: CPT | Performed by: UROLOGY

## 2020-06-02 NOTE — PROGRESS NOTES
Telephone visit    Gross hematuria without UTI seen in ER on 5/30/20    No answer and no answering machine.    Will have clinic continue to reach him to schedule a cystoscopy.

## 2020-06-02 NOTE — TELEPHONE ENCOUNTER
Per Dr Carr patient is to be scheduled for a cysto on  Concha 10 2020, I have called patient, but his voice mail is not set up, and there is no answer.  julio garcia LPN

## 2020-06-02 NOTE — TELEPHONE ENCOUNTER
Signed       Telephone visit     Gross hematuria without UTI seen in ER on 5/30/20     No answer and no answering machine.     Will have clinic continue to reach him to schedule a cystoscopy.

## 2020-06-03 ENCOUNTER — HOSPITAL ENCOUNTER (EMERGENCY)
Facility: CLINIC | Age: 55
Discharge: HOME | End: 2020-06-03
Attending: NURSE PRACTITIONER | Admitting: NURSE PRACTITIONER
Payer: MEDICAID

## 2020-06-03 VITALS
SYSTOLIC BLOOD PRESSURE: 162 MMHG | OXYGEN SATURATION: 100 % | DIASTOLIC BLOOD PRESSURE: 95 MMHG | TEMPERATURE: 97.3 F | RESPIRATION RATE: 20 BRPM | BODY MASS INDEX: 23.74 KG/M2 | WEIGHT: 195 LBS | HEART RATE: 79 BPM

## 2020-06-03 DIAGNOSIS — R31.9 HEMATURIA: ICD-10-CM

## 2020-06-03 DIAGNOSIS — T83.9XXA PROBLEM WITH FOLEY CATHETER, INITIAL ENCOUNTER (H): ICD-10-CM

## 2020-06-03 PROCEDURE — 51798 US URINE CAPACITY MEASURE: CPT | Performed by: NURSE PRACTITIONER

## 2020-06-03 PROCEDURE — 99283 EMERGENCY DEPT VISIT LOW MDM: CPT | Mod: Z6 | Performed by: NURSE PRACTITIONER

## 2020-06-03 PROCEDURE — 99284 EMERGENCY DEPT VISIT MOD MDM: CPT | Performed by: NURSE PRACTITIONER

## 2020-06-03 ASSESSMENT — ENCOUNTER SYMPTOMS
APPETITE CHANGE: 0
MUSCULOSKELETAL NEGATIVE: 1
CARDIOVASCULAR NEGATIVE: 1
HEMATURIA: 1
NEUROLOGICAL NEGATIVE: 1
RESPIRATORY NEGATIVE: 1
CHILLS: 0
FEVER: 0

## 2020-06-03 NOTE — DISCHARGE INSTRUCTIONS
Drink plenty of water (10-12 glasses a day.)  Follow-up with Urology as planned 6/10/2020 for Cystoscopy. Contact them sooner for any problems [phone].  Return to the emergency department if catheter stops draining again.

## 2020-06-03 NOTE — ED AVS SNAPSHOT
[facility] Emergency Department  [address] [facility] [address]  Phone:  [phone]  Fax:  [phone]                                    Micah Nunez   MRN: [id number]    Department:  [facility] Emergency Department   Date of Visit:  6/3/2020           After Visit Summary Signature Page    I have received my discharge instructions, and my questions have been answered. I have discussed any challenges I see with this plan with the nurse or doctor.    ..........................................................................................................................................  Patient/Patient Representative Signature      ..........................................................................................................................................  Patient Representative Print Name and Relationship to Patient    ..................................................               ................................................  Date                                   Time    ..........................................................................................................................................  Reviewed by Signature/Title    ...................................................              ..............................................  Date                                               Time          22EPIC Rev 08/18

## 2020-06-03 NOTE — ED NOTES
Pt in severe abd pain and states his catheter has not drained all day.  Pt moaning.  Catheter irrigated for 250 ml return with mult clots.  Pt in no pain after irrigation.

## 2020-06-04 NOTE — ED PROVIDER NOTES
History     Chief Complaint   Patient presents with     Catheter Problem     not draining, hematuria     HPI  Micah Nunez is a 54 year old male with past medical history of alcohol dependence, tobacco use disorder, and hypertension who presents to the emergency department for evaluation of hematuria, and urinary catheter not draining earlier today. Pt noticed some small clots in catheter today and then stopped draining in the last   Patient developed hematuria and urinary retention 6 days ago.  He was evaluated here on 5/28/2020.  Blood work was unremarkable.  UA revealed large amount of blood, 300 protein, moderate leuk esterase, >182 RBCs, and 3 WBC.  He had a catheter placed.  Consult with urology was completed.  He was initially started on Bactrim, but his urine culture was not negative.  He was seen again here 4 days ago for persistent gross hematuria.  He was instructed to stop the Bactrim and to stop taking Excedrin he was taking.  Patient has an appointment on 6/10/2020 with urology (Dr. Carr) for cystoscopy.      Allergies:  No Known Allergies    Problem List:    Patient Active Problem List    Diagnosis Date Noted     Benign essential hypertension 10/18/2017     Priority: Medium     Tobacco use disorder 08/12/2015     Priority: Medium     Chemical dependency (H) 07/27/2015     Priority: Medium     Papilloma of nose 03/10/2014     Priority: Medium     Nasal mass 12/17/2013     Priority: Medium     CARDIOVASCULAR SCREENING; LDL GOAL LESS THAN 130 12/11/2013     Priority: Medium        Past Medical History:    Past Medical History:   Diagnosis Date     Benign essential hypertension 10/18/2017     Bleeding disorder (H)      CARDIOVASCULAR SCREENING; LDL GOAL LESS THAN 130 12/11/2013     Chemical dependency (H)      Gastroesophageal reflux disease      Hypertension      Nasal mass 12/17/2013     Papilloma of nose 3/10/2014     Skin disease        Past Surgical History:    Past Surgical History:    Procedure Laterality Date     ARTHRODESIS FOOT Left 2/17/2015    Procedure: ARTHRODESIS FOOT;  Surgeon: Cortez Hayward DPM;  Location: [facility] OR     ARTHRODESIS TOE(S)  12/20/2013    Procedure: ARTHRODESIS TOE(S);  Arthrodesis first metatarsophalangeal joint left foot;  Surgeon: Cortez Hayward DPM;  Location: [facility] OR     ENDOSCOPIC SINUS SURGERY  1/6/2014    Procedure: ENDOSCOPIC SINUS SURGERY;  Functional Endoscopic Sinus Surgery;  Surgeon: Bobo Renteria MD;  Location:  OR     ENDOSCOPIC SINUS SURGERY  7/21/2014    Procedure: ENDOSCOPIC SINUS SURGERY;  Surgeon: Bobo Renteria MD;  Location:  OR     ENDOSCOPIC SINUS SURGERY N/A 4/6/2015    Procedure: ENDOSCOPIC SINUS SURGERY;  Surgeon: Bobo Renteria MD;  Location:  OR     ENDOSCOPIC SINUS SURGERY N/A 8/17/2015    Procedure: ENDOSCOPIC SINUS SURGERY;  Surgeon: Bobo Renteria MD;  Location:  OR     ENDOSCOPIC SINUS SURGERY Bilateral 8/19/2019    Procedure: Bilateral Functional Endoscopic Sinus Surgery, Right Nasal Endoscopy and Excision of Left Nasal Mass;  Surgeon: Bobo Renteria MD;  Location:  OR     ENT SURGERY       EXCISE NASAL MASS Left 11/6/2017    Procedure: EXCISE NASAL MASS;  Excision of Left Nasal Papilloma;  Surgeon: Bobo Renteria MD;  Location:  OR     LAPAROSCOPIC HERNIORRHAPHY INGUINAL BILATERAL Bilateral 4/12/2017    Procedure: LAPAROSCOPIC HERNIORRHAPHY INGUINAL BILATERAL;  Surgeon: Shay Mercado MD;  Location: [facility] OR     NASAL ENDOSCOPY Bilateral 2/6/2017    Procedure: NASAL ENDOSCOPY;  Surgeon: Bobo Renteria MD;  Location:  OR     NASAL ENDOSCOPY N/A 5/21/2018    Procedure: NASAL ENDOSCOPY;  Nasal Endoscopy, CO2 Laser Excision of Left Nasal Papilloma;  Surgeon: Bobo Renteria MD;  Location: [facility]     NASAL/SINUS POLYPECTOMY       PE TUBES         Family History:    Family History   Problem Relation Age of Onset     Diabetes Mother 40     C.A.D. Father 72      Unknown/Adopted No family hx of      Depression No family hx of      Anxiety Disorder No family hx of      Schizophrenia No family hx of      Bipolar Disorder No family hx of      Suicide No family hx of      Substance Abuse No family hx of      Dementia No family hx of      Alpine Disease No family hx of      Parkinsonism No family hx of      Autism Spectrum Disorder No family hx of      Intellectual Disability (Mental Retardation) No family hx of      Mental Illness No family hx of      Bleeding Disorder No family hx of        Social History:  Marital Status:  Single [1]  Social History     Tobacco Use     Smoking status: Current Every Day Smoker     Packs/day: 1.00     Years: 30.00     Pack years: 30.00     Types: Cigarettes     Start date: 1/1/1980     Smokeless tobacco: Never Used     Tobacco comment: 1 pack/day   Substance Use Topics     Alcohol use: Yes     Drug use: No     Comment: 7 years quit Cocaine/methamphetamines        Medications:    order for DME  sulfamethoxazole-trimethoprim (BACTRIM DS) 800-160 MG tablet  triamcinolone (KENALOG) 0.1 % external ointment          Review of Systems   Constitutional: Negative for appetite change, chills and fever.   Respiratory: Negative.    Cardiovascular: Negative.    Gastrointestinal:        Abdominal pressure.   Genitourinary: Positive for hematuria.        Foler catheter not draining.   Musculoskeletal: Negative.    Neurological: Negative.        Physical Exam   BP: (!) 155/92  Pulse: 79  Heart Rate: 80  Temp: 97.3  F (36.3  C)  Resp: 20  Weight: 88.5 kg (195 lb)  SpO2: 100 %      Physical Exam  Constitutional:       General: He is not in acute distress.     Appearance: Normal appearance. He is not ill-appearing.   Cardiovascular:      Rate and Rhythm: Normal rate and regular rhythm.   Pulmonary:      Effort: Pulmonary effort is normal.      Breath sounds: Normal breath sounds.   Abdominal:      General: There is no distension.      Palpations: Abdomen is  soft.      Tenderness: There is no abdominal tenderness.   Neurological:      Mental Status: He is alert.         ED Course          Procedures             1515: At time of exam at bedside, the Nurse had already performed bladder scanner which showed 280ml.  Nurse irrigated the catheter with 250 ml and catheter returning the 250 ml of gross hematuria and multiple small clots. Appears to be draining now without problem.     No results found for this or any previous visit (from the past 24 hour(s)).    Medications - No data to display    Assessments & Plan (with Medical Decision Making)   Ortez is now draining after irrigation.  Patient symptoms improved.  No further abdominal pain. No further intervention done. Plan as follows:  Drink plenty of water (10-12 glasses a day.)  Follow-up with Urology as planned 6/10/2020 for Cystoscopy. Contact them sooner for any problems [phone].  Return to the emergency department if catheter stops draining again.    I have reviewed the nursing notes.    I have reviewed the findings, diagnosis, plan and need for follow up with the patient.      Discharge Medication List as of 6/3/2020  3:27 PM          Final diagnoses:   Problem with Ortez catheter, initial encounter (H) - obstructed, resolved   Hematuria       6/3/2020   [facility] EMERGENCY DEPARTMENT     Missy Botello APRN CNP  06/03/20 [id number]

## 2020-06-06 ENCOUNTER — NURSE TRIAGE (OUTPATIENT)
Dept: NURSING | Facility: CLINIC | Age: 55
End: 2020-06-06

## 2020-06-06 NOTE — TELEPHONE ENCOUNTER
Patient reports about 8 days ago had a urinary catheter put in for the first time at [facility] Emergency department. Patient reports he switches between a day and overnight bag. He states the overnight bag is leaking. He needs a new overnight bag for tonight. Advised patient to head back into [facility] ED to get another catheter bag. Patient verbalized understanding and had no further questions.    Viviana Villegas, RN/[facility]    Additional Information    Negative: [1] Caller is not with the adult (patient) AND [2] reporting urgent symptoms    Negative: Lab result questions    Negative: Medication questions    Negative: Caller can't be reached by phone    Negative: Caller has already spoken to PCP or another triager    Negative: RN needs further essential information from caller in order to complete triage    Negative: Requesting regular office appointment    Negative: [1] Caller requesting NON-URGENT health information AND [2] PCP's office is the best resource    Health Information question, no triage required and triager able to answer question    Protocols used: INFORMATION ONLY CALL-A-

## 2020-06-10 ENCOUNTER — OFFICE VISIT (OUTPATIENT)
Dept: UROLOGY | Facility: CLINIC | Age: 55
End: 2020-06-10
Payer: MEDICAID

## 2020-06-10 VITALS
SYSTOLIC BLOOD PRESSURE: 153 MMHG | TEMPERATURE: 96.9 F | DIASTOLIC BLOOD PRESSURE: 79 MMHG | RESPIRATION RATE: 16 BRPM | HEART RATE: 80 BPM

## 2020-06-10 DIAGNOSIS — R31.0 GROSS HEMATURIA: Primary | ICD-10-CM

## 2020-06-10 PROCEDURE — 87088 URINE BACTERIA CULTURE: CPT | Performed by: UROLOGY

## 2020-06-10 PROCEDURE — 99213 OFFICE O/P EST LOW 20 MIN: CPT | Mod: 25 | Performed by: UROLOGY

## 2020-06-10 PROCEDURE — 51798 US URINE CAPACITY MEASURE: CPT | Performed by: UROLOGY

## 2020-06-10 PROCEDURE — 87186 SC STD MICRODIL/AGAR DIL: CPT | Performed by: UROLOGY

## 2020-06-10 PROCEDURE — 87086 URINE CULTURE/COLONY COUNT: CPT | Performed by: UROLOGY

## 2020-06-10 NOTE — NURSING NOTE
"Chief Complaint   Patient presents with     Cystoscopy     Retention, Hematuria        Initial BP (!) 153/79 (BP Location: Right arm, Patient Position: Chair, Cuff Size: Adult Regular)   Pulse 80   Temp 96.9  F (36.1  C) (Tympanic)   Resp 16  Estimated body mass index is 23.74 kg/m  as calculated from the following:    Height as of 5/28/20: 1.93 m (6' 4\").    Weight as of 6/3/20: 88.5 kg (195 lb).  BP completed using cuff size: regular   Medications and allergies reviewed.      Lisa WILHELM CMA     "

## 2020-06-10 NOTE — PROGRESS NOTES
Appointment source: Established Patient  Patient name: Micah Nunez  Urology Staff: Buster Carr MD    Subjective: This is a 54 year old year old male returning for follow up of recent episode of gross hematuria.    Was seen in the ER and noted to have an elevated post void residual (500-600 ml) and gross hematuria without evidence of a UTI.    Has a history of smoking.    Last seen in urology for acute onset of left hydrocele after hernia repair.  The hydrocele was drained percutaneously and remained only minimally enlarged.    Objective:  Cystoscopy was performed today after removing the seals catheter.    There was mucosal inflammation consistent with his seals catheterization but no compelling evidence of bladder neoplasm    External genitalia were unremarkable other than the continued mild prominence of the left devante scrotum.    Assessment:  Gross hematuria without evidence of lower tract urinary neoplasm.    Plan:  Will order a CT scan of the abdomen and pelvis to complete the evaluation.    Total time 15 minutes, counseling 10 minutes discussing evaluation of hematuria.

## 2020-06-10 NOTE — PATIENT INSTRUCTIONS
Per physician instructions.    If you have questions or concerns on any instructions given to you by your provider today or if you need to schedule an appointment, you can reach us at [phone].    Thank you!

## 2020-06-10 NOTE — NURSING NOTE
Active order to obtain bladder scan? Yes   Name of ordering provider:  Bruno   Bladder scan preformed post void Yes: 120ml.  Bladder scan reveled 35ML  Provider notified?  Yes    Lisa WILHELM CMA

## 2020-06-10 NOTE — NURSING NOTE
Pt brought back to the procedure room for a cystoscopy per Dr. Carr. Informed consent obtained.    Catheter removal documentation on 6/10/2020:    Micah Nunez presents to the clinic for catheter removal.  Reason for removal: Cystoscopy   Order has been verified. Yes  Catheter successfully removed at 11:47 AM without immediate complication.  250 cc's of urine present in the catheter bag.  Urethral meatus is free of secretions and encrustation.  The patient is afebrile.    The pt was prepped in a sterile manner and 10ml of lidocaine lubricant uro jet was used.      Scope serial number: [id number] Olympus      Lisa M, CMA

## 2020-06-11 ENCOUNTER — HOSPITAL ENCOUNTER (OUTPATIENT)
Dept: CT IMAGING | Facility: CLINIC | Age: 55
Discharge: HOME | End: 2020-06-11
Attending: UROLOGY | Admitting: UROLOGY
Payer: MEDICAID

## 2020-06-11 DIAGNOSIS — R31.0 GROSS HEMATURIA: ICD-10-CM

## 2020-06-11 PROCEDURE — 74178 CT ABD&PLV WO CNTR FLWD CNTR: CPT

## 2020-06-11 PROCEDURE — 25000125 ZZHC RX 250: Performed by: RADIOLOGY

## 2020-06-11 PROCEDURE — 25000128 H RX IP 250 OP 636: Performed by: RADIOLOGY

## 2020-06-11 RX ORDER — IOPAMIDOL 755 MG/ML
95 INJECTION, SOLUTION INTRAVASCULAR ONCE
Status: COMPLETED | OUTPATIENT
Start: 2020-06-11 | End: 2020-06-11

## 2020-06-11 RX ADMIN — IOPAMIDOL 95 ML: 755 INJECTION, SOLUTION INTRAVENOUS at 14:14

## 2020-06-11 RX ADMIN — SODIUM CHLORIDE 64 ML: 9 INJECTION, SOLUTION INTRAVENOUS at 14:15

## 2020-06-12 DIAGNOSIS — Z87.440 PERSONAL HISTORY OF URINARY TRACT INFECTION: Primary | ICD-10-CM

## 2020-06-12 LAB
BACTERIA SPEC CULT: ABNORMAL
Lab: ABNORMAL
SPECIMEN SOURCE: ABNORMAL

## 2020-06-12 RX ORDER — CIPROFLOXACIN 500 MG/1
500 TABLET, FILM COATED ORAL 2 TIMES DAILY
Qty: 14 TABLET | Refills: 0 | Status: SHIPPED | OUTPATIENT
Start: 2020-06-12 | End: 2021-10-25

## 2020-06-12 NOTE — PROGRESS NOTES
Urinary tract infection not sensitive to trimethoprim sulfa.    Will send prescription to [facility] pharmacy and will ask them to contact him.

## 2020-06-13 DIAGNOSIS — R91.8 PULMONARY NODULES: Primary | ICD-10-CM

## 2020-07-13 ENCOUNTER — ALLIED HEALTH/NURSE VISIT (OUTPATIENT)
Dept: UROLOGY | Facility: CLINIC | Age: 55
End: 2020-07-13
Payer: MEDICAID

## 2020-07-13 DIAGNOSIS — R33.9 URINARY RETENTION WITH INCOMPLETE BLADDER EMPTYING: ICD-10-CM

## 2020-07-13 DIAGNOSIS — Z87.440 PERSONAL HISTORY OF URINARY TRACT INFECTION: Primary | ICD-10-CM

## 2020-07-13 PROCEDURE — 51798 US URINE CAPACITY MEASURE: CPT

## 2020-07-13 NOTE — PROGRESS NOTES
Pt void right prior to bladder scan.  Bladder scan revealed 27ml in bladder.      Pt states feels good and no signs of infection or other discomforts.    Courtney YOUNG   [facility] RN

## 2020-08-20 ENCOUNTER — HOSPITAL ENCOUNTER (EMERGENCY)
Facility: CLINIC | Age: 55
Discharge: HOME | End: 2020-08-20
Attending: EMERGENCY MEDICINE | Admitting: EMERGENCY MEDICINE
Payer: COMMERCIAL

## 2020-08-20 ENCOUNTER — APPOINTMENT (OUTPATIENT)
Dept: CT IMAGING | Facility: CLINIC | Age: 55
End: 2020-08-20
Attending: EMERGENCY MEDICINE
Payer: COMMERCIAL

## 2020-08-20 VITALS
WEIGHT: 200 LBS | SYSTOLIC BLOOD PRESSURE: 142 MMHG | HEART RATE: 68 BPM | HEIGHT: 76 IN | OXYGEN SATURATION: 95 % | DIASTOLIC BLOOD PRESSURE: 90 MMHG | TEMPERATURE: 96 F | RESPIRATION RATE: 24 BRPM | BODY MASS INDEX: 24.36 KG/M2

## 2020-08-20 DIAGNOSIS — R10.9 LEFT SIDED ABDOMINAL PAIN OF UNKNOWN CAUSE: ICD-10-CM

## 2020-08-20 DIAGNOSIS — E86.0 DEHYDRATION: ICD-10-CM

## 2020-08-20 LAB
ALBUMIN SERPL-MCNC: 4.4 G/DL (ref 3.4–5)
ALBUMIN UR-MCNC: NEGATIVE MG/DL
ALP SERPL-CCNC: 135 U/L (ref 40–150)
ALT SERPL W P-5'-P-CCNC: 30 U/L (ref 0–70)
ANION GAP SERPL CALCULATED.3IONS-SCNC: 8 MMOL/L (ref 3–14)
APPEARANCE UR: CLEAR
AST SERPL W P-5'-P-CCNC: 20 U/L (ref 0–45)
BASOPHILS # BLD AUTO: 0.1 10E9/L (ref 0–0.2)
BASOPHILS NFR BLD AUTO: 0.7 %
BILIRUB SERPL-MCNC: 1.3 MG/DL (ref 0.2–1.3)
BILIRUB UR QL STRIP: NEGATIVE
BUN SERPL-MCNC: 18 MG/DL (ref 7–30)
CALCIUM SERPL-MCNC: 9.5 MG/DL (ref 8.5–10.1)
CHLORIDE SERPL-SCNC: 106 MMOL/L (ref 94–109)
CO2 SERPL-SCNC: 25 MMOL/L (ref 20–32)
COLOR UR AUTO: YELLOW
CREAT SERPL-MCNC: 1.15 MG/DL (ref 0.66–1.25)
DIFFERENTIAL METHOD BLD: ABNORMAL
EOSINOPHIL # BLD AUTO: 0.2 10E9/L (ref 0–0.7)
EOSINOPHIL NFR BLD AUTO: 2 %
ERYTHROCYTE [DISTWIDTH] IN BLOOD BY AUTOMATED COUNT: 13.2 % (ref 10–15)
GFR SERPL CREATININE-BSD FRML MDRD: 71 ML/MIN/{1.73_M2}
GLUCOSE SERPL-MCNC: 158 MG/DL (ref 70–99)
GLUCOSE UR STRIP-MCNC: NEGATIVE MG/DL
HCT VFR BLD AUTO: 50.8 % (ref 40–53)
HGB BLD-MCNC: 16.7 G/DL (ref 13.3–17.7)
HGB UR QL STRIP: NEGATIVE
IMM GRANULOCYTES # BLD: 0 10E9/L (ref 0–0.4)
IMM GRANULOCYTES NFR BLD: 0.3 %
KETONES UR STRIP-MCNC: 5 MG/DL
LEUKOCYTE ESTERASE UR QL STRIP: NEGATIVE
LYMPHOCYTES # BLD AUTO: 2.1 10E9/L (ref 0.8–5.3)
LYMPHOCYTES NFR BLD AUTO: 17 %
MCH RBC QN AUTO: 29.9 PG (ref 26.5–33)
MCHC RBC AUTO-ENTMCNC: 32.9 G/DL (ref 31.5–36.5)
MCV RBC AUTO: 91 FL (ref 78–100)
MONOCYTES # BLD AUTO: 0.9 10E9/L (ref 0–1.3)
MONOCYTES NFR BLD AUTO: 7.5 %
NEUTROPHILS # BLD AUTO: 8.8 10E9/L (ref 1.6–8.3)
NEUTROPHILS NFR BLD AUTO: 72.5 %
NITRATE UR QL: NEGATIVE
NRBC # BLD AUTO: 0 10*3/UL
NRBC BLD AUTO-RTO: 0 /100
PH UR STRIP: 6 PH (ref 5–7)
PLATELET # BLD AUTO: 294 10E9/L (ref 150–450)
POTASSIUM SERPL-SCNC: 3.5 MMOL/L (ref 3.4–5.3)
PROT SERPL-MCNC: 8 G/DL (ref 6.8–8.8)
RBC # BLD AUTO: 5.59 10E12/L (ref 4.4–5.9)
SODIUM SERPL-SCNC: 139 MMOL/L (ref 133–144)
SOURCE: ABNORMAL
SP GR UR STRIP: >1.035 (ref 1–1.03)
UROBILINOGEN UR STRIP-MCNC: 0 MG/DL (ref 0–2)
WBC # BLD AUTO: 12.1 10E9/L (ref 4–11)

## 2020-08-20 PROCEDURE — 81003 URINALYSIS AUTO W/O SCOPE: CPT | Performed by: EMERGENCY MEDICINE

## 2020-08-20 PROCEDURE — 96361 HYDRATE IV INFUSION ADD-ON: CPT | Performed by: EMERGENCY MEDICINE

## 2020-08-20 PROCEDURE — 80053 COMPREHEN METABOLIC PANEL: CPT | Performed by: EMERGENCY MEDICINE

## 2020-08-20 PROCEDURE — 96375 TX/PRO/DX INJ NEW DRUG ADDON: CPT | Performed by: EMERGENCY MEDICINE

## 2020-08-20 PROCEDURE — 25000128 H RX IP 250 OP 636: Performed by: EMERGENCY MEDICINE

## 2020-08-20 PROCEDURE — 99285 EMERGENCY DEPT VISIT HI MDM: CPT | Mod: 25 | Performed by: EMERGENCY MEDICINE

## 2020-08-20 PROCEDURE — 85025 COMPLETE CBC W/AUTO DIFF WBC: CPT | Performed by: EMERGENCY MEDICINE

## 2020-08-20 PROCEDURE — 99285 EMERGENCY DEPT VISIT HI MDM: CPT | Mod: Z6 | Performed by: EMERGENCY MEDICINE

## 2020-08-20 PROCEDURE — 96374 THER/PROPH/DIAG INJ IV PUSH: CPT | Mod: 59 | Performed by: EMERGENCY MEDICINE

## 2020-08-20 PROCEDURE — 25000125 ZZHC RX 250: Performed by: EMERGENCY MEDICINE

## 2020-08-20 PROCEDURE — 74177 CT ABD & PELVIS W/CONTRAST: CPT

## 2020-08-20 PROCEDURE — 25800030 ZZH RX IP 258 OP 636: Performed by: EMERGENCY MEDICINE

## 2020-08-20 RX ORDER — IOPAMIDOL 755 MG/ML
97 INJECTION, SOLUTION INTRAVASCULAR ONCE
Status: COMPLETED | OUTPATIENT
Start: 2020-08-20 | End: 2020-08-20

## 2020-08-20 RX ORDER — ONDANSETRON 2 MG/ML
4 INJECTION INTRAMUSCULAR; INTRAVENOUS EVERY 30 MIN PRN
Status: DISCONTINUED | OUTPATIENT
Start: 2020-08-20 | End: 2020-08-20 | Stop reason: HOSPADM

## 2020-08-20 RX ORDER — HYDROMORPHONE HYDROCHLORIDE 1 MG/ML
0.5 INJECTION, SOLUTION INTRAMUSCULAR; INTRAVENOUS; SUBCUTANEOUS
Status: DISCONTINUED | OUTPATIENT
Start: 2020-08-20 | End: 2020-08-20 | Stop reason: HOSPADM

## 2020-08-20 RX ORDER — KETOROLAC TROMETHAMINE 15 MG/ML
15 INJECTION, SOLUTION INTRAMUSCULAR; INTRAVENOUS ONCE
Status: COMPLETED | OUTPATIENT
Start: 2020-08-20 | End: 2020-08-20

## 2020-08-20 RX ADMIN — SODIUM CHLORIDE 64 ML: 9 INJECTION, SOLUTION INTRAVENOUS at 01:25

## 2020-08-20 RX ADMIN — HYDROMORPHONE HYDROCHLORIDE 0.5 MG: 1 INJECTION, SOLUTION INTRAMUSCULAR; INTRAVENOUS; SUBCUTANEOUS at 01:07

## 2020-08-20 RX ADMIN — IOPAMIDOL 97 ML: 755 INJECTION, SOLUTION INTRAVENOUS at 01:24

## 2020-08-20 RX ADMIN — SODIUM CHLORIDE, POTASSIUM CHLORIDE, SODIUM LACTATE AND CALCIUM CHLORIDE 1000 ML: 600; 310; 30; 20 INJECTION, SOLUTION INTRAVENOUS at 01:07

## 2020-08-20 RX ADMIN — KETOROLAC TROMETHAMINE 15 MG: 15 INJECTION, SOLUTION INTRAMUSCULAR; INTRAVENOUS at 01:08

## 2020-08-20 RX ADMIN — ONDANSETRON 4 MG: 2 INJECTION INTRAMUSCULAR; INTRAVENOUS at 01:07

## 2020-08-20 ASSESSMENT — ENCOUNTER SYMPTOMS
VOMITING: 0
WEAKNESS: 0
SHORTNESS OF BREATH: 0
HEADACHES: 0
ABDOMINAL DISTENTION: 1
NAUSEA: 1
FEVER: 0
CHILLS: 0
CONFUSION: 0
CHEST TIGHTNESS: 0
DECREASED CONCENTRATION: 0
LIGHT-HEADEDNESS: 0
COUGH: 0
NUMBNESS: 0
BACK PAIN: 0
FLANK PAIN: 0
DYSURIA: 0
DIARRHEA: 0
FATIGUE: 1
ABDOMINAL PAIN: 1
CONSTIPATION: 0
APPETITE CHANGE: 0
HEMATURIA: 0

## 2020-08-20 ASSESSMENT — MIFFLIN-ST. JEOR: SCORE: 1848.69

## 2020-08-20 NOTE — ED PROVIDER NOTES
History     Chief Complaint   Patient presents with     Abdominal Pain     HPI  Micah Nunez is a 54 year old male with history of chemical dependency and hypertension presenting for evaluation of left-sided abdominal pain.  Symptoms began about 3 hours ago and reportedly have progressively worsened ever since.  He reports constant achy pressure-like pain in his left lower abdomen associate with some mild nausea.  No vomiting.  Denies pain wrapping around to his back.  Denies chest pain difficulty breathing.  Reports he has had 2 previous similar episodes of pain which lasted several hours before resolving.  The most recent episode was a few months ago and led to an episode of urinary retention requiring catheter placement.  Patient had hematuria with that without clear cause ever identified.  Patient reports he has been urinating normally with no hematuria or pain.  Reports relatively normal bowel movements recently although did not have a bowel movement today.  Has had increasing fatigue today and has not eaten much during the day today.  No known history of diverticulitis or kidney stones.    ==================================================================    CHART REVIEW:      CT UROGRAM WO & W CONTRAST 6/11/2020 2:38 PM     HISTORY: Gross hematuria.     CONTRAST:  95 mL Isovue 370.     TECHNIQUE: Abdomen and pelvis CT are performed without and with IV  contrast.     The non-IV enhanced the images are utilized mainly to assess for  urolithiasis or other pathological calcifications. On the delayed IV  enhanced portion of the study the kidneys, ureters, and bladder are  assessed.  The liver, spleen, pancreas, adrenal glands,  retroperitoneum, and abdominal aorta are also assessed. Finally,  pelvic anatomy is assessed on the pelvis portion of the CT.     Radiation dose for this scan is reduced using automated exposure  control, adjustment of mA and/or kV according to patient size, or  iterative  reconstruction technique.     COMPARISON: None.     FINDINGS:     Abdomen: There is no urolithiasis. Symmetrical excretion is seen by  the kidneys. There is a small scar in the lower pole of the left  kidney. The ureters are well opacified throughout their course to the  bladder and show no focal finding.  A small fat-containing umbilical  hernia is seen on axial image 52. There is a 4 mm noncalcified nodule  at the left lung base on axial image 6.     Pelvis: No bladder calculi are demonstrated. No focal bladder lesion  is evident. Sigmoid diverticulosis is present.                                                                      IMPRESSION:  1. No specific reason for hematuria is demonstrated.  2. This slight ill-definition of the outer bladder wall margin is  present. This could at least in part to relate to limited distention.  Cystitis is also in the differential, in the appropriate clinical  setting.  3. There is an incidental 4 mm left lung base nodule. If the patient  has no smoking history or other risk factors no further workup is  necessary. If the patient has a smoking history or other risk factors  a follow-up chest CT in 12 months is suggested.    END CHART REVIEW  ==================================================================      Allergies:  No Known Allergies    Problem List:    Patient Active Problem List    Diagnosis Date Noted     Benign essential hypertension 10/18/2017     Priority: Medium     Tobacco use disorder 08/12/2015     Priority: Medium     Chemical dependency (H) 07/27/2015     Priority: Medium     Papilloma of nose 03/10/2014     Priority: Medium     Nasal mass 12/17/2013     Priority: Medium     CARDIOVASCULAR SCREENING; LDL GOAL LESS THAN 130 12/11/2013     Priority: Medium        Past Medical History:    Past Medical History:   Diagnosis Date     Benign essential hypertension 10/18/2017     Bleeding disorder (H)      CARDIOVASCULAR SCREENING; LDL GOAL LESS THAN 130  12/11/2013     Chemical dependency (H)      Gastroesophageal reflux disease      Hypertension      Nasal mass 12/17/2013     Papilloma of nose 3/10/2014     Skin disease        Past Surgical History:    Past Surgical History:   Procedure Laterality Date     ARTHRODESIS FOOT Left 2/17/2015    Procedure: ARTHRODESIS FOOT;  Surgeon: Cortez Hayward DPM;  Location: [facility] OR     ARTHRODESIS TOE(S)  12/20/2013    Procedure: ARTHRODESIS TOE(S);  Arthrodesis first metatarsophalangeal joint left foot;  Surgeon: Cortez Hayward DPM;  Location: [facility] OR     ENDOSCOPIC SINUS SURGERY  1/6/2014    Procedure: ENDOSCOPIC SINUS SURGERY;  Functional Endoscopic Sinus Surgery;  Surgeon: Bobo Renteria MD;  Location:  OR     ENDOSCOPIC SINUS SURGERY  7/21/2014    Procedure: ENDOSCOPIC SINUS SURGERY;  Surgeon: Bobo Renteria MD;  Location:  OR     ENDOSCOPIC SINUS SURGERY N/A 4/6/2015    Procedure: ENDOSCOPIC SINUS SURGERY;  Surgeon: Bobo Renteria MD;  Location:  OR     ENDOSCOPIC SINUS SURGERY N/A 8/17/2015    Procedure: ENDOSCOPIC SINUS SURGERY;  Surgeon: Boob Renteria MD;  Location:  OR     ENDOSCOPIC SINUS SURGERY Bilateral 8/19/2019    Procedure: Bilateral Functional Endoscopic Sinus Surgery, Right Nasal Endoscopy and Excision of Left Nasal Mass;  Surgeon: Bobo Renteria MD;  Location:  OR     ENT SURGERY       EXCISE NASAL MASS Left 11/6/2017    Procedure: EXCISE NASAL MASS;  Excision of Left Nasal Papilloma;  Surgeon: Bobo Renteria MD;  Location:  OR     LAPAROSCOPIC HERNIORRHAPHY INGUINAL BILATERAL Bilateral 4/12/2017    Procedure: LAPAROSCOPIC HERNIORRHAPHY INGUINAL BILATERAL;  Surgeon: Shay Mercado MD;  Location: [facility] OR     NASAL ENDOSCOPY Bilateral 2/6/2017    Procedure: NASAL ENDOSCOPY;  Surgeon: Bobo Renteria MD;  Location:  OR     NASAL ENDOSCOPY N/A 5/21/2018    Procedure: NASAL ENDOSCOPY;  Nasal Endoscopy, CO2 Laser Excision of Left Nasal Papilloma;   Surgeon: Bobo Renteria MD;  Location: [facility]     NASAL/SINUS POLYPECTOMY       PE TUBES         Family History:    Family History   Problem Relation Age of Onset     Diabetes Mother 40     C.A.D. Father 72     Unknown/Adopted No family hx of      Depression No family hx of      Anxiety Disorder No family hx of      Schizophrenia No family hx of      Bipolar Disorder No family hx of      Suicide No family hx of      Substance Abuse No family hx of      Dementia No family hx of      Hoople Disease No family hx of      Parkinsonism No family hx of      Autism Spectrum Disorder No family hx of      Intellectual Disability (Mental Retardation) No family hx of      Mental Illness No family hx of      Bleeding Disorder No family hx of        Social History:  Marital Status:  Single [1]  Social History     Tobacco Use     Smoking status: Current Every Day Smoker     Packs/day: 1.00     Years: 30.00     Pack years: 30.00     Types: Cigarettes     Start date: 1/1/1980     Smokeless tobacco: Never Used     Tobacco comment: 1 pack/day   Substance Use Topics     Alcohol use: Yes     Drug use: No     Comment: 7 years quit Cocaine/methamphetamines        Medications:    ciprofloxacin (CIPRO) 500 MG tablet  order for DME  triamcinolone (KENALOG) 0.1 % external ointment          Review of Systems   Constitutional: Positive for fatigue. Negative for appetite change, chills and fever.   HENT: Negative for congestion.    Respiratory: Negative for cough, chest tightness and shortness of breath.    Cardiovascular: Negative for chest pain.   Gastrointestinal: Positive for abdominal distention, abdominal pain and nausea. Negative for constipation, diarrhea and vomiting.   Genitourinary: Negative for decreased urine volume, dysuria, flank pain, hematuria and urgency.   Musculoskeletal: Negative for back pain.   Skin: Negative for rash.   Neurological: Negative for weakness, light-headedness, numbness and headaches.  "  Psychiatric/Behavioral: Negative for confusion and decreased concentration.   All other systems reviewed and are negative.      Physical Exam   BP: (!) 167/102  Pulse: 74  Temp: 96  F (35.6  C)  Resp: 24  Height: 193 cm (6' 4\")  Weight: 90.7 kg (200 lb)  SpO2: 98 %      Physical Exam  Vitals signs and nursing note reviewed.   Constitutional:       Appearance: He is well-developed. He is not ill-appearing or diaphoretic.      Comments: Anxious and uncomfortable.   HENT:      Head: Atraumatic.      Mouth/Throat:      Mouth: Mucous membranes are moist.   Eyes:      Conjunctiva/sclera: Conjunctivae normal.   Cardiovascular:      Rate and Rhythm: Normal rate and regular rhythm.   Pulmonary:      Effort: Pulmonary effort is normal.      Breath sounds: Normal breath sounds.   Abdominal:      General: Abdomen is flat. Bowel sounds are normal. There is no distension.      Palpations: Abdomen is soft.      Tenderness: There is abdominal tenderness in the left upper quadrant and left lower quadrant. There is no right CVA tenderness or left CVA tenderness.   Musculoskeletal: Normal range of motion.   Skin:     General: Skin is warm and dry.      Capillary Refill: Capillary refill takes less than 2 seconds.   Neurological:      Mental Status: He is alert and oriented to person, place, and time.   Psychiatric:         Attention and Perception: Attention normal.         Mood and Affect: Mood is anxious.         ED Course        Procedures                   Results for orders placed or performed during the hospital encounter of 08/20/20 (from the past 24 hour(s))   CBC with platelets, differential   Result Value Ref Range    WBC 12.1 (H) 4.0 - 11.0 10e9/L    RBC Count 5.59 4.4 - 5.9 10e12/L    Hemoglobin 16.7 13.3 - 17.7 g/dL    Hematocrit 50.8 40.0 - 53.0 %    MCV 91 78 - 100 fl    MCH 29.9 26.5 - 33.0 pg    MCHC 32.9 31.5 - 36.5 g/dL    RDW 13.2 10.0 - 15.0 %    Platelet Count 294 150 - 450 10e9/L    Diff Method Automated " Method     % Neutrophils 72.5 %    % Lymphocytes 17.0 %    % Monocytes 7.5 %    % Eosinophils 2.0 %    % Basophils 0.7 %    % Immature Granulocytes 0.3 %    Nucleated RBCs 0 0 /100    Absolute Neutrophil 8.8 (H) 1.6 - 8.3 10e9/L    Absolute Lymphocytes 2.1 0.8 - 5.3 10e9/L    Absolute Monocytes 0.9 0.0 - 1.3 10e9/L    Absolute Eosinophils 0.2 0.0 - 0.7 10e9/L    Absolute Basophils 0.1 0.0 - 0.2 10e9/L    Abs Immature Granulocytes 0.0 0 - 0.4 10e9/L    Absolute Nucleated RBC 0.0    Comprehensive metabolic panel   Result Value Ref Range    Sodium 139 133 - 144 mmol/L    Potassium 3.5 3.4 - 5.3 mmol/L    Chloride 106 94 - 109 mmol/L    Carbon Dioxide 25 20 - 32 mmol/L    Anion Gap 8 3 - 14 mmol/L    Glucose 158 (H) 70 - 99 mg/dL    Urea Nitrogen 18 7 - 30 mg/dL    Creatinine 1.15 0.66 - 1.25 mg/dL    GFR Estimate 71 >60 mL/min/[1.73_m2]    GFR Estimate If Black 83 >60 mL/min/[1.73_m2]    Calcium 9.5 8.5 - 10.1 mg/dL    Bilirubin Total 1.3 0.2 - 1.3 mg/dL    Albumin 4.4 3.4 - 5.0 g/dL    Protein Total 8.0 6.8 - 8.8 g/dL    Alkaline Phosphatase 135 40 - 150 U/L    ALT 30 0 - 70 U/L    AST 20 0 - 45 U/L   CT Abdomen Pelvis w Contrast    Narrative    EXAM: CT ABDOMEN PELVIS W CONTRAST  LOCATION: [facility]  DATE/TIME: 8/20/2020 1:19 AM    INDICATION: Left abdominal pain.  COMPARISON: 06/11/2020.  TECHNIQUE: CT scan of the abdomen and pelvis was performed following injection of IV contrast. Multiplanar reformats were obtained. Dose reduction techniques were used.  CONTRAST: 97 mL Isovue 370    FINDINGS:   LOWER CHEST: Bibasilar atelectasis with slight thickening of the intralobular septa. Small hiatal hernia.    HEPATOBILIARY: Diffuse hepatic steatosis. Normal gallbladder.    PANCREAS: Normal.    SPLEEN: Normal.    ADRENAL GLANDS: Normal.    KIDNEYS/BLADDER: Symmetric enhancement. No hydronephrosis. Nondistended bladder.    BOWEL: No mechanical obstruction. Distal colonic diverticulosis without convincing  evidence of acute diverticulitis. No free air.    LYMPH NODES: Normal.    VASCULATURE: Tortuous abdominal aorta and iliac arteries with atherosclerotic change.    PELVIC ORGANS: Small fat-containing right inguinal hernia.    MUSCULOSKELETAL: Degenerative disc height loss in the lumbar spine.      Impression    IMPRESSION:   1.  Colonic diverticulosis without convincing evidence of acute diverticulitis.    2.  Hepatic steatosis.    3.  Thickening of the basilar intralobular septa suggestive of mild pulmonary interstitial edema.   UA reflex to Microscopic   Result Value Ref Range    Color Urine Yellow     Appearance Urine Clear     Glucose Urine Negative NEG^Negative mg/dL    Bilirubin Urine Negative NEG^Negative    Ketones Urine 5 (A) NEG^Negative mg/dL    Specific Gravity Urine >1.035 (H) 1.003 - 1.035    Blood Urine Negative NEG^Negative    pH Urine 6.0 5.0 - 7.0 pH    Protein Albumin Urine Negative NEG^Negative mg/dL    Urobilinogen mg/dL 0.0 0.0 - 2.0 mg/dL    Nitrite Urine Negative NEG^Negative    Leukocyte Esterase Urine Negative NEG^Negative    Source Midstream Urine        Medications   HYDROmorphone (PF) (DILAUDID) injection 0.5 mg (0.5 mg Intravenous Given 8/20/20 0107)   ondansetron (ZOFRAN) injection 4 mg (4 mg Intravenous Given 8/20/20 0107)   ketorolac (TORADOL) injection 15 mg (15 mg Intravenous Given 8/20/20 0108)   lactated ringers BOLUS 1,000 mL (0 mLs Intravenous Stopped 8/20/20 0215)   iopamidol (ISOVUE-370) solution 97 mL (97 mLs Intravenous Given 8/20/20 0124)   sodium chloride 0.9 % bag 500mL for CT scan flush use (64 mLs Intravenous Given 8/20/20 0125)       3:02 AM Patient re-assessed: Patient is pain-free.  Abdomen soft and nontender.  Reviewed labs and urine as well as CT results with patient.  Encouraged increased hydration through the increase specific gravity on urinalysis.      Assessments & Plan (with Medical Decision Making)  54-year-old male with history of chemical dependency,  hypertension, and hematuria previously presenting for evaluation of acute onset of severe progressive left-sided abdominal pain.  Symptoms progressively worsening over about 3 hours and were severe upon arrival in the ED.  Abdomen was tender along the left side of the abdomen.  No reported fevers or vomiting.  No reported constipation.  No known history of diverticulosis or kidney stones however he did have an episode of hematuria with urinary retention a few months back.  Labs, urinalysis, and CT obtained to evaluate for the cause of his pain.  Pain was treated with IV hydromorphone and Toradol with complete resolution of his symptoms.  CT, labs, and urine without clear cause for his pain.  Urine showed a severely elevated specific gravity consistent with dehydration.  No evidence of renal failure.  No white count and only minimally elevated neutrophil count of unclear significance.  CT showed evidence of diverticulosis without diverticulitis.  No evidence of bowel obstruction or other acute pathology.  Given his resolution of symptoms without a dangerous cause of his symptoms identified, recommended discharge with primary care follow-up     I have reviewed the nursing notes.    I have reviewed the findings, diagnosis, plan and need for follow up with the patient.       New Prescriptions    No medications on file       Final diagnoses:   Left sided abdominal pain of unknown cause   Dehydration       8/20/2020   [facility] EMERGENCY DEPARTMENT     Guerrero, César Day MD  08/20/20 [id number]

## 2020-08-20 NOTE — ED NOTES
Unable to reach ride. Pt formally discharged from department and track board based on 4 hour lapse since Dilaudid was administered and MD authorization.

## 2020-08-20 NOTE — ED TRIAGE NOTES
Periumbilical abdominal pain with onset 2-3 hours ago. Nausea, no vomtiing. Hx of pain one other time. Can't recall dx at that time. Restless, anxious, uncomfortable appearing in triage. No abdominal surgical hx.  
rolling walker

## 2020-08-20 NOTE — ED NOTES
Still has not got a hold of roommate. Writer has had to encourage pt to keep trying multiple times. Per Dr. Guerrero, pt may leave at 0500 or 4 hours after Dilaudid was administered if pt can't reach roommate before then.

## 2020-08-20 NOTE — ED AVS SNAPSHOT
[facility] Emergency Department  [address] [address]  Phone:  [phone]  Fax:  [phone]                                    Micah Nunez   MRN: [id number]    Department:  [facility] Emergency Department   Date of Visit:  8/20/2020           After Visit Summary Signature Page    I have received my discharge instructions, and my questions have been answered. I have discussed any challenges I see with this plan with the nurse or doctor.    ..........................................................................................................................................  Patient/Patient Representative Signature      ..........................................................................................................................................  Patient Representative Print Name and Relationship to Patient    ..................................................               ................................................  Date                                   Time    ..........................................................................................................................................  Reviewed by Signature/Title    ...................................................              ..............................................  Date                                               Time          22EPIC Rev 08/18

## 2020-08-20 NOTE — DISCHARGE INSTRUCTIONS
The exact cause of your abdominal pain is unclear but does not appear to be dangerous.  Please follow-up with your primary doctor next week for follow-up of your emergency department visit.  If you have any new or concerning symptoms, please return for repeat evaluation.

## 2020-08-20 NOTE — ED NOTES
Pt has been discharged from ED. Remains in room arranging ride due to Dilaudid received in the department.

## 2020-11-08 ENCOUNTER — APPOINTMENT (OUTPATIENT)
Dept: CT IMAGING | Facility: CLINIC | Age: 55
End: 2020-11-08
Attending: FAMILY MEDICINE
Payer: COMMERCIAL

## 2020-11-08 ENCOUNTER — HOSPITAL ENCOUNTER (EMERGENCY)
Facility: CLINIC | Age: 55
Discharge: HOME | End: 2020-11-08
Attending: FAMILY MEDICINE | Admitting: FAMILY MEDICINE
Payer: COMMERCIAL

## 2020-11-08 VITALS
WEIGHT: 180 LBS | DIASTOLIC BLOOD PRESSURE: 100 MMHG | SYSTOLIC BLOOD PRESSURE: 167 MMHG | OXYGEN SATURATION: 91 % | TEMPERATURE: 96.6 F | HEART RATE: 60 BPM | BODY MASS INDEX: 21.92 KG/M2 | RESPIRATION RATE: 18 BRPM | HEIGHT: 76 IN

## 2020-11-08 DIAGNOSIS — K57.32 DIVERTICULITIS OF COLON: ICD-10-CM

## 2020-11-08 DIAGNOSIS — R93.5 ABNORMAL ABDOMINAL CT SCAN: ICD-10-CM

## 2020-11-08 LAB
ALBUMIN SERPL-MCNC: 3.9 G/DL (ref 3.4–5)
ALBUMIN UR-MCNC: NEGATIVE MG/DL
ALP SERPL-CCNC: 112 U/L (ref 40–150)
ALT SERPL W P-5'-P-CCNC: 18 U/L (ref 0–70)
ANION GAP SERPL CALCULATED.3IONS-SCNC: 4 MMOL/L (ref 3–14)
APPEARANCE UR: ABNORMAL
AST SERPL W P-5'-P-CCNC: 13 U/L (ref 0–45)
BACTERIA #/AREA URNS HPF: ABNORMAL /HPF
BASOPHILS # BLD AUTO: 0.1 10E9/L (ref 0–0.2)
BASOPHILS NFR BLD AUTO: 0.4 %
BILIRUB SERPL-MCNC: 1.1 MG/DL (ref 0.2–1.3)
BILIRUB UR QL STRIP: NEGATIVE
BUN SERPL-MCNC: 17 MG/DL (ref 7–30)
CALCIUM SERPL-MCNC: 9.9 MG/DL (ref 8.5–10.1)
CHLORIDE SERPL-SCNC: 109 MMOL/L (ref 94–109)
CO2 SERPL-SCNC: 28 MMOL/L (ref 20–32)
COLOR UR AUTO: YELLOW
CREAT SERPL-MCNC: 1.09 MG/DL (ref 0.66–1.25)
CRP SERPL-MCNC: 3.2 MG/L (ref 0–8)
DIFFERENTIAL METHOD BLD: ABNORMAL
EOSINOPHIL # BLD AUTO: 0.1 10E9/L (ref 0–0.7)
EOSINOPHIL NFR BLD AUTO: 0.5 %
ERYTHROCYTE [DISTWIDTH] IN BLOOD BY AUTOMATED COUNT: 13.3 % (ref 10–15)
GFR SERPL CREATININE-BSD FRML MDRD: 76 ML/MIN/{1.73_M2}
GLUCOSE SERPL-MCNC: 116 MG/DL (ref 70–99)
GLUCOSE UR STRIP-MCNC: NEGATIVE MG/DL
HCT VFR BLD AUTO: 48.2 % (ref 40–53)
HGB BLD-MCNC: 15.8 G/DL (ref 13.3–17.7)
HGB UR QL STRIP: ABNORMAL
IMM GRANULOCYTES # BLD: 0.1 10E9/L (ref 0–0.4)
IMM GRANULOCYTES NFR BLD: 0.4 %
KETONES UR STRIP-MCNC: 80 MG/DL
LEUKOCYTE ESTERASE UR QL STRIP: ABNORMAL
LIPASE SERPL-CCNC: 51 U/L (ref 73–393)
LYMPHOCYTES # BLD AUTO: 1 10E9/L (ref 0.8–5.3)
LYMPHOCYTES NFR BLD AUTO: 7.3 %
MCH RBC QN AUTO: 29.8 PG (ref 26.5–33)
MCHC RBC AUTO-ENTMCNC: 32.8 G/DL (ref 31.5–36.5)
MCV RBC AUTO: 91 FL (ref 78–100)
MONOCYTES # BLD AUTO: 0.7 10E9/L (ref 0–1.3)
MONOCYTES NFR BLD AUTO: 5.5 %
MUCOUS THREADS #/AREA URNS LPF: PRESENT /LPF
NEUTROPHILS # BLD AUTO: 11.3 10E9/L (ref 1.6–8.3)
NEUTROPHILS NFR BLD AUTO: 85.9 %
NITRATE UR QL: NEGATIVE
NRBC # BLD AUTO: 0 10*3/UL
NRBC BLD AUTO-RTO: 0 /100
PH UR STRIP: 6 PH (ref 5–7)
PLATELET # BLD AUTO: 252 10E9/L (ref 150–450)
POTASSIUM SERPL-SCNC: 3.6 MMOL/L (ref 3.4–5.3)
PROT SERPL-MCNC: 6.9 G/DL (ref 6.8–8.8)
RBC # BLD AUTO: 5.31 10E12/L (ref 4.4–5.9)
RBC #/AREA URNS AUTO: 10 /HPF (ref 0–2)
SODIUM SERPL-SCNC: 141 MMOL/L (ref 133–144)
SOURCE: ABNORMAL
SP GR UR STRIP: 1.02 (ref 1–1.03)
UROBILINOGEN UR STRIP-MCNC: 0 MG/DL (ref 0–2)
WBC # BLD AUTO: 13.2 10E9/L (ref 4–11)
WBC #/AREA URNS AUTO: 6 /HPF (ref 0–5)

## 2020-11-08 PROCEDURE — 96361 HYDRATE IV INFUSION ADD-ON: CPT | Performed by: FAMILY MEDICINE

## 2020-11-08 PROCEDURE — 96375 TX/PRO/DX INJ NEW DRUG ADDON: CPT | Performed by: FAMILY MEDICINE

## 2020-11-08 PROCEDURE — 80053 COMPREHEN METABOLIC PANEL: CPT | Performed by: FAMILY MEDICINE

## 2020-11-08 PROCEDURE — 86140 C-REACTIVE PROTEIN: CPT | Performed by: FAMILY MEDICINE

## 2020-11-08 PROCEDURE — 258N000003 HC RX IP 258 OP 636: Performed by: FAMILY MEDICINE

## 2020-11-08 PROCEDURE — 36415 COLL VENOUS BLD VENIPUNCTURE: CPT | Performed by: FAMILY MEDICINE

## 2020-11-08 PROCEDURE — 96374 THER/PROPH/DIAG INJ IV PUSH: CPT | Performed by: FAMILY MEDICINE

## 2020-11-08 PROCEDURE — 99285 EMERGENCY DEPT VISIT HI MDM: CPT | Performed by: FAMILY MEDICINE

## 2020-11-08 PROCEDURE — 83690 ASSAY OF LIPASE: CPT | Performed by: FAMILY MEDICINE

## 2020-11-08 PROCEDURE — 74176 CT ABD & PELVIS W/O CONTRAST: CPT

## 2020-11-08 PROCEDURE — 85025 COMPLETE CBC W/AUTO DIFF WBC: CPT | Performed by: FAMILY MEDICINE

## 2020-11-08 PROCEDURE — 250N000013 HC RX MED GY IP 250 OP 250 PS 637: Performed by: FAMILY MEDICINE

## 2020-11-08 PROCEDURE — 99285 EMERGENCY DEPT VISIT HI MDM: CPT | Mod: 25 | Performed by: FAMILY MEDICINE

## 2020-11-08 PROCEDURE — 250N000011 HC RX IP 250 OP 636: Performed by: FAMILY MEDICINE

## 2020-11-08 PROCEDURE — 81001 URINALYSIS AUTO W/SCOPE: CPT | Performed by: FAMILY MEDICINE

## 2020-11-08 RX ORDER — ONDANSETRON 2 MG/ML
4 INJECTION INTRAMUSCULAR; INTRAVENOUS
Status: COMPLETED | OUTPATIENT
Start: 2020-11-08 | End: 2020-11-08

## 2020-11-08 RX ORDER — OXYCODONE HYDROCHLORIDE 5 MG/1
5 TABLET ORAL EVERY 6 HOURS PRN
Qty: 12 TABLET | Refills: 0 | Status: SHIPPED | OUTPATIENT
Start: 2020-11-08 | End: 2021-10-25

## 2020-11-08 RX ORDER — SODIUM CHLORIDE, SODIUM LACTATE, POTASSIUM CHLORIDE, CALCIUM CHLORIDE 600; 310; 30; 20 MG/100ML; MG/100ML; MG/100ML; MG/100ML
INJECTION, SOLUTION INTRAVENOUS CONTINUOUS
Status: DISCONTINUED | OUTPATIENT
Start: 2020-11-08 | End: 2020-11-08 | Stop reason: HOSPADM

## 2020-11-08 RX ORDER — METRONIDAZOLE 500 MG/1
500 TABLET ORAL 4 TIMES DAILY
Qty: 28 TABLET | Refills: 0 | Status: SHIPPED | OUTPATIENT
Start: 2020-11-08 | End: 2020-11-18

## 2020-11-08 RX ORDER — CIPROFLOXACIN 500 MG/1
500 TABLET, FILM COATED ORAL ONCE
Status: COMPLETED | OUTPATIENT
Start: 2020-11-08 | End: 2020-11-08

## 2020-11-08 RX ORDER — KETOROLAC TROMETHAMINE 15 MG/ML
10 INJECTION, SOLUTION INTRAMUSCULAR; INTRAVENOUS ONCE
Status: COMPLETED | OUTPATIENT
Start: 2020-11-08 | End: 2020-11-08

## 2020-11-08 RX ORDER — CIPROFLOXACIN 500 MG/1
500 TABLET, FILM COATED ORAL 2 TIMES DAILY
Qty: 20 TABLET | Refills: 0 | Status: SHIPPED | OUTPATIENT
Start: 2020-11-08 | End: 2020-11-18

## 2020-11-08 RX ORDER — METRONIDAZOLE 500 MG/1
500 TABLET ORAL ONCE
Status: COMPLETED | OUTPATIENT
Start: 2020-11-08 | End: 2020-11-08

## 2020-11-08 RX ORDER — HYDROMORPHONE HYDROCHLORIDE 1 MG/ML
0.5 INJECTION, SOLUTION INTRAMUSCULAR; INTRAVENOUS; SUBCUTANEOUS ONCE
Status: COMPLETED | OUTPATIENT
Start: 2020-11-08 | End: 2020-11-08

## 2020-11-08 RX ADMIN — ONDANSETRON 4 MG: 2 INJECTION INTRAMUSCULAR; INTRAVENOUS at 15:46

## 2020-11-08 RX ADMIN — CIPROFLOXACIN HYDROCHLORIDE 500 MG: 500 TABLET, FILM COATED ORAL at 19:23

## 2020-11-08 RX ADMIN — HYDROMORPHONE HYDROCHLORIDE 0.5 MG: 1 INJECTION, SOLUTION INTRAMUSCULAR; INTRAVENOUS; SUBCUTANEOUS at 15:46

## 2020-11-08 RX ADMIN — KETOROLAC TROMETHAMINE 10 MG: 15 INJECTION, SOLUTION INTRAMUSCULAR; INTRAVENOUS at 17:57

## 2020-11-08 RX ADMIN — SODIUM CHLORIDE, POTASSIUM CHLORIDE, SODIUM LACTATE AND CALCIUM CHLORIDE 1000 ML: 600; 310; 30; 20 INJECTION, SOLUTION INTRAVENOUS at 15:46

## 2020-11-08 RX ADMIN — METRONIDAZOLE 500 MG: 500 TABLET ORAL at 19:23

## 2020-11-08 ASSESSMENT — MIFFLIN-ST. JEOR: SCORE: 1757.97

## 2020-11-08 NOTE — ED AVS SNAPSHOT
[facility] Emergency Dept  [address] [address]  Phone: [phone]  Fax: [phone]                                    Micah Nunez   MRN: [id number]    Department: [facility] Emergency Dept   Date of Visit: 11/8/2020           After Visit Summary Signature Page    I have received my discharge instructions, and my questions have been answered. I have discussed any challenges I see with this plan with the nurse or doctor.    ..........................................................................................................................................  Patient/Patient Representative Signature      ..........................................................................................................................................  Patient Representative Print Name and Relationship to Patient    ..................................................               ................................................  Date                                   Time    ..........................................................................................................................................  Reviewed by Signature/Title    ...................................................              ..............................................  Date                                               Time          22EPIC Rev 08/18

## 2020-11-08 NOTE — ED PROVIDER NOTES
History     Chief Complaint   Patient presents with     Abdominal Pain     sudden onset of severer left abd pain, nauseated vomiting     HPI  Micah Nunez is a 54 year old male, past medical history is significant for hypertension, tobacco use disorder, chemical dependency, GERD, presents to the emergency department with concerns of severe left lower quadrant abdominal pain nausea vomiting.  History is obtained from the patient who identifies approximately 1 hour of severe sudden onset left lower quadrant abdominal pain associate with nausea and one episode of emesis prior to arrival.  Similar to a pain presentation he describes in the past that apparently by his account defies explanation.  He notes that the pain came on at rest, ambulation or movement does not seem to make it any worse or better.  No pain medication taken thus far.  He notes that he has been constipated occasionally but this does not feel like constipation to him.  He had normal bowel movement admittedly small earlier this morning before pain onset.  He notes no urinary symptoms such as frequency, urgency, dysuria or hematuria.  No recent trauma or injury lifting or straining etc.    Allergies:  No Known Allergies    Problem List:    Patient Active Problem List    Diagnosis Date Noted     Benign essential hypertension 10/18/2017     Priority: Medium     Tobacco use disorder 08/12/2015     Priority: Medium     Chemical dependency (H) 07/27/2015     Priority: Medium     Papilloma of nose 03/10/2014     Priority: Medium     Nasal mass 12/17/2013     Priority: Medium     CARDIOVASCULAR SCREENING; LDL GOAL LESS THAN 130 12/11/2013     Priority: Medium        Past Medical History:    Past Medical History:   Diagnosis Date     Benign essential hypertension 10/18/2017     Bleeding disorder (H)      CARDIOVASCULAR SCREENING; LDL GOAL LESS THAN 130 12/11/2013     Chemical dependency (H)      Gastroesophageal reflux disease      Hypertension       Nasal mass 12/17/2013     Papilloma of nose 3/10/2014     Skin disease        Past Surgical History:    Past Surgical History:   Procedure Laterality Date     ARTHRODESIS FOOT Left 2/17/2015    Procedure: ARTHRODESIS FOOT;  Surgeon: Cortez Hayward DPM;  Location: [facility] OR     ARTHRODESIS TOE(S)  12/20/2013    Procedure: ARTHRODESIS TOE(S);  Arthrodesis first metatarsophalangeal joint left foot;  Surgeon: Cortez Hayward DPM;  Location: [facility] OR     ENDOSCOPIC SINUS SURGERY  1/6/2014    Procedure: ENDOSCOPIC SINUS SURGERY;  Functional Endoscopic Sinus Surgery;  Surgeon: Bobo Renteria MD;  Location:  OR     ENDOSCOPIC SINUS SURGERY  7/21/2014    Procedure: ENDOSCOPIC SINUS SURGERY;  Surgeon: Bobo Renteria MD;  Location:  OR     ENDOSCOPIC SINUS SURGERY N/A 4/6/2015    Procedure: ENDOSCOPIC SINUS SURGERY;  Surgeon: Bobo Renteria MD;  Location:  OR     ENDOSCOPIC SINUS SURGERY N/A 8/17/2015    Procedure: ENDOSCOPIC SINUS SURGERY;  Surgeon: Bobo Renteria MD;  Location:  OR     ENDOSCOPIC SINUS SURGERY Bilateral 8/19/2019    Procedure: Bilateral Functional Endoscopic Sinus Surgery, Right Nasal Endoscopy and Excision of Left Nasal Mass;  Surgeon: Bobo Renteria MD;  Location:  OR     ENT SURGERY       EXCISE NASAL MASS Left 11/6/2017    Procedure: EXCISE NASAL MASS;  Excision of Left Nasal Papilloma;  Surgeon: Bobo Renteria MD;  Location:  OR     LAPAROSCOPIC HERNIORRHAPHY INGUINAL BILATERAL Bilateral 4/12/2017    Procedure: LAPAROSCOPIC HERNIORRHAPHY INGUINAL BILATERAL;  Surgeon: Shay Mercado MD;  Location: [facility] OR     NASAL ENDOSCOPY Bilateral 2/6/2017    Procedure: NASAL ENDOSCOPY;  Surgeon: Bobo Renteria MD;  Location:  OR     NASAL ENDOSCOPY N/A 5/21/2018    Procedure: NASAL ENDOSCOPY;  Nasal Endoscopy, CO2 Laser Excision of Left Nasal Papilloma;  Surgeon: Bobo Renteria MD;  Location: [facility]     NASAL/SINUS POLYPECTOMY       PE TUBES    "      Family History:    Family History   Problem Relation Age of Onset     Diabetes Mother 40     C.A.D. Father 72     Unknown/Adopted No family hx of      Depression No family hx of      Anxiety Disorder No family hx of      Schizophrenia No family hx of      Bipolar Disorder No family hx of      Suicide No family hx of      Substance Abuse No family hx of      Dementia No family hx of      Harford Disease No family hx of      Parkinsonism No family hx of      Autism Spectrum Disorder No family hx of      Intellectual Disability (Mental Retardation) No family hx of      Mental Illness No family hx of      Bleeding Disorder No family hx of        Social History:  Marital Status:  Single [1]  Social History     Tobacco Use     Smoking status: Current Every Day Smoker     Packs/day: 1.00     Years: 30.00     Pack years: 30.00     Types: Cigarettes     Start date: 1/1/1980     Smokeless tobacco: Never Used     Tobacco comment: 1 pack/day   Substance Use Topics     Alcohol use: Yes     Drug use: No     Comment: 7 years quit Cocaine/methamphetamines        Medications:         ciprofloxacin (CIPRO) 500 MG tablet       metroNIDAZOLE (FLAGYL) 500 MG tablet       oxyCODONE (ROXICODONE) 5 MG tablet       ciprofloxacin (CIPRO) 500 MG tablet       order for DME       triamcinolone (KENALOG) 0.1 % external ointment          Review of Systems   All other systems reviewed and are negative.      Physical Exam   BP: (!) 180/112  Pulse: 56  Temp: 96.6  F (35.9  C)  Resp: 18  Height: 193 cm (6' 4\")  Weight: 81.6 kg (180 lb)  SpO2: 98 %      Physical Exam  Vitals signs and nursing note reviewed.   Constitutional:       General: He is in acute distress.      Appearance: He is well-developed and normal weight. He is not ill-appearing.   HENT:      Head: Normocephalic and atraumatic.      Mouth/Throat:      Mouth: Mucous membranes are moist.      Pharynx: Oropharynx is clear.   Eyes:      Extraocular Movements: Extraocular movements " intact.      Pupils: Pupils are equal, round, and reactive to light.   Cardiovascular:      Rate and Rhythm: Normal rate and regular rhythm.   Pulmonary:      Effort: Pulmonary effort is normal.      Breath sounds: Normal breath sounds.   Abdominal:      General: Bowel sounds are normal.      Palpations: Abdomen is soft.      Tenderness: There is no abdominal tenderness.      Hernia: No hernia is present.   Skin:     General: Skin is warm and dry.      Capillary Refill: Capillary refill takes less than 2 seconds.   Neurological:      General: No focal deficit present.      Mental Status: He is alert and oriented to person, place, and time.   Psychiatric:         Mood and Affect: Mood normal.         Behavior: Behavior normal.     6:04 PM      ED Course        Procedures               Critical Care time:  none               Results for orders placed or performed during the hospital encounter of 11/08/20 (from the past 24 hour(s))   CBC with platelets differential   Result Value Ref Range    WBC 13.2 (H) 4.0 - 11.0 10e9/L    RBC Count 5.31 4.4 - 5.9 10e12/L    Hemoglobin 15.8 13.3 - 17.7 g/dL    Hematocrit 48.2 40.0 - 53.0 %    MCV 91 78 - 100 fl    MCH 29.8 26.5 - 33.0 pg    MCHC 32.8 31.5 - 36.5 g/dL    RDW 13.3 10.0 - 15.0 %    Platelet Count 252 150 - 450 10e9/L    Diff Method Automated Method     % Neutrophils 85.9 %    % Lymphocytes 7.3 %    % Monocytes 5.5 %    % Eosinophils 0.5 %    % Basophils 0.4 %    % Immature Granulocytes 0.4 %    Nucleated RBCs 0 0 /100    Absolute Neutrophil 11.3 (H) 1.6 - 8.3 10e9/L    Absolute Lymphocytes 1.0 0.8 - 5.3 10e9/L    Absolute Monocytes 0.7 0.0 - 1.3 10e9/L    Absolute Eosinophils 0.1 0.0 - 0.7 10e9/L    Absolute Basophils 0.1 0.0 - 0.2 10e9/L    Abs Immature Granulocytes 0.1 0 - 0.4 10e9/L    Absolute Nucleated RBC 0.0    Comprehensive metabolic panel   Result Value Ref Range    Sodium 141 133 - 144 mmol/L    Potassium 3.6 3.4 - 5.3 mmol/L    Chloride 109 94 - 109 mmol/L     Carbon Dioxide 28 20 - 32 mmol/L    Anion Gap 4 3 - 14 mmol/L    Glucose 116 (H) 70 - 99 mg/dL    Urea Nitrogen 17 7 - 30 mg/dL    Creatinine 1.09 0.66 - 1.25 mg/dL    GFR Estimate 76 >60 mL/min/[1.73_m2]    GFR Estimate If Black 88 >60 mL/min/[1.73_m2]    Calcium 9.9 8.5 - 10.1 mg/dL    Bilirubin Total 1.1 0.2 - 1.3 mg/dL    Albumin 3.9 3.4 - 5.0 g/dL    Protein Total 6.9 6.8 - 8.8 g/dL    Alkaline Phosphatase 112 40 - 150 U/L    ALT 18 0 - 70 U/L    AST 13 0 - 45 U/L   CRP inflammation   Result Value Ref Range    CRP Inflammation 3.2 0.0 - 8.0 mg/L   Lipase   Result Value Ref Range    Lipase 51 (L) 73 - 393 U/L   Abd/pelvis CT - no contrast - [facility] Protocol    Narrative    CT ABDOMEN PELVIS W/O CONTRAST 11/8/2020 4:22 PM    CLINICAL HISTORY: Left lower quadrant abdominal pain suspect  urolithiasis  TECHNIQUE: CT scan of the abdomen and pelvis was performed without IV  contrast. Multiplanar reformats were obtained. Dose reduction  techniques were used.  CONTRAST: None.    COMPARISON: CT abdomen and pelvis 8/20/2020    FINDINGS:   LOWER CHEST: Smooth diffuse interlobular septal thickening.    HEPATOBILIARY: Normal.    PANCREAS: Normal.    SPLEEN: Normal.    ADRENAL GLANDS: Normal.    KIDNEYS/BLADDER: No hydroureteronephrosis or urolithiasis.    BOWEL: Abnormal irregular mural thickening of the rectum from the 6  through 12:00 positions. There is also heterogeneous thickening in the  cecum. No bowel obstruction.    Mild distal colonic diverticulosis. There is short segmental mural  thickening of the distal descending proximal sigmoid colon with  adjacent stranding that is not centered about a diverticulum.    LYMPH NODES: No abdominal or pelvic lymphadenopathy.    VASCULATURE: Normal aorta normal in caliber with mild to moderate  calcified atheromatous plaque.    PELVIC ORGANS: Prostate grossly unremarkable.    OTHER: No free fluid or pneumoperitoneum.    MUSCULOSKELETAL: Small paraumbilical hernia.       Impression    IMPRESSION:   1.  Segmental mural thickening at the junction of the descending  sigmoid colon with minimal adjacent stranding that is not centered  about the diverticula. Distribution is, seen with ischemic colitis,  though infectious or inflammatory etiologies or age-indeterminate  diverticulitis not excluded.  2.  Irregular eccentric mural thickening of the rectum, malignancy  must be excluded.  3.  Irregular thickening of the cecum is also indeterminate and could  be further evaluated with optical colonoscopy.  4.  Urinary bladder wall thickening, cystitis not excluded.  5.  Findings of CHF or volume overload evidenced by interstitial  edema.    ASHELY FARR MD   UA with Microscopic reflex to Culture    Specimen: Midstream Urine   Result Value Ref Range    Color Urine Yellow     Appearance Urine Slightly Cloudy     Glucose Urine Negative NEG^Negative mg/dL    Bilirubin Urine Negative NEG^Negative    Ketones Urine 80 (A) NEG^Negative mg/dL    Specific Gravity Urine 1.023 1.003 - 1.035    Blood Urine Small (A) NEG^Negative    pH Urine 6.0 5.0 - 7.0 pH    Protein Albumin Urine Negative NEG^Negative mg/dL    Urobilinogen mg/dL 0.0 0.0 - 2.0 mg/dL    Nitrite Urine Negative NEG^Negative    Leukocyte Esterase Urine Trace (A) NEG^Negative    Source Midstream Urine     WBC Urine 6 (H) 0 - 5 /HPF    RBC Urine 10 (H) 0 - 2 /HPF    Bacteria Urine Few (A) NEG^Negative /HPF    Mucous Urine Present (A) NEG^Negative /LPF       Medications   lactated ringers BOLUS 1,000 mL (0 mLs Intravenous Stopped 11/8/20 8700)     Followed by   lactated ringers infusion (has no administration in time range)   ciprofloxacin (CIPRO) tablet 500 mg (has no administration in time range)   metroNIDAZOLE (FLAGYL) tablet 500 mg (has no administration in time range)   ondansetron (ZOFRAN) injection 4 mg (4 mg Intravenous Given 11/8/20 1546)   HYDROmorphone (PF) (DILAUDID) injection 0.5 mg (0.5 mg Intravenous Given 11/8/20 1546)    ketorolac (TORADOL) injection 10 mg (10 mg Intravenous Given 11/8/20 1757)     6:40 PM  Sticks and imaging reviewed in the room with the patient and answered his questions.  I also reviewed the patient and his presentation along with findings on CT and lab diagnostics with the on-call general surgeon .  She felt it was appropriate to treat the patient with antibiotics empirically for probable atypical diverticulitis.  Obviously given the findings on CT recommendation is for colonoscopy as soon as possible after completion of antibiotics.  She felt follow-up in clinic in a week or so with the patient's primary care provider and referral for colonoscopy at that point would be reasonable.  I spoke with the patient about this and these recommendations and he is in agreement with the plan.  I plan to treat him with Cipro and Flagyl as well as pain medication which will be oxycodone for pain not responsive to Tylenol or ibuprofen.  If he is not improving in the next 24-48 hours he will return to the emergency department.      Assessments & Plan (with Medical Decision Making)   Assessments and plan with medical decision making at the time stamp above.    Disclaimer: This note consists of symbols derived from keyboarding, dictation and/or voice recognition software. As a result, there may be errors in the script that have gone undetected. Please consider this when interpreting information found in this chart.      I have reviewed the nursing notes.    I have reviewed the findings, diagnosis, plan and need for follow up with the patient.       New Prescriptions    CIPROFLOXACIN (CIPRO) 500 MG TABLET    Take 1 tablet (500 mg) by mouth 2 times daily for 10 days    METRONIDAZOLE (FLAGYL) 500 MG TABLET    Take 1 tablet (500 mg) by mouth 4 times daily for 10 days    OXYCODONE (ROXICODONE) 5 MG TABLET    Take 1 tablet (5 mg) by mouth every 6 hours as needed for pain       Final diagnoses:   Diverticulitis of colon    Abnormal abdominal CT scan - Irregular eccentric mural thickening of the rectum, malignancy must be excluded       11/8/2020   [facility] EMERGENCY DEPT     Flaco Hill MD  11/08/20 3118

## 2020-11-09 NOTE — DISCHARGE INSTRUCTIONS
Cipro and Flagyl x10 days as directed.  Nunda diet and clear fluids until symptoms completely resolved.  Tylenol/ibuprofen for baseline pain medication.  You may use oxycodone as prescribed for breakthrough pain.  If not improving over the course the next 24-48 hours please return to the emergency department.  Please call and make an appointment with your primary care provider in 10 to 14 days for recheck and referral on for colonoscopy.

## 2020-11-20 ENCOUNTER — OFFICE VISIT (OUTPATIENT)
Dept: FAMILY MEDICINE | Facility: CLINIC | Age: 55
End: 2020-11-20
Payer: COMMERCIAL

## 2020-11-20 VITALS
OXYGEN SATURATION: 97 % | SYSTOLIC BLOOD PRESSURE: 130 MMHG | HEART RATE: 81 BPM | WEIGHT: 185 LBS | BODY MASS INDEX: 22.53 KG/M2 | HEIGHT: 76 IN | DIASTOLIC BLOOD PRESSURE: 86 MMHG | TEMPERATURE: 97.6 F | RESPIRATION RATE: 16 BRPM

## 2020-11-20 DIAGNOSIS — I10 BENIGN ESSENTIAL HYPERTENSION: ICD-10-CM

## 2020-11-20 DIAGNOSIS — K63.9 MURAL THICKENING OF COLON: Primary | ICD-10-CM

## 2020-11-20 DIAGNOSIS — L20.9 ATOPIC DERMATITIS, UNSPECIFIED TYPE: ICD-10-CM

## 2020-11-20 PROCEDURE — 99214 OFFICE O/P EST MOD 30 MIN: CPT | Performed by: FAMILY MEDICINE

## 2020-11-20 RX ORDER — LISINOPRIL/HYDROCHLOROTHIAZIDE 10-12.5 MG
1 TABLET ORAL DAILY
Qty: 90 TABLET | Refills: 3 | Status: ON HOLD | OUTPATIENT
Start: 2020-11-20 | End: 2021-11-29

## 2020-11-20 RX ORDER — TRIAMCINOLONE ACETONIDE 1 MG/G
OINTMENT TOPICAL
Qty: 80 G | Refills: 1 | Status: SHIPPED | OUTPATIENT
Start: 2020-11-20 | End: 2021-10-25

## 2020-11-20 ASSESSMENT — MIFFLIN-ST. JEOR: SCORE: 1780.65

## 2020-11-20 NOTE — PROGRESS NOTES
"Subjective     Micah Nunez is a 54 year old male who presents to clinic today for the following health issues:    HPI   Patient was seen in the ED for lower abdominal pain and CT scan showed thickening of the rectum which was concerning for malignancy, he says the pain has been on and off for months.he reports no change in bowel movements.He has not had a colonoscopy before. He is also requesting refills on his chronic medications.    ED/UC Followup:    Facility:  [facility] Emergency Dept  Date of visit: 11/08/2020  Reason for visit: Abdominal Pain   Current Status: Pt states pain has not came back. Very slight abdominal pain today and yesterday, not like before. Would like to discuss what he can do to help it from not coming back. Discuss BP meds, currently not taking any.        Hypertension Follow-up      Do you check your blood pressure regularly outside of the clinic? No     Are you following a low salt diet? Yes    Are your blood pressures ever more than 140 on the top number (systolic) OR more   than 90 on the bottom number (diastolic), for example 140/90? Yes elevated in the ED      How many servings of fruits and vegetables do you eat daily?  0-1    On average, how many sweetened beverages do you drink each day (Examples: soda, juice, sweet tea, etc.  Do NOT count diet or artificially sweetened beverages)?   3    How many days per week do you exercise enough to make your heart beat faster? 7    How many minutes a day do you exercise enough to make your heart beat faster? 30 - 60    How many days per week do you miss taking your medication? 0      Review of Systems   Constitutional, HEENT, cardiovascular, pulmonary, gi and gu systems are negative, except as otherwise noted.      Objective    /86 (BP Location: Right arm, Patient Position: Sitting, Cuff Size: Adult Large)   Pulse 81   Temp 97.6  F (36.4  C) (Tympanic)   Resp 16   Ht 1.93 m (6' 4\")   Wt 83.9 kg (185 lb)   " SpO2 97%   BMI 22.52 kg/m    Body mass index is 22.52 kg/m .  Physical Exam   GENERAL: healthy, alert and no distress  EYES: Eyes grossly normal to inspection, PERRL and conjunctivae and sclerae normal  HENT: ear canals and TM's normal, nose and mouth without ulcers or lesions  NECK: no adenopathy, no asymmetry, masses, or scars and thyroid normal to palpation  RESP: lungs clear to auscultation - no rales, rhonchi or wheezes  CV: regular rate and rhythm, normal S1 S2, no S3 or S4, no murmur, click or rub, no peripheral edema and peripheral pulses strong  ABDOMEN: soft, nontender, without hepatosplenomegaly or masses, bowel sounds normal and mild guarding in lower abdomen  MS: no gross musculoskeletal defects noted, no edema    No results found for this or any previous visit (from the past 24 hour(s)).        Assessment & Plan     Micah was seen today for er f/u.    Diagnoses and all orders for this visit:    Mural thickening of colon  -     GASTROENTEROLOGY ADULT REF PROCEDURE ONLY; Future    Benign essential hypertension  -     lisinopril-hydrochlorothiazide (ZESTORETIC) 10-12.5 MG tablet; Take 1 tablet by mouth daily    Atopic dermatitis, unspecified type  -     triamcinolone (KENALOG) 0.1 % external ointment; APPLY SPARINGLY TO AFFECTED AREA THREE TIMES A DAY FOR 14 DAYS                      FUTURE APPOINTMENTS:       - Follow-up visit in one month or sooner as needed.    Return in about 4 weeks (around 12/18/2020).    Yovany White MD  [facility]

## 2020-11-23 ENCOUNTER — HOSPITAL ENCOUNTER (OUTPATIENT)
Facility: CLINIC | Age: 55
End: 2020-11-23
Attending: SURGERY | Admitting: SURGERY
Payer: COMMERCIAL

## 2020-11-23 ENCOUNTER — VIRTUAL VISIT (OUTPATIENT)
Dept: FAMILY MEDICINE | Facility: CLINIC | Age: 55
End: 2020-11-23
Payer: COMMERCIAL

## 2020-11-23 DIAGNOSIS — U07.1 2019 NOVEL CORONAVIRUS DISEASE (COVID-19): ICD-10-CM

## 2020-11-23 DIAGNOSIS — Z20.822 EXPOSURE TO COVID-19 VIRUS: ICD-10-CM

## 2020-11-23 DIAGNOSIS — R53.83 OTHER FATIGUE: Primary | ICD-10-CM

## 2020-11-23 PROCEDURE — 99213 OFFICE O/P EST LOW 20 MIN: CPT | Mod: TEL | Performed by: NURSE PRACTITIONER

## 2020-11-23 NOTE — PROGRESS NOTES
"Micah Nunez is a 54 year old male who is being evaluated via a billable telephone visit.      The patient has been notified of following:     \"This telephone visit will be conducted via a call between you and your physician/provider. We have found that certain health care needs can be provided without the need for a physical exam.  This service lets us provide the care you need with a short phone conversation.  If a prescription is necessary we can send it directly to your pharmacy.  If lab work is needed we can place an order for that and you can then stop by our lab to have the test done at a later time.    Telephone visits are billed at different rates depending on your insurance coverage. During this emergency period, for some insurers they may be billed the same as an in-person visit.  Please reach out to your insurance provider with any questions.    If during the course of the call the physician/provider feels a telephone visit is not appropriate, you will not be charged for this service.\"    Patient has given verbal consent for Telephone visit?  Yes    What phone number would you like to be contacted at? [phone]    How would you like to obtain your AVS? Mail a copy    Subjective     Micah Nunez is a 54 year old male who presents via phone visit today for the following health issues:    HPI     He is currently being worked up for possible stomach cancer.  Concern for COVID-19  About how many days ago did these symptoms start? 1 week  Is this your first visit for this illness? Yes  In the 14 days before your symptoms started, have you had close contact with someone with COVID-19 (Coronavirus)? Yes, I have been in contact with someone who has COVID-19/Coronavirus (confirmed by lab test). Father who he takes care of is in the hospital for COVID.  Do you have a fever or chills? Yes, I felt feverish or had chills  Are you having new or worsening difficulty breathing? No  Do you have new or " worsening cough? No  Have you had any new or unexplained body aches? YES    Have you experienced any of the following NEW symptoms?    Headache: No    Sore throat: No    Loss of taste or smell: YES    Chest pain: YES    Diarrhea: No    Rash: No  What treatments have you tried? none  Who do you live with? He takes care of his parents the last 30 days. He thinks his mother has COVID also, and she will be tested in [facility].  Are you, or a household member, a healthcare worker or a ? No  Do you live in a nursing home, group home, or shelter? No  Do you have a way to get food/medications if quarantined? Yes, I have a friend or family member who can help me.        Review of Systems   See above       Objective          Vitals:  No vitals were obtained today due to virtual visit.    healthy, alert and no distress  PSYCH: Alert and oriented times 3; coherent speech, normal   rate and volume, able to articulate logical thoughts, able   to abstract reason, no tangential thoughts, no hallucinations   or delusions  His affect is normal  RESP: No cough, no audible wheezing, able to talk in full sentences  Remainder of exam unable to be completed due to telephone visits          Assessment/Plan:    Assessment & Plan     Other fatigue    - Symptomatic COVID-19 Virus (Coronavirus) by PCR; Future    Exposure to COVID-19 virus    - Symptomatic COVID-19 Virus (Coronavirus) by PCR; Future     Tobacco Cessation:   reports that he has been smoking cigarettes. He started smoking about 40 years ago. He has a 20.00 pack-year smoking history. He has never used smokeless tobacco.           See Patient Instructions  Patient Instructions   Instructions for Patients    It is recommended that you have a test for coronavirus (COVID-19). This illness can cause fever, cough and trouble breathing. Many people get a mild case and get better on their own. Some people can get very sick.     Please follow these steps:    1. We will  call to schedule your test.  2. A member of our care team will ask you some questions. Then, they will use a swab to collect samples from your nose and throat.     Our testing team will send you your test results.    How can I protect others?    Stay home and away from others (self-isolate) until:    You ve had no fever--and no medicine that reduces fever--for 1 full day (24 hours). And      Your other symptoms have resolved (gotten better). For example, your cough or breathing has improved. And     At least 10 days have passed since your symptoms started.    Stay at least 6 feet away from others. (If someone will drive you to your test, stay in the backseat, as far away from the  as you can.)     Don t go to work, school or anywhere else. When it s time for your test, go straight to the testing site. Don t make any stops on the way there or back.     Wash your hands and face often. Use soap and water.     Cover your mouth and nose with a mask, tissue or washcloth.     Don t touch anyone. No hugging, kissing or handshakes.    How can I take care of myself?    1. Get lots of rest. Drink extra fluids (unless a doctor has told you not to).     2. Take Tylenol (acetaminophen) for fever or pain. If you have liver or kidney problems, ask your family doctor if it's okay to take Tylenol.     Adults can take either:     650 mg (two 325 mg pills) every 4 to 6 hours, or     1,000 mg (two 500 mg pills) every 8 hours as needed.     Note: Don't take more than 3,000 mg in one day.   Acetaminophen is found in many medicines (both prescribed and over-the-counter medicines). Read all labels to be sure you don't take too much.   For children, check the Tylenol bottle for the right dose. The dose is based on  the child's age or weight.    3. If you have other health problems (like cancer, heart failure, an organ transplant or severe kidney disease): Call your specialty clinic if you don't feel better in the next 2  days.    4. Know when to call 911: If your breathing is so bad that it keeps you from doing normal activities, call 911 or go to the emergency room. Tell them that you've been staying home and may have COVID-19.      Thank you for taking steps to prevent the spread of this virus.  o Limit your contact with others.  o Wear a simple mask to cover your cough.  o Wash your hands well and often.  o If you need medical care, go to [email or web address] or contact your health care provider.     For more about COVID-19 and caring for yourself at home, visit the CDC website at https://www.cdc.gov/coronavirus/2019-ncov/about/steps-when-sick.html.     To learn about care at [facility], please go to [email or web address].     [facility] clinical trials (COVID-19 research studies): [email or web address]-clinical-trials.    Below are the COVID-19 hotlines at the [facility] ([facility]). Interpreters are available.     For health questions: Call 239-298-7686 or 1-633.455.7499 (7 a.m. to 7 p.m.)    For questions about schools and childcare: Call 421-229-9692 or 1-158.670.1712 (7 a.m. to 7 p.m.)        Return in about 2 weeks (around 12/7/2020) for or sooner if symptoms persist or worsen.    NEHEMIAS Klein CNP  [facility]    Phone call duration:  12   minutes

## 2020-11-23 NOTE — PATIENT INSTRUCTIONS
Instructions for Patients    It is recommended that you have a test for coronavirus (COVID-19). This illness can cause fever, cough and trouble breathing. Many people get a mild case and get better on their own. Some people can get very sick.     Please follow these steps:    1. We will call to schedule your test.  2. A member of our care team will ask you some questions. Then, they will use a swab to collect samples from your nose and throat.     Our testing team will send you your test results.    How can I protect others?    Stay home and away from others (self-isolate) until:    You ve had no fever--and no medicine that reduces fever--for 1 full day (24 hours). And      Your other symptoms have resolved (gotten better). For example, your cough or breathing has improved. And     At least 10 days have passed since your symptoms started.    Stay at least 6 feet away from others. (If someone will drive you to your test, stay in the backseat, as far away from the  as you can.)     Don t go to work, school or anywhere else. When it s time for your test, go straight to the testing site. Don t make any stops on the way there or back.     Wash your hands and face often. Use soap and water.     Cover your mouth and nose with a mask, tissue or washcloth.     Don t touch anyone. No hugging, kissing or handshakes.    How can I take care of myself?    1. Get lots of rest. Drink extra fluids (unless a doctor has told you not to).     2. Take Tylenol (acetaminophen) for fever or pain. If you have liver or kidney problems, ask your family doctor if it's okay to take Tylenol.     Adults can take either:     650 mg (two 325 mg pills) every 4 to 6 hours, or     1,000 mg (two 500 mg pills) every 8 hours as needed.     Note: Don't take more than 3,000 mg in one day.   Acetaminophen is found in many medicines (both prescribed and over-the-counter medicines). Read all labels to be sure you don't take too much.   For children,  check the Tylenol bottle for the right dose. The dose is based on  the child's age or weight.    3. If you have other health problems (like cancer, heart failure, an organ transplant or severe kidney disease): Call your specialty clinic if you don't feel better in the next 2 days.    4. Know when to call 911: If your breathing is so bad that it keeps you from doing normal activities, call 911 or go to the emergency room. Tell them that you've been staying home and may have COVID-19.      Thank you for taking steps to prevent the spread of this virus.  o Limit your contact with others.  o Wear a simple mask to cover your cough.  o Wash your hands well and often.  o If you need medical care, go to [email or web address] or contact your health care provider.     For more about COVID-19 and caring for yourself at home, visit the CDC website at https://www.cdc.gov/coronavirus/2019-ncov/about/steps-when-sick.html.     To learn about care at [facility], please go to [email or web address].     [facility] clinical trials (COVID-19 research studies): clinicalaffairs.[facility]-clinical-trials.    Below are the COVID-19 hotlines at the [facility] ([facility]). Interpreters are available.     For health questions: Call 266-507-5569 or 1-429.815.4467 (7 a.m. to 7 p.m.)    For questions about schools and childcare: Call 930-990-1745 or 1-921.377.5536 (7 a.m. to 7 p.m.)

## 2020-11-24 ENCOUNTER — ALLIED HEALTH/NURSE VISIT (OUTPATIENT)
Dept: FAMILY MEDICINE | Facility: CLINIC | Age: 55
End: 2020-11-24
Payer: COMMERCIAL

## 2020-11-24 DIAGNOSIS — Z11.59 ENCOUNTER FOR SCREENING FOR OTHER VIRAL DISEASES: Primary | ICD-10-CM

## 2020-11-24 DIAGNOSIS — Z20.822 EXPOSURE TO COVID-19 VIRUS: ICD-10-CM

## 2020-11-24 DIAGNOSIS — R53.83 OTHER FATIGUE: ICD-10-CM

## 2020-11-24 PROCEDURE — 99207 PR NO CHARGE NURSE ONLY: CPT

## 2020-11-24 PROCEDURE — U0003 INFECTIOUS AGENT DETECTION BY NUCLEIC ACID (DNA OR RNA); SEVERE ACUTE RESPIRATORY SYNDROME CORONAVIRUS 2 (SARS-COV-2) (CORONAVIRUS DISEASE [COVID-19]), AMPLIFIED PROBE TECHNIQUE, MAKING USE OF HIGH THROUGHPUT TECHNOLOGIES AS DESCRIBED BY CMS-2020-01-R: HCPCS | Performed by: NURSE PRACTITIONER

## 2020-11-25 ENCOUNTER — APPOINTMENT (OUTPATIENT)
Dept: FAMILY MEDICINE | Facility: CLINIC | Age: 55
End: 2020-11-25
Attending: NURSE PRACTITIONER
Payer: COMMERCIAL

## 2020-11-26 LAB
SARS-COV-2 RNA SPEC QL NAA+PROBE: ABNORMAL
SPECIMEN SOURCE: ABNORMAL

## 2020-11-27 ENCOUNTER — TELEPHONE (OUTPATIENT)
Dept: LAB | Facility: CLINIC | Age: 55
End: 2020-11-27

## 2020-11-27 NOTE — TELEPHONE ENCOUNTER
Coronavirus (COVID-19) Notification    Reason for call  Notify of POSITIVE  COVID-19 lab result, assess symptoms,  review [facility] recommendations    Lab Result   Lab test for 2019-nCoV rRt-PCR or SARS-COV-2 PCR  Oropharyngeal AND/OR nasopharyngeal swabs were POSITIVE for 2019-nCoV RNA [OR] SARS-COV-2 RNA (COVID-19) RNA     We have been unable to reach Patient by phone at this time to notify of their Positive COVID-19 result.  Unable to leave a voicemail due to voicemail not being set up.     POSITIVE COVID-19 Letter sent.    Lila Jaramillo, MSN, RN

## 2020-12-02 ENCOUNTER — ANESTHESIA EVENT (OUTPATIENT)
Dept: GASTROENTEROLOGY | Facility: CLINIC | Age: 55
End: 2020-12-02

## 2020-12-02 ASSESSMENT — LIFESTYLE VARIABLES: TOBACCO_USE: 1

## 2020-12-02 NOTE — ANESTHESIA PREPROCEDURE EVALUATION
Anesthesia Pre-Procedure Evaluation    Patient: Micah Nunez   MRN: [id number] : 1965          Preoperative Diagnosis: Mural thickening of colon [K63.9]    Procedure(s):  COLONOSCOPY    Past Medical History:   Diagnosis Date     Benign essential hypertension 10/18/2017     Bleeding disorder (H)      CARDIOVASCULAR SCREENING; LDL GOAL LESS THAN 130 2013     Chemical dependency (H)      Gastroesophageal reflux disease      Hypertension      Nasal mass 2013     Papilloma of nose 3/10/2014     Skin disease      Past Surgical History:   Procedure Laterality Date     ARTHRODESIS FOOT Left 2015    Procedure: ARTHRODESIS FOOT;  Surgeon: Cortez Hayward DPM;  Location: [facility]     ARTHRODESIS TOE(S)  2013    Procedure: ARTHRODESIS TOE(S);  Arthrodesis first metatarsophalangeal joint left foot;  Surgeon: Cortez Hayward DPM;  Location: [facility]     ENDOSCOPIC SINUS SURGERY  2014    Procedure: ENDOSCOPIC SINUS SURGERY;  Functional Endoscopic Sinus Surgery;  Surgeon: Bobo Renteria MD;  [facility]     ENDOSCOPIC SINUS SURGERY  2014    Procedure: ENDOSCOPIC SINUS SURGERY;  Surgeon: Bobo Renteria MD;  Location:  OR     ENDOSCOPIC SINUS SURGERY N/A 2015    Procedure: ENDOSCOPIC SINUS SURGERY;  Surgeon: Bobo Renteria MD;  [facility]     ENDOSCOPIC SINUS SURGERY N/A 2015    Procedure: ENDOSCOPIC SINUS SURGERY;  Surgeon: Bobo Renteria MD;  [facility]     ENDOSCOPIC SINUS SURGERY Bilateral 2019    Procedure: Bilateral Functional Endoscopic Sinus Surgery, Right Nasal Endoscopy and Excision of Left Nasal Mass;  Surgeon: Bobo Renteria MD;  Location:  OR     ENT SURGERY       EXCISE NASAL MASS Left 2017    Procedure: EXCISE NASAL MASS;  Excision of Left Nasal Papilloma;  Surgeon: Bobo Renteria MD;  Location:  OR     LAPAROSCOPIC HERNIORRHAPHY INGUINAL BILATERAL Bilateral 2017    Procedure:  LAPAROSCOPIC HERNIORRHAPHY INGUINAL BILATERAL;  Surgeon: Shay Mercado MD;  Location: [facility]     NASAL ENDOSCOPY Bilateral 2/6/2017    Procedure: NASAL ENDOSCOPY;  Surgeon: Bobo Renteria MD;  [facility]     NASAL ENDOSCOPY N/A 5/21/2018    Procedure: NASAL ENDOSCOPY;  Nasal Endoscopy, CO2 Laser Excision of Left Nasal Papilloma;  Surgeon: Bobo Renteria MD;  [facility]     NASAL/SINUS POLYPECTOMY       PE TUBES         Anesthesia Evaluation     . Pt has had prior anesthetic. Type: General           ROS/MED HX    ENT/Pulmonary:     (+)tobacco use, , . .    Neurologic:       Cardiovascular:     (+) hypertension----. : . . . :. .       METS/Exercise Tolerance:     Hematologic: Comments: Bleeding disorder        Musculoskeletal:         GI/Hepatic: Comment: Chemical dependency    (+) GERD       Renal/Genitourinary:         Endo:         Psychiatric:         Infectious Disease:         Malignancy:         Other:                                 Lab Results   Component Value Date    WBC 13.2 (H) 11/08/2020    HGB 15.8 11/08/2020    HCT 48.2 11/08/2020     11/08/2020    CRP 3.2 11/08/2020     11/08/2020    POTASSIUM 3.6 11/08/2020    CHLORIDE 109 11/08/2020    CO2 28 11/08/2020    BUN 17 11/08/2020    CR 1.09 11/08/2020     (H) 11/08/2020    KEVIN 9.9 11/08/2020    ALBUMIN 3.9 11/08/2020    PROTTOTAL 6.9 11/08/2020    ALT 18 11/08/2020    AST 13 11/08/2020    ALKPHOS 112 11/08/2020    BILITOTAL 1.1 11/08/2020    LIPASE 51 (L) 11/08/2020    INR 1.09 05/28/2020       Preop Vitals  BP Readings from Last 3 Encounters:   11/20/20 130/86   11/08/20 (!) 167/100   08/20/20 (!) 142/90    Pulse Readings from Last 3 Encounters:   11/20/20 81   11/08/20 60   08/20/20 68      Resp Readings from Last 3 Encounters:   11/20/20 16   11/08/20 18   08/20/20 24    SpO2 Readings from Last 3 Encounters:   11/20/20 97%   11/08/20 91%   08/20/20 95%      Temp Readings from Last 1 Encounters:   11/20/20  "36.4  C (97.6  F) (Tympanic)    Ht Readings from Last 1 Encounters:   11/20/20 1.93 m (6' 4\")      Wt Readings from Last 1 Encounters:   11/20/20 83.9 kg (185 lb)    Estimated body mass index is 22.52 kg/m  as calculated from the following:    Height as of 11/20/20: 1.93 m (6' 4\").    Weight as of 11/20/20: 83.9 kg (185 lb).                   Raul Tyson, CRNA, APRN CRNA  "

## 2020-12-07 ENCOUNTER — ANESTHESIA (OUTPATIENT)
Dept: GASTROENTEROLOGY | Facility: CLINIC | Age: 55
End: 2020-12-07

## 2020-12-10 ENCOUNTER — TELEPHONE (OUTPATIENT)
Dept: FAMILY MEDICINE | Facility: CLINIC | Age: 55
End: 2020-12-10

## 2020-12-10 NOTE — TELEPHONE ENCOUNTER
Reason for Call:  Other appointment    Detailed comments: Pt has some concerns:  He was suppose to have a colonoscopy but due to being a positive COVID results and symptoms he had to cancel.  Ct scan done.  Thinking it might be cancer.  He is wondering as he doesn't feel well about appt?, Medication? Etc.  Symptoms now are slight fever, hot/cold sweats, fatigue and sore throat.  Started not to feel feel on Nov 17th.  Please advise.      Phone Number Patient can be reached at: Home number on file [phone] (home)    Best Time: any    Can we leave a detailed message on this number? YES    Call taken on 12/10/2020 at 12:10 PM by Melissa Cartwright

## 2020-12-10 NOTE — TELEPHONE ENCOUNTER
S-(situation): Has COVID-19 is still symptomatic, had to cancel colonoscopy to r/o malignancy. Now having the return of the abdominal pain.     B-(background): per 11/20/20 dictation:  Patient was seen in the ED for lower abdominal pain and CT scan showed thickening of the rectum which was concerning for malignancy, he says the pain has been on and off for months.he reports no change in bowel movements.He has not had a colonoscopy before. He is also requesting refills on his chronic medications.    A-(assessment): Patient currently having COVID-19 symptoms again, low grade fever, sore throat, hot/cold, fatigue. Discussed hydration therapy, vitamins, electrolyte replacing solution, and plenty of rest.   Yesterday started with a flare of the abdominal pain.  Left sided pain currently, has been intermittent x 6 months, is not to the point where he needs to go to the Emergency Department, but patient is wondering if provider can prescribe something until can have the diagnostic colonoscopy?    Patient has not been able to schedule colonoscopy due to currently symptomatic for COVID-19, has had to reschedule.      R-(recommendations): Patient is wondering if there is something provider can prescribe for the pain to prevent worsening of the abdominal pain until seen by Gastro.

## 2020-12-11 NOTE — TELEPHONE ENCOUNTER
I am really reluctant to prescribe pain medication as this may indicate a more serious problem and may mask the real issue.. If pain is severe I will highly recommend he be seen in the ER even though he has COVID.

## 2021-01-08 ENCOUNTER — ANESTHESIA EVENT (OUTPATIENT)
Dept: GASTROENTEROLOGY | Facility: CLINIC | Age: 56
End: 2021-01-08
Payer: COMMERCIAL

## 2021-01-08 ASSESSMENT — LIFESTYLE VARIABLES: TOBACCO_USE: 1

## 2021-01-08 NOTE — ANESTHESIA PREPROCEDURE EVALUATION
Anesthesia Pre-Procedure Evaluation    Patient: Micah Nunez   MRN: [id number] : 1965          Preoperative Diagnosis: Mural thickening of colon [K63.9]    Procedure(s):  COLONOSCOPY    Past Medical History:   Diagnosis Date     Benign essential hypertension 10/18/2017     Bleeding disorder (H)      CARDIOVASCULAR SCREENING; LDL GOAL LESS THAN 130 2013     Chemical dependency (H)      Gastroesophageal reflux disease      Hypertension      Nasal mass 2013     Papilloma of nose 3/10/2014     Skin disease      Past Surgical History:   Procedure Laterality Date     ARTHRODESIS FOOT Left 2015    Procedure: ARTHRODESIS FOOT;  Surgeon: Cortez Hayward DPM;  Location: [facility]     ARTHRODESIS TOE(S)  2013    Procedure: ARTHRODESIS TOE(S);  Arthrodesis first metatarsophalangeal joint left foot;  Surgeon: Cortez Hayward DPM;  [facility]: [facility]     ENDOSCOPIC SINUS SURGERY  2014    Procedure: ENDOSCOPIC SINUS SURGERY;  Functional Endoscopic Sinus Surgery;  Surgeon: Bobo Renteria MD;  [facility]     ENDOSCOPIC SINUS SURGERY  2014    Procedure: ENDOSCOPIC SINUS SURGERY;  Surgeon: Bobo Renteria MD;  Location:  OR     ENDOSCOPIC SINUS SURGERY N/A 2015    Procedure: ENDOSCOPIC SINUS SURGERY;  Surgeon: Bobo Renteria MD;  [facility]     ENDOSCOPIC SINUS SURGERY N/A 2015    Procedure: ENDOSCOPIC SINUS SURGERY;  Surgeon: Bobo Renteria MD;  [facility]     ENDOSCOPIC SINUS SURGERY Bilateral 2019    Procedure: Bilateral Functional Endoscopic Sinus Surgery, Right Nasal Endoscopy and Excision of Left Nasal Mass;  Surgeon: Bobo Renteria MD;  Location:  OR     ENT SURGERY       EXCISE NASAL MASS Left 2017    Procedure: EXCISE NASAL MASS;  Excision of Left Nasal Papilloma;  Surgeon: Bobo Renteria MD;  Location:  OR     LAPAROSCOPIC HERNIORRHAPHY INGUINAL BILATERAL Bilateral 2017    Procedure:  LAPAROSCOPIC HERNIORRHAPHY INGUINAL BILATERAL;  Surgeon: Shay Mercado MD;  Location: [facility]     NASAL ENDOSCOPY Bilateral 2/6/2017    Procedure: NASAL ENDOSCOPY;  Surgeon: Bobo Renteria MD;  [facility]     NASAL ENDOSCOPY N/A 5/21/2018    Procedure: NASAL ENDOSCOPY;  Nasal Endoscopy, CO2 Laser Excision of Left Nasal Papilloma;  Surgeon: Bobo Renteria MD;  [facility]     NASAL/SINUS POLYPECTOMY       PE TUBES         Anesthesia Evaluation     . Pt has had prior anesthetic. Type: General           ROS/MED HX    ENT/Pulmonary:     (+)tobacco use, Current use , . .    Neurologic:  - neg neurologic ROS     Cardiovascular:     (+) Dyslipidemia, hypertension----. : . . . :. .       METS/Exercise Tolerance:  >4 METS   Hematologic:  - neg hematologic  ROS       Musculoskeletal:  - neg musculoskeletal ROS       GI/Hepatic:     (+) GERD Asymptomatic on medication,       Renal/Genitourinary:  - ROS Renal section negative       Endo:  - neg endo ROS       Psychiatric:  - neg psychiatric ROS       Infectious Disease:  - neg infectious disease ROS       Malignancy:   (+) Malignancy   Nasal mass        Other: Comment: Chemical dependency     - neg other ROS                      Physical Exam  Normal systems: cardiovascular and pulmonary    Airway   Mallampati: II  TM distance: >3 FB  Neck ROM: full    Dental   (+) upper dentures and lower dentures    Cardiovascular       Pulmonary             Lab Results   Component Value Date    WBC 13.2 (H) 11/08/2020    HGB 15.8 11/08/2020    HCT 48.2 11/08/2020     11/08/2020    CRP 3.2 11/08/2020     11/08/2020    POTASSIUM 3.6 11/08/2020    CHLORIDE 109 11/08/2020    CO2 28 11/08/2020    BUN 17 11/08/2020    CR 1.09 11/08/2020     (H) 11/08/2020    KEVIN 9.9 11/08/2020    ALBUMIN 3.9 11/08/2020    PROTTOTAL 6.9 11/08/2020    ALT 18 11/08/2020    AST 13 11/08/2020    ALKPHOS 112 11/08/2020    BILITOTAL 1.1 11/08/2020    LIPASE 51 (L) 11/08/2020     "INR 1.09 05/28/2020       Preop Vitals  BP Readings from Last 3 Encounters:   11/20/20 130/86   11/08/20 (!) 167/100   08/20/20 (!) 142/90    Pulse Readings from Last 3 Encounters:   11/20/20 81   11/08/20 60   08/20/20 68      Resp Readings from Last 3 Encounters:   11/20/20 16   11/08/20 18   08/20/20 24    SpO2 Readings from Last 3 Encounters:   11/20/20 97%   11/08/20 91%   08/20/20 95%      Temp Readings from Last 1 Encounters:   11/20/20 36.4  C (97.6  F) (Tympanic)    Ht Readings from Last 1 Encounters:   11/20/20 1.93 m (6' 4\")      Wt Readings from Last 1 Encounters:   11/20/20 83.9 kg (185 lb)    Estimated body mass index is 22.52 kg/m  as calculated from the following:    Height as of 11/20/20: 1.93 m (6' 4\").    Weight as of 11/20/20: 83.9 kg (185 lb).       Anesthesia Plan      History & Physical Review      ASA Status:  2 .    NPO Status:  > 4 hours    Plan for MAC Reason for MAC:  Deep or markedly invasive procedure (G8)           Postoperative Care      Consents  Anesthetic plan, risks, benefits and alternatives discussed with:  Patient..                 NEHEMIAS Pope CRNA  "

## 2021-01-12 ENCOUNTER — ANESTHESIA (OUTPATIENT)
Dept: GASTROENTEROLOGY | Facility: CLINIC | Age: 56
End: 2021-01-12
Payer: COMMERCIAL

## 2021-01-12 ENCOUNTER — HOSPITAL ENCOUNTER (OUTPATIENT)
Facility: CLINIC | Age: 56
Discharge: HOME | End: 2021-01-12
Attending: SURGERY | Admitting: SURGERY
Payer: COMMERCIAL

## 2021-01-12 VITALS
HEART RATE: 77 BPM | BODY MASS INDEX: 22.53 KG/M2 | TEMPERATURE: 97.6 F | DIASTOLIC BLOOD PRESSURE: 98 MMHG | HEIGHT: 76 IN | SYSTOLIC BLOOD PRESSURE: 144 MMHG | RESPIRATION RATE: 16 BRPM | OXYGEN SATURATION: 97 % | WEIGHT: 185 LBS

## 2021-01-12 LAB — COLONOSCOPY: NORMAL

## 2021-01-12 PROCEDURE — 258N000003 HC RX IP 258 OP 636: Performed by: SURGERY

## 2021-01-12 PROCEDURE — 250N000009 HC RX 250: Performed by: NURSE ANESTHETIST, CERTIFIED REGISTERED

## 2021-01-12 PROCEDURE — 45378 DIAGNOSTIC COLONOSCOPY: CPT | Performed by: SURGERY

## 2021-01-12 PROCEDURE — 250N000009 HC RX 250: Performed by: SURGERY

## 2021-01-12 PROCEDURE — 250N000011 HC RX IP 250 OP 636: Performed by: NURSE ANESTHETIST, CERTIFIED REGISTERED

## 2021-01-12 PROCEDURE — 370N000017 HC ANESTHESIA TECHNICAL FEE, PER MIN: Performed by: SURGERY

## 2021-01-12 RX ORDER — LIDOCAINE 40 MG/G
CREAM TOPICAL
Status: DISCONTINUED | OUTPATIENT
Start: 2021-01-12 | End: 2021-01-12 | Stop reason: HOSPADM

## 2021-01-12 RX ORDER — SODIUM CHLORIDE, SODIUM LACTATE, POTASSIUM CHLORIDE, CALCIUM CHLORIDE 600; 310; 30; 20 MG/100ML; MG/100ML; MG/100ML; MG/100ML
INJECTION, SOLUTION INTRAVENOUS CONTINUOUS
Status: DISCONTINUED | OUTPATIENT
Start: 2021-01-12 | End: 2021-01-12 | Stop reason: HOSPADM

## 2021-01-12 RX ORDER — ONDANSETRON 2 MG/ML
4 INJECTION INTRAMUSCULAR; INTRAVENOUS
Status: DISCONTINUED | OUTPATIENT
Start: 2021-01-12 | End: 2021-01-12 | Stop reason: HOSPADM

## 2021-01-12 RX ORDER — GLYCOPYRROLATE 0.2 MG/ML
INJECTION, SOLUTION INTRAMUSCULAR; INTRAVENOUS PRN
Status: DISCONTINUED | OUTPATIENT
Start: 2021-01-12 | End: 2021-01-12

## 2021-01-12 RX ORDER — PROPOFOL 10 MG/ML
INJECTION, EMULSION INTRAVENOUS CONTINUOUS PRN
Status: DISCONTINUED | OUTPATIENT
Start: 2021-01-12 | End: 2021-01-12

## 2021-01-12 RX ORDER — PROPOFOL 10 MG/ML
INJECTION, EMULSION INTRAVENOUS PRN
Status: DISCONTINUED | OUTPATIENT
Start: 2021-01-12 | End: 2021-01-12

## 2021-01-12 RX ADMIN — LIDOCAINE HYDROCHLORIDE 50 MG: 10 INJECTION, SOLUTION EPIDURAL; INFILTRATION; INTRACAUDAL; PERINEURAL at 11:10

## 2021-01-12 RX ADMIN — LIDOCAINE HYDROCHLORIDE 0.1 ML: 10 INJECTION, SOLUTION EPIDURAL; INFILTRATION; INTRACAUDAL; PERINEURAL at 10:51

## 2021-01-12 RX ADMIN — SODIUM CHLORIDE, POTASSIUM CHLORIDE, SODIUM LACTATE AND CALCIUM CHLORIDE: 600; 310; 30; 20 INJECTION, SOLUTION INTRAVENOUS at 10:50

## 2021-01-12 RX ADMIN — PROPOFOL 200 MCG/KG/MIN: 10 INJECTION, EMULSION INTRAVENOUS at 11:10

## 2021-01-12 RX ADMIN — PROPOFOL 100 MG: 10 INJECTION, EMULSION INTRAVENOUS at 11:10

## 2021-01-12 RX ADMIN — GLYCOPYRROLATE 0.2 MG: 0.2 INJECTION, SOLUTION INTRAMUSCULAR; INTRAVENOUS at 11:10

## 2021-01-12 ASSESSMENT — MIFFLIN-ST. JEOR: SCORE: 1775.65

## 2021-01-12 NOTE — BRIEF OP NOTE
[facility]    Brief Operative Note    Pre-operative diagnosis: Mural thickening of colon [K63.9]   Post-operative diagnosis mild diverticulosis, otherwise normal     Procedure: Procedure(s):  COLONOSCOPY   Surgeon(s): Surgeon(s) and Role:     * Dixon Sarabia MD - Primary   Estimated blood loss: * No values recorded between 1/12/2021 11:15 AM and 1/12/2021 11:35 AM *    Specimens: * No specimens in log *   Findings: Mild sigmoid diverticulosis  Colon otherwise normal

## 2021-01-12 NOTE — ANESTHESIA POSTPROCEDURE EVALUATION
Patient: Micah Nunez    Procedure(s):  COLONOSCOPY    Diagnosis:Mural thickening of colon [K63.9]  Diagnosis Additional Information: No value filed.    Anesthesia Type:  MAC    Note:  Anesthesia Post Evaluation    Patient location during evaluation: Bedside  Patient participation: Able to fully participate in evaluation  Level of consciousness: awake and alert  Pain management: adequate  Airway patency: patent  Cardiovascular status: acceptable  Respiratory status: acceptable  Hydration status: acceptable  PONV: none     Anesthetic complications: None          Last vitals:  Vitals:    01/12/21 1018 01/12/21 1140   BP: (!) 136/90 132/82   Pulse: 72    Resp: 20 20   Temp: 36.4  C (97.6  F)    SpO2: 96% 94%         Electronically Signed By: NEHEMIAS Mantilla CRNA  January 12, 2021  11:45 AM

## 2021-01-12 NOTE — ANESTHESIA CARE TRANSFER NOTE
Patient: Micah Nunez    Procedure(s):  COLONOSCOPY    Diagnosis: Mural thickening of colon [K63.9]  Diagnosis Additional Information: No value filed.    Anesthesia Type:   MAC     Note:  Airway :Room Air  Patient transferred to:Phase II  Handoff Report: Identifed the Patient, Identified the Reponsible Provider, Reviewed the pertinent medical history, Discussed the surgical course, Reviewed Intra-OP anesthesia mangement and issues during anesthesia, Set expectations for post-procedure period and Allowed opportunity for questions and acknowledgement of understanding      Vitals: (Last set prior to Anesthesia Care Transfer)    CRNA VITALS  1/12/2021 1109 - 1/12/2021 1142      1/12/2021             Pulse:  79    SpO2:  96 %                Electronically Signed By: NEHEMIAS Mantilla CRNA  January 12, 2021  11:42 AM

## 2021-01-12 NOTE — H&P
55 year old year old male here for colonoscopy for a CT finding of a thickened wall in the sigmoid colon.    Patient Active Problem List   Diagnosis     CARDIOVASCULAR SCREENING; LDL GOAL LESS THAN 130     Nasal mass     Papilloma of nose     Chemical dependency (H)     Tobacco use disorder     Benign essential hypertension       Past Medical History:   Diagnosis Date     Benign essential hypertension 10/18/2017     Bleeding disorder (H)      CARDIOVASCULAR SCREENING; LDL GOAL LESS THAN 130 12/11/2013     Chemical dependency (H)      Gastroesophageal reflux disease      Hypertension      Nasal mass 12/17/2013     Papilloma of nose 3/10/2014     Skin disease        Past Surgical History:   Procedure Laterality Date     ARTHRODESIS FOOT Left 2/17/2015    Procedure: ARTHRODESIS FOOT;  Surgeon: Cortez Hayward DPM;  Location: [facility]     ARTHRODESIS TOE(S)  12/20/2013    Procedure: ARTHRODESIS TOE(S);  Arthrodesis first metatarsophalangeal joint left foot;  Surgeon: Cortez Hayward DPM;  Location: [facility]     ENDOSCOPIC SINUS SURGERY  1/6/2014    Procedure: ENDOSCOPIC SINUS SURGERY;  Functional Endoscopic Sinus Surgery;  Surgeon: Bobo Renteria MD;  [facility]     ENDOSCOPIC SINUS SURGERY  7/21/2014    Procedure: ENDOSCOPIC SINUS SURGERY;  Surgeon: Bobo Renteria MD;  Location:  OR     ENDOSCOPIC SINUS SURGERY N/A 4/6/2015    Procedure: ENDOSCOPIC SINUS SURGERY;  Surgeon: Bobo Renteria MD;  Location:  OR     ENDOSCOPIC SINUS SURGERY N/A 8/17/2015    Procedure: ENDOSCOPIC SINUS SURGERY;  Surgeon: Bobo Renteria MD;  [facility]     ENDOSCOPIC SINUS SURGERY Bilateral 8/19/2019    Procedure: Bilateral Functional Endoscopic Sinus Surgery, Right Nasal Endoscopy and Excision of Left Nasal Mass;  Surgeon: Bobo Renteria MD;  Location:  OR     ENT SURGERY       EXCISE NASAL MASS Left 11/6/2017    Procedure: EXCISE NASAL MASS;  Excision of Left Nasal Papilloma;  Surgeon:  "Bobo Renteria MD;  [facility]     LAPAROSCOPIC HERNIORRHAPHY INGUINAL BILATERAL Bilateral 4/12/2017    Procedure: LAPAROSCOPIC HERNIORRHAPHY INGUINAL BILATERAL;  Surgeon: Shay Mercado MD;  Location: [facility]     NASAL ENDOSCOPY Bilateral 2/6/2017    Procedure: NASAL ENDOSCOPY;  Surgeon: Bobo Renteria MD;  [facility]     NASAL ENDOSCOPY N/A 5/21/2018    Procedure: NASAL ENDOSCOPY;  Nasal Endoscopy, CO2 Laser Excision of Left Nasal Papilloma;  Surgeon: Bobo Renteria MD;  [facility]     NASAL/SINUS POLYPECTOMY       [facility]ES         [facility]    No current outpatient medications on file.       No Known Allergies    Pt reports that he has been smoking cigarettes. He started smoking about 41 years ago. He has a 20.00 pack-year smoking history. He has never used smokeless tobacco. He reports current alcohol use. He reports previous drug use.    Exam:  BP (!) 136/90 (BP Location: Left arm)   Pulse 72   Temp 97.6  F (36.4  C) (Oral)   Resp 20   Ht 1.93 m (6' 4\")   Wt 83.9 kg (185 lb)   SpO2 96%   BMI 22.52 kg/m      Awake, Alert OX3  Lungs - CTA bilaterally  CV - RRR, no murmurs, distal pulses intact  Abd - soft, non-distended, non-tender, +BS  Extr - No cyanosis or edema    A/P 55 year old year old male in need of colonoscopy for  CT finding of a thickened wall in the sigmoid colon. Risks, benefits, alternatives, and complications were discussed including the possibility of perforation and the patient agreed to proceed.    Dixon Sarabia MD   "

## 2021-02-24 ENCOUNTER — APPOINTMENT (OUTPATIENT)
Dept: CT IMAGING | Facility: CLINIC | Age: 56
End: 2021-02-24
Attending: NURSE PRACTITIONER
Payer: COMMERCIAL

## 2021-02-24 ENCOUNTER — HOSPITAL ENCOUNTER (EMERGENCY)
Facility: CLINIC | Age: 56
Discharge: HOME | End: 2021-02-24
Attending: FAMILY MEDICINE | Admitting: FAMILY MEDICINE
Payer: COMMERCIAL

## 2021-02-24 VITALS
RESPIRATION RATE: 18 BRPM | DIASTOLIC BLOOD PRESSURE: 105 MMHG | HEIGHT: 76 IN | SYSTOLIC BLOOD PRESSURE: 160 MMHG | BODY MASS INDEX: 23.14 KG/M2 | HEART RATE: 68 BPM | OXYGEN SATURATION: 96 % | WEIGHT: 190 LBS | TEMPERATURE: 98 F

## 2021-02-24 DIAGNOSIS — R10.84 ABDOMINAL PAIN, GENERALIZED: ICD-10-CM

## 2021-02-24 LAB
ALBUMIN SERPL-MCNC: 4 G/DL (ref 3.4–5)
ALBUMIN UR-MCNC: NEGATIVE MG/DL
ALP SERPL-CCNC: 142 U/L (ref 40–150)
ALT SERPL W P-5'-P-CCNC: 20 U/L (ref 0–70)
ANION GAP SERPL CALCULATED.3IONS-SCNC: 4 MMOL/L (ref 3–14)
APPEARANCE UR: CLEAR
AST SERPL W P-5'-P-CCNC: 11 U/L (ref 0–45)
BASOPHILS # BLD AUTO: 0.1 10E9/L (ref 0–0.2)
BASOPHILS NFR BLD AUTO: 0.4 %
BILIRUB SERPL-MCNC: 0.9 MG/DL (ref 0.2–1.3)
BILIRUB UR QL STRIP: NEGATIVE
BUN SERPL-MCNC: 11 MG/DL (ref 7–30)
CALCIUM SERPL-MCNC: 9.4 MG/DL (ref 8.5–10.1)
CHLORIDE SERPL-SCNC: 108 MMOL/L (ref 94–109)
CO2 SERPL-SCNC: 28 MMOL/L (ref 20–32)
COLOR UR AUTO: YELLOW
CREAT SERPL-MCNC: 0.9 MG/DL (ref 0.66–1.25)
DIFFERENTIAL METHOD BLD: ABNORMAL
EOSINOPHIL # BLD AUTO: 0.1 10E9/L (ref 0–0.7)
EOSINOPHIL NFR BLD AUTO: 0.5 %
ERYTHROCYTE [DISTWIDTH] IN BLOOD BY AUTOMATED COUNT: 13.2 % (ref 10–15)
GFR SERPL CREATININE-BSD FRML MDRD: >90 ML/MIN/{1.73_M2}
GLUCOSE SERPL-MCNC: 113 MG/DL (ref 70–99)
GLUCOSE UR STRIP-MCNC: NEGATIVE MG/DL
HCT VFR BLD AUTO: 51.3 % (ref 40–53)
HGB BLD-MCNC: 16.8 G/DL (ref 13.3–17.7)
HGB UR QL STRIP: NEGATIVE
IMM GRANULOCYTES # BLD: 0 10E9/L (ref 0–0.4)
IMM GRANULOCYTES NFR BLD: 0.2 %
KETONES UR STRIP-MCNC: 5 MG/DL
LEUKOCYTE ESTERASE UR QL STRIP: NEGATIVE
LIPASE SERPL-CCNC: 55 U/L (ref 73–393)
LYMPHOCYTES # BLD AUTO: 1.5 10E9/L (ref 0.8–5.3)
LYMPHOCYTES NFR BLD AUTO: 11.1 %
MCH RBC QN AUTO: 29.3 PG (ref 26.5–33)
MCHC RBC AUTO-ENTMCNC: 32.7 G/DL (ref 31.5–36.5)
MCV RBC AUTO: 90 FL (ref 78–100)
MONOCYTES # BLD AUTO: 0.6 10E9/L (ref 0–1.3)
MONOCYTES NFR BLD AUTO: 4.2 %
MUCOUS THREADS #/AREA URNS LPF: PRESENT /LPF
NEUTROPHILS # BLD AUTO: 11.1 10E9/L (ref 1.6–8.3)
NEUTROPHILS NFR BLD AUTO: 83.6 %
NITRATE UR QL: NEGATIVE
NRBC # BLD AUTO: 0 10*3/UL
NRBC BLD AUTO-RTO: 0 /100
PH UR STRIP: 6 PH (ref 5–7)
PLATELET # BLD AUTO: 306 10E9/L (ref 150–450)
POTASSIUM SERPL-SCNC: 3.9 MMOL/L (ref 3.4–5.3)
PROT SERPL-MCNC: 7.5 G/DL (ref 6.8–8.8)
RBC # BLD AUTO: 5.73 10E12/L (ref 4.4–5.9)
RBC #/AREA URNS AUTO: 1 /HPF (ref 0–2)
SODIUM SERPL-SCNC: 140 MMOL/L (ref 133–144)
SOURCE: ABNORMAL
SP GR UR STRIP: 1.01 (ref 1–1.03)
SQUAMOUS #/AREA URNS AUTO: <1 /HPF (ref 0–1)
UROBILINOGEN UR STRIP-MCNC: 0 MG/DL (ref 0–2)
WBC # BLD AUTO: 13.2 10E9/L (ref 4–11)
WBC #/AREA URNS AUTO: 1 /HPF (ref 0–5)

## 2021-02-24 PROCEDURE — 81001 URINALYSIS AUTO W/SCOPE: CPT | Performed by: NURSE PRACTITIONER

## 2021-02-24 PROCEDURE — 80053 COMPREHEN METABOLIC PANEL: CPT | Performed by: NURSE PRACTITIONER

## 2021-02-24 PROCEDURE — 96375 TX/PRO/DX INJ NEW DRUG ADDON: CPT | Performed by: NURSE PRACTITIONER

## 2021-02-24 PROCEDURE — 74177 CT ABD & PELVIS W/CONTRAST: CPT

## 2021-02-24 PROCEDURE — 99285 EMERGENCY DEPT VISIT HI MDM: CPT | Performed by: NURSE PRACTITIONER

## 2021-02-24 PROCEDURE — 250N000009 HC RX 250: Performed by: NURSE PRACTITIONER

## 2021-02-24 PROCEDURE — 96361 HYDRATE IV INFUSION ADD-ON: CPT | Performed by: NURSE PRACTITIONER

## 2021-02-24 PROCEDURE — 85025 COMPLETE CBC W/AUTO DIFF WBC: CPT | Performed by: NURSE PRACTITIONER

## 2021-02-24 PROCEDURE — 250N000011 HC RX IP 250 OP 636: Performed by: NURSE PRACTITIONER

## 2021-02-24 PROCEDURE — 83690 ASSAY OF LIPASE: CPT | Performed by: NURSE PRACTITIONER

## 2021-02-24 PROCEDURE — 99285 EMERGENCY DEPT VISIT HI MDM: CPT | Mod: 25 | Performed by: NURSE PRACTITIONER

## 2021-02-24 PROCEDURE — 96374 THER/PROPH/DIAG INJ IV PUSH: CPT | Mod: 59 | Performed by: NURSE PRACTITIONER

## 2021-02-24 PROCEDURE — 258N000003 HC RX IP 258 OP 636: Performed by: NURSE PRACTITIONER

## 2021-02-24 RX ORDER — HYDROMORPHONE HYDROCHLORIDE 1 MG/ML
0.5 INJECTION, SOLUTION INTRAMUSCULAR; INTRAVENOUS; SUBCUTANEOUS ONCE
Status: COMPLETED | OUTPATIENT
Start: 2021-02-24 | End: 2021-02-24

## 2021-02-24 RX ORDER — ONDANSETRON 2 MG/ML
4 INJECTION INTRAMUSCULAR; INTRAVENOUS ONCE
Status: COMPLETED | OUTPATIENT
Start: 2021-02-24 | End: 2021-02-24

## 2021-02-24 RX ORDER — IOPAMIDOL 755 MG/ML
100 INJECTION, SOLUTION INTRAVASCULAR ONCE
Status: COMPLETED | OUTPATIENT
Start: 2021-02-24 | End: 2021-02-24

## 2021-02-24 RX ADMIN — IOPAMIDOL 100 ML: 755 INJECTION, SOLUTION INTRAVENOUS at 11:47

## 2021-02-24 RX ADMIN — ONDANSETRON 4 MG: 2 INJECTION INTRAMUSCULAR; INTRAVENOUS at 11:04

## 2021-02-24 RX ADMIN — HYDROMORPHONE HYDROCHLORIDE 0.5 MG: 1 INJECTION, SOLUTION INTRAMUSCULAR; INTRAVENOUS; SUBCUTANEOUS at 11:12

## 2021-02-24 RX ADMIN — SODIUM CHLORIDE 1000 ML: 9 INJECTION, SOLUTION INTRAVENOUS at 11:04

## 2021-02-24 RX ADMIN — SODIUM CHLORIDE 74 ML: 9 INJECTION, SOLUTION INTRAVENOUS at 11:47

## 2021-02-24 RX ADMIN — HYDROMORPHONE HYDROCHLORIDE 0.5 MG: 1 INJECTION, SOLUTION INTRAMUSCULAR; INTRAVENOUS; SUBCUTANEOUS at 11:04

## 2021-02-24 ASSESSMENT — ENCOUNTER SYMPTOMS
DIARRHEA: 0
ABDOMINAL PAIN: 1
MYALGIAS: 0
FATIGUE: 0
VOMITING: 0
FEVER: 0
HEMATURIA: 0
FLANK PAIN: 0
DYSURIA: 0
NUMBNESS: 0
SHORTNESS OF BREATH: 0
ARTHRALGIAS: 0
LIGHT-HEADEDNESS: 0
CHILLS: 0
DIZZINESS: 0
JOINT SWELLING: 0
NAUSEA: 1
WEAKNESS: 0
HEADACHES: 0
CONSTIPATION: 0
COUGH: 0

## 2021-02-24 ASSESSMENT — MIFFLIN-ST. JEOR: SCORE: 1798.33

## 2021-02-24 NOTE — ED NOTES
Assumed care of pt who came in reporting nausea & L-lower abd quad / suprapubic pain now 7/10 that started this AM. He denies any blood in his stool, fever, SOB, chest pain, or diarrhea and is holding his abd, being unable to sit still moaning loudly appearing to be in moderate pain.

## 2021-02-24 NOTE — ED PROVIDER NOTES
History     Chief Complaint   Patient presents with     Abdominal Pain     has been seen for this before; sharp abd pain started this am; Hx of diverticulitis, feels similar     HPI  Micah Nunez is a 55 year old male with a history of chemical dependency of cocaine ~10 years ago, diverticulitis, and hypertension who presents to the emergency department for evaluation of sudden onset lower abdominal pain. Pain began about 2 hours ago and is an intense, sharp and stabbing pain to the suprapubic and LLQ areas. Accompanying nausea, no vomiting. Denies fever, headache, cough, chest pain, flank pain, diarrhea, dysuria or hematuria. No medications prior to arrival. Feels this is similar to other diverticulitis flares in the past. Smokes 1 ppd, occasional alcohol use, denies drug use.     Allergies:  No Known Allergies    Problem List:    Patient Active Problem List    Diagnosis Date Noted     Benign essential hypertension 10/18/2017     Priority: Medium     Tobacco use disorder 08/12/2015     Priority: Medium     Chemical dependency (H) 07/27/2015     Priority: Medium     Papilloma of nose 03/10/2014     Priority: Medium     Nasal mass 12/17/2013     Priority: Medium     CARDIOVASCULAR SCREENING; LDL GOAL LESS THAN 130 12/11/2013     Priority: Medium      Past Medical History:    Past Medical History:   Diagnosis Date     Benign essential hypertension 10/18/2017     Bleeding disorder (H)      CARDIOVASCULAR SCREENING; LDL GOAL LESS THAN 130 12/11/2013     Chemical dependency (H)      Gastroesophageal reflux disease      Hypertension      Nasal mass 12/17/2013     Papilloma of nose 3/10/2014     Skin disease      Past Surgical History:    Past Surgical History:   Procedure Laterality Date     ARTHRODESIS FOOT Left 2/17/2015    Procedure: ARTHRODESIS FOOT;  Surgeon: Cortez Hayward DPM;  Location: [facility]     ARTHRODESIS TOE(S)  12/20/2013    Procedure: ARTHRODESIS TOE(S);  Arthrodesis first  metatarsophalangeal joint left foot;  Surgeon: Cortez Hayward DPM;  Location: [facility]     COLONOSCOPY N/A 1/12/2021    Procedure: COLONOSCOPY;  Surgeon: Dixon Sarabia MD;  Location: [facility]     ENDOSCOPIC SINUS SURGERY  1/6/2014    Procedure: ENDOSCOPIC SINUS SURGERY;  Functional Endoscopic Sinus Surgery;  Surgeon: Bobo Renteria MD;  Location:  OR     ENDOSCOPIC SINUS SURGERY  7/21/2014    Procedure: ENDOSCOPIC SINUS SURGERY;  Surgeon: Bobo Renteria MD;  Location:  OR     ENDOSCOPIC SINUS SURGERY N/A 4/6/2015    Procedure: ENDOSCOPIC SINUS SURGERY;  Surgeon: Bobo Renteria MD;  Location:  OR     ENDOSCOPIC SINUS SURGERY N/A 8/17/2015    Procedure: ENDOSCOPIC SINUS SURGERY;  Surgeon: Bobo Renteria MD;  Location:  OR     ENDOSCOPIC SINUS SURGERY Bilateral 8/19/2019    Procedure: Bilateral Functional Endoscopic Sinus Surgery, Right Nasal Endoscopy and Excision of Left Nasal Mass;  Surgeon: Bobo Renteria MD;  Location:  OR     ENT SURGERY       EXCISE NASAL MASS Left 11/6/2017    Procedure: EXCISE NASAL MASS;  Excision of Left Nasal Papilloma;  Surgeon: Bobo Renteria MD;  Location:  OR     LAPAROSCOPIC HERNIORRHAPHY INGUINAL BILATERAL Bilateral 4/12/2017    Procedure: LAPAROSCOPIC HERNIORRHAPHY INGUINAL BILATERAL;  Surgeon: Shay Mercado MD;  Location: [facility]     NASAL ENDOSCOPY Bilateral 2/6/2017    Procedure: NASAL ENDOSCOPY;  Surgeon: Bobo Renteria MD;  Location:  OR     NASAL ENDOSCOPY N/A 5/21/2018    Procedure: NASAL ENDOSCOPY;  Nasal Endoscopy, CO2 Laser Excision of Left Nasal Papilloma;  Surgeon: Bobo Renteria MD;  [facility]     NASAL/SINUS POLYPECTOMY       PE TUBES       Family History:    Family History   Problem Relation Age of Onset     Diabetes Mother 40     C.A.D. Father 72     Unknown/Adopted No family hx of      Depression No family hx of      Anxiety Disorder No family hx of      Schizophrenia No family hx of       "Bipolar Disorder No family hx of      Suicide No family hx of      Substance Abuse No family hx of      Dementia No family hx of      Homero Disease No family hx of      Parkinsonism No family hx of      Autism Spectrum Disorder No family hx of      Intellectual Disability (Mental Retardation) No family hx of      Mental Illness No family hx of      Bleeding Disorder No family hx of      Social History:  Marital Status:  Single [1]  Social History     Tobacco Use     Smoking status: Current Every Day Smoker     Packs/day: 0.50     Years: 40.00     Pack years: 20.00     Types: Cigarettes     Start date: 1/1/1980     Smokeless tobacco: Never Used     Tobacco comment: 1 pack/day   Substance Use Topics     Alcohol use: Yes     Comment: occasional      Drug use: Not Currently     Comment: 7 years quit Cocaine/methamphetamines      Medications:    ciprofloxacin (CIPRO) 500 MG tablet  lisinopril-hydrochlorothiazide (ZESTORETIC) 10-12.5 MG tablet  order for DME  oxyCODONE (ROXICODONE) 5 MG tablet  triamcinolone (KENALOG) 0.1 % external ointment      Review of Systems   Constitutional: Negative for chills, fatigue and fever.   HENT: Negative.    Respiratory: Negative for cough and shortness of breath.    Cardiovascular: Negative for chest pain.   Gastrointestinal: Positive for abdominal pain and nausea. Negative for constipation, diarrhea and vomiting.   Genitourinary: Negative for dysuria, flank pain and hematuria.   Musculoskeletal: Negative for arthralgias, joint swelling and myalgias.   Skin: Negative for rash.   Neurological: Negative for dizziness, weakness, light-headedness, numbness and headaches.   All other systems reviewed and are negative.    Physical Exam   BP: (unable to obtain BP due to rocking back and forth)  Pulse: 67  Temp: 97  F (36.1  C)  Resp: 16  Height: 193 cm (6' 4\")  Weight: 86.2 kg (190 lb)  SpO2: 100 %    Physical Exam  Constitutional:       General: He is in acute distress.      Appearance: " He is well-developed. He is not diaphoretic.   HENT:      Head: Normocephalic.   Eyes:      Conjunctiva/sclera: Conjunctivae normal.      Pupils: Pupils are equal, round, and reactive to light.   Neck:      Musculoskeletal: Normal range of motion and neck supple.   Cardiovascular:      Rate and Rhythm: Normal rate and regular rhythm.      Pulses: Normal pulses.   Pulmonary:      Effort: Pulmonary effort is normal. No respiratory distress.      Breath sounds: Normal breath sounds and air entry. No decreased air movement. No decreased breath sounds, wheezing or rhonchi.   Abdominal:      General: There is no distension.      Palpations: Abdomen is soft.      Tenderness: There is abdominal tenderness in the suprapubic area and left lower quadrant.   Musculoskeletal: Normal range of motion.   Skin:     General: Skin is warm.      Capillary Refill: Capillary refill takes less than 2 seconds.   Neurological:      General: No focal deficit present.      Mental Status: He is alert and oriented to person, place, and time.   Psychiatric:         Mood and Affect: Mood normal.       ED Course        Procedures    Results for orders placed or performed during the hospital encounter of 02/24/21 (from the past 24 hour(s))   CBC with platelets, differential   Result Value Ref Range    WBC 13.2 (H) 4.0 - 11.0 10e9/L    RBC Count 5.73 4.4 - 5.9 10e12/L    Hemoglobin 16.8 13.3 - 17.7 g/dL    Hematocrit 51.3 40.0 - 53.0 %    MCV 90 78 - 100 fl    MCH 29.3 26.5 - 33.0 pg    MCHC 32.7 31.5 - 36.5 g/dL    RDW 13.2 10.0 - 15.0 %    Platelet Count 306 150 - 450 10e9/L    Diff Method Automated Method     % Neutrophils 83.6 %    % Lymphocytes 11.1 %    % Monocytes 4.2 %    % Eosinophils 0.5 %    % Basophils 0.4 %    % Immature Granulocytes 0.2 %    Nucleated RBCs 0 0 /100    Absolute Neutrophil 11.1 (H) 1.6 - 8.3 10e9/L    Absolute Lymphocytes 1.5 0.8 - 5.3 10e9/L    Absolute Monocytes 0.6 0.0 - 1.3 10e9/L    Absolute Eosinophils 0.1 0.0 -  0.7 10e9/L    Absolute Basophils 0.1 0.0 - 0.2 10e9/L    Abs Immature Granulocytes 0.0 0 - 0.4 10e9/L    Absolute Nucleated RBC 0.0    Comprehensive metabolic panel   Result Value Ref Range    Sodium 140 133 - 144 mmol/L    Potassium 3.9 3.4 - 5.3 mmol/L    Chloride 108 94 - 109 mmol/L    Carbon Dioxide 28 20 - 32 mmol/L    Anion Gap 4 3 - 14 mmol/L    Glucose 113 (H) 70 - 99 mg/dL    Urea Nitrogen 11 7 - 30 mg/dL    Creatinine 0.90 0.66 - 1.25 mg/dL    GFR Estimate >90 >60 mL/min/[1.73_m2]    GFR Estimate If Black >90 >60 mL/min/[1.73_m2]    Calcium 9.4 8.5 - 10.1 mg/dL    Bilirubin Total 0.9 0.2 - 1.3 mg/dL    Albumin 4.0 3.4 - 5.0 g/dL    Protein Total 7.5 6.8 - 8.8 g/dL    Alkaline Phosphatase 142 40 - 150 U/L    ALT 20 0 - 70 U/L    AST 11 0 - 45 U/L   Lipase   Result Value Ref Range    Lipase 55 (L) 73 - 393 U/L   CT Abdomen Pelvis w Contrast    Narrative    CT ABDOMEN PELVIS W CONTRAST 2/24/2021 12:13 PM    CLINICAL HISTORY: Diverticulitis suspected; Nausea/vomiting    TECHNIQUE: CT scan of the abdomen and pelvis was performed following  injection of IV contrast. Multiplanar reformats were obtained. Dose  reduction techniques were used.  CONTRAST: 100 mL Isovue 370    COMPARISON: 11/8/2020    FINDINGS:   LOWER CHEST: Diffuse interlobular septal thickening at the lung bases  could represent pulmonary edema, similar in appearance to 11/8/2020.  Mild linear and dependent atelectasis at the lung bases. No pleural or  pericardial effusions.    HEPATOBILIARY: Normal.    PANCREAS: Normal.    SPLEEN: Normal.    ADRENAL GLANDS: Normal.    KIDNEYS/BLADDER: No renal masses. Stable mild cortical thinning at the  lower pole of the left kidney, likely the sequela of prior  infection/infarct. No hydronephrosis. Excreted contrast in the renal  collecting systems and ureters without filling defects.    BOWEL: Small hiatal hernia. Normal caliber loops of small bowel and  colon. Extensive sigmoid diverticulosis. No superimposed  inflammatory  changes in the colon. Previously discussed heterogeneous thickening of  the cecum is not well evaluated due to incomplete distention and  presence of stool and was better assessed at recent colonoscopy in  January, 2021. Previously seen mild inflammatory changes around the  proximal sigmoid colon are not definitively visualized and the  stranding in this region may be due to the presence of a left inguinal  hernia mesh. Decompressed rectum. Bilateral inguinal hernia repair  with mesh.    PELVIC ORGANS: No pelvic masses.    ADDITIONAL FINDINGS: No free fluid, intra-abdominal fluid collections  or free air. No abdominal aortic aneurysm. No lymphadenopathy.    MUSCULOSKELETAL: Unremarkable.      Impression    IMPRESSION:   1.  No definite acute findings in the abdomen and pelvis. Sigmoid  diverticulosis. No significant CT evident inflammatory changes around  the sigmoid colon. No abscess or perforation.  2.  Diffuse interlobular septal thickening at the lung bases may be  due to a limited edema, not significantly changed since 11/8/2020.    NIDA ALEXANDER MD   UA with Microscopic reflex to Culture    Specimen: Midstream Urine   Result Value Ref Range    Color Urine Yellow     Appearance Urine Clear     Glucose Urine Negative NEG^Negative mg/dL    Bilirubin Urine Negative NEG^Negative    Ketones Urine 5 (A) NEG^Negative mg/dL    Specific Gravity Urine 1.015 1.003 - 1.035    Blood Urine Negative NEG^Negative    pH Urine 6.0 5.0 - 7.0 pH    Protein Albumin Urine Negative NEG^Negative mg/dL    Urobilinogen mg/dL 0.0 0.0 - 2.0 mg/dL    Nitrite Urine Negative NEG^Negative    Leukocyte Esterase Urine Negative NEG^Negative    Source Midstream Urine     WBC Urine 1 0 - 5 /HPF    RBC Urine 1 0 - 2 /HPF    Squamous Epithelial /HPF Urine <1 0 - 1 /HPF    Mucous Urine Present (A) NEG^Negative /LPF     Medications   0.9% sodium chloride BOLUS (0 mLs Intravenous Stopped 2/24/21 1200)   ondansetron (ZOFRAN) injection 4 mg  (4 mg Intravenous Given 2/24/21 1104)   HYDROmorphone (PF) (DILAUDID) injection 0.5 mg (0.5 mg Intravenous Given 2/24/21 1104)   iopamidol (ISOVUE-370) solution 100 mL (100 mLs Intravenous Given 2/24/21 1147)   sodium chloride 0.9 % bag 500mL for CT scan flush use (74 mLs Intravenous Given 2/24/21 1147)   HYDROmorphone (PF) (DILAUDID) injection 0.5 mg (0.5 mg Intravenous Given 2/24/21 1112)     Assessments & Plan (with Medical Decision Making)   Micah Nunez is a 55 year old male with a history of chemical dependency of cocaine ~10 years ago, diverticulitis, and hypertension who presents to the emergency department for evaluation of sudden onset lower abdominal pain. Pain began about 2 hours ago and is an intense, sharp and stabbing pain to the suprapubic and LLQ areas. Accompanying nausea, no vomiting.     Patient visibly uncomfortable during exam. Slightly hypertensive, other vitals remain normal. Physical exam as above. White count slightly elevated at 13.2 with left shift. Remaining CBC, CMP and lipase normal. Pain improved with Dilaudid dosing. Urinalysis without sign of infection. No sign of diverticulitis on CT scan. Unchanged diffuse interlobular septal thickening at the lung bases. Discussed reassuring workup with patient. He would like GI referral at this time and this was placed. Discussed home symptom management including bowel rest/bland diet, increase rest and hydration, OTC medications as needed and appropriate. Return to the ED with new or worsening symptoms. Follow up with PCP as planned. Patient is agreeable to plan of care and comfortable with discharge plan. Discharged in good condition.     I have reviewed the nursing notes.    I have reviewed the findings, diagnosis, plan and need for follow up with the patient.  Discharge Medication List as of 2/24/2021  1:07 PM        Final diagnoses:   Abdominal pain, generalized     2/24/2021   [facility] EMERGENCY DEPT     Contreras,  Angella Jasmine, APRN CNP  02/24/21 9831

## 2021-02-26 ENCOUNTER — TELEPHONE (OUTPATIENT)
Dept: GASTROENTEROLOGY | Facility: CLINIC | Age: 56
End: 2021-02-26

## 2021-02-26 NOTE — TELEPHONE ENCOUNTER
[facility] Call Center    Phone Message    May a detailed message be left on voicemail: yes     Reason for Call: Other: Referral received to GI for Pt, Pt said he's been in and out of the ER for the last 6-8 months and he is wondering if he can have an in-clinic appointment instead of a video visit because he doesn't think that would help much right now, please contact Pt at number provided, thanks!     Action Taken: Other: GI    Travel Screening: Not Applicable

## 2021-03-01 NOTE — TELEPHONE ENCOUNTER
REFERRAL INFORMATION:    Referring Provider:  NEHEMIAS Turner CNP     Referring Clinic:  [facility] ER    Reason for Visit/Diagnosis: Diverticulitis      FUTURE VISIT INFORMATION:    Appointment Date: 3/2/2021    Appointment Time: 6 PM      NOTES STATUS DETAILS   OFFICE NOTE from Referring Provider Internal 2/24/2021 ER   OFFICE NOTE from Other Specialist Internal 11/20/2020, 4/4/18 Office visit with Dr. Yovany White ([facility])      HOSPITAL DISCHARGE SUMMARY/  ED VISITS Internal 2/24/2021, 11/8/2020 ([facility])    OPERATIVE REPORT N/A    MEDICATION LIST Internal         ENDOSCOPY  N/A    COLONOSCOPY Internal 1/21/2021   ERCP N/A    EUS N/A    STOOL TESTING N/A    PERTINENT LABS Internal    PATHOLOGY REPORTS (RELATED) N/A    IMAGING (CT, MRI, EGD, MRCP, Small Bowel Follow Through/SBT, MR/CT Enterography) Internal CT Abdomen Pelvis: 2/24/2021, 11/8/2020, 8/20/2020

## 2021-03-02 ENCOUNTER — VIRTUAL VISIT (OUTPATIENT)
Dept: GASTROENTEROLOGY | Facility: CLINIC | Age: 56
End: 2021-03-02
Payer: COMMERCIAL

## 2021-03-02 ENCOUNTER — PRE VISIT (OUTPATIENT)
Dept: GASTROENTEROLOGY | Facility: CLINIC | Age: 56
End: 2021-03-02

## 2021-03-02 VITALS — WEIGHT: 190 LBS | HEIGHT: 76 IN | BODY MASS INDEX: 23.14 KG/M2

## 2021-03-02 DIAGNOSIS — R10.32 ABDOMINAL PAIN, LEFT LOWER QUADRANT: ICD-10-CM

## 2021-03-02 DIAGNOSIS — K59.00 CONSTIPATION, UNSPECIFIED CONSTIPATION TYPE: Primary | ICD-10-CM

## 2021-03-02 PROCEDURE — 99204 OFFICE O/P NEW MOD 45 MIN: CPT | Mod: GT | Performed by: PHYSICIAN ASSISTANT

## 2021-03-02 RX ORDER — DICYCLOMINE HYDROCHLORIDE 10 MG/1
10 CAPSULE ORAL 4 TIMES DAILY PRN
Qty: 30 CAPSULE | Refills: 0 | Status: SHIPPED | OUTPATIENT
Start: 2021-03-02 | End: 2021-04-13

## 2021-03-02 RX ORDER — POLYETHYLENE GLYCOL 3350 17 G/17G
1 POWDER, FOR SOLUTION ORAL DAILY
Qty: 507 G | Refills: 4 | Status: ON HOLD | OUTPATIENT
Start: 2021-03-02 | End: 2021-11-29

## 2021-03-02 ASSESSMENT — PAIN SCALES - GENERAL: PAINLEVEL: NO PAIN (0)

## 2021-03-02 ASSESSMENT — MIFFLIN-ST. JEOR: SCORE: 1798.33

## 2021-03-02 NOTE — NURSING NOTE
"Chief Complaint   Patient presents with     Consult     virtual consult       Vitals:    03/02/21 1712   Weight: 86.2 kg (190 lb)   Height: 1.93 m (6' 4\")       Body mass index is 23.13 kg/m .      QUE AngT                      "

## 2021-03-02 NOTE — PROGRESS NOTES
"Micah Nunez is a 55 year old male who is being evaluated via a billable video visit.      The patient has been notified of following:     \"This video visit will be conducted via a call between you and your physician/provider. We have found that certain health care needs can be provided without the need for an in-person physical exam.  This service lets us provide the care you need with a video conversation.  If a prescription is necessary we can send it directly to your pharmacy.  If lab work is needed we can place an order for that and you can then stop by our lab to have the test done at a later time.    If during the course of the call the physician/provider feels a video visit is not appropriate, you will not be charged for this service.\"     Patient confirmed that they are in [place] for today's visit yes    If the video visit is dropped, please send link to:   Text to cell phone: [phone]    Video-Visit Details  Type of service:  Video Visit    Video Start Time: 6:07 PM  20 minute video, 9 minute phone call     Originating Location (pt. Location): Home    Distant Location (provider location):  [facility]STRO[facility] [place]     Platform used: [employer]            "

## 2021-03-02 NOTE — LETTER
3/2/2021         RE: Micah Nunez  [address]  [facility] [address]        Dear Colleague,    Thank you for referring your patient, Micah Nunez, to the [facility] [place]. Please see a copy of my visit note below.    GI CLINIC VISIT    CC/REFERRING MD:  No ref. provider found  REASON FOR CONSULTATION: LLQ abdominal pain     ASSESSMENT/PLAN:  Micah Nunez is a 55-year-old male with a past medical history of chemical dependency and cocaine 10 years ago, diverticulitis, hypertension, who presents for follow-up for abdominal pain.    1. LLQ abdominal pain episodes   The patient presents today for episodes of severe left lower quadrant abdominal pain which have been occurring about once a month for the past several months prompting him to seek care multiple times at the emergency department.  There he has had labs that do not explain pain, multiple CT scans.  CT from 11/8/2020 showed segmental mural thickening in the descending sigmoid colon with minimal adjacent stranding not around diverticula which per radiology distribution is seen with ischemic colitis, infectious or inflammatory colitis or diverticulitis.  He was treated with antibiotics at that time.  Colonoscopy without any evidence of thickening of the rectum or IBD.  Most recent CT scan from 2/24/2021 without evidence of acute diverticulitis.  Differential for symptoms includes mesenteric ischemia (of note the patient smokes about 1 pack/day), smoldering diverticulitis, postsurgical abdominal pain, urologic issue, constipation.  We will plan to evaluate with CTA abdomen and pelvis.  Would also recommend trial of MiraLAX on with 1 capful a day.  Can increase or decrease dose as needed.  Can also try Bentyl as needed if he has another sign of abdominal pain.  Future considerations include referral to surgery as the patient reports symptoms started after hernia repair.  --CTA abdomen and  pelvis  --Take Bentyl as needed for abdominal pain  -- Miralax can be used to treat constipation and works by increasing the amount of water in the stool. Start with 1 caps in 8 oz of water. You may increase or decrease dose as needed     Colorectal cancer screenin    [facility] 6-8 weeks     Thank you for this consultation.  It was a pleasure to participate in the care of this patient; please contact us with any further questions.     55 Minutes was spent on the date of the encounter during chart review, history and exam, documentation, and further activities as noted     Note completed using voice recognition software. Some word and grammatical errors may occur.    Chaya Contreras PA-C  Division of Gastroenterology, Hepatology & Nutrition  [facility]        HPI  Micah Nunez is a 55-year-old male with a past medical history of chemical dependency and cocaine 10 years ago, diverticulitis, hypertension, who presents for follow-up for abdominal pain.  He is new to the [facility] GI clinic and this is my first encounter with the patient.    Patient reports in the last year he has been seen in the emergency department 6-9 times for severe abdominal pain. Does report that he has had a hernia repair about 2 years ago and has had some medical complications. Had episode of urinary retention and hematuria requiring catheter and evaluation with urology (Dr. Carr, 6/10/20).  About once a month he will have severe abdominal pain, can prompt vomiting (due to severity of pain). He describes left sided abdominal pain which can gradually come on in the morning. Bowel movements have been less regular, he can skip up to three days without a bowel movement. No diarrhea.  IV pain medications do alleviate the pain.      Notes that he has not been taking blood pressure medications.     Patient was seen in the emergency department multiple times for this abdominal pain.  When he was there 2020,  he had a CT scan showing segmental mural thickening at the junction of the descending sigmoid colon with distribution seen with ischemic colitis though infectious/inflammatory/diverticulitis possibilities.  Was treated with ciprofloxacin and Flagyl in addition to pain medications.  Was also found to have a regular centric mural thickening of the rectum and thickening of the cecumOf note, patient had colonoscopy 1/12/2021 with diverticulosis in the sigmoid colon, otherwise unremarkable examined colon.    Most recently, the patient was seen in the [facility] emergency department 2/24/2021 reporting sudden onset lower abdominal pain described as sharp and stabbing associated with nausea without vomiting.  White count slightly elevated at 13.2, remaining CBC CMP lipase normal.  CT without acute diverticulitis or source of symptoms.    He does smoke cigarettes.     ROS:    No fevers or chills  + weight loss- has lost a couple of lbs   No blurry vision, double vision or change in vision  No sore throat  No lymphadenopathy  No headache, paraesthesias, or weakness in a limb  No shortness of breath or wheezing  No chest pain or pressure  No arthralgias or myalgias  No rashes or skin changes  No odynophagia or dysphagia  No BRBPR, hematochezia, melena  No dysuria, frequency or urgency  No hot/cold intolerance or polyria  No anxiety or depression    PROBLEM LIST  Patient Active Problem List    Diagnosis Date Noted     Benign essential hypertension 10/18/2017     Priority: Medium     Tobacco use disorder 08/12/2015     Priority: Medium     Chemical dependency (H) 07/27/2015     Priority: Medium     Papilloma of nose 03/10/2014     Priority: Medium     Nasal mass 12/17/2013     Priority: Medium     CARDIOVASCULAR SCREENING; LDL GOAL LESS THAN 130 12/11/2013     Priority: Medium       PERTINENT PAST MEDICAL HISTORY:  Past Medical History:   Diagnosis Date     Benign essential hypertension 10/18/2017     Bleeding  disorder (H)      CARDIOVASCULAR SCREENING; LDL GOAL LESS THAN 130 12/11/2013     Chemical dependency (H)      Gastroesophageal reflux disease      Hypertension      Nasal mass 12/17/2013     Papilloma of nose 3/10/2014     Skin disease        PREVIOUS SURGERIES:  Past Surgical History:   Procedure Laterality Date     ARTHRODESIS FOOT Left 2/17/2015    Procedure: ARTHRODESIS FOOT;  Surgeon: Cortez Hayward DPM;  Location: [facility] OR     ARTHRODESIS TOE(S)  12/20/2013    Procedure: ARTHRODESIS TOE(S);  Arthrodesis first metatarsophalangeal joint left foot;  Surgeon: Cortez Hayward DPM;  Location: [facility] OR     COLONOSCOPY N/A 1/12/2021    Procedure: COLONOSCOPY;  Surgeon: Dixon Sarabia MD;  Location: [facility] GI     ENDOSCOPIC SINUS SURGERY  1/6/2014    Procedure: ENDOSCOPIC SINUS SURGERY;  Functional Endoscopic Sinus Surgery;  Surgeon: Bobo Renteria MD;  Location:  OR     ENDOSCOPIC SINUS SURGERY  7/21/2014    Procedure: ENDOSCOPIC SINUS SURGERY;  Surgeon: Bobo Renteria MD;  Location:  OR     ENDOSCOPIC SINUS SURGERY N/A 4/6/2015    Procedure: ENDOSCOPIC SINUS SURGERY;  Surgeon: Bobo Renteria MD;  Location:  OR     ENDOSCOPIC SINUS SURGERY N/A 8/17/2015    Procedure: ENDOSCOPIC SINUS SURGERY;  Surgeon: Bobo Renteria MD;  Location:  OR     ENDOSCOPIC SINUS SURGERY Bilateral 8/19/2019    Procedure: Bilateral Functional Endoscopic Sinus Surgery, Right Nasal Endoscopy and Excision of Left Nasal Mass;  Surgeon: Bobo Renteria MD;  Location:  OR     ENT SURGERY       EXCISE NASAL MASS Left 11/6/2017    Procedure: EXCISE NASAL MASS;  Excision of Left Nasal Papilloma;  Surgeon: Bobo Renteria MD;  Location:  OR     LAPAROSCOPIC HERNIORRHAPHY INGUINAL BILATERAL Bilateral 4/12/2017    Procedure: LAPAROSCOPIC HERNIORRHAPHY INGUINAL BILATERAL;  Surgeon: Shay Mercado MD;  Location: [facility] OR     NASAL ENDOSCOPY Bilateral 2/6/2017    Procedure: NASAL ENDOSCOPY;  Surgeon:  Bobo Renteria MD;  Location: [facility]     NASAL ENDOSCOPY N/A 5/21/2018    Procedure: NASAL ENDOSCOPY;  Nasal Endoscopy, CO2 Laser Excision of Left Nasal Papilloma;  Surgeon: Bobo Renteria MD;  Location: [facility] OR     NASAL/SINUS POLYPECTOMY       PE TUBES         PREVIOUS ENDOSCOPY:  Colonoscopy     ALLERGIES:   No Known Allergies    PERTINENT MEDICATIONS:    Current Outpatient Medications:      lisinopril-hydrochlorothiazide (ZESTORETIC) 10-12.5 MG tablet, Take 1 tablet by mouth daily, Disp: 90 tablet, Rfl: 3     order for DME, Equipment being ordered: Dynaflex insert, Disp: 2 Units, Rfl: 0     triamcinolone (KENALOG) 0.1 % external ointment, APPLY SPARINGLY TO AFFECTED AREA THREE TIMES A DAY FOR 14 DAYS, Disp: 80 g, Rfl: 1     ciprofloxacin (CIPRO) 500 MG tablet, Take 1 tablet (500 mg) by mouth 2 times daily (Patient not taking: Reported on 11/20/2020), Disp: 14 tablet, Rfl: 0     oxyCODONE (ROXICODONE) 5 MG tablet, Take 1 tablet (5 mg) by mouth every 6 hours as needed for pain (Patient not taking: Reported on 11/20/2020), Disp: 12 tablet, Rfl: 0  No current facility-administered medications for this visit.     Facility-Administered Medications Ordered in Other Visits:      Self Administer Medications: Behavioral Services, , Does not apply, See Admin Instructions, Brunilda Jorgensen, [facility]  2 advil once a week     SOCIAL HISTORY:  Alcohol- a couple of times a week  Cigarettes (1 ppd)   Social History     Socioeconomic History     Marital status: Single     Spouse name: Not on file     Number of children: Not on file     Years of education: Not on file     Highest education level: Not on file   Occupational History     Not on file   Social Needs     Financial resource strain: Not on file     Food insecurity     Worry: Not on file     Inability: Not on file     Transportation needs     Medical: Not on file     Non-medical: Not on file   Tobacco Use     Smoking status: Current Every Day Smoker     Packs/day:  0.50     Years: 40.00     Pack years: 20.00     Types: Cigarettes     Start date: 1/1/1980     Smokeless tobacco: Never Used     Tobacco comment: 1 pack/day   Substance and Sexual Activity     Alcohol use: Yes     Comment: occasional      Drug use: Not Currently     Comment: 7 years quit Cocaine/methamphetamines     Sexual activity: Yes     Partners: Female     Birth control/protection: None   Lifestyle     Physical activity     Days per week: Not on file     Minutes per session: Not on file     Stress: Not on file   Relationships     Social connections     Talks on phone: Not on file     Gets together: Not on file     Attends Anabaptism service: Not on file     Active member of club or organization: Not on file     Attends meetings of clubs or organizations: Not on file     Relationship status: Not on file     Intimate partner violence     Fear of current or ex partner: Not on file     Emotionally abused: Not on file     Physically abused: Not on file     Forced sexual activity: Not on file   Other Topics Concern     Parent/sibling w/ CABG, MI or angioplasty before 65F 55M? No      Service No     Blood Transfusions No     Caffeine Concern No     Occupational Exposure No     Hobby Hazards No     Sleep Concern No     Stress Concern No     Weight Concern No     Special Diet No     Back Care No     Exercise Yes     Bike Helmet No     Seat Belt Yes     Self-Exams Not Asked   Social History Narrative     Not on file       FAMILY HISTORY:  FH of CRC: none   FH of IBD: none   Family History   Problem Relation Age of Onset     Diabetes Mother 40     C.A.D. Father 72     Unknown/Adopted No family hx of      Depression No family hx of      Anxiety Disorder No family hx of      Schizophrenia No family hx of      Bipolar Disorder No family hx of      Suicide No family hx of      Substance Abuse No family hx of      Dementia No family hx of      Lyon Disease No family hx of      Parkinsonism No family hx of       Autism Spectrum Disorder No family hx of      Intellectual Disability (Mental Retardation) No family hx of      Mental Illness No family hx of      Bleeding Disorder No family hx of        Past/family/social history reviewed and no changes    PHYSICAL EXAMINATION:  Constitutional: aaox3, cooperative, pleasant, not dyspneic/diaphoretic, no acute distress  Vitals reviewed: There were no vitals taken for this visit.  Wt:   Wt Readings from Last 2 Encounters:   02/24/21 86.2 kg (190 lb)   01/12/21 83.9 kg (185 lb)   General appearance: Healthy appearing adult, in no acute distress  Eyes: Sclera anicteric  Ears, nose, mouth and throat: No obvious external lesions of ears and nose, Hearing intact  Neck: Symmetric, No obvious external lesions  Respiratory: Normal respiration, no use of accessory muscles   Skin: No rashes or jaundice   Psychiatric: Oriented to person, place and time, Appropriate mood and affect.     PERTINENT STUDIES:  Allied Health/Nurse Visit on 11/24/2020   Component Date Value Ref Range Status     COVID-19 Virus PCR to [facility] - So* 11/24/2020 Nasopharyngeal   Final     COVID-19 Virus PCR to [facility] - Re* 11/24/2020 Detected, Abnormal Result*  Final     Narrative      CT ABDOMEN PELVIS W CONTRAST 2/24/2021 12:13 PM     CLINICAL HISTORY: Diverticulitis suspected; Nausea/vomiting     TECHNIQUE: CT scan of the abdomen and pelvis was performed following  injection of IV contrast. Multiplanar reformats were obtained. Dose  reduction techniques were used.  CONTRAST: 100 mL Isovue 370     COMPARISON: 11/8/2020     FINDINGS:   LOWER CHEST: Diffuse interlobular septal thickening at the lung bases  could represent pulmonary edema, similar in appearance to 11/8/2020.  Mild linear and dependent atelectasis at the lung bases. No pleural or  pericardial effusions.     HEPATOBILIARY: Normal.     PANCREAS: Normal.     SPLEEN: Normal.     ADRENAL GLANDS: Normal.     KIDNEYS/BLADDER: No renal masses. Stable mild cortical  "thinning at the  lower pole of the left kidney, likely the sequela of prior  infection/infarct. No hydronephrosis. Excreted contrast in the renal  collecting systems and ureters without filling defects.     BOWEL: Small hiatal hernia. Normal caliber loops of small bowel and  colon. Extensive sigmoid diverticulosis. No superimposed inflammatory  changes in the colon. Previously discussed heterogeneous thickening of  the cecum is not well evaluated due to incomplete distention and  presence of stool and was better assessed at recent colonoscopy in  January, 2021. Previously seen mild inflammatory changes around the  proximal sigmoid colon are not definitively visualized and the  stranding in this region may be due to the presence of a left inguinal  hernia mesh. Decompressed rectum. Bilateral inguinal hernia repair  with mesh.     PELVIC ORGANS: No pelvic masses.     ADDITIONAL FINDINGS: No free fluid, intra-abdominal fluid collections  or free air. No abdominal aortic aneurysm. No lymphadenopathy.     MUSCULOSKELETAL: Unremarkable.        Impression     IMPRESSION:   1.  No definite acute findings in the abdomen and pelvis. Sigmoid  diverticulosis. No significant CT evident inflammatory changes around  the sigmoid colon. No abscess or perforation.  2.  Diffuse interlobular septal thickening at the lung bases may be  due to a limited edema, not significantly changed since 11/8/2020.           Micah Nunez is a 55 year old male who is being evaluated via a billable video visit.      The patient has been notified of following:     \"This video visit will be conducted via a call between you and your physician/provider. We have found that certain health care needs can be provided without the need for an in-person physical exam.  This service lets us provide the care you need with a video conversation.  If a prescription is necessary we can send it directly to your pharmacy.  If lab work is needed we can place an " "order for that and you can then stop by our lab to have the test done at a later time.    If during the course of the call the physician/provider feels a video visit is not appropriate, you will not be charged for this service.\"     Patient confirmed that they are in [place] for today's visit yes    If the video visit is dropped, please send link to:   Text to cell phone: [phone]    Video-Visit Details  Type of service:  Video Visit    Video Start Time: 6:07 PM  20 minute video, 9 minute phone call     Originating Location (pt. Location): Home    Distant Location (provider location):  [facility] [place]     Platform used: [employer]        Again, thank you for allowing me to participate in the care of your patient.        Sincerely,        Chaya Contreras PA-C    "

## 2021-03-02 NOTE — PROGRESS NOTES
GI CLINIC VISIT    CC/REFERRING MD:  No ref. provider found  REASON FOR CONSULTATION: LLQ abdominal pain     ASSESSMENT/PLAN:  Micah Nunez is a 55-year-old male with a past medical history of chemical dependency and cocaine 10 years ago, diverticulitis, hypertension, who presents for follow-up for abdominal pain.    1. LLQ abdominal pain episodes   The patient presents today for episodes of severe left lower quadrant abdominal pain which have been occurring about once a month for the past several months prompting him to seek care multiple times at the emergency department.  There he has had labs that do not explain pain, multiple CT scans.  CT from 2020 showed segmental mural thickening in the descending sigmoid colon with minimal adjacent stranding not around diverticula which per radiology distribution is seen with ischemic colitis, infectious or inflammatory colitis or diverticulitis.  He was treated with antibiotics at that time.  Colonoscopy without any evidence of thickening of the rectum or IBD.  Most recent CT scan from 2021 without evidence of acute diverticulitis.  Differential for symptoms includes mesenteric ischemia (of note the patient smokes about 1 pack/day), smoldering diverticulitis, postsurgical abdominal pain, urologic issue, constipation.  We will plan to evaluate with CTA abdomen and pelvis.  Would also recommend trial of MiraLAX on with 1 capful a day.  Can increase or decrease dose as needed.  Can also try Bentyl as needed if he has another sign of abdominal pain.  Future considerations include referral to surgery as the patient reports symptoms started after hernia repair.  --CTA abdomen and pelvis  --Take Bentyl as needed for abdominal pain  -- Miralax can be used to treat constipation and works by increasing the amount of water in the stool. Start with 1 caps in 8 oz of water. You may increase or decrease dose as needed     Colorectal cancer screenin    RTC 6-8  weeks     Thank you for this consultation.  It was a pleasure to participate in the care of this patient; please contact us with any further questions.     55 Minutes was spent on the date of the encounter during chart review, history and exam, documentation, and further activities as noted     Note completed using voice recognition software. Some word and grammatical errors may occur.    Chaya Contreras PA-C  Division of Gastroenterology, Hepatology & Nutrition  [facility]        HPI  Micah Nunez is a 55-year-old male with a past medical history of chemical dependency and cocaine 10 years ago, diverticulitis, hypertension, who presents for follow-up for abdominal pain.  He is new to the [facility] GI clinic and this is my first encounter with the patient.    Patient reports in the last year he has been seen in the emergency department 6-9 times for severe abdominal pain. Does report that he has had a hernia repair about 2 years ago and has had some medical complications. Had episode of urinary retention and hematuria requiring catheter and evaluation with urology (Dr. Carr, 6/10/20).  About once a month he will have severe abdominal pain, can prompt vomiting (due to severity of pain). He describes left sided abdominal pain which can gradually come on in the morning. Bowel movements have been less regular, he can skip up to three days without a bowel movement. No diarrhea.  IV pain medications do alleviate the pain.      Notes that he has not been taking blood pressure medications.     Patient was seen in the emergency department multiple times for this abdominal pain.  When he was there 11/8/2020, he had a CT scan showing segmental mural thickening at the junction of the descending sigmoid colon with distribution seen with ischemic colitis though infectious/inflammatory/diverticulitis possibilities.  Was treated with ciprofloxacin and Flagyl in addition to pain medications.   Was also found to have a regular centric mural thickening of the rectum and thickening of the cecumOf note, patient had colonoscopy 1/12/2021 with diverticulosis in the sigmoid colon, otherwise unremarkable examined colon.    Most recently, the patient was seen in the [facility] emergency department 2/24/2021 reporting sudden onset lower abdominal pain described as sharp and stabbing associated with nausea without vomiting.  White count slightly elevated at 13.2, remaining CBC CMP lipase normal.  CT without acute diverticulitis or source of symptoms.    He does smoke cigarettes.     ROS:    No fevers or chills  + weight loss- has lost a couple of lbs   No blurry vision, double vision or change in vision  No sore throat  No lymphadenopathy  No headache, paraesthesias, or weakness in a limb  No shortness of breath or wheezing  No chest pain or pressure  No arthralgias or myalgias  No rashes or skin changes  No odynophagia or dysphagia  No BRBPR, hematochezia, melena  No dysuria, frequency or urgency  No hot/cold intolerance or polyria  No anxiety or depression    PROBLEM LIST  Patient Active Problem List    Diagnosis Date Noted     Benign essential hypertension 10/18/2017     Priority: Medium     Tobacco use disorder 08/12/2015     Priority: Medium     Chemical dependency (H) 07/27/2015     Priority: Medium     Papilloma of nose 03/10/2014     Priority: Medium     Nasal mass 12/17/2013     Priority: Medium     CARDIOVASCULAR SCREENING; LDL GOAL LESS THAN 130 12/11/2013     Priority: Medium       PERTINENT PAST MEDICAL HISTORY:  Past Medical History:   Diagnosis Date     Benign essential hypertension 10/18/2017     Bleeding disorder (H)      CARDIOVASCULAR SCREENING; LDL GOAL LESS THAN 130 12/11/2013     Chemical dependency (H)      Gastroesophageal reflux disease      Hypertension      Nasal mass 12/17/2013     Papilloma of nose 3/10/2014     Skin disease        PREVIOUS SURGERIES:  Past Surgical  History:   Procedure Laterality Date     ARTHRODESIS FOOT Left 2/17/2015    Procedure: ARTHRODESIS FOOT;  Surgeon: Cortez Hayward DPM;  Location: [facility] OR     ARTHRODESIS TOE(S)  12/20/2013    Procedure: ARTHRODESIS TOE(S);  Arthrodesis first metatarsophalangeal joint left foot;  Surgeon: Cortez Hayward DPM;  Location: [facility] OR     COLONOSCOPY N/A 1/12/2021    Procedure: COLONOSCOPY;  Surgeon: Dixon Sarabia MD;  Location: [facility] GI     ENDOSCOPIC SINUS SURGERY  1/6/2014    Procedure: ENDOSCOPIC SINUS SURGERY;  Functional Endoscopic Sinus Surgery;  Surgeon: Bobo Renteria MD;  Location:  OR     ENDOSCOPIC SINUS SURGERY  7/21/2014    Procedure: ENDOSCOPIC SINUS SURGERY;  Surgeon: Bobo Renteria MD;  Location:  OR     ENDOSCOPIC SINUS SURGERY N/A 4/6/2015    Procedure: ENDOSCOPIC SINUS SURGERY;  Surgeon: Bobo Renteria MD;  Location:  OR     ENDOSCOPIC SINUS SURGERY N/A 8/17/2015    Procedure: ENDOSCOPIC SINUS SURGERY;  Surgeon: Bobo Renteria MD;  Location:  OR     ENDOSCOPIC SINUS SURGERY Bilateral 8/19/2019    Procedure: Bilateral Functional Endoscopic Sinus Surgery, Right Nasal Endoscopy and Excision of Left Nasal Mass;  Surgeon: Bobo Renteria MD;  Location:  OR     ENT SURGERY       EXCISE NASAL MASS Left 11/6/2017    Procedure: EXCISE NASAL MASS;  Excision of Left Nasal Papilloma;  Surgeon: Bobo Renteria MD;  Location:  OR     LAPAROSCOPIC HERNIORRHAPHY INGUINAL BILATERAL Bilateral 4/12/2017    Procedure: LAPAROSCOPIC HERNIORRHAPHY INGUINAL BILATERAL;  Surgeon: Shay Mercado MD;  Location: [facility] OR     NASAL ENDOSCOPY Bilateral 2/6/2017    Procedure: NASAL ENDOSCOPY;  Surgeon: Bobo Renteria MD;  Location:  OR     NASAL ENDOSCOPY N/A 5/21/2018    Procedure: NASAL ENDOSCOPY;  Nasal Endoscopy, CO2 Laser Excision of Left Nasal Papilloma;  Surgeon: Bobo Renteria MD;  Location: [facility]     NASAL/SINUS POLYPECTOMY       PE TUBES         PREVIOUS  ENDOSCOPY:  Colonoscopy     ALLERGIES:   No Known Allergies    PERTINENT MEDICATIONS:    Current Outpatient Medications:      lisinopril-hydrochlorothiazide (ZESTORETIC) 10-12.5 MG tablet, Take 1 tablet by mouth daily, Disp: 90 tablet, Rfl: 3     order for DME, Equipment being ordered: Dynaflex insert, Disp: 2 Units, Rfl: 0     triamcinolone (KENALOG) 0.1 % external ointment, APPLY SPARINGLY TO AFFECTED AREA THREE TIMES A DAY FOR 14 DAYS, Disp: 80 g, Rfl: 1     ciprofloxacin (CIPRO) 500 MG tablet, Take 1 tablet (500 mg) by mouth 2 times daily (Patient not taking: Reported on 11/20/2020), Disp: 14 tablet, Rfl: 0     oxyCODONE (ROXICODONE) 5 MG tablet, Take 1 tablet (5 mg) by mouth every 6 hours as needed for pain (Patient not taking: Reported on 11/20/2020), Disp: 12 tablet, Rfl: 0  No current facility-administered medications for this visit.     Facility-Administered Medications Ordered in Other Visits:      Self Administer Medications: Behavioral Services, , Does not apply, See Admin Instructions, Brunilda Jorgensen, [facility]  2 advil once a week     SOCIAL HISTORY:  Alcohol- a couple of times a week  Cigarettes (1 ppd)   Social History     Socioeconomic History     Marital status: Single     Spouse name: Not on file     Number of children: Not on file     Years of education: Not on file     Highest education level: Not on file   Occupational History     Not on file   Social Needs     Financial resource strain: Not on file     Food insecurity     Worry: Not on file     Inability: Not on file     Transportation needs     Medical: Not on file     Non-medical: Not on file   Tobacco Use     Smoking status: Current Every Day Smoker     Packs/day: 0.50     Years: 40.00     Pack years: 20.00     Types: Cigarettes     Start date: 1/1/1980     Smokeless tobacco: Never Used     Tobacco comment: 1 pack/day   Substance and Sexual Activity     Alcohol use: Yes     Comment: occasional      Drug use: Not Currently     Comment: 7 years  quit Cocaine/methamphetamines     Sexual activity: Yes     Partners: Female     Birth control/protection: None   Lifestyle     Physical activity     Days per week: Not on file     Minutes per session: Not on file     Stress: Not on file   Relationships     Social connections     Talks on phone: Not on file     Gets together: Not on file     Attends [employer] service: Not on file     Active member of club or organization: Not on file     Attends meetings of clubs or organizations: Not on file     Relationship status: Not on file     Intimate partner violence     Fear of current or ex partner: Not on file     Emotionally abused: Not on file     Physically abused: Not on file     Forced sexual activity: Not on file   Other Topics Concern     Parent/sibling w/ CABG, MI or angioplasty before 65F 55M? No      Service No     Blood Transfusions No     Caffeine Concern No     Occupational Exposure No     Hobby Hazards No     Sleep Concern No     Stress Concern No     Weight Concern No     Special Diet No     Back Care No     Exercise Yes     Bike Helmet No     Seat Belt Yes     Self-Exams Not Asked   Social History Narrative     Not on file       FAMILY HISTORY:  FH of CRC: none   FH of IBD: none   Family History   Problem Relation Age of Onset     Diabetes Mother 40     C.A.D. Father 72     Unknown/Adopted No family hx of      Depression No family hx of      Anxiety Disorder No family hx of      Schizophrenia No family hx of      Bipolar Disorder No family hx of      Suicide No family hx of      Substance Abuse No family hx of      Dementia No family hx of      [place] Disease No family hx of      Parkinsonism No family hx of      Autism Spectrum Disorder No family hx of      Intellectual Disability (Mental Retardation) No family hx of      Mental Illness No family hx of      Bleeding Disorder No family hx of        Past/family/social history reviewed and no changes    PHYSICAL EXAMINATION:  Constitutional:  aaox3, cooperative, pleasant, not dyspneic/diaphoretic, no acute distress  Vitals reviewed: There were no vitals taken for this visit.  Wt:   Wt Readings from Last 2 Encounters:   02/24/21 86.2 kg (190 lb)   01/12/21 83.9 kg (185 lb)   General appearance: Healthy appearing adult, in no acute distress  Eyes: Sclera anicteric  Ears, nose, mouth and throat: No obvious external lesions of ears and nose, Hearing intact  Neck: Symmetric, No obvious external lesions  Respiratory: Normal respiration, no use of accessory muscles   Skin: No rashes or jaundice   Psychiatric: Oriented to person, place and time, Appropriate mood and affect.     PERTINENT STUDIES:  Allied Health/Nurse Visit on 11/24/2020   Component Date Value Ref Range Status     COVID-19 Virus PCR to [facility] - So* 11/24/2020 Nasopharyngeal   Final     COVID-19 Virus PCR to [facility] - Re* 11/24/2020 Detected, Abnormal Result*  Final     Narrative      CT ABDOMEN PELVIS W CONTRAST 2/24/2021 12:13 PM     CLINICAL HISTORY: Diverticulitis suspected; Nausea/vomiting     TECHNIQUE: CT scan of the abdomen and pelvis was performed following  injection of IV contrast. Multiplanar reformats were obtained. Dose  reduction techniques were used.  CONTRAST: 100 mL Isovue 370     COMPARISON: 11/8/2020     FINDINGS:   LOWER CHEST: Diffuse interlobular septal thickening at the lung bases  could represent pulmonary edema, similar in appearance to 11/8/2020.  Mild linear and dependent atelectasis at the lung bases. No pleural or  pericardial effusions.     HEPATOBILIARY: Normal.     PANCREAS: Normal.     SPLEEN: Normal.     ADRENAL GLANDS: Normal.     KIDNEYS/BLADDER: No renal masses. Stable mild cortical thinning at the  lower pole of the left kidney, likely the sequela of prior  infection/infarct. No hydronephrosis. Excreted contrast in the renal  collecting systems and ureters without filling defects.     BOWEL: Small hiatal hernia. Normal caliber loops of small bowel  and  colon. Extensive sigmoid diverticulosis. No superimposed inflammatory  changes in the colon. Previously discussed heterogeneous thickening of  the cecum is not well evaluated due to incomplete distention and  presence of stool and was better assessed at recent colonoscopy in  January, 2021. Previously seen mild inflammatory changes around the  proximal sigmoid colon are not definitively visualized and the  stranding in this region may be due to the presence of a left inguinal  hernia mesh. Decompressed rectum. Bilateral inguinal hernia repair  with mesh.     PELVIC ORGANS: No pelvic masses.     ADDITIONAL FINDINGS: No free fluid, intra-abdominal fluid collections  or free air. No abdominal aortic aneurysm. No lymphadenopathy.     MUSCULOSKELETAL: Unremarkable.        Impression     IMPRESSION:   1.  No definite acute findings in the abdomen and pelvis. Sigmoid  diverticulosis. No significant CT evident inflammatory changes around  the sigmoid colon. No abscess or perforation.  2.  Diffuse interlobular septal thickening at the lung bases may be  due to a limited edema, not significantly changed since 11/8/2020.

## 2021-03-03 NOTE — PATIENT INSTRUCTIONS
It was a pleasure taking care of you today.  I've included a brief summary of our discussion and care plan from today's visit below.  Please review this information with your primary care provider.  ______________________________________________________________________    My recommendations are summarized as follows:    --CTA abdomen and pelvis  --Take Bentyl as needed for abdominal pain  -- Miralax can be used to treat constipation and works by increasing the amount of water in the stool. Start with 1 caps in 8 oz of water. You may increase or decrease dose as needed     Return to GI Clinic in 6 weeks to review your progress.    ______________________________________________________________________    How do I schedule labs, imaging studies, or procedures that were ordered in clinic today?   Labs: To schedule lab appointment at the [facility], use my chart or call [phone]. If you have a [place] lab closer to home where you are regularly seen you can give them a call.     Procedures: If a colonoscopy, upper endoscopy, breath test, esophageal manometry, or pH impedence was ordered today, our endoscopy team will call you to schedule this. If you have not heard from our endoscopy team within a week, please call [phone] to schedule.     Imaging Studies: If you were scheduled for a CT scan, X-ray, MRI, ultrasound, HIDA scan or other imaging study, please call [phone] to have this scheduled.     Referral: If a referral to another specialty was ordered, expect a phone call or follow instructions above. If you have not heard from anyone regarding your referral in a week, please call our clinic to check the status.     Who do I call with any questions after my visit?  Please be in touch if there are any further questions that arise following today's visit.  There are multiple ways to contact your gastroenterology care team.        During business hours, you may reach a Gastroenterology nurse  at [phone]      To schedule or reschedule an appointment, please call [phone].       You can always send a secure message through [employer].  [employer] messages are answered by your nurse or doctor typically within 24 hours.  Please allow extra time on weekends and holidays.        For urgent/emergent questions after business hours, you may reach the on-call GI Fellow by contacting the [facility]  at [phone].     How will I get the results of any tests ordered?    You will receive all of your results.  If you have signed up for [employer], any tests ordered at your visit will be available to you after your physician reviews them.  Typically this takes 1-2 weeks.  If there are urgent results that require a change in your care plan, your physician or nurse will call you to discuss the next steps.      What is [employer]?  [employer] is a secure way for you to access all of your healthcare records from the [facility].  It is a web based computer program, so you can sign on to it from any location.  It also allows you to send secure messages to your care team.  I recommend signing up for [employer] access if you have not already done so and are comfortable with using a computer.      How to I schedule a follow-up visit?  If you did not schedule a follow-up visit today, please call [phone] to schedule a follow-up office visit.      Sincerely,    Chaya Contreras PA-C  Division of Gastroenterology, Hepatology & Nutrition  [facility]

## 2021-03-05 ENCOUNTER — HOSPITAL ENCOUNTER (OUTPATIENT)
Dept: CT IMAGING | Facility: CLINIC | Age: 56
Discharge: HOME | End: 2021-03-05
Attending: PHYSICIAN ASSISTANT | Admitting: PHYSICIAN ASSISTANT
Payer: COMMERCIAL

## 2021-03-05 DIAGNOSIS — R10.32 ABDOMINAL PAIN, LEFT LOWER QUADRANT: ICD-10-CM

## 2021-03-05 PROCEDURE — 74174 CTA ABD&PLVS W/CONTRAST: CPT

## 2021-03-05 PROCEDURE — 250N000011 HC RX IP 250 OP 636: Performed by: RADIOLOGY

## 2021-03-05 PROCEDURE — 250N000009 HC RX 250: Performed by: RADIOLOGY

## 2021-03-05 RX ORDER — IOPAMIDOL 755 MG/ML
80 INJECTION, SOLUTION INTRAVASCULAR ONCE
Status: COMPLETED | OUTPATIENT
Start: 2021-03-05 | End: 2021-03-05

## 2021-03-05 RX ADMIN — SODIUM CHLORIDE 100 ML: 9 INJECTION, SOLUTION INTRAVENOUS at 12:21

## 2021-03-05 RX ADMIN — IOPAMIDOL 80 ML: 755 INJECTION, SOLUTION INTRAVENOUS at 12:21

## 2021-04-13 ENCOUNTER — VIRTUAL VISIT (OUTPATIENT)
Dept: GASTROENTEROLOGY | Facility: CLINIC | Age: 56
End: 2021-04-13
Payer: COMMERCIAL

## 2021-04-13 VITALS — HEIGHT: 76 IN | BODY MASS INDEX: 23.14 KG/M2 | WEIGHT: 190 LBS

## 2021-04-13 DIAGNOSIS — R10.32 ABDOMINAL PAIN, LEFT LOWER QUADRANT: ICD-10-CM

## 2021-04-13 PROCEDURE — 99212 OFFICE O/P EST SF 10 MIN: CPT | Mod: GT | Performed by: PHYSICIAN ASSISTANT

## 2021-04-13 RX ORDER — DICYCLOMINE HYDROCHLORIDE 10 MG/1
10 CAPSULE ORAL 4 TIMES DAILY PRN
Qty: 30 CAPSULE | Refills: 3 | Status: ON HOLD | OUTPATIENT
Start: 2021-04-13 | End: 2021-11-29

## 2021-04-13 ASSESSMENT — PAIN SCALES - GENERAL: PAINLEVEL: NO PAIN (0)

## 2021-04-13 ASSESSMENT — MIFFLIN-ST. JEOR: SCORE: 1798.33

## 2021-04-13 NOTE — PROGRESS NOTES
"Micah Nunez is a 55 year old male who is being evaluated via a billable video visit.      The patient has been notified of following:     \"This video visit will be conducted via a call between you and your physician/provider. We have found that certain health care needs can be provided without the need for an in-person physical exam.  This service lets us provide the care you need with a video conversation.  If a prescription is necessary we can send it directly to your pharmacy.  If lab work is needed we can place an order for that and you can then stop by our lab to have the test done at a later time.    If during the course of the call the physician/provider feels a video visit is not appropriate, you will not be charged for this service.\"     Patient confirmed that they are in [place] for today's visit Yes    Video-Visit Details  Type of service:  Video Visit    Video Start Time: 1:04 PM  Video End Time:  1:13 PM  Then video call 2:36    Originating Location (pt. Location): [place]    Distant Location (provider location):  [facility] [place]     Platform used: Sxbbm    GI CLINIC VISIT    CC/REFERRING MD:  No ref. provider found  REASON FOR CONSULTATION: LLQ abdominal pain     ASSESSMENT/PLAN:  iMcah Nunez is a 55-year-old male with a past medical history of chemical dependency and cocaine 10 years ago, diverticulitis, hypertension, who presents for follow-up for abdominal pain.    1. LLQ abdominal pain episodes   The patient presents today for episodes of severe left lower quadrant abdominal pain which have been occurring about once a month for the past several months prompting him to seek care multiple times at the emergency department.  There he has had labs that do not explain pain, multiple CT scans.  CT from 11/8/2020 showed segmental mural thickening in the descending sigmoid colon with minimal adjacent stranding not around diverticula which per " radiology distribution is seen with ischemic colitis, infectious or inflammatory colitis or diverticulitis.  He was treated with antibiotics at that time.  Colonoscopy without any evidence of thickening of the rectum or IBD. CT scan from 2021 without evidence of acute diverticulitis.  CTA without mesenteric ischemia.     Overall symptoms have improved, he is wondering if this is from better control of his bowel movements.  I encouraged him to continue MiraLAX, with consideration of taking a low dose of this every day.  Can also take Bentyl as needed for abdominal pain.     If symptoms change or worsen, could consider referral to surgery as his symptoms seem to start after hernia repair.      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  --Track symptoms, let us know if they change or worsen  --Take Bentyl as needed for abdominal pain  -- Miralax can be used to treat constipation and works by increasing the amount of water in the stool. Start with 1 caps in 8 oz of water. You may increase or decrease dose as needed   --Discuss CT scan results with primary care provider    Colorectal cancer screenin    [facility] 6 months     Thank you for this consultation.  It was a pleasure to participate in the care of this patient; please contact us with any further questions.     15 Minutes was spent on the date of the encounter during chart review, history and exam, documentation, and further activities as noted     Note completed using voice recognition software. Some word and grammatical errors may occur.    Chaya Contreras PA-C  Division of Gastroenterology, Hepatology & Nutrition  [facility]  Micah MAYELA Enrique is a 55-year-old male with a past medical history of chemical dependency and cocaine 10 years ago, diverticulitis, hypertension, who presents for follow-up for  abdominal pain.  He is new to the [facility] clinic and this is my first encounter with the patient.    Patient reports in the last year he has been seen in the emergency department 6-9 times for severe abdominal pain. Does report that he has had a hernia repair about 2 years ago and has had some medical complications. Had episode of urinary retention and hematuria requiring catheter and evaluation with urology (Dr. Carr, 6/10/20).  About once a month he will have severe abdominal pain, can prompt vomiting (due to severity of pain). He describes left sided abdominal pain which can gradually come on in the morning. Bowel movements have been less regular, he can skip up to three days without a bowel movement. No diarrhea.  IV pain medications do alleviate the pain.      Notes that he has not been taking blood pressure medications.     Patient was seen in the emergency department multiple times for this abdominal pain.  When he was there 11/8/2020, he had a CT scan showing segmental mural thickening at the junction of the descending sigmoid colon with distribution seen with ischemic colitis though infectious/inflammatory/diverticulitis possibilities.  Was treated with ciprofloxacin and Flagyl in addition to pain medications.  Was also found to have a regular centric mural thickening of the rectum and thickening of the cecumOf note, patient had colonoscopy 1/12/2021 with diverticulosis in the sigmoid colon, otherwise unremarkable examined colon.    Most recently, the patient was seen in the [facility] emergency department 2/24/2021 reporting sudden onset lower abdominal pain described as sharp and stabbing associated with nausea without vomiting.  White count slightly elevated at 13.2, remaining CBC CMP lipase normal.  CT without acute diverticulitis or source of symptoms.    He does smoke cigarettes.     Interval History 4/13/2021:   CTA without mesenteric ischemia     Has not had to go  to the emergency department since he seen me last.      He has been taking as Miralax as needed, and has been having more regular bowel movements. He has not taken this on a daily basis, reports he has a difficult time remembering bowel movements. Stool consistency is improved. Has been working on eating better. Is hoping to cut out cigarettes.     Has tried bentyl every day- he has not taken this recently.     PROBLEM LIST  Patient Active Problem List    Diagnosis Date Noted     Benign essential hypertension 10/18/2017     Priority: Medium     Tobacco use disorder 08/12/2015     Priority: Medium     Chemical dependency (H) 07/27/2015     Priority: Medium     Papilloma of nose 03/10/2014     Priority: Medium     Nasal mass 12/17/2013     Priority: Medium     CARDIOVASCULAR SCREENING; LDL GOAL LESS THAN 130 12/11/2013     Priority: Medium       PERTINENT PAST MEDICAL HISTORY:  Past Medical History:   Diagnosis Date     Benign essential hypertension 10/18/2017     Bleeding disorder (H)      CARDIOVASCULAR SCREENING; LDL GOAL LESS THAN 130 12/11/2013     Chemical dependency (H)      Gastroesophageal reflux disease      Hypertension      Nasal mass 12/17/2013     Papilloma of nose 3/10/2014     Skin disease        PREVIOUS SURGERIES:  Past Surgical History:   Procedure Laterality Date     ARTHRODESIS FOOT Left 2/17/2015    Procedure: ARTHRODESIS FOOT;  Surgeon: Cortez Hayward DPM;  Location: [facility]     ARTHRODESIS TOE(S)  12/20/2013    Procedure: ARTHRODESIS TOE(S);  Arthrodesis first metatarsophalangeal joint left foot;  Surgeon: Cortez Hayward DPM;  Location: [facility] OR     COLONOSCOPY N/A 1/12/2021    Procedure: COLONOSCOPY;  Surgeon: Dixon Sarabia MD;  Location: [facility]     ENDOSCOPIC SINUS SURGERY  1/6/2014    Procedure: ENDOSCOPIC SINUS SURGERY;  Functional Endoscopic Sinus Surgery;  Surgeon: Bobo Renteria MD;  Location:  OR     ENDOSCOPIC SINUS SURGERY  7/21/2014    Procedure: ENDOSCOPIC SINUS  SURGERY;  Surgeon: Bobo Renteria MD;  [facility]     ENDOSCOPIC SINUS SURGERY N/A 4/6/2015    Procedure: ENDOSCOPIC SINUS SURGERY;  Surgeon: Bobo Renteria MD;  [facility]     ENDOSCOPIC SINUS SURGERY N/A 8/17/2015    Procedure: ENDOSCOPIC SINUS SURGERY;  Surgeon: Bobo Renteria MD;  [facility]     ENDOSCOPIC SINUS SURGERY Bilateral 8/19/2019    Procedure: Bilateral Functional Endoscopic Sinus Surgery, Right Nasal Endoscopy and Excision of Left Nasal Mass;  Surgeon: Bobo Renteria MD;  [facility]     ENT SURGERY       EXCISE NASAL MASS Left 11/6/2017    Procedure: EXCISE NASAL MASS;  Excision of Left Nasal Papilloma;  Surgeon: Bobo Renteria MD;  [facility]     LAPAROSCOPIC HERNIORRHAPHY INGUINAL BILATERAL Bilateral 4/12/2017    Procedure: LAPAROSCOPIC HERNIORRHAPHY INGUINAL BILATERAL;  Surgeon: Shay Mercado MD;  Location: [facility]     NASAL ENDOSCOPY Bilateral 2/6/2017    Procedure: NASAL ENDOSCOPY;  Surgeon: Bobo Renteria MD;  [facility]     NASAL ENDOSCOPY N/A 5/21/2018    Procedure: NASAL ENDOSCOPY;  Nasal Endoscopy, CO2 Laser Excision of Left Nasal Papilloma;  Surgeon: Bobo Renteria MD;  [facility]     NASAL/SINUS POLYPECTOMY       PE TUBES         PREVIOUS ENDOSCOPY:  Colonoscopy     ALLERGIES:   No Known Allergies    PERTINENT MEDICATIONS:    Current Outpatient Medications:      ciprofloxacin (CIPRO) 500 MG tablet, Take 1 tablet (500 mg) by mouth 2 times daily, Disp: 14 tablet, Rfl: 0     dicyclomine (BENTYL) 10 MG capsule, Take 1 capsule (10 mg) by mouth 4 times daily as needed (abdominal pain), Disp: 30 capsule, Rfl: 0     lisinopril-hydrochlorothiazide (ZESTORETIC) 10-12.5 MG tablet, Take 1 tablet by mouth daily, Disp: 90 tablet, Rfl: 3     order for DME, Equipment being ordered: Dynaflex insert, Disp: 2 Units, Rfl: 0     oxyCODONE (ROXICODONE) 5 MG tablet, Take 1 tablet (5 mg) by mouth every 6 hours as needed for pain,  Disp: 12 tablet, Rfl: 0     polyethylene glycol (MIRALAX) 17 GM/Dose powder, Take 17 g (1 capful) by mouth daily, Disp: 507 g, Rfl: 4     triamcinolone (KENALOG) 0.1 % external ointment, APPLY SPARINGLY TO AFFECTED AREA THREE TIMES A DAY FOR 14 DAYS, Disp: 80 g, Rfl: 1  No current facility-administered medications for this visit.     Facility-Administered Medications Ordered in Other Visits:      Self Administer Medications: Behavioral Services, , Does not apply, See Admin Instructions, Brunilda Jorgensen, [facility]  2 advil once a week     SOCIAL HISTORY:  Alcohol- a couple of times a week  Cigarettes (1 ppd)   Social History     Socioeconomic History     Marital status: Single     Spouse name: Not on file     Number of children: Not on file     Years of education: Not on file     Highest education level: Not on file   Occupational History     Not on file   Social Needs     Financial resource strain: Not on file     Food insecurity     Worry: Not on file     Inability: Not on file     Transportation needs     Medical: Not on file     Non-medical: Not on file   Tobacco Use     Smoking status: Current Every Day Smoker     Packs/day: 0.50     Years: 40.00     Pack years: 20.00     Types: Cigarettes     Start date: 1/1/1980     Smokeless tobacco: Never Used     Tobacco comment: 1 pack/day   Substance and Sexual Activity     Alcohol use: Yes     Comment: occasional      Drug use: Not Currently     Comment: 7 years quit Cocaine/methamphetamines     Sexual activity: Yes     Partners: Female     Birth control/protection: None   Lifestyle     Physical activity     Days per week: Not on file     Minutes per session: Not on file     Stress: Not on file   Relationships     Social connections     Talks on phone: Not on file     Gets together: Not on file     Attends Sabianist service: Not on file     Active member of club or organization: Not on file     Attends meetings of clubs or organizations: Not on file     Relationship status:  "Not on file     Intimate partner violence     Fear of current or ex partner: Not on file     Emotionally abused: Not on file     Physically abused: Not on file     Forced sexual activity: Not on file   Other Topics Concern     Parent/sibling w/ CABG, MI or angioplasty before 65F 55M? No      Service No     Blood Transfusions No     Caffeine Concern No     Occupational Exposure No     Hobby Hazards No     Sleep Concern No     Stress Concern No     Weight Concern No     Special Diet No     Back Care No     Exercise Yes     Bike Helmet No     Seat Belt Yes     Self-Exams Not Asked   Social History Narrative     Not on file       FAMILY HISTORY:  FH of CRC: none   FH of IBD: none   Family History   Problem Relation Age of Onset     Diabetes Mother 40     C.A.D. Father 72     Unknown/Adopted No family hx of      Depression No family hx of      Anxiety Disorder No family hx of      Schizophrenia No family hx of      Bipolar Disorder No family hx of      Suicide No family hx of      Substance Abuse No family hx of      Dementia No family hx of      [place] Disease No family hx of      Parkinsonism No family hx of      Autism Spectrum Disorder No family hx of      Intellectual Disability (Mental Retardation) No family hx of      Mental Illness No family hx of      Bleeding Disorder No family hx of        Past/family/social history reviewed and no changes    PHYSICAL EXAMINATION:  Constitutional: aaox3, cooperative, pleasant, not dyspneic/diaphoretic, no acute distress  Vitals reviewed: Ht 1.93 m (6' 4\")   Wt 86.2 kg (190 lb)   BMI 23.13 kg/m    Wt:   Wt Readings from Last 2 Encounters:   04/13/21 86.2 kg (190 lb)   03/02/21 86.2 kg (190 lb)   General appearance: Healthy appearing adult, in no acute distress  Eyes: Sclera anicteric  Ears, nose, mouth and throat: No obvious external lesions of ears and nose, Hearing intact  Neck: Symmetric, No obvious external lesions  Respiratory: Normal respiration, no use of " accessory muscles   Skin: No rashes or jaundice   Psychiatric: Oriented to person, place and time, Appropriate mood and affect.     PERTINENT STUDIES:  Allied Health/Nurse Visit on 11/24/2020   Component Date Value Ref Range Status     COVID-19 Virus PCR to [facility] - So* 11/24/2020 Nasopharyngeal   Final     COVID-19 Virus PCR to [facility] - Re* 11/24/2020 Detected, Abnormal Result*  Final     Narrative      CT ABDOMEN PELVIS W CONTRAST 2/24/2021 12:13 PM     CLINICAL HISTORY: Diverticulitis suspected; Nausea/vomiting     TECHNIQUE: CT scan of the abdomen and pelvis was performed following  injection of IV contrast. Multiplanar reformats were obtained. Dose  reduction techniques were used.  CONTRAST: 100 mL Isovue 370     COMPARISON: 11/8/2020     FINDINGS:   LOWER CHEST: Diffuse interlobular septal thickening at the lung bases  could represent pulmonary edema, similar in appearance to 11/8/2020.  Mild linear and dependent atelectasis at the lung bases. No pleural or  pericardial effusions.     HEPATOBILIARY: Normal.     PANCREAS: Normal.     SPLEEN: Normal.     ADRENAL GLANDS: Normal.     KIDNEYS/BLADDER: No renal masses. Stable mild cortical thinning at the  lower pole of the left kidney, likely the sequela of prior  infection/infarct. No hydronephrosis. Excreted contrast in the renal  collecting systems and ureters without filling defects.     BOWEL: Small hiatal hernia. Normal caliber loops of small bowel and  colon. Extensive sigmoid diverticulosis. No superimposed inflammatory  changes in the colon. Previously discussed heterogeneous thickening of  the cecum is not well evaluated due to incomplete distention and  presence of stool and was better assessed at recent colonoscopy in  January, 2021. Previously seen mild inflammatory changes around the  proximal sigmoid colon are not definitively visualized and the  stranding in this region may be due to the presence of a left inguinal  hernia mesh. Decompressed  rectum. Bilateral inguinal hernia repair  with mesh.     PELVIC ORGANS: No pelvic masses.     ADDITIONAL FINDINGS: No free fluid, intra-abdominal fluid collections  or free air. No abdominal aortic aneurysm. No lymphadenopathy.     MUSCULOSKELETAL: Unremarkable.        Impression     IMPRESSION:   1.  No definite acute findings in the abdomen and pelvis. Sigmoid  diverticulosis. No significant CT evident inflammatory changes around  the sigmoid colon. No abscess or perforation.  2.  Diffuse interlobular septal thickening at the lung bases may be  due to a limited edema, not significantly changed since 11/8/2020.       CTA                                                                 IMPRESSION:  1. Patient abdominal aorta without evidence of aneurysm, stenosis or  dissection.  2. Celiac axis, superior mesenteric artery, inferior mesenteric artery  as well as their branch vessels are patent without evidence of  mesenteric ischemia.  3. 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  4. Diverticulosis without evidence of diverticulitis.

## 2021-04-13 NOTE — PATIENT INSTRUCTIONS
It was a pleasure taking care of you today.  I've included a brief summary of our discussion and care plan from today's visit below.  Please review this information with your primary care provider.  ______________________________________________________________________    My recommendations are summarized as follows:    --Track symptoms, let us know if they change or worsen  --Take Bentyl as needed for abdominal pain  -- Miralax can be used to treat constipation and works by increasing the amount of water in the stool. Start with 1 caps in 8 oz of water. You may increase or decrease dose as needed   --Discuss CT scan results with primary care provider    Return to GI Clinic in 6 months to review your progress.    ______________________________________________________________________    How do I schedule labs, imaging studies, or procedures that were ordered in clinic today?     Labs: To schedule lab appointment at the [facility], use my chart or call [phone]. If you have a [facility] lab closer to home where you are regularly seen you can give them a call.     Procedures: If a colonoscopy, upper endoscopy, breath test, esophageal manometry, or pH impedence was ordered today, our endoscopy team will call you to schedule this. If you have not heard from our endoscopy team within a week, please call [phone] to schedule.     Imaging Studies: If you were scheduled for a CT scan, X-ray, MRI, ultrasound, HIDA scan or other imaging study, please call [phone] to have this scheduled.     Referral: If a referral to another specialty was ordered, expect a phone call or follow instructions above. If you have not heard from anyone regarding your referral in a week, please call our clinic to check the status.     Who do I call with any questions after my visit?  Please be in touch if there are any further questions that arise following today's visit.  There are multiple ways to contact your  gastroenterology care team.        During business hours, you may reach a Gastroenterology nurse at [phone]      To schedule or reschedule an appointment, please call [phone].       You can always send a secure message through [employer].  [employer] messages are answered by your nurse or doctor typically within 24 hours.  Please allow extra time on weekends and holidays.        For urgent/emergent questions after business hours, you may reach the on-call GI Fellow by contacting the [facility]  at [phone].     How will I get the results of any tests ordered?    You will receive all of your results.  If you have signed up for [employer], any tests ordered at your visit will be available to you after your physician reviews them.  Typically this takes 1-2 weeks.  If there are urgent results that require a change in your care plan, your physician or nurse will call you to discuss the next steps.      What is [employer]?  [employer]aris is a secure way for you to access all of your healthcare records from the [facility].  It is a web based computer program, so you can sign on to it from any location.  It also allows you to send secure messages to your care team.  I recommend signing up for [employer] access if you have not already done so and are comfortable with using a computer.      How to I schedule a follow-up visit?  If you did not schedule a follow-up visit today, please call [phone] to schedule a follow-up office visit.      Sincerely,    Chaya Contreras PA-C  Division of Gastroenterology, Hepatology & Nutrition  [facility]

## 2021-04-13 NOTE — NURSING NOTE
"Chief Complaint   Patient presents with     RECHECK     Virtual consult       Vitals:    04/13/21 1233   Weight: 86.2 kg (190 lb)   Height: 1.93 m (6' 4\")       Body mass index is 23.13 kg/m .      QUE AngT                      "

## 2021-04-13 NOTE — LETTER
"    4/13/2021         RE: Micah Nunez  [address] [facility] [address]        Dear Colleague,    Thank you for referring your patient, Micah Nunez, to the [facility] Roll. Please see a copy of my visit note below.    Micah Nunez is a 55 year old male who is being evaluated via a billable video visit.      The patient has been notified of following:     \"This video visit will be conducted via a call between you and your physician/provider. We have found that certain health care needs can be provided without the need for an in-person physical exam.  This service lets us provide the care you need with a video conversation.  If a prescription is necessary we can send it directly to your pharmacy.  If lab work is needed we can place an order for that and you can then stop by our lab to have the test done at a later time.    If during the course of the call the physician/provider feels a video visit is not appropriate, you will not be charged for this service.\"     Patient confirmed that they are in [place] for today's visit Yes    Video-Visit Details  Type of service:  Video Visit    Video Start Time: 1:04 PM  Video End Time:  1:13 PM  Then video call 2:36    Originating Location (pt. Location): Home    Distant Location (provider location):  [facility] Roll     Platform used: [employer]    GI CLINIC VISIT    CC/REFERRING MD:  No ref. provider found  REASON FOR CONSULTATION: LLQ abdominal pain     ASSESSMENT/PLAN:  Micah Nunez is a 55-year-old male with a past medical history of chemical dependency and cocaine 10 years ago, diverticulitis, hypertension, who presents for follow-up for abdominal pain.    1. LLQ abdominal pain episodes   The patient presents today for episodes of severe left lower quadrant abdominal pain which have been occurring about once a month for the past several months prompting him to seek " care multiple times at the emergency department.  There he has had labs that do not explain pain, multiple CT scans.  CT from 2020 showed segmental mural thickening in the descending sigmoid colon with minimal adjacent stranding not around diverticula which per radiology distribution is seen with ischemic colitis, infectious or inflammatory colitis or diverticulitis.  He was treated with antibiotics at that time.  Colonoscopy without any evidence of thickening of the rectum or IBD. CT scan from 2021 without evidence of acute diverticulitis.  CTA without mesenteric ischemia.     Overall symptoms have improved, he is wondering if this is from better control of his bowel movements.  I encouraged him to continue MiraLAX, with consideration of taking a low dose of this every day.  Can also take Bentyl as needed for abdominal pain.     If symptoms change or worsen, could consider referral to surgery as his symptoms seem to start after hernia repair.      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  --Track symptoms, let us know if they change or worsen  --Take Bentyl as needed for abdominal pain  -- Miralax can be used to treat constipation and works by increasing the amount of water in the stool. Start with 1 caps in 8 oz of water. You may increase or decrease dose as needed   --Discuss CT scan results with primary care provider    Colorectal cancer screenin    [facility] 6 months     Thank you for this consultation.  It was a pleasure to participate in the care of this patient; please contact us with any further questions.     15 Minutes was spent on the date of the encounter during chart review, history and exam, documentation, and further activities as noted     Note completed using voice recognition software. Some word and grammatical errors may occur.    Chaya Contreras,  ERIN  Division of Gastroenterology, Hepatology & Nutrition  [facility]        HPI  Micah Nunez is a 55-year-old male with a past medical history of chemical dependency and cocaine 10 years ago, diverticulitis, hypertension, who presents for follow-up for abdominal pain.  He is new to the [facility] GI clinic and this is my first encounter with the patient.    Patient reports in the last year he has been seen in the emergency department 6-9 times for severe abdominal pain. Does report that he has had a hernia repair about 2 years ago and has had some medical complications. Had episode of urinary retention and hematuria requiring catheter and evaluation with urology (Dr. Carr, 6/10/20).  About once a month he will have severe abdominal pain, can prompt vomiting (due to severity of pain). He describes left sided abdominal pain which can gradually come on in the morning. Bowel movements have been less regular, he can skip up to three days without a bowel movement. No diarrhea.  IV pain medications do alleviate the pain.      Notes that he has not been taking blood pressure medications.     Patient was seen in the emergency department multiple times for this abdominal pain.  When he was there 11/8/2020, he had a CT scan showing segmental mural thickening at the junction of the descending sigmoid colon with distribution seen with ischemic colitis though infectious/inflammatory/diverticulitis possibilities.  Was treated with ciprofloxacin and Flagyl in addition to pain medications.  Was also found to have a regular centric mural thickening of the rectum and thickening of the cecumOf note, patient had colonoscopy 1/12/2021 with diverticulosis in the sigmoid colon, otherwise unremarkable examined colon.    Most recently, the patient was seen in the [facility] emergency department 2/24/2021 reporting sudden onset lower abdominal pain described as sharp and stabbing  associated with nausea without vomiting.  White count slightly elevated at 13.2, remaining CBC CMP lipase normal.  CT without acute diverticulitis or source of symptoms.    He does smoke cigarettes.     Interval History 4/13/2021:   CTA without mesenteric ischemia     Has not had to go to the emergency department since he seen me last.      He has been taking as Miralax as needed, and has been having more regular bowel movements. He has not taken this on a daily basis, reports he has a difficult time remembering bowel movements. Stool consistency is improved. Has been working on eating better. Is hoping to cut out cigarettes.     Has tried bentyl every day- he has not taken this recently.     PROBLEM LIST  Patient Active Problem List    Diagnosis Date Noted     Benign essential hypertension 10/18/2017     Priority: Medium     Tobacco use disorder 08/12/2015     Priority: Medium     Chemical dependency (H) 07/27/2015     Priority: Medium     Papilloma of nose 03/10/2014     Priority: Medium     Nasal mass 12/17/2013     Priority: Medium     CARDIOVASCULAR SCREENING; LDL GOAL LESS THAN 130 12/11/2013     Priority: Medium       PERTINENT PAST MEDICAL HISTORY:  Past Medical History:   Diagnosis Date     Benign essential hypertension 10/18/2017     Bleeding disorder (H)      CARDIOVASCULAR SCREENING; LDL GOAL LESS THAN 130 12/11/2013     Chemical dependency (H)      Gastroesophageal reflux disease      Hypertension      Nasal mass 12/17/2013     Papilloma of nose 3/10/2014     Skin disease        PREVIOUS SURGERIES:  Past Surgical History:   Procedure Laterality Date     ARTHRODESIS FOOT Left 2/17/2015    Procedure: ARTHRODESIS FOOT;  Surgeon: Cortez Hayward DPM;  Location: [facility] OR     ARTHRODESIS TOE(S)  12/20/2013    Procedure: ARTHRODESIS TOE(S);  Arthrodesis first metatarsophalangeal joint left foot;  Surgeon: Cortez Hayward DPM;  Location: [facility] OR     COLONOSCOPY N/A 1/12/2021    Procedure: COLONOSCOPY;   Surgeon: Dixon Sarabia MD;  Location: [facility] GI     ENDOSCOPIC SINUS SURGERY  1/6/2014    Procedure: ENDOSCOPIC SINUS SURGERY;  Functional Endoscopic Sinus Surgery;  Surgeon: Bobo Renteria MD;  Location:  OR     ENDOSCOPIC SINUS SURGERY  7/21/2014    Procedure: ENDOSCOPIC SINUS SURGERY;  Surgeon: Bobo Renteria MD;  Location:  OR     ENDOSCOPIC SINUS SURGERY N/A 4/6/2015    Procedure: ENDOSCOPIC SINUS SURGERY;  Surgeon: Bboo Renteria MD;  Location:  OR     ENDOSCOPIC SINUS SURGERY N/A 8/17/2015    Procedure: ENDOSCOPIC SINUS SURGERY;  Surgeon: Bobo Renteria MD;  Location:  OR     ENDOSCOPIC SINUS SURGERY Bilateral 8/19/2019    Procedure: Bilateral Functional Endoscopic Sinus Surgery, Right Nasal Endoscopy and Excision of Left Nasal Mass;  Surgeon: Bobo Renteria MD;  Location:  OR     ENT SURGERY       EXCISE NASAL MASS Left 11/6/2017    Procedure: EXCISE NASAL MASS;  Excision of Left Nasal Papilloma;  Surgeon: Bobo Renteria MD;  Location:  OR     LAPAROSCOPIC HERNIORRHAPHY INGUINAL BILATERAL Bilateral 4/12/2017    Procedure: LAPAROSCOPIC HERNIORRHAPHY INGUINAL BILATERAL;  Surgeon: Shay Mercado MD;  Location: [facility] OR     NASAL ENDOSCOPY Bilateral 2/6/2017    Procedure: NASAL ENDOSCOPY;  Surgeon: Bobo Renteria MD;  Location:  OR     NASAL ENDOSCOPY N/A 5/21/2018    Procedure: NASAL ENDOSCOPY;  Nasal Endoscopy, CO2 Laser Excision of Left Nasal Papilloma;  Surgeon: Bobo Renteria MD;  Location: [facility] OR     NASAL/SINUS POLYPECTOMY       PE TUBES         PREVIOUS ENDOSCOPY:  Colonoscopy     ALLERGIES:   No Known Allergies    PERTINENT MEDICATIONS:    Current Outpatient Medications:      ciprofloxacin (CIPRO) 500 MG tablet, Take 1 tablet (500 mg) by mouth 2 times daily, Disp: 14 tablet, Rfl: 0     dicyclomine (BENTYL) 10 MG capsule, Take 1 capsule (10 mg) by mouth 4 times daily as needed (abdominal pain), Disp: 30 capsule, Rfl: 0      lisinopril-hydrochlorothiazide (ZESTORETIC) 10-12.5 MG tablet, Take 1 tablet by mouth daily, Disp: 90 tablet, Rfl: 3     order for DME, Equipment being ordered: Dynaflex insert, Disp: 2 Units, Rfl: 0     oxyCODONE (ROXICODONE) 5 MG tablet, Take 1 tablet (5 mg) by mouth every 6 hours as needed for pain, Disp: 12 tablet, Rfl: 0     polyethylene glycol (MIRALAX) 17 GM/Dose powder, Take 17 g (1 capful) by mouth daily, Disp: 507 g, Rfl: 4     triamcinolone (KENALOG) 0.1 % external ointment, APPLY SPARINGLY TO AFFECTED AREA THREE TIMES A DAY FOR 14 DAYS, Disp: 80 g, Rfl: 1  No current facility-administered medications for this visit.     Facility-Administered Medications Ordered in Other Visits:      Self Administer Medications: Behavioral Services, , Does not apply, See Admin Instructions, Brunilda Jorgensen, [facility]  2 advil once a week     SOCIAL HISTORY:  Alcohol- a couple of times a week  Cigarettes (1 ppd)   Social History     Socioeconomic History     Marital status: Single     Spouse name: Not on file     Number of children: Not on file     Years of education: Not on file     Highest education level: Not on file   Occupational History     Not on file   Social Needs     Financial resource strain: Not on file     Food insecurity     Worry: Not on file     Inability: Not on file     Transportation needs     Medical: Not on file     Non-medical: Not on file   Tobacco Use     Smoking status: Current Every Day Smoker     Packs/day: 0.50     Years: 40.00     Pack years: 20.00     Types: Cigarettes     Start date: 1/1/1980     Smokeless tobacco: Never Used     Tobacco comment: 1 pack/day   Substance and Sexual Activity     Alcohol use: Yes     Comment: occasional      Drug use: Not Currently     Comment: 7 years quit Cocaine/methamphetamines     Sexual activity: Yes     Partners: Female     Birth control/protection: None   Lifestyle     Physical activity     Days per week: Not on file     Minutes per session: Not on file      "Stress: Not on file   Relationships     Social connections     Talks on phone: Not on file     Gets together: Not on file     Attends Buddhism service: Not on file     Active member of club or organization: Not on file     Attends meetings of clubs or organizations: Not on file     Relationship status: Not on file     Intimate partner violence     Fear of current or ex partner: Not on file     Emotionally abused: Not on file     Physically abused: Not on file     Forced sexual activity: Not on file   Other Topics Concern     Parent/sibling w/ CABG, MI or angioplasty before 65F 55M? No      Service No     Blood Transfusions No     Caffeine Concern No     Occupational Exposure No     Hobby Hazards No     Sleep Concern No     Stress Concern No     Weight Concern No     Special Diet No     Back Care No     Exercise Yes     Bike Helmet No     Seat Belt Yes     Self-Exams Not Asked   Social History Narrative     Not on file       FAMILY HISTORY:  FH of CRC: none   FH of IBD: none   Family History   Problem Relation Age of Onset     Diabetes Mother 40     C.A.D. Father 72     Unknown/Adopted No family hx of      Depression No family hx of      Anxiety Disorder No family hx of      Schizophrenia No family hx of      Bipolar Disorder No family hx of      Suicide No family hx of      Substance Abuse No family hx of      Dementia No family hx of      [place] Disease No family hx of      Parkinsonism No family hx of      Autism Spectrum Disorder No family hx of      Intellectual Disability (Mental Retardation) No family hx of      Mental Illness No family hx of      Bleeding Disorder No family hx of        Past/family/social history reviewed and no changes    PHYSICAL EXAMINATION:  Constitutional: aaox3, cooperative, pleasant, not dyspneic/diaphoretic, no acute distress  Vitals reviewed: Ht 1.93 m (6' 4\")   Wt 86.2 kg (190 lb)   BMI 23.13 kg/m    Wt:   Wt Readings from Last 2 Encounters:   04/13/21 86.2 kg (190 " lb)   03/02/21 86.2 kg (190 lb)   General appearance: Healthy appearing adult, in no acute distress  Eyes: Sclera anicteric  Ears, nose, mouth and throat: No obvious external lesions of ears and nose, Hearing intact  Neck: Symmetric, No obvious external lesions  Respiratory: Normal respiration, no use of accessory muscles   Skin: No rashes or jaundice   Psychiatric: Oriented to person, place and time, Appropriate mood and affect.     PERTINENT STUDIES:  Allied Health/Nurse Visit on 11/24/2020   Component Date Value Ref Range Status     COVID-19 Virus PCR to [facility] - So* 11/24/2020 Nasopharyngeal   Final     COVID-19 Virus PCR to [facility] - Re* 11/24/2020 Detected, Abnormal Result*  Final     Narrative      CT ABDOMEN PELVIS W CONTRAST 2/24/2021 12:13 PM     CLINICAL HISTORY: Diverticulitis suspected; Nausea/vomiting     TECHNIQUE: CT scan of the abdomen and pelvis was performed following  injection of IV contrast. Multiplanar reformats were obtained. Dose  reduction techniques were used.  CONTRAST: 100 mL Isovue 370     COMPARISON: 11/8/2020     FINDINGS:   LOWER CHEST: Diffuse interlobular septal thickening at the lung bases  could represent pulmonary edema, similar in appearance to 11/8/2020.  Mild linear and dependent atelectasis at the lung bases. No pleural or  pericardial effusions.     HEPATOBILIARY: Normal.     PANCREAS: Normal.     SPLEEN: Normal.     ADRENAL GLANDS: Normal.     KIDNEYS/BLADDER: No renal masses. Stable mild cortical thinning at the  lower pole of the left kidney, likely the sequela of prior  infection/infarct. No hydronephrosis. Excreted contrast in the renal  collecting systems and ureters without filling defects.     BOWEL: Small hiatal hernia. Normal caliber loops of small bowel and  colon. Extensive sigmoid diverticulosis. No superimposed inflammatory  changes in the colon. Previously discussed heterogeneous thickening of  the cecum is not well evaluated due to incomplete distention  and  presence of stool and was better assessed at recent colonoscopy in  January, 2021. Previously seen mild inflammatory changes around the  proximal sigmoid colon are not definitively visualized and the  stranding in this region may be due to the presence of a left inguinal  hernia mesh. Decompressed rectum. Bilateral inguinal hernia repair  with mesh.     PELVIC ORGANS: No pelvic masses.     ADDITIONAL FINDINGS: No free fluid, intra-abdominal fluid collections  or free air. No abdominal aortic aneurysm. No lymphadenopathy.     MUSCULOSKELETAL: Unremarkable.        Impression     IMPRESSION:   1.  No definite acute findings in the abdomen and pelvis. Sigmoid  diverticulosis. No significant CT evident inflammatory changes around  the sigmoid colon. No abscess or perforation.  2.  Diffuse interlobular septal thickening at the lung bases may be  due to a limited edema, not significantly changed since 11/8/2020.       CTA                                                                 IMPRESSION:  1. Patient abdominal aorta without evidence of aneurysm, stenosis or  dissection.  2. Celiac axis, superior mesenteric artery, inferior mesenteric artery  as well as their branch vessels are patent without evidence of  mesenteric ischemia.  3. 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  4. Diverticulosis without evidence of diverticulitis.      Again, thank you for allowing me to participate in the care of your patient.        Sincerely,        Chaya Contreras PA-C

## 2021-07-24 ENCOUNTER — TELEPHONE (OUTPATIENT)
Dept: GASTROENTEROLOGY | Facility: CLINIC | Age: 56
End: 2021-07-24

## 2021-07-24 NOTE — TELEPHONE ENCOUNTER
Patient did not want to schedule an appointment now. They said it was too early to schedule an appointment in October.

## 2021-08-04 ENCOUNTER — OFFICE VISIT (OUTPATIENT)
Dept: FAMILY MEDICINE | Facility: CLINIC | Age: 56
End: 2021-08-04
Payer: COMMERCIAL

## 2021-08-04 VITALS
RESPIRATION RATE: 20 BRPM | TEMPERATURE: 97.8 F | HEIGHT: 76 IN | HEART RATE: 79 BPM | WEIGHT: 190 LBS | DIASTOLIC BLOOD PRESSURE: 82 MMHG | BODY MASS INDEX: 23.14 KG/M2 | SYSTOLIC BLOOD PRESSURE: 122 MMHG | OXYGEN SATURATION: 99 %

## 2021-08-04 DIAGNOSIS — T14.90XA TRAUMA: ICD-10-CM

## 2021-08-04 DIAGNOSIS — Z09 HOSPITAL DISCHARGE FOLLOW-UP: Primary | ICD-10-CM

## 2021-08-04 DIAGNOSIS — Z87.81 S/P ORIF (OPEN REDUCTION INTERNAL FIXATION) FRACTURE: ICD-10-CM

## 2021-08-04 DIAGNOSIS — Z98.890 S/P ORIF (OPEN REDUCTION INTERNAL FIXATION) FRACTURE: ICD-10-CM

## 2021-08-04 DIAGNOSIS — F41.9 ANXIETY: ICD-10-CM

## 2021-08-04 PROCEDURE — 99214 OFFICE O/P EST MOD 30 MIN: CPT | Performed by: FAMILY MEDICINE

## 2021-08-04 RX ORDER — METHOCARBAMOL 500 MG/1
500 TABLET, FILM COATED ORAL 4 TIMES DAILY PRN
Qty: 90 TABLET | Refills: 1 | Status: ON HOLD | OUTPATIENT
Start: 2021-08-04 | End: 2021-11-29

## 2021-08-04 RX ORDER — GABAPENTIN 300 MG/1
300 CAPSULE ORAL 3 TIMES DAILY
Qty: 270 CAPSULE | Refills: 3 | Status: ON HOLD | OUTPATIENT
Start: 2021-08-04 | End: 2021-11-29

## 2021-08-04 RX ORDER — OXYCODONE HYDROCHLORIDE 5 MG/1
5 TABLET ORAL 3 TIMES DAILY PRN
Qty: 10 TABLET | Refills: 0 | Status: SHIPPED | OUTPATIENT
Start: 2021-08-04 | End: 2021-08-07

## 2021-08-04 RX ORDER — HYDROXYZINE HYDROCHLORIDE 25 MG/1
25 TABLET, FILM COATED ORAL 3 TIMES DAILY PRN
Qty: 90 TABLET | Refills: 1 | Status: SHIPPED | OUTPATIENT
Start: 2021-08-04 | End: 2021-11-02

## 2021-08-04 ASSESSMENT — MIFFLIN-ST. JEOR: SCORE: 1798.33
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
## 2021-10-16 ENCOUNTER — HOSPITAL ENCOUNTER (EMERGENCY)
Facility: CLINIC | Age: 56
Discharge: HOME | End: 2021-10-16
Attending: EMERGENCY MEDICINE | Admitting: EMERGENCY MEDICINE
Payer: COMMERCIAL

## 2021-10-16 VITALS
WEIGHT: 190 LBS | BODY MASS INDEX: 23.62 KG/M2 | SYSTOLIC BLOOD PRESSURE: 151 MMHG | TEMPERATURE: 98 F | HEART RATE: 94 BPM | OXYGEN SATURATION: 99 % | HEIGHT: 75 IN | DIASTOLIC BLOOD PRESSURE: 89 MMHG

## 2021-10-16 DIAGNOSIS — B35.3 TINEA PEDIS OF LEFT FOOT: ICD-10-CM

## 2021-10-16 PROCEDURE — 99284 EMERGENCY DEPT VISIT MOD MDM: CPT | Performed by: EMERGENCY MEDICINE

## 2021-10-16 PROCEDURE — 99283 EMERGENCY DEPT VISIT LOW MDM: CPT | Performed by: EMERGENCY MEDICINE

## 2021-10-16 RX ORDER — PRENATAL VIT 91/IRON/FOLIC/DHA 28-975-200
COMBINATION PACKAGE (EA) ORAL 2 TIMES DAILY
Qty: 24 G | Refills: 0 | Status: SHIPPED | OUTPATIENT
Start: 2021-10-16 | End: 2021-11-13

## 2021-10-16 ASSESSMENT — MIFFLIN-ST. JEOR: SCORE: 1782.46

## 2021-10-17 NOTE — ED TRIAGE NOTES
Pt had foot surgery end of July and states foot is draining fluid, painful, and swollen.  No fevers.

## 2021-10-17 NOTE — ED PROVIDER NOTES
History     Chief Complaint   Patient presents with     Cellulitis     HPI  Micah Nunez is a 55 year old male who presents for rash of the left foot.  Symptoms getting worse for the past several weeks.  Started is mildly itchy and is now more burning and slightly painful.  Is worried about infection.  No known injury.  No fevers or chills.    Allergies:  No Known Allergies    Problem List:    Patient Active Problem List    Diagnosis Date Noted     Benign essential hypertension 10/18/2017     Priority: Medium     Tobacco use disorder 08/12/2015     Priority: Medium     Chemical dependency (H) 07/27/2015     Priority: Medium     Papilloma of nose 03/10/2014     Priority: Medium     Nasal mass 12/17/2013     Priority: Medium     CARDIOVASCULAR SCREENING; LDL GOAL LESS THAN 130 12/11/2013     Priority: Medium        Past Medical History:    Past Medical History:   Diagnosis Date     Benign essential hypertension 10/18/2017     Bleeding disorder (H)      CARDIOVASCULAR SCREENING; LDL GOAL LESS THAN 130 12/11/2013     Chemical dependency (H)      Gastroesophageal reflux disease      Hypertension      Nasal mass 12/17/2013     Papilloma of nose 3/10/2014     Skin disease        Past Surgical History:    Past Surgical History:   Procedure Laterality Date     ARTHRODESIS FOOT Left 2/17/2015    Procedure: ARTHRODESIS FOOT;  Surgeon: Cortez Hayward DPM;  Location: [facility]     ARTHRODESIS TOE(S)  12/20/2013    Procedure: ARTHRODESIS TOE(S);  Arthrodesis first metatarsophalangeal joint left foot;  Surgeon: Cortez Hayward DPM;  Location: [facility]     COLONOSCOPY N/A 1/12/2021    Procedure: COLONOSCOPY;  Surgeon: Dixon Sarabia MD;  Location: [facility]     ENDOSCOPIC SINUS SURGERY  1/6/2014    Procedure: ENDOSCOPIC SINUS SURGERY;  Functional Endoscopic Sinus Surgery;  Surgeon: Bobo Renteria MD;  Location:  OR     ENDOSCOPIC SINUS SURGERY  7/21/2014    Procedure: ENDOSCOPIC SINUS SURGERY;  Surgeon:  Bobo Renteria MD;  Location: [facility]     ENDOSCOPIC SINUS SURGERY N/A 4/6/2015    Procedure: ENDOSCOPIC SINUS SURGERY;  Surgeon: Bobo Renteria MD;  Location: [facility]     ENDOSCOPIC SINUS SURGERY N/A 8/17/2015    Procedure: ENDOSCOPIC SINUS SURGERY;  Surgeon: Bobo Renteria MD;  Location: [facility]     ENDOSCOPIC SINUS SURGERY Bilateral 8/19/2019    Procedure: Bilateral Functional Endoscopic Sinus Surgery, Right Nasal Endoscopy and Excision of Left Nasal Mass;  Surgeon: Bobo Renteria MD;  [facility]     ENT SURGERY       EXCISE NASAL MASS Left 11/6/2017    Procedure: EXCISE NASAL MASS;  Excision of Left Nasal Papilloma;  Surgeon: Bobo Renteria MD;  [facility]     LAPAROSCOPIC HERNIORRHAPHY INGUINAL BILATERAL Bilateral 4/12/2017    Procedure: LAPAROSCOPIC HERNIORRHAPHY INGUINAL BILATERAL;  Surgeon: Shay Mercado MD;  Location: [facility]     NASAL ENDOSCOPY Bilateral 2/6/2017    Procedure: NASAL ENDOSCOPY;  Surgeon: Bobo Renteria MD;  [facility]     NASAL ENDOSCOPY N/A 5/21/2018    Procedure: NASAL ENDOSCOPY;  Nasal Endoscopy, CO2 Laser Excision of Left Nasal Papilloma;  Surgeon: Bobo Renteria MD;  Location[facility]     NASAL/SINUS POLYPECTOMY       PE TUBES         Family History:    Family History   Problem Relation Age of Onset     Diabetes Mother 40     C.A.D. Father 72     Unknown/Adopted No family hx of      Depression No family hx of      Anxiety Disorder No family hx of      Schizophrenia No family hx of      Bipolar Disorder No family hx of      Suicide No family hx of      Substance Abuse No family hx of      Dementia No family hx of      [place]aven Disease No family hx of      Parkinsonism No family hx of      Autism Spectrum Disorder No family hx of      Intellectual Disability (Mental Retardation) No family hx of      Mental Illness No family hx of      Bleeding Disorder No family hx of        Social History:  Marital Status:  Single [1]  Social  "History     Tobacco Use     Smoking status: Current Every Day Smoker     Packs/day: 0.50     Years: 40.00     Pack years: 20.00     Types: Cigarettes     Start date: 1/1/1980     Smokeless tobacco: Never Used     Tobacco comment: 1 pack/day   Vaping Use     Vaping Use: Never used   Substance Use Topics     Alcohol use: Yes     Comment: occasional      Drug use: Not Currently     Comment: 7 years quit Cocaine/methamphetamines        Medications:    terbinafine (LAMISIL) 1 % external cream  ciprofloxacin (CIPRO) 500 MG tablet  dicyclomine (BENTYL) 10 MG capsule  gabapentin (NEURONTIN) 300 MG capsule  hydrOXYzine (ATARAX) 25 MG tablet  lisinopril-hydrochlorothiazide (ZESTORETIC) 10-12.5 MG tablet  methocarbamol (ROBAXIN) 500 MG tablet  order for DME  oxyCODONE (ROXICODONE) 5 MG tablet  polyethylene glycol (MIRALAX) 17 GM/Dose powder  triamcinolone (KENALOG) 0.1 % external ointment          Review of Systems  A 2 point review of systems was performed. All pertinent positives and negatives were listed in the HPI and rest of ROS were otherwise negative.    Physical Exam   BP: (!) 151/89  Pulse: 94  Temp: 98  F (36.7  C)  Height: 190.5 cm (6' 3\")  Weight: 86.2 kg (190 lb)  SpO2: 99 %      Physical Exam  Constitutional:       General: He is not in acute distress.     Appearance: He is well-developed. He is not diaphoretic.   HENT:      Head: Normocephalic and atraumatic.   Eyes:      General: No scleral icterus.  Musculoskeletal:      Cervical back: Normal range of motion and neck supple.   Skin:     General: Skin is warm and dry.      Comments: Erythema and scaling of the left foot in between the toes and up into the top of the foot and along the lateral malleolus.  No excess warmth or tenderness   Neurological:      Mental Status: He is alert and oriented to person, place, and time.                     ED Course        Procedures              Critical Care time:  none               No results found for this or any " previous visit (from the past 24 hour(s)).    Medications - No data to display    Assessments & Plan (with Medical Decision Making)   55-year-old male who presents for rash of the foot.  Vital signs are reassuring.  No signs of cellulitis or abscess.  Rash is typical of tinea pedis and he is safe to discharge with a prescription for terbinafine cream and instructions to return if worse, otherwise follow-up in clinic if not getting better over the next several weeks.  The patient is in agreement with this plan.    I have reviewed the nursing notes.    I have reviewed the findings, diagnosis, plan and need for follow up with the patient.       New Prescriptions    TERBINAFINE (LAMISIL) 1 % EXTERNAL CREAM    Apply topically 2 times daily for 28 days       Final diagnoses:   Tinea pedis of left foot       10/16/2021   [facility] EMERGENCY DEPT     Robert Lora MD  10/16/21 [id number]

## 2021-10-17 NOTE — DISCHARGE INSTRUCTIONS
Apply the cream twice a day for the next 4 weeks.  Return to the emergency department if you have worsening pain, rapidly spreading rash, fevers, or other concerns.  Otherwise follow-up in clinic for recheck if not getting better over the next 2 weeks.

## 2021-10-18 ENCOUNTER — PATIENT OUTREACH (OUTPATIENT)
Dept: CARE COORDINATION | Facility: CLINIC | Age: 56
End: 2021-10-18

## 2021-10-18 DIAGNOSIS — Z71.89 OTHER SPECIFIED COUNSELING: ICD-10-CM

## 2021-10-18 NOTE — PROGRESS NOTES
Clinic Care Coordination Contact  [facility]: Post-Discharge Note  SITUATION                                                      Admission:    Admission Date: 10/16/21   Reason for Admission: Cellulitis  Discharge:   Discharge Date: 10/16/21  Discharge Diagnosis: Cellulitis    BACKGROUND                                                      Micah Nunez is a 55 year old male who presents for rash of the left foot.  Symptoms getting worse for the past several weeks.  Started is mildly itchy and is now more burning and slightly painful.  Is worried about infection.  No known injury.  No fevers or chills    ASSESSMENT      Enrollment  Primary Care Care Coordination Status: Declined    Discharge Assessment  How are you doing now that you are home?: I feel it is getting better slowly  How are your symptoms? (Red Flag symptoms escalate to triage hotline per guidelines): Improved  Do you feel your condition is stable enough to be safe at home until your provider visit?: Yes  Does the patient have their discharge instructions? : Yes  Does the patient have questions regarding their discharge instructions? : No  Were you started on any new medications or were there changes to any of your previous medications? : Yes  Does the patient have all of their medications?: Yes  Do you have questions regarding any of your medications? : No  Do you have all of your needed medical supplies or equipment (DME)?  (i.e. oxygen tank, CPAP, cane, etc.): Yes  Discharge follow-up appointment scheduled within 14 calendar days? : Yes  Discharge Follow Up Appointment Date:  (Patient did not know the date but said it was at the end of the week)  Discharge Follow Up Appointment Scheduled with?: Specialty Care Provider    Post-op ([facility] Only)  If the patient had a surgery or procedure, do they have any questions for a nurse?: No           PLAN                                                      Outpatient Plan:  follow-up in clinic  for recheck  if not getting better over the next 2 weeks    No future appointments.      For any urgent concerns, please contact our 24 hour nurse triage line: [phone] ([phone])         Jazlyn Cartwright   Community Health Worker   [facility]  Ph: [phone]  Fx: [phone]

## 2021-10-25 ENCOUNTER — HOSPITAL ENCOUNTER (EMERGENCY)
Facility: CLINIC | Age: 56
Discharge: HOME | End: 2021-10-26
Attending: EMERGENCY MEDICINE | Admitting: EMERGENCY MEDICINE
Payer: COMMERCIAL

## 2021-10-25 ENCOUNTER — APPOINTMENT (OUTPATIENT)
Dept: GENERAL RADIOLOGY | Facility: CLINIC | Age: 56
End: 2021-10-25
Attending: EMERGENCY MEDICINE
Payer: COMMERCIAL

## 2021-10-25 ENCOUNTER — APPOINTMENT (OUTPATIENT)
Dept: ULTRASOUND IMAGING | Facility: CLINIC | Age: 56
End: 2021-10-25
Attending: EMERGENCY MEDICINE
Payer: COMMERCIAL

## 2021-10-25 ENCOUNTER — APPOINTMENT (OUTPATIENT)
Dept: CT IMAGING | Facility: CLINIC | Age: 56
End: 2021-10-25
Attending: EMERGENCY MEDICINE
Payer: COMMERCIAL

## 2021-10-25 VITALS
OXYGEN SATURATION: 97 % | WEIGHT: 180 LBS | DIASTOLIC BLOOD PRESSURE: 94 MMHG | HEART RATE: 105 BPM | BODY MASS INDEX: 22.5 KG/M2 | SYSTOLIC BLOOD PRESSURE: 160 MMHG | RESPIRATION RATE: 20 BRPM | TEMPERATURE: 97.6 F

## 2021-10-25 DIAGNOSIS — L03.116 CELLULITIS OF LEFT FOOT: ICD-10-CM

## 2021-10-25 DIAGNOSIS — J18.9 COMMUNITY ACQUIRED PNEUMONIA, UNSPECIFIED LATERALITY: ICD-10-CM

## 2021-10-25 DIAGNOSIS — J90 PLEURAL EFFUSION: ICD-10-CM

## 2021-10-25 LAB
ANION GAP SERPL CALCULATED.3IONS-SCNC: 7 MMOL/L (ref 3–14)
BASOPHILS # BLD AUTO: 0.1 10E3/UL (ref 0–0.2)
BASOPHILS NFR BLD AUTO: 1 %
BUN SERPL-MCNC: 15 MG/DL (ref 7–30)
CALCIUM SERPL-MCNC: 8.5 MG/DL (ref 8.5–10.1)
CHLORIDE BLD-SCNC: 110 MMOL/L (ref 94–109)
CO2 SERPL-SCNC: 24 MMOL/L (ref 20–32)
CREAT SERPL-MCNC: 1.03 MG/DL (ref 0.66–1.25)
D DIMER PPP FEU-MCNC: 0.88 UG/ML FEU (ref 0–0.5)
EOSINOPHIL # BLD AUTO: 0.1 10E3/UL (ref 0–0.7)
EOSINOPHIL NFR BLD AUTO: 1 %
ERYTHROCYTE [DISTWIDTH] IN BLOOD BY AUTOMATED COUNT: 14.6 % (ref 10–15)
FLUAV RNA SPEC QL NAA+PROBE: NEGATIVE
FLUBV RNA RESP QL NAA+PROBE: NEGATIVE
GFR SERPL CREATININE-BSD FRML MDRD: 81 ML/MIN/1.73M2
GLUCOSE BLD-MCNC: 85 MG/DL (ref 70–99)
HCT VFR BLD AUTO: 40.5 % (ref 40–53)
HGB BLD-MCNC: 12.2 G/DL (ref 13.3–17.7)
IMM GRANULOCYTES # BLD: 0 10E3/UL
IMM GRANULOCYTES NFR BLD: 0 %
LYMPHOCYTES # BLD AUTO: 1.6 10E3/UL (ref 0.8–5.3)
LYMPHOCYTES NFR BLD AUTO: 17 %
MCH RBC QN AUTO: 26.2 PG (ref 26.5–33)
MCHC RBC AUTO-ENTMCNC: 30.1 G/DL (ref 31.5–36.5)
MCV RBC AUTO: 87 FL (ref 78–100)
MONOCYTES # BLD AUTO: 0.7 10E3/UL (ref 0–1.3)
MONOCYTES NFR BLD AUTO: 8 %
NEUTROPHILS # BLD AUTO: 6.7 10E3/UL (ref 1.6–8.3)
NEUTROPHILS NFR BLD AUTO: 73 %
NRBC # BLD AUTO: 0 10E3/UL
NRBC BLD AUTO-RTO: 0 /100
PLATELET # BLD AUTO: 241 10E3/UL (ref 150–450)
POTASSIUM BLD-SCNC: 4 MMOL/L (ref 3.4–5.3)
RBC # BLD AUTO: 4.65 10E6/UL (ref 4.4–5.9)
SARS-COV-2 RNA RESP QL NAA+PROBE: NEGATIVE
SODIUM SERPL-SCNC: 141 MMOL/L (ref 133–144)
WBC # BLD AUTO: 9.2 10E3/UL (ref 4–11)

## 2021-10-25 PROCEDURE — 96365 THER/PROPH/DIAG IV INF INIT: CPT | Mod: 59 | Performed by: EMERGENCY MEDICINE

## 2021-10-25 PROCEDURE — 258N000003 HC RX IP 258 OP 636: Performed by: EMERGENCY MEDICINE

## 2021-10-25 PROCEDURE — 250N000009 HC RX 250: Performed by: EMERGENCY MEDICINE

## 2021-10-25 PROCEDURE — 36415 COLL VENOUS BLD VENIPUNCTURE: CPT | Performed by: EMERGENCY MEDICINE

## 2021-10-25 PROCEDURE — 99285 EMERGENCY DEPT VISIT HI MDM: CPT | Performed by: EMERGENCY MEDICINE

## 2021-10-25 PROCEDURE — 85025 COMPLETE CBC W/AUTO DIFF WBC: CPT | Performed by: EMERGENCY MEDICINE

## 2021-10-25 PROCEDURE — 99285 EMERGENCY DEPT VISIT HI MDM: CPT | Mod: 25 | Performed by: EMERGENCY MEDICINE

## 2021-10-25 PROCEDURE — 96361 HYDRATE IV INFUSION ADD-ON: CPT | Performed by: EMERGENCY MEDICINE

## 2021-10-25 PROCEDURE — 87636 SARSCOV2 & INF A&B AMP PRB: CPT | Performed by: EMERGENCY MEDICINE

## 2021-10-25 PROCEDURE — C9803 HOPD COVID-19 SPEC COLLECT: HCPCS | Performed by: EMERGENCY MEDICINE

## 2021-10-25 PROCEDURE — 93970 EXTREMITY STUDY: CPT

## 2021-10-25 PROCEDURE — 80048 BASIC METABOLIC PNL TOTAL CA: CPT | Performed by: EMERGENCY MEDICINE

## 2021-10-25 PROCEDURE — 250N000011 HC RX IP 250 OP 636: Performed by: EMERGENCY MEDICINE

## 2021-10-25 PROCEDURE — 83605 ASSAY OF LACTIC ACID: CPT | Performed by: EMERGENCY MEDICINE

## 2021-10-25 PROCEDURE — 96366 THER/PROPH/DIAG IV INF ADDON: CPT | Performed by: EMERGENCY MEDICINE

## 2021-10-25 PROCEDURE — 85379 FIBRIN DEGRADATION QUANT: CPT | Performed by: EMERGENCY MEDICINE

## 2021-10-25 PROCEDURE — 73630 X-RAY EXAM OF FOOT: CPT | Mod: LT

## 2021-10-25 PROCEDURE — 71275 CT ANGIOGRAPHY CHEST: CPT

## 2021-10-25 RX ORDER — DOXYCYCLINE 100 MG/1
100 CAPSULE ORAL ONCE
Status: COMPLETED | OUTPATIENT
Start: 2021-10-26 | End: 2021-10-26

## 2021-10-25 RX ORDER — HYDROCODONE BITARTRATE AND ACETAMINOPHEN 5; 325 MG/1; MG/1
1-2 TABLET ORAL EVERY 4 HOURS PRN
Qty: 12 TABLET | Refills: 0 | Status: SHIPPED | OUTPATIENT
Start: 2021-10-25 | End: 2021-10-26

## 2021-10-25 RX ORDER — CEPHALEXIN 500 MG/1
500 CAPSULE ORAL 2 TIMES DAILY
Qty: 40 CAPSULE | Refills: 0 | Status: SHIPPED | OUTPATIENT
Start: 2021-10-25 | End: 2021-10-26

## 2021-10-25 RX ORDER — IOPAMIDOL 755 MG/ML
79 INJECTION, SOLUTION INTRAVASCULAR ONCE
Status: COMPLETED | OUTPATIENT
Start: 2021-10-25 | End: 2021-10-25

## 2021-10-25 RX ORDER — CEPHALEXIN 500 MG/1
500 CAPSULE ORAL ONCE
Status: COMPLETED | OUTPATIENT
Start: 2021-10-25 | End: 2021-10-26

## 2021-10-25 RX ORDER — DOXYCYCLINE 100 MG/1
100 CAPSULE ORAL EVERY 12 HOURS
Qty: 14 CAPSULE | Refills: 0 | Status: SHIPPED | OUTPATIENT
Start: 2021-10-25 | End: 2021-10-26

## 2021-10-25 RX ORDER — CEFAZOLIN SODIUM 2 G/100ML
2 INJECTION, SOLUTION INTRAVENOUS ONCE
Status: COMPLETED | OUTPATIENT
Start: 2021-10-25 | End: 2021-10-25

## 2021-10-25 RX ADMIN — SODIUM CHLORIDE 100 ML: 9 INJECTION, SOLUTION INTRAVENOUS at 20:38

## 2021-10-25 RX ADMIN — CEFAZOLIN SODIUM 2 G: 2 INJECTION, SOLUTION INTRAVENOUS at 18:32

## 2021-10-25 RX ADMIN — IOPAMIDOL 79 ML: 755 INJECTION, SOLUTION INTRAVENOUS at 20:38

## 2021-10-25 RX ADMIN — SODIUM CHLORIDE, POTASSIUM CHLORIDE, SODIUM LACTATE AND CALCIUM CHLORIDE 1000 ML: 600; 310; 30; 20 INJECTION, SOLUTION INTRAVENOUS at 17:33

## 2021-10-25 NOTE — ED PROVIDER NOTES
History     Chief Complaint   Patient presents with     Foot Swelling     pt had surgery on his left foot in July. Two weeks ago foot became red, swollen and painful. Some weeping noted.      Foot Pain     Shortness of Breath     SOB started one week ago     HPI  Micah Nunez is a 55 year old male with history of plate and screw arthrodesis of the left first MTP joint 7/25/2021 and status post comminuted fracture of the left fifth metatarsal who presents emergency department for primary complaint of gradually worsening left foot redness, swelling and pain which began approximately 2 weeks ago and today prompted him to come to the ED because he does not feel he can wait until his Orthopedics appointment at [facility] tomorrow. No recent foot injury or trauma.  No STS, redness or streaking or leg pain proximally.  No fever, but he has felt chilled.  He was seen in the ED 9 days ago, 10/16/21, for left foot redness and itchy rash, was felt to have tinea pedis and was prescribed Terbinafine/Lamisil cream which did not help.  He has had gradually worsening swelling, redness and pain.  In addition, he reports that in the past week he has felt mildly short of breath and reports a nonproductive cough in the past several days.  He denies chest pain, hemoptysis or other URI symptoms or other signs or symptoms of COVID-19 illness.  No known infectious exposures. He is not vaccinated vs Covid-19.  He smokes.  Previous Records Reviewed:  EXAM: XR FOOT LT AP/MO/LAT   LOCATION:  [facility]   DATE/TIME: 9/10/2021    INDICATION: Postop. COMPARISON: 08/11/2021.     IMPRESSION:   Stable postoperative changes of plate and screw arthrodesis of the first MTP joint. Comminuted fracture of the fifth metatarsal, not significantly changed from the previous examination although there does appear to be some new bone formation along the distal lateral margin of the fracture.     Allergies:  No Known  Allergies    Problem List:    Patient Active Problem List    Diagnosis Date Noted     Benign essential hypertension 10/18/2017     Priority: Medium     Tobacco use disorder 08/12/2015     Priority: Medium     Chemical dependency (H) 07/27/2015     Priority: Medium     Papilloma of nose 03/10/2014     Priority: Medium     Nasal mass 12/17/2013     Priority: Medium     CARDIOVASCULAR SCREENING; LDL GOAL LESS THAN 130 12/11/2013     Priority: Medium        Past Medical History:    Past Medical History:   Diagnosis Date     Benign essential hypertension 10/18/2017     Bleeding disorder (H)      CARDIOVASCULAR SCREENING; LDL GOAL LESS THAN 130 12/11/2013     Chemical dependency (H)      Gastroesophageal reflux disease      Hypertension      Nasal mass 12/17/2013     Papilloma of nose 3/10/2014     Skin disease        Past Surgical History:    Past Surgical History:   Procedure Laterality Date     ARTHRODESIS FOOT Left 2/17/2015    Procedure: ARTHRODESIS FOOT;  Surgeon: Cortez Hayward DPM;  Location: [facility]     ARTHRODESIS TOE(S)  12/20/2013    Procedure: ARTHRODESIS TOE(S);  Arthrodesis first metatarsophalangeal joint left foot;  Surgeon: Cortez Hayward DPM;  Location: [facility]     COLONOSCOPY N/A 1/12/2021    Procedure: COLONOSCOPY;  Surgeon: Dixon Sarabia MD;  Location: [facility]     ENDOSCOPIC SINUS SURGERY  1/6/2014    Procedure: ENDOSCOPIC SINUS SURGERY;  Functional Endoscopic Sinus Surgery;  Surgeon: Bobo Renteria MD;  Location:  OR     ENDOSCOPIC SINUS SURGERY  7/21/2014    Procedure: ENDOSCOPIC SINUS SURGERY;  Surgeon: Bobo Renteria MD;  Location:  OR     ENDOSCOPIC SINUS SURGERY N/A 4/6/2015    Procedure: ENDOSCOPIC SINUS SURGERY;  Surgeon: Bobo Renteria MD;  Location:  OR     ENDOSCOPIC SINUS SURGERY N/A 8/17/2015    Procedure: ENDOSCOPIC SINUS SURGERY;  Surgeon: Bobo Renteria MD;  [facility]     ENDOSCOPIC SINUS SURGERY Bilateral 8/19/2019    Procedure:  Bilateral Functional Endoscopic Sinus Surgery, Right Nasal Endoscopy and Excision of Left Nasal Mass;  Surgeon: Bobo Renteria MD;  [facility]: [facility]     ENT SURGERY       EXCISE NASAL MASS Left 11/6/2017    Procedure: EXCISE NASAL MASS;  Excision of Left Nasal Papilloma;  Surgeon: Bobo Renteria MD;  [facility]     LAPAROSCOPIC HERNIORRHAPHY INGUINAL BILATERAL Bilateral 4/12/2017    Procedure: LAPAROSCOPIC HERNIORRHAPHY INGUINAL BILATERAL;  Surgeon: Shay Mercado MD;  Location: [facility]     NASAL ENDOSCOPY Bilateral 2/6/2017    Procedure: NASAL ENDOSCOPY;  Surgeon: Bobo Renteria MD;  [facility]     NASAL ENDOSCOPY N/A 5/21/2018    Procedure: NASAL ENDOSCOPY;  Nasal Endoscopy, CO2 Laser Excision of Left Nasal Papilloma;  Surgeon: Bobo Renteria MD;  [facility]     NASAL/SINUS POLYPECTOMY       PE TUBES         Family History:    Family History   Problem Relation Age of Onset     Diabetes Mother 40     C.A.D. Father 72     Unknown/Adopted No family hx of      Depression No family hx of      Anxiety Disorder No family hx of      Schizophrenia No family hx of      Bipolar Disorder No family hx of      Suicide No family hx of      Substance Abuse No family hx of      Dementia No family hx of      Homero Disease No family hx of      Parkinsonism No family hx of      Autism Spectrum Disorder No family hx of      Intellectual Disability (Mental Retardation) No family hx of      Mental Illness No family hx of      Bleeding Disorder No family hx of        Social History:  Marital Status:  Single [1]  Social History     Tobacco Use     Smoking status: Current Every Day Smoker     Packs/day: 0.50     Years: 40.00     Pack years: 20.00     Types: Cigarettes     Start date: 1/1/1980     Smokeless tobacco: Never Used     Tobacco comment: 1 pack/day   Vaping Use     Vaping Use: Never used   Substance Use Topics     Alcohol use: Yes     Comment: occasional      Drug use: Not Currently      Comment: 7 years quit Cocaine/methamphetamines        Medications:    cephALEXin (KEFLEX) 500 MG capsule  dicyclomine (BENTYL) 10 MG capsule  doxycycline monohydrate (MONODOX) 100 MG capsule  gabapentin (NEURONTIN) 300 MG capsule  HYDROcodone-acetaminophen (NORCO) 5-325 MG tablet  hydrOXYzine (ATARAX) 25 MG tablet  lisinopril-hydrochlorothiazide (ZESTORETIC) 10-12.5 MG tablet  methocarbamol (ROBAXIN) 500 MG tablet  order for DME  terbinafine (LAMISIL) 1 % external cream  polyethylene glycol (MIRALAX) 17 GM/Dose powder        Review of Systems  As mentioned above in the history present illness.  All other systems were reviewed and are negative.    Physical Exam   BP: (!) 160/94  Pulse: 105  Temp: 97.6  F (36.4  C)  Resp: 20  Weight: 81.6 kg (180 lb)  SpO2: 99 %      Physical Exam  Vitals and nursing note reviewed.   Constitutional:       General: He is not in acute distress.     Appearance: Normal appearance. He is well-developed. He is not ill-appearing or diaphoretic.   HENT:      Head: Normocephalic and atraumatic.      Right Ear: External ear normal.      Left Ear: External ear normal.      Nose: Nose normal.      Mouth/Throat:      Mouth: Mucous membranes are moist.   Eyes:      General: No scleral icterus.     Extraocular Movements: Extraocular movements intact.      Conjunctiva/sclera: Conjunctivae normal.   Neck:      Trachea: No tracheal deviation.   Cardiovascular:      Rate and Rhythm: Normal rate and regular rhythm.      Pulses: Normal pulses.      Heart sounds: Normal heart sounds. No murmur heard.   No friction rub. No gallop.    Pulmonary:      Effort: Pulmonary effort is normal. No tachypnea, accessory muscle usage or respiratory distress.      Breath sounds: Normal breath sounds. No stridor. No decreased breath sounds, wheezing, rhonchi or rales.   Abdominal:      General: There is no distension.   Musculoskeletal:         General: Normal range of motion.      Cervical back: Normal range of motion  and neck supple.      Right lower leg: No edema.      Left lower leg: Tenderness ( dorsal left foot) present. Edema ( left foot) present.   Skin:     General: Skin is warm and dry.      Coloration: Skin is not cyanotic or pale.      Findings: Erythema (dosal left foot, as photographed) present. No rash.      Nails: There is no clubbing.   Neurological:      General: No focal deficit present.      Mental Status: He is alert and oriented to person, place, and time.      Coordination: Coordination normal.   Psychiatric:         Mood and Affect: Mood normal.         Behavior: Behavior normal.       .        ED Course        Procedures                Results for orders placed or performed during the hospital encounter of 10/25/21 (from the past 24 hour(s))   Symptomatic Influenza A/B & SARS-CoV2 (COVID-19) Virus PCR Multiplex Nasopharyngeal    Specimen: Nasopharyngeal; Swab   Result Value Ref Range    Influenza A target Negative Negative    Influenza B target Negative Negative    SARS CoV2 PCR Negative Negative    Narrative    Testing was performed using the soha SARS-CoV-2 & Influenza A/B Assay on the soha Geovanna System. This test should be ordered for the detection of SARS-CoV-2 and influenza viruses in individuals who meet clinical and/or epidemiological criteria. Test performance is unknown in asymptomatic patients. This test is for in vitro diagnostic use under the FDA EUA for laboratories certified under CLIA to perform moderate and/or high complexity testing. This test has not been FDA cleared or approved. A negative result does not rule out the presence of PCR inhibitors in the specimen or target RNA in concentration below the limit of detection for the assay. If only one viral target is positive but coinfection with multiple targets is suspected, the sample should be re-tested with another FDA cleared, approved or authorized test, if coinfection would change clinical management. [facility]  are certified under the Clinical Laboratory Improvement Amendments of 1988 (CLIA-88) as  qualified to perform moderate and/or high complexity laboratory testing.   US Lower Extremity Venous Duplex Bilateral    Narrative    EXAM: US LOWER EXTREMITY VENOUS DUPLEX BILATERAL  LOCATION: [facility]  DATE/TIME: 10/25/2021 5:30 PM    INDICATION: bilateral LE swelling, left > right  COMPARISON: None.  TECHNIQUE: Venous Duplex ultrasound of bilateral lower extremities with and without compression, augmentation and duplex. Color flow and spectral Doppler with waveform analysis performed.    FINDINGS: Exam includes the common femoral, femoral, popliteal veins as well as segmentally visualized deep calf veins and greater saphenous vein.     RIGHT: No deep vein thrombosis. No superficial thrombophlebitis. No popliteal cyst.    LEFT: No deep vein thrombosis. No superficial thrombophlebitis. No popliteal cyst.      Impression    IMPRESSION:  1.  No deep venous thrombosis in the bilateral lower extremities.  2.  Note is made of pulsatile venous waveforms bilaterally. Although this has classically been described with elevated right heart pressures the sensitivity is quite low and it is often seen in normal studies as well.   Basic metabolic panel   Result Value Ref Range    Sodium 141 133 - 144 mmol/L    Potassium 4.0 3.4 - 5.3 mmol/L    Chloride 110 (H) 94 - 109 mmol/L    Carbon Dioxide (CO2) 24 20 - 32 mmol/L    Anion Gap 7 3 - 14 mmol/L    Urea Nitrogen 15 7 - 30 mg/dL    Creatinine 1.03 0.66 - 1.25 mg/dL    Calcium 8.5 8.5 - 10.1 mg/dL    Glucose 85 70 - 99 mg/dL    GFR Estimate 81 >60 mL/min/1.73m2   CBC with platelets differential    Narrative    The following orders were created for panel order CBC with platelets differential.  Procedure                               Abnormality         Status                     ---------                               -----------         ------                      CBC with platelets and d...[[id number]]  Abnormal            Final result                 Please view results for these tests on the individual orders.   D dimer quantitative   Result Value Ref Range    D-Dimer Quantitative 0.88 (H) 0.00 - 0.50 ug/mL FEU    Narrative    This D-dimer assay is intended for use in conjunction with a clinical pretest probability assessment model to exclude pulmonary embolism (PE) and deep venous thrombosis (DVT) in outpatients suspected of PE or DVT. The cut-off value is 0.50 ug/mL FEU.   Lactic acid whole blood   Result Value Ref Range    Lactic Acid 1.5 0.7 - 2.0 mmol/L   CBC with platelets and differential   Result Value Ref Range    WBC Count 9.2 4.0 - 11.0 10e3/uL    RBC Count 4.65 4.40 - 5.90 10e6/uL    Hemoglobin 12.2 (L) 13.3 - 17.7 g/dL    Hematocrit 40.5 40.0 - 53.0 %    MCV 87 78 - 100 fL    MCH 26.2 (L) 26.5 - 33.0 pg    MCHC 30.1 (L) 31.5 - 36.5 g/dL    RDW 14.6 10.0 - 15.0 %    Platelet Count 241 150 - 450 10e3/uL    % Neutrophils 73 %    % Lymphocytes 17 %    % Monocytes 8 %    % Eosinophils 1 %    % Basophils 1 %    % Immature Granulocytes 0 %    NRBCs per 100 WBC 0 <1 /100    Absolute Neutrophils 6.7 1.6 - 8.3 10e3/uL    Absolute Lymphocytes 1.6 0.8 - 5.3 10e3/uL    Absolute Monocytes 0.7 0.0 - 1.3 10e3/uL    Absolute Eosinophils 0.1 0.0 - 0.7 10e3/uL    Absolute Basophils 0.1 0.0 - 0.2 10e3/uL    Absolute Immature Granulocytes 0.0 <=0.0 10e3/uL    Absolute NRBCs 0.0 10e3/uL   XR Foot Left G/E 3 Views    Narrative    EXAM: XR FOOT LEFT G/E 3 VIEWS  LOCATION: [facility]  DATE/TIME: 10/25/2021 7:08 PM    INDICATION: Left foot pain and swelling. Known fifth metatarsal fracture (comparison exam unavailable).  COMPARISON: 4/20/2017.      Impression    IMPRESSION:  1.  Comminuted fracture of the right fifth metatarsal base and proximal diaphysis; the base component is unhealed and the diaphysis component likely demonstrates partial osseous  healing. No additional fractures.  2.  Instrumented arthrodesis of the first MTP joint with plate and screw fixation. The orthopedic hardware is intact and there is osseous fusion of the joint.  3.  Mild second MTP degenerative arthrosis.  4.  Moderate soft tissue swelling in the dorsal forefoot.   CT Chest Pulmonary Embolism w Contrast    Narrative    EXAM: CT CHEST PULMONARY EMBOLISM W CONTRAST  LOCATION: [facility]  DATE/TIME: 10/25/2021 8:37 PM    INDICATION: PE suspected, low/intermediate probability, positive D-dimer.  COMPARISON: None.  TECHNIQUE: CT chest pulmonary angiogram during arterial phase injection of IV contrast. Multiplanar reformats and MIP reconstructions were performed. Dose reduction techniques were used.   CONTRAST: 79 mL Isovue 370.    FINDINGS:  ANGIOGRAM CHEST: Limited opacification of distal pulmonary arteries, no pulmonary embolism demonstrated. Thoracic aorta is nondiagnostic for dissection. No CT evidence of right heart strain.    LUNGS AND PLEURA: Moderate right and minimal left effusion with associated compressive atelectasis and or infiltrate. Interlobular septal thickening is evident, this may be related to chronic edema.    MEDIASTINUM/AXILLAE: Mildly enlarged lymph nodes in the mediastinum and hilum, possibly reactive in this setting. No aneurysm.    CORONARY ARTERY CALCIFICATION: Severe.    UPPER ABDOMEN: No acute findings.    MUSCULOSKELETAL: No frankly destructive bony lesions.      Impression    IMPRESSION:  1.  No pulmonary embolism demonstrated, there is limited opacification of distal pulmonary arteries, these are nonassessable.  2.  Moderate right and minimal left effusion and associated compressive atelectasis and or infiltrate.  3.  Interlobular septal thickening which may be related to chronic interstitial edema.  4.  Mild nonspecific adenopathy in the chest.       Medications   ceFAZolin (ANCEF) intermittent infusion 2 g in 100 mL dextrose  PRE-MIX (0 g Intravenous Stopped 10/25/21 2036)   lactated ringers BOLUS 1,000 mL (0 mLs Intravenous Stopped 10/25/21 2036)   iopamidol (ISOVUE-370) solution 79 mL (79 mLs Intravenous Given 10/25/21 2038)   sodium chloride 0.9 % bag 500mL for CT scan flush use (100 mLs Intravenous Given 10/25/21 2038)   cephALEXin (KEFLEX) capsule 500 mg (500 mg Oral Given 10/26/21 0008)   doxycycline hyclate (VIBRAMYCIN) capsule 100 mg (100 mg Oral Given 10/26/21 0008)     Elevated d-dimer, will get a CT.    He declined admission and wants to be discharged so that he can keep his appointment with his Orthopedic surgeon tomorrow morning at 10:30 AM at [facility].    11:47 PM - After discharge I was notified that he is in the lobby having difficulty getting his medications dispensed from h the [employer] machine due to request for a copayment.  I will order a dose of Keflex 500 mg p.o., first dose of his prescription and the first dose of Doxycycline.  Requested the prescriptions for his medications be sent to our retail pharmacy and he will pick them up in the a.m.    Assessments & Plan (with Medical Decision Making)   55 year old male with history of plate and screw arthrodesis of the left first MTP joint 7/25/2021 and status post comminuted fracture of the left fifth metatarsal who presents emergency department for primary complaint of gradually worsening left foot redness, swelling and pain which began approximately 2 weeks ago and today prompted him to come to the ED because he does not feel he can wait until his Orthopedics appointment at [facility] tomorrow. No recent foot injury or trauma. He was seen in the ED 9 days ago, 10/16/21, for left foot redness and itchy rash, was felt to have tinea pedis and was prescribed Terbinafine/Lamisil cream which did not help.  He has had gradually worsening swelling, redness and pain and exam c/w celluitis without sepsis. No hx of MRSA. He was given Ancef IV and rx Keflex, he  declined admission. In addition, he reports that in the past week he has felt mildly short of breath and reports a nonproductive cough in the past several days. No respiratory distress or hypoxia. No evidence of DVT, PE or CHF. Covid-19 negative.  Due to his history of cough and possible community-acquired pneumonia on CT scan, will add Doxycycline for coverage of community-acquired pneumonia. He was provided instructions for supportive care and will return as needed for worsened condition or worsening symptoms, or new problems or concerns.      I have reviewed the nursing notes.    I have reviewed the findings, diagnosis, plan and need for follow up with the patient.    Discharge Medication List as of 10/25/2021 10:13 PM      START taking these medications    Details   cephALEXin (KEFLEX) 500 MG capsule Take 1 capsule (500 mg) by mouth 2 times daily, Disp-40 capsule, R-0, [facility]      doxycycline monohydrate (MONODOX) 100 MG capsule Take 1 capsule (100 mg) by mouth every 12 hours for 7 days (for pneumonia), Disp-14 capsule, R-0, [facility]      HYDROcodone-acetaminophen (NORCO) 5-325 MG tablet Take 1-2 tablets by mouth every 4 hours as needed for moderate to severe pain maximum 6 tablet(s) per day, Disp-12 tablet, R-0, [facility]             Final diagnoses:   Cellulitis of left foot   Pleural effusion - bilateral, possibly due to community aquired pneumonia   Community acquired pneumonia, unspecified laterality       10/25/2021   [facility] EMERGENCY DEPT     Juancarlos Wetzel MD  10/28/21 [id number]

## 2021-10-25 NOTE — ED NOTES
SOA and cough x1 week.  Pt presents to ED with complaints of left foot swelling and redness.  Weeping noted.  Pt denies fevers, n/v/d, or loss of taste and smell.

## 2021-10-25 NOTE — ED TRIAGE NOTES
pt had surgery on his left foot in July. Two weeks ago foot became red, swollen and painful. Some weeping noted. Pt also report SOB for one week.

## 2021-10-26 LAB — LACTATE SERPL-SCNC: 1.5 MMOL/L (ref 0.7–2)

## 2021-10-26 PROCEDURE — 250N000013 HC RX MED GY IP 250 OP 250 PS 637: Performed by: EMERGENCY MEDICINE

## 2021-10-26 RX ORDER — HYDROCODONE BITARTRATE AND ACETAMINOPHEN 5; 325 MG/1; MG/1
1-2 TABLET ORAL EVERY 4 HOURS PRN
Qty: 12 TABLET | Refills: 0 | Status: ON HOLD | OUTPATIENT
Start: 2021-10-26 | End: 2021-11-29

## 2021-10-26 RX ORDER — CEPHALEXIN 500 MG/1
500 CAPSULE ORAL 2 TIMES DAILY
Qty: 40 CAPSULE | Refills: 0 | Status: ON HOLD | OUTPATIENT
Start: 2021-10-26 | End: 2021-11-29

## 2021-10-26 RX ORDER — DOXYCYCLINE 100 MG/1
100 CAPSULE ORAL EVERY 12 HOURS
Qty: 14 CAPSULE | Refills: 0 | Status: ON HOLD | OUTPATIENT
Start: 2021-10-26 | End: 2021-11-29

## 2021-10-26 RX ADMIN — DOXYCYCLINE HYCLATE 100 MG: 100 CAPSULE ORAL at 00:08

## 2021-10-26 RX ADMIN — CEPHALEXIN 500 MG: 500 CAPSULE ORAL at 00:08

## 2021-11-06 ENCOUNTER — APPOINTMENT (OUTPATIENT)
Dept: CT IMAGING | Facility: CLINIC | Age: 56
End: 2021-11-06
Attending: FAMILY MEDICINE
Payer: COMMERCIAL

## 2021-11-06 ENCOUNTER — HOSPITAL ENCOUNTER (EMERGENCY)
Facility: CLINIC | Age: 56
Discharge: HOME | End: 2021-11-06
Attending: FAMILY MEDICINE | Admitting: FAMILY MEDICINE
Payer: COMMERCIAL

## 2021-11-06 ENCOUNTER — APPOINTMENT (OUTPATIENT)
Dept: ULTRASOUND IMAGING | Facility: CLINIC | Age: 56
End: 2021-11-06
Attending: FAMILY MEDICINE
Payer: COMMERCIAL

## 2021-11-06 VITALS
DIASTOLIC BLOOD PRESSURE: 109 MMHG | TEMPERATURE: 97.8 F | BODY MASS INDEX: 23.12 KG/M2 | WEIGHT: 185 LBS | RESPIRATION RATE: 18 BRPM | OXYGEN SATURATION: 93 % | SYSTOLIC BLOOD PRESSURE: 151 MMHG | HEART RATE: 97 BPM

## 2021-11-06 DIAGNOSIS — R60.0 PERIPHERAL EDEMA: ICD-10-CM

## 2021-11-06 DIAGNOSIS — I87.2 VENOUS STASIS DERMATITIS OF BOTH LOWER EXTREMITIES: ICD-10-CM

## 2021-11-06 DIAGNOSIS — J18.9 COMMUNITY ACQUIRED PNEUMONIA OF RIGHT LOWER LOBE OF LUNG: ICD-10-CM

## 2021-11-06 LAB
ALBUMIN SERPL-MCNC: 2.5 G/DL (ref 3.4–5)
ALP SERPL-CCNC: 146 U/L (ref 40–150)
ALT SERPL W P-5'-P-CCNC: 29 U/L (ref 0–70)
ANION GAP SERPL CALCULATED.3IONS-SCNC: 3 MMOL/L (ref 3–14)
AST SERPL W P-5'-P-CCNC: 23 U/L (ref 0–45)
BASOPHILS # BLD AUTO: 0.1 10E3/UL (ref 0–0.2)
BASOPHILS NFR BLD AUTO: 1 %
BILIRUB SERPL-MCNC: 1 MG/DL (ref 0.2–1.3)
BUN SERPL-MCNC: 19 MG/DL (ref 7–30)
CALCIUM SERPL-MCNC: 8.8 MG/DL (ref 8.5–10.1)
CHLORIDE BLD-SCNC: 106 MMOL/L (ref 94–109)
CO2 SERPL-SCNC: 27 MMOL/L (ref 20–32)
CREAT SERPL-MCNC: 1.24 MG/DL (ref 0.66–1.25)
CRP SERPL-MCNC: 7.5 MG/L (ref 0–8)
EOSINOPHIL # BLD AUTO: 0.1 10E3/UL (ref 0–0.7)
EOSINOPHIL NFR BLD AUTO: 2 %
ERYTHROCYTE [DISTWIDTH] IN BLOOD BY AUTOMATED COUNT: 15.1 % (ref 10–15)
FLUAV RNA SPEC QL NAA+PROBE: NEGATIVE
FLUBV RNA RESP QL NAA+PROBE: NEGATIVE
GFR SERPL CREATININE-BSD FRML MDRD: 65 ML/MIN/1.73M2
GLUCOSE BLD-MCNC: 120 MG/DL (ref 70–99)
HCT VFR BLD AUTO: 43.9 % (ref 40–53)
HGB BLD-MCNC: 13.5 G/DL (ref 13.3–17.7)
HOLD SPECIMEN: NORMAL
IMM GRANULOCYTES # BLD: 0 10E3/UL
IMM GRANULOCYTES NFR BLD: 0 %
LACTATE SERPL-SCNC: 1.3 MMOL/L (ref 0.7–2)
LYMPHOCYTES # BLD AUTO: 1.8 10E3/UL (ref 0.8–5.3)
LYMPHOCYTES NFR BLD AUTO: 20 %
MCH RBC QN AUTO: 26.2 PG (ref 26.5–33)
MCHC RBC AUTO-ENTMCNC: 30.8 G/DL (ref 31.5–36.5)
MCV RBC AUTO: 85 FL (ref 78–100)
MONOCYTES # BLD AUTO: 0.7 10E3/UL (ref 0–1.3)
MONOCYTES NFR BLD AUTO: 8 %
NEUTROPHILS # BLD AUTO: 6.3 10E3/UL (ref 1.6–8.3)
NEUTROPHILS NFR BLD AUTO: 69 %
NRBC # BLD AUTO: 0 10E3/UL
NRBC BLD AUTO-RTO: 0 /100
PLATELET # BLD AUTO: 276 10E3/UL (ref 150–450)
POTASSIUM BLD-SCNC: 4.3 MMOL/L (ref 3.4–5.3)
PROT SERPL-MCNC: 6 G/DL (ref 6.8–8.8)
RBC # BLD AUTO: 5.15 10E6/UL (ref 4.4–5.9)
SARS-COV-2 RNA RESP QL NAA+PROBE: NEGATIVE
SODIUM SERPL-SCNC: 136 MMOL/L (ref 133–144)
TROPONIN I SERPL-MCNC: 0.02 UG/L (ref 0–0.04)
WBC # BLD AUTO: 8.9 10E3/UL (ref 4–11)

## 2021-11-06 PROCEDURE — 96361 HYDRATE IV INFUSION ADD-ON: CPT | Performed by: FAMILY MEDICINE

## 2021-11-06 PROCEDURE — 85025 COMPLETE CBC W/AUTO DIFF WBC: CPT | Performed by: FAMILY MEDICINE

## 2021-11-06 PROCEDURE — 250N000009 HC RX 250: Performed by: FAMILY MEDICINE

## 2021-11-06 PROCEDURE — 87636 SARSCOV2 & INF A&B AMP PRB: CPT | Performed by: FAMILY MEDICINE

## 2021-11-06 PROCEDURE — 86140 C-REACTIVE PROTEIN: CPT | Performed by: FAMILY MEDICINE

## 2021-11-06 PROCEDURE — 99285 EMERGENCY DEPT VISIT HI MDM: CPT | Mod: 25 | Performed by: FAMILY MEDICINE

## 2021-11-06 PROCEDURE — 93970 EXTREMITY STUDY: CPT

## 2021-11-06 PROCEDURE — 250N000011 HC RX IP 250 OP 636: Performed by: FAMILY MEDICINE

## 2021-11-06 PROCEDURE — 258N000003 HC RX IP 258 OP 636: Performed by: FAMILY MEDICINE

## 2021-11-06 PROCEDURE — 99285 EMERGENCY DEPT VISIT HI MDM: CPT | Performed by: FAMILY MEDICINE

## 2021-11-06 PROCEDURE — 83605 ASSAY OF LACTIC ACID: CPT | Performed by: FAMILY MEDICINE

## 2021-11-06 PROCEDURE — 36415 COLL VENOUS BLD VENIPUNCTURE: CPT | Performed by: FAMILY MEDICINE

## 2021-11-06 PROCEDURE — 82040 ASSAY OF SERUM ALBUMIN: CPT | Performed by: FAMILY MEDICINE

## 2021-11-06 PROCEDURE — C9803 HOPD COVID-19 SPEC COLLECT: HCPCS | Performed by: FAMILY MEDICINE

## 2021-11-06 PROCEDURE — 84484 ASSAY OF TROPONIN QUANT: CPT | Performed by: FAMILY MEDICINE

## 2021-11-06 PROCEDURE — 96365 THER/PROPH/DIAG IV INF INIT: CPT | Mod: 59 | Performed by: FAMILY MEDICINE

## 2021-11-06 PROCEDURE — 71275 CT ANGIOGRAPHY CHEST: CPT

## 2021-11-06 RX ORDER — SODIUM CHLORIDE 9 MG/ML
INJECTION, SOLUTION INTRAVENOUS CONTINUOUS
Status: DISCONTINUED | OUTPATIENT
Start: 2021-11-06 | End: 2021-11-06 | Stop reason: HOSPADM

## 2021-11-06 RX ORDER — LEVOFLOXACIN 750 MG/1
750 TABLET, FILM COATED ORAL DAILY
Qty: 10 TABLET | Refills: 0 | Status: SHIPPED | OUTPATIENT
Start: 2021-11-06 | End: 2021-11-16

## 2021-11-06 RX ORDER — IOPAMIDOL 755 MG/ML
79 INJECTION, SOLUTION INTRAVASCULAR ONCE
Status: COMPLETED | OUTPATIENT
Start: 2021-11-06 | End: 2021-11-06

## 2021-11-06 RX ORDER — LEVOFLOXACIN 5 MG/ML
750 INJECTION, SOLUTION INTRAVENOUS ONCE
Status: COMPLETED | OUTPATIENT
Start: 2021-11-06 | End: 2021-11-06

## 2021-11-06 RX ADMIN — SODIUM CHLORIDE 1000 ML: 9 INJECTION, SOLUTION INTRAVENOUS at 10:14

## 2021-11-06 RX ADMIN — IOPAMIDOL 79 ML: 755 INJECTION, SOLUTION INTRAVENOUS at 11:08

## 2021-11-06 RX ADMIN — SODIUM CHLORIDE 100 ML: 9 INJECTION, SOLUTION INTRAVENOUS at 11:09

## 2021-11-06 RX ADMIN — SODIUM CHLORIDE: 9 INJECTION, SOLUTION INTRAVENOUS at 12:33

## 2021-11-06 RX ADMIN — LEVOFLOXACIN 750 MG: 750 INJECTION, SOLUTION INTRAVENOUS at 12:32

## 2021-11-06 ASSESSMENT — ENCOUNTER SYMPTOMS
FATIGUE: 1
ALLERGIC/IMMUNOLOGIC NEGATIVE: 1
EYES NEGATIVE: 1
RESPIRATORY NEGATIVE: 1
HEMATOLOGIC/LYMPHATIC NEGATIVE: 1
APPETITE CHANGE: 1
CARDIOVASCULAR NEGATIVE: 1
WEAKNESS: 1
ACTIVITY CHANGE: 1
PSYCHIATRIC NEGATIVE: 1
ENDOCRINE NEGATIVE: 1
BACK PAIN: 1
WOUND: 1
GASTROINTESTINAL NEGATIVE: 1

## 2021-11-06 NOTE — DISCHARGE INSTRUCTIONS
Levaquin 750 mg p.o. daily x10 days.  Appointment with your primary care provider in clinic in the next 7 to 10 days before you complete antibiotics.  Leg elevation at night and FÁTIMA stockings during the day thigh-high.  Return to the emergency department if worse or changes.
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
## 2021-11-18 ENCOUNTER — ALLIED HEALTH/NURSE VISIT (OUTPATIENT)
Dept: FAMILY MEDICINE | Facility: CLINIC | Age: 56
End: 2021-11-18
Payer: COMMERCIAL

## 2021-11-18 ENCOUNTER — TELEPHONE (OUTPATIENT)
Dept: FAMILY MEDICINE | Facility: CLINIC | Age: 56
End: 2021-11-18

## 2021-11-18 DIAGNOSIS — R60.9 EDEMA: Primary | ICD-10-CM

## 2021-11-18 PROCEDURE — 99207 PR NO CHARGE NURSE ONLY: CPT
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
## 2021-11-19 ENCOUNTER — HOSPITAL ENCOUNTER (EMERGENCY)
Facility: HOSPITAL | Age: 56
Discharge: HOME | End: 2021-11-19
Attending: EMERGENCY MEDICINE | Admitting: EMERGENCY MEDICINE
Payer: COMMERCIAL

## 2021-11-19 VITALS
TEMPERATURE: 98.2 F | BODY MASS INDEX: 22.38 KG/M2 | HEART RATE: 102 BPM | HEIGHT: 75 IN | RESPIRATION RATE: 18 BRPM | SYSTOLIC BLOOD PRESSURE: 185 MMHG | OXYGEN SATURATION: 96 % | WEIGHT: 180 LBS | DIASTOLIC BLOOD PRESSURE: 114 MMHG

## 2021-11-19 DIAGNOSIS — I50.9 CONGESTIVE HEART FAILURE, UNSPECIFIED HF CHRONICITY, UNSPECIFIED HEART FAILURE TYPE (H): ICD-10-CM

## 2021-11-19 LAB
ALBUMIN SERPL-MCNC: 2.9 G/DL (ref 3.5–5)
ALP SERPL-CCNC: 115 U/L (ref 45–120)
ALT SERPL W P-5'-P-CCNC: 18 U/L (ref 0–45)
ANION GAP SERPL CALCULATED.3IONS-SCNC: 9 MMOL/L (ref 5–18)
AST SERPL W P-5'-P-CCNC: 18 U/L (ref 0–40)
BASOPHILS # BLD AUTO: 0.1 10E3/UL (ref 0–0.2)
BASOPHILS NFR BLD AUTO: 1 %
BILIRUB SERPL-MCNC: 1.1 MG/DL (ref 0–1)
BNP SERPL-MCNC: 3357 PG/ML (ref 0–46)
BUN SERPL-MCNC: 16 MG/DL (ref 8–22)
C REACTIVE PROTEIN LHE: 1.5 MG/DL (ref 0–0.8)
CALCIUM SERPL-MCNC: 8.6 MG/DL (ref 8.5–10.5)
CHLORIDE BLD-SCNC: 107 MMOL/L (ref 98–107)
CO2 SERPL-SCNC: 23 MMOL/L (ref 22–31)
CREAT SERPL-MCNC: 1.04 MG/DL (ref 0.7–1.3)
EOSINOPHIL # BLD AUTO: 0.1 10E3/UL (ref 0–0.7)
EOSINOPHIL NFR BLD AUTO: 2 %
ERYTHROCYTE [DISTWIDTH] IN BLOOD BY AUTOMATED COUNT: 16 % (ref 10–15)
ERYTHROCYTE [SEDIMENTATION RATE] IN BLOOD BY WESTERGREN METHOD: 2 MM/HR (ref 0–15)
GFR SERPL CREATININE-BSD FRML MDRD: 80 ML/MIN/1.73M2
GLUCOSE BLD-MCNC: 107 MG/DL (ref 70–125)
HCT VFR BLD AUTO: 43.5 % (ref 40–53)
HGB BLD-MCNC: 13.4 G/DL (ref 13.3–17.7)
IMM GRANULOCYTES # BLD: 0 10E3/UL
IMM GRANULOCYTES NFR BLD: 0 %
LACTATE SERPL-SCNC: 1.6 MMOL/L (ref 0.7–2)
LYMPHOCYTES # BLD AUTO: 1.5 10E3/UL (ref 0.8–5.3)
LYMPHOCYTES NFR BLD AUTO: 15 %
MCH RBC QN AUTO: 25.7 PG (ref 26.5–33)
MCHC RBC AUTO-ENTMCNC: 30.8 G/DL (ref 31.5–36.5)
MCV RBC AUTO: 83 FL (ref 78–100)
MONOCYTES # BLD AUTO: 0.7 10E3/UL (ref 0–1.3)
MONOCYTES NFR BLD AUTO: 8 %
NEUTROPHILS # BLD AUTO: 7.2 10E3/UL (ref 1.6–8.3)
NEUTROPHILS NFR BLD AUTO: 74 %
NRBC # BLD AUTO: 0 10E3/UL
NRBC BLD AUTO-RTO: 0 /100
PLATELET # BLD AUTO: 203 10E3/UL (ref 150–450)
POTASSIUM BLD-SCNC: 3.8 MMOL/L (ref 3.5–5)
PROT SERPL-MCNC: 5.5 G/DL (ref 6–8)
RBC # BLD AUTO: 5.22 10E6/UL (ref 4.4–5.9)
SODIUM SERPL-SCNC: 139 MMOL/L (ref 136–145)
WBC # BLD AUTO: 9.6 10E3/UL (ref 4–11)

## 2021-11-19 PROCEDURE — 85025 COMPLETE CBC W/AUTO DIFF WBC: CPT | Performed by: EMERGENCY MEDICINE

## 2021-11-19 PROCEDURE — 80053 COMPREHEN METABOLIC PANEL: CPT | Performed by: EMERGENCY MEDICINE

## 2021-11-19 PROCEDURE — 99283 EMERGENCY DEPT VISIT LOW MDM: CPT

## 2021-11-19 PROCEDURE — 83605 ASSAY OF LACTIC ACID: CPT | Performed by: EMERGENCY MEDICINE

## 2021-11-19 PROCEDURE — 36415 COLL VENOUS BLD VENIPUNCTURE: CPT | Performed by: EMERGENCY MEDICINE

## 2021-11-19 PROCEDURE — 86141 C-REACTIVE PROTEIN HS: CPT | Performed by: EMERGENCY MEDICINE

## 2021-11-19 PROCEDURE — 83880 ASSAY OF NATRIURETIC PEPTIDE: CPT | Performed by: EMERGENCY MEDICINE

## 2021-11-19 PROCEDURE — 85652 RBC SED RATE AUTOMATED: CPT | Performed by: EMERGENCY MEDICINE

## 2021-11-19 RX ORDER — FUROSEMIDE 20 MG
20 TABLET ORAL DAILY
Qty: 7 TABLET | Refills: 0 | Status: ON HOLD | OUTPATIENT
Start: 2021-11-19 | End: 2021-11-29

## 2021-11-19 ASSESSMENT — ENCOUNTER SYMPTOMS
DIFFICULTY URINATING: 0
DYSURIA: 0
HEMATURIA: 0
FEVER: 0
VOMITING: 0
COLOR CHANGE: 1
NAUSEA: 0

## 2021-11-19 ASSESSMENT — VISUAL ACUITY: OD: 20/10

## 2021-11-19 ASSESSMENT — MIFFLIN-ST. JEOR: SCORE: 1737.1

## 2021-11-19 NOTE — ED PROVIDER NOTES
EMERGENCY DEPARTMENT ENCOUNTER      NAME: Micah Nunez  AGE: 55 year old male  YOB: 1965  MRN: [id number]  EVALUATION DATE & TIME: 11/19/2021  5:03 AM    PCP: Yovany White    ED PROVIDER: Buzz Del Rio M.D.      Chief Complaint   Patient presents with     Leg Swelling         FINAL IMPRESSION:  1. Congestive heart failure, unspecified HF chronicity, unspecified heart failure type (H)          ED COURSE & MEDICAL DECISION MAKING:    Pertinent Labs & Imaging studies reviewed. (See chart for details)  55 year old male presents to the Emergency Department for evaluation of leg swelling.  He is also had some shortness of breath.  Is been seen for possible cellulitis and pneumonia.  Examining his leg there is swelling bilaterally this actually goes up to the groin.  There is some mild erythema but no obvious warmth or infection.  CRP and sed rate are normal.  White count is normal.  I do not think this is infectious.  I think is more likely fluid overload and possible CHF.  His BNP is 3300.  Troponin is normal.  He has no chest pain.  Do not think this is an acute cardiac event.  We will start him on Lasix to help to get some of the fluid off.  He will follow up with his primary soon as possible.  Return for worsening symptoms.  He has already been scanned for DVT by ultrasound and PE by CAT scan patient given Lasix for home.  Will follow up with primary.  Return for worsening symptoms.    5:25 AM I met with the patient to gather history and to perform my initial exam. I discussed the plan for care while in the Emergency Department. PPE: surgical mask, gloves, protective eyewear    At the conclusion of the encounter I discussed the results of all of the tests and the disposition. The questions were answered. The patient or family acknowledged understanding and was agreeable with the care plan.       MEDICATIONS GIVEN IN THE EMERGENCY:  Medications - No data to display    NEW  PRESCRIPTIONS STARTED AT TODAY'S ER VISIT  New Prescriptions    FUROSEMIDE (LASIX) 20 MG TABLET    Take 1 tablet (20 mg) by mouth daily for 7 days          =================================================================    HPI    Patient information was obtained from: Patient    Use of : N/A         Micah Nunez is a 55 year old male with a pertinent history of HTN who presents to this ED via EMS for evaluation of lower extremity swelling.    Per chart review, patient was seen in the ED on 10/16/21 for several weeks of worsening rash to left foot. Discharged with terbinafine cream. He was seen again for worsening rash on 10/25/21. He was given Ancef IV and rx Keflex but denies admission. Ultrasound negative, CT chest/abdomen mainly unremarkable. He was discharged with 10 day course of Keflex (500 mg BID) and 7 day course of doxycycline (100 mg BID). Patient most recently seen in ED on 11/5/21 for worsening left lower extremity swelling, redness, and pain where CT chest negative and ultrasound negative. Patient discharged with 10 day course of Levaquin (750 mg).    Patient reports undergoing surgery to left arm and left leg on 7/25/21. His left upper extremity has healed well but left lower leg has had worsening redness, swelling, and pain since the beginning of October. He has been seen several times for this and prescribed antibiotics without improvement. For the past 5-7 days he notes the redness, pain, and swelling has begun to extend up his leg to his left knee, thigh, and groin. Pain is provoked by bending his knee. Denies fever, nausea, or vomiting.     Patient denies a cardiac history. He otherwise denies dysuria, hematuria, changes in bowel habits, or additional medical concerns or complaints at this time.     REVIEW OF SYSTEMS   Review of Systems   Constitutional: Negative for fever.   Cardiovascular: Positive for leg swelling (left lower extremity).   Gastrointestinal: Negative for  nausea and vomiting.   Genitourinary: Negative for difficulty urinating, dysuria and hematuria.   Musculoskeletal:        Positive for pain to left lower extremity.   Skin: Positive for color change (redness of left lower extremity).   All other systems reviewed and are negative.     PAST MEDICAL HISTORY:  Past Medical History:   Diagnosis Date     Benign essential hypertension 10/18/2017     Bleeding disorder (H)      CARDIOVASCULAR SCREENING; LDL GOAL LESS THAN 130 12/11/2013     Chemical dependency (H)      Gastroesophageal reflux disease      Hypertension      Nasal mass 12/17/2013     Papilloma of nose 3/10/2014     Skin disease        PAST SURGICAL HISTORY:  Past Surgical History:   Procedure Laterality Date     ARTHRODESIS FOOT Left 2/17/2015    Procedure: ARTHRODESIS FOOT;  Surgeon: Cortez Hayward DPM;  Location: [facility]     ARTHRODESIS TOE(S)  12/20/2013    Procedure: ARTHRODESIS TOE(S);  Arthrodesis first metatarsophalangeal joint left foot;  Surgeon: Cortez Hayward DPM;  Location: [facility]     COLONOSCOPY N/A 1/12/2021    Procedure: COLONOSCOPY;  Surgeon: Dixon Sarabia MD;  Location: [facility]     ENDOSCOPIC SINUS SURGERY  1/6/2014    Procedure: ENDOSCOPIC SINUS SURGERY;  Functional Endoscopic Sinus Surgery;  Surgeon: Bobo Renteria MD;  [facility]     ENDOSCOPIC SINUS SURGERY  7/21/2014    Procedure: ENDOSCOPIC SINUS SURGERY;  Surgeon: Bobo Renteria MD;  Location:  OR     ENDOSCOPIC SINUS SURGERY N/A 4/6/2015    Procedure: ENDOSCOPIC SINUS SURGERY;  Surgeon: Bobo Renteria MD;  [facility]     ENDOSCOPIC SINUS SURGERY N/A 8/17/2015    Procedure: ENDOSCOPIC SINUS SURGERY;  Surgeon: Bobo Renteria MD;  [facility]     ENDOSCOPIC SINUS SURGERY Bilateral 8/19/2019    Procedure: Bilateral Functional Endoscopic Sinus Surgery, Right Nasal Endoscopy and Excision of Left Nasal Mass;  Surgeon: Bobo Renteria MD;  Location:  OR     ENT SURGERY       EXCISE  NASAL MASS Left 11/6/2017    Procedure: EXCISE NASAL MASS;  Excision of Left Nasal Papilloma;  Surgeon: Bobo Renteria MD;  [facility]: [facility]     LAPAROSCOPIC HERNIORRHAPHY INGUINAL BILATERAL Bilateral 4/12/2017    Procedure: LAPAROSCOPIC HERNIORRHAPHY INGUINAL BILATERAL;  Surgeon: Shay Mercado MD;  Location: [facility]     NASAL ENDOSCOPY Bilateral 2/6/2017    Procedure: NASAL ENDOSCOPY;  Surgeon: Bobo Renteria MD;  [facility]     NASAL ENDOSCOPY N/A 5/21/2018    Procedure: NASAL ENDOSCOPY;  Nasal Endoscopy, CO2 Laser Excision of Left Nasal Papilloma;  Surgeon: Bobo Renteria MD;  [facility]     NASAL/SINUS POLYPECTOMY       PE TUBES             CURRENT MEDICATIONS:    No current facility-administered medications for this encounter.     Current Outpatient Medications   Medication     furosemide (LASIX) 20 MG tablet     cephALEXin (KEFLEX) 500 MG capsule     dicyclomine (BENTYL) 10 MG capsule     doxycycline monohydrate (MONODOX) 100 MG capsule     gabapentin (NEURONTIN) 300 MG capsule     HYDROcodone-acetaminophen (NORCO) 5-325 MG tablet     lisinopril-hydrochlorothiazide (ZESTORETIC) 10-12.5 MG tablet     methocarbamol (ROBAXIN) 500 MG tablet     order for DME     polyethylene glycol (MIRALAX) 17 GM/Dose powder     Facility-Administered Medications Ordered in Other Encounters   Medication     Self Administer Medications: Behavioral Services         ALLERGIES:  No Known Allergies    FAMILY HISTORY:  Family History   Problem Relation Age of Onset     Diabetes Mother 40     C.A.D. Father 72     Unknown/Adopted No family hx of      Depression No family hx of      Anxiety Disorder No family hx of      Schizophrenia No family hx of      Bipolar Disorder No family hx of      Suicide No family hx of      Substance Abuse No family hx of      Dementia No family hx of      Ramona Disease No family hx of      Parkinsonism No family hx of      Autism Spectrum Disorder No family hx of       "Intellectual Disability (Mental Retardation) No family hx of      Mental Illness No family hx of      Bleeding Disorder No family hx of        SOCIAL HISTORY:   Social History     Socioeconomic History     Marital status: Single     Spouse name: Not on file     Number of children: Not on file     Years of education: Not on file     Highest education level: Not on file   Occupational History     Not on file   Tobacco Use     Smoking status: Current Every Day Smoker     Packs/day: 0.50     Years: 40.00     Pack years: 20.00     Types: Cigarettes     Start date: 1/1/1980     Smokeless tobacco: Never Used     Tobacco comment: 1 pack/day   Vaping Use     Vaping Use: Never used   Substance and Sexual Activity     Alcohol use: Yes     Comment: occasional      Drug use: Not Currently     Comment: 7 years quit Cocaine/methamphetamines     Sexual activity: Yes     Partners: Female     Birth control/protection: None   Other Topics Concern     Parent/sibling w/ CABG, MI or angioplasty before 65F 55M? No      Service No     Blood Transfusions No     Caffeine Concern No     Occupational Exposure No     Hobby Hazards No     Sleep Concern No     Stress Concern No     Weight Concern No     Special Diet No     Back Care No     Exercise Yes     Bike Helmet No     Seat Belt Yes     Self-Exams Not Asked   Social History Narrative     Not on file     Social Determinants of Health     Financial Resource Strain: Not on file   Food Insecurity: Not on file   Transportation Needs: Not on file   Physical Activity: Not on file   Stress: Not on file   Social Connections: Not on file   Intimate Partner Violence: Not on file   Housing Stability: Not on file       VITALS:  BP (!) 185/114   Pulse 102   Temp 98.2  F (36.8  C) (Oral)   Resp 18   Ht 1.905 m (6' 3\")   Wt 81.6 kg (180 lb)   SpO2 96%   BMI 22.50 kg/m      PHYSICAL EXAM    Physical Exam  Constitutional:       General: He is not in acute distress.     Appearance: He is not " diaphoretic.   HENT:      Head: Atraumatic.   Eyes:      General: No scleral icterus.     Pupils: Pupils are equal, round, and reactive to light.   Cardiovascular:      Heart sounds: Normal heart sounds.   Pulmonary:      Effort: No respiratory distress.      Breath sounds: Rhonchi ( in bases) present.   Abdominal:      General: Bowel sounds are normal.      Palpations: Abdomen is soft.      Tenderness: There is no abdominal tenderness.   Musculoskeletal:         General: Swelling present. No tenderness.      Right lower leg: Edema present.      Left lower leg: Edema present.   Skin:     General: Skin is warm.      Findings: No rash.   Neurological:      Comments: 5 out of 5 strength in bilateral upper and lower extremities.  Sensation intact in all 4 extremes.  Cranial nerves intact.  No pronator drift            LAB:  All pertinent labs reviewed and interpreted.  Labs Ordered and Resulted from Time of ED Arrival to Time of ED Departure   CRP INFLAMMATION - Abnormal       Result Value    CRP 1.5 (*)    B-TYPE NATRIURETIC PEPTIDE ([facility] ONLY) - Abnormal    BNP 3,357 (*)    COMPREHENSIVE METABOLIC PANEL - Abnormal    Sodium 139      Potassium 3.8      Chloride 107      Carbon Dioxide (CO2) 23      Anion Gap 9      Urea Nitrogen 16      Creatinine 1.04      Calcium 8.6      Glucose 107      Alkaline Phosphatase 115      AST 18      ALT 18      Protein Total 5.5 (*)     Albumin 2.9 (*)     Bilirubin Total 1.1 (*)     GFR Estimate 80     CBC WITH PLATELETS AND DIFFERENTIAL - Abnormal    WBC Count 9.6      RBC Count 5.22      Hemoglobin 13.4      Hematocrit 43.5      MCV 83      MCH 25.7 (*)     MCHC 30.8 (*)     RDW 16.0 (*)     Platelet Count 203      % Neutrophils 74      % Lymphocytes 15      % Monocytes 8      % Eosinophils 2      % Basophils 1      % Immature Granulocytes 0      NRBCs per 100 WBC 0      Absolute Neutrophils 7.2      Absolute Lymphocytes 1.5      Absolute Monocytes 0.7      Absolute Eosinophils  0.1      Absolute Basophils 0.1      Absolute Immature Granulocytes 0.0      Absolute NRBCs 0.0     LACTIC ACID WHOLE BLOOD - Normal    Lactic Acid 1.6     ERYTHROCYTE SEDIMENTATION RATE AUTO - Normal    Erythrocyte Sedimentation Rate 2         RADIOLOGY:  Reviewed all pertinent imaging. Please see official radiology report.  No orders to display       I, Stephany Bakari, am serving as a scribe to document services personally performed by Dr. Buzz Del Rio, based on my observation and the provider's statements to me. I, Buzz Del Rio MD attest that Stephany Villegas is acting in a scribe capacity, has observed my performance of the services and has documented them in accordance with my direction.    Buzz Del Rio M.D.  Emergency Medicine  [facility] EMERGENCY DEPARTMENT  [facility] [facility] [id number] [phone]  Dept: [phone]     Buzz Del Rio MD  11/19/21 [id number]

## 2021-11-19 NOTE — ED NOTES
Bed: JNED-20  Expected date: 11/19/21  Expected time: 4:57 AM  Means of arrival: Ambulance  Comments:  55M Leg swelling

## 2021-11-19 NOTE — ED TRIAGE NOTES
Patient presents via EMS with bilateral leg swelling and previously diagnosed pneumonia. Patient currently taking antibiotic medication.

## 2021-11-27 ENCOUNTER — APPOINTMENT (OUTPATIENT)
Dept: GENERAL RADIOLOGY | Facility: CLINIC | Age: 56
DRG: 293 | End: 2021-11-27
Attending: EMERGENCY MEDICINE
Payer: COMMERCIAL

## 2021-11-27 ENCOUNTER — HOSPITAL ENCOUNTER (INPATIENT)
Facility: CLINIC | Age: 56
LOS: 2 days | Discharge: HOME | DRG: 293 | End: 2021-11-29
Attending: EMERGENCY MEDICINE | Admitting: FAMILY MEDICINE
Payer: COMMERCIAL

## 2021-11-27 DIAGNOSIS — R60.1 ANASARCA: ICD-10-CM

## 2021-11-27 DIAGNOSIS — I50.9 ACUTE CONGESTIVE HEART FAILURE, UNSPECIFIED HEART FAILURE TYPE (H): ICD-10-CM

## 2021-11-27 DIAGNOSIS — I34.0 MITRAL VALVE INSUFFICIENCY, UNSPECIFIED ETIOLOGY: Primary | ICD-10-CM

## 2021-11-27 PROBLEM — S92.352A CLOSED DISPLACED FRACTURE OF FIFTH METATARSAL BONE OF LEFT FOOT: Status: ACTIVE | Noted: 2021-07-26

## 2021-11-27 PROBLEM — S91.209A TRAUMATIC AVULSION OF NAIL PLATE OF TOE: Status: ACTIVE | Noted: 2021-07-26

## 2021-11-27 PROBLEM — S52.202B TYPE I OR II OPEN FRACTURE OF LEFT ULNA: Status: ACTIVE | Noted: 2020-09-05

## 2021-11-27 PROBLEM — R79.89 ELEVATED TROPONIN: Status: ACTIVE | Noted: 2021-11-27

## 2021-11-27 PROBLEM — Z91.199 NON COMPLIANCE WITH MEDICAL TREATMENT: Status: ACTIVE | Noted: 2021-11-27

## 2021-11-27 PROBLEM — Z20.822 LAB TEST NEGATIVE FOR COVID-19 VIRUS: Status: ACTIVE | Noted: 2021-11-27

## 2021-11-27 LAB
ALBUMIN SERPL-MCNC: 2.8 G/DL (ref 3.4–5)
ALBUMIN UR-MCNC: NEGATIVE MG/DL
ALP SERPL-CCNC: 137 U/L (ref 40–150)
ALT SERPL W P-5'-P-CCNC: 21 U/L (ref 0–70)
ANION GAP SERPL CALCULATED.3IONS-SCNC: 4 MMOL/L (ref 3–14)
APPEARANCE UR: CLEAR
AST SERPL W P-5'-P-CCNC: 14 U/L (ref 0–45)
BASOPHILS # BLD AUTO: 0.1 10E3/UL (ref 0–0.2)
BASOPHILS NFR BLD AUTO: 1 %
BILIRUB SERPL-MCNC: 1 MG/DL (ref 0.2–1.3)
BILIRUB UR QL STRIP: NEGATIVE
BUN SERPL-MCNC: 18 MG/DL (ref 7–30)
CALCIUM SERPL-MCNC: 9.1 MG/DL (ref 8.5–10.1)
CHLORIDE BLD-SCNC: 107 MMOL/L (ref 94–109)
CO2 SERPL-SCNC: 30 MMOL/L (ref 20–32)
COLOR UR AUTO: COLORLESS
CREAT SERPL-MCNC: 1.18 MG/DL (ref 0.66–1.25)
EOSINOPHIL # BLD AUTO: 0.2 10E3/UL (ref 0–0.7)
EOSINOPHIL NFR BLD AUTO: 3 %
ERYTHROCYTE [DISTWIDTH] IN BLOOD BY AUTOMATED COUNT: 16.3 % (ref 10–15)
GFR SERPL CREATININE-BSD FRML MDRD: 69 ML/MIN/1.73M2
GLUCOSE BLD-MCNC: 95 MG/DL (ref 70–99)
GLUCOSE UR STRIP-MCNC: NEGATIVE MG/DL
HCT VFR BLD AUTO: 45.7 % (ref 40–53)
HGB BLD-MCNC: 14.1 G/DL (ref 13.3–17.7)
HGB UR QL STRIP: NEGATIVE
HOLD SPECIMEN: NORMAL
IMM GRANULOCYTES # BLD: 0 10E3/UL
IMM GRANULOCYTES NFR BLD: 0 %
KETONES UR STRIP-MCNC: NEGATIVE MG/DL
LEUKOCYTE ESTERASE UR QL STRIP: NEGATIVE
LYMPHOCYTES # BLD AUTO: 2 10E3/UL (ref 0.8–5.3)
LYMPHOCYTES NFR BLD AUTO: 25 %
MAGNESIUM SERPL-MCNC: 2.3 MG/DL (ref 1.6–2.3)
MCH RBC QN AUTO: 25.8 PG (ref 26.5–33)
MCHC RBC AUTO-ENTMCNC: 30.9 G/DL (ref 31.5–36.5)
MCV RBC AUTO: 84 FL (ref 78–100)
MONOCYTES # BLD AUTO: 0.7 10E3/UL (ref 0–1.3)
MONOCYTES NFR BLD AUTO: 9 %
NEUTROPHILS # BLD AUTO: 5 10E3/UL (ref 1.6–8.3)
NEUTROPHILS NFR BLD AUTO: 62 %
NITRATE UR QL: NEGATIVE
NRBC # BLD AUTO: 0 10E3/UL
NRBC BLD AUTO-RTO: 0 /100
NT-PROBNP SERPL-MCNC: ABNORMAL PG/ML (ref 0–900)
PH UR STRIP: 7 [PH] (ref 5–7)
PHOSPHATE SERPL-MCNC: 3.6 MG/DL (ref 2.5–4.5)
PLATELET # BLD AUTO: 259 10E3/UL (ref 150–450)
POTASSIUM BLD-SCNC: 3.6 MMOL/L (ref 3.4–5.3)
POTASSIUM BLD-SCNC: 3.6 MMOL/L (ref 3.4–5.3)
PROT SERPL-MCNC: 6.5 G/DL (ref 6.8–8.8)
RBC # BLD AUTO: 5.47 10E6/UL (ref 4.4–5.9)
RBC URINE: <1 /HPF
SARS-COV-2 RNA RESP QL NAA+PROBE: NEGATIVE
SODIUM SERPL-SCNC: 141 MMOL/L (ref 133–144)
SP GR UR STRIP: 1 (ref 1–1.03)
TROPONIN I SERPL HS-MCNC: 79 NG/L
TROPONIN I SERPL HS-MCNC: 82 NG/L
TROPONIN I SERPL-MCNC: 0.06 UG/L (ref 0–0.04)
TROPONIN I SERPL-MCNC: 0.07 UG/L (ref 0–0.04)
UROBILINOGEN UR STRIP-MCNC: NORMAL MG/DL
WBC # BLD AUTO: 8 10E3/UL (ref 4–11)
WBC URINE: <1 /HPF

## 2021-11-27 PROCEDURE — 84484 ASSAY OF TROPONIN QUANT: CPT | Performed by: EMERGENCY MEDICINE

## 2021-11-27 PROCEDURE — 120N000001 HC R&B MED SURG/OB

## 2021-11-27 PROCEDURE — 36415 COLL VENOUS BLD VENIPUNCTURE: CPT | Performed by: EMERGENCY MEDICINE

## 2021-11-27 PROCEDURE — 99223 1ST HOSP IP/OBS HIGH 75: CPT | Mod: AI | Performed by: FAMILY MEDICINE

## 2021-11-27 PROCEDURE — 36415 COLL VENOUS BLD VENIPUNCTURE: CPT | Performed by: FAMILY MEDICINE

## 2021-11-27 PROCEDURE — 84484 ASSAY OF TROPONIN QUANT: CPT | Performed by: FAMILY MEDICINE

## 2021-11-27 PROCEDURE — 250N000011 HC RX IP 250 OP 636: Performed by: EMERGENCY MEDICINE

## 2021-11-27 PROCEDURE — 93010 ELECTROCARDIOGRAM REPORT: CPT | Performed by: EMERGENCY MEDICINE

## 2021-11-27 PROCEDURE — 250N000011 HC RX IP 250 OP 636: Performed by: FAMILY MEDICINE

## 2021-11-27 PROCEDURE — 83880 ASSAY OF NATRIURETIC PEPTIDE: CPT | Performed by: EMERGENCY MEDICINE

## 2021-11-27 PROCEDURE — 87635 SARS-COV-2 COVID-19 AMP PRB: CPT | Performed by: EMERGENCY MEDICINE

## 2021-11-27 PROCEDURE — 81001 URINALYSIS AUTO W/SCOPE: CPT | Performed by: EMERGENCY MEDICINE

## 2021-11-27 PROCEDURE — 80053 COMPREHEN METABOLIC PANEL: CPT | Performed by: EMERGENCY MEDICINE

## 2021-11-27 PROCEDURE — 71046 X-RAY EXAM CHEST 2 VIEWS: CPT

## 2021-11-27 PROCEDURE — 83735 ASSAY OF MAGNESIUM: CPT | Performed by: FAMILY MEDICINE

## 2021-11-27 PROCEDURE — 84100 ASSAY OF PHOSPHORUS: CPT | Performed by: FAMILY MEDICINE

## 2021-11-27 PROCEDURE — 85025 COMPLETE CBC W/AUTO DIFF WBC: CPT | Performed by: EMERGENCY MEDICINE

## 2021-11-27 PROCEDURE — 84132 ASSAY OF SERUM POTASSIUM: CPT | Performed by: FAMILY MEDICINE

## 2021-11-27 PROCEDURE — 93005 ELECTROCARDIOGRAM TRACING: CPT | Performed by: EMERGENCY MEDICINE

## 2021-11-27 PROCEDURE — 250N000013 HC RX MED GY IP 250 OP 250 PS 637: Performed by: FAMILY MEDICINE

## 2021-11-27 PROCEDURE — 93308 TTE F-UP OR LMTD: CPT | Performed by: EMERGENCY MEDICINE

## 2021-11-27 PROCEDURE — 99285 EMERGENCY DEPT VISIT HI MDM: CPT | Mod: 25 | Performed by: EMERGENCY MEDICINE

## 2021-11-27 PROCEDURE — 96374 THER/PROPH/DIAG INJ IV PUSH: CPT | Performed by: EMERGENCY MEDICINE

## 2021-11-27 PROCEDURE — 93308 TTE F-UP OR LMTD: CPT | Mod: 26 | Performed by: EMERGENCY MEDICINE

## 2021-11-27 PROCEDURE — C9803 HOPD COVID-19 SPEC COLLECT: HCPCS | Performed by: EMERGENCY MEDICINE

## 2021-11-27 RX ORDER — FUROSEMIDE 10 MG/ML
60 INJECTION INTRAMUSCULAR; INTRAVENOUS ONCE
Status: COMPLETED | OUTPATIENT
Start: 2021-11-27 | End: 2021-11-27

## 2021-11-27 RX ORDER — FUROSEMIDE 10 MG/ML
60 INJECTION INTRAMUSCULAR; INTRAVENOUS EVERY 8 HOURS
Status: DISCONTINUED | OUTPATIENT
Start: 2021-11-27 | End: 2021-11-28

## 2021-11-27 RX ORDER — LIDOCAINE 40 MG/G
CREAM TOPICAL
Status: DISCONTINUED | OUTPATIENT
Start: 2021-11-27 | End: 2021-11-29 | Stop reason: HOSPADM

## 2021-11-27 RX ORDER — FUROSEMIDE 10 MG/ML
40 INJECTION INTRAMUSCULAR; INTRAVENOUS EVERY 12 HOURS
Status: CANCELLED | OUTPATIENT
Start: 2021-11-27

## 2021-11-27 RX ORDER — LISINOPRIL/HYDROCHLOROTHIAZIDE 10-12.5 MG
1 TABLET ORAL DAILY
Status: DISCONTINUED | OUTPATIENT
Start: 2021-11-27 | End: 2021-11-29

## 2021-11-27 RX ADMIN — FUROSEMIDE 60 MG: 10 INJECTION, SOLUTION INTRAMUSCULAR; INTRAVENOUS at 08:55

## 2021-11-27 RX ADMIN — FUROSEMIDE 60 MG: 10 INJECTION, SOLUTION INTRAMUSCULAR; INTRAVENOUS at 19:41

## 2021-11-27 RX ADMIN — LISINOPRIL AND HYDROCHLOROTHIAZIDE 1 TABLET: 12.5; 1 TABLET ORAL at 20:15

## 2021-11-27 ASSESSMENT — ACTIVITIES OF DAILY LIVING (ADL)
ADLS_ACUITY_SCORE: 3
ADLS_ACUITY_SCORE: 12
ADLS_ACUITY_SCORE: 3

## 2021-11-27 ASSESSMENT — MIFFLIN-ST. JEOR
SCORE: 1714.42
SCORE: 1800.63

## 2021-11-27 NOTE — ED NOTES
"Pt states is concerned about a new generator he had bought and had to bring with him to ED, had left at \"\". Upon further investigation generator is sitting at screener desk in [facility]. Will bring generator up to pt room when he is admitted. Pt updated on locating generator.  "

## 2021-11-27 NOTE — ED PROVIDER NOTES
"     Emergency Department Patient Sign-out       Brief HPI:  This is a 55 year old male signed out to me by Dr. Payne.  See initial ED Provider note for details of the presentation.  Any significant events that occurred prior to my assuming care were also reviewed.     Exam:   Patient Vitals for the past 24 hrs:   BP Temp Temp src Pulse Resp SpO2 Height Weight   11/27/21 1841 129/78 98.5  F (36.9  C) Oral 100 18 96 % -- 88 kg (194 lb 0.1 oz)   11/27/21 1800 (!) 135/104 -- -- 113 -- -- -- --   11/27/21 1730 (!) 164/106 -- -- 101 -- -- -- --   11/27/21 1700 (!) 163/107 -- -- 115 -- -- -- --   11/27/21 1630 (!) 158/118 -- -- 108 -- -- -- --   11/27/21 1554 (!) 152/115 -- -- 109 -- -- -- --   11/27/21 1530 (!) 189/126 -- -- 110 -- 99 % -- --   11/27/21 1500 (!) 184/128 -- -- 109 -- 93 % -- --   11/27/21 1430 (!) 162/123 -- -- 115 -- 95 % -- --   11/27/21 1400 (!) 163/136 -- -- 110 -- 92 % -- --   11/27/21 1300 (!) 158/128 -- -- 103 -- 99 % -- --   11/27/21 1200 (!) 162/110 -- -- 113 -- 99 % -- --   11/27/21 0845 (!) 152/102 -- -- 70 29 98 % -- --   11/27/21 0830 (!) 151/110 -- -- 104 28 96 % -- --   11/27/21 0815 (!) 137/101 -- -- 101 20 99 % -- --   11/27/21 0800 (!) 136/101 -- -- 99 11 98 % -- --   11/27/21 0745 (!) 159/107 -- -- 92 -- -- -- --   11/27/21 0730 (!) 139/103 -- -- 97 14 94 % -- --   11/27/21 0715 (!) 145/113 -- -- 105 21 98 % -- --   11/27/21 0700 (!) 146/113 -- -- 99 (!) 0 (!) 82 % -- --   11/27/21 0628 (!) 143/94 98.4  F (36.9  C) Oral 104 18 98 % 1.905 m (6' 3\") 79.4 kg (175 lb)           ED RESULTS:   Results for orders placed or performed during the hospital encounter of 11/27/21 (from the past 24 hour(s))   POC US ECHO LIMITED     Status: None    Collection Time: 11/27/21  8:18 AM    [facility] Procedure Note      Limited Bedside ED Cardiac Ultrasound:    PROCEDURE: PERFORMED BY: Dr. Rajat Payne MD  INDICATIONS/SYMPTOM:  Shortness of Breath  PROBE: Cardiac phased " array probe  BODY LOCATION: Chest  FINDINGS:   The ultrasound was performed utilizing the subcostal, parasternal long axis, parasternal short axis and apical 4 chamber views.  Cardiac contractility:  Present  Gross estimation of cardiac kinesis: depressed  Pericardial Effusion:  None  RV:LV ratio: LV greater than RV  IVC: 3 cm with minimal respiratory variation  LUNGS: Diffuse B-lines bilaterally. Pleural effusion in right base.      INTERPRETATION: Ejection fraction appears low. LV greater than RV. No pericardial effusion. IVC of 3 cm with minimal respiratory variation, bilateral B-lines and right-sided pleural effusion suggestive of pulmonary edema     IMAGE DOCUMENTATION: Images were archived to PACs system.         CBC with platelets differential     Status: Abnormal    Collection Time: 11/27/21  8:48 AM    Narrative    The following orders were created for panel order CBC with platelets differential.  Procedure                               Abnormality         Status                     ---------                               -----------         ------                     CBC with platelets and d...[[id number]]  Abnormal            Final result                 Please view results for these tests on the individual orders.   Comprehensive metabolic panel     Status: Abnormal    Collection Time: 11/27/21  8:48 AM   Result Value Ref Range    Sodium 141 133 - 144 mmol/L    Potassium 3.6 3.4 - 5.3 mmol/L    Chloride 107 94 - 109 mmol/L    Carbon Dioxide (CO2) 30 20 - 32 mmol/L    Anion Gap 4 3 - 14 mmol/L    Urea Nitrogen 18 7 - 30 mg/dL    Creatinine 1.18 0.66 - 1.25 mg/dL    Calcium 9.1 8.5 - 10.1 mg/dL    Glucose 95 70 - 99 mg/dL    Alkaline Phosphatase 137 40 - 150 U/L    AST 14 0 - 45 U/L    ALT 21 0 - 70 U/L    Protein Total 6.5 (L) 6.8 - 8.8 g/dL    Albumin 2.8 (L) 3.4 - 5.0 g/dL    Bilirubin Total 1.0 0.2 - 1.3 mg/dL    GFR Estimate 69 >60 mL/min/1.73m2   Nt probnp inpatient (BNP)     Status: Abnormal     Collection Time: 11/27/21  8:48 AM   Result Value Ref Range    N terminal Pro BNP Inpatient 11,606 (H) 0 - 900 pg/mL   [place] Draw     Status: None    Collection Time: 11/27/21  8:48 AM    Narrative    The following orders were created for panel order [place] Draw.  Procedure                               Abnormality         Status                     ---------                               -----------         ------                     Extra Blue Top Tube[[id number]]                              Final result               Extra Red Top Tube[[id number]]                               Final result               Extra Green Top (Lithium...[[id number]]                      Final result               Extra Purple Top Tube[[id number]]                            Final result                 Please view results for these tests on the individual orders.   CBC with platelets and differential     Status: Abnormal    Collection Time: 11/27/21  8:48 AM   Result Value Ref Range    WBC Count 8.0 4.0 - 11.0 10e3/uL    RBC Count 5.47 4.40 - 5.90 10e6/uL    Hemoglobin 14.1 13.3 - 17.7 g/dL    Hematocrit 45.7 40.0 - 53.0 %    MCV 84 78 - 100 fL    MCH 25.8 (L) 26.5 - 33.0 pg    MCHC 30.9 (L) 31.5 - 36.5 g/dL    RDW 16.3 (H) 10.0 - 15.0 %    Platelet Count 259 150 - 450 10e3/uL    % Neutrophils 62 %    % Lymphocytes 25 %    % Monocytes 9 %    % Eosinophils 3 %    % Basophils 1 %    % Immature Granulocytes 0 %    NRBCs per 100 WBC 0 <1 /100    Absolute Neutrophils 5.0 1.6 - 8.3 10e3/uL    Absolute Lymphocytes 2.0 0.8 - 5.3 10e3/uL    Absolute Monocytes 0.7 0.0 - 1.3 10e3/uL    Absolute Eosinophils 0.2 0.0 - 0.7 10e3/uL    Absolute Basophils 0.1 0.0 - 0.2 10e3/uL    Absolute Immature Granulocytes 0.0 <=0.0 10e3/uL    Absolute NRBCs 0.0 10e3/uL   Extra Blue Top Tube     Status: None    Collection Time: 11/27/21  8:48 AM   Result Value Ref Range    Hold Specimen JIC    Extra Red Top Tube     Status: None    Collection Time: 11/27/21  8:48 AM    Result Value Ref Range    Hold Specimen JIC    Extra Green Top (Lithium Heparin) Tube     Status: None    Collection Time: 11/27/21  8:48 AM   Result Value Ref Range    Hold Specimen JIC    Extra Purple Top Tube     Status: None    Collection Time: 11/27/21  8:48 AM   Result Value Ref Range    Hold Specimen JIC    Troponin I     Status: Abnormal    Collection Time: 11/27/21  8:48 AM   Result Value Ref Range    Troponin I High Sensitivity 79 (H) <79 ng/L   Troponin I     Status: Abnormal    Collection Time: 11/27/21  8:48 AM   Result Value Ref Range    Troponin I 0.066 (H) 0.000 - 0.045 ug/L   Magnesium     Status: Normal    Collection Time: 11/27/21  8:48 AM   Result Value Ref Range    Magnesium 2.3 1.6 - 2.3 mg/dL   Asymptomatic COVID-19 Virus (Coronavirus) by PCR Nasopharyngeal     Status: Normal    Collection Time: 11/27/21  8:51 AM    Specimen: Nasopharyngeal; Swab   Result Value Ref Range    SARS CoV2 PCR Negative Negative    Narrative    Testing was performed using the soha  SARS-CoV-2 & Influenza A/B Assay on the soha  [employer]  System.  This test should be ordered for the detection of SARS-COV-2 in individuals who meet SARS-CoV-2 clinical and/or epidemiological criteria. Test performance is unknown in asymptomatic patients.  This test is for in vitro diagnostic use under the FDA EUA for laboratories certified under CLIA to perform moderate and/or high complexity testing. This test has not been FDA cleared or approved.  A negative test does not rule out the presence of PCR inhibitors in the specimen or target RNA in concentration below the limit of detection for the assay. The possibility of a false negative should be considered if the patient's recent exposure or clinical presentation suggests COVID-19.  [facility] are certified under the Clinical Laboratory Improvement Amendments of 1988 (CLIA-88) as qualified to perform moderate and/or high complexity laboratory testing.   XR Chest 2  Views     Status: None    Collection Time: 11/27/21 10:48 AM    Narrative    EXAM: XR CHEST 2 VIEW  LOCATION: [facility]  DATE/TIME: 11/27/2021, 10:38 AM    INDICATION: Dyspnea and bibasilar rales. Bilateral pitting edema of lower extremities. No known history of CHF. Treated for pneumonia in past month.  COMPARISON: Chest CT on 11/06/2021.      Impression    IMPRESSION: Two views of the chest were obtained. Cardiomediastinal silhouette is within normal limits. Moderate right pleural effusion and associated basilar atelectasis/consolidation. No significant left pleural effusion. No significant pneumothorax.     UA with Microscopic reflex to Culture     Status: Normal    Collection Time: 11/27/21 11:08 AM    Specimen: Urine, Clean Catch   Result Value Ref Range    Color Urine Colorless Colorless, Straw, Light Yellow, Yellow    Appearance Urine Clear Clear    Glucose Urine Negative Negative mg/dL    Bilirubin Urine Negative Negative    Ketones Urine Negative Negative mg/dL    Specific Gravity Urine 1.004 1.003 - 1.035    Blood Urine Negative Negative    pH Urine 7.0 5.0 - 7.0    Protein Albumin Urine Negative Negative mg/dL    Urobilinogen Urine Normal Normal, 2.0 mg/dL    Nitrite Urine Negative Negative    Leukocyte Esterase Urine Negative Negative    RBC Urine <1 <=2 /HPF    WBC Urine <1 <=5 /HPF    Narrative    Urine Culture not indicated   Potassium     Status: Normal    Collection Time: 11/27/21  7:17 PM   Result Value Ref Range    Potassium 3.6 3.4 - 5.3 mmol/L   Phosphorus     Status: Normal    Collection Time: 11/27/21  7:17 PM   Result Value Ref Range    Phosphorus 3.6 2.5 - 4.5 mg/dL       ED MEDICATIONS:   Medications   lidocaine 1 % 0.1-1 mL (has no administration in time range)   lidocaine (LMX4) kit (has no administration in time range)   sodium chloride (PF) 0.9% PF flush 3 mL (3 mLs Intracatheter Given 11/27/21 1944)   sodium chloride (PF) 0.9% PF flush 3 mL (has no  administration in time range)   melatonin tablet 1 mg (has no administration in time range)   HOLD: nitroGLYcerin IF (has no administration in time range)   furosemide (LASIX) injection 60 mg (60 mg Intravenous Given 11/27/21 1941)   lisinopril-hydrochlorothiazide (ZESTORETIC) 10-12.5 MG per tablet 1 tablet (1 tablet Oral Given 11/27/21 2015)   furosemide (LASIX) injection 60 mg (60 mg Intravenous Given 11/27/21 0855)       ED COURSE:  1600.  Patient with heart failure.  Increased edema, up to the sacrum.  In the [facility] ER recently, told to take Lasix 20 mg a day.  He took this and is out of the prescription.  He has had increasing edema despite the Lasix.  He did not follow-up as recommended.  He has not taken Lasix since yesterday.  Work-up here shows right pleural effusion and congestion.  He has a BNP that has increased from 3000-11,000, roughly.  Lasix 60 mg IV given here.  Increased urine output since Lasix.  No hypoxemia.  He is dyspneic and becomes tachycardic with talking.  Other recent past medical history includes community-acquired pneumonia, cellulitis, and fracture.  Awaiting troponin result for admission here versus transfer.    Impression:    ICD-10-CM    1. Acute congestive heart failure, unspecified heart failure type (H)  I50.9    2. Anasarca  R60.1          MD Curry Adair Jason M, MD  11/27/21 6605

## 2021-11-27 NOTE — H&P
[facility]    History and Physical  Hospital Medicine       Date of Admission:  11/27/2021  Date of Service: 11/27/2021     Assessment & Plan   Micah Nunez is a 55 year old male who presents on 11/27/2021 with shortness of breath, orthopnea, paroxysmal nocturnal dyspnea and anasarca.  Patient was admitted for acute exacerbation of congestive heart failure    Active Problems:    Acute exacerbation of CHF (congestive heart failure) (H)    Anasarca    Patient  is a 55 year old male with  history of hypertension, medication noncompliance, smoking, substance abuse in remission presents with shortness of breath and lower extremity edema started 6 weeks ago and have been getting worse over the past few days, associated with dry cough, orthopnea and paroxysmal nocturnal dyspnea.    Patient presented to the ED on 11/19/2021 for evaluation of leg swelling and shortness of breath.  His exam showed edema up to the groin, BNP at that time was 3300, troponin was normal.  His symptoms were thought to be secondary to fluid retention and congestive heart failure.  He was started on Lasix in the ED and was discharged on 7 pills of Lasix 20 mg with plan to follow-up with PCP.  Unfortunately the patient did not follow-up with his PCP.  Patient states he took his last Lasix pill 2 days ago.  Symptoms started to get worse after finishing Lasix pills.  Patient tells me he was diagnosed with hypertension and was prescribed lisinopril- hydrochlorothiazide  but unfortunately he has not been taking the medication.    In the ED patient was hypertensive with blood pressure 143/94, slight tachycardic with heart rate 104, afebrile, oxygen saturation 98% on room air.  His exam showed edema up to the waist.    Labs showed: Significantly elevated BNP at 11,606.  Troponin at 0.066, negative COVID-19 virus.  POC US ECHO LIMITED done in the ED showed: Ejection fraction appears low. LV greater than RV. No pericardial  effusion. IVC of 3 cm with minimal respiratory variation, bilateral B-lines and right-sided pleural effusion suggestive of pulmonary edema  Chest x-ray showed: Two views of the chest were obtained. Cardiomediastinal silhouette is within normal limits. Moderate right pleural effusion and associated basilar atelectasis/consolidation. No significant left pleural effusion. No significant pneumothorax    Patient was given Lasix 60 mg IV for diuresis with good response.    Wt Readings from Last 4 Encounters:   11/27/21 79.4 kg (175 lb)   11/19/21 81.6 kg (180 lb)   11/06/21 83.9 kg (185 lb)   10/25/21 81.6 kg (180 lb)     Plan:  -Continue diuresis with Lasix 60 mg every 8 hours x 4, reassess for further diuresis.  -Daily weight  -Strict intake and output  -Telemetry monitoring  -Low-salt diet and fluid restriction 2000 ml daily  -Electrolyte monitoring and replace per protocol  -Echocardiogram prior to discharge.      Elevated troponin  Troponin admission 0.066  Patient denies any chest pain.  Likely secondary to supply and demand in the setting of congestive heart failure failure.  Trend troponin        Benign essential hypertension  Home medication lisinopril-hydrochlorothiazide.  He has not been taking it.  Resume lisinopril- hydrochlorothiazide       Substance abuse in remission (H)  Patient has a history of polysubstance abuse including heroin, cocaine meth.  Currently in remission.      Non compliance with medical treatment  Patient reports medication none compliance.  He was prescribed medication for hypertension but unfortunately has not been taking it.  Significant risk factor for worsening of congestive heart failure.  Discussed the importance of medication compliance.    Lab test negative for COVID-19 virus  This patient was evaluated during a global COVID-19 pandemic, which necessitated consideration that the patient might be at risk for infection with the SARS-CoV-2 virus that causes COVID-19  COVID-19 PCR  negative on admission  Patient is not vaccinated, but is willing to receive the vaccine      Tobacco use disorder  Currently daily smoker  Patient was counseled against smoking  Nicotine patch as needed  Clinically Significant Risk Factors Present on Admission   Uncontrolled hypertension  Medication noncompliance                     Diet: 2 Gram Sodium Diet    DVT Prophylaxis: Pneumatic Compression Devices  Ortez Catheter: Not present  Code Status:   Full code   Lines: IV line     Disposition Plan   Expected discharge: 11/29/2021   recommended to prior living arrangement once adequate pain management/ tolerating PO medications and safe disposition plan/ TCU bed available.  Entered: Vannessa Hidalgo MD 11/27/2021, 5:27 PM     Status: Patient is appropriate for Inpatient   Vannessa Hidalgo MD        The patient's care was discussed with the Patient.    Primary Care Physician   Yovany White [phone]    History is obtained from the patient,  and review of old records via the EMR.    History of Present Illness   Micah Nunez is a 55 year old male with past medical history of hypertension, medication noncompliance, smoking, substance abuse in remission now presents on 11/27/2021 with shortness of breath and lower extremity edema started 6 weeks ago and have been getting worse over the past few days, associated with dry cough, orthopnea and paroxysmal nocturnal dyspnea.    Patient presented to the ED on 11/19/2021 for evaluation of leg swelling and shortness of breath.  His exam showed edema up to the groin, BNP at that time was 3300, troponin was normal.  His symptoms were thought to be secondary to fluid retention and congestive heart failure.  He was started on Lasix in the ED and was discharged on 7 pills of Lasix 20 mg with plan to follow-up with PCP.  Unfortunately the patient did not follow-up with his PCP.  Patient states he took his last Lasix pill 2 days ago.  Symptoms started to get worse  after finishing Lasix pills.  Patient tells me he was diagnosed with hypertension and was prescribed lisinopril- hydrochlorothiazide  but unfortunately he has not been taking the medication.    In the ED patient was hypertensive with blood pressure 143/94, slight tachycardic with heart rate 104, afebrile, oxygen saturation 98% on room air.  His exam showed edema up to the waist.    Labs showed: Significantly elevated BNP at 11,606.  Troponin at 0.066, negative COVID-19 virus.  POC US ECHO LIMITED done in the ED showed: Ejection fraction appears low. LV greater than RV. No pericardial effusion. IVC of 3 cm with minimal respiratory variation, bilateral B-lines and right-sided pleural effusion suggestive of pulmonary edema  Chest x-ray showed: Two views of the chest were obtained. Cardiomediastinal silhouette is within normal limits. Moderate right pleural effusion and associated basilar atelectasis/consolidation. No significant left pleural effusion. No significant pneumothorax    Patient was given Lasix 60 mg IV for diuresis with good response.    Patient was interviewed and examined at bedside.  Patient states he is feeling better after diuresis.  He denies any chest pain, palpitations, dizziness, fever or chills.  He admits he has not been compliant with blood pressure medication, and  is willing to take medication regularly.  He also would like to get COVID-19 vaccine    Review of Systems   The 10 point Review of Systems is negative other than noted in the HPI or here.     Past Medical History      Past Medical History:   Diagnosis Date     Acute exacerbation of CHF (congestive heart failure) (H) 11/27/2021     Benign essential hypertension 10/18/2017     Bleeding disorder (H)      CARDIOVASCULAR SCREENING; LDL GOAL LESS THAN 130 12/11/2013     Chemical dependency (H)      Gastroesophageal reflux disease      Hypertension      Nasal mass 12/17/2013     Papilloma of nose 3/10/2014     Skin disease      Patient  Active Problem List    Diagnosis Date Noted     Acute exacerbation of CHF (congestive heart failure) (H) 11/27/2021     Priority: High     Anasarca 11/27/2021     Priority: High     Elevated troponin 11/27/2021     Priority: High     Non compliance with medical treatment 11/27/2021     Priority: Medium     Lab test negative for COVID-19 virus 11/27/2021     Priority: Medium     Closed displaced fracture of fifth metatarsal bone of left foot 07/26/2021     Priority: Medium     Traumatic avulsion of nail plate of toe 07/26/2021     Priority: Medium     Type I or II open fracture of left ulna 09/05/2020     Priority: Medium     Formatting of this note might be different from the original.  Added automatically from request for surgery [id number]       Benign essential hypertension 10/18/2017     Priority: Medium     Chemical dependency (H) 07/27/2015     Priority: Medium     Papilloma of nose 03/10/2014     Priority: Medium     Nasal mass 12/17/2013     Priority: Medium     CARDIOVASCULAR SCREENING; LDL GOAL LESS THAN 130 12/11/2013     Priority: Medium     Substance abuse in remission (H) 05/19/2013     Priority: Medium     Tobacco use disorder 08/12/2015     Priority: Low        Past Surgical History     Past Surgical History:   Procedure Laterality Date     ARTHRODESIS FOOT Left 2/17/2015    Procedure: ARTHRODESIS FOOT;  Surgeon: Cortez Hayward DPM;  Location: [facility]     ARTHRODESIS TOE(S)  12/20/2013    Procedure: ARTHRODESIS TOE(S);  Arthrodesis first metatarsophalangeal joint left foot;  Surgeon: Cortez Hayward DPM;  Location: [facility]     COLONOSCOPY N/A 1/12/2021    Procedure: COLONOSCOPY;  Surgeon: Dixon Sarabia MD;  Location: [facility]     ENDOSCOPIC SINUS SURGERY  1/6/2014    Procedure: ENDOSCOPIC SINUS SURGERY;  Functional Endoscopic Sinus Surgery;  Surgeon: Bobo Renteria MD;  Location:  OR     ENDOSCOPIC SINUS SURGERY  7/21/2014    Procedure: ENDOSCOPIC SINUS SURGERY;  Surgeon: Bobo Renteria  MD [place];  Location: [facility]     ENDOSCOPIC SINUS SURGERY N/A 4/6/2015    Procedure: ENDOSCOPIC SINUS SURGERY;  Surgeon: Bobo Renteria MD;  Location: [facility]     ENDOSCOPIC SINUS SURGERY N/A 8/17/2015    Procedure: ENDOSCOPIC SINUS SURGERY;  Surgeon: Bobo Renteria MD;  Location: [facility]     ENDOSCOPIC SINUS SURGERY Bilateral 8/19/2019    Procedure: Bilateral Functional Endoscopic Sinus Surgery, Right Nasal Endoscopy and Excision of Left Nasal Mass;  Surgeon: Bobo Renteria MD;  Location: [facility]     ENT SURGERY       EXCISE NASAL MASS Left 11/6/2017    Procedure: EXCISE NASAL MASS;  Excision of Left Nasal Papilloma;  Surgeon: Bobo Renteria MD;  Location: [facility]     LAPAROSCOPIC HERNIORRHAPHY INGUINAL BILATERAL Bilateral 4/12/2017    Procedure: LAPAROSCOPIC HERNIORRHAPHY INGUINAL BILATERAL;  Surgeon: Shay Mercado MD;  Location: [facility]     NASAL ENDOSCOPY Bilateral 2/6/2017    Procedure: NASAL ENDOSCOPY;  Surgeon: Bobo Renteria MD;  [facility]: [facility]     NASAL ENDOSCOPY N/A 5/21/2018    Procedure: NASAL ENDOSCOPY;  Nasal Endoscopy, CO2 Laser Excision of Left Nasal Papilloma;  Surgeon: Bobo Renteria MD;  Location: [facility]     NASAL/SINUS POLYPECTOMY       PE TUBES          Prior to Admission Medications   Prior to Admission Medications   Prescriptions Last Dose Informant Patient Reported? Taking?   HYDROcodone-acetaminophen (NORCO) 5-325 MG tablet   No No   Sig: Take 1-2 tablets by mouth every 4 hours as needed for moderate to severe pain maximum 6 tablet(s) per day   cephALEXin (KEFLEX) 500 MG capsule   No No   Sig: Take 1 capsule (500 mg) by mouth 2 times daily   dicyclomine (BENTYL) 10 MG capsule  Self No No   Sig: Take 1 capsule (10 mg) by mouth 4 times daily as needed (abdominal pain)   doxycycline monohydrate (MONODOX) 100 MG capsule   No No   Sig: Take 1 capsule (100 mg) by mouth every 12 hours (for pneumonia)   furosemide (LASIX) 20 MG tablet   No No   Sig: Take 1 tablet (20  mg) by mouth daily for 7 days   gabapentin (NEURONTIN) 300 MG capsule  Self No No   Sig: Take 1 capsule (300 mg) by mouth 3 times daily   lisinopril-hydrochlorothiazide (ZESTORETIC) 10-12.5 MG tablet  Self No No   Sig: Take 1 tablet by mouth daily   methocarbamol (ROBAXIN) 500 MG tablet  Self No No   Sig: Take 1 tablet (500 mg) by mouth 4 times daily as needed for muscle spasms   order for DME  Self No No   Sig: Equipment being ordered: Dynaflex insert   polyethylene glycol (MIRALAX) 17 GM/Dose powder  Self No No   Sig: Take 17 g (1 capful) by mouth daily   Patient not taking: Reported on 8/4/2021      Facility-Administered Medications: None     Allergies   No Known Allergies    Family History    Family History   Problem Relation Age of Onset     Diabetes Mother 40     C.A.D. Father 72     Unknown/Adopted No family hx of      Depression No family hx of      Anxiety Disorder No family hx of      Schizophrenia No family hx of      Bipolar Disorder No family hx of      Suicide No family hx of      Substance Abuse No family hx of      Dementia No family hx of      Murray Disease No family hx of      Parkinsonism No family hx of      Autism Spectrum Disorder No family hx of      Intellectual Disability (Mental Retardation) No family hx of      Mental Illness No family hx of      Bleeding Disorder No family hx of        Social History   Social History     Socioeconomic History     Marital status: Single     Spouse name: Not on file     Number of children: Not on file     Years of education: Not on file     Highest education level: Not on file   Occupational History     Not on file   Tobacco Use     Smoking status: Current Every Day Smoker     Packs/day: 0.50     Years: 40.00     Pack years: 20.00     Types: Cigarettes     Start date: 1/1/1980     Smokeless tobacco: Never Used     Tobacco comment: 1 pack/day   Vaping Use     Vaping Use: Never used   Substance and Sexual Activity     Alcohol use: Yes     Comment:  "occasional      Drug use: Not Currently     Comment: 7 years quit Cocaine/methamphetamines     Sexual activity: Yes     Partners: Female     Birth control/protection: None   Other Topics Concern     Parent/sibling w/ CABG, MI or angioplasty before 65F 55M? No      Service No     Blood Transfusions No     Caffeine Concern No     Occupational Exposure No     Hobby Hazards No     Sleep Concern No     Stress Concern No     Weight Concern No     Special Diet No     Back Care No     Exercise Yes     Bike Helmet No     Seat Belt Yes     Self-Exams Not Asked   Social History Narrative     Not on file     Social Determinants of Health     Financial Resource Strain: Not on file   Food Insecurity: Not on file   Transportation Needs: Not on file   Physical Activity: Not on file   Stress: Not on file   Social Connections: Not on file   Intimate Partner Violence: Not on file   Housing Stability: Not on file       Physical Exam   BP (!) 152/115   Pulse 109   Temp 98.4  F (36.9  C) (Oral)   Resp 29   Ht 1.905 m (6' 3\")   Wt 79.4 kg (175 lb)   SpO2 99%   BMI 21.87 kg/m       Weight: 175 lbs 0 oz Body mass index is 21.87 kg/m .     Constitutional: Alert, oriented, cooperative, no apparent distress, appears nontoxic, sitting on the edge of bed  Eyes: Eyes are clear, pupils are reactive.  HENT:  No evidence of cranial trauma.  Lymph/Hematologic: No epitrochlear, axillary, anterior or posterior cervical, or supraclavicular lymphadenopathy is appreciated.  Cardiovascular: Grade 3/6 ejection systolic murmur, tachycardia regular rate, JVP present. Good peripheral pulses in wrists bilaterally. +2 Edema above the knee bilaterally   Respiratory: Clear to auscultation bilaterally.   GI: Soft, non-tender, normal bowel sounds  Genitourinary: Deferred  Musculoskeletal: Normal muscle bulk and tone.  Skin: Warm and dry, no rashes.   Neurologic: Neck supple. Cranial nerves are grossly intact.  is symmetric.     Data   Data " reviewed today:   Recent Labs   Lab 11/27/21  0848   WBC 8.0   HGB 14.1   MCV 84         POTASSIUM 3.6   CHLORIDE 107   CO2 30   BUN 18   CR 1.18   ANIONGAP 4   KEVIN 9.1   GLC 95   ALBUMIN 2.8*   PROTTOTAL 6.5*   BILITOTAL 1.0   ALKPHOS 137   ALT 21   AST 14   TROPONIN 0.066*       Recent Results (from the past 24 hour(s))   POC US ECHO LIMITED    Impression    [facility] Procedure Note      Limited Bedside ED Cardiac Ultrasound:    PROCEDURE: PERFORMED BY: Dr. Rajat Payne MD  INDICATIONS/SYMPTOM:  Shortness of Breath  PROBE: Cardiac phased array probe  BODY LOCATION: Chest  FINDINGS:   The ultrasound was performed utilizing the subcostal, parasternal long axis, parasternal short axis and apical 4 chamber views.  Cardiac contractility:  Present  Gross estimation of cardiac kinesis: depressed  Pericardial Effusion:  None  RV:LV ratio: LV greater than RV  IVC: 3 cm with minimal respiratory variation  LUNGS: Diffuse B-lines bilaterally. Pleural effusion in right base.      INTERPRETATION: Ejection fraction appears low. LV greater than RV. No pericardial effusion. IVC of 3 cm with minimal respiratory variation, bilateral B-lines and right-sided pleural effusion suggestive of pulmonary edema     IMAGE DOCUMENTATION: Images were archived to PACs system.         XR Chest 2 Views    Narrative    EXAM: XR CHEST 2 VIEW  LOCATION: [facility]  DATE/TIME: 11/27/2021, 10:38 AM    INDICATION: Dyspnea and bibasilar rales. Bilateral pitting edema of lower extremities. No known history of CHF. Treated for pneumonia in past month.  COMPARISON: Chest CT on 11/06/2021.      Impression    IMPRESSION: Two views of the chest were obtained. Cardiomediastinal silhouette is within normal limits. Moderate right pleural effusion and associated basilar atelectasis/consolidation. No significant left pleural effusion. No significant pneumothorax.         I personally reviewed the chest x-ray  image(s) showing: Cardiomediastinal silhouette is within normal limits. Moderate right pleural effusion and associated basilar atelectasis/consolidation

## 2021-11-27 NOTE — ED TRIAGE NOTES
"Pt complains of increased bilateral leg swelling. Pt has been seen in ER 6-7 times for leg swelling. Pt has not seen PCP for concern since being evaluated in ER. Pt unsure what medications he has been given for treatment. Pt saw surgeon who put plates in arm and leg for leg swelling.     Pt has not taking medication for pain.     Medical hx: HTN (not currently talking meds \"irresponsibility\" per pt)  "

## 2021-11-27 NOTE — ED PROVIDER NOTES
"  History     Chief Complaint   Patient presents with     Leg Swelling     HPI  Micah Nunez is a 55 year old male with history significant for hypertension (not compliant with medications), chemical dependency in remission, tobacco use, who presents to emergency department for evaluation of lower extremity edema.  Patient says he has been increasingly short of breath over the past several month.  Is also had a cough during this time which he says is recently proving.  He had a traumatic injury to his left lower extremity previously.  Initially had edema in his left lower extremity and now has had it in both legs.  Edema worsening is no up to his waist.  Chart review shows that he was seen in outside emergency department on 11/19/2021 and was discharged on furosemide with plan for PCP follow-up.  Patient says he took his last \"water pill\" yesterday and has yet to follow-up in clinic.  States that there was initial improvement in his lower extremity edema but over the past few days it is worsened.  Patient was also treated earlier this month for probable pneumonia with Levaquin.  Also treated for probable lower extremity cellulitis with terbinafine, then Ancef, Keflex and doxycycline.  No current fevers, chills, chest pain, nausea, vomiting or diarrhea.  No abdominal pain.  His last time he was seen in primary care was at least 6 months ago.  Is prescribed an unknown antihypertensive medication but states he has not been responsible and has not been taking it.  Chart review shows that he was prescribed lisinopril hydrochlorothiazide. Describes orthopnea    The patient's PMHx, Surgical Hx, Allergies, and Medications were all reviewed with the patient.    Allergies:  No Known Allergies    Problem List:    Patient Active Problem List    Diagnosis Date Noted     Closed displaced fracture of fifth metatarsal bone of left foot 07/26/2021     Priority: Medium     Traumatic avulsion of nail plate of toe 07/26/2021    "  Priority: Medium     Type I or II open fracture of left ulna 09/05/2020     Priority: Medium     Formatting of this note might be different from the original.  Added automatically from request for surgery [id number]       Benign essential hypertension 10/18/2017     Priority: Medium     Tobacco use disorder 08/12/2015     Priority: Medium     Chemical dependency (H) 07/27/2015     Priority: Medium     Papilloma of nose 03/10/2014     Priority: Medium     Nasal mass 12/17/2013     Priority: Medium     CARDIOVASCULAR SCREENING; LDL GOAL LESS THAN 130 12/11/2013     Priority: Medium     Substance abuse in remission (H) 05/19/2013     Priority: Medium        Past Medical History:    Past Medical History:   Diagnosis Date     Benign essential hypertension 10/18/2017     Bleeding disorder (H)      CARDIOVASCULAR SCREENING; LDL GOAL LESS THAN 130 12/11/2013     Chemical dependency (H)      Gastroesophageal reflux disease      Hypertension      Nasal mass 12/17/2013     Papilloma of nose 3/10/2014     Skin disease        Past Surgical History:    Past Surgical History:   Procedure Laterality Date     ARTHRODESIS FOOT Left 2/17/2015    Procedure: ARTHRODESIS FOOT;  Surgeon: Cortez Hayward DPM;  Location: [facility]     ARTHRODESIS TOE(S)  12/20/2013    Procedure: ARTHRODESIS TOE(S);  Arthrodesis first metatarsophalangeal joint left foot;  Surgeon: Cortez Hayward DPM;  Location: [facility]     COLONOSCOPY N/A 1/12/2021    Procedure: COLONOSCOPY;  Surgeon: Dixon Sarabia MD;  Location: [facility]     ENDOSCOPIC SINUS SURGERY  1/6/2014    Procedure: ENDOSCOPIC SINUS SURGERY;  Functional Endoscopic Sinus Surgery;  Surgeon: Bobo Renteria MD;  Location:  OR     ENDOSCOPIC SINUS SURGERY  7/21/2014    Procedure: ENDOSCOPIC SINUS SURGERY;  Surgeon: Bobo Renteria MD;  Location:  OR     ENDOSCOPIC SINUS SURGERY N/A 4/6/2015    Procedure: ENDOSCOPIC SINUS SURGERY;  Surgeon: Bobo Renteria MD;  Location:  OR      ENDOSCOPIC SINUS SURGERY N/A 8/17/2015    Procedure: ENDOSCOPIC SINUS SURGERY;  Surgeon: Bobo Renteria MD;  [facility]: [facility]     ENDOSCOPIC SINUS SURGERY Bilateral 8/19/2019    Procedure: Bilateral Functional Endoscopic Sinus Surgery, Right Nasal Endoscopy and Excision of Left Nasal Mass;  Surgeon: Bobo Renteria MD;  [facility]     ENT SURGERY       EXCISE NASAL MASS Left 11/6/2017    Procedure: EXCISE NASAL MASS;  Excision of Left Nasal Papilloma;  Surgeon: Bobo Renteria MD;  [facility]     LAPAROSCOPIC HERNIORRHAPHY INGUINAL BILATERAL Bilateral 4/12/2017    Procedure: LAPAROSCOPIC HERNIORRHAPHY INGUINAL BILATERAL;  Surgeon: Shay Mercado MD;  Location: [facility]     NASAL ENDOSCOPY Bilateral 2/6/2017    Procedure: NASAL ENDOSCOPY;  Surgeon: Bobo Renteria MD;  [facility]     NASAL ENDOSCOPY N/A 5/21/2018    Procedure: NASAL ENDOSCOPY;  Nasal Endoscopy, CO2 Laser Excision of Left Nasal Papilloma;  Surgeon: Bobo Renteria MD;  [facility]     NASAL/SINUS POLYPECTOMY       PE TUBES         Family History:    Family History   Problem Relation Age of Onset     Diabetes Mother 40     C.A.D. Father 72     Unknown/Adopted No family hx of      Depression No family hx of      Anxiety Disorder No family hx of      Schizophrenia No family hx of      Bipolar Disorder No family hx of      Suicide No family hx of      Substance Abuse No family hx of      Dementia No family hx of      Homero Disease No family hx of      Parkinsonism No family hx of      Autism Spectrum Disorder No family hx of      Intellectual Disability (Mental Retardation) No family hx of      Mental Illness No family hx of      Bleeding Disorder No family hx of        Social History:  Marital Status:  Single [1]  Social History     Tobacco Use     Smoking status: Current Every Day Smoker     Packs/day: 0.50     Years: 40.00     Pack years: 20.00     Types: Cigarettes     Start date: 1/1/1980      "Smokeless tobacco: Never Used     Tobacco comment: 1 pack/day   Vaping Use     Vaping Use: Never used   Substance Use Topics     Alcohol use: Yes     Comment: occasional      Drug use: Not Currently     Comment: 7 years quit Cocaine/methamphetamines        Medications:    cephALEXin (KEFLEX) 500 MG capsule  dicyclomine (BENTYL) 10 MG capsule  doxycycline monohydrate (MONODOX) 100 MG capsule  furosemide (LASIX) 20 MG tablet  gabapentin (NEURONTIN) 300 MG capsule  HYDROcodone-acetaminophen (NORCO) 5-325 MG tablet  lisinopril-hydrochlorothiazide (ZESTORETIC) 10-12.5 MG tablet  methocarbamol (ROBAXIN) 500 MG tablet  order for DME  polyethylene glycol (MIRALAX) 17 GM/Dose powder          Review of Systems   A complete review of systems performed and is otherwise negative except as noted in the HPI.     Physical Exam   BP: (!) 143/94  Pulse: 104  Temp: 98.4  F (36.9  C)  Resp: 18  Height: 190.5 cm (6' 3\")  Weight: 79.4 kg (175 lb)  SpO2: 98 %    Physical Exam  GEN: Awake, alert, and cooperative. No acute distress.  HENT: MMM. External ears and nose normal bilaterally.  EYES: EOM intact. Conjunctiva clear. No discharge.   NECK: Symmetric, freely mobile. JVD present.  CV : Tachycardic rate, regular rhythm. Early systolic murmur most noticeable and left lower sternal border.  PULM: Normal effort. Rales and decreased sounds in bases. Decreased breath sounds in right base   ABD: Soft, non-tender, non-distended. No rebound or guarding.   NEURO: Normal speech. Following commands. CN II-XII grossly intact. Answering questions and interacting appropriately.   EXT: pitting edema in lower extremities to waist. Sacral edema also present. Erythema is lower extremities.   INT: Warm and dry.        ED Course        Procedures  Results for orders placed during the hospital encounter of 11/27/21    POC US ECHO LIMITED    [facility] Procedure Note    Limited Bedside ED Cardiac Ultrasound:    PROCEDURE: PERFORMED BY: " Rajat Payne MD  INDICATIONS/SYMPTOM:  Shortness of Breath  PROBE: Cardiac phased array probe  BODY LOCATION: Chest  FINDINGS:  The ultrasound was performed utilizing the subcostal, parasternal long axis, parasternal short axis and apical 4 chamber views.  Cardiac contractility:  Present  Gross estimation of cardiac kinesis: depressed  Pericardial Effusion:  None  RV:LV ratio: LV greater than RV  IVC: 3 cm with minimal respiratory variation  LUNGS: Diffuse B-lines bilaterally. Pleural effusion in right base.    INTERPRETATION: Ejection fraction appears low. LV greater than RV. No pericardial effusion. IVC of 3 cm with minimal respiratory variation, bilateral B-lines and right-sided pleural effusion suggestive of pulmonary edema    IMAGE DOCUMENTATION: Images were archived to PACs system.        EKG: Interpreted by myself. Sinus rhythm with rate of 109 bpm. QTc 439. Normal axis. Voltage criteria for LVH. ST depressions laterally also consistent with strain pattern. Q waves in precordial leads. No significant changes compared to 11/6/2021.     Critical Care time:  none               Results for orders placed or performed during the hospital encounter of 11/27/21 (from the past 24 hour(s))   POC US ECHO LIMITED    [facility] Procedure Note      Limited Bedside ED Cardiac Ultrasound:    PROCEDURE: PERFORMED BY: Dr. Rajat Payne MD  INDICATIONS/SYMPTOM:  Shortness of Breath  PROBE: Cardiac phased array probe  BODY LOCATION: Chest  FINDINGS:   The ultrasound was performed utilizing the subcostal, parasternal long axis, parasternal short axis and apical 4 chamber views.  Cardiac contractility:  Present  Gross estimation of cardiac kinesis: depressed  Pericardial Effusion:  None  RV:LV ratio: LV greater than RV  IVC: 3 cm with minimal respiratory variation  LUNGS: Diffuse B-lines bilaterally. Pleural effusion in right base.      INTERPRETATION: Ejection fraction appears low. LV greater  than RV. No pericardial effusion. IVC of 3 cm with minimal respiratory variation, bilateral B-lines and right-sided pleural effusion suggestive of pulmonary edema     IMAGE DOCUMENTATION: Images were archived to PACs system.         CBC with platelets differential    Narrative    The following orders were created for panel order CBC with platelets differential.  Procedure                               Abnormality         Status                     ---------                               -----------         ------                     CBC with platelets and d...[[id number]]  Abnormal            Final result                 Please view results for these tests on the individual orders.   Comprehensive metabolic panel   Result Value Ref Range    Sodium 141 133 - 144 mmol/L    Potassium 3.6 3.4 - 5.3 mmol/L    Chloride 107 94 - 109 mmol/L    Carbon Dioxide (CO2) 30 20 - 32 mmol/L    Anion Gap 4 3 - 14 mmol/L    Urea Nitrogen 18 7 - 30 mg/dL    Creatinine 1.18 0.66 - 1.25 mg/dL    Calcium 9.1 8.5 - 10.1 mg/dL    Glucose 95 70 - 99 mg/dL    Alkaline Phosphatase 137 40 - 150 U/L    AST 14 0 - 45 U/L    ALT 21 0 - 70 U/L    Protein Total 6.5 (L) 6.8 - 8.8 g/dL    Albumin 2.8 (L) 3.4 - 5.0 g/dL    Bilirubin Total 1.0 0.2 - 1.3 mg/dL    GFR Estimate 69 >60 mL/min/1.73m2   Nt probnp inpatient (BNP)   Result Value Ref Range    N terminal Pro BNP Inpatient 11,606 (H) 0 - 900 pg/mL   [facility] Draw    Narrative    The following orders were created for panel order [place] Draw.  Procedure                               Abnormality         Status                     ---------                               -----------         ------                     Extra Blue Top Tube[[id number]]                              Final result               Extra Red Top Tube[[id number]]                               Final result               Extra Green Top (Lithium...[[id number]]                      Final result               Extra Purple Top  Tube[[id number]]                            Final result                 Please view results for these tests on the individual orders.   CBC with platelets and differential   Result Value Ref Range    WBC Count 8.0 4.0 - 11.0 10e3/uL    RBC Count 5.47 4.40 - 5.90 10e6/uL    Hemoglobin 14.1 13.3 - 17.7 g/dL    Hematocrit 45.7 40.0 - 53.0 %    MCV 84 78 - 100 fL    MCH 25.8 (L) 26.5 - 33.0 pg    MCHC 30.9 (L) 31.5 - 36.5 g/dL    RDW 16.3 (H) 10.0 - 15.0 %    Platelet Count 259 150 - 450 10e3/uL    % Neutrophils 62 %    % Lymphocytes 25 %    % Monocytes 9 %    % Eosinophils 3 %    % Basophils 1 %    % Immature Granulocytes 0 %    NRBCs per 100 WBC 0 <1 /100    Absolute Neutrophils 5.0 1.6 - 8.3 10e3/uL    Absolute Lymphocytes 2.0 0.8 - 5.3 10e3/uL    Absolute Monocytes 0.7 0.0 - 1.3 10e3/uL    Absolute Eosinophils 0.2 0.0 - 0.7 10e3/uL    Absolute Basophils 0.1 0.0 - 0.2 10e3/uL    Absolute Immature Granulocytes 0.0 <=0.0 10e3/uL    Absolute NRBCs 0.0 10e3/uL   Extra Blue Top Tube   Result Value Ref Range    Hold Specimen JIC    Extra Red Top Tube   Result Value Ref Range    Hold Specimen JIC    Extra Green Top (Lithium Heparin) Tube   Result Value Ref Range    Hold Specimen JIC    Extra Purple Top Tube   Result Value Ref Range    Hold Specimen JIC    Asymptomatic COVID-19 Virus (Coronavirus) by PCR Nasopharyngeal    Specimen: Nasopharyngeal; Swab   Result Value Ref Range    SARS CoV2 PCR Negative Negative    Narrative    Testing was performed using the soha  SARS-CoV-2 & Influenza A/B Assay on the soha  [employer]  System.  This test should be ordered for the detection of SARS-COV-2 in individuals who meet SARS-CoV-2 clinical and/or epidemiological criteria. Test performance is unknown in asymptomatic patients.  This test is for in vitro diagnostic use under the FDA EUA for laboratories certified under CLIA to perform moderate and/or high complexity testing. This test has not been FDA cleared or approved.  A negative test  does not rule out the presence of PCR inhibitors in the specimen or target RNA in concentration below the limit of detection for the assay. The possibility of a false negative should be considered if the patient's recent exposure or clinical presentation suggests COVID-19.  [facility] are certified under the Clinical Laboratory Improvement Amendments of 1988 (CLIA-88) as qualified to perform moderate and/or high complexity laboratory testing.   XR Chest 2 Views    Narrative    EXAM: XR CHEST 2 VIEW  LOCATION: [facility]  DATE/TIME: 11/27/2021, 10:38 AM    INDICATION: Dyspnea and bibasilar rales. Bilateral pitting edema of lower extremities. No known history of CHF. Treated for pneumonia in past month.  COMPARISON: Chest CT on 11/06/2021.      Impression    IMPRESSION: Two views of the chest were obtained. Cardiomediastinal silhouette is within normal limits. Moderate right pleural effusion and associated basilar atelectasis/consolidation. No significant left pleural effusion. No significant pneumothorax.     UA with Microscopic reflex to Culture    Specimen: Urine, Clean Catch   Result Value Ref Range    Color Urine Colorless Colorless, Straw, Light Yellow, Yellow    Appearance Urine Clear Clear    Glucose Urine Negative Negative mg/dL    Bilirubin Urine Negative Negative    Ketones Urine Negative Negative mg/dL    Specific Gravity Urine 1.004 1.003 - 1.035    Blood Urine Negative Negative    pH Urine 7.0 5.0 - 7.0    Protein Albumin Urine Negative Negative mg/dL    Urobilinogen Urine Normal Normal, 2.0 mg/dL    Nitrite Urine Negative Negative    Leukocyte Esterase Urine Negative Negative    RBC Urine <1 <=2 /HPF    WBC Urine <1 <=5 /HPF    Narrative    Urine Culture not indicated       Medications   furosemide (LASIX) injection 60 mg (60 mg Intravenous Given 11/27/21 0855)       Assessments & Plan (with Medical Decision Making)   55 year old male with past medical  history of poorly controlled hypertension, with worsening lower extremity edema now to waist and recently completed course of furosemide. On exam patient appears mildly anxious and distressed but nontoxic. His bilateral pitting edema to his waist and sacral edema also present. Legs are erythematous which I think is most likely related to venous stasis. Early systolic cardiac murmur and mild JVD present. Rales in bases with decreased sounds in right base. Abdomen soft and non tender. Clinically appears to be volume overloaded. Point-of-care ultrasound with slightly reduced ejection fraction and sonographic evidence of pulmonary edema including right-sided pleural effusion. We will start diuresis with 60 mg IV furosemide. Labs and chest x-ray pending at this time.    Urinalysis unremarkable.  CBC without leukocytosis or anemia.  CMP with mild hypoproteinemia.  NT proBNP elevated to 11,600 (was 3300 on 11/19).  EKG with signs of LVH but no evidence of acute ischemia.  Symptoms have been longstanding and no report of chest pain.  Very low suspicion for ACS.  Chest x-ray with moderate right side pleural effusion.    I think his symptoms are related to congestive heart failure.  This is supported by likely depressed LV function, generous IVC with minimal respiratory variation, increasing BNP.  Renal function normal normal LFTs.  No proteinuria.  Given patient's worsening edema and heart failure of unknown etiology he should be admitted to the hospital for further evaluation and management continue diuresis.  Patient should also have an echocardiogram.     I discussed the case with Dr. Hiadlgo with hospitalist service and he requests a troponin prior to excepting patient. Care of patient signed to Dr. Alejandro Bishop to follow-up on troponin with plan for admission.    I have reviewed the nursing notes.         New Prescriptions    No medications on file       Final diagnoses:   Acute congestive heart failure, unspecified  heart failure type (H)   Anasarca     Rajat Payne MD    11/27/2021   [facility] EMERGENCY DEPT    Disclaimer: This note consists of words and symbols derived from keyboarding and dictation using voice recognition software.  As a result, there may be errors that have gone undetected.  Please consider this when interpreting information found in this note.             Rajat Payne MD  11/27/21 1600

## 2021-11-28 LAB
BASE EXCESS BLDA CALC-SCNC: 7.8 MMOL/L (ref -9–1.8)
HCO3 BLD-SCNC: 33 MMOL/L (ref 21–28)
O2/TOTAL GAS SETTING VFR VENT: 0 %
PCO2 BLD: 46 MM HG (ref 35–45)
PH BLD: 7.46 [PH] (ref 7.35–7.45)
PO2 BLD: 62 MM HG (ref 80–105)
POTASSIUM BLD-SCNC: 3.5 MMOL/L (ref 3.4–5.3)

## 2021-11-28 PROCEDURE — 250N000013 HC RX MED GY IP 250 OP 250 PS 637: Performed by: FAMILY MEDICINE

## 2021-11-28 PROCEDURE — 250N000011 HC RX IP 250 OP 636: Performed by: INTERNAL MEDICINE

## 2021-11-28 PROCEDURE — 99233 SBSQ HOSP IP/OBS HIGH 50: CPT | Performed by: INTERNAL MEDICINE

## 2021-11-28 PROCEDURE — 120N000001 HC R&B MED SURG/OB

## 2021-11-28 PROCEDURE — 36415 COLL VENOUS BLD VENIPUNCTURE: CPT | Performed by: INTERNAL MEDICINE

## 2021-11-28 PROCEDURE — 82803 BLOOD GASES ANY COMBINATION: CPT | Performed by: INTERNAL MEDICINE

## 2021-11-28 PROCEDURE — 250N000011 HC RX IP 250 OP 636: Performed by: FAMILY MEDICINE

## 2021-11-28 PROCEDURE — 84132 ASSAY OF SERUM POTASSIUM: CPT | Performed by: INTERNAL MEDICINE

## 2021-11-28 RX ORDER — FUROSEMIDE 10 MG/ML
60 INJECTION INTRAMUSCULAR; INTRAVENOUS EVERY 8 HOURS
Status: DISCONTINUED | OUTPATIENT
Start: 2021-11-28 | End: 2021-11-29

## 2021-11-28 RX ADMIN — FUROSEMIDE 60 MG: 10 INJECTION, SOLUTION INTRAMUSCULAR; INTRAVENOUS at 10:48

## 2021-11-28 RX ADMIN — FUROSEMIDE 60 MG: 10 INJECTION, SOLUTION INTRAMUSCULAR; INTRAVENOUS at 02:57

## 2021-11-28 RX ADMIN — LISINOPRIL AND HYDROCHLOROTHIAZIDE 1 TABLET: 12.5; 1 TABLET ORAL at 10:48

## 2021-11-28 RX ADMIN — FUROSEMIDE 60 MG: 10 INJECTION, SOLUTION INTRAMUSCULAR; INTRAVENOUS at 19:26

## 2021-11-28 ASSESSMENT — ACTIVITIES OF DAILY LIVING (ADL)
ADLS_ACUITY_SCORE: 3

## 2021-11-28 ASSESSMENT — MIFFLIN-ST. JEOR: SCORE: 1769.63

## 2021-11-28 NOTE — PROGRESS NOTES
"[facility] ADMISSION NOTE    Patient admitted to room 2207 at approximately 1840 via cart from emergency room. Patient was accompanied by transport tech.     Verbal SBAR report received from ER nurse Yola prior to patient arrival.     Patient ambulated to bed independently. Patient alert and oriented X 3. The patient is not having any pain.  . Admission vital signs: Blood pressure 129/78, pulse 100, temperature 98.5  F (36.9  C), temperature source Oral, resp. rate 18, height 1.905 m (6' 3\"), weight 88 kg (194 lb 0.1 oz), SpO2 96 %. Patient was oriented to plan of care, call light, bed controls, tv, telephone, bathroom and visiting hours.     Risk Assessment    The following safety risks were identified during admission: fall. Yellow risk band applied: YES.     Skin Initial Assessment    This writer admitted this patient and completed a full skin assessment and Leighton score in the Adult PCS flowsheet. Appropriate interventions initiated as needed.     Secondary skin check completed by AICHA Connors.         Education    Patient has a [place] to Observation order: No  Observation education completed and documented: N/A    Jolynn Crow RN on 11/27/2021 at 10:14 PM      "

## 2021-11-28 NOTE — PLAN OF CARE
OT order received . Spoke with Dr Ennis on rounds no needs identified at this time. Plan to discharge orders

## 2021-11-28 NOTE — ED NOTES
Security states they will take pt's generator and put it in their office for safe keeping. Pt made aware of this. Pt label placed on generator.

## 2021-11-28 NOTE — PROGRESS NOTES
Skin affirmation note    Admitting nurse completed full skin assessment, Leighton score and Leighton interventions. This writer agrees with the initial skin assessment findings.

## 2021-11-28 NOTE — PROGRESS NOTES
[facility] Internal Medicine Progress Note     Date of Service (when I saw the patient): 11/28/2021    REASON FOR ADMISSION / INTERVAL HISTORY:  Micah Nunez is a 55 year old male who presents on 11/27/2021 with shortness of breath, orthopnea, paroxysmal nocturnal dyspnea and anasarca.    Feels better today but still has significant LE edema    ASSESSMENT/PLAN:     Acute CHF/ anasarca  Presenting with 6 weeks od LE edema,  dry cough, orthopnea and paroxysmal nocturnal dyspnea. Edema worsening-and increased from LE to his waist. Patient was seen in  ED on 11/19/2021 for evaluation of leg swelling and shortness of breath.He was started on Lasix in the ED and was discharged on 7 pills of Lasix 20 mg with plan to follow-up with PCP.  Unfortunately the patient did not follow-up with his PCP.  Patient states he took his last Lasix pill 2 days ago.  Symptoms started to get worse after finishing Lasix pills. Non compliant with BP meds-lisinopril-hydrochlorothiazide.   Labs showed: Significantly elevated BNP at 11,606.  Troponin at 0.066. CXR showed Moderate right pleural effusion and associated basilar atelectasis/consolidation. No significant left pleural effusion.  Was started on iv lasix 60mg q8 hrs. Having good diuresis and edema imp[roved on abdominal wall/ waist. Still has significant edema LE.  -continue iv lasix, ECHO-Monday, continue lisinopril-hydrochlorothiazide. Follow cr/ daily weights. Stop tele.     Moderate R pleural effusion  Has been there since 10/21. Does not appear in resp distress and o2 sats stable on RA.  -just monitor, if o2 sats drop or has resp distress, would consider thoracentesis.    Elevated troponin  Troponin admission 0.066. Patient denies any chest pain. Likely secondary to supply and demand in the setting of congestive heart failure failure. Trop trended down  -echo in AM        Benign essential hypertension  Home medication lisinopril-hydrochlorothiazide.  He has not been  "taking it.  -continue lisinopril- hydrochlorothiazide        Substance abuse in remission (H)  Patient has a history of polysubstance abuse including heroin, cocaine meth.  Currently in remission.       Non compliance with medical treatment  Patient reports medication none compliance.  He was prescribed medication for hypertension but unfortunately has not been taking it.  Significant risk factor for worsening of congestive heart failure.  Discussed the importance of medication compliance.      Lab test negative for COVID-19 virus  This patient was evaluated during a global COVID-19 pandemic, which necessitated consideration that the patient might be at risk for infection with the SARS-CoV-2 virus that causes COVID-19  COVID-19 PCR negative on admission  Patient is not vaccinated, but is willing to receive the vaccine       Tobacco use disorder  Currently daily smoker  Patient was counseled against smoking  Nicotine patch as needed    DISPO  Home in 1-2 days likely after diuresis      GUZMAN WASHINGTON MD   Pg [phone]    DVT Prhylaxis: Pneumatic Compression Devices  Code Status: Full Code    ROS:  As described in A/P and Exam.  Otherwise ALL are  negative.    PHYSICAL EXAM:  All vitals have been reviewed    Blood pressure 126/79, pulse 92, temperature 97.9  F (36.6  C), temperature source Oral, resp. rate 18, height 1.905 m (6' 3\"), weight 84.9 kg (187 lb 2.7 oz), SpO2 94 %.    I/O this shift:  In: -   Out: 300 [Urine:300]    GENERAL APPEARANCE: healthy, alert and no distress  EYES: conjunctiva clear, eyes grossly normal  HENT: external ears and nose normal   RESP: lungs clear to auscultation - no rales, rhonchi or wheezes  CV: regular rate and rhythm, normal S1 S2, no S3 or S4 and no murmur, click or rub   ABDOMEN: soft, nontender, no HSM or masses and bowel sounds normal  MS: no clubbing, cyanosis; 2-3+ edema  SKIN: clear without significant rashes or lesions  NEURO: -non-focal moves all 4 extr    ROUTINE  LABS " (Last four results)  CMP  Recent Labs   Lab 11/27/21 1917 11/27/21  0848   NA  --  141   POTASSIUM 3.6 3.6   CHLORIDE  --  107   CO2  --  30   ANIONGAP  --  4   GLC  --  95   BUN  --  18   CR  --  1.18   GFRESTIMATED  --  69   KEVIN  --  9.1   MAG  --  2.3   PHOS 3.6  --    PROTTOTAL  --  6.5*   ALBUMIN  --  2.8*   BILITOTAL  --  1.0   ALKPHOS  --  137   AST  --  14   ALT  --  21     CBC  Recent Labs   Lab 11/27/21  0848   WBC 8.0   RBC 5.47   HGB 14.1   HCT 45.7   MCV 84   MCH 25.8*   MCHC 30.9*   RDW 16.3*        INRNo lab results found in last 7 days.  Arterial Blood Gas  Recent Labs   Lab 11/28/21  0112   PH 7.46*   PCO2 46*   PO2 62*   HCO3 33*   O2PER 0       Recent Results (from the past 24 hour(s))   XR Chest 2 Views    Narrative    EXAM: XR CHEST 2 VIEW  LOCATION: [facility]  DATE/TIME: 11/27/2021, 10:38 AM    INDICATION: Dyspnea and bibasilar rales. Bilateral pitting edema of lower extremities. No known history of CHF. Treated for pneumonia in past month.  COMPARISON: Chest CT on 11/06/2021.      Impression    IMPRESSION: Two views of the chest were obtained. Cardiomediastinal silhouette is within normal limits. Moderate right pleural effusion and associated basilar atelectasis/consolidation. No significant left pleural effusion. No significant pneumothorax.

## 2021-11-28 NOTE — PLAN OF CARE
Verbal education and reading materials provided to patient regarding heart failure. Plan of care discussed. Patient expressed understanding.

## 2021-11-28 NOTE — CONSULTS
Care Management Note:    Care Management team received referral based on old order set of CHF for discharge planning.    Per IDT rounds, EMR review, and/or discussion with medical team it has been determined that pt has no discharge needs at this time.    Care Management will close referral at this time.    WILLI Forde

## 2021-11-28 NOTE — PLAN OF CARE
Lasix given according to MAR. Patient uses urinal at bedside. Urine is clear pale yellow. +3-4 edema in bilateral LE's. On tele.

## 2021-11-28 NOTE — PLAN OF CARE
Patient oriented x4, steady up in room independently.  Denies pain  Bilat LE +3, encouraged to keep feet elevated  Lasix IV given as ordered. Potassium recheck 3.5

## 2021-11-29 ENCOUNTER — APPOINTMENT (OUTPATIENT)
Dept: CARDIOLOGY | Facility: CLINIC | Age: 56
DRG: 293 | End: 2021-11-29
Attending: INTERNAL MEDICINE
Payer: COMMERCIAL

## 2021-11-29 VITALS
SYSTOLIC BLOOD PRESSURE: 128 MMHG | WEIGHT: 172.62 LBS | OXYGEN SATURATION: 96 % | DIASTOLIC BLOOD PRESSURE: 82 MMHG | TEMPERATURE: 97.5 F | RESPIRATION RATE: 16 BRPM | BODY MASS INDEX: 21.46 KG/M2 | HEIGHT: 75 IN | HEART RATE: 67 BPM

## 2021-11-29 PROBLEM — S91.209A TRAUMATIC AVULSION OF NAIL PLATE OF TOE: Status: RESOLVED | Noted: 2021-07-26 | Resolved: 2021-11-29

## 2021-11-29 PROBLEM — I10 BENIGN ESSENTIAL HYPERTENSION: Chronic | Status: ACTIVE | Noted: 2017-10-18

## 2021-11-29 PROBLEM — S52.202B TYPE I OR II OPEN FRACTURE OF LEFT ULNA: Status: RESOLVED | Noted: 2020-09-05 | Resolved: 2021-11-29

## 2021-11-29 PROBLEM — S92.352A CLOSED DISPLACED FRACTURE OF FIFTH METATARSAL BONE OF LEFT FOOT: Status: RESOLVED | Noted: 2021-07-26 | Resolved: 2021-11-29

## 2021-11-29 LAB
ANION GAP SERPL CALCULATED.3IONS-SCNC: 5 MMOL/L (ref 3–14)
BI-PLANE LVEF ECHO: NORMAL
BUN SERPL-MCNC: 15 MG/DL (ref 7–30)
CALCIUM SERPL-MCNC: 8.9 MG/DL (ref 8.5–10.1)
CHLORIDE BLD-SCNC: 102 MMOL/L (ref 94–109)
CO2 SERPL-SCNC: 32 MMOL/L (ref 20–32)
CREAT SERPL-MCNC: 1.13 MG/DL (ref 0.66–1.25)
GFR SERPL CREATININE-BSD FRML MDRD: 73 ML/MIN/1.73M2
GLUCOSE BLD-MCNC: 110 MG/DL (ref 70–99)
HOLD SPECIMEN: NORMAL
LVEF ECHO: NORMAL
MAGNESIUM SERPL-MCNC: 2 MG/DL (ref 1.6–2.3)
NT-PROBNP SERPL-MCNC: 4916 PG/ML (ref 0–900)
POTASSIUM BLD-SCNC: 3.1 MMOL/L (ref 3.4–5.3)
POTASSIUM BLD-SCNC: 4 MMOL/L (ref 3.4–5.3)
SODIUM SERPL-SCNC: 139 MMOL/L (ref 133–144)

## 2021-11-29 PROCEDURE — 36415 COLL VENOUS BLD VENIPUNCTURE: CPT | Performed by: INTERNAL MEDICINE

## 2021-11-29 PROCEDURE — 83735 ASSAY OF MAGNESIUM: CPT | Performed by: INTERNAL MEDICINE

## 2021-11-29 PROCEDURE — 93306 TTE W/DOPPLER COMPLETE: CPT

## 2021-11-29 PROCEDURE — 250N000011 HC RX IP 250 OP 636: Performed by: INTERNAL MEDICINE

## 2021-11-29 PROCEDURE — 250N000013 HC RX MED GY IP 250 OP 250 PS 637: Performed by: FAMILY MEDICINE

## 2021-11-29 PROCEDURE — 83880 ASSAY OF NATRIURETIC PEPTIDE: CPT | Performed by: INTERNAL MEDICINE

## 2021-11-29 PROCEDURE — 93306 TTE W/DOPPLER COMPLETE: CPT | Mod: 26 | Performed by: INTERNAL MEDICINE

## 2021-11-29 PROCEDURE — 84132 ASSAY OF SERUM POTASSIUM: CPT | Performed by: INTERNAL MEDICINE

## 2021-11-29 PROCEDURE — 99207 PR CDG-CODE CATEGORY CHANGED: CPT | Performed by: INTERNAL MEDICINE

## 2021-11-29 PROCEDURE — 250N000013 HC RX MED GY IP 250 OP 250 PS 637: Performed by: INTERNAL MEDICINE

## 2021-11-29 PROCEDURE — 80048 BASIC METABOLIC PNL TOTAL CA: CPT | Performed by: INTERNAL MEDICINE

## 2021-11-29 PROCEDURE — 99239 HOSP IP/OBS DSCHRG MGMT >30: CPT | Performed by: INTERNAL MEDICINE

## 2021-11-29 RX ORDER — POTASSIUM CHLORIDE 1500 MG/1
40 TABLET, EXTENDED RELEASE ORAL ONCE
Status: COMPLETED | OUTPATIENT
Start: 2021-11-29 | End: 2021-11-29

## 2021-11-29 RX ORDER — POTASSIUM CHLORIDE 1.5 G/1.58G
10 POWDER, FOR SOLUTION ORAL DAILY
Qty: 30 PACKET | Refills: 0 | Status: SHIPPED | OUTPATIENT
Start: 2021-11-30 | End: 2021-12-01

## 2021-11-29 RX ORDER — FUROSEMIDE 20 MG
20 TABLET ORAL DAILY
Status: DISCONTINUED | OUTPATIENT
Start: 2021-11-29 | End: 2021-11-29 | Stop reason: HOSPADM

## 2021-11-29 RX ORDER — POTASSIUM CHLORIDE 1.5 G/1.58G
10 POWDER, FOR SOLUTION ORAL DAILY
Status: DISCONTINUED | OUTPATIENT
Start: 2021-11-30 | End: 2021-11-29 | Stop reason: HOSPADM

## 2021-11-29 RX ORDER — LISINOPRIL 20 MG/1
20 TABLET ORAL DAILY
Status: DISCONTINUED | OUTPATIENT
Start: 2021-11-30 | End: 2021-11-29 | Stop reason: HOSPADM

## 2021-11-29 RX ORDER — LISINOPRIL 20 MG/1
20 TABLET ORAL DAILY
Qty: 30 TABLET | Refills: 0 | Status: SHIPPED | OUTPATIENT
Start: 2021-11-30 | End: 2021-12-01

## 2021-11-29 RX ORDER — FUROSEMIDE 20 MG
20 TABLET ORAL DAILY
Qty: 30 TABLET | Refills: 0 | Status: SHIPPED | OUTPATIENT
Start: 2021-11-29 | End: 2021-12-01

## 2021-11-29 RX ORDER — FUROSEMIDE 10 MG/ML
60 INJECTION INTRAMUSCULAR; INTRAVENOUS EVERY 12 HOURS
Status: DISCONTINUED | OUTPATIENT
Start: 2021-11-29 | End: 2021-11-29

## 2021-11-29 RX ORDER — LISINOPRIL 10 MG/1
10 TABLET ORAL ONCE
Status: COMPLETED | OUTPATIENT
Start: 2021-11-29 | End: 2021-11-29

## 2021-11-29 RX ADMIN — FUROSEMIDE 60 MG: 10 INJECTION, SOLUTION INTRAMUSCULAR; INTRAVENOUS at 02:51

## 2021-11-29 RX ADMIN — LISINOPRIL 10 MG: 10 TABLET ORAL at 12:04

## 2021-11-29 RX ADMIN — LISINOPRIL AND HYDROCHLOROTHIAZIDE 1 TABLET: 12.5; 1 TABLET ORAL at 08:27

## 2021-11-29 RX ADMIN — POTASSIUM CHLORIDE 40 MEQ: 20 TABLET, EXTENDED RELEASE ORAL at 06:11

## 2021-11-29 RX ADMIN — FUROSEMIDE 20 MG: 20 TABLET ORAL at 12:04

## 2021-11-29 ASSESSMENT — ACTIVITIES OF DAILY LIVING (ADL)
ADLS_ACUITY_SCORE: 3

## 2021-11-29 ASSESSMENT — MIFFLIN-ST. JEOR: SCORE: 1703.63

## 2021-11-29 NOTE — PLAN OF CARE
[facility] DISCHARGE NOTE    Patient discharged to home at 12:35 PM via ambulation. Accompanied by staff. Discharge instructions reviewed with patient, opportunity offered to ask questions. Prescriptions filled and sent with patient upon discharge. All belongings sent with patient.    Javier Davison RN

## 2021-11-29 NOTE — PROGRESS NOTES
[facility]    Medicine Progress Note - Hospitalist Service       Date of Admission:  11/27/2021    Assessment & Plan           MAYELA Nunez is a 55 year old male who presents on 11/27/2021 with shortness of breath, orthopnea, paroxysmal nocturnal dyspnea and anasarca.      Diagnosed at previous ER visit on 10/25/2021 of community-acquired pneumonia, was treated with lavequin       ASSESSMENT/PLAN:      Acute CHF/ anasarca  Presents with 6 weeks of bilateral lower extremities edema,  dry cough, orthopnea and paroxysmal nocturnal dyspnea. Edema worsening-and increased from LE to his waist. Patient was seen in  ED on 11/19/2021 for evaluation of leg swelling and shortness of breath. He was started on Lasix in the ED and was discharged on 7 pills of Lasix 20 mg with plan to follow-up with PCP.  Unfortunately the patient did not follow-up with his PCP.  Patient states he took his last Lasix pill 2 days ago.  Symptoms started to get worse after finishing Lasix pills. Non compliant with prior to admission BP meds - lisinopril-hydrochlorothiazide.   Labs showed: Significantly elevated BNP at 11,606.  Troponin at 0.066. CXR showed moderate right pleural effusion and associated basilar atelectasis/consolidation. No significant left pleural effusion. EKG has new poor R-wave progression since 2017    Echo - pending    Started on IV lasix 60mg q8 hrs.    Diuresing, weight trending down. Baseline weight uncertain, kraft reports he has lost weight over the ;last 3 months because he is depressed. Base;line may be about 190# per patient. . Decreased potassium   - Decrease IV lasix to BID  - Continue lisinopril-hydrochlorothiazide. Follow Chem8/daily weights.      Moderate right pleural effusion  Has been there since 10/21. Does not appear in resp distress and o2 sats stable on RA.  - Monitor, if o2 sats drop or has respiratory distress or effusion persists after diuresis, would consider  thoracentesis.     Elevated troponin  Troponin admission 0.066. Patient denies any chest pain. Likely secondary to supply and demand in the setting of congestive heart failure failure. Trop trended down 0.66- 0.61  - Await echo    Hypokalemia  - Due to diuresis  - Replace per protocol       Benign essential hypertension  Home medication lisinopril-hydrochlorothiazide.  He has not been taking it.  - Continue lisinopril- hydrochlorothiazide        Substance abuse in remission (H)  Patient has a history of polysubstance abuse including heroin, cocaine meth.  Currently in remission.       Non compliance with medical treatment  Patient reports medication none compliance.  He was prescribed medication for hypertension but unfortunately has not been taking it.  Significant risk factor for worsening of congestive heart failure.       Lab test negative for COVID-19 virus  This patient was evaluated during a global COVID-19 pandemic, which necessitated consideration that the patient might be at risk for infection with the SARS-CoV-2 virus that causes COVID-19  - COVID-19 PCR negative on admission  - Patient is not vaccinated, but is willing to receive the vaccine       Tobacco use disorder  Currently daily smoker. Patient was counseled against smoking  - Nicotine patch as needed        Diet: 2 Gram Sodium Diet Other - please comment    DVT Prophylaxis: Ambulate every shift  Ortez Catheter: Not present  Central Lines: None  Code Status: Full Code      Disposition Plan   Expected discharge: later today or tomorrow,  recommended to prior living arrangement once K replaced, discussed echo with cardiology.     The patient's care was discussed with the Bedside Nurse and Patient.    Carlitos Santos MD  Hospitalist Service  [facility]  Securely message with the Vocera Web Console (learn more here)  Text page via [employer] Paging/Directory        Clinically Significant Risk Factors Present on Admission                ______________________________________________________________________    Interval History   Feeling much better  Edema near baseline  Breathing improved      Intake/Output Summary (Last 24 hours) at 11/29/2021 1041  Last data filed at 11/29/2021 0600  Gross per 24 hour   Intake 1420 ml   Output 7275 ml   Net -5855 ml        Vitals:    11/27/21 0628 11/27/21 1841 11/28/21 0726 11/29/21 0532   Weight: 79.4 kg (175 lb) 88 kg (194 lb 0.1 oz) 84.9 kg (187 lb 2.7 oz) 78.3 kg (172 lb 9.9 oz)     Wt Readings from Last 20 Encounters:   11/29/21 78.3 kg (172 lb 9.9 oz)   11/19/21 81.6 kg (180 lb)   11/06/21 83.9 kg (185 lb)   10/25/21 81.6 kg (180 lb)   10/16/21 86.2 kg (190 lb)   08/04/21 86.2 kg (190 lb)   04/13/21 86.2 kg (190 lb)   03/02/21 86.2 kg (190 lb)   02/24/21 86.2 kg (190 lb)   01/12/21 83.9 kg (185 lb)   11/20/20 83.9 kg (185 lb)   11/08/20 81.6 kg (180 lb)   08/20/20 90.7 kg (200 lb)   06/03/20 88.5 kg (195 lb)   05/30/20 88.5 kg (195 lb)   05/28/20 88.5 kg (195 lb)   08/19/19 84.8 kg (187 lb)   08/05/19 85.3 kg (188 lb)   07/30/19 87.1 kg (192 lb)   06/03/19 88.5 kg (195 lb)         Data reviewed today: I reviewed all medications, new labs and imaging results over the last 24 hours. I personally reviewed the EKG tracing showing poor rwp and the chest x-ray image(s) showing right pleural effusion.    Physical Exam   Vital Signs: Temp: 97.5  F (36.4  C) Temp src: Oral BP: 128/82 Pulse: 67   Resp: 16 SpO2: 96 % O2 Device: None (Room air)    Weight: 172 lbs 9.92 oz  Constitutional: awake, alert, cooperative, no apparent distress, and appears stated age  Respiratory: No increased work of breathing, good air exchange, clear to auscultation bilaterally, ? Diminished right base  Cardiovascular: regular rate and rhythm, murmurs include systolic murmur III/VI located at apex   Musculoskeletal: mild left lower extremity edema    Data     Lab Results   Component Value Date    TROPONIN 0.061 (H) 11/27/2021    TROPONIN  0.066 (H) 11/27/2021    TROPONIN 0.020 11/06/2021     Heart Failure Labs  Outpatient: No results found for: NTBNP    Inpatient:   Lab Results   Component Value Date    NTBNPI 11,606 (H) 11/27/2021       CMPRecent Labs   Lab 11/29/21  0432 11/28/21  1238 11/27/21  1917 11/27/21  0848     --   --  141   POTASSIUM 3.1* 3.5 3.6 3.6   CHLORIDE 102  --   --  107   CO2 32  --   --  30   ANIONGAP 5  --   --  4   *  --   --  95   BUN 15  --   --  18   CR 1.13  --   --  1.18   GFRESTIMATED 73  --   --  69   KEVIN 8.9  --   --  9.1   MAG  --   --   --  2.3   PHOS  --   --  3.6  --    PROTTOTAL  --   --   --  6.5*   ALBUMIN  --   --   --  2.8*   BILITOTAL  --   --   --  1.0   ALKPHOS  --   --   --  137   AST  --   --   --  14   ALT  --   --   --  21     CBC  Recent Labs   Lab 11/27/21  0848   WBC 8.0   RBC 5.47   HGB 14.1   HCT 45.7   MCV 84   MCH 25.8*   MCHC 30.9*   RDW 16.3*        Arterial Blood Gas  Recent Labs   Lab 11/28/21  0112   PH 7.46*   PCO2 46*   PO2 62*   HCO3 33*   O2PER 0

## 2021-11-29 NOTE — DISCHARGE SUMMARY
[facility]  Hospitalist Discharge Summary      Date of Admission:  11/27/2021  Date of Discharge:  11/29/2021  Discharging Provider: Carlitos Santos MD      Discharge Diagnoses   Principal Problem:    Acute systolic heart failure, likely secondary to mitral valve prolapse with severe mitral regurgitation.   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  The   following labs/tests are recommended: Basic metabolic panel.    See cardiology in < 2weeks           Unresulted Labs Ordered in the Past 30 Days of this Admission     No orders found from 10/28/2021 to 11/28/2021.          Discharge Disposition   Discharged to home  Condition at discharge: Good      Hospital Course              MAYELA Nunez is a 55 year old male who presents on 11/27/2021 with shortness of breath, orthopnea, paroxysmal nocturnal dyspnea and anasarca.      Diagnosed at previous ER visit on 10/25/2021 of community-acquired pneumonia, was treated with lavequin       ASSESSMENT/PLAN:      Acute systolic CHF/ anasarca due to severe mitral regurgitation   Presents with 6 weeks of bilateral lower extremities edema,  dry cough, orthopnea and paroxysmal nocturnal dyspnea. Edema worsening-and increased from LE to his waist. Patient was seen in  ED on 11/19/2021 for evaluation of leg swelling and shortness of breath. He was started on Lasix in the ED and was discharged  on 7 pills of Lasix 20 mg with plan to follow-up with PCP.  Unfortunately the patient did not follow-up with his PCP.  Patient states he took his last Lasix pill 2 days ago.  Symptoms started to get worse after finishing Lasix pills. Non compliant with prior to admission BP meds - lisinopril-hydrochlorothiazide.   Labs showed: Significantly elevated BNP at 11,606.  Troponin at 0.066. CXR showed moderate right pleural effusion and associated basilar atelectasis/consolidation. No significant left pleural effusion. EKG has new poor R-wave progression since 2017    Started on IV lasix 60mg q8 hrs.    Echo - shows severe mitral regurgitation    Diuresed well, weight trending down. Baseline weight uncertain, reports he has lost weight over the last 3 months because he was depressed.   Discussed with cardiology in [facility] clinic    - Change to lasix 20mg oral, add KCL 10 meq  - Increase lisinopril to 20mg, consider further titration at follow-up if BPs are not low  - Needs MARIANNA, possible surgical repair. See cardiology in clinic in <2 weeks     Moderate right pleural effusion  Has been there since 10/21. Does not appear in resp distress and o2 sats stable on RA.  Seems improved on exam 11/29/2021   - Monitor, if  effusion persists after diuresis, would consider thoracentesis.     Elevated troponin  Troponin admission 0.066. Patient denies any chest pain. Likely secondary to supply and demand in the setting of congestive heart failure failure.   Trop trended down 0.66- 0.61    Hypokalemia  - Due to diuresis  - Replace per protocol       Benign essential hypertension  Home medication lisinopril-hydrochlorothiazide.  He has not been taking it.  - Continue lisinopril- hydrochlorothiazide        Substance abuse in remission (H)  Patient has a history of polysubstance abuse including heroin, cocaine meth.  Currently in remission.       Non compliance with medical treatment  Patient reports medication none compliance.  He was  prescribed medication for hypertension but unfortunately has not been taking it.  Significant risk factor for worsening of congestive heart failure.       Lab test negative for COVID-19 virus  This patient was evaluated during a global COVID-19 pandemic, which necessitated consideration that the patient might be at risk for infection with the SARS-CoV-2 virus that causes COVID-19  - COVID-19 PCR negative on admission  - Patient is not vaccinated, but is willing to receive the vaccine       Tobacco use disorder  Currently daily smoker. Patient was counseled against smoking  - Nicotine patch as needed       Consultations This Hospital Stay   CORE CLINIC EVALUATION IP CONSULT  OCCUPATIONAL THERAPY ADULT IP CONSULT  NUTRITION SERVICES ADULT IP CONSULT  CARE MANAGEMENT / SOCIAL WORK IP CONSULT  CORE CLINIC EVALUATION IP CONSULT  OCCUPATIONAL THERAPY ADULT IP CONSULT  NUTRITION SERVICES ADULT IP CONSULT  CARE MANAGEMENT / SOCIAL WORK IP CONSULT    Code Status   Full Code    Time Spent on this Encounter   I, Carlitos Santos MD, personally saw the patient today and spent greater than 30 minutes discharging this patient.       Carlitos Santos MD  [facility] SURGICAL  [address] [address]  Phone: [phone]  Fax: [phone]  ______________________________________________________________________    Physical Exam   Vital Signs: Temp: 97.5  F (36.4  C) Temp src: Oral BP: 128/82 Pulse: 67   Resp: 16 SpO2: 96 % O2 Device: None (Room air)    Weight: 172 lbs 9.92 oz  Constitutional: awake, alert, cooperative, no apparent distress, and appears stated age       Primary Care Physician   Yovany White    Discharge Orders      Adult Cardiology Eval Referral      Reason for your hospital stay    congestive heart failure due to severe mitral regurgitation     Follow-up and recommended labs and tests     Follow up with primary care provider, Yovany White, within 7 days to  evaluate medication change and to evaluate treatment change.  The following labs/tests are recommended: Basic metabolic panel.    See cardiology in < 2weeks     Activity    Your activity upon discharge: activity as tolerated     Monitor and record    Daily weights.   Call clinic for increase or decrease of greater than 5 lbs in less than a week.  Low Salt diet (<2000mg)  is VERY important.     Diet    Follow this diet upon discharge: Orders Placed This Encounter      2 Gram Sodium Diet       Significant Results and Procedures   Results for orders placed or performed during the hospital encounter of 11/27/21   XR Chest 2 Views    Narrative    EXAM: XR CHEST 2 VIEW  LOCATION: [facility]  DATE/TIME: 11/27/2021, 10:38 AM    INDICATION: Dyspnea and bibasilar rales. Bilateral pitting edema of lower extremities. No known history of CHF. Treated for pneumonia in past month.  COMPARISON: Chest CT on 11/06/2021.      Impression    IMPRESSION: Two views of the chest were obtained. Cardiomediastinal silhouette is within normal limits. Moderate right pleural effusion and associated basilar atelectasis/consolidation. No significant left pleural effusion. No significant pneumothorax.     POC US ECHO LIMITED    Impression    [facility] Procedure Note      Limited Bedside ED Cardiac Ultrasound:    PROCEDURE: PERFORMED BY: Dr. Rajat Payne MD  INDICATIONS/SYMPTOM:  Shortness of Breath  PROBE: Cardiac phased array probe  BODY LOCATION: Chest  FINDINGS:   The ultrasound was performed utilizing the subcostal, parasternal long axis, parasternal short axis and apical 4 chamber views.  Cardiac contractility:  Present  Gross estimation of cardiac kinesis: depressed  Pericardial Effusion:  None  RV:LV ratio: LV greater than RV  IVC: 3 cm with minimal respiratory variation  LUNGS: Diffuse B-lines bilaterally. Pleural effusion in right base.      INTERPRETATION: Ejection fraction appears low. LV  greater than RV. No pericardial effusion. IVC of 3 cm with minimal respiratory variation, bilateral B-lines and right-sided pleural effusion suggestive of pulmonary edema     IMAGE DOCUMENTATION: Images were archived to PACs system.         Echocardiogram Complete     Value    LVEF  50-55%    Biplane LVEF 53%    [facility]    [id number]  ______________________________________________________________________________  Procedure  Complete Echo Adult.  ______________________________________________________________________________  Interpretation Summary     1. The left ventricle is mildly dilated. There is mild eccentric left  ventricular hypertrophy. Left ventricular systolic function is low normal. The  visual ejection fraction is 50-55%. Biplane LVEF is 53%.  2. The right ventricle is normal size. The right ventricular systolic function  is mildly reduced.  3. Severe mitral valve prolapse, posterior leaflet. There is severe (4+)  mitral regurgitation.  4. Bicuspid aortic valve with a complete LCC/RCC raphe. There is mild (1+)  aortic regurgitation. Mild aortic stenosis with mean gradient of 8 mmHg.  5. Small posterior pericardial effusion.  ______________________________________________________________________________  Left Ventricle  The left ventricle is mildly dilated. There is mild eccentric left ventricular  hypertrophy. Left ventricular systolic function is low normal. The visual  ejection fraction is 50-55%. Biplane LVEF is 53%. Diastolic function not  assessed due to significant valvular regurgitation.     Right Ventricle  The right ventricle is normal size. The right ventricular systolic function is  mildly reduced.     Atria  The left atrium is severely dilated. The right atrium is moderately dilated.     Mitral Valve  Severe mitral valve prolapse, posterior leaflet. There is severe (4+) mitral  regurgitation. The mitral regurgitant jet is anteriorly directed, which is  consistent with posterior leaflet  pathology.     Tricuspid Valve  Normal tricuspid valve. There is trace tricuspid regurgitation. The right  ventricular systolic pressure is approximated at 25.9 mmHg plus the right  atrial pressure.     Aortic Valve  Bicuspid aortic valve with a complete LCC/RCC raphe. There is mild (1+) aortic  regurgitation.     Pulmonic Valve  The pulmonic valve is not well visualized.     Vessels  Normal size aorta. Normal size ascending aorta. IVC diameter and respiratory  changes fall into an intermediate range suggesting an RA pressure of 8 mmHg.     Pericardium  Small pericardial effusion.     Rhythm  Sinus rhythm was noted.  ______________________________________________________________________________  MMode/2D Measurements & Calculations  IVSd: 1.00 cm     LVIDd: 6.1 cm  LVIDs: 4.7 cm  LVPWd: 1.1 cm  FS: 22.6 %  LV mass(C)d: 267.6 grams  LV mass(C)dI: 123.5 grams/m2  Ao root diam: 3.5 cm  LA dimension: 4.5 cm  asc Aorta Diam: 3.4 cm  LA/Ao: 1.3  LA Volume (BP): 118.0 ml  LA Volume Index (BP): 54.4 ml/m2  RWT: 0.36     Doppler Measurements & Calculations  MV E max ivcente: 165.9 cm/sec  MV max P.9 mmHg  MV mean P.2 mmHg  MV V2 VTI: 41.4 cm  MV dec time: 0.14 sec  MR ERO: 0.55 cm2  MR volume: 64.3 ml  TR max vicente: 254.6 cm/sec  TR max P.9 mmHg  Medial E/e': 30.9     ______________________________________________________________________________  Report approved by: Bryan Singh 2021 11:16 AM           Lab Results   Component Value Date    TROPONIN 0.061 (H) 2021    TROPONIN 0.066 (H) 2021    TROPONIN 0.020 2021     Lab Results   Component Value Date    NTBNPI 4,916 (H) 2021    NTBNPI 11,606 (H) 2021       CMPRecent Labs   Lab 21  1029 21  0432 21  1238 21  1917 21  0848   NA  --  139  --   --  141   POTASSIUM 4.0 3.1* 3.5 3.6 3.6   CHLORIDE  --  102  --   --  107   CO2  --  32  --   --  30   ANIONGAP  --  5  --   --  4   GLC  --  110*  --   --   95   BUN  --  15  --   --  18   CR  --  1.13  --   --  1.18   GFRESTIMATED  --  73  --   --  69   KEVIN  --  8.9  --   --  9.1   MAG 2.0  --   --   --  2.3   PHOS  --   --   --  3.6  --    PROTTOTAL  --   --   --   --  6.5*   ALBUMIN  --   --   --   --  2.8*   BILITOTAL  --   --   --   --  1.0   ALKPHOS  --   --   --   --  137   AST  --   --   --   --  14   ALT  --   --   --   --  21     CBC  Recent Labs   Lab 11/27/21  0848   WBC 8.0   RBC 5.47   HGB 14.1   HCT 45.7   MCV 84   MCH 25.8*   MCHC 30.9*   RDW 16.3*        INRNo lab results found in last 7 days.  Arterial Blood Gas  Recent Labs   Lab 11/28/21  0112   PH 7.46*   PCO2 46*   PO2 62*   HCO3 33*   O2PER 0      UA RESULTS:  Recent Labs   Lab Test 11/27/21  1108   COLOR Colorless   APPEARANCE Clear   URINEGLC Negative   URINEBILI Negative   URINEKETONE Negative   SG 1.004   UBLD Negative   URINEPH 7.0   PROTEIN Negative   NITRITE Negative   LEUKEST Negative   RBCU <1   WBCU <1        Discharge Medications   Current Discharge Medication List      START taking these medications    Details   lisinopril (ZESTRIL) 20 MG tablet Take 1 tablet (20 mg) by mouth daily  Qty: 30 tablet, Refills: 0    Associated Diagnoses: Mitral valve insufficiency, unspecified etiology      potassium chloride (KLOR-CON) 20 MEQ packet Take 10 mEq by mouth daily  Qty: 30 packet, Refills: 0    Associated Diagnoses: Mitral valve insufficiency, unspecified etiology         CONTINUE these medications which have CHANGED    Details   furosemide (LASIX) 20 MG tablet Take 1 tablet (20 mg) by mouth daily  Qty: 30 tablet, Refills: 0    Associated Diagnoses: Mitral valve insufficiency, unspecified etiology         CONTINUE these medications which have NOT CHANGED    Details   order for DME Equipment being ordered: Dynaflex insert  Qty: 2 Units, Refills: 0    Associated Diagnoses: Hallux rigidus of both feet         STOP taking these medications       cephALEXin (KEFLEX) 500 MG capsule Comments:    Reason for Stopping:         dicyclomine (BENTYL) 10 MG capsule Comments:   Reason for Stopping:         doxycycline monohydrate (MONODOX) 100 MG capsule Comments:   Reason for Stopping:         gabapentin (NEURONTIN) 300 MG capsule Comments:   Reason for Stopping:         HYDROcodone-acetaminophen (NORCO) 5-325 MG tablet Comments:   Reason for Stopping:         lisinopril-hydrochlorothiazide (ZESTORETIC) 10-12.5 MG tablet Comments:   Reason for Stopping:         methocarbamol (ROBAXIN) 500 MG tablet Comments:   Reason for Stopping:         polyethylene glycol (MIRALAX) 17 GM/Dose powder Comments:   Reason for Stopping:             Allergies   No Known Allergies

## 2021-11-30 ENCOUNTER — PATIENT OUTREACH (OUTPATIENT)
Dept: CARE COORDINATION | Facility: CLINIC | Age: 56
End: 2021-11-30
Payer: COMMERCIAL

## 2021-11-30 DIAGNOSIS — Z71.89 OTHER SPECIFIED COUNSELING: ICD-10-CM

## 2021-11-30 NOTE — PROGRESS NOTES
Clinic Care Coordination Contact  Community Health Worker Initial Outreach    CHW Initial Information Gathering:  Referral Source: IP Report  Preferred Hospital: [facility], [place]  [phone]  Current living arrangement:: I live in a private home with family,Other (He is currently living with parents. He does not have an apartment or house.)  Type of residence:: Private home - no stairs  Community Resources: Other (see comment),County Programs (SNAP/Food [place])  Equipment Currently Used at Home: wheelchair, power (would like to look for a walker or a cane because his left foot and left arm were shattered. His friend built him a power scooter since he could not afford one.)  Informal Support system:: Family,Friends  No PCP office visit in Past Year: No  Transportation means:: Friend,Family  CHW Additional Questions  If ED/Hospital discharge, follow-up appointment scheduled as recommended?: Other (Patient has heart consult for hospital follow up and said he will schedule with his PCP shortly.)  Patient agreeable to assistance with scheduling?: Yes  CHW assisted patient to schedule (specify): CHW gave patient phone number for PCP clinic. He indicated that he will call himself.  Medication changes made following ED/Hospital discharge?: Yes, patient to be scheduled with CC RN/SW within approx 1 business day  [facility] active?: No    Patient accepts CC: Yes. Patient scheduled for assessment with a SW on 12/01/2021 at 10:00 AM. Patient noted desire to discuss medical transportation because he does not have a license, how to get a cane or walker since he broke his foot last year, ongoing medical needs, housing resouces since he does not have a house or apartment and has recently moved in with his parents. He can no longer work and is considering applying for disability. He currently has food assistance and health insurance through the state, implied that this is due to loss of income (due to medical  reasons).    The patient may switch PCP to a provider closer to him now that he lives with his parents but for the time being, he will schedule his hospital follow up with his current PCP, [facility] White MD at [facility].     To note, after the call with CHW, patient scheduled a follow up appointment with his PCP right away for 12/06/2021 at 1:20 PM.    [facility]: Post-Discharge Note  SITUATION                                                      Admission:    Admission Date: 11/27/21   Reason for Admission: acute congestive heart failure  Discharge:   Discharge Date: 11/29/21  Discharge Diagnosis: acute congestive heart failure    BACKGROUND                                                      Micah Nunez is a 55 year old male with past medical history of hypertension, medication noncompliance, smoking, substance abuse in remission now presents on 11/27/2021 with shortness of breath and lower extremity edema started 6 weeks ago and have been getting worse over the past few days, associated with dry cough, orthopnea and paroxysmal nocturnal dyspnea.     Patient presented to the ED on 11/19/2021 for evaluation of leg swelling and shortness of breath.  His exam showed edema up to the groin, BNP at that time was 3300, troponin was normal.  His symptoms were thought to be secondary to fluid retention and congestive heart failure.  He was started on Lasix in the ED and was discharged on 7 pills of Lasix 20 mg with plan to follow-up with PCP.  Unfortunately the patient did not follow-up with his PCP.  Patient states he took his last Lasix pill 2 days ago.  Symptoms started to get worse after finishing Lasix pills.  Patient tells me he was diagnosed with hypertension and was prescribed lisinopril- hydrochlorothiazide  but unfortunately he has not been taking the medication.     In the ED patient was hypertensive with blood pressure 143/94, slight tachycardic with heart rate 104,  afebrile, oxygen saturation 98% on room air.  His exam showed edema up to the waist.    ASSESSMENT      Enrollment  Primary Care Care Coordination Status: Potential    Discharge Assessment  How are you doing now that you are home?: Feeling pretty good, no other  How are your symptoms? (Red Flag symptoms escalate to triage hotline per guidelines): Unchanged  Do you feel your condition is stable enough to be safe at home until your provider visit?: Yes  Does the patient have their discharge instructions? : Yes  Does the patient have questions regarding their discharge instructions? : No  Were you started on any new medications or were there changes to any of your previous medications? : Yes  Does the patient have all of their medications?: Yes  Do you have questions regarding any of your medications? : No  Do you have all of your needed medical supplies or equipment (DME)?  (i.e. oxygen tank, CPAP, cane, etc.): Yes  Discharge follow-up appointment scheduled within 14 calendar days? : Yes  Discharge Follow Up Appointment Date: 12/06/21  Discharge Follow Up Appointment Scheduled with?: Primary Care Provider    Post-op ([facility] CTA Only)  If the patient had a surgery or procedure, do they have any questions for a nurse?: No      PLAN                                                      Outpatient Plan:      Follow up with primary care provider, Yovany White, within 7 days to evaluate medication change and to evaluate treatment change.  The following labs/tests are recommended: Basic metabolic panel.      See cardiology in < 2weeks.    Future Appointments   Date Time Provider Department Center   12/1/2021 10:00 AM [facility] CCC SW [facility]   12/1/2021  1:50 PM Samantha Jackson MD HRWWH [facility]   12/6/2021  1:20 PM Yovany White MD [facility]   12/8/2021 10:30 AM Felician[facility] Schaefer [facility]         For any urgent concerns, please contact our 24 hour nurse triage line: [phone]  ([phone])           Faith Barker  Community Health Worker  [facility], [facility]  Ph: [phone]

## 2021-12-01 ENCOUNTER — PATIENT OUTREACH (OUTPATIENT)
Dept: NURSING | Facility: CLINIC | Age: 56
End: 2021-12-01
Attending: FAMILY MEDICINE
Payer: COMMERCIAL

## 2021-12-01 ENCOUNTER — OFFICE VISIT (OUTPATIENT)
Dept: CARDIOLOGY | Facility: CLINIC | Age: 56
End: 2021-12-01
Payer: COMMERCIAL

## 2021-12-01 VITALS
BODY MASS INDEX: 20.87 KG/M2 | HEART RATE: 68 BPM | WEIGHT: 167 LBS | RESPIRATION RATE: 16 BRPM | SYSTOLIC BLOOD PRESSURE: 118 MMHG | DIASTOLIC BLOOD PRESSURE: 66 MMHG

## 2021-12-01 DIAGNOSIS — I34.1 MVP (MITRAL VALVE PROLAPSE): ICD-10-CM

## 2021-12-01 DIAGNOSIS — I34.0 SEVERE MITRAL REGURGITATION: ICD-10-CM

## 2021-12-01 DIAGNOSIS — Q23.81 BICUSPID AORTIC VALVE: ICD-10-CM

## 2021-12-01 DIAGNOSIS — F19.11 SUBSTANCE ABUSE IN REMISSION (H): ICD-10-CM

## 2021-12-01 DIAGNOSIS — Z71.89 OTHER SPECIFIED COUNSELING: ICD-10-CM

## 2021-12-01 DIAGNOSIS — I50.21 ACUTE SYSTOLIC CONGESTIVE HEART FAILURE (H): Primary | ICD-10-CM

## 2021-12-01 DIAGNOSIS — I34.0 MITRAL VALVE INSUFFICIENCY, UNSPECIFIED ETIOLOGY: ICD-10-CM

## 2021-12-01 PROCEDURE — 99205 OFFICE O/P NEW HI 60 MIN: CPT | Performed by: INTERNAL MEDICINE

## 2021-12-01 RX ORDER — SPIRONOLACTONE 25 MG/1
25 TABLET ORAL DAILY
Qty: 30 TABLET | Refills: 3 | Status: SHIPPED | OUTPATIENT
Start: 2021-12-01 | End: 2022-01-31

## 2021-12-01 RX ORDER — METOPROLOL SUCCINATE 25 MG/1
12.5 TABLET, EXTENDED RELEASE ORAL DAILY
Qty: 30 TABLET | Refills: 3 | Status: SHIPPED | OUTPATIENT
Start: 2021-12-01 | End: 2022-01-31

## 2021-12-01 RX ORDER — POTASSIUM CHLORIDE 1.5 G/1.58G
10 POWDER, FOR SOLUTION ORAL DAILY
Qty: 30 PACKET | Refills: 2 | Status: SHIPPED | OUTPATIENT
Start: 2021-12-01 | End: 2022-01-31

## 2021-12-01 RX ORDER — LISINOPRIL 20 MG/1
20 TABLET ORAL DAILY
Qty: 30 TABLET | Refills: 6 | Status: SHIPPED | OUTPATIENT
Start: 2021-12-01 | End: 2022-01-31

## 2021-12-01 RX ORDER — FUROSEMIDE 20 MG
20 TABLET ORAL DAILY
Qty: 30 TABLET | Refills: 6 | Status: SHIPPED | OUTPATIENT
Start: 2021-12-01 | End: 2022-01-31

## 2021-12-01 ASSESSMENT — ACTIVITIES OF DAILY LIVING (ADL): DEPENDENT_IADLS:: TRANSPORTATION

## 2021-12-01 NOTE — PATIENT INSTRUCTIONS
1. Continue current medication  2. Begin metoprolol succinate 12.5 mg daily (half of a 25 mg tablet)  3. Begin spironolactone 25 mg daily in addition to the furosemide to help with swelling  4. Schedule transesophageal echo to look closer at mitral valve  5. Follow up in heart failure clinic and valve clinic  6. Weigh yourself every morning and document  7. Limit salt in the diet to no more than 2000 mg daily

## 2021-12-01 NOTE — LETTER
[facility]  Patient Centered Plan of Care  About Me:        Patient Name:  Micah Holloway    YOB: 1965  Age:         55 year old   [place] MRN:    [id number] Telephone Information:  Home Phone [phone]   Mobile [phone]       Address:  [address] [place] [address] Email address:  [email or web address]      Emergency Contact(s)    Name Relationship Lgl Grd Work Phone Home Phone Mobile Phone   1. FLORIN HOLLOWAY* Father    [phone]   2. ELLY Friend   [phone]           Primary language:  English     needed? No   [place] [facility]:  [phone] op. 1  Other communication barriers:None  Preferred Method of Communication:  Mail  Current living arrangement: I live in a private home with family; Other (w/ parents)  Mobility Status/ Medical Equipment: Independent w/Device    Health Maintenance  Health Maintenance Reviewed: Due/Overdue   Health Maintenance Due   Topic Date Due     HF ACTION PLAN  Never done     TSH W/FREE T4 REFLEX  Never done     ADVANCE CARE PLANNING  Never done     Pneumococcal Vaccine: Pediatrics (0 to 5 Years) and At-Risk Patients (6 to 64 Years) (1 of 2 - PPSV23) Never done     COVID-19 Vaccine (1) Never done     HIV SCREENING  Never done     HEPATITIS B IMMUNIZATION (1 of 3 - Risk 3-dose series) Never done     ZOSTER IMMUNIZATION (1 of 2) Never done     PREVENTIVE CARE VISIT  01/08/2020     LIPID  01/17/2020     INFLUENZA VACCINE (1) 09/01/2021     My Access Plan  Medical Emergency 911   Primary Clinic Line [facility] - [phone]   24 Hour Appointment Line [phone] or  [phone]) (toll-free)   24 Hour Nurse Line [phone] (toll-free)   Preferred Urgent Care [facility], [phone]     Preferred Hospital [place], [place]  [phone]     Preferred Pharmacy [facility]     Behavioral Health Crisis Line The National Suicide Prevention Lifeline at 1-508.482.5223 or 911     My Care Team Members  Patient Care Team       Relationship Specialty Notifications Start End    Yovany White MD PCP - General Family Practice  2/5/15     referring to ENT    Phone: [phone] Fax: [phone] [address] [facility] [address]    Bobo Renteria MD MD Otolaryngology  11/30/15     Phone: [phone] Fax: [phone]         [address] [facility] [address] [facility] [address]    Yovany White MD Assigned PCP   2/19/17     Phone: [phone] Fax: [phone] [address] [facility] [address]    NATHALIE Carr MD Assigned Surgical Provider   10/23/20     Phone: [phone] Pager: [phone] Fax: [phone]        [address] [facility] [address]    Barbara De La Garza LSW Lead Care Coordinator Primary Care - CC  12/1/21     Phone: [phone] [address] [facility] [address]            My Care Plans  Self Management and Treatment Plan  Goals and (Comments)  Goals        General     1. Reducing Risks (pt-stated)      Notes - Note created  12/1/2021  1:06 PM by Barbara De La Garza LSW     Goal Statement: I will get connected to resources/supports.   Date Goal set: 12/1/21  Barriers: Access   Strengths: Motivated, family, friends, care team   Date to Achieve By: 5/1/22  Patient expressed understanding of goal: Yes    Action steps to achieve this goal:  1. I will start application for social security disability income online.   2. I will use [facility] to medical appts.  3. I will talk to my PCP about a cane or other DME.   4. I will look into other resources as needed (housing, etc).   5. I will work with care coordination as needed.               Action Plans on File:                       Advance Care Plans/Directives Type:     Honoring Choices    Advance Care Planning and Health Care Directives  When it comes to  decisions about your health care, it s important that your voice is heard. You may not always be able to speak for yourself.    We encourage you to have discussions with your loved ones, cultural or spiritual leaders and health care providers about your goals, values, beliefs and choices.    We are a part of [employer] [place] , supporting and promoting the benefits of advance care planning conversations.    Our goals are to:    Help you make informed decisions about your healthcare choices and ensure that those choices are honored    Offer advance care planning discussions with trained staff    Ensure your choices are clearly defined, documented and available in your medical record    Translate your choices into medical orders as needed    Why is Advance Care Planning important?    Know what your health care choices are and decide what is right for you    An unexpected illness or injury could leave you unable to participate in important treatment decisions    When choices are left to others to decide that responsibility can be difficult and stressful    By discussing and outlining your choices, your voice is heard in the care you want to receive    How can I learn more?  We offer free classes at multiple locations, days and times. Our trained facilitators will provide information and guide you through a Health Care Directive document. They can also review, notarize and add your document to your medical record.    Call [facility] at [phone] or toll free at [phone] for assistance.      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)     Non compliance with medical treatment     Anasarca     Elevated troponin     Lab test negative for COVID-19 virus      Acute congestive heart failure, unspecified heart failure type (H)     Mitral regurgitation - severe      Current Medications and Allergies:    Current Outpatient Medications   Medication Instructions     furosemide (LASIX) 20 mg, Oral, DAILY     lisinopril (ZESTRIL) 20 mg, Oral, DAILY     order for DME Equipment being ordered: Dynaflex insert     potassium chloride (KLOR-CON) 20 MEQ packet 10 mEq, Oral, DAILY     Care Coordination Start Date: 12/1/2021   Frequency of Care Coordination: monthly     Form Last Updated: 12/01/2021
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
## 2021-12-01 NOTE — PROGRESS NOTES
Clinic Care Coordination Contact    Clinic Care Coordination Contact  OUTREACH    Referral Information:  Referral Source: IP Report    Primary Diagnosis: Psychosocial    Chief Complaint   Patient presents with     Clinic Care Coordination - Initial     Needs assessment        Universal Utilization:   Clinic Utilization  Difficulty keeping appointments: No  Compliance Concerns: No  No-Show Concerns: No  No PCP office visit in Past Year: No    Utilization    Hospital Admissions  2             ED Visits  6             No Show Count (past year)  1                Current as of: 12/1/2021 11:39 AM              Clinical Concerns:  Current Medical Concerns: Pt was hospitalized at [facility] 11/27/21 to 11/29/21 for congestive heart failure due to severe mitral regurgitation. Cardiology referral made, appt today 12/1/21 1:50 pm.    Patient Active Problem List   Diagnosis     CARDIOVASCULAR SCREENING; LDL GOAL LESS THAN 130     Papilloma of nose     Chemical dependency (H)     Tobacco use disorder     Benign essential hypertension     Substance abuse in remission (H)     Non compliance with medical treatment     Anasarca     Elevated troponin     Lab test negative for COVID-19 virus     Acute congestive heart failure, unspecified heart failure type (H)     Mitral regurgitation - severe       Current Behavioral Concerns: n/a      Education Provided to patient: role of  CC and clinic care coordination, community resources, SSDI process, medical transportation, new PCP in new location if wanted    Order: CHW hospital discharge call    CHW: Patient noted desire to discuss medical transportation because he does not have a license, how to get a cane or walker since he broke his foot last year, ongoing medical needs, housing resouces since he does not have a house or apartment and has recently moved in with his parents. He can no longer work and is considering applying for disability. He currently has food  assistance and health insurance through the state, implied that this is due to loss of income (due to medical reasons).  The patient may switch PCP to a provider closer to him now that he lives with his parents but for the time being, he will schedule his hospital follow up with his current PCP, Y[facility]any White MD at [facility].    SW CC outreach to pt for initial assessment per CHW scheduling.     1) Medical transportation: CC discussed that pt has [employer] [place] and can access [employer] at [phone] or [phone]. Will send in letter. Pt does not have drivers license.     2) DME: Pt would like to talk to PCP about a cane. He stated a walker can be cumbersome. CC updated PCP OV appt notes. CC explained order will be sent to DME agency, pt does not have a preference. CC noted MA should cover cane. Pt plans to talk to PCP about breathing during sleep, may need sleep medicine or CPAP referral.    3) Housing resources: Not discussed on call, will include in letter. Pt living with parents.     4) Applying for disability income: Pt is no longer working. Pt has MA and SNAP. CC explained process and online application/portal. Pt has access to Internet. CC will send info in letter. No income right now.     5) May need new PCP closer to home: Pt now living with parents in [place]. Pt likes PCP but may need someone closer to home dirk with transportation issues. CC will send options.     Pain  Pain: Not discussed     Health Maintenance Reviewed: Due/Overdue     Health Maintenance Due   Topic Date Due     HF ACTION PLAN  Never done     TSH W/FREE T4 REFLEX  Never done     ADVANCE CARE PLANNING  Never done     Pneumococcal Vaccine: Pediatrics (0 to 5 Years) and At-Risk Patients (6 to 64 Years) (1 of 2 - PPSV23) Never done     COVID-19 Vaccine (1) Never done     HIV SCREENING  Never done     HEPATITIS B IMMUNIZATION (1 of 3 - Risk 3-dose series) Never done     ZOSTER IMMUNIZATION (1  of 2) Never done     PREVENTIVE CARE VISIT  01/08/2020     LIPID  01/17/2020     INFLUENZA VACCINE (1) 09/01/2021       Clinical Pathway: None    Medication Management:  Medication review status: Medications reviewed and no changes reported per patient.           Functional Status:  Dependent ADLs: Ambulation-cane,Ambulation-walker,Wheelchair-independent  Dependent IADLs: Transportation  Bed or wheelchair confined: No  Mobility Status: Independent w/Device  Fallen 2 or more times in the past year?: No  Any fall with injury in the past year?: No    Living Situation:  Current living arrangement: I live in a private home with family,Other (w/ parents)  Type of residence: Private home - no stairs    Lifestyle & Psychosocial Needs:    Social Determinants of Health     Tobacco Use: High Risk     Smoking Tobacco Use: Current Every Day Smoker     Smokeless Tobacco Use: Never Used   Alcohol Use: Not on file   Financial Resource Strain: Not on file   Food Insecurity: Not on file   Transportation Needs: Not on file   Physical Activity: Not on file   Stress: Not on file   Social Connections: Not on file   Intimate Partner Violence: Not on file   Depression: Not at risk     PHQ-2 Score: 0   Housing Stability: Not on file       Diet: Regular  Inadequate nutrition: No  Tube Feeding: No  Inadequate activity/exercise: No  Significant changes in sleep pattern: No  Transportation means: Friend,Family     Christianity or spiritual beliefs that impact treatment: No  Mental health DX: No  Mental health management concern: No  Chemical Dependency Status: No Current Concerns  Informal Support system: Family,Friends     Care Coordinator has reviewed patient's Social Determinants of Health (SDoH) on this date. Upon review, changes were not made.      Resources and Interventions:  Current Resources:   Community Resources: Other,County Programs,Financial/Insurance,DME (SNAP)  Supplies used at home: None  Equipment Currently Used at Home:  wheelchair, power (left foot and left arm were shattered. His friend built him a power scooter since he could not afford one.)  Employment Status: disabled     Advance Care Plan/Directive  Advanced Care Plans/Directives on file: No  Advanced Care Plan/Directive Status: Referral to [facility] Facilitator    Referrals Placed: Community Resources,County Resources,Transportation,Disability Specialists,Disability Linkage line,[facility],Other      Goals:   Goals        General     1. Reducing Risks (pt-stated)      Notes - Note created  12/1/2021  1:06 PM by Barbara De La Garza LSW     Goal Statement: I will get connected to resources/supports.   Date Goal set: 12/1/21  Barriers: Access   Strengths: Motivated, family, friends, care team   Date to Achieve By: 5/1/22  Patient expressed understanding of goal: Yes    Action steps to achieve this goal:  1. I will start application for social security disability income online.   2. I will use [facility] to medical appts.  3. I will talk to my PCP about a cane or other DME.   4. I will look into other resources as needed (housing, etc).   5. I will work with care coordination as needed.              Patient/Caregiver understanding: Pt reports understanding and denies any additional questions or concerns at this times. SW CC engaged in AIDET communication during encounter.    Outreach Frequency: Monthly    Future Appointments              Today Samantha Jackson MD [facility]    In 5 days Yovany White MD [facility]  Arrive at: Clinic A    In 1 week Feliciano Schaefer MD [facility]          Plan: Patient was provided with this writer's contact information and encouraged to call with any questions or concerns. Pt will attend upcoming appts. Pt will look into resources provided.     SW CC will mail care coordination introduction letter, complex care plan,  and resources to patient. JUSTIN CC will follow up in approx 3 weeks.     Barbara De La Garza [facility]   Social Work Clinic Care Coordinator   [facility]  [phone]  [email or web address]
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
## 2021-12-01 NOTE — LETTER
[facility] CARE COORDINATION  [facility]  [address] [place] [address]    December 1, 2021    Micah Nunez  [address]   [facility] [address]      Dear Micah,    I am a clinic care coordinator who works with Yovany White MD at [facility]. I wanted to thank you for spending the time to talk with me.  Below is a description of clinic care coordination and how I can further assist you.      The clinic care coordination team is made up of a registered nurse,  and community health worker who understand the health care system. The goal of clinic care coordination is to help you manage your health and improve access to the health care system in the most efficient manner. The team can assist you in meeting your health care goals by providing education, coordinating services, strengthening the communication among your providers and supporting you with any resource needs.    Please feel free to contact me at [phone] with any questions or concerns. We are focused on providing you with the highest-quality healthcare experience possible and that all starts with you.     Sincerely,     Barbara De La Garza [facility]   Social Work Clinic Care Coordinator   [facility]  [phone]  [email or web address]     Enclosed: I have enclosed a copy of the Patient Centered Plan of Care. This has helpful information and goals that we have talked about. Please keep this in an easy to access place to use as needed. and resources

## 2021-12-01 NOTE — LETTER
RE: Micah Nunez     Housing search:     - www.housinglink.org (updated weekly)    SSDI application:     Website: https://www.ssa.gov/applyfordisability/    Social Security offers an online disability application you can complete at your convenience. Apply from the comfort of your home or any location at a time most convenient for you. You do not need to drive to your local Social Security office or wait for an appointment with a Social .     Who can apply for adult disability benefits online?  You can use the online application to apply for disability benefits if you:  Are age 18 or older;   Are not currently receiving benefits on your own Social Security record;   Are unable to work because of a medical condition that is expected to last at least 12 months or result in death: and  Have not been denied disability benefits in the last 60 days. If your application was recently denied for medical reasons, the Internet Appeal is a starting point to request a review of the medical determination we made.     How do I apply for benefits?  Here is what you need to do to apply for benefits online:   Print and review the Adult Disability Checklist.  It will help you gather the information you need to complete the application.  Complete the Disability Benefit Application.  Complete the Medical Release Form    What other ways can I apply?  You can also apply:   By phone - Call us at [phone] from 7 a.m. to 7 p.m. Monday through Friday. If you are deaf or hard of hearing, you can call us at TTY [phone].    In person - Visit your local Social Security office. (Call first to make an appointment.)   If you do not live in the [place] or one of its territories, you can also contact your nearest Federal Benefits Unit that provides service to your country of residence.     If you find yourself having more questions/needing more assistance with the process of applying for Disability Benefits, here  are some additional resources you can tap into a :      Disability Specialists: [phone] or [email or web address].disabilityspecialists.net/  [employer]ability Specialists strive to make the Social Security Disability application as simple and straightforward as possible.  Please call them toll-free at [phone] to request an intake interview, or just complete the questionnaire on the main website, and they will contact you.  They work in 120 different languages. When you call, state the language you prefer, then wait a moment while they get someone on the line to help. When you complete the initial interview through the claims team, you will be assigned to an individual representative.[employer]ists is paid only if they obtain benefits for you. If we don t get you benefits, we are paid nothing no matter how long we worked for you and no matter how much money we spent attempting to get you benefits. For some cases, there is no charge to you because we are paid by the [facility] or [facility] government to assist you. For other cases, the law limits our fee to a percentage of the past due benefits that we obtain for you and our fee is paid to us by the Social Security Administration. For every case, nothing is taken from your monthly benefit check and you have no upfront costs.      [employer]: 1-362-604-9182 or https://www.qualitydisabilityBokee.com/       [employer]ability [employer]: 1-458.834.1416 or https://disabilityhubmn.org/      [employer]  [address] [place] [address]  [phone] or [phone]  Website: www.disabilitypartners.net    DME:     -[facility] [place]: [phone]    Medical transportation:    -[employer] at [phone] or [phone]  Call [employer] Monday through Friday between  7 a.m. and 5 p.m., three to four business  days before your appointment.   [employer] and special  transportation provides transportation in accordance with the  Department of Human Services (DHS) guidelines for eligible [facility], MSC+, and SNBC members  without access to transportation to in-network primary care providers within 30 miles of, and innetwork specialty providers within 60 miles of the member s pick-up location.      New PCP closer to [place]:     Search by location here, see providers at each clinic: [email or web address]     [facility]   [address], [place] [id number] [phone]    [facility]  [address] [place], [place] [id number] [phone]    [facility]  [address], [place], [place] [id number] [phone]    [facility], [address] [place] [address]  [phone]

## 2021-12-06 ENCOUNTER — PREP FOR PROCEDURE (OUTPATIENT)
Dept: CARDIOLOGY | Facility: CLINIC | Age: 56
End: 2021-12-06

## 2021-12-06 ENCOUNTER — TELEPHONE (OUTPATIENT)
Dept: CARDIOLOGY | Facility: CLINIC | Age: 56
End: 2021-12-06

## 2021-12-06 ENCOUNTER — OFFICE VISIT (OUTPATIENT)
Dept: FAMILY MEDICINE | Facility: CLINIC | Age: 56
End: 2021-12-06
Payer: COMMERCIAL

## 2021-12-06 VITALS
OXYGEN SATURATION: 100 % | DIASTOLIC BLOOD PRESSURE: 84 MMHG | HEART RATE: 86 BPM | RESPIRATION RATE: 16 BRPM | SYSTOLIC BLOOD PRESSURE: 116 MMHG | TEMPERATURE: 98.2 F

## 2021-12-06 DIAGNOSIS — I34.0 MITRAL VALVE INSUFFICIENCY, UNSPECIFIED ETIOLOGY: ICD-10-CM

## 2021-12-06 DIAGNOSIS — R19.09 INGUINAL SWELLING: ICD-10-CM

## 2021-12-06 DIAGNOSIS — I34.0 MITRAL REGURGITATION: Primary | ICD-10-CM

## 2021-12-06 DIAGNOSIS — I50.21 ACUTE SYSTOLIC HEART FAILURE (H): ICD-10-CM

## 2021-12-06 DIAGNOSIS — F19.20 CHEMICAL DEPENDENCY (H): ICD-10-CM

## 2021-12-06 DIAGNOSIS — Z11.59 ENCOUNTER FOR SCREENING FOR OTHER VIRAL DISEASES: ICD-10-CM

## 2021-12-06 DIAGNOSIS — Z09 HOSPITAL DISCHARGE FOLLOW-UP: Primary | ICD-10-CM

## 2021-12-06 PROCEDURE — 99495 TRANSJ CARE MGMT MOD F2F 14D: CPT | Performed by: FAMILY MEDICINE

## 2021-12-06 RX ORDER — SODIUM CHLORIDE 9 MG/ML
INJECTION, SOLUTION INTRAVENOUS CONTINUOUS
Status: CANCELLED | OUTPATIENT
Start: 2021-12-06

## 2021-12-06 RX ORDER — LIDOCAINE 40 MG/G
CREAM TOPICAL
Status: CANCELLED | OUTPATIENT
Start: 2021-12-06

## 2021-12-06 ASSESSMENT — PAIN SCALES - GENERAL: PAINLEVEL: MILD PAIN (3)

## 2021-12-06 NOTE — TELEPHONE ENCOUNTER
Contacted Lori (non-invasive sched) @ [facility] with request to call patient and arrange MARIANNA appt. - procedure order set placed per protocol.  mg

## 2021-12-06 NOTE — PROGRESS NOTES
Assessment & Plan     Hospital discharge follow-up  55 yr old male with complex past history recently admitted in the hospital and diagnosed with acute heart failure secondary to severe mitral regurgitation. Doing better now and has f/u with cardiology .    Inguinal swelling  Patient mentioned that he noticed the lump in the left groin- ultrasound ordered.  - US Lower Extremity Non Vascular Left; Future    Acute systolic heart failure (H)  Patient with recent diagnosis of heart failure - doing better    Mitral valve insufficiency, unspecified etiology  F/u with cardiology      Review of prior external note(s) from - [facility] information from [facility] reviewed  Ordering of each unique test  Prescription drug management  25 minutes spent on the date of the encounter doing chart review, review of outside records, patient visit and documentation            FUTURE APPOINTMENTS:       - Follow-up visit in one month or sooner as needed.    Return in about 4 weeks (around 1/3/2022) for Follow up.    Yovany White MD  [facility] CHELSIE Obrien is a 55 year old who presents for the following health issues     HPI 55-year-old male presenting to the clinic for hospital follow-up.  He was seen for bilateral lower extremity edema, dry cough and orthopnea that has been ongoing for about 6 weeks.  He was found to be in acute heart failure and discovered that he had mitral valve regurgitation and mitral valve prolapse. The echocardiogram confirmed this and he was diuresed aggressively while in the hospital.      He dropped significant weight and symptoms dramatically improved.  He has since seen the cardiologist and is scheduled for a MARIANNA echo in a couple of weeks.  He reports that he is feeling much better.      Other symptoms he presented  today was he has had a large swelling in his left groin which he assumed was a hernia.  He has had hernia surgery before but said he thought  that the mesh was protruding.  He has had no systemic symptoms as regards to the lump in his groin area.  He does appear to have lost a lot of weight and looked pretty frail during the visit.  Medications were refilled during his hospital stay.  He has several appointments in the next couple of weeks with specialist.  No other symptoms reported today.    ED/UC Followup:    Facility:  [facility]  Date of visit: 11/27/2021 - admitted to hospital  Reason for visit: Mitral valve insufficiency, acute congestive heart failure  Current Status: has been doing a little better. He says the water is not being retained as much as he was able to wear shoes today.          Post Discharge Outreach 11/30/2021   Admission Date 11/27/2021   Reason for Admission acute congestive heart failure   Discharge Date 11/29/2021   Discharge Diagnosis acute congestive heart failure   How are you doing now that you are home? Feeling pretty good, no other   How are your symptoms? (Red Flag symptoms escalate to triage hotline per guidelines) Unchanged   Do you feel your condition is stable enough to be safe at home until your provider visit? Yes   Does the patient have their discharge instructions?  Yes   Does the patient have questions regarding their discharge instructions?  No   Were you started on any new medications or were there changes to any of your previous medications?  Yes   Does the patient have all of their medications? Yes   Do you have questions regarding any of your medications?  No   Do you have all of your needed medical supplies or equipment (DME)?  (i.e. oxygen tank, CPAP, cane, etc.) Yes   Discharge follow-up appointment scheduled within 14 calendar days?  Yes   Discharge Follow Up Appointment Date 12/6/2021   Discharge Follow Up Appointment Scheduled with? Primary Care Provider     Hospital Follow-up Visit:    Hospital/Nursing Home/IP Rehab Facility: [facility]  Date of  Admission: 11/27/2021  Date of Discharge: 11/29/2021  Reason(s) for Admission: Mitral valve insufficiency, acute congestive heart failure  Was your hospitalization related to COVID-19? No   Problems taking medications regularly:  None  Medication changes since discharge:   START taking these medications     Details   lisinopril (ZESTRIL) 20 MG tablet Take 1 tablet (20 mg) by mouth daily  Qty: 30 tablet, Refills: 0     Associated Diagnoses: Mitral valve insufficiency, unspecified etiology       potassium chloride (KLOR-CON) 20 MEQ packet Take 10 mEq by mouth daily  Qty: 30 packet, Refills: 0     Associated Diagnoses: Mitral valve insufficiency, unspecified etiology     STOP taking these medications         cephALEXin (KEFLEX) 500 MG capsule Comments:   Reason for Stopping:            dicyclomine (BENTYL) 10 MG capsule Comments:   Reason for Stopping:            doxycycline monohydrate (MONODOX) 100 MG capsule Comments:   Reason for Stopping:            gabapentin (NEURONTIN) 300 MG capsule Comments:   Reason for Stopping:            HYDROcodone-acetaminophen (NORCO) 5-325 MG tablet Comments:   Reason for Stopping:            lisinopril-hydrochlorothiazide (ZESTORETIC) 10-12.5 MG tablet Comments:   Reason for Stopping:            methocarbamol (ROBAXIN) 500 MG tablet Comments:   Reason for Stopping:            polyethylene glycol (MIRALAX) 17 GM/Dose powder Comments:   Reason for Stopping:       Problems adhering to non-medication therapy:  None    Summary of hospitalization:  [facility] discharge summary reviewed  Diagnostic Tests/Treatments reviewed.  Follow up needed: none  Other Healthcare Providers Involved in Patient s Care:         None  Update since discharge: improved.   Post Discharge Medication Reconciliation: discharge medications reconciled, continue medications without change.  Plan of care communicated with patient            Review of Systems   CONSTITUTIONAL:NEGATIVE for fever, chills,  change in weight, POSITIVE  for anorexia and arthralgias and fatigue  INTEGUMENTARY/SKIN: NEGATIVE for worrisome rashes, moles or lesions  ENT/MOUTH: NEGATIVE for ear, mouth and throat problems  RESP:NEGATIVE for significant cough or SOB  CV: NEGATIVE for chest pain, palpitations or peripheral edema  GI: NEGATIVE for nausea, abdominal pain, heartburn, or change in bowel habits  : negative for dysuria, hematuria, decreased urinary stream, erectile dysfunction  MUSCULOSKELETAL: NEGATIVE for significant arthralgias or myalgia  NEURO: POSITIVE for  weakness   HEME/ALLERGY/IMMUNE: NEGATIVE for bleeding problems  PSYCHIATRIC: NEGATIVE for changes in mood or affect      Objective    /84 (BP Location: Right arm, Patient Position: Sitting, Cuff Size: Adult Regular)   Pulse 86   Temp 98.2  F (36.8  C) (Tympanic)   Resp 16   SpO2 100%   There is no height or weight on file to calculate BMI.  Physical Exam   GENERAL: alert, frail, fatigued and appears older than stated age  NECK: no adenopathy, no asymmetry, masses, or scars and thyroid normal to palpation  RESP: lungs clear to auscultation - no rales, rhonchi or wheezes  CV: regular rate and rhythm, normal S1 S2, no S3 or S4, no murmur, click or rub, no peripheral edema and peripheral pulses strong  ABDOMEN: soft, nontender, no hepatosplenomegaly, no masses and bowel sounds normal   (male): Lump in left inguinal canal- movable soft  MS: no gross musculoskeletal defects noted, no edema

## 2021-12-06 NOTE — TELEPHONE ENCOUNTER
----- Message from Samantha Jackson MD sent at 12/6/2021  9:04 AM CST -----  I saw this patient last week as a new consult with acute CHF and severe MVP/MR. Ordered a MARIANNA but it doesnot look like he scheduled it on the way out. Can you contact him and tell him it is imperative that we get it set up ASAP.    KML

## 2021-12-08 ENCOUNTER — OFFICE VISIT (OUTPATIENT)
Dept: PULMONOLOGY | Facility: OTHER | Age: 56
End: 2021-12-08
Attending: EMERGENCY MEDICINE
Payer: COMMERCIAL

## 2021-12-08 VITALS
DIASTOLIC BLOOD PRESSURE: 88 MMHG | BODY MASS INDEX: 20.67 KG/M2 | WEIGHT: 166.2 LBS | HEART RATE: 69 BPM | HEIGHT: 75 IN | OXYGEN SATURATION: 100 % | SYSTOLIC BLOOD PRESSURE: 104 MMHG

## 2021-12-08 DIAGNOSIS — J90 PLEURAL EFFUSION: ICD-10-CM

## 2021-12-08 PROCEDURE — 99204 OFFICE O/P NEW MOD 45 MIN: CPT | Performed by: INTERNAL MEDICINE

## 2021-12-08 ASSESSMENT — MIFFLIN-ST. JEOR: SCORE: 1669.51

## 2021-12-08 NOTE — PATIENT INSTRUCTIONS
Patient Education     Percutaneous Diagnosis of Chest, Lung Problems   You ve been told you need a percutaneous procedure to diagnose a problem in your chest or lung. This procedure lets the healthcare provider remove tissue or fluid (biopsy) from the chest or lung. For these procedures, the skin of the chest is numbed. Then a needle is passed through the skin.      With FNA, a thin needle is used to take a tissue sample from a mass.         Thoracentesis is used to remove fluid from the pleural space.      Fine-needle aspiration biopsy  Fine-needle aspiration biopsy (FNAB) is a procedure used for taking a lung tissue sample (a tissue biopsy). The tissue sample is taken from a nodule, abnormal tissue, tumor, or lung mass. First, a CT scan or ultrasound is done. This helps the healthcare provider find the area. Then a thin needle is passed through the skin of the chest into the nodule, abnormal tissue, tumor, or lung mass. Another CT scan or a chest X-ray is taken to be sure the needle is placed correctly. Once the needle is in place, a small amount of lung tissue is drawn (aspirated) into the needle. The lung tissue sample is then sent for testing.    Thoracentesis  Thoracentesis is used to drain abnormal fluid buildup between the lungs and chest wall (the pleural space). For the procedure, a needle is put through the skin of the chest into the pleural space. Fluid is then drawn into the needle and later tested for cancer and other problems.    Getting ready for the procedure  Before your procedure, do the following:    Follow any directions you are given for not eating and drinking before the procedure.    Tell your healthcare provider about any medicines you take. This includes over-the-counter or prescription medicines, vitamins, herbs, or other supplements. You may need to stop taking certain medicines before the procedure, especially aspirin, warfarin, ibuprofen, or other blood thinners.    Talk with your  provider about any allergies, recent illnesses, and health problems you may have.    Tell your pregnant if you are pregnant or think you may be pregnant.  During the procedure  You receive local anesthesia (numbing medicine) to keep you from feeling pain. The area where the needle goes in is numbed. If your doctor uses ultrasound to guide the needle, you will also have cool gel placed on your skin in the area to help the ultrasound probe move around. Medicine to help you relax (sedation) may also be given through an intravenous (IV) line.   After the procedure  You may have some pain after the procedure. You will be given medicine to help ease any pain. You can go home after you recover from anesthesia, often the same day as the procedure. If you received sedation, an adult family member or friend will need to drive you home. If a tube was placed in your chest to drain fluid and was left in, you will stay in the hospital for at least 1 day or more. Your healthcare provider will tell you more.    Risks and possible complications  All procedures have some risk. Possible risks of this procedure include:     Bleeding or coughing up blood    Collapsed lung (pneumothorax)    Infection    Injury to other structures in the chest    Chest pain    Other complications as explained by your healthcare provider   When to call your healthcare provider  Call your healthcare provider if any of these occur:    Coughing up blood    Shortness of breath    Chest pain    Rapid heart rate or pulse    Fever of  100.4  F( 38 C) or higher, or as directed by your provider  Radha last reviewed this educational content on 12/1/2019 2000-2021 [employer]. All rights reserved. This information is not intended as a substitute for professional medical care. Always follow your healthcare professional's instructions.         ======================    Patient Education     Lung Cancer Screening  Frequently Asked Questions  If you  are at high risk for lung cancer, getting screened with low-dose computed tomography (LDCT) every year can help save your life. This handout offers answers to the most common questions about lung cancer screening. If you have other questions, please call [phone] ([phone]).  What is it?  Lung cancer screening uses special X-ray technology to create an image of the lung tissue. The exam is quick and easy and takes less than 10 seconds. We don't give you any medicine or use any needles. You can eat before and after the exam. You do not need to change your clothes as long as the clothing on your chest does not contain metal. But you do need to be able to hold your breath for at least 6 seconds during the exam.  What is the goal of lung cancer screening?  The goal of lung cancer screening is to save lives. Many times, lung cancer is not found until a person starts having physical symptoms. Lung cancer screening can help detect lung cancer in the earliest stages when it may be easier to treat.  Who should be screened for lung cancer?  We suggest lung cancer screening for anyone who is at high-risk for lung cancer. You are in the high-risk group if you:    are between the ages of 55 and 79 and    have smoked at least 1 pack of cigarettes a day for 30 or more years and    still smoke or have quit within the past 15 years.  However, if you have a new cough or shortness of breath, you should talk to your doctor before being screened.  Why does it matter if I have symptoms?  Certain symptoms can be a sign that you have a condition in your lungs that should be checked and treated by your doctor. These symptoms include fever, chest pain, a new or changing cough, shortness of breath that you have never felt before, coughing up blood or unexplained weight loss. Having any of these symptoms can greatly affect the results of lung screening.  Should all smokers get an LDCT lung cancer screening exam?  It depends.  Lung cancer screening is for a very specific group of men and women who have a history of heavy smoking over a long period of time (see above).  I am in one of the high-risk groups, but have been diagnosed with cancer in the past. Is LDCT lung cancer screening right for me?  In some cases, you should not have LDCT lung screening, such as when your doctor is already following your cancer with CT scan studies. Your doctor will help you decide if LDCT lung screening is right for you.  Do I need to have a screening exam every year?  Yes. If you are in the high-risk group described earlier, you should get an LDCT lung cancer screening exam every year until you are 79. Or you may stop screening if you are no longer willing or able to undergo screening and treatment.  How effective is LDCT at preventing death from lung cancer?  Studies have shown that LDCT lung cancer screening can lower the risk of death from lung cancer by 20 percent in people who are at high risk.  What are the risks?  There are some risks and limitations of LDCT lung screening. We want to make sure that we have done a good job explaining these to you, so please let us know if you have any questions. Your doctor may want to talk with you more about these risks.    Radiation exposure: As with any exam that uses radiation, there is a very small increased risk of cancer. The amount of radiation in LDCT is small--about the same amount a person would get from a mammogram. Your doctor orders the exam when he or she feels the potential benefits outweigh the risks.    False negatives: No test is perfect, including LDCT. It is possible that you may have a medical condition, including lung cancer, that is not found during your exam. This is called a false negative.    False positives and more testing: LDCT very often finds something in the lung that could be cancer, but in fact is not. This is called a false positive result. False positive tests often cause  anxiety. To make sure these findings are not cancer, you may need to have more tests. These tests will be done only if you give us permission. Sometimes patients need a treatment that can have side effects, such as a biopsy. For more information on false positives, see What can I expect from the results?    Findings not related to lung cancer: Your LDCT exam also takes pictures of areas of your body next to your lungs. In a very small number of cases, the CT scan will show an abnormal finding in one of these areas, such as your kidneys, adrenal glands, liver or thyroid. This finding may not be serious, but you may need more tests. Your doctor can help you decide what other tests you may need, if any.  What can I expect from the results?  About 1 out of 4 LDCT exams will find something that may need more tests. Most of the time, these findings are lung nodules. Lung nodules are very small collections of tissue in the lung. These nodules are very common, and the vast majority--more than 97 percent--are not cancer (benign). Most are normal lymph nodes or small areas of scarring from past infections.  But if a small lung nodule is found to be cancer, the cancer can be cured more than 90 percent of the time. To know if the nodule is cancer, we may need to get more images before your next yearly screening exam. If the nodule has suspicious features (for example, it is large, has an odd shape or grows over time), we will refer you to a specialist for further testing.  Will my doctor also get the results?  Yes. Your doctor will get a copy of your results.  Is it okay to keep smoking now that there's a cancer screening exam?  No. Tobacco is one of the strongest cancer-causing agents. It causes not only lung cancer, but other cancers and cardiovascular (heart) diseases as well. The damage caused by smoking builds over time. This means that the longer you smoke, the higher your risk of disease. While it is never too late to  quit, the sooner you quit, the better.  Where can I find help to quit smoking?  The best way to prevent lung cancer is to stop smoking. For help on quitting smoking, please call [employer] at 6-028-640-FPRR ([phone]) or the American Cancer Society at 1-817.859.4138 to find local resources near you. If you have already quit smoking, congratulations and keep it up!  For informational purposes only. Not to replace the advice of your health care provider.  Copyright   2013 [facility]. All rights reserved. [employer] 572718 - REV 03/16.         ====================    Patient Education       Understanding the mRNA COVID-19 Vaccine  Vaccines for COVID-19 are available. What do you need to know about getting a COVID-19 vaccine? What can you expect after you get it? Read on to learn more.   mRNA COVID-19 vaccine fast facts    Two mRNA vaccine choices are available: Pfizer and Moderna.    The vaccine is given as a shot in a muscle in your upper arm.    The mRNA vaccine does not use live, dead, or weak COVID virus.    The vaccine will not give you COVID-19.    Side effects of the vaccine mean your immune system is working, not that you have the virus.    You will need 2 doses of the vaccine, at least 3 weeks apart.    People with a very weak immune system may not build up enough antibodies to fight COVID-19 after getting the first 2 doses of the 2-dose vaccine. This includes people who have had a solid organ transplant or who have a condition such as cancer that can cause a very weak immune system. For these people, an extra dose of the vaccine is advised. This extra dose is part of their vaccine series (primary series), not a booster. The extra shot is given at least 28 days after the second dose. Talk with your healthcare provider about your own case and risk.    Everyone age 18 or older can get a COVID-19 booster shot. A booster shot of the Pfizer or Moderna vaccines is advised for many people. The booster shot is  "to be given at least 6 months after your primary series. Talk with your healthcare provider about your situation and risk. The CDC says these people should get a Pfizer or Moderna booster shot:  ? People ages 50 and older.  ? People ages 18 and older who live in long-term care facilities.    The CDC states that people 18 to 49 may choose to get the booster depending on their specific case and risk. This includes people with certain health conditions or whose job or living setting puts them at higher risk for COVID-19.    You may choose which vaccine you get for your booster This is called mix and match. Talk with your healthcare provider about which booster you should get depending on your health and specific case.    What does the COVID-19 vaccine do?  The COVID-19 vaccine has been shown to work well to prevent symptomatic COVID-19 illness. Getting a COVID-19 vaccine can help protect you and your family from getting ill from the virus. If you get the virus after you get the vaccine, it may help your symptoms be milder. The Covid-19 vaccine may also help protect people around you from getting the infection.   COVID-19 vaccines may also lead to more widespread changes. The more people who get the COVID-19 vaccine, the less likely the virus will be able to spread in the community. This is called \"herd immunity\" or \"community immunity.\" As more people get the vaccine, local and regional policies may be able to change about what types of businesses can be open and how people can gather together.   Schools may back in session in person faster. Workplaces may reopen. Events may be allowed, travel may resume for many people, and it may be easier to see family and friends.   Should I get a COVID-19 vaccine?  The most important thing to do is talk with your healthcare provider. mRNA vaccines are approved to protect people against COVID-19, including those who are pregnant or breastfeeding. One vaccine is approved for people " as young as 5. Talk with your healthcare provider about your risks and which vaccine may be best for you and your family.   People who have had COVID-19 may still benefit from the vaccine. Researchers don t yet exactly know how long natural immunity lasts after you have COVID-19. Your healthcare provider may advise you to get the vaccine if you had COVID-19 more than 90 days ago.   Tell your healthcare provider if you have ever had a severe allergic reaction to food or medicine. Talk with them about your risks if you carry an epinephrine autoinjector. This may affect your provider s advice to you about the vaccine.   How does an mRNA COVID-19 vaccine work?  The COVID-19 mRNA vaccines are different from traditional vaccines. They re not made with live, dead, or weak virus. Instead, they're made with messenger ribonucleic acid (mRNA). This is a type of molecule that gives instructions for how to make different kinds of proteins. mRNA molecules are a natural part of our cells and how our bodies work.   The mRNA in the vaccines tells your cells how to make a harmless piece of a protein called a spike protein. This protein is found on the outside of the SARS-CoV-2 virus that causes COVID-19. Your immune system sees this spike protein as a threat, and creates antibodies and other defenses against it.   This will help your body's immune system recognize and fight the real virus if it ever shows up. It s kind of like recognizing someone by the hat they wear. Your body is then prepared to spot COVID-19 and fight it off before it grows in your body s cells.   How were the COVID-19 vaccines approved for safety?  The COVID-19 mRNA vaccines have passed many tests in labs and in thousands of people, and meet strict standards from the FDA.   The vaccines were tested first in animals. They were then tested in a series of clinical trials that included thousands of people. All of the data from these tests was collected and submitted  to the FDA and other scientific groups. These committees of scientists and public health experts carefully look at the data to see if a vaccine is safe and effective. If the vaccine meets the FDA's strict standards of safety and quality, the agency tells the vaccine company they can make the vaccine for emergency use.   How is this vaccine ready so quickly?  Researchers have been working with mRNA vaccines for many years. They are made more easily and safely in a lab than a vaccine that uses a virus. Because of this, they can also be made faster.   Vaccines have typically taken longer to be approved and come to market. But over many years of creating vaccines, research groups and public health agencies have been making the vaccine process work faster. For COVID-19, a special program was created to help get COVID-19 vaccines ready even more quickly.   This program is a partnership of the U.[place]. Department of Health and Human Services, the U.S. Department of Defense, and many medical research and manufacturing groups. These organizations agreed to work together as closely as possible to communicate and move through a robust process to develop safe COVID-19 vaccines more quickly.   How much does the vaccine cost?  The [place] government is providing the vaccine free to [place] residents. But the site where you get your vaccine may bill your health insurer for giving you the vaccine. Talk with your health insurer, local pharmacy, employer, or healthcare provider to find out more about a possible fee. You can t be denied a vaccine if you don t have health insurance or can t pay the fee yourself.   Getting the mRNA COVID-19 vaccine  The vaccine is given as a shot in a muscle in your upper arm. You will need to have 2 doses, spaced 21 days or more apart. You ll need both of these doses to get the best COVID-19 protection from the vaccine.   People with a moderately or severely weak immune system may not build up enough antibodies  to fight COVID-19 after getting the first 2 doses of the vaccine. They may need an extra dose. This includes people who have had a solid organ transplant or who have a condition that causes a very weak immune system. The extra dose is given at least 28 days after the second dose. Talk with your healthcare provider about your situation and risk.   Follow instructions from the healthcare staff. Tell the staff if you have ever had a severe allergic reaction to food or medicine, or carry an epinephrine autoinjector. Tell them if you feel any reaction after you have the shot. You may be asked to stay for some time after getting the vaccine so you can be monitored.   Side effects: What to expect  The vaccine will have side effects for some people. A vaccine activates a person s immune system. It causes the immune system to create antibodies to fight off a specific virus or bacteria. When your immune system goes into action, you may feel your immune system kick into gear as though it s fighting an illness. This does not mean you are infected with an illness. It means that your immune system is working.   People in the COVID-19 vaccine trials commonly had soreness where the shot was given, tiredness, headaches, muscle and joint aches, chills, and fever for a day or two. Fewer people had redness and swelling at the injection site. These are all signs that your immune system is working on its defense. You can get these kinds of effects after many kinds of vaccines. But these symptoms should last a short time. In comparison, COVID-19 symptoms can be severe and last much longer, and cause complications, long-term illness, and death. The FDA approval process makes sure that the discomfort and risks of a vaccine outweigh the risks and complications of the illness it helps prevent.   Allergic reactions  In general, the COVID-19 vaccines are very safe. They have been tested on thousands of people. Non-severe allergic reactions have  "happened in a few people up to 4 hours after getting the vaccine. The vaccine clinic may ask you to stay on-site for a period of time after you get the vaccine. This is to make sure you don't have an immediate reaction.   Talk with your healthcare provider before you get a COVID-19 vaccine. Tell them if you have ever had an immediate reaction to any vaccine, even if the reaction was not severe. Your provider will help you weigh the risks and benefits of the COVID-19 vaccine for you.   If you have an allergy to any ingredient in the mRNA COVID-19 vaccine, the CDC advises that you not get the vaccine. If you have had 1 dose of COVID-19 vaccine and you have an immediate allergic reaction, the [facility] advises that you not get the second dose.   The [employer] has a smartphone yariel called V-Safe to help you report side effects. The yariel will also send you reminders if you need a second vaccine dose. To access this yariel, see \"To learn more\" below.   Severe symptoms  If you get a COVID-19 vaccine and think you may be having a severe allergic reaction after leaving the vaccine clinic, call 911. Severe symptoms include:     Trouble breathing    Wheezing    Trouble swallowing or feeling like your throat is closing    Cool, moist, pale, or blue-tinted skin    Hoarse voice or trouble speaking    Chest pain    Fainting    Swelling in the eyes, mouth, face, or tongue    Seizure    Feeling very drowsy or having trouble awakening    Fast heart rate    Nausea, vomiting, diarrhea, stomach cramps, or abdominal pain  After you get the COVID-19 vaccine  When you get both doses of the vaccine:    It s still possible to get COVID-19.  Like most vaccines, the COVID-19 vaccines are not 100% effective at preventing the disease. You should still take care to prevent contact with sick people and follow local advice about staying safe.    Follow your local, state, and national instructions about wearing a mask and social distancing. Check the most current " CDC guidelines .  Talking with your healthcare provider  You may have a lot of questions about the vaccine for yourself. Should you get it? If so, when? What are the risks and benefits to you? The best way to answer these questions is to talk with your healthcare provider. They can let you know when and what kind of vaccine is available, and what you should consider.   To learn more    CDC: COVID-19 Vaccines    FDA: COVID-19 Vaccines    V-Safe After Vaccination Health     CDC: People with Certain Medical Conditions    Date last modified: 11/22/2021  [employer] last reviewed this educational content on 12/1/2020 2000-2021 [employer]. All rights reserved. This information is not intended as a substitute for professional medical care. Always follow your healthcare professional's instructions.         =================      Patient Education     Pneumococcal 23-valent vaccine Prefilled Syringe   Uses    Instructions  This medicine is given as an injection into a muscle.  Please tell your doctor and pharmacist about all the medicines you take. Include both prescription and over-the-counter medicines. Also tell them about any vitamins, herbal medicines, or anything else you take for your health.  Cautions  Tell your doctor and pharmacist if you ever had an allergic reaction to a medicine. Symptoms of an allergic reaction can include trouble breathing, skin rash, itching, swelling, or severe dizziness.  Speak with your health care provider before receiving any vaccinations.  Tell the doctor or pharmacist if you are pregnant, planning to be pregnant, or breastfeeding.  Ask your pharmacist if this medicine can interact with any of your other medicines. Be sure to tell them about all the medicines you take.  Side Effects  The following is a list of some common side effects from this medicine. Please speak with your doctor about what you should do if you experience these or other side effects.    lack  of energy and tiredness    fever or chills    reaction at the area of the injection (pain, redness, swelling)    muscle pain    nausea  Call your doctor or get medical help right away if you notice any of these more serious side effects:    dizziness    ear problems (ringing in the ears, hearing loss)    fainting    feeling of numbness or tingling in your hands and feet    seizures    unusual or unexplained tiredness or weakness    blurring or changes of vision  A few people may have an allergic reactions to this medicine. Symptoms can include difficulty breathing, skin rash, itching, swelling, or severe dizziness. If you notice any of these symptoms, seek medical help quickly.  Extra  Please speak with your doctor, nurse, or pharmacist if you have any questions about this medicine.  [email or web address]  IMPORTANT NOTE: This document tells you briefly how to take your medicine, but it does not tell you all there is to know about it.Your doctor or pharmacist may give you other documents about your medicine. Please talk to them if you have any questions.Always follow their advice. There is a more complete description of this medicine available in English.Scan this code on your smartphone or tablet or use the web address below. You can also ask your pharmacist for a printout. If you have any questions, please ask your pharmacist.     2021 [employer]

## 2021-12-08 NOTE — PROGRESS NOTES
Pulmonary Clinic Outpatient Consultation    Assessment and Plan:   56 year old man with a history of severe MR due to MV prolapse, HFpEF, multiple hospitalizations and ER visits for CHF and volume overload, presents for evaluation of pleural effusion. We reviewed his recent CT scan in detail. There was a moderate sized right sided effusion and small left effusion. These are most likely due to his severe MR and CHF.  Lungs are mostly clear and SpO2 is 100% today.  We discussed indications for thoracentesis which is not indicated at this time.     Recommendations:  #Bilateral pleural effusions, R > L: as above  - I gave him some information on thoracentesis  - continue diuretics per cardiology  - will have him return in 3 months with CXR. Advised him to call sooner if he develops worsening SOB refractory to diuretics as we may need to schedule a thoracentesis.     #Current everyday smoker: discussed smoking cessation for 5 minutes. Not interested in Chantix or NRT.  - offered LDCT for lung cancer screening; he declines.    #severe MR with mitral valve prolapse:  - management per cardiology (Dr. Jackson)  - MARIANNA next week as scheduled  - CHF precautions reinforced.    #RHM:  - he should get the flu shot  - he is going to get the covid-19 vaccine today at [facility] in [place]. I gave him some information on the vaccines.  - He should also get PSV23 as well. He is going to discuss this with his PMD    Follow up in 3 months with CXR.  All questions answered.     Feliciano Schaefer MD (Avi)  [facility] Pulmonary & Critical Care  Clinic [phone]  Fax [phone]      CCx: pleural effusion    HPI: 56 year old man with a history of severe MR due to MV prolapse, HFpEF, multiple hospitalizations and ER visits for CHF and volume overload, presents for evaluation of pleural effusion. He was recently hospitalized at [facility] for the above. He did not undergo thoracentesis. He improved with diuretics. He  says he is feeling much better these days. No SOB at rest (mild with exertion). Taking medications faithfully. Watching salt intake.     He sees Dr. Jackson from heart care and is going to have a MARIANNA next week to assess the mitral valve.     He has a 40 pack year smoking history. Trying to cut back. Currently smoking 1-2 cig per day.    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    ROS:  A 12-system review was obtained and was negative with the exception of the symptoms endorsed in the history of present illness.    PMH:  Past Medical History:   Diagnosis Date     Acute exacerbation of CHF (congestive heart failure) (H) 11/27/2021     Benign essential hypertension      Bleeding disorder (H)      Chemical dependency (H)      Closed displaced fracture of fifth metatarsal bone of left foot 07/26/2021     Gastroesophageal reflux disease      Hypertension      Nasal mass 12/17/2013     Papilloma of nose 03/10/2014     Skin disease      Traumatic avulsion of nail plate of toe 07/26/2021     Type I or II open fracture of left ulna 09/05/2020       PSH:  Past Surgical History:   Procedure Laterality Date     ARTHRODESIS FOOT Left 2/17/2015    Procedure: ARTHRODESIS FOOT;  Surgeon: Cortez Hayward DPM;  Location: [facility]     ARTHRODESIS TOE(S)  12/20/2013    Procedure: ARTHRODESIS TOE(S);  Arthrodesis first metatarsophalangeal joint left foot;  Surgeon: Cortez Hayward DPM;  Location: [facility]     COLONOSCOPY N/A 1/12/2021    Procedure: COLONOSCOPY;  Surgeon: Dixon Sarabia MD;  [facility]: [facility]     ENDOSCOPIC SINUS SURGERY  1/6/2014    Procedure: ENDOSCOPIC SINUS SURGERY;  Functional Endoscopic Sinus Surgery;  Surgeon: Bobo Renteria MD;  Location:  OR     ENDOSCOPIC SINUS SURGERY  7/21/2014    Procedure: ENDOSCOPIC SINUS SURGERY;  Surgeon: Bobo Renteria MD;  Location:  OR     ENDOSCOPIC SINUS SURGERY N/A 4/6/2015    Procedure: ENDOSCOPIC SINUS SURGERY;  Surgeon: Bobo Renteria  MD [place];  [facility]     ENDOSCOPIC SINUS SURGERY N/A 8/17/2015    Procedure: ENDOSCOPIC SINUS SURGERY;  Surgeon: Bobo Renteria MD;  [facility]     ENDOSCOPIC SINUS SURGERY Bilateral 8/19/2019    Procedure: Bilateral Functional Endoscopic Sinus Surgery, Right Nasal Endoscopy and Excision of Left Nasal Mass;  Surgeon: Bobo Renteria MD;  [facility]     ENT SURGERY       EXCISE NASAL MASS Left 11/6/2017    Procedure: EXCISE NASAL MASS;  Excision of Left Nasal Papilloma;  Surgeon: Bobo Renteria MD;  [facility]     LAPAROSCOPIC HERNIORRHAPHY INGUINAL BILATERAL Bilateral 4/12/2017    Procedure: LAPAROSCOPIC HERNIORRHAPHY INGUINAL BILATERAL;  Surgeon: Shay Mercado MD;  [facility]: [facility]     NASAL ENDOSCOPY Bilateral 2/6/2017    Procedure: NASAL ENDOSCOPY;  Surgeon: Bobo Renteria MD;  [facility]     NASAL ENDOSCOPY N/A 5/21/2018    Procedure: NASAL ENDOSCOPY;  Nasal Endoscopy, CO2 Laser Excision of Left Nasal Papilloma;  Surgeon: Bobo Renteria MD;  [facility]     NASAL/SINUS POLYPECTOMY       PE TUBES         Allergies:  No Known Allergies    Family HX:  Family History   Problem Relation Age of Onset     Diabetes Mother 40     C.A.D. Father 72     Unknown/Adopted No family hx of      Depression No family hx of      Anxiety Disorder No family hx of      Schizophrenia No family hx of      Bipolar Disorder No family hx of      Suicide No family hx of      Substance Abuse No family hx of      Dementia No family hx of      [place] Disease No family hx of      Parkinsonism No family hx of      Autism Spectrum Disorder No family hx of      Intellectual Disability (Mental Retardation) No family hx of      Mental Illness No family hx of      Bleeding Disorder No family hx of        Social Hx:  Social History     Socioeconomic History     Marital status: Single     Spouse name: Not on file     Number of children: Not on file     Years of education: Not on  file     Highest education level: Not on file   Occupational History     Not on file   Tobacco Use     Smoking status: Current Every Day Smoker     Packs/day: 0.25     Years: 40.00     Pack years: 10.00     Types: Cigarettes     Start date: 1/1/1980     Smokeless tobacco: Never Used     Tobacco comment: about 3 cigarettes or so a day   Vaping Use     Vaping Use: Never used   Substance and Sexual Activity     Alcohol use: Not Currently     Comment: occasional      Drug use: Not Currently     Types: Cocaine, Methamphetamines, Opiates, Marijuana     Comment: 7 years quit Cocaine/methamphetamines     Sexual activity: Yes     Partners: Female     Birth control/protection: None   Other Topics Concern     Parent/sibling w/ CABG, MI or angioplasty before 65F 55M? No      Service No     Blood Transfusions No     Caffeine Concern No     Occupational Exposure No     Hobby Hazards No     Sleep Concern No     Stress Concern No     Weight Concern No     Special Diet No     Back Care No     Exercise Yes     Bike Helmet No     Seat Belt Yes     Self-Exams Not Asked   Social History Narrative     Not on file     Social Determinants of Health     Financial Resource Strain: Not on file   Food Insecurity: Not on file   Transportation Needs: Not on file   Physical Activity: Not on file   Stress: Not on file   Social Connections: Not on file   Intimate Partner Violence: Not on file   Housing Stability: Not on file       Current Meds:  Current Outpatient Medications   Medication Sig Dispense Refill     furosemide (LASIX) 20 MG tablet Take 1 tablet (20 mg) by mouth daily 30 tablet 6     lisinopril (ZESTRIL) 20 MG tablet Take 1 tablet (20 mg) by mouth daily 30 tablet 6     metoprolol succinate ER (TOPROL-XL) 25 MG 24 hr tablet Take 0.5 tablets (12.5 mg) by mouth daily 30 tablet 3     order for DME Equipment being ordered: Dynaflex insert 2 Units 0     potassium chloride (KLOR-CON) 20 MEQ packet Take 10 mEq by mouth daily 30 packet 2  "    spironolactone (ALDACTONE) 25 MG tablet Take 1 tablet (25 mg) by mouth daily 30 tablet 3       Physical Exam:  /88   Pulse 69   Ht 1.905 m (6' 3\")   Wt 75.4 kg (166 lb 3.2 oz)   SpO2 100%   BMI 20.77 kg/m  Gen: awake, alert, oriented, no distress  HEENT: nasal turbinates are unremarkable, no oropharyngeal lesions, no cervical or supraclavicular lymphadenopathy  CV: RRR, loud harsh 5/6 systolic murmur heard throughout the precordium  Resp: diminished at right base with dullness to percussion. No wheezing. Good air movement.  Skin: no apparent rashes  Ext: no cyanosis, clubbing. 2+ pitting edema in the right leg  Neuro: alert, nonfocal    Labs:  Reviewed  BNP 1916 on 11/29  Chem panel OK, renal function normal.   CBC OK on 11/27    Imaging studies:  Personally reviewed  CT chest from 11/27    IMPRESSION:  1.  No pulmonary embolism demonstrated. There are extensive areas of nonopacification of pulmonary arteries in the right lower lobe and to a lesser extent the left lower lobe making these areas nonassessable for pulmonary emboli.  2.  Increased interstitial change which may be related to increasing interstitial edema.  3.  Moderate right pleural effusion and associated compressive atelectasis and/or infiltrate at the right base, increased from previous.    Echo  Interpretation Summary     1. The left ventricle is mildly dilated. There is mild eccentric left  ventricular hypertrophy. Left ventricular systolic function is low normal. The  visual ejection fraction is 50-55%. Biplane LVEF is 53%.  2. The right ventricle is normal size. The right ventricular systolic function  is mildly reduced.  3. Severe mitral valve prolapse, posterior leaflet. There is severe (4+)  mitral regurgitation.  4. Bicuspid aortic valve with a complete LCC/RCC raphe. There is mild (1+)  aortic regurgitation. Mild aortic stenosis with mean gradient of 8 mmHg.  5. Small posterior pericardial " effusion.  ______________________________________________________________________________      Pulmonary Function Testing  n/a

## 2021-12-08 NOTE — LETTER
12/8/2021         RE: Micah Nunez  [address]  [place] [address]        Dear Colleague,    Thank you for referring your patient, Micah Nunez, to the [facility]. Please see a copy of my visit note below.    Pulmonary Clinic Outpatient Consultation    Assessment and Plan:   56 year old man with a history of severe MR due to MV prolapse, HFpEF, multiple hospitalizations and ER visits for CHF and volume overload, presents for evaluation of pleural effusion. We reviewed his recent CT scan in detail. There was a moderate sized right sided effusion and small left effusion. These are most likely due to his severe MR and CHF.  Lungs are mostly clear and SpO2 is 100% today.  We discussed indications for thoracentesis which is not indicated at this time.     Recommendations:  #Bilateral pleural effusions, R > L: as above  - I gave him some information on thoracentesis  - continue diuretics per cardiology  - will have him return in 3 months with CXR. Advised him to call sooner if he develops worsening SOB refractory to diuretics as we may need to schedule a thoracentesis.     #Current everyday smoker: discussed smoking cessation for 5 minutes. Not interested in Chantix or NRT.  - offered LDCT for lung cancer screening; he declines.    #severe MR with mitral valve prolapse:  - management per cardiology (Dr. Jackson)  - MARIANNA next week as scheduled  - CHF precautions reinforced.    #RHM:  - he should get the flu shot  - he is going to get the covid-19 vaccine today at [facility] in [place]. I gave him some information on the vaccines.  - He should also get PSV23 as well. He is going to discuss this with his PMD    Follow up in 3 months with CXR.  All questions answered.     Feliciano Schaefer MD (Avi)  [facility] Pulmonary & Critical Care  Clinic [phone]  Fax [phone]      CCx: pleural effusion    HPI: 56 year old man with a history of severe MR due  to MV prolapse, HFpEF, multiple hospitalizations and ER visits for CHF and volume overload, presents for evaluation of pleural effusion. He was recently hospitalized at [facility] for the above. He did not undergo thoracentesis. He improved with diuretics. He says he is feeling much better these days. No SOB at rest (mild with exertion). Taking medications faithfully. Watching salt intake.     He sees Dr. Jackson from heart care and is going to have a MARIANNA next week to assess the mitral valve.     He has a 40 pack year smoking history. Trying to cut back. Currently smoking 1-2 cig per day.    He works for an [employer] company. Usually works from home.    ROS:  A 12-system review was obtained and was negative with the exception of the symptoms endorsed in the history of present illness.    PMH:  Past Medical History:   Diagnosis Date     Acute exacerbation of CHF (congestive heart failure) (H) 11/27/2021     Benign essential hypertension      Bleeding disorder (H)      Chemical dependency (H)      Closed displaced fracture of fifth metatarsal bone of left foot 07/26/2021     Gastroesophageal reflux disease      Hypertension      Nasal mass 12/17/2013     Papilloma of nose 03/10/2014     Skin disease      Traumatic avulsion of nail plate of toe 07/26/2021     Type I or II open fracture of left ulna 09/05/2020       PSH:  Past Surgical History:   Procedure Laterality Date     ARTHRODESIS FOOT Left 2/17/2015    Procedure: ARTHRODESIS FOOT;  Surgeon: Cortez Hayward DPM;  [facility]: [facility]     ARTHRODESIS TOE(S)  12/20/2013    Procedure: ARTHRODESIS TOE(S);  Arthrodesis first metatarsophalangeal joint left foot;  Surgeon: Cortez Hayward DPM;  [facility]: [facility]     COLONOSCOPY N/A 1/12/2021    Procedure: COLONOSCOPY;  Surgeon: Dixon Sarabia MD;  [facility]     ENDOSCOPIC SINUS SURGERY  1/6/2014    Procedure: ENDOSCOPIC SINUS SURGERY;  Functional Endoscopic Sinus Surgery;  Surgeon: Bobo Renteria,  MD;  [facility]     ENDOSCOPIC SINUS SURGERY  7/21/2014    Procedure: ENDOSCOPIC SINUS SURGERY;  Surgeon: Bobo Renteria MD;  [facility]     ENDOSCOPIC SINUS SURGERY N/A 4/6/2015    Procedure: ENDOSCOPIC SINUS SURGERY;  Surgeon: Bobo Renteria MD;  [facility]     ENDOSCOPIC SINUS SURGERY N/A 8/17/2015    Procedure: ENDOSCOPIC SINUS SURGERY;  Surgeon: Bobo Renteria MD;  [facility]     ENDOSCOPIC SINUS SURGERY Bilateral 8/19/2019    Procedure: Bilateral Functional Endoscopic Sinus Surgery, Right Nasal Endoscopy and Excision of Left Nasal Mass;  Surgeon: Bobo Renteria MD;  [facility]     ENT SURGERY       EXCISE NASAL MASS Left 11/6/2017    Procedure: EXCISE NASAL MASS;  Excision of Left Nasal Papilloma;  Surgeon: Bobo Renteria MD;  [facility]     LAPAROSCOPIC HERNIORRHAPHY INGUINAL BILATERAL Bilateral 4/12/2017    Procedure: LAPAROSCOPIC HERNIORRHAPHY INGUINAL BILATERAL;  Surgeon: Shay Mercado MD;  [facility]: [facility]     NASAL ENDOSCOPY Bilateral 2/6/2017    Procedure: NASAL ENDOSCOPY;  Surgeon: Bobo Renteria MD;  [facility]     NASAL ENDOSCOPY N/A 5/21/2018    Procedure: NASAL ENDOSCOPY;  Nasal Endoscopy, CO2 Laser Excision of Left Nasal Papilloma;  Surgeon: Bobo Renteria MD;  [facility]     NASAL/SINUS POLYPECTOMY       PE TUBES         Allergies:  No Known Allergies    Family HX:  Family History   Problem Relation Age of Onset     Diabetes Mother 40     C.A.D. Father 72     Unknown/Adopted No family hx of      Depression No family hx of      Anxiety Disorder No family hx of      Schizophrenia No family hx of      Bipolar Disorder No family hx of      Suicide No family hx of      Substance Abuse No family hx of      Dementia No family hx of      [place] Disease No family hx of      Parkinsonism No family hx of      Autism Spectrum Disorder No family hx of      Intellectual Disability (Mental Retardation) No family hx of       Mental Illness No family hx of      Bleeding Disorder No family hx of        Social Hx:  Social History     Socioeconomic History     Marital status: Single     Spouse name: Not on file     Number of children: Not on file     Years of education: Not on file     Highest education level: Not on file   Occupational History     Not on file   Tobacco Use     Smoking status: Current Every Day Smoker     Packs/day: 0.25     Years: 40.00     Pack years: 10.00     Types: Cigarettes     Start date: 1/1/1980     Smokeless tobacco: Never Used     Tobacco comment: about 3 cigarettes or so a day   Vaping Use     Vaping Use: Never used   Substance and Sexual Activity     Alcohol use: Not Currently     Comment: occasional      Drug use: Not Currently     Types: Cocaine, Methamphetamines, Opiates, Marijuana     Comment: 7 years quit Cocaine/methamphetamines     Sexual activity: Yes     Partners: Female     Birth control/protection: None   Other Topics Concern     Parent/sibling w/ CABG, MI or angioplasty before 65F 55M? No      Service No     Blood Transfusions No     Caffeine Concern No     Occupational Exposure No     Hobby Hazards No     Sleep Concern No     Stress Concern No     Weight Concern No     Special Diet No     Back Care No     Exercise Yes     Bike Helmet No     Seat Belt Yes     Self-Exams Not Asked   Social History Narrative     Not on file     Social Determinants of Health     Financial Resource Strain: Not on file   Food Insecurity: Not on file   Transportation Needs: Not on file   Physical Activity: Not on file   Stress: Not on file   Social Connections: Not on file   Intimate Partner Violence: Not on file   Housing Stability: Not on file       Current Meds:  Current Outpatient Medications   Medication Sig Dispense Refill     furosemide (LASIX) 20 MG tablet Take 1 tablet (20 mg) by mouth daily 30 tablet 6     lisinopril (ZESTRIL) 20 MG tablet Take 1 tablet (20 mg) by mouth daily 30 tablet 6      "metoprolol succinate ER (TOPROL-XL) 25 MG 24 hr tablet Take 0.5 tablets (12.5 mg) by mouth daily 30 tablet 3     order for DME Equipment being ordered: Dynaflex insert 2 Units 0     potassium chloride (KLOR-CON) 20 MEQ packet Take 10 mEq by mouth daily 30 packet 2     spironolactone (ALDACTONE) 25 MG tablet Take 1 tablet (25 mg) by mouth daily 30 tablet 3       Physical Exam:  /88   Pulse 69   Ht 1.905 m (6' 3\")   Wt 75.4 kg (166 lb 3.2 oz)   SpO2 100%   BMI 20.77 kg/m  Gen: awake, alert, oriented, no distress  HEENT: nasal turbinates are unremarkable, no oropharyngeal lesions, no cervical or supraclavicular lymphadenopathy  CV: RRR, no M/G/R  Resp: diminished at right base with dullness to percussion. No wheezing. Good air movement.  Skin: no apparent rashes  Ext: no cyanosis, clubbing. 2+ pitting edema in the right leg  Neuro: alert, nonfocal    Labs:  Reviewed  BNP 1916 on 11/29  Chem panel OK, renal function normal.   CBC OK on 11/27    Imaging studies:  Personally reviewed  CT chest from 11/27    IMPRESSION:  1.  No pulmonary embolism demonstrated. There are extensive areas of nonopacification of pulmonary arteries in the right lower lobe and to a lesser extent the left lower lobe making these areas nonassessable for pulmonary emboli.  2.  Increased interstitial change which may be related to increasing interstitial edema.  3.  Moderate right pleural effusion and associated compressive atelectasis and/or infiltrate at the right base, increased from previous.    Echo  Interpretation Summary     1. The left ventricle is mildly dilated. There is mild eccentric left  ventricular hypertrophy. Left ventricular systolic function is low normal. The  visual ejection fraction is 50-55%. Biplane LVEF is 53%.  2. The right ventricle is normal size. The right ventricular systolic function  is mildly reduced.  3. Severe mitral valve prolapse, posterior leaflet. There is severe (4+)  mitral regurgitation.  4. " Bicuspid aortic valve with a complete LCC/RCC raphe. There is mild (1+)  aortic regurgitation. Mild aortic stenosis with mean gradient of 8 mmHg.  5. Small posterior pericardial effusion.  ______________________________________________________________________________      Pulmonary Function Testing  n/a      Again, thank you for allowing me to participate in the care of your patient.        Sincerely,        Feliciano Schaefer MD

## 2021-12-09 ENCOUNTER — HOSPITAL ENCOUNTER (OUTPATIENT)
Dept: ULTRASOUND IMAGING | Facility: CLINIC | Age: 56
Discharge: HOME | End: 2021-12-09
Attending: FAMILY MEDICINE | Admitting: FAMILY MEDICINE
Payer: COMMERCIAL

## 2021-12-09 DIAGNOSIS — R19.09 INGUINAL SWELLING: ICD-10-CM

## 2021-12-09 PROCEDURE — 76882 US LMTD JT/FCL EVL NVASC XTR: CPT | Mod: LT

## 2021-12-09 NOTE — LETTER
December 10, 2021      Micah PEDRO Enrique  [address]   [place] [address]        Dear ,    We are writing to inform you of your test results.    Test result was within normal parameters. The ultrasound showed benign lymph nodes. You should follow up in a month so we can re evaluate the lymph node for resolution.       Resulted Orders   US Lower Extremity Non Vascular Left    Narrative    EXAM: US LOWER EXTREMITY NON VASCULAR LEFT  LOCATION: [facility]  DATE/TIME: 12/9/2021 12:44 PM    INDICATION: Lymph node in left inguinal canal- lymph node is firm and mobile  COMPARISON: None.  TECHNIQUE: Routine.    FINDINGS: Targeted ultrasound examination of the left inguinal region demonstrates 2 lymph nodes measuring 2.2 x 1.5 x 0.6 cm and 1.3 x 1.2 x 0.5 cm. These both have a benign sonographic appearance. They probably represent benign reactive nodes. Clinical   follow-up is recommended to confirm that these remain stable or resolve.         If you have any questions or concerns, please call the clinic at the number listed above.       Sincerely,      Yovany White MD

## 2021-12-10 NOTE — RESULT ENCOUNTER NOTE
Please inform the patient that test result was within normal parameters. The ultrasound showed benign lymph nodes. Patient should follow up in a month so we can re evaluate the lymph node for resolution.  Thank you.     Yovany White M.D.

## 2021-12-17 ENCOUNTER — LAB (OUTPATIENT)
Dept: LAB | Facility: CLINIC | Age: 56
End: 2021-12-17
Attending: INTERNAL MEDICINE
Payer: COMMERCIAL

## 2021-12-17 DIAGNOSIS — Z11.59 ENCOUNTER FOR SCREENING FOR OTHER VIRAL DISEASES: ICD-10-CM

## 2021-12-17 PROCEDURE — U0005 INFEC AGEN DETEC AMPLI PROBE: HCPCS

## 2021-12-17 PROCEDURE — U0003 INFECTIOUS AGENT DETECTION BY NUCLEIC ACID (DNA OR RNA); SEVERE ACUTE RESPIRATORY SYNDROME CORONAVIRUS 2 (SARS-COV-2) (CORONAVIRUS DISEASE [COVID-19]), AMPLIFIED PROBE TECHNIQUE, MAKING USE OF HIGH THROUGHPUT TECHNOLOGIES AS DESCRIBED BY CMS-2020-01-R: HCPCS

## 2021-12-18 LAB — SARS-COV-2 RNA RESP QL NAA+PROBE: NEGATIVE

## 2021-12-20 ENCOUNTER — HOSPITAL ENCOUNTER (OUTPATIENT)
Dept: CARDIOLOGY | Facility: HOSPITAL | Age: 56
Discharge: HOME | End: 2021-12-20
Attending: INTERNAL MEDICINE | Admitting: INTERNAL MEDICINE
Payer: COMMERCIAL

## 2021-12-20 VITALS
RESPIRATION RATE: 15 BRPM | DIASTOLIC BLOOD PRESSURE: 79 MMHG | HEART RATE: 83 BPM | BODY MASS INDEX: 18.54 KG/M2 | WEIGHT: 157 LBS | OXYGEN SATURATION: 98 % | HEIGHT: 77 IN | SYSTOLIC BLOOD PRESSURE: 130 MMHG | TEMPERATURE: 97.5 F

## 2021-12-20 DIAGNOSIS — I34.0 SEVERE MITRAL REGURGITATION: ICD-10-CM

## 2021-12-20 DIAGNOSIS — I34.1 MVP (MITRAL VALVE PROLAPSE): ICD-10-CM

## 2021-12-20 DIAGNOSIS — I34.0 MITRAL REGURGITATION: ICD-10-CM

## 2021-12-20 LAB
ANION GAP SERPL CALCULATED.3IONS-SCNC: 8 MMOL/L (ref 5–18)
BUN SERPL-MCNC: 15 MG/DL (ref 8–22)
CALCIUM SERPL-MCNC: 9.3 MG/DL (ref 8.5–10.5)
CHLORIDE BLD-SCNC: 105 MMOL/L (ref 98–107)
CO2 SERPL-SCNC: 27 MMOL/L (ref 22–31)
CREAT SERPL-MCNC: 0.97 MG/DL (ref 0.7–1.3)
GFR SERPL CREATININE-BSD FRML MDRD: 87 ML/MIN/1.73M2
GLUCOSE BLD-MCNC: 99 MG/DL (ref 70–125)
POTASSIUM BLD-SCNC: 4.2 MMOL/L (ref 3.5–5)
SODIUM SERPL-SCNC: 140 MMOL/L (ref 136–145)

## 2021-12-20 PROCEDURE — 80048 BASIC METABOLIC PNL TOTAL CA: CPT | Performed by: INTERNAL MEDICINE

## 2021-12-20 PROCEDURE — 99153 MOD SED SAME PHYS/QHP EA: CPT | Performed by: INTERNAL MEDICINE

## 2021-12-20 PROCEDURE — 99152 MOD SED SAME PHYS/QHP 5/>YRS: CPT | Performed by: INTERNAL MEDICINE

## 2021-12-20 PROCEDURE — 93325 DOPPLER ECHO COLOR FLOW MAPG: CPT | Mod: 26 | Performed by: INTERNAL MEDICINE

## 2021-12-20 PROCEDURE — 93312 ECHO TRANSESOPHAGEAL: CPT | Mod: 26 | Performed by: INTERNAL MEDICINE

## 2021-12-20 PROCEDURE — 93320 DOPPLER ECHO COMPLETE: CPT | Mod: 26 | Performed by: INTERNAL MEDICINE

## 2021-12-20 PROCEDURE — 258N000003 HC RX IP 258 OP 636: Performed by: INTERNAL MEDICINE

## 2021-12-20 PROCEDURE — 36415 COLL VENOUS BLD VENIPUNCTURE: CPT | Performed by: INTERNAL MEDICINE

## 2021-12-20 PROCEDURE — 250N000011 HC RX IP 250 OP 636: Performed by: INTERNAL MEDICINE

## 2021-12-20 PROCEDURE — 93312 ECHO TRANSESOPHAGEAL: CPT

## 2021-12-20 PROCEDURE — 250N000009 HC RX 250: Performed by: INTERNAL MEDICINE

## 2021-12-20 RX ORDER — FENTANYL CITRATE 50 UG/ML
INJECTION, SOLUTION INTRAMUSCULAR; INTRAVENOUS
Status: COMPLETED | OUTPATIENT
Start: 2021-12-20 | End: 2021-12-20

## 2021-12-20 RX ORDER — LIDOCAINE HYDROCHLORIDE 20 MG/ML
SOLUTION OROPHARYNGEAL
Status: COMPLETED | OUTPATIENT
Start: 2021-12-20 | End: 2021-12-20

## 2021-12-20 RX ORDER — SODIUM CHLORIDE 9 MG/ML
INJECTION, SOLUTION INTRAVENOUS CONTINUOUS
Status: DISCONTINUED | OUTPATIENT
Start: 2021-12-20 | End: 2021-12-20 | Stop reason: HOSPADM

## 2021-12-20 RX ORDER — LIDOCAINE 40 MG/G
CREAM TOPICAL
Status: CANCELLED | OUTPATIENT
Start: 2021-12-20

## 2021-12-20 RX ORDER — SODIUM CHLORIDE 9 MG/ML
INJECTION, SOLUTION INTRAVENOUS CONTINUOUS
Status: CANCELLED | OUTPATIENT
Start: 2021-12-20

## 2021-12-20 RX ADMIN — SODIUM CHLORIDE: 9 INJECTION, SOLUTION INTRAVENOUS at 08:07

## 2021-12-20 RX ADMIN — FENTANYL CITRATE 50 MCG: 50 INJECTION, SOLUTION INTRAMUSCULAR; INTRAVENOUS at 08:31

## 2021-12-20 RX ADMIN — LIDOCAINE HYDROCHLORIDE 15 ML: 20 SOLUTION ORAL; TOPICAL at 08:31

## 2021-12-20 RX ADMIN — MIDAZOLAM 1 MG: 1 INJECTION INTRAMUSCULAR; INTRAVENOUS at 08:32

## 2021-12-20 RX ADMIN — BENZOCAINE 9 SPRAY: 220 SPRAY, METERED PERIODONTAL at 08:32

## 2021-12-20 RX ADMIN — MIDAZOLAM 0.5 MG: 1 INJECTION INTRAMUSCULAR; INTRAVENOUS at 08:38

## 2021-12-20 RX ADMIN — FENTANYL CITRATE 50 MCG: 50 INJECTION, SOLUTION INTRAMUSCULAR; INTRAVENOUS at 08:36

## 2021-12-20 ASSESSMENT — MIFFLIN-ST. JEOR: SCORE: 1659.53

## 2021-12-20 NOTE — DISCHARGE INSTRUCTIONS
* Recovery After Conscious Sedation (Adult)  We gave you medicine by vein to make you sleepy or relaxed during your procedure. This may have included both a pain medicine and sleeping medicine. Most of the effects have worn off. But you may still feel sleepy for the next 6 to 8 hours.  Home care  Follow these guidelines when you get home:    You may feel sleepy and clumsy and have poor balance for the next few hours.    A responsible adult should stay with you for the next 8 hours. This person should make sure your condition doesn t get worse.    Don't drink any alcohol for the next 24 hours.    Don't drive, operate dangerous machinery, make important business or personal decisions or sign legal documents during the next 24 hours.    You may vomit (throw up) if you eat too soon after the procedure. If this happens, drink small amounts of water, juice or clear broth. Wait to try solid food until you no longer have nausea (upset stomach).  Note: Your care team may tell you not to take any medicine by mouth for pain or sleep in the next 4 hours. These medicines may react with the medicines you had in the hospital. This could cause a much stronger response than usual.  Follow-up care  Follow up with your care team if you are not alert and back to your usual level of activity within 12 hours.  When to seek medical advice  Call your care team right away if any of these occur:    You still feel sleepy or clumsy after 12 hours, or your sleepiness gets worse    Weakness or dizziness gets worse    Repeated vomiting    If you can't be woken up and someone is staying with you, they should call 911.  Date Last Reviewed: 10/18/2016    [id number] [employer]. [address] [place] [address], [place] [address]. All rights reserved. This information is not intended as a substitute for professional medical care. Always follow your healthcare professional's instructions.  This information has been modified by your health care  provider with permission from the publisher.      1. You are required to have someone accompany you home.    2. Rest today. Do not drive or operate machinery today. Over-activity may produce nausea and dizziness.    3. You should follow your normal diet. Drink plenty of fluids. Do not drink any alcoholic beverages for 24 hours. *(Alcohol may interact with the medications you received today).    4. NO HOT FOODS or LIQUIDS FOR 6 HOURS after the procedure.  3:00 PM today    5. You may have a sore throat or cough. This is normal. These symptoms should resolve in 24 hours.     6. If you have further questions call your doctor:

## 2021-12-20 NOTE — PRE-PROCEDURE
GENERAL PRE-PROCEDURE:   Procedure:  Transesophageal echocardiogram  Date/Time:  12/20/2021 8:23 AM    Written consent obtained?: Yes    Risks and benefits: Risks, benefits and alternatives were discussed    Consent given by:  Patient  Patient states understanding of procedure being performed: Yes    Patient's understanding of procedure matches consent: Yes    Procedure consent matches procedure scheduled: Yes    Expected level of sedation:  Moderate  Appropriately NPO:  Yes  Mallampati  :  Grade 1- soft palate, uvula, tonsillar pillars, and posterior pharyngeal wall visible  Lungs:  Lungs clear with good breath sounds bilaterally  Heart:  Systolic murmur  History & Physical reviewed:  History and physical reviewed and no updates needed  Statement of review:  I have reviewed the lab findings, diagnostic data, medications, and the plan for sedation

## 2021-12-22 ENCOUNTER — PATIENT OUTREACH (OUTPATIENT)
Dept: NURSING | Facility: CLINIC | Age: 56
End: 2021-12-22
Payer: COMMERCIAL

## 2021-12-22 NOTE — PROGRESS NOTES
Clinic Care Coordination Contact    Follow Up Progress Note      Assessment: Pt has been working closely with cardiology team. Pt received letter with resource information.     Care Gaps:    Health Maintenance Due   Topic Date Due     HF ACTION PLAN  Never done     TSH W/FREE T4 REFLEX  Never done     ADVANCE CARE PLANNING  Never done     COVID-19 Vaccine (1) Never done     Pneumococcal Vaccine: Pediatrics (0 to 5 Years) and At-Risk Patients (6 to 64 Years) (1 of 2 - PPSV23) Never done     HIV SCREENING  Never done     HEPATITIS B IMMUNIZATION (1 of 3 - Risk 3-dose series) Never done     ZOSTER IMMUNIZATION (1 of 2) Never done     PREVENTIVE CARE VISIT  01/08/2020     LIPID  01/17/2020     INFLUENZA VACCINE (1) 09/01/2021       Postponed to PCP OV     Goals addressed this encounter:   Goals Addressed                    This Visit's Progress       1. Reducing Risks (pt-stated)   20%      Goal Statement: I will get connected to resources/supports.   Date Goal set: 12/1/21  Barriers: Access   Strengths: Motivated, family, friends, care team   Date to Achieve By: 5/1/22  Patient expressed understanding of goal: Yes    Action steps to achieve this goal:  1. I will start application for social security disability income online.   2. I will use [employer] to medical appts.  3. I will talk to my PCP about a cane or other DME.   4. I will look into other resources as needed (housing, etc).   5. I will work with care coordination as needed.              Intervention/Education provided during outreach: JUSTIN STEPHENSON outreach to pt for check in.     Pt reported that his parents are moving to  in next 60 days. He is staying with them in [facility]. Pt will look for housing in near future.     Pt is working with cardiology team. Noted his procedures have been going well. Pt stated he may need valve replacement. Pt stated he has a hard time breathing at times - expected with heart issues. Pt is staying positive.      Pt hasnt started SSDI application yet, plans to do so online in the near future.     Pt plans to schedule PCP appt after new year - see her after next cardiology appt.      Outreach Frequency: Monthly    Plan: JUSTIN STEPHENSON will follow up in one month.     WILLI Freitas   Social Work Clinic Care Coordinator   [facility]  [phone]  [email or web address]

## 2022-01-03 ENCOUNTER — APPOINTMENT (OUTPATIENT)
Dept: CARDIOLOGY | Facility: CLINIC | Age: 57
End: 2022-01-03
Payer: COMMERCIAL

## 2022-01-03 ENCOUNTER — OFFICE VISIT (OUTPATIENT)
Dept: CARDIOLOGY | Facility: CLINIC | Age: 57
End: 2022-01-03
Payer: COMMERCIAL

## 2022-01-03 VITALS
SYSTOLIC BLOOD PRESSURE: 120 MMHG | DIASTOLIC BLOOD PRESSURE: 80 MMHG | HEIGHT: 75 IN | WEIGHT: 167.3 LBS | BODY MASS INDEX: 20.8 KG/M2 | RESPIRATION RATE: 24 BRPM | HEART RATE: 82 BPM

## 2022-01-03 DIAGNOSIS — I34.0 SEVERE MITRAL REGURGITATION: ICD-10-CM

## 2022-01-03 DIAGNOSIS — I34.1 MVP (MITRAL VALVE PROLAPSE): ICD-10-CM

## 2022-01-03 DIAGNOSIS — I50.21 ACUTE SYSTOLIC CONGESTIVE HEART FAILURE (H): ICD-10-CM

## 2022-01-03 PROCEDURE — 99214 OFFICE O/P EST MOD 30 MIN: CPT | Performed by: NURSE PRACTITIONER

## 2022-01-03 ASSESSMENT — MIFFLIN-ST. JEOR: SCORE: 1674.5

## 2022-01-03 NOTE — LETTER
1/3/2022    Yovany White MD  [address] [address]    RE: Micah Nunez     Dear Colleague,     I had the pleasure of seeing Micah Nunez in the [facility].        Assessment/Recommendations   Assessment:    1.  Heart failure with preserved ejection fraction, NYHA class III: Decompensated.  He states he stopped taking his Lasix about a week ago.  The last few days he has had increased fatigue and dyspnea on exertion.  He has not noticed any weight gain.  He denies orthopnea or PND.  He does not follow a low-sodium diet and eats out on a regular basis.  2.  Mitral valve regurgitation: He is scheduled in the valve clinic February 17  3.  Hypertension: Controlled.  Blood pressure 120/80    Plan:  1.  Start taking Lasix 20 mg daily  2.  Stressed importance of low-sodium diet and continue daily weights    Micah Nunez will follow up with Dr. Jackson in April, Dr. Maloney February 17 in the valve clinic and in the heart failure clinic in 1 month.     History of Present Illness/Subjective    Mr. Micah Nunez is a 56 year old male seen at [facility] heart failure clinic today for continued follow-up.  He follows up for heart failure with preserved ejection fraction.  He had a MARIANNA December 20, 2021 which showed an ejection fraction of 55 to 60% with severe mitral valve regurgitation.  Dr. Jackson recommended he follow-up in the valve clinic which is scheduled February 17.  He has a past medical history significant for hypertension, past substance abuse.    Today, he states he has had increased fatigue and dyspnea on exertion the last few days.  He states he stopped taking his Lasix approximately a week ago.  He denies orthopnea, PND or chest pain.  He denies lightheadedness, orthopnea, PND, palpitations, chest pain and abdominal fullness/bloating.      He is monitoring home weights which are stable around 160 pounds.  He eats out on a regular  "basis.    MARIANNA 12/20/2021  Interpretation Summary     TRANSESOPHAGEAL ECHOCARDIOGRAM     1. Normal left ventricular size and systolic performance with a visually  estimated ejection fraction of 55-60%.  2. The aortic valve is bicuspid.  Â  There is mild aortic stenosis.  Â  There is mild aortic insufficiency.  3. There is severe prolapse of the central posterior scallop (P2). The central  posterior scallop is partially flail.  4. There is severe mitral insufficiency (eccentric anteriorly directed jet).  See below for more details of quantitative assessment.  5. There is mild right ventricular enlargement with normal right ventricular  systolic performance.  6. There is moderate-severe left atrial enlargement. There is moderate right  atrial enlargement.  7. No intracardiac mass or thrombus is detected.  8. The pulmonary artery pressure cannot be estimated in the study due to  insufficient tricuspid insufficiency.  9. Echo contrast examination was performed using agitated NS as contrast  agent. The right heart opacity was good and the left heart was well  visualized. There was no evidence of right to left shunting during spontaneous  respiration or following release of Valsalva.  10. There is a very small pericardial effusion.  ______________________________________________________________________________       Physical Examination Review of Systems   /80 (BP Location: Left arm, Patient Position: Sitting, Cuff Size: Adult Regular)   Pulse 82   Resp 24   Ht 1.905 m (6' 3\")   Wt 75.9 kg (167 lb 4.8 oz)   BMI 20.91 kg/m    Body mass index is 20.91 kg/m .  Wt Readings from Last 3 Encounters:   01/03/22 75.9 kg (167 lb 4.8 oz)   12/20/21 71.2 kg (157 lb)   12/08/21 75.4 kg (166 lb 3.2 oz)       General Appearance:   no acute distress   ENT/Mouth: membranes moist, no oral lesions or bleeding gums.      EYES:  no scleral icterus, normal conjunctivae   Neck: no carotid bruits or thyromegaly   Chest/Lungs:   lungs " "are clear to auscultation, no rales or wheezing, equal chest wall expansion    Cardiovascular:   Regular. Normal first and second heart sounds with grade 3/6 systolic murmur, no rubs, or gallops; Jugular venous pressure normal, trace left lower extremity edema    Abdomen:  no organomegaly, masses, bruits, or tenderness; bowel sounds are present   Extremities: no cyanosis or clubbing   Skin: no xanthelasma, warm.    Neurologic: normal  bilateral, no tremors     Psychiatric: alert and oriented x3    Enc Vitals  BP: 120/80  Pulse: 82  Resp: 24  Weight: 75.9 kg (167 lb 4.8 oz)  Height: 190.5 cm (6' 3\")                                         Medical History  Surgical History Family History Social History   Past Medical History:   Diagnosis Date     Acute exacerbation of CHF (congestive heart failure) (H) 11/27/2021     Benign essential hypertension      Bleeding disorder (H)      Chemical dependency (H)      Closed displaced fracture of fifth metatarsal bone of left foot 07/26/2021     Gastroesophageal reflux disease      Hypertension      Nasal mass 12/17/2013     Papilloma of nose 03/10/2014     Skin disease      Traumatic avulsion of nail plate of toe 07/26/2021     Type I or II open fracture of left ulna 09/05/2020    Past Surgical History:   Procedure Laterality Date     ARTHRODESIS FOOT Left 2/17/2015    Procedure: ARTHRODESIS FOOT;  Surgeon: Cortez Hayward DPM;  Location: [facility]     ARTHRODESIS TOE(S)  12/20/2013    Procedure: ARTHRODESIS TOE(S);  Arthrodesis first metatarsophalangeal joint left foot;  Surgeon: Cortez Hayward DPM;  Location: [facility]     COLONOSCOPY N/A 1/12/2021    Procedure: COLONOSCOPY;  Surgeon: Dixon Sarabia MD;  Location: [facility]     ENDOSCOPIC SINUS SURGERY  1/6/2014    Procedure: ENDOSCOPIC SINUS SURGERY;  Functional Endoscopic Sinus Surgery;  Surgeon: Bobo Renteria MD;  Location:  OR     ENDOSCOPIC SINUS SURGERY  7/21/2014    Procedure: ENDOSCOPIC SINUS SURGERY;  " Surgeon: Bobo Renteria MD;  [facility]     ENDOSCOPIC SINUS SURGERY N/A 4/6/2015    Procedure: ENDOSCOPIC SINUS SURGERY;  Surgeon: Bobo Renteria MD;  [facility]     ENDOSCOPIC SINUS SURGERY N/A 8/17/2015    Procedure: ENDOSCOPIC SINUS SURGERY;  Surgeon: Bobo Renteria MD;  [facility]     ENDOSCOPIC SINUS SURGERY Bilateral 8/19/2019    Procedure: Bilateral Functional Endoscopic Sinus Surgery, Right Nasal Endoscopy and Excision of Left Nasal Mass;  Surgeon: Bobo Renteria MD;  [facility]     ENT SURGERY       EXCISE NASAL MASS Left 11/6/2017    Procedure: EXCISE NASAL MASS;  Excision of Left Nasal Papilloma;  Surgeon: Bobo Renteria MD;  [facility]     LAPAROSCOPIC HERNIORRHAPHY INGUINAL BILATERAL Bilateral 4/12/2017    Procedure: LAPAROSCOPIC HERNIORRHAPHY INGUINAL BILATERAL;  Surgeon: Shay Mercado MD;  Location: [facility]     NASAL ENDOSCOPY Bilateral 2/6/2017    Procedure: NASAL ENDOSCOPY;  Surgeon: Bobo Renteria MD;  [facility]     NASAL ENDOSCOPY N/A 5/21/2018    Procedure: NASAL ENDOSCOPY;  Nasal Endoscopy, CO2 Laser Excision of Left Nasal Papilloma;  Surgeon: Bobo Renteria MD;  [facility]     NASAL/SINUS POLYPECTOMY       PE TUBES      Family History   Problem Relation Age of Onset     Diabetes Mother 40     C.A.D. Father 72     Unknown/Adopted No family hx of      Depression No family hx of      Anxiety Disorder No family hx of      Schizophrenia No family hx of      Bipolar Disorder No family hx of      Suicide No family hx of      Substance Abuse No family hx of      Dementia No family hx of      [place] Disease No family hx of      Parkinsonism No family hx of      Autism Spectrum Disorder No family hx of      Intellectual Disability (Mental Retardation) No family hx of      Mental Illness No family hx of      Bleeding Disorder No family hx of     Social History     Socioeconomic History     Marital status: Single      Spouse name: Not on file     Number of children: Not on file     Years of education: Not on file     Highest education level: Not on file   Occupational History     Not on file   Tobacco Use     Smoking status: Current Every Day Smoker     Packs/day: 1.00     Years: 40.00     Pack years: 40.00     Types: Cigarettes     Start date: 1/1/1980     Smokeless tobacco: Never Used     Tobacco comment: about 3 cigarettes or so a day   Vaping Use     Vaping Use: Never used   Substance and Sexual Activity     Alcohol use: Not Currently     Comment: occasional      Drug use: Not Currently     Types: Cocaine, Methamphetamines, Opiates, Marijuana     Comment: 7 years quit Cocaine/methamphetamines     Sexual activity: Yes     Partners: Female     Birth control/protection: None   Other Topics Concern     Parent/sibling w/ CABG, MI or angioplasty before 65F 55M? No      Service No     Blood Transfusions No     Caffeine Concern No     Occupational Exposure No     Hobby Hazards No     Sleep Concern No     Stress Concern No     Weight Concern No     Special Diet No     Back Care No     Exercise Yes     Bike Helmet No     Seat Belt Yes     Self-Exams Not Asked   Social History Narrative     Not on file     Social Determinants of Health     Financial Resource Strain: Not on file   Food Insecurity: Not on file   Transportation Needs: Not on file   Physical Activity: Not on file   Stress: Not on file   Social Connections: Not on file   Intimate Partner Violence: Not on file   Housing Stability: Not on file          Medications  Allergies   Current Outpatient Medications   Medication Sig Dispense Refill     furosemide (LASIX) 20 MG tablet Take 1 tablet (20 mg) by mouth daily 30 tablet 6     lisinopril (ZESTRIL) 20 MG tablet Take 1 tablet (20 mg) by mouth daily 30 tablet 6     metoprolol succinate ER (TOPROL-XL) 25 MG 24 hr tablet Take 0.5 tablets (12.5 mg) by mouth daily 30 tablet 3     order for DME Equipment being ordered:  Dynaflex insert 2 Units 0     potassium chloride (KLOR-CON) 20 MEQ packet Take 10 mEq by mouth daily 30 packet 2     spironolactone (ALDACTONE) 25 MG tablet Take 1 tablet (25 mg) by mouth daily 30 tablet 3    No Known Allergies      Lab Results    Chemistry/lipid CBC Cardiac Enzymes/BNP/TSH/INR   Lab Results   Component Value Date    CHOL 220 (H) 01/17/2019    HDL 53 01/17/2019    TRIG 259 (H) 01/17/2019    BUN 15 12/20/2021     12/20/2021    CO2 27 12/20/2021    Lab Results   Component Value Date    WBC 8.0 11/27/2021    HGB 14.1 11/27/2021    HCT 45.7 11/27/2021    MCV 84 11/27/2021     11/27/2021    Lab Results   Component Value Date    BNP 3,357 (H) 11/19/2021    INR 1.09 05/28/2020             This note has been dictated using voice recognition software. Any grammatical, typographical, or context distortions are unintentional and inherent to the software    30 minutes spent on the date of encounter doing chart review, review of outside records, review of test results, interpretation with above tests, patient visit, documentation and discussion with family.          cc:   Samantha Jackson MD  [address] [facility]  [address]  [place] [address]

## 2022-01-03 NOTE — PROGRESS NOTES
Assessment/Recommendations   Assessment:    1.  Heart failure with preserved ejection fraction, NYHA class III: Decompensated.  He states he stopped taking his Lasix about a week ago.  The last few days he has had increased fatigue and dyspnea on exertion.  He has not noticed any weight gain.  He denies orthopnea or PND.  He does not follow a low-sodium diet and eats out on a regular basis.  2.  Mitral valve regurgitation: He is scheduled in the valve clinic February 17  3.  Hypertension: Controlled.  Blood pressure 120/80    Plan:  1.  Start taking Lasix 20 mg daily  2.  Stressed importance of low-sodium diet and continue daily weights    Micah Nunez will follow up with Dr. Jackson in April, Dr. Maloney February 17 in the valve clinic and in the heart failure clinic in 1 month.     History of Present Illness/Subjective    Mr. Micha Nunez is a 56 year old male seen at [facility] heart failure clinic today for continued follow-up.  He follows up for heart failure with preserved ejection fraction.  He had a MARIANNA December 20, 2021 which showed an ejection fraction of 55 to 60% with severe mitral valve regurgitation.  Dr. Jackson recommended he follow-up in the valve clinic which is scheduled February 17.  He has a past medical history significant for hypertension, past substance abuse.    Today, he states he has had increased fatigue and dyspnea on exertion the last few days.  He states he stopped taking his Lasix approximately a week ago.  He denies orthopnea, PND or chest pain.  He denies lightheadedness, orthopnea, PND, palpitations, chest pain and abdominal fullness/bloating.      He is monitoring home weights which are stable around 160 pounds.  He eats out on a regular basis.    MARIANNA 12/20/2021  Interpretation Summary     TRANSESOPHAGEAL ECHOCARDIOGRAM     1. Normal left ventricular size and systolic performance with a visually  estimated ejection fraction of 55-60%.  2. The aortic  "valve is bicuspid.  Â  There is mild aortic stenosis.  Â  There is mild aortic insufficiency.  3. There is severe prolapse of the central posterior scallop (P2). The central  posterior scallop is partially flail.  4. There is severe mitral insufficiency (eccentric anteriorly directed jet).  See below for more details of quantitative assessment.  5. There is mild right ventricular enlargement with normal right ventricular  systolic performance.  6. There is moderate-severe left atrial enlargement. There is moderate right  atrial enlargement.  7. No intracardiac mass or thrombus is detected.  8. The pulmonary artery pressure cannot be estimated in the study due to  insufficient tricuspid insufficiency.  9. Echo contrast examination was performed using agitated NS as contrast  agent. The right heart opacity was good and the left heart was well  visualized. There was no evidence of right to left shunting during spontaneous  respiration or following release of Valsalva.  10. There is a very small pericardial effusion.  ______________________________________________________________________________       Physical Examination Review of Systems   /80 (BP Location: Left arm, Patient Position: Sitting, Cuff Size: Adult Regular)   Pulse 82   Resp 24   Ht 1.905 m (6' 3\")   Wt 75.9 kg (167 lb 4.8 oz)   BMI 20.91 kg/m    Body mass index is 20.91 kg/m .  Wt Readings from Last 3 Encounters:   01/03/22 75.9 kg (167 lb 4.8 oz)   12/20/21 71.2 kg (157 lb)   12/08/21 75.4 kg (166 lb 3.2 oz)       General Appearance:   no acute distress   ENT/Mouth: membranes moist, no oral lesions or bleeding gums.      EYES:  no scleral icterus, normal conjunctivae   Neck: no carotid bruits or thyromegaly   Chest/Lungs:   lungs are clear to auscultation, no rales or wheezing, equal chest wall expansion    Cardiovascular:   Regular. Normal first and second heart sounds with grade 3/6 systolic murmur, no rubs, or gallops; Jugular venous " "pressure normal, trace left lower extremity edema    Abdomen:  no organomegaly, masses, bruits, or tenderness; bowel sounds are present   Extremities: no cyanosis or clubbing   Skin: no xanthelasma, warm.    Neurologic: normal  bilateral, no tremors     Psychiatric: alert and oriented x3    Enc Vitals  BP: 120/80  Pulse: 82  Resp: 24  Weight: 75.9 kg (167 lb 4.8 oz)  Height: 190.5 cm (6' 3\")                                         Medical History  Surgical History Family History Social History   Past Medical History:   Diagnosis Date     Acute exacerbation of CHF (congestive heart failure) (H) 11/27/2021     Benign essential hypertension      Bleeding disorder (H)      Chemical dependency (H)      Closed displaced fracture of fifth metatarsal bone of left foot 07/26/2021     Gastroesophageal reflux disease      Hypertension      Nasal mass 12/17/2013     Papilloma of nose 03/10/2014     Skin disease      Traumatic avulsion of nail plate of toe 07/26/2021     Type I or II open fracture of left ulna 09/05/2020    Past Surgical History:   Procedure Laterality Date     ARTHRODESIS FOOT Left 2/17/2015    Procedure: ARTHRODESIS FOOT;  Surgeon: Cortez Hayward DPM;  Location: [facility]     ARTHRODESIS TOE(S)  12/20/2013    Procedure: ARTHRODESIS TOE(S);  Arthrodesis first metatarsophalangeal joint left foot;  Surgeon: Cortez Hayward DPM;  Location: [facility]     COLONOSCOPY N/A 1/12/2021    Procedure: COLONOSCOPY;  Surgeon: Dixon Sarabia MD;  Location: [facility]     ENDOSCOPIC SINUS SURGERY  1/6/2014    Procedure: ENDOSCOPIC SINUS SURGERY;  Functional Endoscopic Sinus Surgery;  Surgeon: Bobo Renteria MD;  Location:  OR     ENDOSCOPIC SINUS SURGERY  7/21/2014    Procedure: ENDOSCOPIC SINUS SURGERY;  Surgeon: Bobo Renteria MD;  Location:  OR     ENDOSCOPIC SINUS SURGERY N/A 4/6/2015    Procedure: ENDOSCOPIC SINUS SURGERY;  Surgeon: Bobo Renteria MD;  Location:  OR     ENDOSCOPIC SINUS " SURGERY N/A 8/17/2015    Procedure: ENDOSCOPIC SINUS SURGERY;  Surgeon: Bobo Renteria MD;  [facility]     ENDOSCOPIC SINUS SURGERY Bilateral 8/19/2019    Procedure: Bilateral Functional Endoscopic Sinus Surgery, Right Nasal Endoscopy and Excision of Left Nasal Mass;  Surgeon: Bobo Renteria MD;  [facility]     ENT SURGERY       EXCISE NASAL MASS Left 11/6/2017    Procedure: EXCISE NASAL MASS;  Excision of Left Nasal Papilloma;  Surgeon: Bobo Renteria MD;  [facility]     LAPAROSCOPIC HERNIORRHAPHY INGUINAL BILATERAL Bilateral 4/12/2017    Procedure: LAPAROSCOPIC HERNIORRHAPHY INGUINAL BILATERAL;  Surgeon: Shay Mercado MD;  Location: [facility]     NASAL ENDOSCOPY Bilateral 2/6/2017    Procedure: NASAL ENDOSCOPY;  Surgeon: Bobo Renteria MD;  [facility]     NASAL ENDOSCOPY N/A 5/21/2018    Procedure: NASAL ENDOSCOPY;  Nasal Endoscopy, CO2 Laser Excision of Left Nasal Papilloma;  Surgeon: Bobo Renteria MD;  [facility]     NASAL/SINUS POLYPECTOMY       PE TUBES      Family History   Problem Relation Age of Onset     Diabetes Mother 40     C.A.D. Father 72     Unknown/Adopted No family hx of      Depression No family hx of      Anxiety Disorder No family hx of      Schizophrenia No family hx of      Bipolar Disorder No family hx of      Suicide No family hx of      Substance Abuse No family hx of      Dementia No family hx of      Norton Disease No family hx of      Parkinsonism No family hx of      Autism Spectrum Disorder No family hx of      Intellectual Disability (Mental Retardation) No family hx of      Mental Illness No family hx of      Bleeding Disorder No family hx of     Social History     Socioeconomic History     Marital status: Single     Spouse name: Not on file     Number of children: Not on file     Years of education: Not on file     Highest education level: Not on file   Occupational History     Not on file   Tobacco Use     Smoking  status: Current Every Day Smoker     Packs/day: 1.00     Years: 40.00     Pack years: 40.00     Types: Cigarettes     Start date: 1/1/1980     Smokeless tobacco: Never Used     Tobacco comment: about 3 cigarettes or so a day   Vaping Use     Vaping Use: Never used   Substance and Sexual Activity     Alcohol use: Not Currently     Comment: occasional      Drug use: Not Currently     Types: Cocaine, Methamphetamines, Opiates, Marijuana     Comment: 7 years quit Cocaine/methamphetamines     Sexual activity: Yes     Partners: Female     Birth control/protection: None   Other Topics Concern     Parent/sibling w/ CABG, MI or angioplasty before 65F 55M? No      Service No     Blood Transfusions No     Caffeine Concern No     Occupational Exposure No     Hobby Hazards No     Sleep Concern No     Stress Concern No     Weight Concern No     Special Diet No     Back Care No     Exercise Yes     Bike Helmet No     Seat Belt Yes     Self-Exams Not Asked   Social History Narrative     Not on file     Social Determinants of Health     Financial Resource Strain: Not on file   Food Insecurity: Not on file   Transportation Needs: Not on file   Physical Activity: Not on file   Stress: Not on file   Social Connections: Not on file   Intimate Partner Violence: Not on file   Housing Stability: Not on file          Medications  Allergies   Current Outpatient Medications   Medication Sig Dispense Refill     furosemide (LASIX) 20 MG tablet Take 1 tablet (20 mg) by mouth daily 30 tablet 6     lisinopril (ZESTRIL) 20 MG tablet Take 1 tablet (20 mg) by mouth daily 30 tablet 6     metoprolol succinate ER (TOPROL-XL) 25 MG 24 hr tablet Take 0.5 tablets (12.5 mg) by mouth daily 30 tablet 3     order for DME Equipment being ordered: Dynaflex insert 2 Units 0     potassium chloride (KLOR-CON) 20 MEQ packet Take 10 mEq by mouth daily 30 packet 2     spironolactone (ALDACTONE) 25 MG tablet Take 1 tablet (25 mg) by mouth daily 30 tablet 3     No Known Allergies      Lab Results    Chemistry/lipid CBC Cardiac Enzymes/BNP/TSH/INR   Lab Results   Component Value Date    CHOL 220 (H) 01/17/2019    HDL 53 01/17/2019    TRIG 259 (H) 01/17/2019    BUN 15 12/20/2021     12/20/2021    CO2 27 12/20/2021    Lab Results   Component Value Date    WBC 8.0 11/27/2021    HGB 14.1 11/27/2021    HCT 45.7 11/27/2021    MCV 84 11/27/2021     11/27/2021    Lab Results   Component Value Date    BNP 3,357 (H) 11/19/2021    INR 1.09 05/28/2020             This note has been dictated using voice recognition software. Any grammatical, typographical, or context distortions are unintentional and inherent to the software    30 minutes spent on the date of encounter doing chart review, review of outside records, review of test results, interpretation with above tests, patient visit, documentation and discussion with family.

## 2022-01-03 NOTE — PATIENT INSTRUCTIONS
Micah Nunez,    It was a pleasure to see you today at [facility].     My recommendations after this visit include:  - Please follow up with Dr Jackson in April   - Please follow up with Dr Maloney February 17 in the valve clinic  - Please follow up with Chaya Coffman in 1 month  - Start taking your furosemide 20 mg daily    Chaya Coffman CNP      What is the [facility] Heart Failure Program?     The [facility] Failure Program is a heart failure specialty clinic within Heart Care.  You will work with your cardiologist, nurse practitioner, and nurses to carefully adjust medications and learn how to live with heart failure.  The Heart Failure Program will help you:      Better understand your chronic heart condition    Feel better and avoid hospital stays    Monitoring for Symptoms      Call the Heart Failure Phone Line ([phone]) if you have any of these symptoms:     Increased shortness of breath/shortness of breath at rest    Waking up at night with difficulty breathing    Unable to lie down for sleep due to symptoms or needing to sit upright for sleep    Weight gain of 2 pounds a day for 2 days in a row OR 5 pounds in 1 week    Increased swelling in your ankles or legs    Dizziness or lightheadedness    Medications       Take your medications as prescribed    Bring all your medications in their original bottles to every appointment    Avoid non-steroidal anti-inflammatory medications (Advil, Aleve, Ibuprofen, Naprosyn, Naproxen, Celebrex)    Do not stop taking your medications or begin taking over-the-counter or herbal medications without first talking to your doctor or nurse practitioner    Diet and Lifestyle       Limit sodium/salt to 2000 mg daily   o Read food labels for sodium content  o Do not add salt when cooking or add salt at the table    Weigh yourself every day and record in your daily weight log   o Call if you gain 2 pounds a day for 2  days in a row OR 5 pounds in 1 week  o Bring daily weight log to every appointment    Stay active, pace yourself, listen to your body, and rest when tired    Elevate your legs if they are swollen. Ask about using compression/support stockings    Stop smoking    Lose weight if you are overweight    Avoid drinking alcohol or limit amount    Stay updated on your immunizations including flu and pneumonia vaccines

## 2022-01-05 ENCOUNTER — TELEPHONE (OUTPATIENT)
Dept: CARDIOLOGY | Facility: CLINIC | Age: 57
End: 2022-01-05
Payer: COMMERCIAL

## 2022-01-05 NOTE — TELEPHONE ENCOUNTER
----- Message from NEHEMIAS Cartwright CNP sent at 1/5/2022  9:47 AM CST -----  Regarding: FW: call after restarting lasix  Jayne,    Please call patient and see if his VILLARREAL has improved with restarting lasix. Thank you  ----- Message -----  From: Chaya Coffman APRN CNP  Sent: 1/5/2022  12:00 AM CST  To: NEHEMIAS Cartwright CNP  Subject: call after restarting lasix                      Call after restarting lasix 20 mg daily, improvement in VILLARREAL?

## 2022-01-05 NOTE — TELEPHONE ENCOUNTER
Called pt to see how he is feeling after starting Lasix 20 mg daily on 1-3-21.     Pt states that he continues to have VILLARREAL, feels that it is mildly improved. Pt states that he has lost about 3 pounds since starting Lasix; today's weight # 160 lbs. Denies any SOB at rest, swelling or difficulty sleeping.    Pt states he is struggling with low sodium diet; he is in the process of getting his parents sent up in a full time facility for Alzheimer's and doesn't have much time for himself to cook. Offered pt [employer] meals; pt agreeable to trying, application filled out together on the phone and faxed.     Routing to Chaya to review.     Jayne Siddiqi RN

## 2022-01-21 ENCOUNTER — PATIENT OUTREACH (OUTPATIENT)
Dept: NURSING | Facility: CLINIC | Age: 57
End: 2022-01-21
Payer: COMMERCIAL

## 2022-01-21 NOTE — LETTER
[facility] CARE COORDINATION  [facility]  [address] [place] [address]    January 21, 2022    Micah Nunez  [address]   [facility] [address]      Dear Micah,    Here is some educational information about your low sodium diet.    Sincerely,     Barbara De La Garza [facility]   Social Work Clinic Care Coordinator   [facility]  [phone]  [email or web address]     Enclosed: I have enclosed helpful educational material.

## 2022-01-21 NOTE — PROGRESS NOTES
Clinic Care Coordination Contact    Follow Up Progress Note      Assessment: Pt struggles with understanding his low sodium diet as directed by cardiology team. Pt has future cardiology appts scheduled. Pt continues to work to access community resources as needed.     Care Gaps:    Health Maintenance Due   Topic Date Due     HF ACTION PLAN  Never done     TSH W/FREE T4 REFLEX  Never done     ADVANCE CARE PLANNING  Never done     COVID-19 Vaccine (1) Never done     Pneumococcal Vaccine: Pediatrics (0 to 5 Years) and At-Risk Patients (6 to 64 Years) (1 of 2 - PPSV23) Never done     HIV SCREENING  Never done     HEPATITIS B IMMUNIZATION (1 of 3 - Risk 3-dose series) Never done     ZOSTER IMMUNIZATION (1 of 2) Never done     PREVENTIVE CARE VISIT  01/08/2020     LIPID  01/17/2020     INFLUENZA VACCINE (1) 09/01/2021     PHQ-2  01/01/2022       Postponed to next PCP OV     Goals addressed this encounter:   Goals Addressed                    This Visit's Progress       1. Reducing Risks (pt-stated)   30%      Goal Statement: I will get connected to resources/supports.   Date Goal set: 12/1/21  Barriers: Access   Strengths: Motivated, family, friends, care team   Date to Achieve By: 5/1/22  Patient expressed understanding of goal: Yes    Action steps to achieve this goal:  1. I will start application for social security disability income online.   2. I will use [employer] to medical appts.  3. I will talk to my PCP about a cane or other DME.   4. I will look into other resources as needed (housing, etc).   5. I will work with care coordination as needed.              Intervention/Education provided during outreach: JUSTIN STEPHENSON outreach to pt for check in.     Per chart review, cardiology team ordered [employer] meals 1/5/22. Pt confirmed he got them Tuesday and likes them, very nice. Pt discussed that he does not always follow his cardiology diet (Low sodium diet). At times, he is unsure what to eat. CC will review  with RN CC and send educational info via mail. Pt agreed.     Pt reported his breathing has improved over last couple of days. He was  having a hard time breathing 7-10 days ago, was having trouble sleeping at night. Pt started taking all his medications again, wasn't aware he was supposed to take even when feeling well. No fluid in legs at this time. Pt has future cardiology appts scheduled - pt aware.     Pt stated he plans to work on his diet and has stopped drinking alcohol. Pt has not quit smoking but next in line. CC offered quiting resources; pt reported he has quit a few times in the past and did it cold turkey. Pt declined resources, CC offered to discuss in future, MA should cover some resources.     Pt reported he needs to start application for soc sec disability, advised to start ASAP.     Parents are moving into senior living community in near future.      Outreach Frequency: Monthly    Plan: JUSTIN CC will follow up in one month. JUSTIN STEPHENSON will mail low sodium diet educational material to pt.      WLILI Freitas   Social Work Clinic Care Coordinator   [facility]  [phone]  [email or web address]

## 2022-01-29 ENCOUNTER — APPOINTMENT (OUTPATIENT)
Dept: RADIOLOGY | Facility: CLINIC | Age: 57
End: 2022-01-29
Payer: COMMERCIAL

## 2022-01-29 ENCOUNTER — HOSPITAL ENCOUNTER (EMERGENCY)
Facility: CLINIC | Age: 57
Discharge: HOME | End: 2022-01-30
Attending: EMERGENCY MEDICINE | Admitting: EMERGENCY MEDICINE
Payer: COMMERCIAL

## 2022-01-29 DIAGNOSIS — R06.02 SHORTNESS OF BREATH: ICD-10-CM

## 2022-01-29 DIAGNOSIS — I34.0 MITRAL VALVE REGURGITATION: ICD-10-CM

## 2022-01-29 DIAGNOSIS — R60.0 LOWER EXTREMITY EDEMA: ICD-10-CM

## 2022-01-29 DIAGNOSIS — I50.9 CHF (CONGESTIVE HEART FAILURE) (H): Primary | ICD-10-CM

## 2022-01-29 LAB
ANION GAP SERPL CALCULATED.3IONS-SCNC: 9 MMOL/L (ref 5–18)
BNP SERPL-MCNC: 2610 PG/ML (ref 0–47)
BUN SERPL-MCNC: 16 MG/DL (ref 8–22)
CALCIUM SERPL-MCNC: 9 MG/DL (ref 8.5–10.5)
CHLORIDE BLD-SCNC: 103 MMOL/L (ref 98–107)
CO2 SERPL-SCNC: 24 MMOL/L (ref 22–31)
CREAT SERPL-MCNC: 1.23 MG/DL (ref 0.7–1.3)
ERYTHROCYTE [DISTWIDTH] IN BLOOD BY AUTOMATED COUNT: 18.2 % (ref 10–15)
GFR SERPL CREATININE-BSD FRML MDRD: 69 ML/MIN/1.73M2
GLUCOSE BLD-MCNC: 122 MG/DL (ref 70–125)
HCT VFR BLD AUTO: 39.3 % (ref 40–53)
HGB BLD-MCNC: 12.6 G/DL (ref 13.3–17.7)
MCH RBC QN AUTO: 26 PG (ref 26.5–33)
MCHC RBC AUTO-ENTMCNC: 32.1 G/DL (ref 31.5–36.5)
MCV RBC AUTO: 81 FL (ref 78–100)
PLATELET # BLD AUTO: 276 10E3/UL (ref 150–450)
POTASSIUM BLD-SCNC: 4.2 MMOL/L (ref 3.5–5)
RBC # BLD AUTO: 4.85 10E6/UL (ref 4.4–5.9)
SODIUM SERPL-SCNC: 136 MMOL/L (ref 136–145)
TROPONIN I SERPL-MCNC: 0.03 NG/ML (ref 0–0.29)
WBC # BLD AUTO: 9.9 10E3/UL (ref 4–11)

## 2022-01-29 PROCEDURE — 71046 X-RAY EXAM CHEST 2 VIEWS: CPT

## 2022-01-29 PROCEDURE — 96374 THER/PROPH/DIAG INJ IV PUSH: CPT

## 2022-01-29 PROCEDURE — 99285 EMERGENCY DEPT VISIT HI MDM: CPT | Mod: 25

## 2022-01-29 PROCEDURE — 93005 ELECTROCARDIOGRAM TRACING: CPT | Performed by: PHYSICIAN ASSISTANT

## 2022-01-29 PROCEDURE — 80048 BASIC METABOLIC PNL TOTAL CA: CPT | Performed by: PHYSICIAN ASSISTANT

## 2022-01-29 PROCEDURE — 85018 HEMOGLOBIN: CPT | Performed by: PHYSICIAN ASSISTANT

## 2022-01-29 PROCEDURE — 84484 ASSAY OF TROPONIN QUANT: CPT | Performed by: PHYSICIAN ASSISTANT

## 2022-01-29 PROCEDURE — 36415 COLL VENOUS BLD VENIPUNCTURE: CPT | Performed by: PHYSICIAN ASSISTANT

## 2022-01-29 PROCEDURE — 83880 ASSAY OF NATRIURETIC PEPTIDE: CPT | Performed by: PHYSICIAN ASSISTANT

## 2022-01-29 RX ORDER — FUROSEMIDE 10 MG/ML
40 INJECTION INTRAMUSCULAR; INTRAVENOUS ONCE
Status: COMPLETED | OUTPATIENT
Start: 2022-01-29 | End: 2022-01-30

## 2022-01-29 ASSESSMENT — ENCOUNTER SYMPTOMS
FEVER: 0
NAUSEA: 0
SHORTNESS OF BREATH: 1
VOMITING: 0
COUGH: 1
DIARRHEA: 0
CHILLS: 0
ABDOMINAL PAIN: 0

## 2022-01-30 VITALS
BODY MASS INDEX: 20.62 KG/M2 | DIASTOLIC BLOOD PRESSURE: 94 MMHG | HEART RATE: 75 BPM | SYSTOLIC BLOOD PRESSURE: 149 MMHG | OXYGEN SATURATION: 100 % | RESPIRATION RATE: 18 BRPM | TEMPERATURE: 97.8 F | WEIGHT: 165 LBS

## 2022-01-30 PROCEDURE — 250N000011 HC RX IP 250 OP 636: Performed by: PHYSICIAN ASSISTANT

## 2022-01-30 RX ADMIN — FUROSEMIDE 40 MG: 10 INJECTION, SOLUTION INTRAMUSCULAR; INTRAVENOUS at 00:04

## 2022-01-30 NOTE — ED PROVIDER NOTES
"Emergency Department Midlevel Supervisory Note     I personally saw the patient and performed a substantive portion of the visit including all aspects of the medical decision making.    ED Course:  10:34 PM Emily Mendez PA-C staffed patient with me. I agree with their assessment and plan of management, and I will see the patient.    10:37 PM I met with the patient to introduce myself, gather additional history, perform my initial exam, and discuss the plan.     Brief HPI:     Micah Nunez is a 56 year old male who presents for evaluation of shortness of breath. Patient has been recently diagnosed with CHF and does have an appointment soon to get a Mitral Valve replacement. He was discharged with medications and did take them with improvement but said he stopped taking them since he felt better \"and no one told him to keep taking it\". For the past week, he has been endorsing worsening shortness of breath where he feels \"like he needs to gasp for air which is weird\". Currently, his shortness of breath is not as intense. He also notes that he had lost a significant amount of weight and used to be 295 lbs along with some swelling into his knees and ankles. He also does endorse some mild left sided chest pain that worsens with deep breaths causing it to radiate to the middle of his chest.He does endorse some mild left sided chest pain that worsens with deep breaths causing it to radiate to the middle of his chest.    I, George Juarez, am serving as a scribe to document services personally performed by Dr. Del Rio, based on myobservation and the provider's statements to me. I, Dr. Del Rio attest that George Juarez is acting in a scribe capacity, has observed my performance of the services and has documented them in accordance with my direction.     Brief Physical Exam:   Constitutional:  Alert, in no acute distress  EYES: Conjunctivae clear  HENT:  Atraumatic, normocephalic  Respiratory:  Crackles in the base " Respirations even, unlabored, in no acute respiratory distress  Cardiovascular:  Regular rate and rhythm, good peripheral perfusion  GI: Soft, nondistended, nontender, no palpable masses, no rebound, no guarding   Musculoskeletal:   Trace to 1+ edema bilaterally.. No cyanosis. Range of motion major extremities intact.    Integument: Warm, Dry, No erythema, No rash.   Neurologic:  Alert & oriented, no focal deficits noted  Psych: Normal mood and affect     MDM:  56 year old with history of CHF presents with worsening shortness of breath.  Seems likely worsening CHF.  Has been having issues with compliance with is not been more compliant with his medications.  Minimal chest pain.  EKG is negative.  BNP is elevated.  Troponins negative.  Do not think this is acute ischemic event.  Chest x-ray actually looks improved from previous.  No signs of PE.  We will increase his Lasix and have him follow-up with his primary.  Oxygen saturations okay here.  Patient discharged home       1. CHF (congestive heart failure) (H)    2. Shortness of breath    3. Lower extremity edema    4. Mitral valve regurgitation        Labs and Imaging:  Results for orders placed or performed during the hospital encounter of 01/29/22   Chest XR,  PA & LAT    Impression    IMPRESSION: Interval decrease in right-sided pleural fluid. There remains minimal to moderate right basilar pleural fluid with mild atelectasis. Left lung clear. Moderate to marked cardiac enlargement. Normal pulmonary vascularity. Aortic calcification.   Degenerative changes in the spine.   CBC (+ platelets, no diff)   Result Value Ref Range    WBC Count 9.9 4.0 - 11.0 10e3/uL    RBC Count 4.85 4.40 - 5.90 10e6/uL    Hemoglobin 12.6 (L) 13.3 - 17.7 g/dL    Hematocrit 39.3 (L) 40.0 - 53.0 %    MCV 81 78 - 100 fL    MCH 26.0 (L) 26.5 - 33.0 pg    MCHC 32.1 31.5 - 36.5 g/dL    RDW 18.2 (H) 10.0 - 15.0 %    Platelet Count 276 150 - 450 10e3/uL   Basic metabolic panel   Result Value  Ref Range    Sodium 136 136 - 145 mmol/L    Potassium 4.2 3.5 - 5.0 mmol/L    Chloride 103 98 - 107 mmol/L    Carbon Dioxide (CO2) 24 22 - 31 mmol/L    Anion Gap 9 5 - 18 mmol/L    Urea Nitrogen 16 8 - 22 mg/dL    Creatinine 1.23 0.70 - 1.30 mg/dL    Calcium 9.0 8.5 - 10.5 mg/dL    Glucose 122 70 - 125 mg/dL    GFR Estimate 69 >60 mL/min/1.73m2   Troponin I (now)   Result Value Ref Range    Troponin I 0.03 0.00 - 0.29 ng/mL   B-Type Natriuretic Peptide ([facility] Only)   Result Value Ref Range    BNP 2,610 (H) 0 - 47 pg/mL     I have reviewed the relevant laboratory and radiology studies    Procedures:  I was present for the key portions of this procedure: none    Dr. Buzz Del Rio  [facility] EMERGENCY ROOM  [address] [facility] [id number] [phone]       Buzz Del Rio MD  01/30/22 0122

## 2022-01-30 NOTE — ED NOTES
Patient ambulated with portable pulse oximeter. Oxygen saturations remained above 93%. Provider notified.

## 2022-01-30 NOTE — ED PROVIDER NOTES
EMERGENCY DEPARTMENT ENCOUNTER      NAME: Micah Nunez  AGE: 56 year old male  YOB: 1965  MRN: [id number]  EVALUATION DATE & TIME: 2022  9:33 PM    PCP: Yovany White    ED PROVIDER: Emily Mendez PA-C      Chief Complaint   Patient presents with     Shortness of Breath         FINAL IMPRESSION:  1. CHF (congestive heart failure) (H)    2. Shortness of breath    3. Lower extremity edema    4. Mitral valve regurgitation          ED COURSE & MEDICAL DECISION MAKIN:40 PM I introduced myself to patient, performed initial HPI and examination. PPE worn: surgical mask, eye protection and gloves.    10:36 PM I staffed patient with Dr. Del Rio.   12:23 AM Trial of ambulation performed with no hypoxia; patient appropriate for discharge.      56 year old male with PMH substance abuse in remission, CHF presents to the Emergency Department for evaluation of shortness of breath.     Recently diagnosed with acute systolic heart failure likely secondary to mitral valve prolapse with severe mitral regurgitation. Diuresed with IV lasix and discharged home with furosemide 20 mg daily, addition of spironolactone 25 mg daily. Also on metoprolol 12.5 mg daily. Instructed on salt restrictions, daily weights. Re-evaluated with cardiology 1/3, had stopped taking lasix approximately 1 week prior and reporting increased fatigue and dyspnea on exertion without noticed weight gain. He is scheduled to have mitral valve repair in the upcoming few weeks.    Reports progressively worsening over the past few weeks. Additionally reports worsening lower extremity edema. Intermittent, non-exertional chest pain that is not pleuritic. Has been taking his medicines as prescribed for the past 1.5 weeks, but does state previously he was not compliant (believed that once symptoms improved he no longer needed to take them).     Upon arrival, patient is mildly hypertensive. No tachycardia, no hypoxia. On exam  patient has 1+ pitting edema, trace crackles to bilateral lungs. No respiratory distress.     EKG nonischemic  Labs with no leukocytosis, no notable electrolyte derangement. Creatinine is WNL. Troponin is negative. BNP is markedly elevated (2610)  CXR with interval decrease in right sided pleural fluid with minimal-moderate right basilar pleural fluid with mild atelectasis. Moderate-Marked cardiac enlargement.    Presentation is most consistent with CHF. No hypoxia or respiratory distress in the emergency department. Presentation is not consistent with ACS, not consistent with pulmonary embolism or aortic dissection. Will treat with 40 mg IV furosemide in the ED. Trial of ambulation with no hypoxia. Patient is ultimately appropriate for discharge. Plan to increase furosemide from 20 mg daily to 40 mg daily, follow up/call cardiology clinic on Monday for additional recommendations. Instructed on close ED return precautions. Patient discharged in stable condition.         MEDICATIONS GIVEN IN THE EMERGENCY:  Medications   furosemide (LASIX) injection 40 mg (40 mg Intravenous Given 1/30/22 0004)       NEW PRESCRIPTIONS STARTED AT TODAY'S ER VISIT  New Prescriptions    No medications on file          =================================================================    HPI    Patient information was obtained from: patient     Use of : N/A         Micah Nunez is a 56 year old male with a pertinent history of hypertension, substance abuse in remission, CHF, mitral regurgitation, tobacco use disorder, who presents to this ED via walk in, for evaluation of shortness of breath.     Chart was reviewed: Patient was admitted on 12/20/21 for acute systolic heart failure, and a Mitral Valve Prolapse with severe mitral regurgitation at . They did obtain an ECG which showed sinus rhythm, left atrial enlargement, left ventricular hypertrophy with secondary ST abnormality. They also obtained a  "complete echo adult which showed 1. The left ventricle is mildly dilated. There is mild eccentric left ventricular hypertrophy. Left ventricular systolic function is low normal. The visual ejection fraction is 50-55%. Biplane LVEF is 53%. 2. The right ventricle is normal size. The right ventricular systolic function is mildly reduced. 3. Severe mitral valve prolapse, posterior leaflet. There is severe (4+) mitral regurgitation. 4. Bicuspid aortic valve with a complete LCC/RCC raphe. There is mild (1+) aortic regurgitation. Mild aortic stenosis with mean gradient of 8 mmHg. 5. Small posterior pericardial effusion. Patient was discharged home in stable condition. He was advised to continue doses of Lisinopril, Furosemide, and Potassium Chloride. He was started on Spironolactone 25 mg daily and Metoprolol Succinate 12.5 mg daily.     Patient has been recently diagnosed with CHF and does have an appointment soon to get a Mitral Valve replacement. He was discharged with medications and did take them with improvement but said he stopped taking them since he felt better \"and no one told him to keep taking it\". For the past week, he has been endorsing worsening shortness of breath where he feels \"like he needs to gasp for air which is weird\". Currently, his shortness of breath is not as intense. He also notes that he had lost a significant amount of weight and used to be 295 lbs along with some swelling into his knees and ankles.     He does endorse some mild left sided chest pain that worsens with deep breaths causing it to radiate to the middle of his chest. He also endorses some brand new cough and mild stuffy nose. He states he has been tested for COVID-19 \"6 times\" and has been negative but he currently feels \"under the weather\". Otherwise, no nausea, vomiting, diarrhea, abdominal pain, fever, chills. No other medical complaints at this time.       REVIEW OF SYSTEMS   Review of Systems   Constitutional: Negative for " chills and fever.   HENT: Positive for congestion.    Respiratory: Positive for cough (mild) and shortness of breath.    Cardiovascular: Positive for chest pain (left sided) and leg swelling (bilateral knees and ankles).   Gastrointestinal: Negative for abdominal pain, diarrhea, nausea and vomiting.   All other systems reviewed and are negative.       PAST MEDICAL HISTORY:  Past Medical History:   Diagnosis Date     Acute exacerbation of CHF (congestive heart failure) (H) 11/27/2021     Benign essential hypertension      Bleeding disorder (H)      Chemical dependency (H)      Closed displaced fracture of fifth metatarsal bone of left foot 07/26/2021     Gastroesophageal reflux disease      Hypertension      Nasal mass 12/17/2013     Papilloma of nose 03/10/2014     Skin disease      Traumatic avulsion of nail plate of toe 07/26/2021     Type I or II open fracture of left ulna 09/05/2020       PAST SURGICAL HISTORY:  Past Surgical History:   Procedure Laterality Date     ARTHRODESIS FOOT Left 2/17/2015    Procedure: ARTHRODESIS FOOT;  Surgeon: Cortez Hayward DPM;  Location: [facility]     ARTHRODESIS TOE(S)  12/20/2013    Procedure: ARTHRODESIS TOE(S);  Arthrodesis first metatarsophalangeal joint left foot;  Surgeon: Cortez Hayward DPM;  Location: [facility]     COLONOSCOPY N/A 1/12/2021    Procedure: COLONOSCOPY;  Surgeon: Dixon Sarabia MD;  Location: [facility]     ENDOSCOPIC SINUS SURGERY  1/6/2014    Procedure: ENDOSCOPIC SINUS SURGERY;  Functional Endoscopic Sinus Surgery;  Surgeon: Bobo Renteria MD;  Location:  OR     ENDOSCOPIC SINUS SURGERY  7/21/2014    Procedure: ENDOSCOPIC SINUS SURGERY;  Surgeon: Bobo Renteria MD;  Location:  OR     ENDOSCOPIC SINUS SURGERY N/A 4/6/2015    Procedure: ENDOSCOPIC SINUS SURGERY;  Surgeon: Bobo Renteria MD;  Location:  OR     ENDOSCOPIC SINUS SURGERY N/A 8/17/2015    Procedure: ENDOSCOPIC SINUS SURGERY;  Surgeon: Bobo Renteria MD;   [facility]     ENDOSCOPIC SINUS SURGERY Bilateral 8/19/2019    Procedure: Bilateral Functional Endoscopic Sinus Surgery, Right Nasal Endoscopy and Excision of Left Nasal Mass;  Surgeon: Bobo Renteria MD;  [facility]     ENT SURGERY       EXCISE NASAL MASS Left 11/6/2017    Procedure: EXCISE NASAL MASS;  Excision of Left Nasal Papilloma;  Surgeon: Bobo Renteria MD;  [facility]     LAPAROSCOPIC HERNIORRHAPHY INGUINAL BILATERAL Bilateral 4/12/2017    Procedure: LAPAROSCOPIC HERNIORRHAPHY INGUINAL BILATERAL;  Surgeon: Shay Mercado MD;  Location: [facility]     NASAL ENDOSCOPY Bilateral 2/6/2017    Procedure: NASAL ENDOSCOPY;  Surgeon: Bobo Renteria MD;  [facility]     NASAL ENDOSCOPY N/A 5/21/2018    Procedure: NASAL ENDOSCOPY;  Nasal Endoscopy, CO2 Laser Excision of Left Nasal Papilloma;  Surgeon: Bobo Renteria MD;  [facility]     NASAL/SINUS POLYPECTOMY       PE TUBES         CURRENT MEDICATIONS:    furosemide (LASIX) 20 MG tablet  lisinopril (ZESTRIL) 20 MG tablet  metoprolol succinate ER (TOPROL-XL) 25 MG 24 hr tablet  order for DME  potassium chloride (KLOR-CON) 20 MEQ packet  spironolactone (ALDACTONE) 25 MG tablet        ALLERGIES:  No Known Allergies    FAMILY HISTORY:  Family History   Problem Relation Age of Onset     Diabetes Mother 40     C.A.D. Father 72     Unknown/Adopted No family hx of      Depression No family hx of      Anxiety Disorder No family hx of      Schizophrenia No family hx of      Bipolar Disorder No family hx of      Suicide No family hx of      Substance Abuse No family hx of      Dementia No family hx of      [place] Disease No family hx of      Parkinsonism No family hx of      Autism Spectrum Disorder No family hx of      Intellectual Disability (Mental Retardation) No family hx of      Mental Illness No family hx of      Bleeding Disorder No family hx of        SOCIAL HISTORY:   Social History     Socioeconomic History      Marital status: Single     Spouse name: Not on file     Number of children: Not on file     Years of education: Not on file     Highest education level: Not on file   Occupational History     Not on file   Tobacco Use     Smoking status: Current Every Day Smoker     Packs/day: 1.00     Years: 40.00     Pack years: 40.00     Types: Cigarettes     Start date: 1/1/1980     Smokeless tobacco: Never Used     Tobacco comment: about 3 cigarettes or so a day   Vaping Use     Vaping Use: Never used   Substance and Sexual Activity     Alcohol use: Not Currently     Comment: occasional      Drug use: Not Currently     Types: Cocaine, Methamphetamines, Opiates, Marijuana     Comment: 7 years quit Cocaine/methamphetamines     Sexual activity: Yes     Partners: Female     Birth control/protection: None   Other Topics Concern     Parent/sibling w/ CABG, MI or angioplasty before 65F 55M? No      Service No     Blood Transfusions No     Caffeine Concern No     Occupational Exposure No     Hobby Hazards No     Sleep Concern No     Stress Concern No     Weight Concern No     Special Diet No     Back Care No     Exercise Yes     Bike Helmet No     Seat Belt Yes     Self-Exams Not Asked   Social History Narrative     Not on file     Social Determinants of Health     Financial Resource Strain: Not on file   Food Insecurity: Not on file   Transportation Needs: Not on file   Physical Activity: Not on file   Stress: Not on file   Social Connections: Not on file   Intimate Partner Violence: Not on file   Housing Stability: Not on file       VITALS:  BP (!) 149/94   Pulse 75   Temp 97.8  F (36.6  C) (Oral)   Resp 22   Wt 74.8 kg (165 lb)   SpO2 98%   BMI 20.62 kg/m      PHYSICAL EXAM    Constitutional: Well developed, Thin male, NAD, GCS 15   HENT: Normocephalic, Atraumatic.   Neck- Supple, Nontender. Normal ROM. No stridor  Eyes: Conjunctiva normal  Respiratory: No respiratory distress, speaking in full sentences but  intermittent takes a deep breath. Trace crackles to bilateral lung bases. No wheezing.   Cardiovascular: Normal heart rate, Regular rhythm; 3/6 systolic murmur.   GI: Soft, nontender  Musculoskeletal: No deformities, Moves all extremities equally. 1+ pitting edema to bilateral lower extremities.   Integument: Warm, Dry, No erythema, ecchymosis, or rash.  Neurologic: Alert & oriented x 3, Normal sensory function. No focal deficits.   Psychiatric: Affect normal, Judgment normal, Mood normal. Cooperative.      LAB:  All pertinent labs reviewed and interpreted.  Results for orders placed or performed during the hospital encounter of 01/29/22   Chest XR,  PA & LAT    Impression    IMPRESSION: Interval decrease in right-sided pleural fluid. There remains minimal to moderate right basilar pleural fluid with mild atelectasis. Left lung clear. Moderate to marked cardiac enlargement. Normal pulmonary vascularity. Aortic calcification.   Degenerative changes in the spine.   CBC (+ platelets, no diff)   Result Value Ref Range    WBC Count 9.9 4.0 - 11.0 10e3/uL    RBC Count 4.85 4.40 - 5.90 10e6/uL    Hemoglobin 12.6 (L) 13.3 - 17.7 g/dL    Hematocrit 39.3 (L) 40.0 - 53.0 %    MCV 81 78 - 100 fL    MCH 26.0 (L) 26.5 - 33.0 pg    MCHC 32.1 31.5 - 36.5 g/dL    RDW 18.2 (H) 10.0 - 15.0 %    Platelet Count 276 150 - 450 10e3/uL   Basic metabolic panel   Result Value Ref Range    Sodium 136 136 - 145 mmol/L    Potassium 4.2 3.5 - 5.0 mmol/L    Chloride 103 98 - 107 mmol/L    Carbon Dioxide (CO2) 24 22 - 31 mmol/L    Anion Gap 9 5 - 18 mmol/L    Urea Nitrogen 16 8 - 22 mg/dL    Creatinine 1.23 0.70 - 1.30 mg/dL    Calcium 9.0 8.5 - 10.5 mg/dL    Glucose 122 70 - 125 mg/dL    GFR Estimate 69 >60 mL/min/1.73m2   Troponin I (now)   Result Value Ref Range    Troponin I 0.03 0.00 - 0.29 ng/mL   B-Type Natriuretic Peptide ( [place] Only)   Result Value Ref Range    BNP 2,610 (H) 0 - 47 pg/mL       RADIOLOGY:  Reviewed all pertinent imaging.  Please see official radiology report.  Chest XR,  PA & LAT   Final Result   IMPRESSION: Interval decrease in right-sided pleural fluid. There remains minimal to moderate right basilar pleural fluid with mild atelectasis. Left lung clear. Moderate to marked cardiac enlargement. Normal pulmonary vascularity. Aortic calcification.    Degenerative changes in the spine.          EKG:    Performed at: 21:41:01    Impression: Sinus rhythm, Possible left atrial enlargement; Left ventricular hypertrophy with repolarization abnormality    Rate: 75 BPM  NH Interval: 166 ms  QRS Interval: 86 ms  QTc Interval: 400/446 ms  ST Changes: Nonspecific, No STEMI.   Comparison: None    Dr. Del Rio and I have independently reviewed and interpreted the EKG(s) documented above.    PROCEDURES:   None      I, Jenniferarian Haddad, am serving as a scribe to document services personally performed by Emily Mendez PA-C based on my observation and the provider's statements to me. I, Emily Mendez PA-C attest that Jennifer Haddad is acting in a scribe capacity, has observed my performance of the services and has documented them in accordance with my direction.    Emily Mendez PA-C  Emergency Medicine  [facility] EMERGENCY ROOM  [address] [facility] [id number] [phone]           Emily Mendez PA-C  01/30/22 0024

## 2022-01-30 NOTE — ED TRIAGE NOTES
Pt presents with worsening shortness of breath. Pt was recently diagnosed with CHF. Pt talking in full sentences in triage, reports dyspneic on exertion. ABCs intact. Pt has not been taking daily medications.

## 2022-01-30 NOTE — DISCHARGE INSTRUCTIONS
Increase your furosemide (Lasix) from 20 mg to 40 mg for the next few days. Continue your other medicines as scheduled.  Call your cardiology clinic on Monday for additional recommendations and to plan close follow up.  Continue with low salt diet and weigh yourself daily. Document weights.    Return to the emergency department if you develop new fevers, worsening/changing shortness of breath, chest pain, or any other concerning symptoms. We would be happy to see you.

## 2022-01-31 ENCOUNTER — PATIENT OUTREACH (OUTPATIENT)
Dept: NURSING | Facility: CLINIC | Age: 57
End: 2022-01-31
Payer: COMMERCIAL

## 2022-01-31 ENCOUNTER — OFFICE VISIT (OUTPATIENT)
Dept: CARDIOLOGY | Facility: CLINIC | Age: 57
End: 2022-01-31
Payer: COMMERCIAL

## 2022-01-31 ENCOUNTER — APPOINTMENT (OUTPATIENT)
Dept: CARDIOLOGY | Facility: CLINIC | Age: 57
End: 2022-01-31
Payer: COMMERCIAL

## 2022-01-31 VITALS
SYSTOLIC BLOOD PRESSURE: 114 MMHG | BODY MASS INDEX: 21.26 KG/M2 | HEART RATE: 72 BPM | HEIGHT: 75 IN | RESPIRATION RATE: 16 BRPM | WEIGHT: 171 LBS | DIASTOLIC BLOOD PRESSURE: 74 MMHG

## 2022-01-31 DIAGNOSIS — I50.9 CONGESTIVE HEART FAILURE (H): Primary | ICD-10-CM

## 2022-01-31 DIAGNOSIS — I34.0 SEVERE MITRAL REGURGITATION: ICD-10-CM

## 2022-01-31 DIAGNOSIS — I50.21 ACUTE SYSTOLIC CONGESTIVE HEART FAILURE (H): ICD-10-CM

## 2022-01-31 DIAGNOSIS — I34.0 MITRAL VALVE INSUFFICIENCY, UNSPECIFIED ETIOLOGY: ICD-10-CM

## 2022-01-31 DIAGNOSIS — I34.1 MVP (MITRAL VALVE PROLAPSE): ICD-10-CM

## 2022-01-31 DIAGNOSIS — I10 BENIGN ESSENTIAL HYPERTENSION: Chronic | ICD-10-CM

## 2022-01-31 DIAGNOSIS — I50.9 ACUTE CONGESTIVE HEART FAILURE, UNSPECIFIED HEART FAILURE TYPE (H): Primary | ICD-10-CM

## 2022-01-31 PROCEDURE — 99214 OFFICE O/P EST MOD 30 MIN: CPT | Performed by: NURSE PRACTITIONER

## 2022-01-31 RX ORDER — METOPROLOL SUCCINATE 25 MG/1
12.5 TABLET, EXTENDED RELEASE ORAL DAILY
Qty: 45 TABLET | Refills: 1 | Status: SHIPPED | OUTPATIENT
Start: 2022-01-31 | End: 2022-07-15

## 2022-01-31 RX ORDER — SPIRONOLACTONE 25 MG/1
25 TABLET ORAL DAILY
Qty: 90 TABLET | Refills: 1 | Status: SHIPPED | OUTPATIENT
Start: 2022-01-31 | End: 2022-07-15

## 2022-01-31 RX ORDER — LISINOPRIL 20 MG/1
20 TABLET ORAL DAILY
Qty: 90 TABLET | Refills: 1 | Status: SHIPPED | OUTPATIENT
Start: 2022-01-31 | End: 2022-07-15

## 2022-01-31 RX ORDER — POTASSIUM CHLORIDE 1500 MG/1
20 TABLET, EXTENDED RELEASE ORAL DAILY
Qty: 90 TABLET | Refills: 0 | Status: SHIPPED | OUTPATIENT
Start: 2022-01-31 | End: 2022-04-28

## 2022-01-31 RX ORDER — FUROSEMIDE 20 MG
40 TABLET ORAL DAILY
Qty: 180 TABLET | Refills: 1 | Status: SHIPPED | OUTPATIENT
Start: 2022-01-31 | End: 2022-07-15

## 2022-01-31 ASSESSMENT — ACTIVITIES OF DAILY LIVING (ADL): DEPENDENT_IADLS:: TRANSPORTATION

## 2022-01-31 ASSESSMENT — MIFFLIN-ST. JEOR: SCORE: 1691.28

## 2022-01-31 NOTE — PROGRESS NOTES
Clinic Care Coordination Contact    Clinic Care Coordination Contact  OUTREACH    Referral Information:  Referral Source: ED Follow-Up    Primary Diagnosis: CHF    Chief Complaint   Patient presents with     Clinic Care Coordination - Post Hospital     Clinic Care Coordination RN         Universal Utilization: ED visit 1/29/2022 CHF exacerbation   Clinic Utilization  Difficulty keeping appointments:: No  Compliance Concerns: No  No-Show Concerns: No  No PCP office visit in Past Year: No  Utilization    Hospital Admissions  2             ED Visits  7             No Show Count (past year)  2                Current as of: 1/31/2022  1:30 PM              Clinical Concerns:  Current Medical Concerns:  Needs education on new CHF diagnosis     Current Behavioral Concerns: No    Education Provided to patient: CC RN introduced role and care plan,CHF Action Plan and education mailed    Pain  Pain (GOAL):: No  Health Maintenance Reviewed: Not assessed  Clinical Pathway: Clinic Care Coordination CHF Assessment    Discharge:    Day of hospital discharge: 1/29/22  What recommendations were made for follow up after your recent hospitalization? Take medications  As prescribed   Have the follow up appointments been scheduled? Yes  If not, can I help you set up these appointments? No       CHF:    Home scale available:  Yes  Home scale weight this morning: Patient will start checking weight daily   Heart Failure Zones sheet on refrigerator or available: No  Any increased SOB since hospital discharge:  No  Any increased edema since hospital discharge:  No  What number to call for YELLOW zones: [phone]    Symptom Review:   Heart Failure Symptoms  Shortness of breath:: Yes  Shortness of breath symptom course:: Improved  Wheezing or noisy breathing?: No  Cough: No  Chest pain: : Yes (Left side of chest)  Severity:: moderate  Location:: left chest  Radiates to: : none  Frequency:: intermittent  Heartbeat: Regular  Dizzy or  "Lightheaded: No  Checking weight daily? : No  Diet:: No added salt  Appetite:: Normal  Urination:: Normal  Fatigue: Yes  Weakness (Heaviness in limbs):: Yes  What Heart Failure zone are you currently in?: Green  Overall your CHF symptoms are (GOAL):: Stable    Medications:  \"How many new medications are you on since your hospitalization?\"  0 - 1  \"How many of your current medicines changed (dose, timing, name, etc.) while you were in the hospital?\"  0 - 1  \"Do you have questions about your medications?\"  No  Is patient on Warfarin?  No  Is Ejection Fraction <40%: No    When is the first appointment with the CHF clinic: Patient had a Cardiology appointment today                Medication Management:  Medication review status: Reviewed at Cardiology appointment   Functional Status:  Dependent ADLs:: Ambulation-cane,Ambulation-walker,Wheelchair-independent  Dependent IADLs:: Transportation  Bed or wheelchair confined:: No  Mobility Status: Independent w/Device    Living Situation:  Current living arrangement:: I live in a private home with family,Other (w/ parents)  Type of residence:: Private home - no stairs    Lifestyle & Psychosocial Needs:    Social Determinants of Health     Tobacco Use: High Risk     Smoking Tobacco Use: Current Every Day Smoker     Smokeless Tobacco Use: Never Used   Alcohol Use: Not on file   Financial Resource Strain: Not on file   Food Insecurity: Not on file   Transportation Needs: Not on file   Physical Activity: Not on file   Stress: Not on file   Social Connections: Not on file   Intimate Partner Violence: Not on file   Depression: Not at risk     PHQ-2 Score: 0   Housing Stability: Not on file     Diet:: Regular  Inadequate nutrition (GOAL):: No  Tube Feeding: No  Inadequate activity/exercise (GOAL):: No  Significant changes in sleep pattern (GOAL): No  Transportation means:: Friend,Family     Mandaen or spiritual beliefs that impact treatment:: No  Mental health DX:: No  Mental " health management concern (GOAL):: No  Chemical Dependency Status: No Current Concerns  Informal Support system:: Family,Friends           Resources and Interventions:  Current Resources:      Community Resources: Other (see comment),County Programs,Financial/Insurance,DME (SNAP/Food [place])  Supplies used at home:: None  Equipment Currently Used at Home: wheelchair, power (left foot and left arm were shattered. His friend built him a power scooter since he could not afford one.)  Employment Status: disabled         Advance Care Plan/Directive  Advanced Care Plans/Directives on file:: No  Advanced Care Plan/Directive Status: Referral to [employer] Facilitator    Referrals Placed: Community Resources,County Resources,Transportation,Disability Specialists,Disability Linkage line,[employer],     Goals:   Goals        General     1. Reducing Risks (pt-stated)      Notes - Note created  12/1/2021  1:06 PM by Barbara De La Garza LSW     Goal Statement: I will get connected to resources/supports.   Date Goal set: 12/1/21  Barriers: Access   Strengths: Motivated, family, friends, care team   Date to Achieve By: 5/1/22  Patient expressed understanding of goal: Yes    Action steps to achieve this goal:  1. I will start application for social security disability income online.   2. I will use [employer] to medical appts.  3. I will talk to my PCP about a cane or other DME.   4. I will look into other resources as needed (housing, etc).   5. I will work with care coordination as needed.         Improve chronic symptoms (pt-stated)      Notes - Note edited  1/31/2022  3:18 PM by Gail Gomez RN     .Goal Statement: My CHF symptoms will be stable   Date Goal set: 1/31/2022  Barriers: Poor understanding of CHF diagnosis   Strengths: Motivated to learn   Date to Achieve By: 4/31/2022  Patient expressed understanding of goal: Yes  Action steps to achieve this goal:  1. I will check my weight daily and call if  weight gain is 2-3# in a day or 5# in a week   2. I will seek medical help if I have increased shortness of breath episodes,fever or coughing up a colored sputum   3. I will take my medications as prescribed and keep future Cardiology /primary care appointments  4. I will follow a low salt diet              Patient/Caregiver understanding: Patient expresses understanding of discharge instructions with review        Future Appointments              In 2 weeks [facility] LAB [facility]    In 2 weeks [facility] VALVE RN [facility], [facility]    In 2 weeks Lamont Maloney MD [facility], [facility]    In 2 weeks Andree Rodas MD [facility], [facility]    In 1 month Chaya Coffman APRN CNP [facility], [facility]    In 1 month [facility], [facility]    In 1 month Feliciano Schaefer MD [facility], [facility]          Plan:   1. Patient will check weight daily and call if weight gain 2-3# in a day or 5# in a week,SOB episodes ,fever or leg edema  2. Patient will follow a low salt diet and take medications as prescribed   3. Patient will keep future Cardiology appointments   4. CC RN will mail a CHF Action Plan and educational materials   5. CC RN will follow up in 3-5 business days     [facility]   Gail Gomez RN, Care Coordinator   [facility]'s   E-mail [email or web address]   [phone]

## 2022-01-31 NOTE — LETTER
[facility]  Patient Centered Plan of Care  About Me:        Patient Name:  Micah Holloway    YOB: 1965  Age:         56 year old   [place] MRN:    [id number] Telephone Information:  Home Phone [phone]   Mobile [phone]       Address:  [address] [place] [id number] Email address:  [email or web address]      Emergency Contact(s)    Name Relationship Lgl Grd Work Phone Home Phone Mobile Phone   1. FLORIN HOLLOWAY* Father    [phone]   2. ELLY Friend   [phone]           Primary language:  English     needed? No   [place] [employer]:  [phone] op. 1  Other communication barriers:None    Preferred Method of Communication:  Mail  Current living arrangement: I live in a private home with family; Other (w/ parents)    Mobility Status/ Medical Equipment: Independent w/Device        Health Maintenance  Health Maintenance Reviewed:   Health Maintenance Due   Topic Date Due     HF ACTION PLAN  Never done     TSH W/FREE T4 REFLEX  Never done     ADVANCE CARE PLANNING  Never done     COVID-19 Vaccine (1) Never done     Pneumococcal Vaccine: Pediatrics (0 to 5 Years) and At-Risk Patients (6 to 64 Years) (1 of 2 - PPSV23) Never done     HIV SCREENING  Never done     HEPATITIS B IMMUNIZATION (1 of 3 - Risk 3-dose series) Never done     ZOSTER IMMUNIZATION (1 of 2) Never done     PREVENTIVE CARE VISIT  01/08/2020     LIPID  01/17/2020     INFLUENZA VACCINE (1) 09/01/2021     PHQ-2  01/01/2022       My Access Plan  Medical Emergency 911   Primary Clinic Line [facility] - [phone]   24 Hour Appointment Line [phone] or  [phone]) (toll-free)   24 Hour Nurse Line [phone] (toll-free)   Preferred Urgent Care [facility], [phone]     Preferred Hospital [facility], [place]  [phone]     Preferred Pharmacy [facility] [place] -  [place], [place] - [address] [facility]     Behavioral Health Crisis Line The National Suicide Prevention Lifeline at 1-626.680.1789 or 911       My Care Team Members  Patient Care Team       Relationship Specialty Notifications Start End    Yovany White MD PCP - General Family Practice  2/5/15     referring to ENT    Phone: [phone] Fax: [phone]         [address] [facility] [address]    Bobo Renteria MD MD Otolaryngology  11/30/15     Phone: [phone] Fax: [phone]         [address] [facility] [address] [facility] [address]    Yovany White MD Assigned PCP   2/19/17     Phone: [phone] Fax: [phone]         [address] [facility] [address]    Barbara De La Garza LSW Clinic Care Coordinator Primary Care - CC Admissions 12/1/21     Phone: [phone]          [address] [facility] [address]    Chaya Coffman APRN CNP Assigned Heart and Vascular Provider   1/9/22     Phone: [phone] Fax: [phone]         [address] [place] [address]    Gail Gomez, RN Lead Care Coordinator Primary Care - CC Admissions 1/31/22     Phone: [phone]                 My Care Plans  Self Management and Treatment Plan  Goals and (Comments)  Goals        General     1. Reducing Risks (pt-stated)      Notes - Note created  12/1/2021  1:06 PM by Barbara De La Garza LSW     Goal Statement: I will get connected to resources/supports.   Date Goal set: 12/1/21  Barriers: Access   Strengths: Motivated, family, friends, care team   Date to Achieve By: 5/1/22  Patient expressed understanding of goal: Yes    Action steps to achieve this goal:  1. I will start application for social security disability income online.   2. I will use [facility] to medical appts.  3. I will talk to my PCP about a cane or other DME.   4. I will look into other resources as needed (housing, etc).   5. I will work with care coordination as needed.         Improve chronic  symptoms (pt-stated)      Notes - Note edited  1/31/2022  3:18 PM by Gail Gomez RN     .Goal Statement: My CHF symptoms will be stable   Date Goal set: 1/31/2022  Barriers: Poor understanding of CHF diagnosis   Strengths: Motivated to learn   Date to Achieve By: 4/31/2022  Patient expressed understanding of goal: Yes  Action steps to achieve this goal:  1. I will check my weight daily and call if weight gain is 2-3# in a day or 5# in a week   2. I will seek medical help if I have increased shortness of breath episodes,fever or coughing up a colored sputum   3. I will take my medications as prescribed and keep future Cardiology /primary care appointments  4. 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)     Non compliance with medical treatment     Anasarca     Elevated troponin     Lab test negative for COVID-19 virus     Acute congestive heart failure, unspecified heart failure type (H)     Mitral regurgitation - severe      Current Medications and Allergies:   Current Outpatient Medications   Medication     furosemide (LASIX) 20 MG tablet     lisinopril (ZESTRIL) 20 MG tablet     metoprolol succinate ER (TOPROL-XL) 25 MG 24 hr tablet     order for DME     potassium chloride ER (KLOR-CON M) 20 MEQ CR tablet     spironolactone (ALDACTONE) 25 MG tablet     No current facility-administered medications for this visit.     Facility-Administered Medications Ordered in Other Visits   Medication     Self Administer Medications: Behavioral Services       Care Coordination Start Date: 12/1/2021   Frequency of Care Coordination: Weekly    Form Last  Updated: 01/31/2022

## 2022-01-31 NOTE — PATIENT INSTRUCTIONS
Micah Nunez,    It was a pleasure to see you today at [facility] HEART [facility].     My recommendations after this visit include:  - Please follow up with Dr Jackson in April   - Please follow up with Dr Maloney February 17  - Please follow up with Chaya Coffman in early March  - Please increase furosemide to 40 mg daily (2 tablets)    Chaya Coffman, CNP

## 2022-01-31 NOTE — LETTER
[facility] CARE COORDINATION  [address] [facility] [address]  January 31, 2022    Micah Nunez  [address]   [facility] [address]      Dear Micah,    I am a clinic care coordinator who works with Yovany White MD at [facility] . I wanted to thank you for spending the time to talk with me.  Below is a description of clinic care coordination and how I can further assist you.      The clinic care coordination team is made up of a registered nurse,  and community health worker who understand the health care system. The goal of clinic care coordination is to help you manage your health and improve access to the health care system in the most efficient manner. The team can assist you in meeting your health care goals by providing education, coordinating services, strengthening the communication among your providers and supporting you with any resource needs.    Please feel free to contact me at [phone] with any questions or concerns. We are focused on providing you with the highest-quality healthcare experience possible and that all starts with you.     Sincerely,     [facility] .  Gail Gomez RN, Care Coordinator   [facility]'s   E-mail [email or web address]   [phone]    Enclosed: I have enclosed a copy of the Patient Centered Plan of Care and CHF educational materials  This has helpful information and goals that we have talked about. Please keep this in an easy to access place to use as needed.

## 2022-01-31 NOTE — LETTER
1/31/2022    Yovany White MD  [address] [facility] [address]    RE: Micah Nunez       Dear Colleague,     I had the pleasure of seeing Micah Nunez in the [facility].        Assessment/Recommendations   Assessment:    1.  Heart failure with preserved ejection fraction, NYHA class III: Decompensated.  He states he was in the ED this past weekend due to increased dyspnea on exertion.  He received 40 mg of IV Lasix and was told to increase Lasix to 40 mg daily.  He has not done so.  He is receiving [employer] meal service which has helped him not eat out as much but he continues to eat out every day.  We discussed low-sodium diet in detail today.  Instructed him to stop eating out and take advantage of [employer] meals.  2.  Severe mitral valve regurgitation: He is scheduled to see Dr. Maloney in the valve clinic February 17  3.  Hypertension: Controlled.  Blood pressure 114/74    Plan:  1.  Increase Lasix to 40 mg daily  2.  Stressed the importance of low-sodium diet and eating [employer] meals  3.  Instructed him to stop eating out  4.  Monitor daily weights    Micah Nunez will follow up with Dr. Jackson in April and in the heart failure clinic in early March.     History of Present Illness/Subjective    Mr. Micah Nunez is a 56 year old male seen at [facility] heart failure clinic today for continued follow-up.  He follows up for heart failure with preserved ejection fraction.  He had a MARIANNA December 20, 2021 which showed an ejection fraction of 55 to 60% with severe mitral valve regurgitation.  Dr. Jackson recommended he follow-up in the valve clinic which is scheduled February 17.  He has a past medical history significant for hypertension, past substance abuse.    He was seen in the ED this past weekend due to passing out at [employer] due to shortness of breath.  Fortunately there was a physician and nurse in the store who took care of him.   "He went to the ED and had a BNP of 2600.  He received 40 mg of IV Lasix and was instructed to increase Lasix to 40 mg daily.  He has not done so.  Today, he states he is feeling better.  He continues to have fatigue and dyspnea on exertion.  He denies orthopnea, PND, chest pain or edema       Physical Examination Review of Systems   /74 (BP Location: Left arm, Patient Position: Sitting, Cuff Size: Adult Regular)   Pulse 72   Resp 16   Ht 1.905 m (6' 3\")   Wt 77.6 kg (171 lb)   BMI 21.37 kg/m    Body mass index is 21.37 kg/m .  Wt Readings from Last 3 Encounters:   01/31/22 77.6 kg (171 lb)   01/29/22 74.8 kg (165 lb)   01/03/22 75.9 kg (167 lb 4.8 oz)       General Appearance:   no acute distress   ENT/Mouth: membranes moist, no oral lesions or bleeding gums.      EYES:  no scleral icterus, normal conjunctivae   Neck: no carotid bruits or thyromegaly   Chest/Lungs:   lungs are clear to auscultation, no rales or wheezing, equal chest wall expansion    Cardiovascular:   Regular. Normal first and second heart sounds with grade 3/6 systolic murmur, no rubs, or gallops; Jugular venous pressure normal, no edema bilaterally    Abdomen:  no organomegaly, masses, bruits, or tenderness; bowel sounds are present   Extremities: no cyanosis or clubbing   Skin: no xanthelasma, warm.    Neurologic: normal  bilateral, no tremors     Psychiatric: alert and oriented x3    Enc Vitals  BP: 114/74  Pulse: 72  Resp: 16  Weight: 77.6 kg (171 lb)  Height: 190.5 cm (6' 3\")                                         Medical History  Surgical History Family History Social History   Past Medical History:   Diagnosis Date     Acute exacerbation of CHF (congestive heart failure) (H) 11/27/2021     Benign essential hypertension      Bleeding disorder (H)      Chemical dependency (H)      Closed displaced fracture of fifth metatarsal bone of left foot 07/26/2021     Gastroesophageal reflux disease      Hypertension      Nasal mass " 12/17/2013     Papilloma of nose 03/10/2014     Skin disease      Traumatic avulsion of nail plate of toe 07/26/2021     Type I or II open fracture of left ulna 09/05/2020    Past Surgical History:   Procedure Laterality Date     ARTHRODESIS FOOT Left 2/17/2015    Procedure: ARTHRODESIS FOOT;  Surgeon: Cortez Hayward DPM;  Location: [facility] OR     ARTHRODESIS TOE(S)  12/20/2013    Procedure: ARTHRODESIS TOE(S);  Arthrodesis first metatarsophalangeal joint left foot;  Surgeon: Cortez Hayward DPM;  Location: [facility] OR     COLONOSCOPY N/A 1/12/2021    Procedure: COLONOSCOPY;  Surgeon: Dixon Sarabia MD;  Location: [facility] GI     ENDOSCOPIC SINUS SURGERY  1/6/2014    Procedure: ENDOSCOPIC SINUS SURGERY;  Functional Endoscopic Sinus Surgery;  Surgeon: Bobo Renteria MD;  Location:  OR     ENDOSCOPIC SINUS SURGERY  7/21/2014    Procedure: ENDOSCOPIC SINUS SURGERY;  Surgeon: Bobo Renteria MD;  Location:  OR     ENDOSCOPIC SINUS SURGERY N/A 4/6/2015    Procedure: ENDOSCOPIC SINUS SURGERY;  Surgeon: Bobo Renteria MD;  Location:  OR     ENDOSCOPIC SINUS SURGERY N/A 8/17/2015    Procedure: ENDOSCOPIC SINUS SURGERY;  Surgeon: Bobo Renteria MD;  Location:  OR     ENDOSCOPIC SINUS SURGERY Bilateral 8/19/2019    Procedure: Bilateral Functional Endoscopic Sinus Surgery, Right Nasal Endoscopy and Excision of Left Nasal Mass;  Surgeon: Bobo Renteria MD;  Location:  OR     ENT SURGERY       EXCISE NASAL MASS Left 11/6/2017    Procedure: EXCISE NASAL MASS;  Excision of Left Nasal Papilloma;  Surgeon: Bobo Renteria MD;  Location:  OR     LAPAROSCOPIC HERNIORRHAPHY INGUINAL BILATERAL Bilateral 4/12/2017    Procedure: LAPAROSCOPIC HERNIORRHAPHY INGUINAL BILATERAL;  Surgeon: Shay Mercado MD;  Location: [facility] OR     NASAL ENDOSCOPY Bilateral 2/6/2017    Procedure: NASAL ENDOSCOPY;  Surgeon: Bobo Renteria MD;  Location:  OR     NASAL ENDOSCOPY N/A 5/21/2018    Procedure:  NASAL ENDOSCOPY;  Nasal Endoscopy, CO2 Laser Excision of Left Nasal Papilloma;  Surgeon: Bobo Renteria MD;  [facility]: [facility]     NASAL/SINUS POLYPECTOMY       PE TUBES      Family History   Problem Relation Age of Onset     Diabetes Mother 40     C.A.D. Father 72     Unknown/Adopted No family hx of      Depression No family hx of      Anxiety Disorder No family hx of      Schizophrenia No family hx of      Bipolar Disorder No family hx of      Suicide No family hx of      Substance Abuse No family hx of      Dementia No family hx of      Homero Disease No family hx of      Parkinsonism No family hx of      Autism Spectrum Disorder No family hx of      Intellectual Disability (Mental Retardation) No family hx of      Mental Illness No family hx of      Bleeding Disorder No family hx of     Social History     Socioeconomic History     Marital status: Single     Spouse name: Not on file     Number of children: Not on file     Years of education: Not on file     Highest education level: Not on file   Occupational History     Not on file   Tobacco Use     Smoking status: Current Every Day Smoker     Packs/day: 1.00     Years: 40.00     Pack years: 40.00     Types: Cigarettes     Start date: 1/1/1980     Smokeless tobacco: Never Used     Tobacco comment: about 3 cigarettes or so a day   Vaping Use     Vaping Use: Never used   Substance and Sexual Activity     Alcohol use: Not Currently     Comment: occasional      Drug use: Not Currently     Types: Cocaine, Methamphetamines, Opiates, Marijuana     Comment: 7 years quit Cocaine/methamphetamines     Sexual activity: Yes     Partners: Female     Birth control/protection: None   Other Topics Concern     Parent/sibling w/ CABG, MI or angioplasty before 65F 55M? No      Service No     Blood Transfusions No     Caffeine Concern No     Occupational Exposure No     Hobby Hazards No     Sleep Concern No     Stress Concern No     Weight Concern No     Special Diet  No     Back Care No     Exercise Yes     Bike Helmet No     Seat Belt Yes     Self-Exams Not Asked   Social History Narrative     Not on file     Social Determinants of Health     Financial Resource Strain: Not on file   Food Insecurity: Not on file   Transportation Needs: Not on file   Physical Activity: Not on file   Stress: Not on file   Social Connections: Not on file   Intimate Partner Violence: Not on file   Housing Stability: Not on file          Medications  Allergies   Current Outpatient Medications   Medication Sig Dispense Refill     furosemide (LASIX) 20 MG tablet Take 2 tablets (40 mg) by mouth daily 180 tablet 1     lisinopril (ZESTRIL) 20 MG tablet Take 1 tablet (20 mg) by mouth daily 90 tablet 1     metoprolol succinate ER (TOPROL-XL) 25 MG 24 hr tablet Take 0.5 tablets (12.5 mg) by mouth daily 45 tablet 1     potassium chloride ER (KLOR-CON M) 20 MEQ CR tablet Take 1 tablet (20 mEq) by mouth daily 90 tablet 0     spironolactone (ALDACTONE) 25 MG tablet Take 1 tablet (25 mg) by mouth daily 90 tablet 1     order for DME Equipment being ordered: Dynaflex insert (Patient not taking: Reported on 1/31/2022) 2 Units 0    No Known Allergies      Lab Results    Chemistry/lipid CBC Cardiac Enzymes/BNP/TSH/INR   Lab Results   Component Value Date    CHOL 220 (H) 01/17/2019    HDL 53 01/17/2019    TRIG 259 (H) 01/17/2019    BUN 16 01/29/2022     01/29/2022    CO2 24 01/29/2022    Lab Results   Component Value Date    WBC 9.9 01/29/2022    HGB 12.6 (L) 01/29/2022    HCT 39.3 (L) 01/29/2022    MCV 81 01/29/2022     01/29/2022    Lab Results   Component Value Date    TROPONINI 0.03 01/29/2022    BNP 2,610 (H) 01/29/2022    INR 1.09 05/28/2020             This note has been dictated using voice recognition software. Any grammatical, typographical, or context distortions are unintentional and inherent to the software    30 minutes spent on the date of encounter doing chart review, review of outside  records, review of test results, patient visit and documentation.                  Thank you for allowing me to participate in the care of your patient.      Sincerely,     NEHEMIAS Cartwright [facility]  cc:   No referring provider defined for this encounter.

## 2022-01-31 NOTE — PROGRESS NOTES
Assessment/Recommendations   Assessment:    1.  Heart failure with preserved ejection fraction, NYHA class III: Decompensated.  He states he was in the ED this past weekend due to increased dyspnea on exertion.  He received 40 mg of IV Lasix and was told to increase Lasix to 40 mg daily.  He has not done so.  He is receiving [employer] meal service which has helped him not eat out as much but he continues to eat out every day.  We discussed low-sodium diet in detail today.  Instructed him to stop eating out and take advantage of [employer] meals.  2.  Severe mitral valve regurgitation: He is scheduled to see Dr. Maloney in the valve clinic February 17  3.  Hypertension: Controlled.  Blood pressure 114/74    Plan:  1.  Increase Lasix to 40 mg daily  2.  Stressed the importance of low-sodium diet and eating [employer] meals  3.  Instructed him to stop eating out  4.  Monitor daily weights    Micah Nunez will follow up with Dr. Jackson in April and in the heart failure clinic in early March.     History of Present Illness/Subjective    Mr. Micah Nunez is a 56 year old male seen at [facility] heart failure clinic today for continued follow-up.  He follows up for heart failure with preserved ejection fraction.  He had a MARIANNA December 20, 2021 which showed an ejection fraction of 55 to 60% with severe mitral valve regurgitation.  Dr. Jackson recommended he follow-up in the valve clinic which is scheduled February 17.  He has a past medical history significant for hypertension, past substance abuse.    He was seen in the ED this past weekend due to passing out at "[employer] due to shortness of breath.  Fortunately there was a physician and nurse in the store who took care of him.  He went to the ED and had a BNP of 2600.  He received 40 mg of IV Lasix and was instructed to increase Lasix to 40 mg daily.  He has not done so.  Today, he states he is feeling better.  He continues to have fatigue and  "dyspnea on exertion.  He denies orthopnea, PND, chest pain or edema       Physical Examination Review of Systems   /74 (BP Location: Left arm, Patient Position: Sitting, Cuff Size: Adult Regular)   Pulse 72   Resp 16   Ht 1.905 m (6' 3\")   Wt 77.6 kg (171 lb)   BMI 21.37 kg/m    Body mass index is 21.37 kg/m .  Wt Readings from Last 3 Encounters:   01/31/22 77.6 kg (171 lb)   01/29/22 74.8 kg (165 lb)   01/03/22 75.9 kg (167 lb 4.8 oz)       General Appearance:   no acute distress   ENT/Mouth: membranes moist, no oral lesions or bleeding gums.      EYES:  no scleral icterus, normal conjunctivae   Neck: no carotid bruits or thyromegaly   Chest/Lungs:   lungs are clear to auscultation, no rales or wheezing, equal chest wall expansion    Cardiovascular:   Regular. Normal first and second heart sounds with grade 3/6 systolic murmur, no rubs, or gallops; Jugular venous pressure normal, no edema bilaterally    Abdomen:  no organomegaly, masses, bruits, or tenderness; bowel sounds are present   Extremities: no cyanosis or clubbing   Skin: no xanthelasma, warm.    Neurologic: normal  bilateral, no tremors     Psychiatric: alert and oriented x3    Enc Vitals  BP: 114/74  Pulse: 72  Resp: 16  Weight: 77.6 kg (171 lb)  Height: 190.5 cm (6' 3\")                                         Medical History  Surgical History Family History Social History   Past Medical History:   Diagnosis Date     Acute exacerbation of CHF (congestive heart failure) (H) 11/27/2021     Benign essential hypertension      Bleeding disorder (H)      Chemical dependency (H)      Closed displaced fracture of fifth metatarsal bone of left foot 07/26/2021     Gastroesophageal reflux disease      Hypertension      Nasal mass 12/17/2013     Papilloma of nose 03/10/2014     Skin disease      Traumatic avulsion of nail plate of toe 07/26/2021     Type I or II open fracture of left ulna 09/05/2020    Past Surgical History:   Procedure Laterality " Date     ARTHRODESIS FOOT Left 2/17/2015    Procedure: ARTHRODESIS FOOT;  Surgeon: Cortez Hayward DPM;  Location: [facility] OR     ARTHRODESIS TOE(S)  12/20/2013    Procedure: ARTHRODESIS TOE(S);  Arthrodesis first metatarsophalangeal joint left foot;  Surgeon: Cortez Hayward DPM;  Location: [facility] OR     COLONOSCOPY N/A 1/12/2021    Procedure: COLONOSCOPY;  Surgeon: Dixon Sarabia MD;  Location: [facility]     ENDOSCOPIC SINUS SURGERY  1/6/2014    Procedure: ENDOSCOPIC SINUS SURGERY;  Functional Endoscopic Sinus Surgery;  Surgeon: Bobo Renteria MD;  Location:  OR     ENDOSCOPIC SINUS SURGERY  7/21/2014    Procedure: ENDOSCOPIC SINUS SURGERY;  Surgeon: Bobo Renteria MD;  Location:  OR     ENDOSCOPIC SINUS SURGERY N/A 4/6/2015    Procedure: ENDOSCOPIC SINUS SURGERY;  Surgeon: Bobo Renteria MD;  Location:  OR     ENDOSCOPIC SINUS SURGERY N/A 8/17/2015    Procedure: ENDOSCOPIC SINUS SURGERY;  Surgeon: Bobo Renteria MD;  [facility]     ENDOSCOPIC SINUS SURGERY Bilateral 8/19/2019    Procedure: Bilateral Functional Endoscopic Sinus Surgery, Right Nasal Endoscopy and Excision of Left Nasal Mass;  Surgeon: Bobo Renteria MD;  Location:  OR     ENT SURGERY       EXCISE NASAL MASS Left 11/6/2017    Procedure: EXCISE NASAL MASS;  Excision of Left Nasal Papilloma;  Surgeon: Bobo Renteria MD;  Location:  OR     LAPAROSCOPIC HERNIORRHAPHY INGUINAL BILATERAL Bilateral 4/12/2017    Procedure: LAPAROSCOPIC HERNIORRHAPHY INGUINAL BILATERAL;  Surgeon: Shay Mercado MD;  Location: [facility]     NASAL ENDOSCOPY Bilateral 2/6/2017    Procedure: NASAL ENDOSCOPY;  Surgeon: Bobo Renteria MD;  Location:  OR     NASAL ENDOSCOPY N/A 5/21/2018    Procedure: NASAL ENDOSCOPY;  Nasal Endoscopy, CO2 Laser Excision of Left Nasal Papilloma;  Surgeon: Bobo Renteria MD;  [facility]     NASAL/SINUS POLYPECTOMY       PE TUBES      Family History   Problem Relation Age of  Onset     Diabetes Mother 40     C.A.D. Father 72     Unknown/Adopted No family hx of      Depression No family hx of      Anxiety Disorder No family hx of      Schizophrenia No family hx of      Bipolar Disorder No family hx of      Suicide No family hx of      Substance Abuse No family hx of      Dementia No family hx of      Labette Disease No family hx of      Parkinsonism No family hx of      Autism Spectrum Disorder No family hx of      Intellectual Disability (Mental Retardation) No family hx of      Mental Illness No family hx of      Bleeding Disorder No family hx of     Social History     Socioeconomic History     Marital status: Single     Spouse name: Not on file     Number of children: Not on file     Years of education: Not on file     Highest education level: Not on file   Occupational History     Not on file   Tobacco Use     Smoking status: Current Every Day Smoker     Packs/day: 1.00     Years: 40.00     Pack years: 40.00     Types: Cigarettes     Start date: 1/1/1980     Smokeless tobacco: Never Used     Tobacco comment: about 3 cigarettes or so a day   Vaping Use     Vaping Use: Never used   Substance and Sexual Activity     Alcohol use: Not Currently     Comment: occasional      Drug use: Not Currently     Types: Cocaine, Methamphetamines, Opiates, Marijuana     Comment: 7 years quit Cocaine/methamphetamines     Sexual activity: Yes     Partners: Female     Birth control/protection: None   Other Topics Concern     Parent/sibling w/ CABG, MI or angioplasty before 65F 55M? No      Service No     Blood Transfusions No     Caffeine Concern No     Occupational Exposure No     Hobby Hazards No     Sleep Concern No     Stress Concern No     Weight Concern No     Special Diet No     Back Care No     Exercise Yes     Bike Helmet No     Seat Belt Yes     Self-Exams Not Asked   Social History Narrative     Not on file     Social Determinants of Health     Financial Resource Strain: Not on file    Food Insecurity: Not on file   Transportation Needs: Not on file   Physical Activity: Not on file   Stress: Not on file   Social Connections: Not on file   Intimate Partner Violence: Not on file   Housing Stability: Not on file          Medications  Allergies   Current Outpatient Medications   Medication Sig Dispense Refill     furosemide (LASIX) 20 MG tablet Take 2 tablets (40 mg) by mouth daily 180 tablet 1     lisinopril (ZESTRIL) 20 MG tablet Take 1 tablet (20 mg) by mouth daily 90 tablet 1     metoprolol succinate ER (TOPROL-XL) 25 MG 24 hr tablet Take 0.5 tablets (12.5 mg) by mouth daily 45 tablet 1     potassium chloride ER (KLOR-CON M) 20 MEQ CR tablet Take 1 tablet (20 mEq) by mouth daily 90 tablet 0     spironolactone (ALDACTONE) 25 MG tablet Take 1 tablet (25 mg) by mouth daily 90 tablet 1     order for DME Equipment being ordered: Dynaflex insert (Patient not taking: Reported on 1/31/2022) 2 Units 0    No Known Allergies      Lab Results    Chemistry/lipid CBC Cardiac Enzymes/BNP/TSH/INR   Lab Results   Component Value Date    CHOL 220 (H) 01/17/2019    HDL 53 01/17/2019    TRIG 259 (H) 01/17/2019    BUN 16 01/29/2022     01/29/2022    CO2 24 01/29/2022    Lab Results   Component Value Date    WBC 9.9 01/29/2022    HGB 12.6 (L) 01/29/2022    HCT 39.3 (L) 01/29/2022    MCV 81 01/29/2022     01/29/2022    Lab Results   Component Value Date    TROPONINI 0.03 01/29/2022    BNP 2,610 (H) 01/29/2022    INR 1.09 05/28/2020             This note has been dictated using voice recognition software. Any grammatical, typographical, or context distortions are unintentional and inherent to the software    30 minutes spent on the date of encounter doing chart review, review of outside records, review of test results, patient visit and documentation.

## 2022-02-14 ENCOUNTER — PATIENT OUTREACH (OUTPATIENT)
Dept: NURSING | Facility: CLINIC | Age: 57
End: 2022-02-14
Payer: COMMERCIAL

## 2022-02-14 DIAGNOSIS — I50.9 CONGESTIVE HEART FAILURE (H): Primary | ICD-10-CM

## 2022-02-14 DIAGNOSIS — I34.0 MITRAL REGURGITATION: Primary | ICD-10-CM

## 2022-02-14 ASSESSMENT — ACTIVITIES OF DAILY LIVING (ADL): DEPENDENT_IADLS:: TRANSPORTATION

## 2022-02-14 NOTE — PROGRESS NOTES
Clinic Care Coordination Contact    Follow Up Progress Note   ED visit 1/29/2022 CHF exacerbation (new diagnosis )     Assessment:   brief conversation due to the patient is driving   Patient states his breathing is much better and denies any edema  in legs  Checking weight daily and this morning 161#  Patient has not used [employer] transportation yet  Patient plans to get on his computer tonight and complete the disability application     Care Gaps:    Health Maintenance Due   Topic Date Due     HF ACTION PLAN  Never done     TSH W/FREE T4 REFLEX  Never done     ADVANCE CARE PLANNING  Never done     COVID-19 Vaccine (1) Never done     Pneumococcal Vaccine: Pediatrics (0 to 5 Years) and At-Risk Patients (6 to 64 Years) (1 of 2 - PPSV23) Never done     HIV SCREENING  Never done     HEPATITIS B IMMUNIZATION (1 of 3 - Risk 3-dose series) Never done     ZOSTER IMMUNIZATION (1 of 2) Never done     PREVENTIVE CARE VISIT  01/08/2020     LIPID  01/17/2020     INFLUENZA VACCINE (1) 09/01/2021     PHQ-2  01/01/2022       Postponed to Next RN CC outreach      Goals addressed this encounter:   Goals Addressed                    This Visit's Progress       1. Reducing Risks (pt-stated)   30%      Goal Statement: I will get connected to resources/supports.   Date Goal set: 12/1/21  Barriers: Access   Strengths: Motivated, family, friends, care team   Date to Achieve By: 5/1/22  Patient expressed understanding of goal: Yes    Action steps to achieve this goal:  1. I will start application for social security disability income online.   2. I will use [employer] to medical appts.  3. I will talk to my PCP about a cane or other DME.   4. I will look into other resources as needed (housing, etc).   5. I will work with care coordination as needed.           Improve chronic symptoms (pt-stated)   40%      .Goal Statement: My CHF symptoms will be stable   Date Goal set: 1/31/2022  Barriers: Poor understanding of CHF  diagnosis   Strengths: Motivated to learn   Date to Achieve By: 4/31/2022  Patient expressed understanding of goal: Yes  Action steps to achieve this goal:  1. I will check my weight daily and call if weight gain is 2-3# in a day or 5# in a week   2. I will seek medical help if I have increased shortness of breath episodes,fever or coughing up a colored sputum   3. I will take my medications as prescribed and keep future Cardiology /primary care appointments  4. I will follow a low salt diet              Intervention/Education provided during outreach: Call with any increased symptoms of concern ( SOB weight gain or ankle edema )     Outreach Frequency: 2 weeks    Plan:   1. Patient will continue to check weight daily and call with increased weight or symptoms of concern   2. Care Coordinator will follow up in 1-2 weeks     [facility]   Gail Gomez RN, Care Coordinator   [facility]'s   E-mail [email or web address]   [phone]

## 2022-02-22 ENCOUNTER — PATIENT OUTREACH (OUTPATIENT)
Dept: NURSING | Facility: CLINIC | Age: 57
End: 2022-02-22
Payer: COMMERCIAL

## 2022-02-22 NOTE — PROGRESS NOTES
Clinic Care Coordination Contact    Follow Up Progress Note      Assessment: Pt is working on social security disability application online. Pt is in need of new housing in the very near future, options are limited due to no income.    Care Gaps:    Health Maintenance Due   Topic Date Due     HF ACTION PLAN  Never done     TSH W/FREE T4 REFLEX  Never done     ADVANCE CARE PLANNING  Never done     COVID-19 Vaccine (1) Never done     Pneumococcal Vaccine: Pediatrics (0 to 5 Years) and At-Risk Patients (6 to 64 Years) (1 of 2 - PPSV23) Never done     HIV SCREENING  Never done     HEPATITIS B IMMUNIZATION (1 of 3 - Risk 3-dose series) Never done     ZOSTER IMMUNIZATION (1 of 2) Never done     PREVENTIVE CARE VISIT  01/08/2020     LIPID  01/17/2020     INFLUENZA VACCINE (1) 09/01/2021     PHQ-2  01/01/2022       Postponed to next OV     Goals addressed this encounter:   Goals Addressed                    This Visit's Progress       1. Reducing Risks (pt-stated)   40%      Goal Statement: I will get connected to resources/supports.   Date Goal set: 12/1/21  Barriers: Access   Strengths: Motivated, family, friends, care team   Date to Achieve By: 5/1/22  Patient expressed understanding of goal: Yes    Action steps to achieve this goal:  1. I will start application for social security disability [employer] online.   2. I will use [employer] to medical appts.  3. I will talk to my PCP about a cane or other DME.   4. I will look into other resources as needed (housing, etc).   5. I will work with care coordination as needed.           2. Improve chronic symptoms (pt-stated)         .Goal Statement: My CHF symptoms will be stable   Date Goal set: 1/31/2022  Barriers: Poor understanding of CHF diagnosis   Strengths: Motivated to learn   Date to Achieve By: 4/31/2022  Patient expressed understanding of goal: Yes    Action steps to achieve this goal:  1. I will check my weight daily and call if weight gain is 2-3# in a  day or 5# in a week.  2. I will seek medical help if I have increased shortness of breath episodes,fever or coughing up a colored sputum.  3. I will take my medications as prescribed and keep future Cardiology /primary care appointments.  4. I will follow a low salt diet.            Intervention/Education provided during outreach: JUSTIN CC outreach to pt for SW check in. RN CC following as well for cardiology needs.     Pt reported he is working on housing. He is currently with his parents in senior living, helping dad with dementia and mom who is disabled. However, he cannot stay in the facility long term.     Pt is planning to have a valve replacement in the next 30-60 day, hoping he can stay with parents until this and go to TCU.     Pt needs to finish disability application online. Advised to give forms to cardiology or PCP.    Pt reported that he has a friend he can stay with 'in a pinch.' Pt also has a # if he is on the street with no where to go, someone at [facility] can get him a motel.    CC will send more information via e-mail for housing search.     Sent:  - https://www.[employer]Comm.gov/sites/np/renters  - www.housinglink.org   - https://www.[employer].org/  - https://[employer].org/housing-choice/  - http://[facility].org/rental-housing/rental-housing-listing/  - Https://[place].[employer].org/     Outreach Frequency: Monthly    Plan: JUSTIN CC will follow up in 3 weeks. JUSTIN CC will send more resources to pt to look into. RN CC to follow as well.     WILLI Freitas   Social Work Clinic Care Coordinator   [facility]  [phone]  [email or web address]

## 2022-03-04 ENCOUNTER — PATIENT OUTREACH (OUTPATIENT)
Dept: CARE COORDINATION | Facility: CLINIC | Age: 57
End: 2022-03-04
Payer: COMMERCIAL

## 2022-03-04 ASSESSMENT — ACTIVITIES OF DAILY LIVING (ADL): DEPENDENT_IADLS:: TRANSPORTATION

## 2022-03-04 NOTE — PROGRESS NOTES
"Clinic Care Coordination Contact    Follow Up Progress Note      Assessment: Patient states he has been doing well. States he has been taking his medications without any issues. Patient is receiving food delivery services with food that aligns with his dietary needs. Patient denies any shortness of breath or leg edema. States he has been checking his weight and advises it is \"within one pound\" when checking. Patient has cardiology on 3/14/22, which he states he wasn't aware of. There are several upcoming appointments listed for him. Writer is sending a link to his email for him to set up a [employer] account for easy access to appointment information.    Care Gaps:    Health Maintenance Due   Topic Date Due     HF ACTION PLAN  Never done     TSH W/FREE T4 REFLEX  Never done     ADVANCE CARE PLANNING  Never done     COVID-19 Vaccine (1) Never done     Pneumococcal Vaccine: Pediatrics (0 to 5 Years) and At-Risk Patients (6 to 64 Years) (1 of 2 - PPSV23) Never done     HIV SCREENING  Never done     HEPATITIS B IMMUNIZATION (1 of 3 - Risk 3-dose series) Never done     ZOSTER IMMUNIZATION (1 of 2) Never done     PREVENTIVE CARE VISIT  01/08/2020     LIPID  01/17/2020     INFLUENZA VACCINE (1) 09/01/2021     PHQ-2  01/01/2022       Will address at future RN CC outreach.    Goals addressed this encounter:   Goals Addressed                    This Visit's Progress       2. Improve chronic symptoms (pt-stated)   60%      .Goal Statement: My CHF symptoms will be stable   Date Goal set: 1/31/2022  Barriers: Poor understanding of CHF diagnosis   Strengths: Motivated to learn   Date to Achieve By: 4/31/2022  Patient expressed understanding of goal: Yes    Action steps to achieve this goal:  1. I will check my weight daily and call if weight gain is 2-3# in a day or 5# in a week.  2. I will seek medical help if I have increased shortness of breath episodes,fever or coughing up a colored sputum.  3. I will take my medications as " prescribed and keep future Cardiology /primary care appointments.  4. I will follow a low salt diet.            Intervention/Education provided during outreach: Patient verbalized understanding of plan. Patient denied any further questions or concerns.     Outreach Frequency: 2 weeks    Plan:   Patient will continue to monitor his weight and take his medications as prescribed. Patient will create a MyChart account to allow easy access to appointment information.  Care Coordinator will follow up in 2 weeks.    Lacey Pereira RN Care Coordinator  [facility]  (Covering for Lead RN Care Coordinator)

## 2022-03-17 ENCOUNTER — PATIENT OUTREACH (OUTPATIENT)
Dept: NURSING | Facility: CLINIC | Age: 57
End: 2022-03-17
Payer: COMMERCIAL

## 2022-03-17 DIAGNOSIS — I50.9 CONGESTIVE HEART FAILURE (H): Primary | ICD-10-CM

## 2022-03-17 ASSESSMENT — ACTIVITIES OF DAILY LIVING (ADL): DEPENDENT_IADLS:: TRANSPORTATION
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
## 2022-03-22 ENCOUNTER — PATIENT OUTREACH (OUTPATIENT)
Dept: NURSING | Facility: CLINIC | Age: 57
End: 2022-03-22
Payer: COMMERCIAL

## 2022-03-22 NOTE — PROGRESS NOTES
Clinic Care Coordination Contact    Follow Up Progress Note      Assessment: Pt is currently residing in [place] at a hotel through shelter team funding. Pt has a [facility] worker helping with resources/housing search.     Care Gaps:    Health Maintenance Due   Topic Date Due     HF ACTION PLAN  Never done     TSH W/FREE T4 REFLEX  Never done     ADVANCE CARE PLANNING  Never done     COVID-19 Vaccine (1) Never done     Pneumococcal Vaccine: Pediatrics (0 to 5 Years) and At-Risk Patients (6 to 64 Years) (1 of 2 - PPSV23) Never done     HIV SCREENING  Never done     HEPATITIS B IMMUNIZATION (1 of 3 - Risk 3-dose series) Never done     ZOSTER IMMUNIZATION (1 of 2) Never done     PREVENTIVE CARE VISIT  01/08/2020     LIPID  01/17/2020     INFLUENZA VACCINE (1) 09/01/2021     PHQ-2 (once per calendar year)  01/01/2022       Postponed to next OV     Goals addressed this encounter:   Goals Addressed                    This Visit's Progress       1. Reducing Risks // SW Only (pt-stated)   50%      Goal Statement: I will get connected to resources/supports.   Date Goal set: 12/1/21  Barriers: Access   Strengths: Motivated, family, friends, care team   Date to Achieve By: 5/1/22  Patient expressed understanding of goal: Yes    Action steps to achieve this goal:  1. I will start application for social security disability income online.   2. I will use [employer] to medical appts.  3. I will talk to my PCP about a cane or other DME.   4. I will look into other resources as needed (housing, etc).   5. I will work with care coordination as needed.              Intervention/Education provided during outreach: SW CC outreach to pt for goal check in. Last RN CC outreach 3/17/22.     Pt meets with housing staff Thursday mornings to talk about housing. CC inquired if they have made a referral to Housing Stabilization Services (HSS). Pt is aware of this option.    Pt reported he is attending Dr khan for heart,  has upcoming procedure that he anticipates he will be in hospital few weeks and then TCU.      No other questions or concerns today.     Outreach Frequency: 2 weeks    Plan: SW CC will follow up in one month. RN CC will outreach as noted.     Barbara De La Garza, [facility]   Social Work Clinic Care Coordinator   [facility]  [phone]  [email or web address]

## 2022-03-30 ENCOUNTER — OFFICE VISIT (OUTPATIENT)
Dept: PULMONOLOGY | Facility: OTHER | Age: 57
End: 2022-03-30
Payer: COMMERCIAL

## 2022-03-30 ENCOUNTER — HOSPITAL ENCOUNTER (OUTPATIENT)
Dept: RADIOLOGY | Facility: HOSPITAL | Age: 57
Discharge: HOME | End: 2022-03-30
Attending: INTERNAL MEDICINE | Admitting: INTERNAL MEDICINE
Payer: COMMERCIAL

## 2022-03-30 VITALS
OXYGEN SATURATION: 95 % | HEART RATE: 64 BPM | RESPIRATION RATE: 16 BRPM | DIASTOLIC BLOOD PRESSURE: 70 MMHG | SYSTOLIC BLOOD PRESSURE: 120 MMHG | BODY MASS INDEX: 19.87 KG/M2 | WEIGHT: 159 LBS

## 2022-03-30 DIAGNOSIS — J90 PLEURAL EFFUSION: ICD-10-CM

## 2022-03-30 DIAGNOSIS — I34.0 MITRAL VALVE INSUFFICIENCY, UNSPECIFIED ETIOLOGY: Primary | ICD-10-CM

## 2022-03-30 PROCEDURE — 99214 OFFICE O/P EST MOD 30 MIN: CPT | Performed by: INTERNAL MEDICINE

## 2022-03-30 PROCEDURE — 71046 X-RAY EXAM CHEST 2 VIEWS: CPT

## 2022-03-30 NOTE — LETTER
3/30/2022         RE: Micah Nunez  [address]  [place] [address]        Dear Colleague,    Thank you for referring your patient, Micah Nunez, to the [facility]. Please see a copy of my visit note below.    Pulmonary Clinic Follow-up Visit    56 year old man with a history of severe MR due to MV prolapse, HFpEF, multiple hospitalizations and ER visits for CHF and volume overload, presents for follow up of pleural effusions. He is doing quite well these days, not that he's taking his medications faithfully. CXR today showed clear lungs without any evidence of pleural effusions. He is going to meet with a valve specialist and cardiothoracic surgery soon to discuss mitral valve replacement.  Discussed importance of smoking cessation. He is working on quitting.     Of note, he remains unvaccinated against COVID-19. STRONGLY recommended he get vaccinated as he is at risk for severe illness. He should also get the flu shot annually in the Fall as well as pneumococcal vaccination (PSV23). Defer to his PMD for further discussions on vaccination.    Follow up as needed.    Feliciano Schaefer MD (Avi)  [facility] Pulmonary & Critical Care  Clinic [phone]  Fax [phone]      CCx: CHF, pleural effusions.    HPI: Interim history: I last saw Micah on 12/8/2021. Since that time, he reports he's generally been doing well. Takes his medications faithfully.   He was in the ER for CHF exacerbation. He was not taking his medications daily when that happened.  He is going to meeting with valve clinic and Dr. Rodas (cardiothoracic surgery) soon.  No fevers, chills, cough, hemoptysis, chest pain or chest pressure.     ROS:  A 12-system review was obtained and was negative with the exception of the symptoms endorsed in the history of present illness.    PMH:  Past Medical History:   Diagnosis Date     Acute exacerbation of CHF (congestive heart failure) (H)  11/27/2021     Benign essential hypertension      Bleeding disorder (H)      Chemical dependency (H)      Closed displaced fracture of fifth metatarsal bone of left foot 07/26/2021     Gastroesophageal reflux disease      Hypertension      Nasal mass 12/17/2013     Papilloma of nose 03/10/2014     Skin disease      Traumatic avulsion of nail plate of toe 07/26/2021     Type I or II open fracture of left ulna 09/05/2020       PSH:  Past Surgical History:   Procedure Laterality Date     ARTHRODESIS FOOT Left 2/17/2015    Procedure: ARTHRODESIS FOOT;  Surgeon: Cortez Hayward DPM;  Location: [facility]     ARTHRODESIS TOE(S)  12/20/2013    Procedure: ARTHRODESIS TOE(S);  Arthrodesis first metatarsophalangeal joint left foot;  Surgeon: Cortez Hayward DPM;  Location: [facility]     COLONOSCOPY N/A 1/12/2021    Procedure: COLONOSCOPY;  Surgeon: Dixon Sarabia MD;  Location: [facility]     ENDOSCOPIC SINUS SURGERY  1/6/2014    Procedure: ENDOSCOPIC SINUS SURGERY;  Functional Endoscopic Sinus Surgery;  Surgeon: Bobo Renteria MD;  Location:  OR     ENDOSCOPIC SINUS SURGERY  7/21/2014    Procedure: ENDOSCOPIC SINUS SURGERY;  Surgeon: Bobo Renteria MD;  Location:  OR     ENDOSCOPIC SINUS SURGERY N/A 4/6/2015    Procedure: ENDOSCOPIC SINUS SURGERY;  Surgeon: Bobo Renteria MD;  Location:  OR     ENDOSCOPIC SINUS SURGERY N/A 8/17/2015    Procedure: ENDOSCOPIC SINUS SURGERY;  Surgeon: Bobo Renteria MD;  Location:  OR     ENDOSCOPIC SINUS SURGERY Bilateral 8/19/2019    Procedure: Bilateral Functional Endoscopic Sinus Surgery, Right Nasal Endoscopy and Excision of Left Nasal Mass;  Surgeon: Bobo Renteria MD;  Location:  OR     ENT SURGERY       EXCISE NASAL MASS Left 11/6/2017    Procedure: EXCISE NASAL MASS;  Excision of Left Nasal Papilloma;  Surgeon: Bobo Renteria MD;  Location:  OR     LAPAROSCOPIC HERNIORRHAPHY INGUINAL BILATERAL Bilateral 4/12/2017    Procedure:  LAPAROSCOPIC HERNIORRHAPHY INGUINAL BILATERAL;  Surgeon: Shay Mercado MD;  Location: [facility]     NASAL ENDOSCOPY Bilateral 2/6/2017    Procedure: NASAL ENDOSCOPY;  Surgeon: Bobo Renteria MD;  [facility]     NASAL ENDOSCOPY N/A 5/21/2018    Procedure: NASAL ENDOSCOPY;  Nasal Endoscopy, CO2 Laser Excision of Left Nasal Papilloma;  Surgeon: Bobo Renteria MD;  [facility]     NASAL/SINUS POLYPECTOMY       PE TUBES         Allergies:  No Known Allergies    Family HX:  Family History   Problem Relation Age of Onset     Diabetes Mother 40     C.A.D. Father 72     Unknown/Adopted No family hx of      Depression No family hx of      Anxiety Disorder No family hx of      Schizophrenia No family hx of      Bipolar Disorder No family hx of      Suicide No family hx of      Substance Abuse No family hx of      Dementia No family hx of      Homero Disease No family hx of      Parkinsonism No family hx of      Autism Spectrum Disorder No family hx of      Intellectual Disability (Mental Retardation) No family hx of      Mental Illness No family hx of      Bleeding Disorder No family hx of        Social Hx:  Social History     Socioeconomic History     Marital status: Single     Spouse name: Not on file     Number of children: Not on file     Years of education: Not on file     Highest education level: Not on file   Occupational History     Not on file   Tobacco Use     Smoking status: Current Every Day Smoker     Packs/day: 1.00     Years: 40.00     Pack years: 40.00     Types: Cigarettes     Start date: 1/1/1980     Smokeless tobacco: Never Used     Tobacco comment: about 3 cigarettes or so a day   Vaping Use     Vaping Use: Never used   Substance and Sexual Activity     Alcohol use: Not Currently     Comment: occasional      Drug use: Not Currently     Types: Cocaine, Methamphetamines, Opiates, Marijuana     Comment: 7 years quit Cocaine/methamphetamines     Sexual activity: Yes     Partners: Female      Birth control/protection: None   Other Topics Concern     Parent/sibling w/ CABG, MI or angioplasty before 65F 55M? No      Service No     Blood Transfusions No     Caffeine Concern No     Occupational Exposure No     Hobby Hazards No     Sleep Concern No     Stress Concern No     Weight Concern No     Special Diet No     Back Care No     Exercise Yes     Bike Helmet No     Seat Belt Yes     Self-Exams Not Asked   Social History Narrative     Not on file     Social Determinants of Health     Financial Resource Strain: Not on file   Food Insecurity: Not on file   Transportation Needs: Not on file   Physical Activity: Not on file   Stress: Not on file   Social Connections: Not on file   Intimate Partner Violence: Not on file   Housing Stability: Not on file       Current Meds:  Current Outpatient Medications   Medication Sig Dispense Refill     furosemide (LASIX) 20 MG tablet Take 2 tablets (40 mg) by mouth daily 180 tablet 1     lisinopril (ZESTRIL) 20 MG tablet Take 1 tablet (20 mg) by mouth daily 90 tablet 1     metoprolol succinate ER (TOPROL-XL) 25 MG 24 hr tablet Take 0.5 tablets (12.5 mg) by mouth daily (Patient taking differently: Take 25 mg by mouth daily ) 45 tablet 1     order for DME Equipment being ordered: Dynaflex insert 2 Units 0     potassium chloride ER (KLOR-CON M) 20 MEQ CR tablet Take 1 tablet (20 mEq) by mouth daily 90 tablet 0     spironolactone (ALDACTONE) 25 MG tablet Take 1 tablet (25 mg) by mouth daily 90 tablet 1       Physical Exam:  /70 (BP Location: Right arm, Patient Position: Sitting, Cuff Size: Adult Regular)   Pulse 64   Resp 16   Wt 72.1 kg (159 lb)   SpO2 95%   BMI 19.87 kg/m    Gen: awake, alert, oriented, no distress  HEENT: nasal turbinates are unremarkable, no oropharyngeal lesions, no cervical or supraclavicular lymphadenopathy  CV: RRR, loud harsh 5/6 systolic murmur heard throughout the precordium  Resp: diminished at right base with dullness to  percussion. No wheezing. Good air movement.  Skin: no apparent rashes  Ext: no cyanosis, clubbing. 2+ pitting edema in the right leg  Neuro: alert, nonfocal    Labs:  Reviewed  BNP 2610 on 1/29  Scr 1.23, remainder of chem panel normal.     Imaging studies:  EXAM: XR CHEST 2 VW  LOCATION: [facility]  DATE/TIME: 3/30/2022 10:53 AM     INDICATION:  Pleural effusions on  CT. Severe mitral regurg.  COMPARISON: 01/29/2022. Chest CTA 11/06/2021                                                                      IMPRESSION: Lungs are clear. Interval resolution of the small right effusion seen on most recent chest x-ray. Lungs are clear. Heart and pulmonary vascularity are normal. No signs of failure or pneumonia.      Pulmonary Function Testing  n/a      Again, thank you for allowing me to participate in the care of your patient.        Sincerely,        Feliciano Schaefer MD

## 2022-03-30 NOTE — PROGRESS NOTES
Pulmonary Clinic Follow-up Visit    56 year old man with a history of severe MR due to MV prolapse, HFpEF, multiple hospitalizations and ER visits for CHF and volume overload, presents for follow up of pleural effusions. He is doing quite well these days, not that he's taking his medications faithfully. CXR today showed clear lungs without any evidence of pleural effusions. He is going to meet with a valve specialist and cardiothoracic surgery soon to discuss mitral valve replacement.  Discussed importance of smoking cessation. He is working on quitting.     Of note, he remains unvaccinated against COVID-19. STRONGLY recommended he get vaccinated as he is at risk for severe illness. He should also get the flu shot annually in the Fall as well as pneumococcal vaccination (PSV23). Defer to his PMD for further discussions on vaccination.    Follow up as needed.    Feliciano Schaefer MD (Avi)  [facility] Pulmonary & Critical Care  Clinic [phone]  Fax [phone]      CCx: CHF, pleural effusions.    HPI: Interim history: I last saw Micah on 12/8/2021. Since that time, he reports he's generally been doing well. Takes his medications faithfully.   He was in the ER for CHF exacerbation. He was not taking his medications daily when that happened.  He is going to meeting with valve clinic and Dr. Rodas (cardiothoracic surgery) soon.  No fevers, chills, cough, hemoptysis, chest pain or chest pressure.     ROS:  A 12-system review was obtained and was negative with the exception of the symptoms endorsed in the history of present illness.    PMH:  Past Medical History:   Diagnosis Date     Acute exacerbation of CHF (congestive heart failure) (H) 11/27/2021     Benign essential hypertension      Bleeding disorder (H)      Chemical dependency (H)      Closed displaced fracture of fifth metatarsal bone of left foot 07/26/2021     Gastroesophageal reflux disease      Hypertension      Nasal mass  12/17/2013     Papilloma of nose 03/10/2014     Skin disease      Traumatic avulsion of nail plate of toe 07/26/2021     Type I or II open fracture of left ulna 09/05/2020       PSH:  Past Surgical History:   Procedure Laterality Date     ARTHRODESIS FOOT Left 2/17/2015    Procedure: ARTHRODESIS FOOT;  Surgeon: Cortez Hayward DPM;  Location: [facility]     ARTHRODESIS TOE(S)  12/20/2013    Procedure: ARTHRODESIS TOE(S);  Arthrodesis first metatarsophalangeal joint left foot;  Surgeon: Cortez Hayward DPM;  Location: [facility]     COLONOSCOPY N/A 1/12/2021    Procedure: COLONOSCOPY;  Surgeon: Dixon Sarabia MD;  [facility]: [facility]     ENDOSCOPIC SINUS SURGERY  1/6/2014    Procedure: ENDOSCOPIC SINUS SURGERY;  Functional Endoscopic Sinus Surgery;  Surgeon: Bobo Renteria MD;  Location:  OR     ENDOSCOPIC SINUS SURGERY  7/21/2014    Procedure: ENDOSCOPIC SINUS SURGERY;  Surgeon: Bobo Renteria MD;  [facility]     ENDOSCOPIC SINUS SURGERY N/A 4/6/2015    Procedure: ENDOSCOPIC SINUS SURGERY;  Surgeon: Bobo Renteria MD;  [facility]     ENDOSCOPIC SINUS SURGERY N/A 8/17/2015    Procedure: ENDOSCOPIC SINUS SURGERY;  Surgeon: Bobo Renteria MD;  [facility]     ENDOSCOPIC SINUS SURGERY Bilateral 8/19/2019    Procedure: Bilateral Functional Endoscopic Sinus Surgery, Right Nasal Endoscopy and Excision of Left Nasal Mass;  Surgeon: Bobo Renteria MD;  Location:  OR     ENT SURGERY       EXCISE NASAL MASS Left 11/6/2017    Procedure: EXCISE NASAL MASS;  Excision of Left Nasal Papilloma;  Surgeon: Bobo Renteria MD;  Location:  OR     LAPAROSCOPIC HERNIORRHAPHY INGUINAL BILATERAL Bilateral 4/12/2017    Procedure: LAPAROSCOPIC HERNIORRHAPHY INGUINAL BILATERAL;  Surgeon: Shay Mercado MD;  Location: [facility]     NASAL ENDOSCOPY Bilateral 2/6/2017    Procedure: NASAL ENDOSCOPY;  Surgeon: Bobo Renteria MD;  Location:  OR     NASAL ENDOSCOPY N/A 5/21/2018     Procedure: NASAL ENDOSCOPY;  Nasal Endoscopy, CO2 Laser Excision of Left Nasal Papilloma;  Surgeon: Bobo Renteria MD;  [facility]     NASAL/SINUS POLYPECTOMY       PE TUBES         Allergies:  No Known Allergies    Family HX:  Family History   Problem Relation Age of Onset     Diabetes Mother 40     C.A.D. Father 72     Unknown/Adopted No family hx of      Depression No family hx of      Anxiety Disorder No family hx of      Schizophrenia No family hx of      Bipolar Disorder No family hx of      Suicide No family hx of      Substance Abuse No family hx of      Dementia No family hx of      Homero Disease No family hx of      Parkinsonism No family hx of      Autism Spectrum Disorder No family hx of      Intellectual Disability (Mental Retardation) No family hx of      Mental Illness No family hx of      Bleeding Disorder No family hx of        Social Hx:  Social History     Socioeconomic History     Marital status: Single     Spouse name: Not on file     Number of children: Not on file     Years of education: Not on file     Highest education level: Not on file   Occupational History     Not on file   Tobacco Use     Smoking status: Current Every Day Smoker     Packs/day: 1.00     Years: 40.00     Pack years: 40.00     Types: Cigarettes     Start date: 1/1/1980     Smokeless tobacco: Never Used     Tobacco comment: about 3 cigarettes or so a day   Vaping Use     Vaping Use: Never used   Substance and Sexual Activity     Alcohol use: Not Currently     Comment: occasional      Drug use: Not Currently     Types: Cocaine, Methamphetamines, Opiates, Marijuana     Comment: 7 years quit Cocaine/methamphetamines     Sexual activity: Yes     Partners: Female     Birth control/protection: None   Other Topics Concern     Parent/sibling w/ CABG, MI or angioplasty before 65F 55M? No      Service No     Blood Transfusions No     Caffeine Concern No     Occupational Exposure No     Hobby Hazards No      Sleep Concern No     Stress Concern No     Weight Concern No     Special Diet No     Back Care No     Exercise Yes     Bike Helmet No     Seat Belt Yes     Self-Exams Not Asked   Social History Narrative     Not on file     Social Determinants of Health     Financial Resource Strain: Not on file   Food Insecurity: Not on file   Transportation Needs: Not on file   Physical Activity: Not on file   Stress: Not on file   Social Connections: Not on file   Intimate Partner Violence: Not on file   Housing Stability: Not on file       Current Meds:  Current Outpatient Medications   Medication Sig Dispense Refill     furosemide (LASIX) 20 MG tablet Take 2 tablets (40 mg) by mouth daily 180 tablet 1     lisinopril (ZESTRIL) 20 MG tablet Take 1 tablet (20 mg) by mouth daily 90 tablet 1     metoprolol succinate ER (TOPROL-XL) 25 MG 24 hr tablet Take 0.5 tablets (12.5 mg) by mouth daily (Patient taking differently: Take 25 mg by mouth daily ) 45 tablet 1     order for DME Equipment being ordered: Dynaflex insert 2 Units 0     potassium chloride ER (KLOR-CON M) 20 MEQ CR tablet Take 1 tablet (20 mEq) by mouth daily 90 tablet 0     spironolactone (ALDACTONE) 25 MG tablet Take 1 tablet (25 mg) by mouth daily 90 tablet 1       Physical Exam:  /70 (BP Location: Right arm, Patient Position: Sitting, Cuff Size: Adult Regular)   Pulse 64   Resp 16   Wt 72.1 kg (159 lb)   SpO2 95%   BMI 19.87 kg/m    Gen: awake, alert, oriented, no distress  HEENT: nasal turbinates are unremarkable, no oropharyngeal lesions, no cervical or supraclavicular lymphadenopathy  CV: RRR, loud harsh 5/6 systolic murmur heard throughout the precordium  Resp: diminished at right base with dullness to percussion. No wheezing. Good air movement.  Skin: no apparent rashes  Ext: no cyanosis, clubbing. 2+ pitting edema in the right leg  Neuro: alert, nonfocal    Labs:  Reviewed  BNP 2610 on 1/29  Scr 1.23, remainder of chem panel normal.     Imaging  studies:  EXAM: XR CHEST 2 VW  LOCATION: [facility]  DATE/TIME: 3/30/2022 10:53 AM     INDICATION:  Pleural effusions on  CT. Severe mitral regurg.  COMPARISON: 01/29/2022. Chest CTA 11/06/2021                                                                      IMPRESSION: Lungs are clear. Interval resolution of the small right effusion seen on most recent chest x-ray. Lungs are clear. Heart and pulmonary vascularity are normal. No signs of failure or pneumonia.      Pulmonary Function Testing  n/a

## 2022-04-01 ENCOUNTER — PATIENT OUTREACH (OUTPATIENT)
Dept: NURSING | Facility: CLINIC | Age: 57
End: 2022-04-01
Payer: COMMERCIAL

## 2022-04-01 DIAGNOSIS — I50.9 CONGESTIVE HEART FAILURE (H): Primary | ICD-10-CM

## 2022-04-01 ASSESSMENT — ACTIVITIES OF DAILY LIVING (ADL): DEPENDENT_IADLS:: TRANSPORTATION

## 2022-04-01 NOTE — PROGRESS NOTES
Clinic Care Coordination Contact    Follow Up Progress Note   ED visit 1/29/2022 CHF exacerbation      Assessment:   Patient reports he is working with [email or web address] to complete disability paperwork  Patient is working with [employer] to set up housing after his heart surgery and will plan to go to TCU for a few weeks   Patient has a Cardiology appointment April 7 and he will discuss the dated for future heart surgery  Needs to decide if he will have a mechanical or pig valve   Patient is very pleased with all the help and calls and everything is falling into place     Weight is stable and denies increased SOB or leg edema       Care Gaps:    Health Maintenance Due   Topic Date Due     HF ACTION PLAN  Never done     TSH W/FREE T4 REFLEX  Never done     ADVANCE CARE PLANNING  Never done     COVID-19 Vaccine (1) Never done     Pneumococcal Vaccine: Pediatrics (0 to 5 Years) and At-Risk Patients (6 to 64 Years) (1 of 2 - PPSV23) Never done     HIV SCREENING  Never done     HEPATITIS B IMMUNIZATION (1 of 3 - Risk 3-dose series) Never done     ZOSTER IMMUNIZATION (1 of 2) Never done     PREVENTIVE CARE VISIT  01/08/2020     LIPID  01/17/2020     INFLUENZA VACCINE (1) 09/01/2021     PHQ-2 (once per calendar year)  01/01/2022       Care Gap Goal set: Yes    Goals addressed this encounter:   Goals Addressed                    This Visit's Progress       1. Reducing Risks // SW Only (pt-stated)   50%      Goal Statement: I will get connected to resources/supports.   Date Goal set: 12/1/21  Barriers: Access   Strengths: Motivated, family, friends, care team   Date to Achieve By: 5/1/22  Patient expressed understanding of goal: Yes    Action steps to achieve this goal:  1. I will start application for social security disability income online.   2. I will use [employer] to medical appts.  3. I will talk to my PCP about a cane or other DME.   4. I will look into other resources as needed (housing, etc).   5. I  will work with care coordination as needed.           2. Improve chronic symptoms (pt-stated)   80%      .Goal Statement: My CHF symptoms will be stable   Date Goal set: 1/31/2022  Barriers: Poor understanding of CHF diagnosis   Strengths: Motivated to learn   Date to Achieve By: 5/31/2022  Patient expressed understanding of goal: Yes    Action steps to achieve this goal:  1. I will check my weight daily and call if weight gain is 2-3# in a day or 5# in a week.  2. I will seek medical help if I have increased shortness of breath episodes,fever or coughing up a colored sputum.  3. I will take my medications as prescribed and keep future Cardiology /primary care appointments.  4. I will follow a low salt diet.            Intervention/Education provided during outreach: Not discussed      Outreach Frequency: 2 weeks        Plan:     Care Coordinator will follow up after Cardiology appointment 4/7/2022    [facility]   Gail Gomez RN, Care Coordinator   [facility]'s   E-mail [email or web address]   [phone]

## 2022-04-06 DIAGNOSIS — I34.0 MITRAL REGURGITATION: Primary | ICD-10-CM

## 2022-04-07 ENCOUNTER — OFFICE VISIT (OUTPATIENT)
Dept: CARDIOLOGY | Facility: CLINIC | Age: 57
End: 2022-04-07
Payer: COMMERCIAL

## 2022-04-07 ENCOUNTER — OFFICE VISIT (OUTPATIENT)
Dept: CARDIOLOGY | Facility: CLINIC | Age: 57
End: 2022-04-07

## 2022-04-07 ENCOUNTER — LAB (OUTPATIENT)
Dept: CARDIOLOGY | Facility: CLINIC | Age: 57
End: 2022-04-07
Payer: COMMERCIAL

## 2022-04-07 ENCOUNTER — ALLIED HEALTH/NURSE VISIT (OUTPATIENT)
Dept: CARDIOLOGY | Facility: CLINIC | Age: 57
End: 2022-04-07

## 2022-04-07 VITALS
WEIGHT: 159 LBS | SYSTOLIC BLOOD PRESSURE: 110 MMHG | RESPIRATION RATE: 20 BRPM | DIASTOLIC BLOOD PRESSURE: 58 MMHG | HEART RATE: 92 BPM | BODY MASS INDEX: 19.87 KG/M2

## 2022-04-07 VITALS
DIASTOLIC BLOOD PRESSURE: 58 MMHG | SYSTOLIC BLOOD PRESSURE: 110 MMHG | WEIGHT: 159 LBS | RESPIRATION RATE: 20 BRPM | BODY MASS INDEX: 19.87 KG/M2 | HEART RATE: 92 BPM

## 2022-04-07 DIAGNOSIS — I34.1 MVP (MITRAL VALVE PROLAPSE): ICD-10-CM

## 2022-04-07 DIAGNOSIS — I34.0 MITRAL REGURGITATION: Primary | ICD-10-CM

## 2022-04-07 DIAGNOSIS — I34.0 SEVERE MITRAL REGURGITATION: ICD-10-CM

## 2022-04-07 DIAGNOSIS — I34.0 MITRAL VALVE REGURGITATION: Primary | ICD-10-CM

## 2022-04-07 DIAGNOSIS — I50.21 ACUTE SYSTOLIC CONGESTIVE HEART FAILURE (H): ICD-10-CM

## 2022-04-07 DIAGNOSIS — I50.9 ACUTE CONGESTIVE HEART FAILURE, UNSPECIFIED HEART FAILURE TYPE (H): ICD-10-CM

## 2022-04-07 DIAGNOSIS — I34.0 MITRAL REGURGITATION: ICD-10-CM

## 2022-04-07 DIAGNOSIS — I34.0 NONRHEUMATIC MITRAL VALVE REGURGITATION: ICD-10-CM

## 2022-04-07 DIAGNOSIS — I34.1 MVP (MITRAL VALVE PROLAPSE): Primary | ICD-10-CM

## 2022-04-07 DIAGNOSIS — I34.1 MITRAL VALVE PROLAPSE: ICD-10-CM

## 2022-04-07 LAB
ALBUMIN SERPL-MCNC: 3.8 G/DL (ref 3.5–5)
ALP SERPL-CCNC: 99 U/L (ref 45–120)
ALT SERPL W P-5'-P-CCNC: 10 U/L (ref 0–45)
ANION GAP SERPL CALCULATED.3IONS-SCNC: 10 MMOL/L (ref 5–18)
AST SERPL W P-5'-P-CCNC: 16 U/L (ref 0–40)
BILIRUB SERPL-MCNC: 0.5 MG/DL (ref 0–1)
BUN SERPL-MCNC: 13 MG/DL (ref 8–22)
CALCIUM SERPL-MCNC: 9.5 MG/DL (ref 8.5–10.5)
CHLORIDE BLD-SCNC: 101 MMOL/L (ref 98–107)
CO2 SERPL-SCNC: 28 MMOL/L (ref 22–31)
CREAT SERPL-MCNC: 1.04 MG/DL (ref 0.7–1.3)
ERYTHROCYTE [DISTWIDTH] IN BLOOD BY AUTOMATED COUNT: 16.4 % (ref 10–15)
GFR SERPL CREATININE-BSD FRML MDRD: 84 ML/MIN/1.73M2
GLUCOSE BLD-MCNC: 97 MG/DL (ref 70–125)
HCT VFR BLD AUTO: 44.8 % (ref 40–53)
HGB BLD-MCNC: 14.4 G/DL (ref 13.3–17.7)
MCH RBC QN AUTO: 28.6 PG (ref 26.5–33)
MCHC RBC AUTO-ENTMCNC: 32.1 G/DL (ref 31.5–36.5)
MCV RBC AUTO: 89 FL (ref 78–100)
PLATELET # BLD AUTO: 234 10E3/UL (ref 150–450)
POTASSIUM BLD-SCNC: 4.6 MMOL/L (ref 3.5–5)
PROT SERPL-MCNC: 7 G/DL (ref 6–8)
RBC # BLD AUTO: 5.04 10E6/UL (ref 4.4–5.9)
SODIUM SERPL-SCNC: 139 MMOL/L (ref 136–145)
WBC # BLD AUTO: 8.9 10E3/UL (ref 4–11)

## 2022-04-07 PROCEDURE — 99205 OFFICE O/P NEW HI 60 MIN: CPT | Performed by: INTERNAL MEDICINE

## 2022-04-07 PROCEDURE — 93000 ELECTROCARDIOGRAM COMPLETE: CPT | Performed by: INTERNAL MEDICINE

## 2022-04-07 PROCEDURE — 85027 COMPLETE CBC AUTOMATED: CPT

## 2022-04-07 PROCEDURE — 80053 COMPREHEN METABOLIC PANEL: CPT

## 2022-04-07 PROCEDURE — 99203 OFFICE O/P NEW LOW 30 MIN: CPT | Performed by: THORACIC SURGERY (CARDIOTHORACIC VASCULAR SURGERY)

## 2022-04-07 PROCEDURE — 36415 COLL VENOUS BLD VENIPUNCTURE: CPT

## 2022-04-07 PROCEDURE — 99207 PR NO CHARGE NURSE ONLY: CPT
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
## 2022-04-08 ENCOUNTER — PATIENT OUTREACH (OUTPATIENT)
Dept: NURSING | Facility: CLINIC | Age: 57
End: 2022-04-08
Payer: COMMERCIAL

## 2022-04-08 DIAGNOSIS — I50.9 CONGESTIVE HEART FAILURE (H): Primary | ICD-10-CM

## 2022-04-08 LAB
ATRIAL RATE - MUSE: 89 BPM
DIASTOLIC BLOOD PRESSURE - MUSE: NORMAL MMHG
INTERPRETATION ECG - MUSE: NORMAL
P AXIS - MUSE: 76 DEGREES
PR INTERVAL - MUSE: 156 MS
QRS DURATION - MUSE: 90 MS
QT - MUSE: 362 MS
QTC - MUSE: 440 MS
R AXIS - MUSE: 67 DEGREES
SYSTOLIC BLOOD PRESSURE - MUSE: NORMAL MMHG
T AXIS - MUSE: 61 DEGREES
VENTRICULAR RATE- MUSE: 89 BPM

## 2022-04-08 ASSESSMENT — ACTIVITIES OF DAILY LIVING (ADL): DEPENDENT_IADLS:: TRANSPORTATION

## 2022-04-08 NOTE — PROGRESS NOTES
Clinic Care Coordination Contact    Follow Up Progress Note     ED visit 1/29/2022 CHF exacerbation      Assessment:   Patient had an appointment with the Cardiologist yesterday   Patient is going to be scheduled for an Angiogram in a couple of weeks and then it will be decided if he will have invasive surgery  or they will go into the groin and place a stent     Care Gaps:    Health Maintenance Due   Topic Date Due     HF ACTION PLAN  Never done     TSH W/FREE T4 REFLEX  Never done     ADVANCE CARE PLANNING  Never done     COVID-19 Vaccine (1) Never done     Pneumococcal Vaccine: Pediatrics (0 to 5 Years) and At-Risk Patients (6 to 64 Years) (1 of 2 - PPSV23) Never done     HIV SCREENING  Never done     HEPATITIS B IMMUNIZATION (1 of 3 - Risk 3-dose series) Never done     ZOSTER IMMUNIZATION (1 of 2) Never done     PREVENTIVE CARE VISIT  01/08/2020     LIPID  01/17/2020     INFLUENZA VACCINE (1) 09/01/2021     PHQ-2 (once per calendar year)  01/01/2022       Will discuss at next PCP visit     Goals addressed this encounter:   Goals Addressed                    This Visit's Progress       1. Reducing Risks // SW Only (pt-stated)   50%      Goal Statement: I will get connected to resources/supports.   Date Goal set: 12/1/21  Barriers: Access   Strengths: Motivated, family, friends, care team   Date to Achieve By: 5/1/22  Patient expressed understanding of goal: Yes    Action steps to achieve this goal:  1. I will start application for social security disability income online.   2. I will use [employer] to medical appts.  3. I will talk to my PCP about a cane or other DME.   4. I will look into other resources as needed (housing, etc).   5. I will work with care coordination as needed.           2. Improve chronic symptoms (pt-stated)   80%      .Goal Statement: My CHF symptoms will be stable   Date Goal set: 1/31/2022  Barriers: Poor understanding of CHF diagnosis   Strengths: Motivated to learn   Date  to Achieve By: 5/31/2022  Patient expressed understanding of goal: Yes    Action steps to achieve this goal:  1. I will check my weight daily and call if weight gain is 2-3# in a day or 5# in a week.  2. I will seek medical help if I have increased shortness of breath episodes,fever or coughing up a colored sputum.  3. I will take my medications as prescribed and keep future Cardiology /primary care appointments.  4. I will follow a low salt diet.            Intervention/Education provided during outreach: Not discussed        Outreach Frequency: 2 weeks        Plan:   Patient will schedule a Angiogram in 2 weeks   Care Coordinator will follow up in 1-2 weeks   [facility]   Gail Gomez RN, Care Coordinator   [facility]'s   E-mail [email or web address]   [phone]
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
## 2022-04-11 DIAGNOSIS — Z11.59 ENCOUNTER FOR SCREENING FOR OTHER VIRAL DISEASES: Primary | ICD-10-CM

## 2022-04-13 ENCOUNTER — PREP FOR PROCEDURE (OUTPATIENT)
Dept: CARDIOLOGY | Facility: CLINIC | Age: 57
End: 2022-04-13
Payer: COMMERCIAL

## 2022-04-13 DIAGNOSIS — I34.0 MITRAL REGURGITATION: Primary | ICD-10-CM

## 2022-04-13 RX ORDER — DIAZEPAM 10 MG
10 TABLET ORAL
Status: CANCELLED | OUTPATIENT
Start: 2022-04-13

## 2022-04-13 RX ORDER — SODIUM CHLORIDE 9 MG/ML
INJECTION, SOLUTION INTRAVENOUS CONTINUOUS
Status: CANCELLED | OUTPATIENT
Start: 2022-04-13

## 2022-04-13 RX ORDER — LIDOCAINE 40 MG/G
CREAM TOPICAL
Status: CANCELLED | OUTPATIENT
Start: 2022-04-13

## 2022-04-13 RX ORDER — ASPIRIN 81 MG/1
243 TABLET, CHEWABLE ORAL ONCE
Status: CANCELLED | OUTPATIENT
Start: 2022-04-13

## 2022-04-13 RX ORDER — ASPIRIN 325 MG
325 TABLET ORAL ONCE
Status: CANCELLED | OUTPATIENT
Start: 2022-04-13 | End: 2022-04-13

## 2022-04-13 RX ORDER — FENTANYL CITRATE 50 UG/ML
25 INJECTION, SOLUTION INTRAMUSCULAR; INTRAVENOUS
Status: CANCELLED | OUTPATIENT
Start: 2022-04-13

## 2022-04-13 NOTE — PROGRESS NOTES
[facility] Heart Care RN Pre-Procedure Education Note    Procedure: Ca pPCI   Date of Procedure: 4/22/22  Arrival time: 10:00AM  Location: [facility]     Diagnosis: MR  Cardiologist Ordering Procedure: Dr. Tobin  Primary Cardiologist: Dr Jackson  PCP: Yovany White Test: 4/18/22 [facility]  H&P completed by: Dr. Tobin  Previous Cath Report: no  Bypass grafts: No  Labs within 7 days: am of procedure  Renal Issues: No  Diabetic: No    Does patient have contrast/IV dye allergy: no      Patient Education  Explained indications/risks for diagnostic evaluation, including one or more of the following:  coronary angiogram  Explained indications/risks for therapeutic interventions, including one or more of the following: PTCA and stent.  These risks are in addition to baseline risks associated with a Diagnostic Evaluation.  Patient state understanding of procedure and risks and agrees to proceed  No further questions at this time.    Pre-procedure instructions  Patient instructed to be NPO after midnight.  Patient instructed to arrange for transportation home following procedure  No driving for 24 hours post procedure  Depending on the results of the test, provider may decide to keep patient overnight in the hospital for further evaluation.  Bring Cpap if applicable.   Will require responsible adult to stay at home with pt for 24 hours if same-day discharge    Pre-procedure medication instructions  medication instructions: I instructed pt to take metoprolol and lisinopril AM of procedure, but the patient said it would be easier for him to manage if he skips all meds morning of procedure.     Pt will have a friend drop him off and pick him up. If same day discharge, he will go stay with his parents. Pt is currently homeless and temporarily living at a hotel with finding from the [facility].       Neel Guerrero RN, Structural Heart Coordinator  [facility]  Valve Clinic  [phone]  Fax: [phone]

## 2022-04-18 ENCOUNTER — LAB (OUTPATIENT)
Dept: CARDIOLOGY | Facility: CLINIC | Age: 57
End: 2022-04-18
Payer: COMMERCIAL

## 2022-04-18 ENCOUNTER — OFFICE VISIT (OUTPATIENT)
Dept: CARDIOLOGY | Facility: CLINIC | Age: 57
End: 2022-04-18
Payer: COMMERCIAL

## 2022-04-18 VITALS
HEART RATE: 64 BPM | DIASTOLIC BLOOD PRESSURE: 64 MMHG | SYSTOLIC BLOOD PRESSURE: 106 MMHG | OXYGEN SATURATION: 95 % | WEIGHT: 161.6 LBS | RESPIRATION RATE: 19 BRPM | BODY MASS INDEX: 20.2 KG/M2

## 2022-04-18 DIAGNOSIS — I50.32 CHRONIC HEART FAILURE WITH PRESERVED EJECTION FRACTION (H): ICD-10-CM

## 2022-04-18 DIAGNOSIS — Z11.59 ENCOUNTER FOR SCREENING FOR OTHER VIRAL DISEASES: ICD-10-CM

## 2022-04-18 DIAGNOSIS — I10 BENIGN ESSENTIAL HYPERTENSION: Primary | Chronic | ICD-10-CM

## 2022-04-18 DIAGNOSIS — I34.0 NONRHEUMATIC MITRAL VALVE REGURGITATION: ICD-10-CM

## 2022-04-18 PROCEDURE — 99214 OFFICE O/P EST MOD 30 MIN: CPT | Performed by: NURSE PRACTITIONER

## 2022-04-18 PROCEDURE — U0005 INFEC AGEN DETEC AMPLI PROBE: HCPCS

## 2022-04-18 PROCEDURE — U0003 INFECTIOUS AGENT DETECTION BY NUCLEIC ACID (DNA OR RNA); SEVERE ACUTE RESPIRATORY SYNDROME CORONAVIRUS 2 (SARS-COV-2) (CORONAVIRUS DISEASE [COVID-19]), AMPLIFIED PROBE TECHNIQUE, MAKING USE OF HIGH THROUGHPUT TECHNOLOGIES AS DESCRIBED BY CMS-2020-01-R: HCPCS

## 2022-04-18 NOTE — PROGRESS NOTES
Assessment/Recommendations   Assessment:    1.  Heart failure with preserved ejection fraction, NYHA class II: Compensated.  He states his dyspnea on exertion has been stable.  There has been a few days where he has missed his Lasix and he can feel the difference.  His weight has remained stable.  He denies orthopnea or PND  2.  Severe mitral valve regurgitation: He met with Dr. Tobin and Dr. Rodas on April 7.  He is going to proceed with work-up for possible MitraClip.  He is scheduled for coronary angiogram on April 22.  3.  Hypertension: Controlled.  Blood pressure 106/64    Plan:  1.  Continue current medications  2.  Coronary angiogram scheduled for April 22 for work-up of MitraClip  3.  Continue monitoring weights and low-salt diet    Micah Nunez will follow up with Dr. Jackson in June and in the heart failure clinic in August or sooner if needed.     History of Present Illness/Subjective    Mr. Micah Nunez is a 56 year old male seen at [facility] heart failure clinic today for continued follow-up.  He follows up for heart failure with preserved ejection fraction.  He had a MARIANNA December 20, 2021 which showed an ejection fraction of 55 to 60% with severe mitral valve regurgitation.  He met with Dr. Tobin and Dr. Rodas on April 7 in the valve clinic.  He has a past medical history significant for hypertension, past substance abuse.    Today, he states he is feeling well.  His fatigue and dyspnea on exertion have been stable.  He denies orthopnea, PND, palpitations, chest pain, abdominal fullness/bloating and lower extremity edema.      He is monitoring home weights which are stable.  He is trying to follow a low-sodium diet     Physical Examination Review of Systems   /64 (BP Location: Left arm, Patient Position: Sitting, Cuff Size: Adult Regular)   Pulse 64   Resp 19   Wt 73.3 kg (161 lb 9.6 oz)   SpO2 95%   BMI 20.20 kg/m    Body mass index is 20.2 kg/m .  Wt  Readings from Last 3 Encounters:   04/18/22 73.3 kg (161 lb 9.6 oz)   04/07/22 72.1 kg (159 lb)   04/07/22 72.1 kg (159 lb)       General Appearance:   no acute distress   ENT/Mouth: Wearing mask   EYES:  no scleral icterus, normal conjunctivae   Neck: no thyromegaly   Chest/Lungs:   lungs are clear to auscultation, no rales or wheezing, equal chest wall expansion    Cardiovascular:   Regular. Normal first and second heart sounds with grade 3/6 systolic murmur, no rubs, or gallops; Jugular venous pressure normal, no edema bilaterally    Abdomen:   bowel sounds are present   Extremities: no cyanosis or clubbing   Skin: no xanthelasma, warm.    Neurologic: normal  bilateral, no tremors     Psychiatric: alert and oriented x3                                              Medical History  Surgical History Family History Social History   Past Medical History:   Diagnosis Date     Acute exacerbation of CHF (congestive heart failure) (H) 11/27/2021     Benign essential hypertension      Bleeding disorder (H)      Chemical dependency (H)      Closed displaced fracture of fifth metatarsal bone of left foot 07/26/2021     Gastroesophageal reflux disease      Hypertension      Nasal mass 12/17/2013     Papilloma of nose 03/10/2014     Skin disease      Traumatic avulsion of nail plate of toe 07/26/2021     Type I or II open fracture of left ulna 09/05/2020    Past Surgical History:   Procedure Laterality Date     ARTHRODESIS FOOT Left 2/17/2015    Procedure: ARTHRODESIS FOOT;  Surgeon: Cortez Hayward DPM;  Location: [facility]     ARTHRODESIS TOE(S)  12/20/2013    Procedure: ARTHRODESIS TOE(S);  Arthrodesis first metatarsophalangeal joint left foot;  Surgeon: Cortez Hayward DPM;  [facility]: [facility]     COLONOSCOPY N/A 1/12/2021    Procedure: COLONOSCOPY;  Surgeon: Dixon Sarabia MD;  [facility]: [facility]     ENDOSCOPIC SINUS SURGERY  1/6/2014    Procedure: ENDOSCOPIC SINUS SURGERY;  Functional Endoscopic Sinus Surgery;   Surgeon: Bobo Renteria MD;  [facility]     ENDOSCOPIC SINUS SURGERY  7/21/2014    Procedure: ENDOSCOPIC SINUS SURGERY;  Surgeon: Bobo Renteria MD;  [facility]     ENDOSCOPIC SINUS SURGERY N/A 4/6/2015    Procedure: ENDOSCOPIC SINUS SURGERY;  Surgeon: Bobo Renteria MD;  [facility]     ENDOSCOPIC SINUS SURGERY N/A 8/17/2015    Procedure: ENDOSCOPIC SINUS SURGERY;  Surgeon: Bobo Renteria MD;  [facility]     ENDOSCOPIC SINUS SURGERY Bilateral 8/19/2019    Procedure: Bilateral Functional Endoscopic Sinus Surgery, Right Nasal Endoscopy and Excision of Left Nasal Mass;  Surgeon: Bobo Renteria MD;  [facility]     ENT SURGERY       EXCISE NASAL MASS Left 11/6/2017    Procedure: EXCISE NASAL MASS;  Excision of Left Nasal Papilloma;  Surgeon: Bobo Renteria MD;  [facility]     LAPAROSCOPIC HERNIORRHAPHY INGUINAL BILATERAL Bilateral 4/12/2017    Procedure: LAPAROSCOPIC HERNIORRHAPHY INGUINAL BILATERAL;  Surgeon: Shay Mercado MD;  Location: [facility]     NASAL ENDOSCOPY Bilateral 2/6/2017    Procedure: NASAL ENDOSCOPY;  Surgeon: Bobo Renteria MD;  [facility]     NASAL ENDOSCOPY N/A 5/21/2018    Procedure: NASAL ENDOSCOPY;  Nasal Endoscopy, CO2 Laser Excision of Left Nasal Papilloma;  Surgeon: Bobo Renteria MD;  [facility]     NASAL/SINUS POLYPECTOMY       PE TUBES      Family History   Problem Relation Age of Onset     Diabetes Mother 40     C.A.D. Father 72     Unknown/Adopted No family hx of      Depression No family hx of      Anxiety Disorder No family hx of      Schizophrenia No family hx of      Bipolar Disorder No family hx of      Suicide No family hx of      Substance Abuse No family hx of      Dementia No family hx of      Homero Disease No family hx of      Parkinsonism No family hx of      Autism Spectrum Disorder No family hx of      Intellectual Disability (Mental Retardation) No family hx of      Mental Illness  No family hx of      Bleeding Disorder No family hx of     Social History     Socioeconomic History     Marital status: Single     Spouse name: Not on file     Number of children: Not on file     Years of education: Not on file     Highest education level: Not on file   Occupational History     Not on file   Tobacco Use     Smoking status: Current Every Day Smoker     Packs/day: 1.00     Years: 40.00     Pack years: 40.00     Types: Cigarettes     Start date: 1/1/1980     Smokeless tobacco: Never Used     Tobacco comment: about 3 cigarettes or so a day   Vaping Use     Vaping Use: Never used   Substance and Sexual Activity     Alcohol use: Not Currently     Comment: occasional      Drug use: Not Currently     Types: Cocaine, Methamphetamines, Opiates, Marijuana     Comment: 7 years quit Cocaine/methamphetamines     Sexual activity: Yes     Partners: Female     Birth control/protection: None   Other Topics Concern     Parent/sibling w/ CABG, MI or angioplasty before 65F 55M? No      Service No     Blood Transfusions No     Caffeine Concern No     Occupational Exposure No     Hobby Hazards No     Sleep Concern No     Stress Concern No     Weight Concern No     Special Diet No     Back Care No     Exercise Yes     Bike Helmet No     Seat Belt Yes     Self-Exams Not Asked   Social History Narrative     Not on file     Social Determinants of Health     Financial Resource Strain: Not on file   Food Insecurity: Not on file   Transportation Needs: Not on file   Physical Activity: Not on file   Stress: Not on file   Social Connections: Not on file   Intimate Partner Violence: Not on file   Housing Stability: Not on file          Medications  Allergies   Current Outpatient Medications   Medication Sig Dispense Refill     furosemide (LASIX) 20 MG tablet Take 2 tablets (40 mg) by mouth daily 180 tablet 1     lisinopril (ZESTRIL) 20 MG tablet Take 1 tablet (20 mg) by mouth daily 90 tablet 1     metoprolol succinate ER  (TOPROL-XL) 25 MG 24 hr tablet Take 0.5 tablets (12.5 mg) by mouth daily (Patient taking differently: Take 25 mg by mouth daily) 45 tablet 1     potassium chloride ER (KLOR-CON M) 20 MEQ CR tablet Take 1 tablet (20 mEq) by mouth daily 90 tablet 0     spironolactone (ALDACTONE) 25 MG tablet Take 1 tablet (25 mg) by mouth daily 90 tablet 1     order for DME Equipment being ordered: Dynaflex insert (Patient not taking: Reported on 4/18/2022) 2 Units 0    No Known Allergies      Lab Results    Chemistry/lipid CBC Cardiac Enzymes/BNP/TSH/INR   Lab Results   Component Value Date    CHOL 220 (H) 01/17/2019    HDL 53 01/17/2019    TRIG 259 (H) 01/17/2019    BUN 13 04/07/2022     04/07/2022    CO2 28 04/07/2022    Lab Results   Component Value Date    WBC 8.9 04/07/2022    HGB 14.4 04/07/2022    HCT 44.8 04/07/2022    MCV 89 04/07/2022     04/07/2022    Lab Results   Component Value Date    TROPONINI 0.03 01/29/2022    BNP 2,610 (H) 01/29/2022    INR 1.09 05/28/2020             This note has been dictated using voice recognition software. Any grammatical, typographical, or context distortions are unintentional and inherent to the software    30 minutes spent on the date of encounter doing chart review, review of outside records, review of test results, patient visit and documentation.

## 2022-04-18 NOTE — PATIENT INSTRUCTIONS
Micah Nunez,    It was a pleasure to see you today at [facility] HEART [facility].     My recommendations after this visit include:  - Please follow up with Dr Jackson in June   - Please follow up with Chaya Coffman in 4 months  - Continue current medications    Chaya Coffman, CNP

## 2022-04-18 NOTE — LETTER
4/18/2022    Yovany White MD  [address] [facility] [address]    RE: Micah Nunez       Dear Colleague,     I had the pleasure of seeing Micah Nunez in the [facility].        Assessment/Recommendations   Assessment:    1.  Heart failure with preserved ejection fraction, NYHA class II: Compensated.  He states his dyspnea on exertion has been stable.  There has been a few days where he has missed his Lasix and he can feel the difference.  His weight has remained stable.  He denies orthopnea or PND  2.  Severe mitral valve regurgitation: He met with Dr. Tobin and Dr. Rodas on April 7.  He is going to proceed with work-up for possible MitraClip.  He is scheduled for coronary angiogram on April 22.  3.  Hypertension: Controlled.  Blood pressure 106/64    Plan:  1.  Continue current medications  2.  Coronary angiogram scheduled for April 22 for work-up of MitraClip  3.  Continue monitoring weights and low-salt diet    Micah Nunez will follow up with Dr. Jackson in June and in the heart failure clinic in August or sooner if needed.     History of Present Illness/Subjective    Mr. Micah Nunez is a 56 year old male seen at [facility] heart failure clinic today for continued follow-up.  He follows up for heart failure with preserved ejection fraction.  He had a MARIANNA December 20, 2021 which showed an ejection fraction of 55 to 60% with severe mitral valve regurgitation.  He met with Dr. Tobin and Dr. Rodas on April 7 in the valve clinic.  He has a past medical history significant for hypertension, past substance abuse.    Today, he states he is feeling well.  His fatigue and dyspnea on exertion have been stable.  He denies orthopnea, PND, palpitations, chest pain, abdominal fullness/bloating and lower extremity edema.      He is monitoring home weights which are stable.  He is trying to follow a low-sodium diet     Physical Examination Review of  Systems   /64 (BP Location: Left arm, Patient Position: Sitting, Cuff Size: Adult Regular)   Pulse 64   Resp 19   Wt 73.3 kg (161 lb 9.6 oz)   SpO2 95%   BMI 20.20 kg/m    Body mass index is 20.2 kg/m .  Wt Readings from Last 3 Encounters:   04/18/22 73.3 kg (161 lb 9.6 oz)   04/07/22 72.1 kg (159 lb)   04/07/22 72.1 kg (159 lb)       General Appearance:   no acute distress   ENT/Mouth: Wearing mask   EYES:  no scleral icterus, normal conjunctivae   Neck: no thyromegaly   Chest/Lungs:   lungs are clear to auscultation, no rales or wheezing, equal chest wall expansion    Cardiovascular:   Regular. Normal first and second heart sounds with grade 3/6 systolic murmur, no rubs, or gallops; Jugular venous pressure normal, no edema bilaterally    Abdomen:   bowel sounds are present   Extremities: no cyanosis or clubbing   Skin: no xanthelasma, warm.    Neurologic: normal  bilateral, no tremors     Psychiatric: alert and oriented x3                                              Medical History  Surgical History Family History Social History   Past Medical History:   Diagnosis Date     Acute exacerbation of CHF (congestive heart failure) (H) 11/27/2021     Benign essential hypertension      Bleeding disorder (H)      Chemical dependency (H)      Closed displaced fracture of fifth metatarsal bone of left foot 07/26/2021     Gastroesophageal reflux disease      Hypertension      Nasal mass 12/17/2013     Papilloma of nose 03/10/2014     Skin disease      Traumatic avulsion of nail plate of toe 07/26/2021     Type I or II open fracture of left ulna 09/05/2020    Past Surgical History:   Procedure Laterality Date     ARTHRODESIS FOOT Left 2/17/2015    Procedure: ARTHRODESIS FOOT;  Surgeon: Cortez Hayward DPM;  Location: [facility]     ARTHRODESIS TOE(S)  12/20/2013    Procedure: ARTHRODESIS TOE(S);  Arthrodesis first metatarsophalangeal joint left foot;  Surgeon: Cortez Hayward DPM;  Location: [facility]      COLONOSCOPY N/A 1/12/2021    Procedure: COLONOSCOPY;  Surgeon: Dixon Sarabia MD;  Location: [facility]     ENDOSCOPIC SINUS SURGERY  1/6/2014    Procedure: ENDOSCOPIC SINUS SURGERY;  Functional Endoscopic Sinus Surgery;  Surgeon: Bobo Renteria MD;  [facility]     ENDOSCOPIC SINUS SURGERY  7/21/2014    Procedure: ENDOSCOPIC SINUS SURGERY;  Surgeon: Bobo Renteria MD;  [facility]     ENDOSCOPIC SINUS SURGERY N/A 4/6/2015    Procedure: ENDOSCOPIC SINUS SURGERY;  Surgeon: Bobo Renteria MD;  [facility]     ENDOSCOPIC SINUS SURGERY N/A 8/17/2015    Procedure: ENDOSCOPIC SINUS SURGERY;  Surgeon: Bobo Renteria MD;  [facility]     ENDOSCOPIC SINUS SURGERY Bilateral 8/19/2019    Procedure: Bilateral Functional Endoscopic Sinus Surgery, Right Nasal Endoscopy and Excision of Left Nasal Mass;  Surgeon: Bobo Renteria MD;  Location:  OR     ENT SURGERY       EXCISE NASAL MASS Left 11/6/2017    Procedure: EXCISE NASAL MASS;  Excision of Left Nasal Papilloma;  Surgeon: Bobo Renteria MD;  Location:  OR     LAPAROSCOPIC HERNIORRHAPHY INGUINAL BILATERAL Bilateral 4/12/2017    Procedure: LAPAROSCOPIC HERNIORRHAPHY INGUINAL BILATERAL;  Surgeon: Shay Mercado MD;  Location: [facility]     NASAL ENDOSCOPY Bilateral 2/6/2017    Procedure: NASAL ENDOSCOPY;  Surgeon: Bobo Renteria MD;  Location:  OR     NASAL ENDOSCOPY N/A 5/21/2018    Procedure: NASAL ENDOSCOPY;  Nasal Endoscopy, CO2 Laser Excision of Left Nasal Papilloma;  Surgeon: Bobo Renteria MD;  [facility]     NASAL/SINUS POLYPECTOMY       PE TUBES      Family History   Problem Relation Age of Onset     Diabetes Mother 40     C.A.D. Father 72     Unknown/Adopted No family hx of      Depression No family hx of      Anxiety Disorder No family hx of      Schizophrenia No family hx of      Bipolar Disorder No family hx of      Suicide No family hx of      Substance Abuse No family hx of      Dementia No  family hx of      Stevens Disease No family hx of      Parkinsonism No family hx of      Autism Spectrum Disorder No family hx of      Intellectual Disability (Mental Retardation) No family hx of      Mental Illness No family hx of      Bleeding Disorder No family hx of     Social History     Socioeconomic History     Marital status: Single     Spouse name: Not on file     Number of children: Not on file     Years of education: Not on file     Highest education level: Not on file   Occupational History     Not on file   Tobacco Use     Smoking status: Current Every Day Smoker     Packs/day: 1.00     Years: 40.00     Pack years: 40.00     Types: Cigarettes     Start date: 1/1/1980     Smokeless tobacco: Never Used     Tobacco comment: about 3 cigarettes or so a day   Vaping Use     Vaping Use: Never used   Substance and Sexual Activity     Alcohol use: Not Currently     Comment: occasional      Drug use: Not Currently     Types: Cocaine, Methamphetamines, Opiates, Marijuana     Comment: 7 years quit Cocaine/methamphetamines     Sexual activity: Yes     Partners: Female     Birth control/protection: None   Other Topics Concern     Parent/sibling w/ CABG, MI or angioplasty before 65F 55M? No      Service No     Blood Transfusions No     Caffeine Concern No     Occupational Exposure No     Hobby Hazards No     Sleep Concern No     Stress Concern No     Weight Concern No     Special Diet No     Back Care No     Exercise Yes     Bike Helmet No     Seat Belt Yes     Self-Exams Not Asked   Social History Narrative     Not on file     Social Determinants of Health     Financial Resource Strain: Not on file   Food Insecurity: Not on file   Transportation Needs: Not on file   Physical Activity: Not on file   Stress: Not on file   Social Connections: Not on file   Intimate Partner Violence: Not on file   Housing Stability: Not on file          Medications  Allergies   Current Outpatient Medications   Medication Sig  Dispense Refill     furosemide (LASIX) 20 MG tablet Take 2 tablets (40 mg) by mouth daily 180 tablet 1     lisinopril (ZESTRIL) 20 MG tablet Take 1 tablet (20 mg) by mouth daily 90 tablet 1     metoprolol succinate ER (TOPROL-XL) 25 MG 24 hr tablet Take 0.5 tablets (12.5 mg) by mouth daily (Patient taking differently: Take 25 mg by mouth daily) 45 tablet 1     potassium chloride ER (KLOR-CON M) 20 MEQ CR tablet Take 1 tablet (20 mEq) by mouth daily 90 tablet 0     spironolactone (ALDACTONE) 25 MG tablet Take 1 tablet (25 mg) by mouth daily 90 tablet 1     order for DME Equipment being ordered: Dynaflex insert (Patient not taking: Reported on 4/18/2022) 2 Units 0    No Known Allergies      Lab Results    Chemistry/lipid CBC Cardiac Enzymes/BNP/TSH/INR   Lab Results   Component Value Date    CHOL 220 (H) 01/17/2019    HDL 53 01/17/2019    TRIG 259 (H) 01/17/2019    BUN 13 04/07/2022     04/07/2022    CO2 28 04/07/2022    Lab Results   Component Value Date    WBC 8.9 04/07/2022    HGB 14.4 04/07/2022    HCT 44.8 04/07/2022    MCV 89 04/07/2022     04/07/2022    Lab Results   Component Value Date    TROPONINI 0.03 01/29/2022    BNP 2,610 (H) 01/29/2022    INR 1.09 05/28/2020             This note has been dictated using voice recognition software. Any grammatical, typographical, or context distortions are unintentional and inherent to the software    30 minutes spent on the date of encounter doing chart review, review of outside records, review of test results, patient visit and documentation.                  Thank you for allowing me to participate in the care of your patient.      Sincerely,     NEHEMIAS Cartwright [facility]  cc:   No referring provider defined for this encounter.

## 2022-04-19 ENCOUNTER — OFFICE VISIT (OUTPATIENT)
Dept: FAMILY MEDICINE | Facility: CLINIC | Age: 57
End: 2022-04-19
Payer: COMMERCIAL

## 2022-04-19 VITALS
TEMPERATURE: 97.8 F | OXYGEN SATURATION: 98 % | HEART RATE: 83 BPM | RESPIRATION RATE: 18 BRPM | DIASTOLIC BLOOD PRESSURE: 80 MMHG | BODY MASS INDEX: 20.14 KG/M2 | WEIGHT: 162 LBS | SYSTOLIC BLOOD PRESSURE: 110 MMHG | HEIGHT: 75 IN

## 2022-04-19 DIAGNOSIS — Z00.00 ROUTINE GENERAL MEDICAL EXAMINATION AT A HEALTH CARE FACILITY: Primary | ICD-10-CM

## 2022-04-19 DIAGNOSIS — F17.200 NICOTINE DEPENDENCE, UNCOMPLICATED, UNSPECIFIED NICOTINE PRODUCT TYPE: ICD-10-CM

## 2022-04-19 DIAGNOSIS — E78.5 HYPERLIPIDEMIA LDL GOAL <100: ICD-10-CM

## 2022-04-19 DIAGNOSIS — Z13.220 SCREENING FOR HYPERLIPIDEMIA: ICD-10-CM

## 2022-04-19 DIAGNOSIS — Z13.29 SCREENING FOR THYROID DISORDER: ICD-10-CM

## 2022-04-19 LAB
CHOLEST SERPL-MCNC: 196 MG/DL
FASTING STATUS PATIENT QL REPORTED: NO
HDLC SERPL-MCNC: 60 MG/DL
LDLC SERPL CALC-MCNC: 113 MG/DL
NONHDLC SERPL-MCNC: 136 MG/DL
SARS-COV-2 RNA RESP QL NAA+PROBE: NEGATIVE
TRIGL SERPL-MCNC: 117 MG/DL
TSH SERPL DL<=0.005 MIU/L-ACNC: 0.89 MU/L (ref 0.4–4)

## 2022-04-19 PROCEDURE — 99396 PREV VISIT EST AGE 40-64: CPT | Performed by: FAMILY MEDICINE

## 2022-04-19 PROCEDURE — 84443 ASSAY THYROID STIM HORMONE: CPT | Performed by: FAMILY MEDICINE

## 2022-04-19 PROCEDURE — 80061 LIPID PANEL: CPT | Performed by: FAMILY MEDICINE

## 2022-04-19 PROCEDURE — 36415 COLL VENOUS BLD VENIPUNCTURE: CPT | Performed by: FAMILY MEDICINE

## 2022-04-19 ASSESSMENT — ENCOUNTER SYMPTOMS
NERVOUS/ANXIOUS: 1
PARESTHESIAS: 1
PALPITATIONS: 0
FEVER: 0
HEMATOCHEZIA: 0
HEMATURIA: 0
FREQUENCY: 1
ABDOMINAL PAIN: 0
CONSTIPATION: 1
WEAKNESS: 1
DIZZINESS: 1
HEADACHES: 0
EYE PAIN: 0
SHORTNESS OF BREATH: 1
HEARTBURN: 0
MYALGIAS: 0
DYSURIA: 0
ARTHRALGIAS: 1
CHILLS: 1
NAUSEA: 0
SORE THROAT: 0
DIARRHEA: 0
COUGH: 1
JOINT SWELLING: 1

## 2022-04-19 ASSESSMENT — PAIN SCALES - GENERAL: PAINLEVEL: MILD PAIN (2)

## 2022-04-19 NOTE — LETTER
May 6, 2022      Micah Nunez  [address]  [facility] [address]        Dear ,    We are writing to inform you of your test results.    cholesterol was slightly high. Thyroid labs were within normal limits.   Continue with his cholesterol medication and lifestyle changes      Resulted Orders   TSH WITH FREE T4 REFLEX   Result Value Ref Range    TSH 0.89 0.40 - 4.00 mU/L   Lipid panel reflex to direct LDL Fasting   Result Value Ref Range    Cholesterol 196 <200 mg/dL    Triglycerides 117 <150 mg/dL    Direct Measure HDL 60 >=40 mg/dL    LDL Cholesterol Calculated 113 (H) <=100 mg/dL    Non HDL Cholesterol 136 (H) <130 mg/dL    Patient Fasting > 8hrs? No     Narrative    Cholesterol  Desirable:  <200 mg/dL    Triglycerides  Normal:  Less than 150 mg/dL  Borderline High:  150-199 mg/dL  High:  200-499 mg/dL  Very High:  Greater than or equal to 500 mg/dL    Direct Measure HDL  Female:  Greater than or equal to 50 mg/dL   Male:  Greater than or equal to 40 mg/dL    LDL Cholesterol  Desirable:  <100mg/dL  Above Desirable:  100-129 mg/dL   Borderline High:  130-159 mg/dL   High:  160-189 mg/dL   Very High:  >= 190 mg/dL    Non HDL Cholesterol  Desirable:  130 mg/dL  Above Desirable:  130-159 mg/dL  Borderline High:  160-189 mg/dL  High:  190-219 mg/dL  Very High:  Greater than or equal to 220 mg/dL       If you have any questions or concerns, please call the clinic at the number listed above.       Sincerely,      Yovany White MD
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
## 2022-04-19 NOTE — PATIENT INSTRUCTIONS
Preventive Health Recommendations  Male Ages 50 - 64    Yearly exam:             See your health care provider every year in order to  o   Review health changes.   o   Discuss preventive care.    o   Review your medicines if your doctor has prescribed any.     Have a cholesterol test every 5 years, or more frequently if you are at risk for high cholesterol/heart disease.     Have a diabetes test (fasting glucose) every three years. If you are at risk for diabetes, you should have this test more often.     Have a colonoscopy at age 50, or have a yearly FIT test (stool test). These exams will check for colon cancer.      Talk with your health care provider about whether or not a prostate cancer screening test (PSA) is right for you.    You should be tested each year for STDs (sexually transmitted diseases), if you re at risk.     Shots: Get a flu shot each year. Get a tetanus shot every 10 years.     Nutrition:    Eat at least 5 servings of fruits and vegetables daily.     Eat whole-grain bread, whole-wheat pasta and brown rice instead of white grains and rice.     Get adequate Calcium and Vitamin D.     Lifestyle    Exercise for at least 150 minutes a week (30 minutes a day, 5 days a week). This will help you control your weight and prevent disease.     Limit alcohol to one drink per day.     No smoking.     Wear sunscreen to prevent skin cancer.     See your dentist every six months for an exam and cleaning.     See your eye doctor every 1 to 2 years.            Thank you for choosing [facility].  You may be receiving an email and/or telephone survey request from [facility] Customer Experience regarding your visit today.  Please take a few minutes to respond to the survey to let us know how we are doing.      If you have questions or concerns, please contact us via [email or web address] or you can contact your care team at [phone] option 2.    Our Clinic hours are:  Monday - Thursday 7am-6pm  Friday 7am-5pm    The  [place] outpatient lab hours are:  Monday - Friday 7am-4:30pm    Appointments are required, call [phone]    If you have clinical questions after hours or would like to schedule an appointment,  call the clinic at [phone].    HOW TO QUIT SMOKING  Smoking is one of the hardest habits to break. About half of all those who have ever smoked have been able to quit, and most of those (about 70%) who still smoke want to quit. Here are some of the best ways to stop smoking.     KEEP TRYING:  It takes most smokers about 8 tries before they are finally able to fully quit. So, the more often you try and fail, the better your chance of quitting the next time! So, don't give up!    GO COLD TURKEY:  Most ex-smokers quit cold turkey. Trying to cut back gradually doesn't seem to work as well, perhaps because it continues the smoking habit. Also, it is possible to fool yourself by inhaling more while smoking fewer cigarettes. This results in the same amount of nicotine in your body!    GET SUPPORT:  Support programs can make an important difference, especially for the heavy smoker. These groups offer lectures, methods to change your behavior and peer support. Call the free national Quitline for more information. 800-QUIT-NOW (415-861-3959). Low-cost or free programs are offered by many hospitals, local chapters of the American Lung Association (271-924-5370) and the American Cancer Society (260-120-3024). Support at home is important too. Non-smokers can help by offering praise and encouragement. If the smoker fails to quit, encourage them to try again!    OVER-THE-COUNTER MEDICINES:  For those who can't quit on their own, Nicotine Replacement Therapy (NRT) may make quitting much easier. Certain aids such as the nicotine patch, gum and lozenge are available without a prescription. However, it is best to use these under the guidance of your doctor. The skin patch provides a steady supply of nicotine to the body. Nicotine gum  and lozenge gives temporary bursts of low levels of nicotine. Both methods take the edge off the craving for cigarettes. WARNING: If you feel symptoms of nicotine overdose, such as nausea, vomiting, dizziness, weakness, or fast heartbeat, stop using these and see your doctor.    PRESCRIPTION MEDICINES:  After evaluating your smoking patterns and prior attempts at quitting, your doctor may offer a prescription medicine such as bupropion (Zyban, Wellbutrin), varenicline (Chantix, Champix), a niocotine inhaler or nasal spray. Each has its unique advantage and side effects which your doctor can review with you.    HEALTH BENEFITS OF QUITTING:  The benefits of quitting start right away and keep improving the longer you go without smokin minutes: blood pressure and pulse return to normal  8 hours: oxygen levels return to normal  2 days: ability to smell and taste begins to improve as damaged nerves start to regrow  2-3 weeks: circulation and lung function improves  1-9 months: decreased cough, congestion and shortness of breath; less tired  1 year: risk of heart attack decreases by half  5 years: risk of lung cancer decreases by half; risk of stroke becomes the same as a non-smoker  For information about how to quit smoking, visit the following links:  National Cancer [place] ,   Clearing the Air, Quit Smoking Today   - an online booklet. http://www.smokefree.gov/pubs/clearing_the_air.pdf  Smokefree.gov http://smokefree.gov/  QuitNet http://www.quitnet.com/    5595-0069 Regina Garcia, [address] [place] [address], [place] [address]. All rights reserved. This information is not intended as a substitute for professional medical care. Always follow your healthcare professional's instructions.

## 2022-04-22 ENCOUNTER — HOSPITAL ENCOUNTER (OUTPATIENT)
Facility: HOSPITAL | Age: 57
Discharge: HOME | End: 2022-04-22
Attending: INTERNAL MEDICINE | Admitting: INTERNAL MEDICINE
Payer: COMMERCIAL

## 2022-04-22 VITALS
HEART RATE: 60 BPM | SYSTOLIC BLOOD PRESSURE: 130 MMHG | OXYGEN SATURATION: 98 % | RESPIRATION RATE: 16 BRPM | DIASTOLIC BLOOD PRESSURE: 73 MMHG | TEMPERATURE: 98.2 F

## 2022-04-22 DIAGNOSIS — I34.0 MITRAL REGURGITATION: ICD-10-CM

## 2022-04-22 DIAGNOSIS — I34.0 NONRHEUMATIC MITRAL VALVE REGURGITATION: Primary | ICD-10-CM

## 2022-04-22 DIAGNOSIS — I34.1 MITRAL VALVE PROLAPSE: ICD-10-CM

## 2022-04-22 LAB
ABO/RH(D): NORMAL
ACT BLD: 203 SECONDS (ref 74–150)
ANION GAP SERPL CALCULATED.3IONS-SCNC: 10 MMOL/L (ref 5–18)
ANTIBODY SCREEN: NEGATIVE
BUN SERPL-MCNC: 18 MG/DL (ref 8–22)
CALCIUM SERPL-MCNC: 9.5 MG/DL (ref 8.5–10.5)
CHLORIDE BLD-SCNC: 102 MMOL/L (ref 98–107)
CO2 SERPL-SCNC: 27 MMOL/L (ref 22–31)
CREAT SERPL-MCNC: 1.02 MG/DL (ref 0.7–1.3)
ERYTHROCYTE [DISTWIDTH] IN BLOOD BY AUTOMATED COUNT: 16.5 % (ref 10–15)
GFR SERPL CREATININE-BSD FRML MDRD: 86 ML/MIN/1.73M2
GLUCOSE BLD-MCNC: 92 MG/DL (ref 70–125)
HCT VFR BLD AUTO: 40.8 % (ref 40–53)
HGB BLD-MCNC: 13.1 G/DL (ref 13.3–17.7)
MCH RBC QN AUTO: 28.9 PG (ref 26.5–33)
MCHC RBC AUTO-ENTMCNC: 32.1 G/DL (ref 31.5–36.5)
MCV RBC AUTO: 90 FL (ref 78–100)
PLATELET # BLD AUTO: 248 10E3/UL (ref 150–450)
POTASSIUM BLD-SCNC: 3.8 MMOL/L (ref 3.5–5)
RBC # BLD AUTO: 4.53 10E6/UL (ref 4.4–5.9)
SODIUM SERPL-SCNC: 139 MMOL/L (ref 136–145)
SPECIMEN EXPIRATION DATE: NORMAL
WBC # BLD AUTO: 8.2 10E3/UL (ref 4–11)

## 2022-04-22 PROCEDURE — C1769 GUIDE WIRE: HCPCS | Performed by: INTERNAL MEDICINE

## 2022-04-22 PROCEDURE — 255N000002 HC RX 255 OP 636: Performed by: INTERNAL MEDICINE

## 2022-04-22 PROCEDURE — 250N000011 HC RX IP 250 OP 636: Performed by: INTERNAL MEDICINE

## 2022-04-22 PROCEDURE — 86901 BLOOD TYPING SEROLOGIC RH(D): CPT | Performed by: INTERNAL MEDICINE

## 2022-04-22 PROCEDURE — 250N000009 HC RX 250: Performed by: INTERNAL MEDICINE

## 2022-04-22 PROCEDURE — 93458 L HRT ARTERY/VENTRICLE ANGIO: CPT | Performed by: INTERNAL MEDICINE

## 2022-04-22 PROCEDURE — 80048 BASIC METABOLIC PNL TOTAL CA: CPT | Performed by: INTERNAL MEDICINE

## 2022-04-22 PROCEDURE — 272N000001 HC OR GENERAL SUPPLY STERILE: Performed by: INTERNAL MEDICINE

## 2022-04-22 PROCEDURE — 250N000013 HC RX MED GY IP 250 OP 250 PS 637: Performed by: INTERNAL MEDICINE

## 2022-04-22 PROCEDURE — 36415 COLL VENOUS BLD VENIPUNCTURE: CPT | Performed by: INTERNAL MEDICINE

## 2022-04-22 PROCEDURE — 93458 L HRT ARTERY/VENTRICLE ANGIO: CPT | Mod: 26 | Performed by: INTERNAL MEDICINE

## 2022-04-22 PROCEDURE — 85027 COMPLETE CBC AUTOMATED: CPT | Performed by: INTERNAL MEDICINE

## 2022-04-22 PROCEDURE — C1887 CATHETER, GUIDING: HCPCS | Performed by: INTERNAL MEDICINE

## 2022-04-22 PROCEDURE — C1894 INTRO/SHEATH, NON-LASER: HCPCS | Performed by: INTERNAL MEDICINE

## 2022-04-22 PROCEDURE — 258N000003 HC RX IP 258 OP 636: Performed by: INTERNAL MEDICINE

## 2022-04-22 PROCEDURE — 85347 COAGULATION TIME ACTIVATED: CPT

## 2022-04-22 RX ORDER — IODIXANOL 320 MG/ML
INJECTION, SOLUTION INTRAVASCULAR
Status: DISCONTINUED | OUTPATIENT
Start: 2022-04-22 | End: 2022-04-22 | Stop reason: HOSPADM

## 2022-04-22 RX ORDER — ASPIRIN 81 MG/1
243 TABLET, CHEWABLE ORAL ONCE
Status: COMPLETED | OUTPATIENT
Start: 2022-04-22 | End: 2022-04-22

## 2022-04-22 RX ORDER — FENTANYL CITRATE 50 UG/ML
25 INJECTION, SOLUTION INTRAMUSCULAR; INTRAVENOUS
Status: DISCONTINUED | OUTPATIENT
Start: 2022-04-22 | End: 2022-04-22 | Stop reason: HOSPADM

## 2022-04-22 RX ORDER — ASPIRIN 325 MG
325 TABLET ORAL ONCE
Status: COMPLETED | OUTPATIENT
Start: 2022-04-22 | End: 2022-04-22

## 2022-04-22 RX ORDER — ASPIRIN 81 MG/1
81 TABLET, CHEWABLE ORAL ONCE
Status: DISCONTINUED | OUTPATIENT
Start: 2022-04-22 | End: 2022-04-22

## 2022-04-22 RX ORDER — OXYCODONE HYDROCHLORIDE 5 MG/1
5 TABLET ORAL EVERY 4 HOURS PRN
Status: DISCONTINUED | OUTPATIENT
Start: 2022-04-22 | End: 2022-04-22 | Stop reason: HOSPADM

## 2022-04-22 RX ORDER — NALOXONE HYDROCHLORIDE 0.4 MG/ML
0.4 INJECTION, SOLUTION INTRAMUSCULAR; INTRAVENOUS; SUBCUTANEOUS
Status: DISCONTINUED | OUTPATIENT
Start: 2022-04-22 | End: 2022-04-22 | Stop reason: HOSPADM

## 2022-04-22 RX ORDER — FLUMAZENIL 0.1 MG/ML
0.2 INJECTION, SOLUTION INTRAVENOUS
Status: DISCONTINUED | OUTPATIENT
Start: 2022-04-22 | End: 2022-04-22 | Stop reason: HOSPADM

## 2022-04-22 RX ORDER — ATROPINE SULFATE 0.1 MG/ML
0.5 INJECTION INTRAVENOUS
Status: DISCONTINUED | OUTPATIENT
Start: 2022-04-22 | End: 2022-04-22 | Stop reason: HOSPADM

## 2022-04-22 RX ORDER — OXYCODONE HYDROCHLORIDE 5 MG/1
10 TABLET ORAL EVERY 4 HOURS PRN
Status: DISCONTINUED | OUTPATIENT
Start: 2022-04-22 | End: 2022-04-22 | Stop reason: HOSPADM

## 2022-04-22 RX ORDER — LIDOCAINE 40 MG/G
CREAM TOPICAL
Status: DISCONTINUED | OUTPATIENT
Start: 2022-04-22 | End: 2022-04-22 | Stop reason: HOSPADM

## 2022-04-22 RX ORDER — ATORVASTATIN CALCIUM 40 MG/1
40 TABLET, FILM COATED ORAL DAILY
Qty: 90 TABLET | Refills: 3 | Status: SHIPPED | OUTPATIENT
Start: 2022-04-22 | End: 2023-01-30

## 2022-04-22 RX ORDER — NALOXONE HYDROCHLORIDE 0.4 MG/ML
0.2 INJECTION, SOLUTION INTRAMUSCULAR; INTRAVENOUS; SUBCUTANEOUS
Status: DISCONTINUED | OUTPATIENT
Start: 2022-04-22 | End: 2022-04-22 | Stop reason: HOSPADM

## 2022-04-22 RX ORDER — ACETAMINOPHEN 325 MG/1
650 TABLET ORAL EVERY 4 HOURS PRN
Status: DISCONTINUED | OUTPATIENT
Start: 2022-04-22 | End: 2022-04-22 | Stop reason: HOSPADM

## 2022-04-22 RX ORDER — DIAZEPAM 5 MG
10 TABLET ORAL
Status: COMPLETED | OUTPATIENT
Start: 2022-04-22 | End: 2022-04-22

## 2022-04-22 RX ORDER — SODIUM CHLORIDE 9 MG/ML
INJECTION, SOLUTION INTRAVENOUS CONTINUOUS
Status: DISCONTINUED | OUTPATIENT
Start: 2022-04-22 | End: 2022-04-22 | Stop reason: HOSPADM

## 2022-04-22 RX ADMIN — ASPIRIN 325 MG: 325 TABLET ORAL at 10:25

## 2022-04-22 RX ADMIN — SODIUM CHLORIDE: 9 INJECTION, SOLUTION INTRAVENOUS at 10:24

## 2022-04-22 RX ADMIN — DIAZEPAM 10 MG: 5 TABLET ORAL at 13:29

## 2022-04-22 ASSESSMENT — EJECTION FRACTION: EF_VALUE: .29

## 2022-04-22 NOTE — PRE-PROCEDURE
GENERAL PRE-PROCEDURE:     Written consent obtained?: Yes    Risks and benefits: Risks, benefits and alternatives were discussed    Consent given by:  Patient  Patient states understanding of procedure being performed: Yes    Patient's understanding of procedure matches consent: Yes    Procedure consent matches procedure scheduled: Yes    Expected level of sedation:  Moderate  Appropriately NPO:  Yes  Mallampati  :  Grade 4- soft palate obscured by base of tongue  Lungs:  Lungs clear with good breath sounds bilaterally  Heart:  Normal heart sounds and rate  History & Physical reviewed:  History and physical reviewed and no updates needed  Statement of review:  I have reviewed the lab findings, diagnostic data, medications, and the plan for sedation

## 2022-04-23 NOTE — DISCHARGE INSTRUCTIONS

## 2022-04-24 ENCOUNTER — HEALTH MAINTENANCE LETTER (OUTPATIENT)
Age: 57
End: 2022-04-24

## 2022-04-25 ENCOUNTER — PATIENT OUTREACH (OUTPATIENT)
Dept: CARE COORDINATION | Facility: CLINIC | Age: 57
End: 2022-04-25
Payer: COMMERCIAL

## 2022-04-25 ASSESSMENT — ACTIVITIES OF DAILY LIVING (ADL): DEPENDENT_IADLS:: TRANSPORTATION

## 2022-04-25 NOTE — PROGRESS NOTES
Clinic Care Coordination Contact    Situation: Patient chart reviewed by care coordinator.    Background: Pt presented to [facility] 4/22/22 for scheduled Coronary Angiogram.    Assessment: No complications noted.     Plan/Recommendations: SW CC will follow up this week for goal check in. RN CC will follow up as previously noted.     WILLI Freitas   Social Work Primary Care Clinic Care Coordinator   [facility]  [phone]  [email or web address]

## 2022-04-26 ENCOUNTER — PATIENT OUTREACH (OUTPATIENT)
Dept: NURSING | Facility: CLINIC | Age: 57
End: 2022-04-26
Payer: COMMERCIAL

## 2022-04-26 NOTE — PROGRESS NOTES
Clinic Care Coordination Contact    Follow Up Progress Note      Assessment: Pt had coronary angiogram 4/22/22. No complications.     Care Gaps:    Health Maintenance Due   Topic Date Due     HF ACTION PLAN  Never done     COVID-19 Vaccine (1) Never done     Pneumococcal Vaccine: Pediatrics (0 to 5 Years) and At-Risk Patients (6 to 64 Years) (1 of 2 - PPSV23) Never done     HIV SCREENING  Never done     HEPATITIS B IMMUNIZATION (1 of 3 - Risk 3-dose series) Never done     ZOSTER IMMUNIZATION (1 of 2) Never done     INFLUENZA VACCINE (1) 09/01/2021       Postponed to next PCP OV     Goals addressed this encounter:    Goals Addressed                    This Visit's Progress       1. Reducing Risks // SW Only (pt-stated)   60%      Goal Statement: I will get connected to resources/supports.   Date Goal set: 12/1/21  Barriers: Access   Strengths: Motivated, family, friends, care team   Date to Achieve By: 5/1/22  Patient expressed understanding of goal: Yes    Action steps to achieve this goal:  1. I will start application for social security disability income online. (Started)  2. I will use [place] to medical appts.  3. I will talk to my PCP about a cane or other DME. (Had PCP OV 4/19/22)  4. I will look into other resources as needed (housing, etc). (Working with [employer] for [employer], staying in [facility])  5. I will work with care coordination as needed.                Intervention/Education provided during outreach: SW CC outreach to pt for check in on SW only goal.     Discussed that he had angiogram. Pt picked up medications last night that he was given post procedure. Pt is awaiting results. These results should then help his care team decide when have surgery.     Pt reported otherwise, everything seems to be okay. Pt saw PCP 4/19/22 and they helped with some forms he needed for [facility] services.     Pt continues to work with [employer] for [email or web address] and is staying in a  rolan until surgery.    Outreach Frequency: 2 weeks (SW CC and RN CC following)    Plan: SW CC will follow up in one month. RN CC will follow up as indicated in notes.     Barbara De La Garza [facility]   Social Work Primary Care Clinic Care Coordinator   [facility]  [phone]  [email or web address]

## 2022-05-11 ENCOUNTER — PATIENT OUTREACH (OUTPATIENT)
Dept: CARE COORDINATION | Facility: CLINIC | Age: 57
End: 2022-05-11
Payer: COMMERCIAL

## 2022-05-11 DIAGNOSIS — I50.9 CONGESTIVE HEART FAILURE (H): Primary | ICD-10-CM

## 2022-05-11 ASSESSMENT — ACTIVITIES OF DAILY LIVING (ADL): DEPENDENT_IADLS:: TRANSPORTATION

## 2022-05-11 NOTE — PROGRESS NOTES
Clinic Care Coordination Contact  [facility]/Voicemail    Referral Source: IP Report  Clinical Data:   [facility]  4/22/2022 Coronary Angiogram   Care Coordinator Outreach  Outreach attempted x 1.  Left message on patient's voicemail with call back information and requested return call.  Plan:  Care Coordinator will try to reach patient again in 3-10 business days.  [facility]   Gail Gomez RN, Care Coordinator   [facility]'s   E-mail [email or web address]   [phone]

## 2022-05-25 ENCOUNTER — PATIENT OUTREACH (OUTPATIENT)
Dept: NURSING | Facility: CLINIC | Age: 57
End: 2022-05-25
Payer: COMMERCIAL

## 2022-05-25 DIAGNOSIS — I50.9 CONGESTIVE HEART FAILURE (H): Primary | ICD-10-CM

## 2022-05-25 ASSESSMENT — ACTIVITIES OF DAILY LIVING (ADL): DEPENDENT_IADLS:: TRANSPORTATION

## 2022-05-25 NOTE — PROGRESS NOTES
Clinic Care Coordination Contact    Follow Up Progress Note      Assessment:   Patient reports he just had a Angiogram and has a future Cardiology visit June 20 to discuss a plan for surgery   Either invasive or through the vein procedure   Patient is pleased with his medication that he is taking and is feeling good   Patient is working with housing stabilization program and they are working on getting him an apartment.  Continues to live in a Motel at this time   Patient had assistance by Elsie Hudson/Social Security filling  out disability forms 4-8 weeks ago    Care Gaps:    Health Maintenance Due   Topic Date Due     HF ACTION PLAN  Never done     COVID-19 Vaccine (1) Never done     Pneumococcal Vaccine: Pediatrics (0 to 5 Years) and At-Risk Patients (6 to 64 Years) (1 - PCV) Never done     HIV SCREENING  Never done     HEPATITIS B IMMUNIZATION (1 of 3 - Risk 3-dose series) Never done     ZOSTER IMMUNIZATION (1 of 2) Never done       Postponed to next RN CC outreach      Goals addressed this encounter:    Goals Addressed                    This Visit's Progress       1. Reducing Risks // SW Only (pt-stated)   70%      Goal Statement: I will get connected to resources/supports.   Date Goal set: 12/1/21  Barriers: Access   Strengths: Motivated, family, friends, care team   Date to Achieve By: 5/1/22  Patient expressed understanding of goal: Yes    Action steps to achieve this goal:  1. I will start application for social security disability income online. (Started)  2. I will use [employer] to medical appts.  3. I will talk to my PCP about a cane or other DME. (Had PCP OV 4/19/22)  4. I will look into other resources as needed (housing, etc). ([facility], staying in motel)  5. I will work with care coordination as needed.             2. Improve chronic symptoms (pt-stated)   80%      .Goal Statement: My CHF symptoms will be stable   Date Goal set:  1/31/2022  Barriers: Poor understanding of CHF diagnosis   Strengths: Motivated to learn   Date to Achieve By: 5/31/2022  Patient expressed understanding of goal: Yes    Action steps to achieve this goal:  1. I will check my weight daily and call if weight gain is 2-3# in a day or 5# in a week.  2. I will seek medical help if I have increased shortness of breath episodes,fever or coughing up a colored sputum.  3. I will take my medications as prescribed and keep future Cardiology /primary care appointments.  4. I will follow a low salt diet.              Intervention/Education provided during outreach: Continue to take medications as directed      Outreach Frequency: monthly        Plan:     Care Coordinator will follow up after June 20 Cardiology appointment     [facility]   Gail Gomez RN, Care Coordinator   [facility]   E-mail [email or web address]   [phone]

## 2022-05-26 ENCOUNTER — PATIENT OUTREACH (OUTPATIENT)
Dept: CARE COORDINATION | Facility: CLINIC | Age: 57
End: 2022-05-26
Payer: COMMERCIAL

## 2022-05-26 NOTE — PROGRESS NOTES
Clinic Care Coordination Contact    Follow Up Progress Note      Assessment: Patient had Angiogram and will find out next steps on June 20th.    Care Gaps:    Health Maintenance Due   Topic Date Due     HF ACTION PLAN  Never done     COVID-19 Vaccine (1) Never done     Pneumococcal Vaccine: Pediatrics (0 to 5 Years) and At-Risk Patients (6 to 64 Years) (1 - PCV) Never done     HIV SCREENING  Never done     HEPATITIS B IMMUNIZATION (1 of 3 - Risk 3-dose series) Never done     ZOSTER IMMUNIZATION (1 of 2) Never done         Goals addressed this encounter:    Goals Addressed                    This Visit's Progress       Patient Stated       1. Reducing Risks // SW Only (pt-stated)   On track      Goal Statement: I will get connected to resources/supports.   Date Goal set: 12/1/21  Barriers: Access   Strengths: Motivated, family, friends, care team   Date to Achieve By: 5/1/22  Patient expressed understanding of goal: Yes    Action steps to achieve this goal:  1. I will start application for social security disability income online. (In Process)  2. I will use [employer] to medical appts.  3. I will talk to my PCP about a cane or other DME. (Had PCP OV 4/19/22)  4. I will look into other resources as needed (housing, etc). (Working with [facility] worker for housing, staying in [facility])  5. I will work with care coordination as needed.                Intervention/Education provided during outreach:   JUSTIN CC introduced myself as covering SW for Barbara.    -Discussed recent angiogram and will have a future appt on June 20th to discuss next steps. Patient is hoping this to have vein procedure vs surgery.  -Still working with housing stabilization and is grateful to be able to stay at the [facility] until something is found  -Patient shares that both parents are in the hospital right now. Dad will most likely past away in the next week. Mom is doing better health wise. He visits them both daily. JUSTIN provides  empathy for this tough situation.  -Patient is waiting to hear back from Social Security, hoping this will be sometime this summer  -Getting meals from [employer]. Feels that he has everything he needs right now as far as resources.  -Appreciates the check in's. CC SW provided direct number should something come up. Patient thanks JUSTIN for the call.       Outreach Frequency: monthly      Plan:   JUSTIN CC will follow up in one month. RN CC will follow up as indicated in notes.    WILLI Mullen   Social Work Clinic Care Coordinator   [facility]  PH: [phone]  [email or web address]

## 2022-05-27 ENCOUNTER — TELEPHONE (OUTPATIENT)
Dept: CARDIOLOGY | Facility: CLINIC | Age: 57
End: 2022-05-27
Payer: COMMERCIAL

## 2022-05-27 DIAGNOSIS — I34.0 SEVERE MITRAL REGURGITATION: Primary | ICD-10-CM

## 2022-05-27 NOTE — TELEPHONE ENCOUNTER
Per Dr Tobin, plan for repeat TTE to eval MR  Pt agrees with plan.      Neel Guerrero RN, Structural Heart Coordinator  [facility]  Valve Clinic [phone]  Fax: [phone]  05/27/22  9:28 AM  '

## 2022-06-22 ENCOUNTER — HOSPITAL ENCOUNTER (OUTPATIENT)
Dept: CARDIOLOGY | Facility: CLINIC | Age: 57
Discharge: HOME | End: 2022-06-22
Attending: INTERNAL MEDICINE | Admitting: INTERNAL MEDICINE
Payer: COMMERCIAL

## 2022-06-22 DIAGNOSIS — I34.0 SEVERE MITRAL REGURGITATION: ICD-10-CM

## 2022-06-22 LAB — LVEF ECHO: NORMAL

## 2022-06-22 PROCEDURE — 93306 TTE W/DOPPLER COMPLETE: CPT | Mod: 26 | Performed by: INTERNAL MEDICINE

## 2022-06-22 PROCEDURE — 93306 TTE W/DOPPLER COMPLETE: CPT

## 2022-06-23 DIAGNOSIS — I34.0 SEVERE MITRAL REGURGITATION: Primary | ICD-10-CM

## 2022-06-28 ENCOUNTER — PATIENT OUTREACH (OUTPATIENT)
Dept: CARE COORDINATION | Facility: CLINIC | Age: 57
End: 2022-06-28

## 2022-06-28 NOTE — PROGRESS NOTES
Clinic Care Coordination Contact  [facility]/Voicemail       Clinical Data: Care Coordinator Outreach  Outreach attempted x 1.  Left message on patient's voicemail with call back information and requested return call.  Plan: Care Coordinator will attempt to reach patient again in 7-10 days.    WILLI Mullen   Social Work Clinic Care Coordinator   [facility]  PH: [phone]  [email or web address]

## 2022-07-01 DIAGNOSIS — I34.0 MITRAL REGURGITATION: Primary | ICD-10-CM

## 2022-07-07 DIAGNOSIS — I34.0 SEVERE MITRAL REGURGITATION: Primary | ICD-10-CM

## 2022-07-11 ENCOUNTER — LAB (OUTPATIENT)
Dept: CARDIOLOGY | Facility: CLINIC | Age: 57
End: 2022-07-11
Payer: COMMERCIAL

## 2022-07-11 ENCOUNTER — OFFICE VISIT (OUTPATIENT)
Dept: CARDIOLOGY | Facility: CLINIC | Age: 57
End: 2022-07-11
Payer: COMMERCIAL

## 2022-07-11 ENCOUNTER — ALLIED HEALTH/NURSE VISIT (OUTPATIENT)
Dept: CARDIOLOGY | Facility: CLINIC | Age: 57
End: 2022-07-11

## 2022-07-11 VITALS
BODY MASS INDEX: 20.14 KG/M2 | RESPIRATION RATE: 14 BRPM | WEIGHT: 162 LBS | SYSTOLIC BLOOD PRESSURE: 106 MMHG | HEIGHT: 75 IN | DIASTOLIC BLOOD PRESSURE: 68 MMHG | HEART RATE: 69 BPM

## 2022-07-11 DIAGNOSIS — I34.0 SEVERE MITRAL REGURGITATION: Primary | ICD-10-CM

## 2022-07-11 DIAGNOSIS — F19.20 CHEMICAL DEPENDENCY (H): ICD-10-CM

## 2022-07-11 DIAGNOSIS — I34.0 SEVERE MITRAL REGURGITATION: ICD-10-CM

## 2022-07-11 DIAGNOSIS — I10 BENIGN ESSENTIAL HYPERTENSION: Chronic | ICD-10-CM

## 2022-07-11 DIAGNOSIS — I34.0 NONRHEUMATIC MITRAL VALVE REGURGITATION: Primary | ICD-10-CM

## 2022-07-11 PROBLEM — I50.9 ACUTE CONGESTIVE HEART FAILURE, UNSPECIFIED HEART FAILURE TYPE (H): Status: RESOLVED | Noted: 2021-11-27 | Resolved: 2022-07-11

## 2022-07-11 LAB
ABO/RH(D): NORMAL
ALBUMIN SERPL-MCNC: 4.2 G/DL (ref 3.5–5)
ALP SERPL-CCNC: 109 U/L (ref 45–120)
ALT SERPL W P-5'-P-CCNC: 18 U/L (ref 0–45)
ANION GAP SERPL CALCULATED.3IONS-SCNC: 8 MMOL/L (ref 5–18)
ANTIBODY SCREEN: NEGATIVE
AST SERPL W P-5'-P-CCNC: 19 U/L (ref 0–40)
ATRIAL RATE - MUSE: 69 BPM
BASOPHILS # BLD AUTO: 0.1 10E3/UL (ref 0–0.2)
BASOPHILS NFR BLD AUTO: 1 %
BILIRUB SERPL-MCNC: 0.5 MG/DL (ref 0–1)
BNP SERPL-MCNC: 46 PG/ML (ref 0–47)
BUN SERPL-MCNC: 16 MG/DL (ref 8–22)
CALCIUM SERPL-MCNC: 9.8 MG/DL (ref 8.5–10.5)
CHLORIDE BLD-SCNC: 100 MMOL/L (ref 98–107)
CO2 SERPL-SCNC: 31 MMOL/L (ref 22–31)
CREAT SERPL-MCNC: 1.43 MG/DL (ref 0.7–1.3)
DIASTOLIC BLOOD PRESSURE - MUSE: NORMAL MMHG
EOSINOPHIL # BLD AUTO: 0.3 10E3/UL (ref 0–0.7)
EOSINOPHIL NFR BLD AUTO: 3 %
ERYTHROCYTE [DISTWIDTH] IN BLOOD BY AUTOMATED COUNT: 13 % (ref 10–15)
GFR SERPL CREATININE-BSD FRML MDRD: 58 ML/MIN/1.73M2
GLUCOSE BLD-MCNC: 69 MG/DL (ref 70–125)
HCT VFR BLD AUTO: 37.2 % (ref 40–53)
HGB BLD-MCNC: 11.9 G/DL (ref 13.3–17.7)
IMM GRANULOCYTES # BLD: 0 10E3/UL
IMM GRANULOCYTES NFR BLD: 0 %
INR PPP: 1.01 (ref 0.85–1.15)
INTERPRETATION ECG - MUSE: NORMAL
LYMPHOCYTES # BLD AUTO: 2.1 10E3/UL (ref 0.8–5.3)
LYMPHOCYTES NFR BLD AUTO: 24 %
MAGNESIUM SERPL-MCNC: 2 MG/DL (ref 1.8–2.6)
MCH RBC QN AUTO: 31.1 PG (ref 26.5–33)
MCHC RBC AUTO-ENTMCNC: 32 G/DL (ref 31.5–36.5)
MCV RBC AUTO: 97 FL (ref 78–100)
MONOCYTES # BLD AUTO: 0.8 10E3/UL (ref 0–1.3)
MONOCYTES NFR BLD AUTO: 9 %
NEUTROPHILS # BLD AUTO: 5.5 10E3/UL (ref 1.6–8.3)
NEUTROPHILS NFR BLD AUTO: 63 %
NRBC # BLD AUTO: 0 10E3/UL
NRBC BLD AUTO-RTO: 0 /100
P AXIS - MUSE: 69 DEGREES
PLATELET # BLD AUTO: 259 10E3/UL (ref 150–450)
POTASSIUM BLD-SCNC: 4.1 MMOL/L (ref 3.5–5)
PR INTERVAL - MUSE: 162 MS
PROT SERPL-MCNC: 7.1 G/DL (ref 6–8)
QRS DURATION - MUSE: 100 MS
QT - MUSE: 390 MS
QTC - MUSE: 417 MS
R AXIS - MUSE: 37 DEGREES
RBC # BLD AUTO: 3.83 10E6/UL (ref 4.4–5.9)
SODIUM SERPL-SCNC: 139 MMOL/L (ref 136–145)
SPECIMEN EXPIRATION DATE: NORMAL
SYSTOLIC BLOOD PRESSURE - MUSE: NORMAL MMHG
T AXIS - MUSE: 64 DEGREES
VENTRICULAR RATE- MUSE: 69 BPM
WBC # BLD AUTO: 8.6 10E3/UL (ref 4–11)

## 2022-07-11 PROCEDURE — 36415 COLL VENOUS BLD VENIPUNCTURE: CPT

## 2022-07-11 PROCEDURE — 86900 BLOOD TYPING SEROLOGIC ABO: CPT

## 2022-07-11 PROCEDURE — 83735 ASSAY OF MAGNESIUM: CPT

## 2022-07-11 PROCEDURE — 93000 ELECTROCARDIOGRAM COMPLETE: CPT | Performed by: GENERAL ACUTE CARE HOSPITAL

## 2022-07-11 PROCEDURE — 86850 RBC ANTIBODY SCREEN: CPT

## 2022-07-11 PROCEDURE — 83880 ASSAY OF NATRIURETIC PEPTIDE: CPT

## 2022-07-11 PROCEDURE — 86901 BLOOD TYPING SEROLOGIC RH(D): CPT

## 2022-07-11 PROCEDURE — 99215 OFFICE O/P EST HI 40 MIN: CPT | Performed by: INTERNAL MEDICINE

## 2022-07-11 PROCEDURE — 85610 PROTHROMBIN TIME: CPT

## 2022-07-11 PROCEDURE — 85025 COMPLETE CBC W/AUTO DIFF WBC: CPT

## 2022-07-11 PROCEDURE — 80053 COMPREHEN METABOLIC PANEL: CPT

## 2022-07-11 PROCEDURE — 99207 PR NO CHARGE NURSE ONLY: CPT

## 2022-07-11 NOTE — NURSING NOTE
Reason for today s visit: Pre-op RN Visit    Transcatheter Mitral Valve Repair with MitraClip on Monday 7/18/22  arrival time: 0530Am for 1st case  at [facility] with Dr. Tobin    Referring provider: Dr Jackson  Primary Cardiology: Dr. Jackson  PCP: Yovany White      CT surgery consult completed: Dr. Rodas (date 4/7/22)  Dental Clearance obtained: dentures      Tests completed today:  Pre-procedure labs drawn: CMP, CBC, INR, Mag, BNP, Type and Screen  EKG      STS score: 1% repair, 2% rplcmt  Serum albumin (date completed 7/11/22): pending  Edmonds index (date completed 7/11/22): 6/6  Total Frailty Score: 0      Patient instructed on medications:   Am of procedure only take lisinopril and metoprolol  Loading dose of Plavix: no  Loading dose of ASA: 325 mg am of procedure      Patient instructed on skin prep:  Patient sent home with carroll wipes and instructed for skin prep be done evening before procedure.        Surgery and anesthesia consents  Vicki GALLO reviewed risks of mitraclip/MARIANNA and signed consents  Consents signed and scanned into media  Anesthesia consents to be completed am of surgery      Pre and post procedure education was also reviewed with the patient. No further questions and ready to proceed with surgery as planned.    Instructed to come to the main entrance of [facility] at 0530 AM on Monday 7/18/22    All questions were answered to patient by Vicki GALLO and Neel Guerrero RN    No family present at the time of appointment.      Neel Guerrero RN  [facility] Valve Clinic  [facility]  Phone: [phone]  Fax: [phone]  07/11/22  3:05 PM
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
## 2022-07-11 NOTE — PATIENT INSTRUCTIONS
Micah Nunez,    It was a pleasure to see you today in the clinic regarding your upcoming MitraClip.     My recommendations after this visit include:     - report to the [facility] on Monday morning at the instructed time      If you have questions or concerns, please call using the numbers below:    Valve Clinic Pool  [phone]    After Hours/Scheduling  [phone]    Otherwise you can dial the nurse directly at:    Neel Guerrero RN  Valve clinic coordinator  [phone]
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
## 2022-07-14 ENCOUNTER — LAB (OUTPATIENT)
Dept: FAMILY MEDICINE | Facility: CLINIC | Age: 57
End: 2022-07-14
Payer: COMMERCIAL

## 2022-07-14 ENCOUNTER — PATIENT OUTREACH (OUTPATIENT)
Dept: NURSING | Facility: CLINIC | Age: 57
End: 2022-07-14

## 2022-07-14 DIAGNOSIS — I34.0 SEVERE MITRAL REGURGITATION: ICD-10-CM

## 2022-07-14 DIAGNOSIS — I50.21 ACUTE SYSTOLIC CONGESTIVE HEART FAILURE (H): ICD-10-CM

## 2022-07-14 DIAGNOSIS — I34.1 MVP (MITRAL VALVE PROLAPSE): ICD-10-CM

## 2022-07-14 DIAGNOSIS — I34.0 MITRAL REGURGITATION: ICD-10-CM

## 2022-07-14 DIAGNOSIS — I34.0 MITRAL VALVE INSUFFICIENCY, UNSPECIFIED ETIOLOGY: ICD-10-CM

## 2022-07-14 LAB — SARS-COV-2 RNA RESP QL NAA+PROBE: NEGATIVE

## 2022-07-14 PROCEDURE — U0003 INFECTIOUS AGENT DETECTION BY NUCLEIC ACID (DNA OR RNA); SEVERE ACUTE RESPIRATORY SYNDROME CORONAVIRUS 2 (SARS-COV-2) (CORONAVIRUS DISEASE [COVID-19]), AMPLIFIED PROBE TECHNIQUE, MAKING USE OF HIGH THROUGHPUT TECHNOLOGIES AS DESCRIBED BY CMS-2020-01-R: HCPCS

## 2022-07-14 PROCEDURE — U0005 INFEC AGEN DETEC AMPLI PROBE: HCPCS

## 2022-07-14 ASSESSMENT — ACTIVITIES OF DAILY LIVING (ADL): DEPENDENT_IADLS:: TRANSPORTATION

## 2022-07-14 NOTE — PROGRESS NOTES
Clinic Care Coordination Contact    Follow Up Progress Note      Assessment:     Spoke with patient who shares that he will be heading in soon to get his Covid test for surgery on Monday. Patient shares that things are going great otherwise. SW asks about plan for after care and pt shares that he will have a friend that can stay with him.    Patient does states that he is almost out of medications and has enough to last through Monday. SW asks if he would like help with this and pt states he would.     SW called  Pharmacy and they were able to refill 2 medications right away and requested refills from PCP on the rest. They will reach out to patient if there are any issues on getting those filled.    SW called patient back and relayed above info. Patient thanked SW for the call.    Care Gaps:    Health Maintenance Due   Topic Date Due     HF ACTION PLAN  Never done     COVID-19 Vaccine (1) Never done     Pneumococcal Vaccine: Pediatrics (0 to 5 Years) and At-Risk Patients (6 to 64 Years) (1 - PCV) Never done     HIV SCREENING  Never done     HEPATITIS B IMMUNIZATION (1 of 3 - Risk 3-dose series) Never done     ZOSTER IMMUNIZATION (1 of 2) Never done           Goals addressed this encounter:    Goals Addressed                    This Visit's Progress       Patient Stated       1. Reducing Risks // SW Only (pt-stated)   On track      Goal Statement: I will get connected to resources/supports.   Date Goal set: 12/1/21  Barriers: Access   Strengths: Motivated, family, friends, care team   Date to Achieve By: 5/1/22  Patient expressed understanding of goal: Yes    Action steps to achieve this goal:  1. I will start application for social security disability income online. (In Process)  2. I will use [employer] to medical appts.  3. I will talk to my PCP about a cane or other DME. (Had PCP OV 4/19/22)  4. I will look into other resources as needed (housing, etc). (Working with [facility] worker for  housing, staying in motel)  5. I will work with care coordination as needed.             2. Improve chronic symptoms (pt-stated)   On track      .Goal Statement: My CHF symptoms will be stable   Date Goal set: 1/31/2022  Barriers: Poor understanding of CHF diagnosis   Strengths: Motivated to learn   Date to Achieve By: 5/31/2022  Patient expressed understanding of goal: Yes    Action steps to achieve this goal:  1. I will check my weight daily and call if weight gain is 2-3# in a day or 5# in a week.  2. I will seek medical help if I have increased shortness of breath episodes,fever or coughing up a colored sputum.  3. I will take my medications as prescribed and keep future Cardiology /primary care appointments.  4. I will follow a low salt diet.              Intervention/Education provided during outreach:  -Requested refills on medications  -Discussed upcoming surgery on 7/18     Outreach Frequency: monthly    Plan:   JUSTIN CC will complete next follow up in 2-3 weeks.    WILLI Mullen   Social Work Clinic Care Coordinator   [facility]  PH: [phone]  [email or web address]

## 2022-07-14 NOTE — PROGRESS NOTES
Clinic Care Coordination Contact    Follow Up Progress Note      Assessment: Patient is scheduled for a Transcatheter mitral valve repair with mitraclip. Possible atrial septal defect closure. procedure 7/18 and will have an overnight stay.  Patient has a friend who will provide transportation and sty with him as needed   4 months ago applied for sliding scale disability and is still waiting for an answer.  Patient is working with a counselor through the housing program and in the next 2 weeks he will get assistance with rent until disability is approved   Care Gaps:    Health Maintenance Due   Topic Date Due     HF ACTION PLAN  Never done     COVID-19 Vaccine (1) Never done     Pneumococcal Vaccine: Pediatrics (0 to 5 Years) and At-Risk Patients (6 to 64 Years) (1 - PCV) Never done     HIV SCREENING  Never done     HEPATITIS B IMMUNIZATION (1 of 3 - Risk 3-dose series) Never done     ZOSTER IMMUNIZATION (1 of 2) Never done       Not discussed today     Goals addressed this encounter:    Goals Addressed                    This Visit's Progress       1. Reducing Risks // SW Only (pt-stated)   70%      Goal Statement: I will get connected to resources/supports.   Date Goal set: 12/1/21  Barriers: Access   Strengths: Motivated, family, friends, care team   Date to Achieve By: 5/1/22 extended date 9/1/2022  Patient expressed understanding of goal: Yes    Action steps to achieve this goal:  1. I will start application for social security disability income online. (In Process)  2. I will use [employer] to medical appts.  3. I will talk to my PCP about a cane or other DME. (Had PCP OV 4/19/22)  4. I will look into other resources as needed (housing, etc). (Working with [facility] worker [facility])  5. I will work with care coordination as needed.             2. Improve chronic symptoms (pt-stated)   80%      .Goal Statement: My CHF symptoms will be stable   Date Goal set:  1/31/2022  Barriers: Poor understanding of CHF diagnosis   Strengths: Motivated to learn   Date to Achieve By: 5/31/2022  Patient expressed understanding of goal: Yes    Action steps to achieve this goal:  1. I will check my weight daily and call if weight gain is 2-3# in a day or 5# in a week.  2. I will seek medical help if I have increased shortness of breath episodes,fever or coughing up a colored sputum.  3. I will take my medications as prescribed and keep future Cardiology /primary care appointments.  4. I will follow a low salt diet.              Intervention/Education provided during outreach: Informed patient CC RN will follow up after scheduled procedure      Outreach Frequency: monthly        Plan:     Care Coordinator will follow up after Transcatheter mitral valve repair with mitraclip. Possible atrial septal defect closure.procedure scheduled 7/19/2022    [facility]   Gail Gomez RN, Care Coordinator   [facility]'s   E-mail [email or web address]   [phone]

## 2022-07-15 RX ORDER — FUROSEMIDE 20 MG
40 TABLET ORAL DAILY
Qty: 180 TABLET | Refills: 1 | Status: SHIPPED | OUTPATIENT
Start: 2022-07-15 | End: 2022-12-30

## 2022-07-15 RX ORDER — SPIRONOLACTONE 25 MG/1
25 TABLET ORAL DAILY
Qty: 90 TABLET | Refills: 1 | Status: SHIPPED | OUTPATIENT
Start: 2022-07-15 | End: 2022-12-30

## 2022-07-15 RX ORDER — LISINOPRIL 20 MG/1
20 TABLET ORAL DAILY
Qty: 90 TABLET | Refills: 1 | Status: SHIPPED | OUTPATIENT
Start: 2022-07-15 | End: 2022-12-30

## 2022-07-15 RX ORDER — METOPROLOL SUCCINATE 25 MG/1
25 TABLET, EXTENDED RELEASE ORAL DAILY
Qty: 90 TABLET | Refills: 3 | Status: SHIPPED | OUTPATIENT
Start: 2022-07-15 | End: 2023-01-30

## 2022-07-15 NOTE — H&P
[facility]      CSI History and Physicial  Micah Nunez MRN: [id number]  1965  Date of Admission:(Not on file)  Primary care provider: Yovany White      Assessment and Plan:     Micah Nunez is a 56 y.o.M with a PMhx of severe MR, HFpEF, HTN, former polysubstance abuse, homelessness, who is admitted for planned MitraClip procedure.      # Severe mitral regurgitation s/p mitraclip x 1   # Chronic Diastolic Heart Failure  # HTN  S/p MitraClip x1 with trace MR on MARIANNA after clip placement. Patient reporting some pain in bilateral groin sites, Sheath sites soft without hematoma. No arrhythmias. Neuro's intact    - Volume Status: euvolemic continue PTA Lasix 40 mg daily  - AA: Continue PTA Aldactone 25 mg daily  - BP stable; continue PTA Lisinopril, PTA Metoprolol  - Bedrest per protocol   - Neuro checks, per protocol  - Echocardiogram tomorrow  - Monitor groin sites  - EKG in morning   - Ortez can be removed once patient is out of bed   - Lifelong 81 mg ASA   - Cardiac rehab/PT/OT  - Diurese as needed/able  - Daily weights  - Strict intake/output  - Continuous telemetry monitoring  - Lifelong antibiotic prophylaxis prior to all dental procedures.      # Polysubstance abuse   # Homelessness  Hx of polysubstance abuse, including use of methamphetamines, cocaine, alcohol, marijuana and tobacco.  He is still using tobacco, but has not used any other drugs for 5+ months.  He never used IV drugs. Patient also currently homeless, living in [facility].   -  consult for discharge planning    Clinically Significant Risk Factors Present on Admission                   Cardiovascular: Mitral valve insufficiency         FEN: Cardiac diet  Disposition: Home in 1-2 days with cardiac rehab pending stable post-op period  Code Status: FULL CODE    Yumi Medina, APRN, CNP  [facility] Interventional Cardiology Team  [phone]         Chief Complaint:   Planned MitraClip Procedure          "History of Present Illness:   Micah Nunez is a 56 y.o.M with a PMhx of severe MR, HFpEF, HTN, former polysubstance abuse, who is admitted for planned MitraClip procedure.     Patient recently hospitalized with acute HF exacerbation, found to have severe MR. After diuresis, patient continued to experience significant dsypnea and LE edema. Patient reports starting about 1-2 weeks ago, he was also experiencing SOB at rest. He was evaluated by structural cardiology and cardiothoracic surgery for MitraClip vs MVR. Patient is currently homeless, living in [facility]. Given social situation, CVTS felt patient would be best served with MitraClip.    Procedure no complications, patient now s/p MitraClip x 1 with trace MR in immediate post op.     At time of interview patient denies any SOB, chest pain, lightheadedness, or dizziness. He is reporting some pain in bilateral groin sites, a \"soreness,\" RN getting prn pain medications. Sites appear CDI, soft, no increased pain with palpation, no swelling, no signs of hematoma.          Review of Systems:    10 point review of systems negative except for stated above in HPI.          Past Medical History:   Medical History reviewed.   Past Medical History:   Diagnosis Date     Acute exacerbation of CHF (congestive heart failure) (H) 11/27/2021     Benign essential hypertension      Bleeding disorder (H)      Chemical dependency (H)      Closed displaced fracture of fifth metatarsal bone of left foot 07/26/2021     Gastroesophageal reflux disease      Hypertension      Nasal mass 12/17/2013     Papilloma of nose 03/10/2014     Skin disease      Traumatic avulsion of nail plate of toe 07/26/2021     Type I or II open fracture of left ulna 09/05/2020             Past Surgical History:   Surgical History reviewed.   Past Surgical History:   Procedure Laterality Date     ARTHRODESIS FOOT Left 2/17/2015    Procedure: ARTHRODESIS FOOT;  Surgeon: Cortez Hayward, BROOK;  " Location: [facility] [place]     ARTHRODESIS TOE(S)  12/20/2013    Procedure: ARTHRODESIS TOE(S);  Arthrodesis first metatarsophalangeal joint left foot;  Surgeon: Cortez Hayward DPM;  Location: [facility] OR     COLONOSCOPY N/A 1/12/2021    Procedure: COLONOSCOPY;  Surgeon: Dixon Sarabia MD;  Location: [facility] GI     CV CORONARY ANGIOGRAM N/A 4/22/2022    Procedure: Coronary Angiogram;  Surgeon: Lamont Maloney MD;  Location: [facility] CATH LAB CV     CV LEFT HEART CATH N/A 4/22/2022    Procedure: Left Heart Catheterization;  Surgeon: Lamont Maloney MD;  Location: [facility] CATH LAB CV     ENDOSCOPIC SINUS SURGERY  1/6/2014    Procedure: ENDOSCOPIC SINUS SURGERY;  Functional Endoscopic Sinus Surgery;  Surgeon: Bobo Renteria MD;  Location:  OR     ENDOSCOPIC SINUS SURGERY  7/21/2014    Procedure: ENDOSCOPIC SINUS SURGERY;  Surgeon: Bobo Renteria MD;  [facility]     ENDOSCOPIC SINUS SURGERY N/A 4/6/2015    Procedure: ENDOSCOPIC SINUS SURGERY;  Surgeon: Bobo Renteria MD;  Location:  OR     ENDOSCOPIC SINUS SURGERY N/A 8/17/2015    Procedure: ENDOSCOPIC SINUS SURGERY;  Surgeon: Bobo Renteria MD;  Location:  OR     ENDOSCOPIC SINUS SURGERY Bilateral 8/19/2019    Procedure: Bilateral Functional Endoscopic Sinus Surgery, Right Nasal Endoscopy and Excision of Left Nasal Mass;  Surgeon: Bobo Renteria MD;  Location:  OR     ENT SURGERY       EXCISE NASAL MASS Left 11/6/2017    Procedure: EXCISE NASAL MASS;  Excision of Left Nasal Papilloma;  Surgeon: Bobo Renteria MD;  Location:  OR     LAPAROSCOPIC HERNIORRHAPHY INGUINAL BILATERAL Bilateral 4/12/2017    Procedure: LAPAROSCOPIC HERNIORRHAPHY INGUINAL BILATERAL;  Surgeon: Shay Mercado MD;  Location: [facility] [place]     NASAL ENDOSCOPY Bilateral 2/6/2017    Procedure: NASAL ENDOSCOPY;  Surgeon: Bobo Renteria MD;  Location:  OR     NASAL ENDOSCOPY N/A 5/21/2018    Procedure: NASAL ENDOSCOPY;  Nasal Endoscopy, CO2 Laser  Excision of Left Nasal Papilloma;  Surgeon: Bobo Renteria MD;  [facility]     NASAL/SINUS POLYPECTOMY       PE TUBES               Social History:   Social History reviewed.  Social History     Tobacco Use     Smoking status: Current Every Day Smoker     Packs/day: 1.00     Years: 40.00     Pack years: 40.00     Types: Cigarettes     Start date: 1/1/1980     Smokeless tobacco: Never Used     Tobacco comment: about 5-10 cigarettes or so a day   Substance Use Topics     Alcohol use: Not Currently     Comment: occasional              Family History:   Family History reviewed.   Family History   Problem Relation Age of Onset     Diabetes Mother 40     C.A.D. Father 72     Unknown/Adopted No family hx of      Depression No family hx of      Anxiety Disorder No family hx of      Schizophrenia No family hx of      Bipolar Disorder No family hx of      Suicide No family hx of      Substance Abuse No family hx of      Dementia No family hx of      Homero Disease No family hx of      Parkinsonism No family hx of      Autism Spectrum Disorder No family hx of      Intellectual Disability (Mental Retardation) No family hx of      Mental Illness No family hx of      Bleeding Disorder No family hx of              Allergies:   No Known Allergies          Medications:   Medications Reviewed.   No current facility-administered medications for this encounter.     Current Outpatient Medications   Medication Sig     atorvastatin (LIPITOR) 40 MG tablet Take 1 tablet (40 mg) by mouth daily     furosemide (LASIX) 20 MG tablet TAKE 2 TABLETS (40 MG) BY MOUTH DAILY     lisinopril (ZESTRIL) 20 MG tablet TAKE 1 TABLET (20 MG) BY MOUTH DAILY     metoprolol succinate ER (TOPROL XL) 25 MG 24 hr tablet Take 1 tablet (25 mg) by mouth daily     order for DME Equipment being ordered: Dynaflex insert     potassium chloride ER (KLOR-CON M) 20 MEQ CR tablet TAKE 1 TABLET (20 MEQ) BY MOUTH DAILY     spironolactone (ALDACTONE) 25 MG  "tablet TAKE 1 TABLET (25 MG) BY MOUTH DAILY     Facility-Administered Medications Ordered in Other Encounters   Medication     Self Administer Medications: Behavioral Services             Physical Exam:   Vitals were reviewed.  Blood pressure (!) 147/78, pulse 71, temperature (!) 96.6  F (35.9  C), temperature source Temporal, resp. rate 14, height 1.905 m (6' 3\"), weight 73.4 kg (161 lb 13.1 oz), SpO2 100 %.    General: AAOx3, NAD  Skin: Not jaundiced, no rash, no ecchymoses; bilateral incision site CDI, soft, non-tender to touch, no swelling, no signs of hematoma. No bruit  HEENT: MMM, PERRLA, EOM intact  CV: RRR, normal S1S2, no murmur, clicks, rubs  Resp: Clear to auscultation bilaterally, no wheezes, rhonchi  Abd: Soft, non-tender, BS+, no masses appreciated  Extremities: warm and well perfused, palpable pulses, no edema  Neuro: No lateralizing symptoms or focal neurologic deficits        Labs:   Routine Labs:  Lab Results   Component Value Date    TROPONIN 0.061 (H) 11/27/2021    TROPONIN 0.066 (H) 11/27/2021    TROPONIN 0.020 11/06/2021     CMP  Recent Labs   Lab 07/18/22  0633 07/11/22  1328   NA  --  139   POTASSIUM  --  4.1   CHLORIDE  --  100   CO2  --  31   ANIONGAP  --  8   GLC 93 69*   BUN  --  16   CR  --  1.43*   GFRESTIMATED  --  58*   KEVIN  --  9.8   MAG  --  2.0   PROTTOTAL  --  7.1   ALBUMIN  --  4.2   BILITOTAL  --  0.5   ALKPHOS  --  109   AST  --  19   ALT  --  18     CBC  Recent Labs   Lab 07/11/22  1328   WBC 8.6   RBC 3.83*   HGB 11.9*   HCT 37.2*   MCV 97   MCH 31.1   MCHC 32.0   RDW 13.0        INR  Recent Labs   Lab 07/11/22  1328   INR 1.01           Diagnostics:    EKG 12Lead 7/11/2022: NSR HR 69       Echo 6/22/22:  Interpretation Summary     1.Left ventricular size, wall motion and function are normal. The ejection  fraction is 60-65%.  2.Normal right ventricle size and systolic function.  3.Flow reversal noted in pulmonary veins consistent with significant " mitral  regurgitation. There is severe (4+) mitral regurgitation. Severe mitral valve  prolapse, posterior leaflet, ERO is 0.8 cm2 with volume of 37 cc.  4.The aortic valve is bicuspid. Thickened aortic valve leaflets  Compared to the prior study dated 11/29/2021, there have been no changes.    Coronary Angiogram 7/11/2022:  Micah Nunez is a 56 year old old male with MR due to MV prolapse, history of admission for ADHF, history of polysubstance abuse who is here for pre-MV intervention angiography     - obstructive CAD in D1, mod-severe disease in mid-distal LAD; normal L-sided filling pressures  - will discuss further options as part of the heart team - given lack of angina, preserved LVEF, need for strict medication compliance if gets PCI, could consider medical therapy for CAD and MitraClip vs. Staged PCI (at least to diagonal branch but +/- dLAD) and MitraClip, vs. CAB/MVR  - add ASA 81mg daily indefinitely, atorva 40  - continue aggressive risk factor modification     Findings:  LM:no obstruction  LAD:large D1 w/ 80% proximal narrowing, mid-distal LAD w/ 60-70% Ca2+ narrowing  Lcx:mildly irregular  RCA:dominant, mildly irregular      Time Spent on this Encounter   I spent 60 minutes on the unit/floor managing the care of Micah Nunez. Over 50% of my time was spent on the following:   - Counseling the patient and/or family regarding: diagnostic results, prognosis, risks and benefits of treatment options and recommended follow-up  - Coordination of care with the: consultant(s) and nurse    Sahra Medina, CNP       Physician Attestation   I have reviewed and discussed with the advanced practice provider their history, physical and plan for Micah Nunez. I did not participate in a shared visit by interviewing or examining the patient and this should be billed as an advanced practice provider only visit.    Kentrell Sullivan MD

## 2022-07-17 ENCOUNTER — ANESTHESIA EVENT (OUTPATIENT)
Dept: CARDIOLOGY | Facility: CLINIC | Age: 57
DRG: 267 | End: 2022-07-17
Payer: COMMERCIAL

## 2022-07-18 ENCOUNTER — HOSPITAL ENCOUNTER (OUTPATIENT)
Dept: CARDIOLOGY | Facility: CLINIC | Age: 57
Discharge: HOME | DRG: 267 | End: 2022-07-18
Attending: INTERNAL MEDICINE | Admitting: INTERNAL MEDICINE
Payer: COMMERCIAL

## 2022-07-18 ENCOUNTER — HOSPITAL ENCOUNTER (INPATIENT)
Facility: CLINIC | Age: 57
LOS: 1 days | Discharge: HOME | DRG: 267 | End: 2022-07-19
Attending: INTERNAL MEDICINE | Admitting: INTERNAL MEDICINE
Payer: COMMERCIAL

## 2022-07-18 ENCOUNTER — PATIENT OUTREACH (OUTPATIENT)
Dept: CARE COORDINATION | Facility: CLINIC | Age: 57
End: 2022-07-18

## 2022-07-18 ENCOUNTER — ANESTHESIA (OUTPATIENT)
Dept: CARDIOLOGY | Facility: CLINIC | Age: 57
DRG: 267 | End: 2022-07-18
Payer: COMMERCIAL

## 2022-07-18 DIAGNOSIS — I34.0 NONRHEUMATIC MITRAL VALVE REGURGITATION: Primary | ICD-10-CM

## 2022-07-18 DIAGNOSIS — Z95.818 S/P MITRAL VALVE CLIP IMPLANTATION: ICD-10-CM

## 2022-07-18 DIAGNOSIS — I34.0 SEVERE MITRAL REGURGITATION: ICD-10-CM

## 2022-07-18 DIAGNOSIS — Z98.890 S/P MITRAL VALVE CLIP IMPLANTATION: ICD-10-CM

## 2022-07-18 DIAGNOSIS — I50.32 CHRONIC HEART FAILURE WITH PRESERVED EJECTION FRACTION (H): ICD-10-CM

## 2022-07-18 LAB
ACT BLD: 258 SECONDS (ref 74–150)
ACT BLD: 296 SECONDS (ref 74–150)
ACT BLD: 326 SECONDS (ref 74–150)
ALBUMIN SERPL BCG-MCNC: 4 G/DL (ref 3.5–5.2)
ALP SERPL-CCNC: 118 U/L (ref 40–129)
ALT SERPL W P-5'-P-CCNC: 13 U/L (ref 10–50)
ANION GAP SERPL CALCULATED.3IONS-SCNC: 10 MMOL/L (ref 7–15)
ANION GAP SERPL CALCULATED.3IONS-SCNC: 9 MMOL/L (ref 7–15)
AST SERPL W P-5'-P-CCNC: 24 U/L (ref 10–50)
BILIRUB SERPL-MCNC: 0.3 MG/DL
BUN SERPL-MCNC: 12 MG/DL (ref 6–20)
BUN SERPL-MCNC: 16.7 MG/DL (ref 6–20)
CALCIUM SERPL-MCNC: 9.3 MG/DL (ref 8.6–10)
CALCIUM SERPL-MCNC: 9.4 MG/DL (ref 8.6–10)
CHLORIDE SERPL-SCNC: 102 MMOL/L (ref 98–107)
CHLORIDE SERPL-SCNC: 104 MMOL/L (ref 98–107)
CREAT SERPL-MCNC: 1.18 MG/DL (ref 0.67–1.17)
CREAT SERPL-MCNC: 1.19 MG/DL (ref 0.67–1.17)
DEPRECATED HCO3 PLAS-SCNC: 25 MMOL/L (ref 22–29)
DEPRECATED HCO3 PLAS-SCNC: 27 MMOL/L (ref 22–29)
ERYTHROCYTE [DISTWIDTH] IN BLOOD BY AUTOMATED COUNT: 12.9 % (ref 10–15)
GFR SERPL CREATININE-BSD FRML MDRD: 72 ML/MIN/1.73M2
GFR SERPL CREATININE-BSD FRML MDRD: 72 ML/MIN/1.73M2
GLUCOSE BLDC GLUCOMTR-MCNC: 114 MG/DL (ref 70–99)
GLUCOSE BLDC GLUCOMTR-MCNC: 124 MG/DL (ref 70–99)
GLUCOSE BLDC GLUCOMTR-MCNC: 93 MG/DL (ref 70–99)
GLUCOSE SERPL-MCNC: 132 MG/DL (ref 70–99)
GLUCOSE SERPL-MCNC: 86 MG/DL (ref 70–99)
HCT VFR BLD AUTO: 40.3 % (ref 40–53)
HGB BLD-MCNC: 13 G/DL (ref 13.3–17.7)
LVEF ECHO: NORMAL
MAGNESIUM SERPL-MCNC: 2.1 MG/DL (ref 1.7–2.3)
MAGNESIUM SERPL-MCNC: 2.2 MG/DL (ref 1.7–2.3)
MCH RBC QN AUTO: 31.4 PG (ref 26.5–33)
MCHC RBC AUTO-ENTMCNC: 32.3 G/DL (ref 31.5–36.5)
MCV RBC AUTO: 97 FL (ref 78–100)
PHOSPHATE SERPL-MCNC: 3 MG/DL (ref 2.5–4.5)
PLATELET # BLD AUTO: 209 10E3/UL (ref 150–450)
POTASSIUM SERPL-SCNC: 4.5 MMOL/L (ref 3.4–5.3)
POTASSIUM SERPL-SCNC: 5.4 MMOL/L (ref 3.4–5.3)
PROT SERPL-MCNC: 6.5 G/DL (ref 6.4–8.3)
RBC # BLD AUTO: 4.14 10E6/UL (ref 4.4–5.9)
SODIUM SERPL-SCNC: 137 MMOL/L (ref 136–145)
SODIUM SERPL-SCNC: 140 MMOL/L (ref 136–145)
WBC # BLD AUTO: 8.1 10E3/UL (ref 4–11)

## 2022-07-18 PROCEDURE — 93355 ECHO TRANSESOPHAGEAL (TEE): CPT | Performed by: INTERNAL MEDICINE

## 2022-07-18 PROCEDURE — 93005 ELECTROCARDIOGRAM TRACING: CPT

## 2022-07-18 PROCEDURE — 83735 ASSAY OF MAGNESIUM: CPT | Performed by: INTERNAL MEDICINE

## 2022-07-18 PROCEDURE — 02UG3JZ SUPPLEMENT MITRAL VALVE WITH SYNTHETIC SUBSTITUTE, PERCUTANEOUS APPROACH: ICD-10-PCS | Performed by: INTERNAL MEDICINE

## 2022-07-18 PROCEDURE — 84100 ASSAY OF PHOSPHORUS: CPT | Performed by: INTERNAL MEDICINE

## 2022-07-18 PROCEDURE — 80053 COMPREHEN METABOLIC PANEL: CPT | Performed by: INTERNAL MEDICINE

## 2022-07-18 PROCEDURE — 272N000001 HC OR GENERAL SUPPLY STERILE: Performed by: INTERNAL MEDICINE

## 2022-07-18 PROCEDURE — 250N000013 HC RX MED GY IP 250 OP 250 PS 637: Performed by: ANESTHESIOLOGY

## 2022-07-18 PROCEDURE — 33418 REPAIR TCAT MITRAL VALVE: CPT | Performed by: INTERNAL MEDICINE

## 2022-07-18 PROCEDURE — 93355 ECHO TRANSESOPHAGEAL (TEE): CPT

## 2022-07-18 PROCEDURE — 250N000013 HC RX MED GY IP 250 OP 250 PS 637: Performed by: INTERNAL MEDICINE

## 2022-07-18 PROCEDURE — 93010 ELECTROCARDIOGRAM REPORT: CPT | Mod: 59 | Performed by: INTERNAL MEDICINE

## 2022-07-18 PROCEDURE — 99222 1ST HOSP IP/OBS MODERATE 55: CPT | Mod: 25 | Performed by: NURSE PRACTITIONER

## 2022-07-18 PROCEDURE — 250N000009 HC RX 250: Performed by: NURSE ANESTHETIST, CERTIFIED REGISTERED

## 2022-07-18 PROCEDURE — 258N000003 HC RX IP 258 OP 636: Performed by: NURSE ANESTHETIST, CERTIFIED REGISTERED

## 2022-07-18 PROCEDURE — C1887 CATHETER, GUIDING: HCPCS | Performed by: INTERNAL MEDICINE

## 2022-07-18 PROCEDURE — C1760 CLOSURE DEV, VASC: HCPCS | Performed by: INTERNAL MEDICINE

## 2022-07-18 PROCEDURE — 36415 COLL VENOUS BLD VENIPUNCTURE: CPT | Performed by: INTERNAL MEDICINE

## 2022-07-18 PROCEDURE — 250N000011 HC RX IP 250 OP 636: Performed by: ANESTHESIOLOGY

## 2022-07-18 PROCEDURE — 370N000017 HC ANESTHESIA TECHNICAL FEE, PER MIN: Performed by: INTERNAL MEDICINE

## 2022-07-18 PROCEDURE — C1769 GUIDE WIRE: HCPCS | Performed by: INTERNAL MEDICINE

## 2022-07-18 PROCEDURE — 250N000011 HC RX IP 250 OP 636: Performed by: NURSE ANESTHETIST, CERTIFIED REGISTERED

## 2022-07-18 PROCEDURE — 82962 GLUCOSE BLOOD TEST: CPT

## 2022-07-18 PROCEDURE — 258N000003 HC RX IP 258 OP 636: Performed by: ANESTHESIOLOGY

## 2022-07-18 PROCEDURE — C1894 INTRO/SHEATH, NON-LASER: HCPCS | Performed by: INTERNAL MEDICINE

## 2022-07-18 PROCEDURE — 85347 COAGULATION TIME ACTIVATED: CPT

## 2022-07-18 PROCEDURE — 278N000051 HC OR IMPLANT GENERAL: Performed by: INTERNAL MEDICINE

## 2022-07-18 PROCEDURE — 200N000002 HC R&B ICU UMMC

## 2022-07-18 PROCEDURE — 85027 COMPLETE CBC AUTOMATED: CPT | Performed by: INTERNAL MEDICINE

## 2022-07-18 PROCEDURE — 258N000003 HC RX IP 258 OP 636: Performed by: INTERNAL MEDICINE

## 2022-07-18 DEVICE — DELIVERY SYSTEM MITRACLIP G4  CDS0701-XTW: Type: IMPLANTABLE DEVICE | Site: HEART | Status: FUNCTIONAL

## 2022-07-18 DEVICE — MITRACLIP BUNDLE 1 0201: Type: IMPLANTABLE DEVICE | Status: FUNCTIONAL

## 2022-07-18 DEVICE — CLOSURE ANGIOSEAL 6FR 610130: Type: IMPLANTABLE DEVICE | Status: FUNCTIONAL

## 2022-07-18 RX ORDER — LIDOCAINE HYDROCHLORIDE 20 MG/ML
INJECTION, SOLUTION INFILTRATION; PERINEURAL PRN
Status: DISCONTINUED | OUTPATIENT
Start: 2022-07-18 | End: 2022-07-18

## 2022-07-18 RX ORDER — HYDROMORPHONE HCL IN WATER/PF 6 MG/30 ML
0.2 PATIENT CONTROLLED ANALGESIA SYRINGE INTRAVENOUS EVERY 5 MIN PRN
Status: DISCONTINUED | OUTPATIENT
Start: 2022-07-18 | End: 2022-07-18 | Stop reason: HOSPADM

## 2022-07-18 RX ORDER — ONDANSETRON 2 MG/ML
INJECTION INTRAMUSCULAR; INTRAVENOUS PRN
Status: DISCONTINUED | OUTPATIENT
Start: 2022-07-18 | End: 2022-07-18

## 2022-07-18 RX ORDER — NALOXONE HYDROCHLORIDE 0.4 MG/ML
0.4 INJECTION, SOLUTION INTRAMUSCULAR; INTRAVENOUS; SUBCUTANEOUS
Status: DISCONTINUED | OUTPATIENT
Start: 2022-07-18 | End: 2022-07-19

## 2022-07-18 RX ORDER — PROPOFOL 10 MG/ML
INJECTION, EMULSION INTRAVENOUS PRN
Status: DISCONTINUED | OUTPATIENT
Start: 2022-07-18 | End: 2022-07-18

## 2022-07-18 RX ORDER — ACETAMINOPHEN 325 MG/1
650 TABLET ORAL EVERY 4 HOURS PRN
Status: DISCONTINUED | OUTPATIENT
Start: 2022-07-18 | End: 2022-07-19 | Stop reason: HOSPADM

## 2022-07-18 RX ORDER — ASPIRIN 81 MG/1
81 TABLET ORAL DAILY
Status: DISCONTINUED | OUTPATIENT
Start: 2022-07-19 | End: 2022-07-19 | Stop reason: HOSPADM

## 2022-07-18 RX ORDER — NALOXONE HYDROCHLORIDE 0.4 MG/ML
0.2 INJECTION, SOLUTION INTRAMUSCULAR; INTRAVENOUS; SUBCUTANEOUS
Status: DISCONTINUED | OUTPATIENT
Start: 2022-07-18 | End: 2022-07-19

## 2022-07-18 RX ORDER — HEPARIN SODIUM 1000 [USP'U]/ML
INJECTION, SOLUTION INTRAVENOUS; SUBCUTANEOUS PRN
Status: DISCONTINUED | OUTPATIENT
Start: 2022-07-18 | End: 2022-07-18

## 2022-07-18 RX ORDER — POTASSIUM CHLORIDE 750 MG/1
20 TABLET, EXTENDED RELEASE ORAL DAILY
Status: DISCONTINUED | OUTPATIENT
Start: 2022-07-19 | End: 2022-07-19 | Stop reason: HOSPADM

## 2022-07-18 RX ORDER — PROTAMINE SULFATE 10 MG/ML
INJECTION, SOLUTION INTRAVENOUS PRN
Status: DISCONTINUED | OUTPATIENT
Start: 2022-07-18 | End: 2022-07-18

## 2022-07-18 RX ORDER — ONDANSETRON 2 MG/ML
4 INJECTION INTRAMUSCULAR; INTRAVENOUS EVERY 6 HOURS PRN
Status: DISCONTINUED | OUTPATIENT
Start: 2022-07-18 | End: 2022-07-19 | Stop reason: HOSPADM

## 2022-07-18 RX ORDER — FENTANYL CITRATE 50 UG/ML
25 INJECTION, SOLUTION INTRAMUSCULAR; INTRAVENOUS EVERY 5 MIN PRN
Status: DISCONTINUED | OUTPATIENT
Start: 2022-07-18 | End: 2022-07-18 | Stop reason: HOSPADM

## 2022-07-18 RX ORDER — FENTANYL CITRATE 50 UG/ML
INJECTION, SOLUTION INTRAMUSCULAR; INTRAVENOUS PRN
Status: DISCONTINUED | OUTPATIENT
Start: 2022-07-18 | End: 2022-07-18

## 2022-07-18 RX ORDER — NOREPINEPHRINE BITARTRATE 0.06 MG/ML
.01-.6 INJECTION, SOLUTION INTRAVENOUS CONTINUOUS
Status: DISCONTINUED | OUTPATIENT
Start: 2022-07-18 | End: 2022-07-18 | Stop reason: HOSPADM

## 2022-07-18 RX ORDER — SODIUM CHLORIDE, SODIUM LACTATE, POTASSIUM CHLORIDE, CALCIUM CHLORIDE 600; 310; 30; 20 MG/100ML; MG/100ML; MG/100ML; MG/100ML
INJECTION, SOLUTION INTRAVENOUS CONTINUOUS
Status: DISCONTINUED | OUTPATIENT
Start: 2022-07-18 | End: 2022-07-18 | Stop reason: HOSPADM

## 2022-07-18 RX ORDER — OXYCODONE HYDROCHLORIDE 10 MG/1
10 TABLET ORAL EVERY 4 HOURS PRN
Status: DISCONTINUED | OUTPATIENT
Start: 2022-07-18 | End: 2022-07-19 | Stop reason: HOSPADM

## 2022-07-18 RX ORDER — DEXAMETHASONE SODIUM PHOSPHATE 4 MG/ML
INJECTION, SOLUTION INTRA-ARTICULAR; INTRALESIONAL; INTRAMUSCULAR; INTRAVENOUS; SOFT TISSUE PRN
Status: DISCONTINUED | OUTPATIENT
Start: 2022-07-18 | End: 2022-07-18

## 2022-07-18 RX ORDER — ONDANSETRON 2 MG/ML
4 INJECTION INTRAMUSCULAR; INTRAVENOUS EVERY 30 MIN PRN
Status: DISCONTINUED | OUTPATIENT
Start: 2022-07-18 | End: 2022-07-18 | Stop reason: HOSPADM

## 2022-07-18 RX ORDER — CEFAZOLIN SODIUM 1 G/3ML
INJECTION, POWDER, FOR SOLUTION INTRAMUSCULAR; INTRAVENOUS PRN
Status: DISCONTINUED | OUTPATIENT
Start: 2022-07-18 | End: 2022-07-18

## 2022-07-18 RX ORDER — ONDANSETRON 4 MG/1
4 TABLET, ORALLY DISINTEGRATING ORAL EVERY 30 MIN PRN
Status: DISCONTINUED | OUTPATIENT
Start: 2022-07-18 | End: 2022-07-18 | Stop reason: HOSPADM

## 2022-07-18 RX ORDER — OXYCODONE HYDROCHLORIDE 5 MG/1
5 TABLET ORAL EVERY 4 HOURS PRN
Status: DISCONTINUED | OUTPATIENT
Start: 2022-07-18 | End: 2022-07-18 | Stop reason: HOSPADM

## 2022-07-18 RX ORDER — EPHEDRINE SULFATE 50 MG/ML
INJECTION, SOLUTION INTRAMUSCULAR; INTRAVENOUS; SUBCUTANEOUS PRN
Status: DISCONTINUED | OUTPATIENT
Start: 2022-07-18 | End: 2022-07-18

## 2022-07-18 RX ORDER — SPIRONOLACTONE 25 MG/1
25 TABLET ORAL DAILY
Status: DISCONTINUED | OUTPATIENT
Start: 2022-07-19 | End: 2022-07-19 | Stop reason: HOSPADM

## 2022-07-18 RX ORDER — ONDANSETRON 4 MG/1
4 TABLET, ORALLY DISINTEGRATING ORAL EVERY 6 HOURS PRN
Status: DISCONTINUED | OUTPATIENT
Start: 2022-07-18 | End: 2022-07-19 | Stop reason: HOSPADM

## 2022-07-18 RX ORDER — ATORVASTATIN CALCIUM 40 MG/1
40 TABLET, FILM COATED ORAL DAILY
Status: DISCONTINUED | OUTPATIENT
Start: 2022-07-19 | End: 2022-07-19 | Stop reason: HOSPADM

## 2022-07-18 RX ORDER — METOPROLOL SUCCINATE 25 MG/1
25 TABLET, EXTENDED RELEASE ORAL DAILY
Status: DISCONTINUED | OUTPATIENT
Start: 2022-07-19 | End: 2022-07-19 | Stop reason: HOSPADM

## 2022-07-18 RX ORDER — OXYCODONE HYDROCHLORIDE 5 MG/1
5 TABLET ORAL EVERY 4 HOURS PRN
Status: DISCONTINUED | OUTPATIENT
Start: 2022-07-18 | End: 2022-07-19 | Stop reason: HOSPADM

## 2022-07-18 RX ORDER — SODIUM CHLORIDE 9 MG/ML
INJECTION, SOLUTION INTRAVENOUS CONTINUOUS
Status: ACTIVE | OUTPATIENT
Start: 2022-07-18 | End: 2022-07-18

## 2022-07-18 RX ORDER — FUROSEMIDE 40 MG
40 TABLET ORAL DAILY
Status: DISCONTINUED | OUTPATIENT
Start: 2022-07-19 | End: 2022-07-19 | Stop reason: HOSPADM

## 2022-07-18 RX ORDER — SODIUM CHLORIDE, SODIUM LACTATE, POTASSIUM CHLORIDE, CALCIUM CHLORIDE 600; 310; 30; 20 MG/100ML; MG/100ML; MG/100ML; MG/100ML
INJECTION, SOLUTION INTRAVENOUS CONTINUOUS PRN
Status: DISCONTINUED | OUTPATIENT
Start: 2022-07-18 | End: 2022-07-18

## 2022-07-18 RX ORDER — LISINOPRIL 20 MG/1
20 TABLET ORAL DAILY
Status: DISCONTINUED | OUTPATIENT
Start: 2022-07-19 | End: 2022-07-19 | Stop reason: HOSPADM

## 2022-07-18 RX ADMIN — OXYCODONE HYDROCHLORIDE 5 MG: 5 TABLET ORAL at 12:47

## 2022-07-18 RX ADMIN — Medication 4000 UNITS: at 09:39

## 2022-07-18 RX ADMIN — HYDROMORPHONE HYDROCHLORIDE 0.2 MG: 0.2 INJECTION, SOLUTION INTRAMUSCULAR; INTRAVENOUS; SUBCUTANEOUS at 12:20

## 2022-07-18 RX ADMIN — Medication 10 MG: at 08:50

## 2022-07-18 RX ADMIN — Medication 50 MG: at 08:50

## 2022-07-18 RX ADMIN — OXYCODONE HYDROCHLORIDE 5 MG: 5 TABLET ORAL at 16:42

## 2022-07-18 RX ADMIN — LIDOCAINE HYDROCHLORIDE 100 MG: 20 INJECTION, SOLUTION INFILTRATION; PERINEURAL at 08:50

## 2022-07-18 RX ADMIN — FENTANYL CITRATE 100 MCG: 50 INJECTION, SOLUTION INTRAMUSCULAR; INTRAVENOUS at 08:41

## 2022-07-18 RX ADMIN — DEXAMETHASONE SODIUM PHOSPHATE 4 MG: 4 INJECTION, SOLUTION INTRA-ARTICULAR; INTRALESIONAL; INTRAMUSCULAR; INTRAVENOUS; SOFT TISSUE at 08:56

## 2022-07-18 RX ADMIN — FENTANYL CITRATE 25 MCG: 50 INJECTION, SOLUTION INTRAMUSCULAR; INTRAVENOUS at 10:55

## 2022-07-18 RX ADMIN — CEFAZOLIN 2 G: 1 INJECTION, POWDER, FOR SOLUTION INTRAMUSCULAR; INTRAVENOUS at 09:18

## 2022-07-18 RX ADMIN — Medication 20 MG: at 09:10

## 2022-07-18 RX ADMIN — SUGAMMADEX 200 MG: 100 INJECTION, SOLUTION INTRAVENOUS at 10:13

## 2022-07-18 RX ADMIN — HYDROMORPHONE HYDROCHLORIDE 0.2 MG: 0.2 INJECTION, SOLUTION INTRAMUSCULAR; INTRAVENOUS; SUBCUTANEOUS at 11:30

## 2022-07-18 RX ADMIN — FENTANYL CITRATE 25 MCG: 50 INJECTION, SOLUTION INTRAMUSCULAR; INTRAVENOUS at 11:00

## 2022-07-18 RX ADMIN — Medication 8000 UNITS: at 09:27

## 2022-07-18 RX ADMIN — OXYCODONE HYDROCHLORIDE 5 MG: 5 TABLET ORAL at 15:54

## 2022-07-18 RX ADMIN — ACETAMINOPHEN 650 MG: 325 TABLET, FILM COATED ORAL at 13:52

## 2022-07-18 RX ADMIN — FENTANYL CITRATE 25 MCG: 50 INJECTION, SOLUTION INTRAMUSCULAR; INTRAVENOUS at 11:05

## 2022-07-18 RX ADMIN — PROPOFOL 200 MG: 10 INJECTION, EMULSION INTRAVENOUS at 08:50

## 2022-07-18 RX ADMIN — Medication 10 MG: at 09:25

## 2022-07-18 RX ADMIN — FENTANYL CITRATE 25 MCG: 50 INJECTION, SOLUTION INTRAMUSCULAR; INTRAVENOUS at 10:50

## 2022-07-18 RX ADMIN — HYDROMORPHONE HYDROCHLORIDE 0.2 MG: 0.2 INJECTION, SOLUTION INTRAMUSCULAR; INTRAVENOUS; SUBCUTANEOUS at 13:52

## 2022-07-18 RX ADMIN — MIDAZOLAM 2 MG: 1 INJECTION INTRAMUSCULAR; INTRAVENOUS at 08:41

## 2022-07-18 RX ADMIN — ONDANSETRON 4 MG: 2 INJECTION INTRAMUSCULAR; INTRAVENOUS at 10:06

## 2022-07-18 RX ADMIN — SODIUM CHLORIDE: 9 INJECTION, SOLUTION INTRAVENOUS at 11:27

## 2022-07-18 RX ADMIN — PROTAMINE SULFATE 45 MG: 10 INJECTION, SOLUTION INTRAVENOUS at 10:05

## 2022-07-18 RX ADMIN — SODIUM CHLORIDE, POTASSIUM CHLORIDE, SODIUM LACTATE AND CALCIUM CHLORIDE: 600; 310; 30; 20 INJECTION, SOLUTION INTRAVENOUS at 08:41

## 2022-07-18 RX ADMIN — SODIUM CHLORIDE, POTASSIUM CHLORIDE, SODIUM LACTATE AND CALCIUM CHLORIDE: 600; 310; 30; 20 INJECTION, SOLUTION INTRAVENOUS at 10:30

## 2022-07-18 RX ADMIN — Medication 2000 UNITS: at 09:51

## 2022-07-18 RX ADMIN — HYDROMORPHONE HYDROCHLORIDE 0.2 MG: 0.2 INJECTION, SOLUTION INTRAMUSCULAR; INTRAVENOUS; SUBCUTANEOUS at 11:20

## 2022-07-18 ASSESSMENT — ACTIVITIES OF DAILY LIVING (ADL)
ADLS_ACUITY_SCORE: 35
ADLS_ACUITY_SCORE: 20
DRESSING/BATHING_DIFFICULTY: NO
ADLS_ACUITY_SCORE: 35
DOING_ERRANDS_INDEPENDENTLY_DIFFICULTY: NO
ADLS_ACUITY_SCORE: 35
WALKING_OR_CLIMBING_STAIRS_DIFFICULTY: NO
ADLS_ACUITY_SCORE: 20
WEAR_GLASSES_OR_BLIND: YES
CONCENTRATING,_REMEMBERING_OR_MAKING_DECISIONS_DIFFICULTY: NO
DIFFICULTY_EATING/SWALLOWING: NO
CHANGE_IN_FUNCTIONAL_STATUS_SINCE_ONSET_OF_CURRENT_ILLNESS/INJURY: NO
EQUIPMENT_CURRENTLY_USED_AT_HOME: WHEELCHAIR, POWER
ADLS_ACUITY_SCORE: 35
ADLS_ACUITY_SCORE: 20
FALL_HISTORY_WITHIN_LAST_SIX_MONTHS: NO
TOILETING_ISSUES: NO

## 2022-07-18 ASSESSMENT — VISUAL ACUITY: OU: NORMAL ACUITY

## 2022-07-18 NOTE — ANESTHESIA POSTPROCEDURE EVALUATION
Patient: Micah Nunez    Procedure: Procedure(s):  Transcatheter mitral valve repair with mitraclip. Possible atrial septal defect closure.       Anesthesia Type:  General    Note:  Disposition: Inpatient   Postop Pain Control: Uneventful            Sign Out: Well controlled pain   PONV: No   Neuro/Psych: Uneventful            Sign Out: Acceptable/Baseline neuro status   Airway/Respiratory: Uneventful            Sign Out: Acceptable/Baseline resp. status   CV/Hemodynamics: Uneventful            Sign Out: Acceptable CV status; No obvious hypovolemia; No obvious fluid overload   Other NRE: NONE   DID A NON-ROUTINE EVENT OCCUR? No           Last vitals:  Vitals Value Taken Time   /81 07/18/22 1200   Temp 36.4  C (97.5  F) 07/18/22 1145   Pulse 65 07/18/22 1211   Resp 14 07/18/22 1200   SpO2 99 % 07/18/22 1211   Vitals shown include unvalidated device data.    Electronically Signed By: William Messina MD  July 18, 2022  12:12 PM

## 2022-07-18 NOTE — PROGRESS NOTES
Clinic Care Coordination Contact  Ambulatory Care Coordination to Inpatient Care Management   Hand-In Communication    Date:  July 18, 2022  Name: Micah Nunez is enrolled in Ambulatory Care Coordination program and I am the Lead Care Coordinator.  CC Contact Information: Epic InBasket + phone  Payor Source: Payor: [employer] / Plan: friendfund [place] / Product Type: HMO /   Current services in place:     Please see the CC Snaphot and Care Management Flowsheets for specific  details of this Micah Nunez care plan.   Additional details/specific concerns r/t this admission:    Goal Statement: My CHF symptoms will be stable   Date Goal set: 1/31/2022  Barriers: Poor understanding of CHF diagnosis   Strengths: Motivated to learn   Date to Achieve By: 9/31/2022  Patient expressed understanding of goal: Yes    Action steps to achieve this goal:  1. I will check my weight daily and call if weight gain is 2-3# in a day or 5# in a week.  2. I will seek medical help if I have increased shortness of breath episodes,fever or coughing up a colored sputum.  3. I will take my medications as prescribed and keep future Cardiology /primary care appointments.  4. I will follow a low salt diet.    Goal Statement: I will get connected to resources/supports.   Date Goal set: 12/1/21  Barriers: Access   Strengths: Motivated, family, friends, care team   Date to Achieve By: 5/1/22 extended date 9/1/2022  Patient expressed understanding of goal: Yes    Action steps to achieve this goal:  1. I will start application for social security disability income online. (In Process)  2. I will use [facility] to medical appts.  3. I will talk to my PCP about a cane or other DME. (Had PCP OV 4/19/22)  4. I will look into other resources as needed (housing, etc). (Working with [facility] worker for housing, staying in [facility])  5. I will work with care coordination as needed.          I will follow this  admission in Epic. Please feel free to contact me with questions or for further collaboration in discharge planning.  [facility]   Gail Gomez RN, Care Coordinator   [facility]'s   E-mail [email or web address]   [phone]

## 2022-07-18 NOTE — ANESTHESIA CARE TRANSFER NOTE
Patient: Micah Nunez    Procedure: Procedure(s):  Transcatheter mitral valve repair with mitraclip. Possible atrial septal defect closure.       Diagnosis: valvular disease  Diagnosis Additional Information: No value filed.    Anesthesia Type:   General     Note:    Oropharynx: oropharynx clear of all foreign objects  Level of Consciousness: drowsy  Oxygen Supplementation: nasal cannula  Level of Supplemental Oxygen (L/min / FiO2): 2  Independent Airway: airway patency satisfactory and stable  Dentition: dentition unchanged  Vital Signs Stable: post-procedure vital signs reviewed and stable  Report to RN Given: handoff report given  Patient transferred to: PACU    Handoff Report: Identifed the Patient, Identified the Reponsible Provider, Reviewed the pertinent medical history, Discussed the surgical course, Reviewed Intra-OP anesthesia mangement and issues during anesthesia, Set expectations for post-procedure period and Allowed opportunity for questions and acknowledgement of understanding      Vitals:  Vitals Value Taken Time   /78 07/18/22 1030   Temp 35.9  C (96.6  F) 07/18/22 1030   Pulse 67 07/18/22 1035   Resp 14 07/18/22 1030   SpO2 100 % 07/18/22 1035   Vitals shown include unvalidated device data.    Electronically Signed By: NEEHMIAS Mccallum CRNA  July 18, 2022  10:36 AM

## 2022-07-18 NOTE — ANESTHESIA PROCEDURE NOTES
Perioperative MARIANNA Procedure Note    Staff -        Anesthesiologist:  Alonso Cortez MD       Performed By: anesthesiologist  Preanesthesia Checklist:  Patient identified, IV assessed, risks and benefits discussed, monitors and equipment assessed, procedure being performed at surgeon's request and anesthesia consent obtained.    MARIANNA Probe Insertion    Probe Status PRE Insertion: NO obvious damage  Probe type:  Adult 3D  Bite block used:   Oral Airway  Insertion Technique: Easy, no oropharyngeal manipulation  Insertion complications: None obvious  Billing Report: A MARIANNA report is being generated by the cardiology department.  Probe Status POST Removal: NO obvious damage

## 2022-07-18 NOTE — ANESTHESIA PREPROCEDURE EVALUATION
Anesthesia Pre-Procedure Evaluation    Patient: Micah Nunez   MRN: [id number] : 1965        Procedure : Procedure(s):  Transcatheter mitral valve repair with mitraclip. Possible atrial septal defect closure.          Past Medical History:   Diagnosis Date     Acute exacerbation of CHF (congestive heart failure) (H) 2021     Benign essential hypertension      Bleeding disorder (H)      Chemical dependency (H)      Closed displaced fracture of fifth metatarsal bone of left foot 2021     Gastroesophageal reflux disease      Hypertension      Nasal mass 2013     Papilloma of nose 03/10/2014     Skin disease      Traumatic avulsion of nail plate of toe 2021     Type I or II open fracture of left ulna 2020      Past Surgical History:   Procedure Laterality Date     ARTHRODESIS FOOT Left 2015    Procedure: ARTHRODESIS FOOT;  Surgeon: Cortez Hayward DPM;  Location: [facility] OR     ARTHRODESIS TOE(S)  2013    Procedure: ARTHRODESIS TOE(S);  Arthrodesis first metatarsophalangeal joint left foot;  Surgeon: Cortez Hayward DPM;  Location: [facility] OR     COLONOSCOPY N/A 2021    Procedure: COLONOSCOPY;  Surgeon: Dixon Sarabia MD;  Location: [facility] GI     CV CORONARY ANGIOGRAM N/A 2022    Procedure: Coronary Angiogram;  Surgeon: Lamont Maloney MD;  Location: [facility]     CV LEFT HEART CATH N/A 2022    Procedure: Left Heart Catheterization;  Surgeon: Lamont Maloney MD;  Location: [facility] CV     ENDOSCOPIC SINUS SURGERY  2014    Procedure: ENDOSCOPIC SINUS SURGERY;  Functional Endoscopic Sinus Surgery;  Surgeon: Bobo Renteria MD;  Location:  OR     ENDOSCOPIC SINUS SURGERY  2014    Procedure: ENDOSCOPIC SINUS SURGERY;  Surgeon: Bobo Renteria MD;  Location:  OR     ENDOSCOPIC SINUS SURGERY N/A 2015    Procedure: ENDOSCOPIC SINUS SURGERY;  Surgeon: Bobo Renteria MD;  Location:  OR      ENDOSCOPIC SINUS SURGERY N/A 8/17/2015    Procedure: ENDOSCOPIC SINUS SURGERY;  Surgeon: Bobo Renteria MD;  [facility]     ENDOSCOPIC SINUS SURGERY Bilateral 8/19/2019    Procedure: Bilateral Functional Endoscopic Sinus Surgery, Right Nasal Endoscopy and Excision of Left Nasal Mass;  Surgeon: Bobo Renteria MD;  [facility]     ENT SURGERY       EXCISE NASAL MASS Left 11/6/2017    Procedure: EXCISE NASAL MASS;  Excision of Left Nasal Papilloma;  Surgeon: Bobo Renteria MD;  [facility]     LAPAROSCOPIC HERNIORRHAPHY INGUINAL BILATERAL Bilateral 4/12/2017    Procedure: LAPAROSCOPIC HERNIORRHAPHY INGUINAL BILATERAL;  Surgeon: Shay Mercado MD;  Location: [facility]     NASAL ENDOSCOPY Bilateral 2/6/2017    Procedure: NASAL ENDOSCOPY;  Surgeon: Bobo Renteria MD;  [facility]     NASAL ENDOSCOPY N/A 5/21/2018    Procedure: NASAL ENDOSCOPY;  Nasal Endoscopy, CO2 Laser Excision of Left Nasal Papilloma;  Surgeon: Bobo Renteria MD;  [facility]     NASAL/SINUS POLYPECTOMY       PE TUBES        No Known Allergies   Social History     Tobacco Use     Smoking status: Current Every Day Smoker     Packs/day: 1.00     Years: 40.00     Pack years: 40.00     Types: Cigarettes     Start date: 1/1/1980     Smokeless tobacco: Never Used     Tobacco comment: about 5-10 cigarettes or so a day   Substance Use Topics     Alcohol use: Not Currently     Comment: occasional       Wt Readings from Last 1 Encounters:   07/18/22 73.4 kg (161 lb 13.1 oz)        Anesthesia Evaluation            ROS/MED HX  ENT/Pulmonary:       Neurologic:       Cardiovascular:     (+) hypertension-----CHF     METS/Exercise Tolerance:     Hematologic:       Musculoskeletal:       GI/Hepatic:     (+) GERD,     Renal/Genitourinary:       Endo:       Psychiatric/Substance Use:       Infectious Disease:       Malignancy:       Other:               OUTSIDE LABS:  CBC:   Lab Results   Component Value Date    WBC 8.6  07/11/2022    WBC 8.2 04/22/2022    HGB 11.9 (L) 07/11/2022    HGB 13.1 (L) 04/22/2022    HCT 37.2 (L) 07/11/2022    HCT 40.8 04/22/2022     07/11/2022     04/22/2022     BMP:   Lab Results   Component Value Date     07/11/2022     04/22/2022    POTASSIUM 4.1 07/11/2022    POTASSIUM 3.8 04/22/2022    CHLORIDE 100 07/11/2022    CHLORIDE 102 04/22/2022    CO2 31 07/11/2022    CO2 27 04/22/2022    BUN 16 07/11/2022    BUN 18 04/22/2022    CR 1.43 (H) 07/11/2022    CR 1.02 04/22/2022    GLC 93 07/18/2022    GLC 69 (L) 07/11/2022     COAGS:   Lab Results   Component Value Date    INR 1.01 07/11/2022     POC: No results found for: BGM, HCG, HCGS  HEPATIC:   Lab Results   Component Value Date    ALBUMIN 4.2 07/11/2022    PROTTOTAL 7.1 07/11/2022    ALT 18 07/11/2022    AST 19 07/11/2022    ALKPHOS 109 07/11/2022    BILITOTAL 0.5 07/11/2022     OTHER:   Lab Results   Component Value Date    PH 7.46 (H) 11/28/2021    LACT 1.6 11/19/2021    KEVIN 9.8 07/11/2022    PHOS 3.6 11/27/2021    MAG 2.0 07/11/2022    LIPASE 55 (L) 02/24/2021    TSH 0.89 04/19/2022    CRP 1.5 (H) 11/19/2021    SED 2 11/19/2021       Anesthesia Plan    ASA Status:  3      Anesthesia Type: General.     - Airway: ETT   Induction: Intravenous.      Techniques and Equipment:     - Lines/Monitors: 2nd IV, MARIANNA            MARIANNA Absolute Contra-indication: NONE            MARIANNA Relative Contra-indication: NONE     Consents    Anesthesia Plan(s) and associated risks, benefits, and realistic alternatives discussed. Questions answered and patient/representative(s) expressed understanding.    - Discussed:     - Discussed with:  Patient      - Extended Intubation/Ventilatory Support Discussed: No.      - Patient is DNR/DNI Status: No    Use of blood products discussed: No .     Postoperative Care    Pain management: IV analgesics.   PONV prophylaxis: Ondansetron (or other 5HT-3), Dexamethasone or Solumedrol     Comments:                Alonso  MD Diego

## 2022-07-18 NOTE — OR NURSING
Surgery paged:  Micah Nunez  Making sure pt is staying the night. Please clarify.  thank you  [phone]

## 2022-07-18 NOTE — ANESTHESIA PROCEDURE NOTES
Airway       Patient location during procedure: OR       Procedure Start/Stop Times: 7/18/2022 8:52 AM  Staff -        Anesthesiologist:  Alonso Cortez MD       Other Anesthesia Staff: Joey Turner       Performed By: SRNA  Consent for Airway        Urgency: elective  Indications and Patient Condition       Indications for airway management: lizett-procedural       Induction type:intravenous       Mask difficulty assessment: 1 - vent by mask    Final Airway Details       Final airway type: endotracheal airway       Successful airway: ETT - single  Endotracheal Airway Details        ETT size (mm): 8.0       Cuffed: yes       Successful intubation technique: direct laryngoscopy       DL Blade Type: MAC 4       Grade View of Cords: 1       Adjucts: stylet       Position: Right       Measured from: lips       Secured at (cm): 23       Bite block used: None    Post intubation assessment        Placement verified by: capnometry, equal breath sounds and chest rise        Number of attempts at approach: 1       Number of other approaches attempted: 0       Secured with: silk tape       Ease of procedure: easy       Dentition: Intact and Unchanged    Medication(s) Administered   Medication Administration Time: 7/18/2022 8:52 AM

## 2022-07-19 ENCOUNTER — APPOINTMENT (OUTPATIENT)
Dept: CARDIOLOGY | Facility: CLINIC | Age: 57
DRG: 267 | End: 2022-07-19
Attending: INTERNAL MEDICINE
Payer: COMMERCIAL

## 2022-07-19 ENCOUNTER — APPOINTMENT (OUTPATIENT)
Dept: OCCUPATIONAL THERAPY | Facility: CLINIC | Age: 57
DRG: 267 | End: 2022-07-19
Attending: INTERNAL MEDICINE
Payer: COMMERCIAL

## 2022-07-19 ENCOUNTER — APPOINTMENT (OUTPATIENT)
Dept: GENERAL RADIOLOGY | Facility: CLINIC | Age: 57
DRG: 267 | End: 2022-07-19
Attending: INTERNAL MEDICINE
Payer: COMMERCIAL

## 2022-07-19 VITALS
TEMPERATURE: 98 F | HEIGHT: 75 IN | DIASTOLIC BLOOD PRESSURE: 79 MMHG | WEIGHT: 163.14 LBS | OXYGEN SATURATION: 98 % | BODY MASS INDEX: 20.28 KG/M2 | RESPIRATION RATE: 16 BRPM | SYSTOLIC BLOOD PRESSURE: 136 MMHG | HEART RATE: 66 BPM

## 2022-07-19 DIAGNOSIS — Z98.890 S/P MITRAL VALVE CLIP IMPLANTATION: Primary | ICD-10-CM

## 2022-07-19 DIAGNOSIS — Z95.818 S/P MITRAL VALVE CLIP IMPLANTATION: Primary | ICD-10-CM

## 2022-07-19 LAB
ANION GAP SERPL CALCULATED.3IONS-SCNC: 11 MMOL/L (ref 7–15)
BUN SERPL-MCNC: 18.4 MG/DL (ref 6–20)
CALCIUM SERPL-MCNC: 9.4 MG/DL (ref 8.6–10)
CHLORIDE SERPL-SCNC: 100 MMOL/L (ref 98–107)
CREAT SERPL-MCNC: 1.15 MG/DL (ref 0.67–1.17)
DEPRECATED HCO3 PLAS-SCNC: 24 MMOL/L (ref 22–29)
ERYTHROCYTE [DISTWIDTH] IN BLOOD BY AUTOMATED COUNT: 12.7 % (ref 10–15)
GFR SERPL CREATININE-BSD FRML MDRD: 75 ML/MIN/1.73M2
GLUCOSE SERPL-MCNC: 106 MG/DL (ref 70–99)
HCT VFR BLD AUTO: 37.3 % (ref 40–53)
HGB BLD-MCNC: 12.4 G/DL (ref 13.3–17.7)
LVEF ECHO: NORMAL
MAGNESIUM SERPL-MCNC: 2 MG/DL (ref 1.7–2.3)
MCH RBC QN AUTO: 31.1 PG (ref 26.5–33)
MCHC RBC AUTO-ENTMCNC: 33.2 G/DL (ref 31.5–36.5)
MCV RBC AUTO: 94 FL (ref 78–100)
PLATELET # BLD AUTO: 242 10E3/UL (ref 150–450)
POTASSIUM SERPL-SCNC: 4.5 MMOL/L (ref 3.4–5.3)
RBC # BLD AUTO: 3.99 10E6/UL (ref 4.4–5.9)
SODIUM SERPL-SCNC: 135 MMOL/L (ref 136–145)
WBC # BLD AUTO: 15.6 10E3/UL (ref 4–11)

## 2022-07-19 PROCEDURE — 97165 OT EVAL LOW COMPLEX 30 MIN: CPT | Mod: GO

## 2022-07-19 PROCEDURE — 93005 ELECTROCARDIOGRAM TRACING: CPT

## 2022-07-19 PROCEDURE — 93306 TTE W/DOPPLER COMPLETE: CPT | Mod: 26 | Performed by: INTERNAL MEDICINE

## 2022-07-19 PROCEDURE — 85027 COMPLETE CBC AUTOMATED: CPT | Performed by: INTERNAL MEDICINE

## 2022-07-19 PROCEDURE — 93010 ELECTROCARDIOGRAM REPORT: CPT | Performed by: INTERNAL MEDICINE

## 2022-07-19 PROCEDURE — 36415 COLL VENOUS BLD VENIPUNCTURE: CPT | Performed by: INTERNAL MEDICINE

## 2022-07-19 PROCEDURE — 97110 THERAPEUTIC EXERCISES: CPT | Mod: GO

## 2022-07-19 PROCEDURE — 71045 X-RAY EXAM CHEST 1 VIEW: CPT | Mod: 26 | Performed by: RADIOLOGY

## 2022-07-19 PROCEDURE — 71045 X-RAY EXAM CHEST 1 VIEW: CPT

## 2022-07-19 PROCEDURE — 255N000002 HC RX 255 OP 636: Performed by: INTERNAL MEDICINE

## 2022-07-19 PROCEDURE — 83735 ASSAY OF MAGNESIUM: CPT | Performed by: INTERNAL MEDICINE

## 2022-07-19 PROCEDURE — 250N000013 HC RX MED GY IP 250 OP 250 PS 637: Performed by: INTERNAL MEDICINE

## 2022-07-19 PROCEDURE — 93306 TTE W/DOPPLER COMPLETE: CPT

## 2022-07-19 PROCEDURE — 80048 BASIC METABOLIC PNL TOTAL CA: CPT | Performed by: INTERNAL MEDICINE

## 2022-07-19 PROCEDURE — 99239 HOSP IP/OBS DSCHRG MGMT >30: CPT | Mod: 25 | Performed by: NURSE PRACTITIONER

## 2022-07-19 RX ORDER — NALOXONE HYDROCHLORIDE 0.4 MG/ML
0.4 INJECTION, SOLUTION INTRAMUSCULAR; INTRAVENOUS; SUBCUTANEOUS
Status: DISCONTINUED | OUTPATIENT
Start: 2022-07-19 | End: 2022-07-19

## 2022-07-19 RX ORDER — NALOXONE HYDROCHLORIDE 0.4 MG/ML
0.2 INJECTION, SOLUTION INTRAMUSCULAR; INTRAVENOUS; SUBCUTANEOUS
Status: DISCONTINUED | OUTPATIENT
Start: 2022-07-19 | End: 2022-07-19 | Stop reason: HOSPADM

## 2022-07-19 RX ORDER — HYDRALAZINE HYDROCHLORIDE 20 MG/ML
10 INJECTION INTRAMUSCULAR; INTRAVENOUS
Status: DISCONTINUED | OUTPATIENT
Start: 2022-07-19 | End: 2022-07-19

## 2022-07-19 RX ORDER — NALOXONE HYDROCHLORIDE 0.4 MG/ML
0.4 INJECTION, SOLUTION INTRAMUSCULAR; INTRAVENOUS; SUBCUTANEOUS
Status: DISCONTINUED | OUTPATIENT
Start: 2022-07-19 | End: 2022-07-19 | Stop reason: HOSPADM

## 2022-07-19 RX ORDER — FLUMAZENIL 0.1 MG/ML
0.2 INJECTION, SOLUTION INTRAVENOUS
Status: DISCONTINUED | OUTPATIENT
Start: 2022-07-19 | End: 2022-07-19 | Stop reason: HOSPADM

## 2022-07-19 RX ORDER — LIDOCAINE 40 MG/G
CREAM TOPICAL
Status: DISCONTINUED | OUTPATIENT
Start: 2022-07-19 | End: 2022-07-19 | Stop reason: HOSPADM

## 2022-07-19 RX ORDER — NALOXONE HYDROCHLORIDE 0.4 MG/ML
0.2 INJECTION, SOLUTION INTRAMUSCULAR; INTRAVENOUS; SUBCUTANEOUS
Status: DISCONTINUED | OUTPATIENT
Start: 2022-07-19 | End: 2022-07-19

## 2022-07-19 RX ORDER — ATROPINE SULFATE 0.1 MG/ML
0.5 INJECTION INTRAVENOUS
Status: DISCONTINUED | OUTPATIENT
Start: 2022-07-19 | End: 2022-07-19

## 2022-07-19 RX ORDER — NITROGLYCERIN 0.4 MG/1
0.4 TABLET SUBLINGUAL EVERY 5 MIN PRN
Status: DISCONTINUED | OUTPATIENT
Start: 2022-07-19 | End: 2022-07-19 | Stop reason: HOSPADM

## 2022-07-19 RX ORDER — FENTANYL CITRATE 50 UG/ML
25 INJECTION, SOLUTION INTRAMUSCULAR; INTRAVENOUS
Status: DISCONTINUED | OUTPATIENT
Start: 2022-07-19 | End: 2022-07-19

## 2022-07-19 RX ADMIN — ASPIRIN 81 MG: 81 TABLET, COATED ORAL at 08:03

## 2022-07-19 RX ADMIN — HUMAN ALBUMIN MICROSPHERES AND PERFLUTREN 9 ML: 10; .22 INJECTION, SOLUTION INTRAVENOUS at 12:31

## 2022-07-19 RX ADMIN — OXYCODONE HYDROCHLORIDE 5 MG: 5 TABLET ORAL at 08:03

## 2022-07-19 RX ADMIN — LISINOPRIL 20 MG: 5 TABLET ORAL at 08:04

## 2022-07-19 RX ADMIN — METOPROLOL SUCCINATE 25 MG: 25 TABLET, EXTENDED RELEASE ORAL at 08:04

## 2022-07-19 RX ADMIN — FUROSEMIDE 40 MG: 20 TABLET ORAL at 08:03

## 2022-07-19 RX ADMIN — SPIRONOLACTONE 25 MG: 25 TABLET, FILM COATED ORAL at 08:04

## 2022-07-19 RX ADMIN — ATORVASTATIN CALCIUM 40 MG: 40 TABLET, FILM COATED ORAL at 08:04

## 2022-07-19 RX ADMIN — POTASSIUM CHLORIDE 20 MEQ: 750 TABLET, EXTENDED RELEASE ORAL at 08:04

## 2022-07-19 ASSESSMENT — ACTIVITIES OF DAILY LIVING (ADL)
ADLS_ACUITY_SCORE: 20
ADLS_ACUITY_SCORE: 22
ADLS_ACUITY_SCORE: 20
ADLS_ACUITY_SCORE: 20
PREVIOUS_RESPONSIBILITIES: MEAL PREP;HOUSEKEEPING;LAUNDRY;SHOPPING;MEDICATION MANAGEMENT;FINANCES
ADLS_ACUITY_SCORE: 20
IADL_COMMENTS: IND
ADLS_ACUITY_SCORE: 20
ADLS_ACUITY_SCORE: 22

## 2022-07-19 NOTE — PLAN OF CARE
Admitted/transferred from: PACU (15:00)  Reason for admission/transfer: Observation status post mitral valve clip procedure  Patient status upon admission/transfer: C/o significant back pack related to bed rest post-procedure  Interventions: PRN pain medication. Continued monitoring of bilateral groin sites. (Patient reporting substantial relief of pain from sitting at bedside.)  Plan: Continue to monitor status of interventional sites, manage pain, and echo EKG in AM with likely discharge 7/19/22.   2 RN skin assessment: completed by Victorina CARRERA RN, and Kingsley PINTO RN  Result of skin assessment and interventions/actions: Bilateral groin surgical sites covered with Primapore dressings--shadow outlined post-procedure with no further bleeding appreciated on unit. Skin is otherwise clean, dry, and intact.   Height, weight, drug calc weight: Done  Patient belongings (see Flowsheet - Adult Profile for details): Patient belongings remain with patient, including electric scooter, mobile phone, and backpack.   MDRO education (if applicable): No active isolation precautions.    Kingsley Malik RN  Critical Care Flex Team

## 2022-07-19 NOTE — PROGRESS NOTES
Care Management Discharge Note    Discharge Date: 07/19/2022       Discharge Disposition: Outpatient Rehab (PT, OT, SLP, Cardiac or Pulmonary), Shelter    Discharge Services: County Worker    Discharge DME: None    Discharge Transportation: family or friend will provide    Private pay costs discussed: Not applicable    Education Provided on the Discharge Plan:  Yes  Persons Notified of Discharge Plans: Pt, pt's mother, bedside RN. Charge RN, Cards CSI  Patient/Family in Agreement with the Plan: yes    Handoff Referral Completed: Yes    Additional Information:  SW met w/ pt to discuss discharge plan as pt is medically ready. Pt reported that he will go back to [facility] ([address], [facility]) and that his friend Grace will provide a ride. He shared that he is part of a housing program through the [facility] and they are paying for his hotel room. He is also working with a [facility]  to get SSDI. Pt does not have any concerns regarding discharge plan and feels safe w/ current plan.     SW will continue to follow for support, resources, and discharge planning    WONG Hernandez, KARENSW  4A/4C   Ph: [phone]  Pager: [phone]

## 2022-07-19 NOTE — PROGRESS NOTES
Patient states they have never used a cpap before, and not history of GIANFRANCO.  Order discontinued.

## 2022-07-19 NOTE — PLAN OF CARE
Goal Outcome Evaluation:  Overall Patient Progress: improving  Outcome Evaluation: Pt frustrated at times with lab draws. Otherwise pt up to commode stand-by and copperative with assessments. Pt also complains that his right groin siteis tender to palpation.  Neuro: A&Ox4. Pt denies pain.  Resp: On RA Clear/Dim LS  CV: NSR. SBP's 110s to 140s. CSI CC notified about a couple higher SBPs.  : Pt voids spontaneously.   GI: No BM since procedure.   Plan: AM labs and echo. Team to consider discharging patient home.  For vital signs and complete assessments, please see documentation flowsheets.

## 2022-07-19 NOTE — PROGRESS NOTES
Transferred to: 6B at 1215  Status at time of transfer: Stable  Belongings: Electric scooter, backpack, phone  Ortez removed? (if no, why?): Yes  Chart and medications: Chart with patient  Family notified: Patient updated friend/ride    Echocardiogram completed. Therapy worked with patient. Awaiting echo read. See SW note for discharge plans. Pt will call ride when ready.

## 2022-07-19 NOTE — PROGRESS NOTES
07/19/22 1100   Quick Adds   Type of Visit Initial Occupational Therapy Evaluation   Living Environment   People in Home alone   Current Living Arrangements other (see comments)   Home Accessibility no concerns   Transportation Anticipated family or friend will provide   Living Environment Comments Pt at this time lives in a motel with a flight of stairs to get to his room. All needs at met on one level. Pt reported that he does use a scooter for long distances such as grocery shopping but does ambulate within his home environment. Pt reported that he does not use a FWW for mobility.   Self-Care   Usual Activity Tolerance good   Current Activity Tolerance good   Regular Exercise No   Equipment Currently Used at Home other (see comments)   Fall history within last six months no   Activity/Exercise/Self-Care Comment Pt uses a scooter for long distance mobility   Instrumental Activities of Daily Living (IADL)   Previous Responsibilities meal prep;housekeeping;laundry;shopping;medication management;finances   IADL Comments IND   General Information   Onset of Illness/Injury or Date of Surgery 07/18/22   Referring Physician Nicolas Tobin MD   Patient/Family Therapy Goal Statement (OT) Get stronger   Additional Occupational Profile Info/Pertinent History of Current Problem Micah Nunez is a 56 y.o.M with a PMhx of severe MR, HFpEF, HTN, former polysubstance abuse, homelessness, who is admitted for planned MitraClip procedure   Existing Precautions/Restrictions cardiac   Left Upper Extremity (Weight-bearing Status) full weight-bearing (FWB)   Right Upper Extremity (Weight-bearing Status) full weight-bearing (FWB)   Left Lower Extremity (Weight-bearing Status) full weight-bearing (FWB)   Right Lower Extremity (Weight-bearing Status) full weight-bearing (FWB)   Heart Disease Risk Factors Smoking;Medical history   Cognitive Status Examination   Orientation Status orientation to person, place and time   Affect/Mental  Status (Cognitive) WNL   Follows Commands WNL   Visual Perception   Visual Impairment/Limitations WNL   Sensory   Sensory Quick Adds No deficits were identified   Pain Assessment   Patient Currently in Pain No   Integumentary/Edema   Integumentary/Edema Comments No edema noted at time of eval.   Posture   Posture not impaired   Range of Motion Comprehensive   General Range of Motion no range of motion deficits identified   Strength Comprehensive (MMT)   General Manual Muscle Testing (MMT) Assessment no strength deficits identified   Muscle Tone Assessment   Muscle Tone Quick Adds No deficits were identified   Coordination   Upper Extremity Coordination No deficits were identified   Bed Mobility   Bed Mobility No deficits identified   Transfers   Transfers No deficits identified   Balance   Balance Assessment no deficits were identified   Activities of Daily Living   BADL Assessment/Intervention no deficits identified   Clinical Impression   Criteria for Skilled Therapeutic Interventions Met (OT) Yes, treatment indicated   OT Diagnosis Due to pt being admitted for a MitraClip procedure, pt would continue to benefit from skilled cardiac rehab for cardiopulmonary health.   OT Problem List-Impairments impacting ADL problems related to;activity tolerance impaired;mobility;strength   ADL comments/analysis Pt at this time IND with ADLs but would continue to benefit from CR for cardio related health.   Assessment of Occupational Performance 1-3 Performance Deficits   Planned Therapy Interventions (OT) other (see comments);strengthening   Clinical Decision Making Complexity (OT) low complexity   Risk & Benefits of therapy have been explained evaluation/treatment results reviewed;care plan/treatment goals reviewed;risks/benefits reviewed;current/potential barriers reviewed;participants voiced agreement with care plan;participants included;patient   OT Discharge Planning   OT Discharge Recommendation (DC Rec) home with  outpatient cardiac rehab   OT Rationale for DC Rec Pt at this time is moving IND. Pt would continue to benefit from OP CR for cardio related health.   OT Brief overview of current status IND   Total Evaluation Time (Minutes)   Total Evaluation Time (Minutes) 10   OT Goals   Therapy Frequency (OT) 3 times/wk   OT Predicted Duration/Target Date for Goal Attainment 07/26/22   OT Goals Cardiac Phase 1   OT: Understanding of cardiac education to maximize quality of life, condition management, and health outcomes Patient   OT: Perform aerobic activity with stable cardiovascular response continuous;15 minutes;NuStep   OT: Functional/aerobic ambulation tolerance with stable cardiovascular response in order to return to home and community environment Independent;Greater than 300 feet   OT: Navigation of stairs simulating home set up with stable cardiovascular response in order to return to home and community environment Greater than 10 stairs;Rail on right

## 2022-07-19 NOTE — DISCHARGE INSTRUCTIONS
Patient Instructions - Going Home after transcatheter [id number] mitral valve repair using MITRACLIP:    Going Home: It s normal to feel a little anxious after leaving the hospital and when fewer people are nearby. People do much better when they feel as though they have support- if you live alone we suggest you arrange to have someone stay with you for a day or two to help you recover.   Medications:  Take all of your medications as directed in your discharge summary. Do not stop taking these medications without speaking with your cardiologist. If you have any questions about any of your medications, speak to your pharmacist or provider.     Follow up Appointments  You will follow up with Dr. Jackson on August 4 at 1:50 pm (arrive at 1:35).  You will have a repeat echocardiogram on August 18 at 12:45 pm.  You will have a follow up with Dr. Tobin on September 1 at 1:30 pm (arrive at 1:15 for labs).        If you need to reschedule any of these appointments, please call:  [phone]    See your regular cardiologist in 6 months.  Your regular heart doctor will continue to be your heart specialist.   See your family doctor in 2 weeks.  Make an appointment once you get home.    Site Care: Shower every day- let the water run over your incision or access site, but do not rub the area. No tub baths, pools or hot-tubs for the 1st week you are at home. Do not put ointments, powders, or lotions on your access site and/or incision.   It is normal to feel a small lump the size of a marble in the groin access site.  That is the closure device used to close the vein.  If you are experiencing discomfort, redness or increasing swelling, please call us.  Dental Work: Avoid major dental work for the next 6 months if possible. . You will need antibiotics before dental work or surgery. This would decrease the risk of infection.   Driving:  You should not drive for the first 5 days after your procedure. The first time you  drive, you must have someone with you. You may ride in a car.   Activity: People recover at different rates depending on their general health and the type of heart valve procedure. Most people take a few weeks to feel fully recovered. Daily activity and exercise are an important part of your recovery.  Do not lift, push or pull anything > 10 lb. for the first 5 days.    CALL THE VALVE CLINIC IF YOU HAVE ANY QUESTIONS OR CONCERNS:  [phone]    For the first 5 days after your valve procedure   Do Not:   Lift, push, or pull more than ten pounds (including pets, laundry, groceries, etc)  Garden, including lawn mowing and raking  Excessively bear down or strain when having a bowel movement   Ride a bike  Do:  Weigh yourself every day in the morning after using the bathroom.  If you notice weight gain of more than 3 pounds in 1 day or more than 5 pounds in 1 week, call the cardiology clinic.   Get up and get dressed every day. Do not stay in bed.  Set a daily routine.   Walk as much as you can. You may walk up and down stairs. When you are tired, rest.   Cough and deep breathe. Use your incentive spirometer 5-10 times each hour when you are awake.   We strongly recommend you participate in a cardiac rehabilitation program. This type of program will help you learn about your heart health, prevent more heart problems, participate in safe and heart-healthy activities, and learn how to return to your activities of daily living and hobbies.   Let the cardiology clinic know if you need to leave town before your first follow-up visit.     When to call the Cardiology Clinic to speak with a nurse?   Swelling of the legs, ankles, or feet  Increasing shortness of breath  Change in the color or temperature of your lower legs and feet  Abdominal pain or unrelieved nausea and/or vomiting  Redness and warmth around your access site and/or incision that does not go away  Yellow or green drainage from your access site and/or incision  Weight gain of 3 pounds in one day or 5 pounds in one week  Develop any numbness, tingling, or limited movement of your arms or legs.   Chest pain that does not go away  New confusion or you cannot think clearly  Fever and chills         [facility]:  [phone]  If you are calling after hours, please listen to the entire voicemail, a live  will answer at the end of the message

## 2022-07-19 NOTE — DISCHARGE SUMMARY
[address] [address]  p: [phone]    Discharge Summary: Cardiology Service    Micah Nunez MRN# [id number]   YOB: 1965 Age: 56 year old       Admission Date: 07/18/2022  Discharge Date: 07/19/2022    Discharge Diagnoses:  # Severe mitral regurgitation s/p mitraclip x 1   # Chronic Diastolic Heart Failure  # HTN  # Hx of Polysubstance abuse   # Homelessness    Brief HPI:  Micah Nunez is a 56 y.o.M with a PMhx of severe MR, CAD, HFpEF, HTN, former polysubstance abuse, homelessness, who is admitted for planned MitraClip procedure.     Hospital Course by Diagnosis:  # Severe mitral regurgitation s/p mitraclip x 1   # Chronic Diastolic Heart Failure  # CAD   # HTN  S/p MitraClip x1 with trace MR on MARIANNA after clip placement. Sheath sites soft without hematoma. No arrhythmias. Neuro's intact. POD 1 echo with mild residual MR.      - Volume Status: euvolemic, continue PTA Lasix 40 mg daily  - AA: Continue PTA Aldactone 25 mg daily  - BP stable; continue PTA Lisinopril, PTA Metoprolol  - Lifelong 81 mg ASA   - OP Cardiac rehab  - Daily weights  - Lifelong antibiotic prophylaxis prior to all dental procedures.  - Pre-MitraClip angiogram revealed 80% blockage in LAD; plan for PCI at later date      # Hx of Polysubstance abuse   # Homelessness  Hx of polysubstance abuse, including use of methamphetamines, cocaine, alcohol, marijuana and tobacco.  He is still using tobacco, but has not used any other drugs for 5+ months.  He never used IV drugs. Patient also currently homeless, living in [facility].   - SW consulted for discharge planning    Pertinent Procedures:  1. MitraClip 7/18    Medication Changes:  START 81 mg ASA    Discharge medications:   Current Discharge Medication List      START taking these medications    Details   aspirin (ASA) 81 MG EC tablet Take 1 tablet (81 mg) by mouth daily  Qty: 90 tablet, Refills:  3    Associated Diagnoses: Nonrheumatic mitral valve regurgitation; S/P mitral valve clip implantation         CONTINUE these medications which have NOT CHANGED    Details   atorvastatin (LIPITOR) 40 MG tablet Take 1 tablet (40 mg) by mouth daily  Qty: 90 tablet, Refills: 3    Associated Diagnoses: Nonrheumatic mitral valve regurgitation      furosemide (LASIX) 20 MG tablet TAKE 2 TABLETS (40 MG) BY MOUTH DAILY  Qty: 180 tablet, Refills: 1    Associated Diagnoses: Mitral valve insufficiency, unspecified etiology      lisinopril (ZESTRIL) 20 MG tablet TAKE 1 TABLET (20 MG) BY MOUTH DAILY  Qty: 90 tablet, Refills: 1    Associated Diagnoses: Mitral valve insufficiency, unspecified etiology      metoprolol succinate ER (TOPROL XL) 25 MG 24 hr tablet Take 1 tablet (25 mg) by mouth daily  Qty: 90 tablet, Refills: 3    Associated Diagnoses: Acute systolic congestive heart failure (H)      potassium chloride ER (KLOR-CON M) 20 MEQ CR tablet TAKE 1 TABLET (20 MEQ) BY MOUTH DAILY  Qty: 90 tablet, Refills: 1    Associated Diagnoses: Acute congestive heart failure, unspecified heart failure type (H)      spironolactone (ALDACTONE) 25 MG tablet TAKE 1 TABLET (25 MG) BY MOUTH DAILY  Qty: 90 tablet, Refills: 1    Associated Diagnoses: MVP (mitral valve prolapse); Severe mitral regurgitation; Acute systolic congestive heart failure (H)      order for DME Equipment being ordered: Dynaflex insert  Qty: 2 Units, Refills: 0    Associated Diagnoses: Hallux rigidus of both feet             Follow-up:  - Dr. Jackson 8/4 for post hospitalization visit  - Dr. Tobin, [facility] 9/1 for MitraClip follow up    Labs or imaging requiring follow-up after discharge:  Repeat echo 1 month    Code status:  Full    Condition on discharge  Temp:  [97  F (36.1  C)-97.9  F (36.6  C)] 97.9  F (36.6  C)  Pulse:  [58-76] 61  Resp:  [12-20] 16  BP: (103-147)/(52-95) 129/79  SpO2:  [95 %-100 %] 98 %  General: Alert, interactive, NAD  Eyes:  sclera anicteric, EOMI  Neck: no JVD carotid 2+ bilaterally  Cardiovascular: regular rate and rhythm, normal S1 and S2, no murmurs, gallops, or rubs  Resp: clear to auscultation bilaterally, no rales, wheezes, or rhonchi  GI: Soft, nontender, nondistended. +BS.  No HSM or masses, no rebound or guarding.  Extremities: no edema, no cyanosis or clubbing, dorsalis pedis and posterior tibialis pulses 2+ bilaterally  Skin: Warm and dry, no jaundice or rash; bilateral groin sites CDI, right side tender to touch, no tenderness to touch on left side, no swelling, minimal bruising, no signs of hematoma  Neuro: CN 2-12 intact, moves all extremities equally  Psych: Alert & oriented x 3    Imaging with results:  EKG 12Lead 7/11/2022: NSR HR 69        Echo 7/19/2022:  Interpretation Summary  Global and regional left ventricular function is normal with an EF of 60-65%.  Global right ventricular function is normal.  Post Mitral Clip placement (7/18/22). The clip is well seated. There is mild  residual mitral regurgitation. The transmitral valve gradient is mildly  elevated 3.6 mmHg.  The inferior vena cava was normal in size with preserved respiratory  variability.  No pericardial effusion is present.    Echo 6/22/22:  Interpretation Summary     1.Left ventricular size, wall motion and function are normal. The ejection  fraction is 60-65%.  2.Normal right ventricle size and systolic function.  3.Flow reversal noted in pulmonary veins consistent with significant mitral  regurgitation. There is severe (4+) mitral regurgitation. Severe mitral valve  prolapse, posterior leaflet, ERO is 0.8 cm2 with volume of 37 cc.  4.The aortic valve is bicuspid. Thickened aortic valve leaflets  Compared to the prior study dated 11/29/2021, there have been no changes.     Coronary Angiogram 7/11/2022:  Micah Nunez is a 56 year old old male with MR due to MV prolapse, history of admission for ADHF, history of polysubstance abuse who is here  for pre-MV intervention angiography     - obstructive CAD in D1, mod-severe disease in mid-distal LAD; normal L-sided filling pressures  - will discuss further options as part of the heart team - given lack of angina, preserved LVEF, need for strict medication compliance if gets PCI, could consider medical therapy for CAD and MitraClip vs. Staged PCI (at least to diagonal branch but +/- dLAD) and MitraClip, vs. CAB/MVR  - add ASA 81mg daily indefinitely, atorva 40  - continue aggressive risk factor modification     Findings:  LM:no obstruction  LAD:large D1 w/ 80% proximal narrowing, mid-distal LAD w/ 60-70% Ca2+ narrowing  Lcx:mildly irregular  RCA:dominant, mildly irregular    Patient Care Team:  Yovany White MD as PCP - General (Family Practice)  Bobo Renteria MD as MD (Otolaryngology)  Yovany White MD as Assigned PCP  Barbara De La Garza LSW as Clinic Care Coordinator (Primary Care - CC)  Chaya Coffman APRN CNP as Assigned Heart and Vascular Provider  Gail Gomez, RN as Lead Care Coordinator (Primary Care - CC)  Jessika Du LSW as Clinic Care Coordinator (Primary Care - CC)    Yumi DEL CID, DMITRI  [facility]    Time Spent on this Encounter   I, Sahra Medina CNP, personally saw the patient today and spent greater than 30 minutes discharging this patient.     Physician Attestation   I have reviewed and discussed with the advanced practice provider their discharge plan for Micah Nunez. I did not participate in a shared visit by interviewing or examining the patient and this should be billed as an advanced practice provider only discharge.    Kentrell Sullivan MD

## 2022-07-19 NOTE — PLAN OF CARE
Transfer 1220  Transferred from:   Via:w/c  Reason for transfer: Pt appropriate for 6B- improved/worsened patient condition  Belongings: Received with pt  Chart: Received with pt  Medications: Meds received from old unit with pt  Code Status verified on armband: yes  2 RN Skin Assessment Completed By: Kacie Gil rec completed: no  Bed surface reassessed with algorithm and charted: yes  New bed surface ordered: no  Suction/Ambu bag/Flowmeter at bedside: yes    Report received from:  Jayne HOLCOMB  Pt status: Pt stable, on RA.     Neuro: A&Ox4.   Cardiac: SR. VSS.   Respiratory: Sating 98% on RA.  GI/: Voided prior to transfer to .  Diet/appetite: Tolerating regular diet.  Activity:  Assist of SBA, up to chair and in halls.  Pain: At acceptable level on current regimen.   Skin: No new deficits noted.    Plan: Continue with POC. Notify primary team with changes. Plan for discharge today.      Goal Outcome Evaluation:    Adequate for discharge    DISCHARGE       7/19/22 1300                  ----------------------------------------------------------------------------  Discharged to: Home  Via: private transportation  Accompanied by: Friend  Discharge Instructions: regular diet, activity, medications, follow up appointments, when to call the MD, aftercare instructions.  Prescriptions: To be filled by [facility]; medication list reviewed & sent with pt  Follow Up Appointments: arranged; information given  Belongings: All sent with pt  IV: d/c'd  Telemetry: d/c'd  Pt exhibits understanding of above discharge instructions; all questions answered. Pt declined to have staff assist him to his ride, pt left unit on his scooter.    Discharge Paperwork: Signed, copied, and sent home with patient.

## 2022-07-19 NOTE — PROGRESS NOTES
Care Management Discharge Note     Discharge Date: 07/19/2022        Discharge Disposition: Outpatient Rehab (PT, OT, SLP, Cardiac or Pulmonary), Shelter     Discharge Services: County Worker     Discharge DME: None     Discharge Transportation: family or friend will provide     Private pay costs discussed: Not applicable     Education Provided on the Discharge Plan:  Yes  Persons Notified of Discharge Plans: Pt, pt's mother, bedside RN. Charge RN, Cards CSI  Patient/Family in Agreement with the Plan: yes     Handoff Referral Completed: Yes     Additional Information:  SW met w/ pt to discuss discharge plan as pt is medically ready. Pt reported that he will go back to [employer] [facility] ([address], [facility]) and that his friend Grace will provide a ride. He shared that he is part of a housing program through the [facility] and they are paying for his hotel room. He is also working with a [facility]  to get SSDI. Pt does not have any concerns regarding discharge plan and feels safe w/ current plan.      SW will continue to follow for support, resources, and discharge planning     WONG Hernandez, AKRENSW  4A/4C   Ph: [phone]  Pager: [phone]

## 2022-07-20 ENCOUNTER — TELEPHONE (OUTPATIENT)
Dept: CARDIOLOGY | Facility: CLINIC | Age: 57
End: 2022-07-20

## 2022-07-20 ENCOUNTER — PATIENT OUTREACH (OUTPATIENT)
Dept: CARE COORDINATION | Facility: CLINIC | Age: 57
End: 2022-07-20

## 2022-07-20 LAB
ATRIAL RATE - MUSE: 65 BPM
ATRIAL RATE - MUSE: 66 BPM
DIASTOLIC BLOOD PRESSURE - MUSE: NORMAL MMHG
DIASTOLIC BLOOD PRESSURE - MUSE: NORMAL MMHG
INTERPRETATION ECG - MUSE: NORMAL
INTERPRETATION ECG - MUSE: NORMAL
P AXIS - MUSE: 85 DEGREES
P AXIS - MUSE: 87 DEGREES
PR INTERVAL - MUSE: 164 MS
PR INTERVAL - MUSE: 174 MS
QRS DURATION - MUSE: 100 MS
QRS DURATION - MUSE: 98 MS
QT - MUSE: 414 MS
QT - MUSE: 420 MS
QTC - MUSE: 434 MS
QTC - MUSE: 436 MS
R AXIS - MUSE: 47 DEGREES
R AXIS - MUSE: 72 DEGREES
SYSTOLIC BLOOD PRESSURE - MUSE: NORMAL MMHG
SYSTOLIC BLOOD PRESSURE - MUSE: NORMAL MMHG
T AXIS - MUSE: 58 DEGREES
T AXIS - MUSE: 70 DEGREES
VENTRICULAR RATE- MUSE: 65 BPM
VENTRICULAR RATE- MUSE: 66 BPM

## 2022-07-20 ASSESSMENT — ACTIVITIES OF DAILY LIVING (ADL): DEPENDENT_IADLS:: TRANSPORTATION

## 2022-07-20 NOTE — PROGRESS NOTES
Clinic Care Coordination Contact  [facility]/Voicemail    Referral Source: IP Report  Clinical Data: Hospital admission  7/18-7/19/2022  # Severe mitral regurgitation s/p mitraclip x 1   # Chronic Diastolic Heart Failure  # HTN  # Hx of Polysubstance abuse   # Homelessness  Care Coordinator Outreach  Outreach attempted x 1.  Left message on patient's voicemail with call back information and requested return call.  Plan: Care Coordinator will try to reach patient again in 1-2 business days.  [facility]   Gail Gomez RN, Care Coordinator   [facility]'s   E-mail [email or web address]   [phone]

## 2022-07-20 NOTE — TELEPHONE ENCOUNTER
Health Call Center    Phone Message    May a detailed message be left on voicemail: yes     Reason for Call: Other: Micah called to inform the care team that he is having increased pain in his groin area where they went in to place his mitraclip. He understands that pain is normal but it has been increasing and he wants to make sure nothing is wrong. He also wants to know if he can shower. Please give him a call back to discuss. Thank you!    Action Taken: Other: Cardiology    Travel Screening: Not Applicable

## 2022-07-20 NOTE — PLAN OF CARE
Occupational Therapy Discharge Summary    Reason for therapy discharge:    Discharged to home with outpatient therapy.    Progress towards therapy goal(s). See goals on Care Plan in [facility] electronic health record for goal details.  Goals partially met.  Barriers to achieving goals:   discharge from facility.    Therapy recommendation(s):    Continued therapy is recommended.  Rationale/Recommendations:  Increase functional endurance at OP CR.

## 2022-07-22 ENCOUNTER — PATIENT OUTREACH (OUTPATIENT)
Dept: CARE COORDINATION | Facility: CLINIC | Age: 57
End: 2022-07-22

## 2022-07-22 ASSESSMENT — ACTIVITIES OF DAILY LIVING (ADL): DEPENDENT_IADLS:: TRANSPORTATION

## 2022-07-22 NOTE — PROGRESS NOTES
Clinic Care Coordination Contact  [facility]: Post-Discharge Note  SITUATION                                                      Admission:    Admission Date: 07/18/22   Reason for Admission: procedure for MARIANNA with mitraclip  Discharge:   Discharge Date: 07/19/22  Discharge Diagnosis: procedure for MARIANNA with mitaclip    BACKGROUND                                                      Per hospital discharge summary and inpatient provider notes:  Discharge Diagnoses:  # Severe mitral regurgitation s/p mitraclip x 1   # Chronic Diastolic Heart Failure  # HTN  # Hx of Polysubstance abuse   # Homelessness    ASSESSMENT      Enrollment  Primary Care Care Coordination Status: Enrolled  Clinical Pathway Name: None  Outreach Frequency: monthly    Discharge Assessment  How are you doing now that you are home?: finally the pain is reducing  How are your symptoms? (Red Flag symptoms escalate to triage hotline per guidelines): Improved  Do you feel your condition is stable enough to be safe at home until your provider visit?: Yes  Does the patient have their discharge instructions? : Yes  Does the patient have questions regarding their discharge instructions? : No  Were you started on any new medications or were there changes to any of your previous medications? : No  Does the patient have all of their medications?: Yes  Do you have questions regarding any of your medications? : No  Do you have all of your needed medical supplies or equipment (DME)?  (i.e. oxygen tank, CPAP, cane, etc.): Yes  Discharge follow-up appointment scheduled within 14 calendar days? : Yes  Discharge Follow Up Appointment Date: 07/25/22  Discharge Follow Up Appointment Scheduled with?: Specialty Care Provider         Post-op (Clinicians Only)  Did the patient have surgery or a procedure: Yes  Incision: healing  Drainage: No  Bleeding: none  Fever: No  Chills: No  Redness: No  Warmth: No  Swelling: No  Incision site pain: Yes  Closure:  dissolving  Eating & Drinking: eating and drinking without complaints/concerns  PO Intake: other (low salt)  Bowel Function: normal  Urinary Status: voiding without complaint/concerns    Care Management       Care Mgmt General Assessment  Referral  Referral Source: IP Report  Health Care Home/Utilization  Preferred Hospital: [place], [place]  [phone]  Preferred Urgent Care: [facility], [phone]  Living Situation  Current living arrangement:: I live in a private home with family;Other (w/ parents)  Type of residence:: Private home - no stairs  Resources  Patient receiving home care services:: No  Community Resources: Other (see comment);[facility] Programs;Financial/Insurance;DME;[facility] Worker (SNAP/[employer])  Supplies Currently Used at Home: None  Equipment Currently Used at Home: wheelchair, power (left foot and left arm were shattered. His friend built him a power scooter since he could not afford one.)  Referrals Placed: Community Resources;County Resources;Transportation;Disability Specialists;Disability Linkage line;Sheela Domingo;Other   Employment Status: disabled  Psychosocial  Mandaen or spiritual beliefs that impact treatment:: No  Mental health DX:: No  Mental health management concern (GOAL):: No  Chemical Dependency Status: No Current Concerns  Informal Support system:: Family;Friends  Functional Status  Dependent ADLs:: Ambulation-cane;Ambulation-walker;Wheelchair-independent  Dependent IADLs:: Transportation  Bed or wheelchair confined:: No  Mobility Status: Independent w/Device  Fallen 2 or more times in the past year?: No  Any fall with injury in the past year?: No  Advance Care Plan/Directive  Advanced Care Plans/Directives on file:: No  Advanced Care Plan/Directive Status: Referral to Sheela Domingo Facilitator and     Care Mgmt Encounter Assessment  Preventative Care  Routine Health maintenance Reviewed: Due/Overdue   Health Maintenance  Due   Topic Date Due     HF ACTION PLAN  Never done     COVID-19 Vaccine (1) Never done     Pneumococcal Vaccine: Pediatrics (0 to 5 Years) and At-Risk Patients (6 to 64 Years) (1 - PCV) Never done     HIV SCREENING  Never done     HEPATITIS B IMMUNIZATION (1 of 3 - Risk 3-dose series) Never done     ZOSTER IMMUNIZATION (1 of 2) Never done       Clinic Utilization  Difficulty keeping appointments:: No  Compliance Concerns: No  No-Show Concerns: No  No PCP office visit in Past Year: No  Transportation  Transportation means:: Friend;Family     Primary Diagnosis  Primary Diagnosis: Cardiovascular - other (CHF)  Barriers in Communication  Other concerns:: None  How confident are you filling out medical forms by yourself:: Extremely  Pain  Pain (GOAL):: No  Medication Review  Medication adherence problem (GOAL):: No  Knowledgeable about how to use meds:: Yes  Medication side effects suspected:: No  Diet/Exercise/Sleep  Diet:: Regular  Inadequate nutrition (GOAL):: No  Tube Feeding: No  Inadequate activity/exercise (GOAL):: No  Significant changes in sleep pattern (GOAL): No      Goals        1. Reducing Risks // SW Only (pt-stated)       Goal Statement: I will get connected to resources/supports.   Date Goal set: 12/1/21  Barriers: Access   Strengths: Motivated, family, friends, care team   Date to Achieve By: 5/1/22 extended date 9/1/2022  Patient expressed understanding of goal: Yes    Action steps to achieve this goal:  1. I will start application for social security disability income online. (In Process)  2. I will use [employer] to medical appts.  3. I will talk to my PCP about a cane or other DME. (Had PCP OV 4/19/22)  4. I will look into other resources as needed (housing, etc). (Working with [facility] worker for housing, [facility])  5. I will work with care coordination as needed.             2. Improve chronic symptoms (pt-stated)       .Goal Statement: My CHF symptoms will be stable    Date Goal set: 1/31/2022  Barriers: Poor understanding of CHF diagnosis   Strengths: Motivated to learn   Date to Achieve By: 9/31/2022  Patient expressed understanding of goal: Yes    Action steps to achieve this goal:  1. I will check my weight daily and call if weight gain is 2-3# in a day or 5# in a week.  2. I will seek medical help if I have increased shortness of breath episodes,fever or coughing up a colored sputum.  3. I will take my medications as prescribed and keep future Cardiology /primary care appointments.  4. I will follow a low salt diet.                PLAN                                                      Outpatient Plan:  1. The patient will monitor his weight on a daily basis. And report any changes of 2# in one day or 5# in one week.  2.  The patient will follow a low salt diet.   3.  The patient will work on stopping smoking.    Future Appointments   Date Time Provider Department Center   7/25/2022  1:00 PM [place] CARD RHB 2 BENITA [facility]   8/4/2022  1:50 PM Samantha Jackson MD [facility]   8/18/2022 12:45 PM JN HC ECHO STAFF JNCVTS [facility]   9/1/2022  1:15 PM HCC LAB SJN [facility]   9/1/2022  1:30 PM Nicolas Tobin MD [facility]         For any urgent concerns, please contact our 24 hour nurse triage line: [phone] ([phone])         Taiwo Sethi MSN, RN, PHN, [facility]   Primary Care Clinical RN Care Coordinator  [facility]  7/22/2022   11:52 AM  [email or web address]  Office: [phone]

## 2022-07-25 ENCOUNTER — HOSPITAL ENCOUNTER (OUTPATIENT)
Dept: CARDIAC REHAB | Facility: CLINIC | Age: 57
Discharge: HOME | End: 2022-07-25
Attending: INTERNAL MEDICINE
Payer: COMMERCIAL

## 2022-07-25 DIAGNOSIS — Z95.818 S/P MITRAL VALVE CLIP IMPLANTATION: ICD-10-CM

## 2022-07-25 DIAGNOSIS — Z98.890 S/P MITRAL VALVE CLIP IMPLANTATION: ICD-10-CM

## 2022-07-25 PROCEDURE — 93798 PHYS/QHP OP CAR RHAB W/ECG: CPT | Performed by: CLINICAL EXERCISE PHYSIOLOGIST

## 2022-07-29 ENCOUNTER — PATIENT OUTREACH (OUTPATIENT)
Dept: CARE COORDINATION | Facility: CLINIC | Age: 57
End: 2022-07-29

## 2022-07-29 NOTE — PROGRESS NOTES
Clinic Care Coordination Contact    Follow Up Progress Note      Assessment:     Patient shares that he is doing great since surgery. Has had several follow ups and many more upcoming appt's.    Continues to work with [employer] and shares that he is very grateful for this program. Shares that they are going to help get connected to another program where he will be able to get $1,000 per month for rent and whatever is left over he will be able to use for utilities ex..    Patient shares that he recently heard from disability who called to get more information regarding his condition. Hoping this will be resolved soon as he has friends who have offered him jobs but he can't accept them until after disability has been decided.    Patient shares that he didn't feel so good today and had some issues breathing which he attributes to smoking. Patient shares that he would really like to stop and has only had x1 per day since surgery. SW praises him for his efforts.    Patient has no concerns/needs at this time and thanks JUSTIN for checking in.    Care Gaps:    Health Maintenance Due   Topic Date Due     HF ACTION PLAN  Never done     COVID-19 Vaccine (1) Never done     Pneumococcal Vaccine: Pediatrics (0 to 5 Years) and At-Risk Patients (6 to 64 Years) (1 - PCV) Never done     HIV SCREENING  Never done     HEPATITIS B IMMUNIZATION (1 of 3 - Risk 3-dose series) Never done     ZOSTER IMMUNIZATION (1 of 2) Never done         Goals addressed this encounter:    Goals Addressed                    This Visit's Progress       Patient Stated       1. Reducing Risks // SW Only (pt-stated)   On track      Goal Statement: I will get connected to resources/supports.   Date Goal set: 12/1/21  Barriers: Access   Strengths: Motivated, family, friends, care team   Date to Achieve By: 5/1/22 extended date 9/1/2022  Patient expressed understanding of goal: Yes    Action steps to achieve this goal:  1. I will start application for social  security disability income online. (In Process)  2. I will use [employer] to medical appts.  3. I will talk to my PCP about a cane or other DME. (Had PCP OV 4/19/22)  4. I will look into other resources as needed (housing, etc). (Working with [facility] worker for housing, staying in motel)  5. I will work with care coordination as needed.             2. Improve chronic symptoms (pt-stated)   On track      .Goal Statement: My CHF symptoms will be stable   Date Goal set: 1/31/2022  Barriers: Poor understanding of CHF diagnosis   Strengths: Motivated to learn   Date to Achieve By: 9/31/2022  Patient expressed understanding of goal: Yes    Action steps to achieve this goal:  1. I will check my weight daily and call if weight gain is 2-3# in a day or 5# in a week.  2. I will seek medical help if I have increased shortness of breath episodes,fever or coughing up a colored sputum.  3. I will take my medications as prescribed and keep future Cardiology /primary care appointments.  4. I will follow a low salt diet.              Intervention/Education provided during outreach:   -Discussed overall well-being since surgery  -Disability claim status  -Would like to stop smoking     Outreach Frequency: monthly    Plan:    will complete next outreach   In one month.

## 2022-08-18 ENCOUNTER — PATIENT OUTREACH (OUTPATIENT)
Dept: CARE COORDINATION | Facility: CLINIC | Age: 57
End: 2022-08-18

## 2022-08-18 NOTE — PROGRESS NOTES
Clinic Care Coordination - Chart Review Only    Situation/Background: Patient chart reviewed by care coordinator related to [employer] conversion.    Assessment: Patient continues to be followed by Clinic Care Coordination.    Plan: Patient's chart updated to align with [employer] program for ongoing patient management.    Barbara De La Garza [facility]   Social Work Primary Care Clinic Care Coordinator   [facility]  [phone]  [email or web address]

## 2022-08-18 NOTE — LETTER
[facility]  Patient Centered Plan of Care  About Me:        Patient Name:  Micah Holloway    YOB: 1965  Age:         56 year old   [place] MRN:    [id number] Telephone Information:  Home Phone [phone]   Mobile [phone]       Address:   [address] [place] [address] Email address:  [email or web address]      Emergency Contact(s)    Name Relationship Lgl Grd Work Phone Home Phone Mobile Phone   1. MELISA Mother    [phone]   2. ALBERT HOLLOWAY Father    [phone]   3. ELLY Friend   [phone]   4. PADDLEFORT,KAY* Friend    [phone]           Primary language:  English     needed? No   [place] [employer]:  [phone] op. 1  Other communication barriers:None  Preferred Method of Communication:  Mail  Current living arrangement: I live in a private home with family; Other (w/ parents)  Mobility Status/ Medical Equipment: Independent w/Device    Health Maintenance  Health Maintenance Reviewed: Due/Overdue   Health Maintenance Due   Topic Date Due     HF ACTION PLAN  Never done     COVID-19 Vaccine (1) Never done     Pneumococcal Vaccine: Pediatrics (0 to 5 Years) and At-Risk Patients (6 to 64 Years) (1 - PCV) Never done     HIV SCREENING  Never done     HEPATITIS B IMMUNIZATION (1 of 3 - Risk 3-dose series) Never done     ZOSTER IMMUNIZATION (1 of 2) Never done     My Access Plan  Medical Emergency 911   Primary Clinic Line [facility] - [phone]   24 Hour Appointment Line [phone] or  [phone]) (toll-free)   24 Hour Nurse Line [phone] (toll-free)   Preferred Urgent Care [facility], [phone]     Preferred Hospital [place], [place]  [phone]     Preferred Pharmacy [facility] [place] - [address]     Behavioral Health Crisis Line The National Suicide Prevention Lifeline at 1-749.579.3340 or  Text/Call 988     My Care Team Members  Patient Care Team       Relationship Specialty Notifications Start End    Yovany White MD PCP - General Family Practice  2/5/15     referring to ENT    Phone: [phone] Fax: [phone] [address] [facility] [address]    Bobo Renteria MD MD Otolaryngology  11/30/15     Phone: [phone] Fax: [phone]         [address] [facility] [address] [facility] [address]    Yovany White MD Assigned PCP   2/19/17     Phone: [phone] Fax: [phone] [address] [facility] [address]    Barbara De La Garza LSW Clinic Care Coordinator Primary Care - CC Admissions 12/1/21     Phone: [phone]          [address] [facility] [address]    Gail Gomez RN Lead Care Coordinator Primary Care - CC Admissions 12/1/21     Phone: [phone]         Chaya Coffman APRN CNP Assigned Heart and Vascular Provider   1/9/22     Phone: [phone] Fax: [phone] [address] [facility], [address] [facility] [address]    Barbara De La Garza LSW Lead Care Coordinator Primary Care - CC Admissions 8/18/22     Phone: [phone] [address] [facility] [address]            My Care Plans  Self Management and Treatment Plan  Care Plan  Care Plan: Housing and Support     Problem: Insufficient In-home support     Goal: Establish adequate home support     Start Date: 12/1/2021 Expected End Date: 9/1/2022    This Visit's Progress: 70%    Priority: Medium    Note:     Goal Statement: I will get connected to resources/supports.   Barriers: Access   Strengths: Motivated, family, friends, care team   Patient expressed understanding of goal: Yes    Action steps to achieve this goal:  1. I will start application for social security disability income online. (In Process)  2. I will use [employer] to medical appts.  3. I will talk to my PCP about a cane or other DME. (Had PCP OV 4/19/22)  4. I will look into other  resources as needed (housing, etc). (Working with [facility] worker for housing, staying in [facility])  5. I will work with care coordination as needed.                      Care Plan: Heart Failure     Problem: Heart failure diagnosis knowledge deficit     Goal: Patient will verbalize understanding of how heart failure affects the body           Goal: Patient will verbalize understanding of how other conditions affect the heart                 Problem: Fluid retention/overload     Goal: Patient will be able to identify signs and symptoms of fluid retention                 Problem: Sodium intake     Goal: Patient will maintain management of sodium consumption                 Problem: Heart failure medication adherence     Goal: Consistently take heart failure medication as prescribed                 Problem: Insufficient exercise regimen     Goal: Patient will engage in physical activity safely                 Problem: Heart failure progression and care needs     Goal: Patient will verbalize understanding of heart failure progression                 Problem: Heart failure maintenance - see below     Goal: Patient will not experience any symptoms of shortness of breath or body swelling over the next 3 months     Start Date: 1/31/2022 Expected End Date: 9/30/2022    This Visit's Progress: 70%    Priority: Medium    Note:     Goal Statement: My CHF symptoms will be stable   Barriers: Poor understanding of CHF diagnosis   Strengths: Motivated to learn   Patient expressed understanding of goal: Yes    Action steps to achieve this goal:  1. I will check my weight daily and call if weight gain is 2-3# in a day or 5# in a week.  2. I will seek medical help if I have increased shortness of breath episodes,fever or coughing up a colored sputum.  3. I will take my medications as prescribed and keep future Cardiology /primary care appointments.  4. I will follow a low salt diet.                         Action Plans on File:                        Advance Care Plans/Directives Type:   No data recorded  Honoring Choices    Advance Care Planning and Health Care Directives  When it comes to decisions about your health care, it s important that your voice is heard. You may not always be able to speak for yourself.    We encourage you to have discussions with your loved ones, cultural or spiritual leaders and health care providers about your goals, values, beliefs and choices.    We are a part of [employer] [place] , supporting and promoting the benefits of advance care planning conversations.    Our goals are to:    Help you make informed decisions about your healthcare choices and ensure that those choices are honored    Offer advance care planning discussions with trained staff    Ensure your choices are clearly defined, documented and available in your medical record    Translate your choices into medical orders as needed    Why is Advance Care Planning important?    Know what your health care choices are and decide what is right for you    An unexpected illness or injury could leave you unable to participate in important treatment decisions    When choices are left to others to decide that responsibility can be difficult and stressful    By discussing and outlining your choices, your voice is heard in the care you want to receive    How can I learn more?  We offer free classes at multiple locations, days and times. Our trained facilitators will provide information and guide you through a Health Care Directive document. They can also review, notarize and add your document to your medical record.    Call [employer] at [phone] or toll free at [phone] for assistance.      My Medical and Care Information  Problem List   Patient Active Problem List   Diagnosis     CARDIOVASCULAR SCREENING; LDL GOAL LESS THAN 130     Papilloma of nose     Chemical dependency (H)     Tobacco use disorder     Benign essential  hypertension     Substance abuse in remission (H)     Non compliance with medical treatment     Anasarca     Elevated troponin     Lab test negative for COVID-19 virus     Mitral regurgitation - severe     Chronic heart failure with preserved ejection fraction (H)     S/P mitral valve clip implantation      Current Medications and Allergies:    Current Outpatient Medications   Medication Instructions     aspirin (ASA) 81 mg, Oral, DAILY     atorvastatin (LIPITOR) 40 mg, Oral, DAILY     furosemide (LASIX) 40 mg, Oral, DAILY     lisinopril (ZESTRIL) 20 mg, Oral, DAILY     metoprolol succinate ER (TOPROL XL) 25 mg, Oral, DAILY     order for DME Equipment being ordered: [employer] insert     potassium chloride ER (KLOR-CON M) 20 MEQ CR tablet 20 mEq, Oral, DAILY     spironolactone (ALDACTONE) 25 mg, Oral, DAILY     Care Coordination Start Date: 12/1/2021   Frequency of Care Coordination: monthly     Form Last Updated: 08/18/2022

## 2022-08-26 DIAGNOSIS — Z98.890 S/P MITRAL VALVE CLIP IMPLANTATION: Primary | ICD-10-CM

## 2022-08-26 DIAGNOSIS — Z95.818 S/P MITRAL VALVE CLIP IMPLANTATION: Primary | ICD-10-CM

## 2022-08-30 NOTE — PROGRESS NOTES
Clinic Care Coordination Contact    Situation: Patient chart reviewed by care coordinator.    Background: SW CC and RN CC following pt.     Assessment: Per review, pt's SW only goal appears exhausted. Will defer ongoing outreaches to RN CC as appropriate. If SW CC is needed again, please reach out to consult.     Plan/Recommendations: RN CC to outreach as previously planned. SW CC removed from care team - SW goal completed.     Barbara De La Garza [facility]   Social Work Primary Care Clinic Care Coordinator   [facility]  [phone]  [email or web address]

## 2022-08-31 ENCOUNTER — PATIENT OUTREACH (OUTPATIENT)
Dept: CARE COORDINATION | Facility: CLINIC | Age: 57
End: 2022-08-31

## 2022-08-31 ASSESSMENT — ACTIVITIES OF DAILY LIVING (ADL): DEPENDENT_IADLS:: TRANSPORTATION

## 2022-08-31 NOTE — PROGRESS NOTES
Clinic Care Coordination Contact    Follow Up Progress Note  Admission:               Admission Date: 07/18/22              Reason for Admission: procedure for MARIANNA with mitraclip  Discharge:   Discharge Date: 07/19/22  Discharge Diagnosis: procedure for MARIANNA with mitaclip      Assessment:   Patient report he continues Cardiac Rehabilitation in [place] 3 days a week and is getting stronger.  Patient is getting around with a scooter his friend gave him  Continues to work on Social Security disability.  Working with a disability expert to fill out the forms  Started the process in April.  Just received a letter in the mail he needs more medical information.  Patient continues to work with [employer] and can stay in the motel until Social Security is approved  Also was able to arrange a monthly payment of $1000.00 a month through the [facility].  Patient weight is stable and denies any leg edema     Care Gaps:    Health Maintenance Due   Topic Date Due     HF ACTION PLAN  Never done     COVID-19 Vaccine (1) Never done     Pneumococcal Vaccine: Pediatrics (0 to 5 Years) and At-Risk Patients (6 to 64 Years) (1 - PCV) Never done     HIV SCREENING  Never done     HEPATITIS B IMMUNIZATION (1 of 3 - Risk 3-dose series) Never done     ZOSTER IMMUNIZATION (1 of 2) Never done     INFLUENZA VACCINE (1) 09/01/2022       Not discussed today     Care Plans  Care Plan: Housing and Support     Problem: Insufficient In-home support I will get connected to resources/supports.     Priority: High Onset Date: 12/1/2021    Note:     Barriers: Access   Strengths: Motivated, family, friends, care team   Date to Achieve By: 5/1/22 extended date 10/20/2022  Patient expressed understanding of goal: Yes    Action steps to achieve this goal:  1. I will start application for social security disability income online. (8/31/202 In Process) Started application in April working with a disability person to complete forms. 8/31/2022 recently  received a letter they need more medical information   2. I will use [employer] to medical appts.  3. I will talk to my PCP about a scooter when Social Security is approved   4. I will look into other resources as needed (housing, etc). (Working with [facility] worker for [employer], staying in [employer])  5. I will work with care coordination as needed.        Goal: Establish adequate home support  Completed 8/30/2022    Start Date: 12/1/2021 Expected End Date: 9/1/2022    This Visit's Progress: 100%    Priority: Medium    Note:     Goal Statement: I will get connected to resources/supports.   Barriers: Access   Strengths: Motivated, family, friends, care team   Patient expressed understanding of goal: Yes    Action steps to achieve this goal:  1. I will start application for social security disability income online. (In Process)  2. I will use [employer] to medical appts.  3. I will talk to my PCP about a cane or other DME. (Had PCP OV 4/19/22)  4. I will look into other resources as needed (housing, etc). (Working with [employer] for [employer], staying in [employer])  5. I will work with care coordination as needed.                      Care Plan: Heart Failure - RN only     Problem: Heart failure diagnosis knowledge deficit           Problem: Fluid retention/overload           Problem: Sodium intake           Problem: Heart failure medication adherence           Problem: Insufficient exercise regimen           Problem: Heart failure progression and care needs           Problem: Heart failure maintenance - see below     Goal: Patient will not experience any symptoms of shortness of breath or body swelling over the next 3 months     Start Date: 1/31/2022 Expected End Date: 11/30/2022    This Visit's Progress: 50% Recent Progress: 70%    Priority: Medium    Note:     Goal Statement: My CHF symptoms will be stable   Barriers: Poor understanding of CHF diagnosis   Strengths: Motivated  to learn   Patient expressed understanding of goal: Yes    Action steps to achieve this goal:  1. I will check my weight daily and call if weight gain is 2-3# in a day or 5# in a week.  2. I will seek medical help if I have increased shortness of breath episodes,fever or coughing up a colored sputum.  3. I will take my medications as prescribed and keep future Cardiology /primary care appointments.  4. I will follow a low salt diet.                        Intervention/Education provided during outreach: CC RN available as needed for questions or concerns      Outreach Frequency: monthly        Plan:   Patient will continue Cardiac Rehabilitation 3 times a week   Patient will continue Social Security application   Care Coordinator will follow up monthly  [facility]   Gail Gomez RN, Care Coordinator   [facility]'s   E-mail [email or web address]   [phone]

## 2022-09-01 ENCOUNTER — TELEPHONE (OUTPATIENT)
Dept: CARDIOLOGY | Facility: CLINIC | Age: 57
End: 2022-09-01

## 2022-09-01 DIAGNOSIS — Z98.890 S/P MITRAL VALVE CLIP IMPLANTATION: Primary | ICD-10-CM

## 2022-09-01 DIAGNOSIS — Z95.818 S/P MITRAL VALVE CLIP IMPLANTATION: Primary | ICD-10-CM

## 2022-09-01 NOTE — TELEPHONE ENCOUNTER
[facility] Call Center    Phone Message    May a detailed message be left on voicemail: yes     Reason for Call: Order(s): Other:   Reason for requested:   Date needed: 22  Provider name: Vicki Sosa PA-C    Pt called to schedule Echo Complete, orders are  as of today 22. Please review and place new orders. Thank you!      Action Taken: Other: Cardiology    Travel Screening: Not Applicable                                                                       RN

## 2022-09-06 ENCOUNTER — HOSPITAL ENCOUNTER (OUTPATIENT)
Dept: CARDIOLOGY | Facility: CLINIC | Age: 57
Discharge: HOME | End: 2022-09-06
Attending: INTERNAL MEDICINE | Admitting: INTERNAL MEDICINE
Payer: COMMERCIAL

## 2022-09-06 DIAGNOSIS — Z95.818 S/P MITRAL VALVE CLIP IMPLANTATION: ICD-10-CM

## 2022-09-06 DIAGNOSIS — Z98.890 S/P MITRAL VALVE CLIP IMPLANTATION: ICD-10-CM

## 2022-09-06 LAB — LVEF ECHO: NORMAL

## 2022-09-06 PROCEDURE — 93306 TTE W/DOPPLER COMPLETE: CPT | Mod: 26 | Performed by: INTERNAL MEDICINE

## 2022-09-06 PROCEDURE — 93306 TTE W/DOPPLER COMPLETE: CPT

## 2022-09-22 ENCOUNTER — OFFICE VISIT (OUTPATIENT)
Dept: FAMILY MEDICINE | Facility: CLINIC | Age: 57
End: 2022-09-22
Payer: COMMERCIAL

## 2022-09-22 VITALS
RESPIRATION RATE: 20 BRPM | SYSTOLIC BLOOD PRESSURE: 114 MMHG | BODY MASS INDEX: 21.01 KG/M2 | HEIGHT: 75 IN | WEIGHT: 169 LBS | HEART RATE: 75 BPM | DIASTOLIC BLOOD PRESSURE: 80 MMHG | TEMPERATURE: 97.7 F | OXYGEN SATURATION: 99 %

## 2022-09-22 DIAGNOSIS — F19.10 SUBSTANCE ABUSE (H): Primary | ICD-10-CM

## 2022-09-22 PROCEDURE — 99213 OFFICE O/P EST LOW 20 MIN: CPT | Performed by: FAMILY MEDICINE

## 2022-09-22 ASSESSMENT — PAIN SCALES - GENERAL: PAINLEVEL: MILD PAIN (3)
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
## 2022-09-22 NOTE — PATIENT INSTRUCTIONS
Dr Minor    [facility]  Address  [facility]  [address] [place] [address]    Kylie Olivo.

## 2022-09-23 DIAGNOSIS — Z95.818 S/P MITRAL VALVE CLIP IMPLANTATION: Primary | ICD-10-CM

## 2022-09-23 DIAGNOSIS — Z98.890 S/P MITRAL VALVE CLIP IMPLANTATION: Primary | ICD-10-CM

## 2022-09-26 ENCOUNTER — OFFICE VISIT (OUTPATIENT)
Dept: CARDIOLOGY | Facility: CLINIC | Age: 57
End: 2022-09-26
Payer: COMMERCIAL

## 2022-09-26 VITALS
SYSTOLIC BLOOD PRESSURE: 96 MMHG | RESPIRATION RATE: 14 BRPM | BODY MASS INDEX: 21.26 KG/M2 | WEIGHT: 171 LBS | HEIGHT: 75 IN | DIASTOLIC BLOOD PRESSURE: 60 MMHG | HEART RATE: 68 BPM

## 2022-09-26 DIAGNOSIS — I50.32 CHRONIC HEART FAILURE WITH PRESERVED EJECTION FRACTION (H): ICD-10-CM

## 2022-09-26 DIAGNOSIS — F19.11 SUBSTANCE ABUSE IN REMISSION (H): Chronic | ICD-10-CM

## 2022-09-26 DIAGNOSIS — Z95.818 S/P MITRAL VALVE CLIP IMPLANTATION: ICD-10-CM

## 2022-09-26 DIAGNOSIS — I10 BENIGN ESSENTIAL HYPERTENSION: Chronic | ICD-10-CM

## 2022-09-26 DIAGNOSIS — F17.200 TOBACCO USE DISORDER: ICD-10-CM

## 2022-09-26 DIAGNOSIS — I34.0 NONRHEUMATIC MITRAL VALVE REGURGITATION: Primary | ICD-10-CM

## 2022-09-26 DIAGNOSIS — Z98.890 S/P MITRAL VALVE CLIP IMPLANTATION: ICD-10-CM

## 2022-09-26 LAB
ANION GAP SERPL CALCULATED.3IONS-SCNC: 11 MMOL/L (ref 7–15)
BUN SERPL-MCNC: 16.1 MG/DL (ref 6–20)
CALCIUM SERPL-MCNC: 9.5 MG/DL (ref 8.6–10)
CHLORIDE SERPL-SCNC: 95 MMOL/L (ref 98–107)
CREAT SERPL-MCNC: 1.52 MG/DL (ref 0.67–1.17)
DEPRECATED HCO3 PLAS-SCNC: 28 MMOL/L (ref 22–29)
ERYTHROCYTE [DISTWIDTH] IN BLOOD BY AUTOMATED COUNT: 12.5 % (ref 10–15)
GFR SERPL CREATININE-BSD FRML MDRD: 53 ML/MIN/1.73M2
GLUCOSE SERPL-MCNC: 100 MG/DL (ref 70–99)
HCT VFR BLD AUTO: 36.5 % (ref 40–53)
HGB BLD-MCNC: 11.7 G/DL (ref 13.3–17.7)
MCH RBC QN AUTO: 30.8 PG (ref 26.5–33)
MCHC RBC AUTO-ENTMCNC: 32.1 G/DL (ref 31.5–36.5)
MCV RBC AUTO: 96 FL (ref 78–100)
PLATELET # BLD AUTO: 235 10E3/UL (ref 150–450)
POTASSIUM SERPL-SCNC: 4.3 MMOL/L (ref 3.4–5.3)
RBC # BLD AUTO: 3.8 10E6/UL (ref 4.4–5.9)
SODIUM SERPL-SCNC: 134 MMOL/L (ref 136–145)
WBC # BLD AUTO: 8.4 10E3/UL (ref 4–11)

## 2022-09-26 PROCEDURE — 36415 COLL VENOUS BLD VENIPUNCTURE: CPT | Performed by: INTERNAL MEDICINE

## 2022-09-26 PROCEDURE — 85027 COMPLETE CBC AUTOMATED: CPT | Performed by: INTERNAL MEDICINE

## 2022-09-26 PROCEDURE — 80048 BASIC METABOLIC PNL TOTAL CA: CPT | Performed by: INTERNAL MEDICINE

## 2022-09-26 PROCEDURE — 99215 OFFICE O/P EST HI 40 MIN: CPT | Mod: 24 | Performed by: INTERNAL MEDICINE
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
## 2022-09-26 NOTE — PATIENT INSTRUCTIONS
Micah Nunez,    It was a pleasure to see you today in the clinic regarding your 1 month MitraClip visit.     My recommendations after this visit include:     - start taking baby aspirin, 1 daily    You should followup with Dr. Jackson in January, or sooner if needed      If you have questions or concerns, please call using the numbers below:    Valve Clinic Pool  [phone]    After Hours/Scheduling  [phone]    Otherwise you can dial the nurse directly at:    Silvia Moralez RN  [phone]    Monster Jensen RN  [phone]

## 2022-09-27 ASSESSMENT — ENCOUNTER SYMPTOMS
ENDOCRINE NEGATIVE: 1
MUSCULOSKELETAL NEGATIVE: 1
CARDIOVASCULAR NEGATIVE: 1
NEUROLOGICAL NEGATIVE: 1
EYES NEGATIVE: 1
RESPIRATORY NEGATIVE: 1
HEMATOLOGIC/LYMPHATIC NEGATIVE: 1
ALLERGIC/IMMUNOLOGIC NEGATIVE: 1
CONSTITUTIONAL NEGATIVE: 1
GASTROINTESTINAL NEGATIVE: 1

## 2022-09-30 ENCOUNTER — PATIENT OUTREACH (OUTPATIENT)
Dept: CARE COORDINATION | Facility: CLINIC | Age: 57
End: 2022-09-30

## 2022-09-30 ASSESSMENT — ACTIVITIES OF DAILY LIVING (ADL): DEPENDENT_IADLS:: TRANSPORTATION

## 2022-09-30 NOTE — PROGRESS NOTES
Clinic Care Coordination Contact  [facility]/Voicemail    Referral Source: IP Report  Clinical Data:   Clinical Data: Hospital admission  7/18-7/19/2022  # Severe mitral regurgitation s/p mitraclip x 1   # Chronic Diastolic Heart Failure  # HTN  # Hx of Polysubstance abuse   # Homelessness  Care Coordinator Outreach  Outreach attempted x 1.  Left message on patient's voicemail with call back information and requested return call.  Plan: . Care Coordinator will try to reach patient again in 10 business days.    [facility]   Gail Gomez RN, Care Coordinator   [facility]'s   E-mail [email or web address]   [phone]

## 2022-10-10 ENCOUNTER — PATIENT OUTREACH (OUTPATIENT)
Dept: CARE COORDINATION | Facility: CLINIC | Age: 57
End: 2022-10-10

## 2022-10-10 ASSESSMENT — ACTIVITIES OF DAILY LIVING (ADL): DEPENDENT_IADLS:: TRANSPORTATION

## 2022-10-10 NOTE — PROGRESS NOTES
Clinic Care Coordination Contact    Follow Up Progress Note      Assessment: RN CC connected with patient for monthly outreach and goal progression. Patient states he continues to work with Cardiac Rehab 3 days per week. Patient states his weight has been stable at 171 pounds for several weeks. Patient continues to work with [facility] for Social Security disability process and is enrolled in a program that is helping him find an apartment. Both processes are still ongoing.    Care Gaps:    Health Maintenance Due   Topic Date Due     HF ACTION PLAN  Never done     HEPATITIS B IMMUNIZATION (1 of 3 - 3-dose series) Never done     COVID-19 Vaccine (1) Never done     Pneumococcal Vaccine: Pediatrics (0 to 5 Years) and At-Risk Patients (6 to 64 Years) (1 - PCV) Never done     HIV SCREENING  Never done     ZOSTER IMMUNIZATION (1 of 2) Never done     INFLUENZA VACCINE (1) 09/01/2022     LUNG CANCER SCREENING  11/06/2022       Not discussed at this time.    Care Plans  Care Plan: Housing and Support     Problem: Insufficient In-home support I will get connected to resources/supports.     Priority: High Onset Date: 12/1/2021    Note:     Barriers: Access   Strengths: Motivated, family, friends, care team   Date to Achieve By: 5/1/22 extended date 10/20/2022  Patient expressed understanding of goal: Yes    Action steps to achieve this goal:  1. I will start application for social security disability income online. (8/31/202 In Process) Started application in April working with a disability person to complete forms. 8/31/2022 recently received a letter they need more medical information   2. I will use [employer] to medical appts.  3. I will talk to my PCP about a scooter when Social Security is approved   4. I will look into other resources as needed (housing, etc). (Working with [facility] worker for housing, staying in motel)  5. I will work with care coordination as needed.        Goal:  Establish adequate home support  Completed 8/30/2022    Start Date: 12/1/2021 Expected End Date: 9/1/2022    This Visit's Progress: 100%    Priority: Medium    Note:     Goal Statement: I will get connected to resources/supports.   Barriers: Access   Strengths: Motivated, family, friends, care team   Patient expressed understanding of goal: Yes    Action steps to achieve this goal:  1. I will start application for social security disability income online. (In Process)  2. I will use [employer] to medical appts.  3. I will talk to my PCP about a cane or other DME. (Had PCP OV 4/19/22)  4. I will look into other resources as needed (housing, etc). (Working with [facility] for housing, staying in [facility])  5. I will work with care coordination as needed.                      Care Plan: Heart Failure - RN only     Problem: Heart failure maintenance - see below     Goal: Patient will not experience any symptoms of shortness of breath or body swelling over the next 3 months     Start Date: 1/31/2022 Expected End Date: 11/30/2022    This Visit's Progress: 60% Recent Progress: 50%    Priority: Medium    Note:     Goal Statement: My CHF symptoms will be stable   Barriers: Poor understanding of CHF diagnosis   Strengths: Motivated to learn   Patient expressed understanding of goal: Yes    Action steps to achieve this goal:  1. I will check my weight daily and call if weight gain is 2-3# in a day or 5# in a week.  2. I will seek medical help if I have increased shortness of breath episodes,fever or coughing up a colored sputum.  3. I will take my medications as prescribed and keep future Cardiology /primary care appointments.  4. I will follow a low salt diet.                        Intervention/Education provided during outreach: Patient reports understanding and denies any additional questions or concerns at this time. RN CC engaged in [employer] communication during encounter.     Outreach Frequency:  monthly    Plan:   Patient to continue Cardiac Rehab. Patient to continue working with [facility].  Care Coordinator will follow up in one month.    Lacey Pereira RN Care Coordinator  [facility]  (Covering for Lead RN Care Coordinator)

## 2022-10-14 DIAGNOSIS — I50.9 ACUTE CONGESTIVE HEART FAILURE, UNSPECIFIED HEART FAILURE TYPE (H): ICD-10-CM

## 2022-10-14 RX ORDER — POTASSIUM CHLORIDE 1500 MG/1
20 TABLET, EXTENDED RELEASE ORAL DAILY
Qty: 90 TABLET | Refills: 1 | Status: SHIPPED | OUTPATIENT
Start: 2022-10-14 | End: 2023-01-30

## 2022-10-28 ENCOUNTER — VIRTUAL VISIT (OUTPATIENT)
Dept: PHARMACY | Facility: CLINIC | Age: 57
End: 2022-10-28
Payer: COMMERCIAL

## 2022-10-28 ENCOUNTER — TELEPHONE (OUTPATIENT)
Dept: PHARMACY | Facility: CLINIC | Age: 57
End: 2022-10-28

## 2022-10-28 DIAGNOSIS — I10 BENIGN ESSENTIAL HYPERTENSION: Chronic | ICD-10-CM

## 2022-10-28 DIAGNOSIS — I50.32 CHRONIC HEART FAILURE WITH PRESERVED EJECTION FRACTION (H): Primary | ICD-10-CM

## 2022-10-28 DIAGNOSIS — I34.0 NONRHEUMATIC MITRAL VALVE REGURGITATION: ICD-10-CM

## 2022-10-28 DIAGNOSIS — F19.11 SUBSTANCE ABUSE IN REMISSION (H): Chronic | ICD-10-CM

## 2022-10-28 DIAGNOSIS — F17.200 TOBACCO USE DISORDER: ICD-10-CM

## 2022-10-28 DIAGNOSIS — I25.10 CORONARY ARTERY DISEASE INVOLVING NATIVE CORONARY ARTERY OF NATIVE HEART WITHOUT ANGINA PECTORIS: ICD-10-CM

## 2022-10-28 PROCEDURE — 99607 MTMS BY PHARM ADDL 15 MIN: CPT | Mod: 93 | Performed by: PHARMACIST

## 2022-10-28 PROCEDURE — 99605 MTMS BY PHARM NP 15 MIN: CPT | Mod: 93 | Performed by: PHARMACIST

## 2022-10-28 RX ORDER — ASPIRIN 81 MG/1
TABLET, DELAYED RELEASE ORAL
COMMUNITY
Start: 2022-10-14 | End: 2023-01-30

## 2022-10-28 RX ORDER — ONDANSETRON 4 MG/1
TABLET, FILM COATED ORAL
COMMUNITY
Start: 2022-10-14 | End: 2022-10-28

## 2022-10-28 RX ORDER — BUPRENORPHINE HYDROCHLORIDE, NALOXONE HYDROCHLORIDE 2; .5 MG/1; MG/1
FILM, SOLUBLE BUCCAL; SUBLINGUAL
COMMUNITY
Start: 2022-10-14 | End: 2022-10-28 | Stop reason: DRUGHIGH

## 2022-10-28 RX ORDER — CLONIDINE HYDROCHLORIDE 0.1 MG/1
.1-.2 TABLET ORAL
COMMUNITY
Start: 2022-10-14 | End: 2023-01-30

## 2022-10-28 RX ORDER — GABAPENTIN 100 MG/1
CAPSULE ORAL
COMMUNITY
Start: 2022-10-14 | End: 2022-10-28

## 2022-10-28 RX ORDER — BUPRENORPHINE HYDROCHLORIDE, NALOXONE HYDROCHLORIDE 12; 3 MG/1; MG/1
1 FILM, SOLUBLE BUCCAL; SUBLINGUAL 2 TIMES DAILY
COMMUNITY
Start: 2022-10-27 | End: 2024-06-11

## 2022-10-28 RX ORDER — CLONIDINE HYDROCHLORIDE 0.1 MG/1
TABLET ORAL
COMMUNITY
Start: 2022-10-14 | End: 2022-10-28

## 2022-10-28 RX ORDER — BUPROPION HYDROCHLORIDE 300 MG/1
1 TABLET ORAL EVERY MORNING
COMMUNITY
Start: 2022-10-27 | End: 2023-01-30

## 2022-10-28 RX ORDER — GABAPENTIN 100 MG/1
100-200 CAPSULE ORAL
COMMUNITY
Start: 2022-10-14 | End: 2023-01-30 | Stop reason: DRUGHIGH

## 2022-10-28 RX ORDER — HYDROXYZINE HYDROCHLORIDE 25 MG/1
25 TABLET, FILM COATED ORAL 3 TIMES DAILY PRN
COMMUNITY
Start: 2022-10-14 | End: 2023-01-30

## 2022-10-28 RX ORDER — ONDANSETRON 4 MG/1
4 TABLET, FILM COATED ORAL
COMMUNITY
Start: 2022-10-14 | End: 2023-01-30

## 2022-10-28 RX ORDER — BUPRENORPHINE AND NALOXONE 8; 2 MG/1; MG/1
1 FILM, SOLUBLE BUCCAL; SUBLINGUAL
COMMUNITY
Start: 2022-10-14 | End: 2023-01-26

## 2022-10-28 RX ORDER — BUPROPION HYDROCHLORIDE 300 MG/1
TABLET ORAL
COMMUNITY
Start: 2022-10-27 | End: 2022-10-28

## 2022-10-28 RX ORDER — HYDROXYZINE HYDROCHLORIDE 25 MG/1
TABLET, FILM COATED ORAL
COMMUNITY
Start: 2022-10-14 | End: 2022-10-28

## 2022-10-28 NOTE — PROGRESS NOTES
Medication Therapy Management (MTM) Encounter    ASSESSMENT:                            Medication Adherence/Access: See below for considerations    HFpEF/Hypertension: stable and managed by cardiology. Will investigate if pharmacy has potassium supplement doses or formulations are smaller in size. Taking 20 MEQ every other day and K has been stable. K would likely remain stable if he took a lower dose daily instead.    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 Okay to keep as needed medications on hand in case you need them in the future (ondansetron, hydroxyzine, gabapentin, clonidine). Reviewed what each one was for. Encouraged daily use of bupropion to help with smoking cessation.      PLAN:                            1. Take bupropion every day for the best benefit for smoking cessation.    2. Recommend to consider switching potassium supplement to Klor-Con 8 MEQ once daily. This appears to be covered on insurance. - will discuss with cardiology     Follow-up: annual review recommended    SUBJECTIVE/OBJECTIVE:                          Micah Nunez is a 56 year old male called for an initial visit. He was referred to me from his insurance.    Reason for visit: medication therapy management    Allergies/ADRs: Reviewed in chart  Past Medical History: Reviewed in chart  Tobacco: He reports that he has been smoking cigarettes. He started smoking about 42 years ago. He has a 40.00 pack-year smoking history. He has never used smokeless tobacco.Nicotine/Tobacco Cessation Plan:   Pharmacotherapies : bupropion (Zyban)   Alcohol: not currently using  Personal Healthcare Goals: focused on his health and feeling better.     Medication Adherence/Access: Patient takes medications directly from bottles. Patient takes medications 2 time(s) per day. Takes 7  "medications for his heart together in the morning. Takes second dose of Suboxone at night.  Medication barriers: potassium chloride pill size - difficult to swallow.   The patient fills medications at [facility]: YES. Except gets Suboxone at a different pharmacy.    HFpEF/Hypertension: Current medications include metoprolol succinate 25 mg daily, lisinopril 20 mg once daily, furosemide 20 mg once daily, potassium chloride ER 20 MEQ once every other day (struggles swallowing due to large pill size), and spironolactone 25 mg once daily. Patient reports no current medication side effects.     ECHO:  Date 9/6/22, EF 55-60%  Patient is not complaining of HF symptoms . Going to cardiac rehab 3x per week currently.  BP Readings from Last 3 Encounters:   09/26/22 96/60   09/22/22 114/80   07/19/22 136/79   HR 61-75     Mitral regurgitation: taking aspirin 81 mg once daily. tolerating well without signs or symptoms of bleeding.    Coronary artery disease/hyperlipidemia: Current therapy includes atorvastatin 40 mg daily. Patient reports no significant myalgias or other side effects.  Recent Labs   Lab Test 04/19/22  1204 01/17/19  1013 02/10/15  0840   CHOL 196 220* 173   HDL 60 53 40*   * 115* 101   TRIG 117 259* 161*   CHOLHDLRATIO  --   --  4.3     Substance abuse in remission/tobacco cessation: Has weekly visits at Suboxone clinic. Started Suboxone 2 weeks ago. Suboxone dose just increased today to 12-3 mg film in the morning at 8-2 mg film in the evening. Has ondansetron 4 mg tablets, clonidine 0.1 mg tablets, gabapentin 100 mg capsules, and hydroxyzine 25 mg tablets to use as needed for withdrawal symptoms but hasn't had to use any of these and said he \"could just throw them away.\" Denies bothersome withdrawal symptoms other than cold shivers.     Recently prescribed bupropion  mg once daily, patient said this is for tobacco cessation. States he plans to take as needed and then should be able to stop after a " week. He prefers to not take medications he doesn't need.     CBC RESULTS: Recent Labs   Lab Test 09/26/22  1250   WBC 8.4   RBC 3.80*   HGB 11.7*   HCT 36.5*   MCV 96   MCH 30.8   MCHC 32.1   RDW 12.5        Last Comprehensive Metabolic Panel:  Sodium   Date Value Ref Range Status   09/26/2022 134 (L) 136 - 145 mmol/L Final   02/24/2021 140 133 - 144 mmol/L Final     Potassium   Date Value Ref Range Status   09/26/2022 4.3 3.4 - 5.3 mmol/L Final   07/11/2022 4.1 3.5 - 5.0 mmol/L Final   02/24/2021 3.9 3.4 - 5.3 mmol/L Final     Chloride   Date Value Ref Range Status   09/26/2022 95 (L) 98 - 107 mmol/L Final   07/11/2022 100 98 - 107 mmol/L Final   02/24/2021 108 94 - 109 mmol/L Final     Carbon Dioxide   Date Value Ref Range Status   02/24/2021 28 20 - 32 mmol/L Final     Carbon Dioxide (CO2)   Date Value Ref Range Status   09/26/2022 28 22 - 29 mmol/L Final   07/11/2022 31 22 - 31 mmol/L Final     Anion Gap   Date Value Ref Range Status   09/26/2022 11 7 - 15 mmol/L Final   07/11/2022 8 5 - 18 mmol/L Final   02/24/2021 4 3 - 14 mmol/L Final     Glucose   Date Value Ref Range Status   09/26/2022 100 (H) 70 - 99 mg/dL Final   07/11/2022 69 (L) 70 - 125 mg/dL Final   02/24/2021 113 (H) 70 - 99 mg/dL Final     GLUCOSE BY METER POCT   Date Value Ref Range Status   07/18/2022 124 (H) 70 - 99 mg/dL Final     Urea Nitrogen   Date Value Ref Range Status   09/26/2022 16.1 6.0 - 20.0 mg/dL Final   07/11/2022 16 8 - 22 mg/dL Final   02/24/2021 11 7 - 30 mg/dL Final     Creatinine   Date Value Ref Range Status   09/26/2022 1.52 (H) 0.67 - 1.17 mg/dL Final   02/24/2021 0.90 0.66 - 1.25 mg/dL Final     GFR Estimate   Date Value Ref Range Status   09/26/2022 53 (L) >60 mL/min/1.73m2 Final     Comment:     Effective December 21, 2021 eGFRcr in adults is calculated using the 2021 CKD-EPI creatinine equation which includes age and gender (Jo-Ann whatley al., NEJM, DOI: 10.1056/WYKNzb4087525)   02/24/2021 >90 >60 mL/min/[1.73_m2]  Final     Comment:     Non  GFR Calc  Starting 12/18/2018, serum creatinine based estimated GFR (eGFR) will be   calculated using the Chronic Kidney Disease Epidemiology Collaboration   (CKD-EPI) equation.       Calcium   Date Value Ref Range Status   09/26/2022 9.5 8.6 - 10.0 mg/dL Final   02/24/2021 9.4 8.5 - 10.1 mg/dL Final     Bilirubin Total   Date Value Ref Range Status   07/18/2022 0.3 <=1.2 mg/dL Final   02/24/2021 0.9 0.2 - 1.3 mg/dL Final     Alkaline Phosphatase   Date Value Ref Range Status   07/18/2022 118 40 - 129 U/L Final   02/24/2021 142 40 - 150 U/L Final     ALT   Date Value Ref Range Status   07/18/2022 13 10 - 50 U/L Final   02/24/2021 20 0 - 70 U/L Final     AST   Date Value Ref Range Status   07/18/2022 24 10 - 50 U/L Final   02/24/2021 11 0 - 45 U/L Final     Today's Vitals: There were no vitals taken for this visit.  ----------------    I spent 22 minutes with this patient today. All changes were made via collaborative practice agreement with Dr. White. A copy of the visit note was provided to the patient's provider(s).    The patient was sent via [employer] a summary of these recommendations.     Telemedicine Visit Details  Type of service:  Telephone visit  Start Time: 3 pm  End Time: 3:22 pm  Originating Location (pt. Location): Home      Distant Location (provider location):  Off-site  Provider has received verbal consent for a visit from the patient? Yes     Medication Therapy Recommendations  Chronic heart failure with preserved ejection fraction (H)    Current Medication: potassium chloride ER (KLOR-CON M) 20 MEQ CR tablet   Rationale: Cannot swallow/administer medication - Adherence - Adherence   Recommendation: Change Medication   Status: Contact Provider - Awaiting Response         Tobacco use disorder    Current Medication: buPROPion (WELLBUTRIN XL) 300 MG 24 hr tablet   Rationale: Patient prefers not to take - Adherence - Adherence   Recommendation: Provide  Education - buPROPion 300 MG 24 hr tablet   Status: Accepted - no CPA Needed

## 2022-10-28 NOTE — Clinical Note
Hi there, I talked with Micah for a med review. He's having trouble swallowing potassium supplement. I think Klor-con 8 MEQ should be smaller and covered on insurance. If he takes this daily, should be similar efficacy to taking 20 MEQ every other day. Sometime to consider. Thanks - Anjana Corcoran, PharmD

## 2022-10-28 NOTE — PATIENT INSTRUCTIONS
"Recommendations from today's MTM visit:                                                    MTM (medication therapy management) is a service provided by a clinical pharmacist designed to help you get the most of out of your medicines.   Today we reviewed what your medicines are for, how to know if they are working, that your medicines are safe and how to make your medicine regimen as easy as possible.      1. Take bupropion every day for the best benefit for smoking cessation.    2. Will talk to cardiologist about switching potassium supplement to Klor-Con 8 MEQ once daily. This appears to be covered on insurance and should be a smaller tablet. If it's not covered or is too large, please work with your pharmacy to find a potassium supplement that is easier to take.     Follow-up: annual review recommended    It was great speaking with you today.  I value your experience and would be very thankful for your time in providing feedback in our clinic survey. In the next few days, you may receive an email or text message from [employer] with a link to a survey related to your  clinical pharmacist.\"     To schedule another MTM appointment, please call the clinic directly or you may call the MTM scheduling line at [phone] or toll-free at [phone].     My Clinical Pharmacist's contact information:                                                      Please feel free to contact me with any questions or concerns you have.      Anjana Corcoran, PharmD   Medication Therapy Management Pharmacist   November 1, 2022  [phone]    "

## 2022-10-28 NOTE — TELEPHONE ENCOUNTER
See note for details.     Anjana Corcoran, PharmD   Medication Therapy Management Pharmacist   October 28, 2022  [phone]

## 2022-11-10 ENCOUNTER — PATIENT OUTREACH (OUTPATIENT)
Dept: CARE COORDINATION | Facility: CLINIC | Age: 57
End: 2022-11-10

## 2022-11-10 ASSESSMENT — ACTIVITIES OF DAILY LIVING (ADL): DEPENDENT_IADLS:: TRANSPORTATION

## 2022-11-10 NOTE — LETTER
[facility]  Patient Centered Plan of Care    [facility] CARE COORDINATION  [address] [address]    November 10, 2022        Micah Earlson  [address]  [place] [address]          Dear Joaquina Obrien is an updated Patient Centered Plan of Care for your continued enrollment in Care Coordination. Please let us know if you have additional questions, concerns, or goals that we can assist with.    Sincerely,    [facility]   Gail Gomez RN, Care Coordinator   [facility]'s   E-mail [email or web address]   [phone]          About Me:        Patient Name:  Micah Nunez    YOB: 1965  Age:         56 year old   [facility] MRN:    [id number] Telephone Information:  Home Phone [phone]   Mobile [phone]       Address:  [address]  [place] [address] Email address:  [email or web address]      Emergency Contact(s)    Name Relationship Lgl Grd Work Phone Home Phone Mobile Phone   1. MELISA Mother    [phone]   2. BRONSON NUNEZ* Father    [phone]   3. ELLY Friend   [phone]   4. PADDLEFORT,KAY* Friend    [phone]           Primary language:  English     needed? No   [facility]:  [phone] op. 1  Other communication barriers:None    Preferred Method of Communication:  Mail  Current living arrangement: I live in a private home with family; Other (w/ parents)    Mobility Status/ Medical Equipment: Independent w/Device        Health Maintenance  Health Maintenance Reviewed:   Health Maintenance Due   Topic Date Due     HF ACTION PLAN  Never done     HEPATITIS B IMMUNIZATION (1 of 3 - 3-dose series) Never done     COVID-19 Vaccine (1) Never done     Pneumococcal Vaccine: Pediatrics (0 to 5 Years) and At-Risk Patients (6 to 64 Years) (1 - PCV) Never done     HIV SCREENING  Never done     ZOSTER IMMUNIZATION (1 of 2) Never done     INFLUENZA  VACCINE (1) 09/01/2022     LUNG CANCER SCREENING  11/06/2022         My Access Plan  Medical Emergency 911   Primary Clinic Line [facility] - [phone]   24 Hour Appointment Line [phone] or  [phone]) (toll-free)   24 Hour Nurse Line [phone] (toll-free)   Preferred Urgent Care [facility], [phone]     Preferred Hospital [place], [place]  [phone]     Preferred Pharmacy [facility] - [place], [place] - [address]     Behavioral Health Crisis Line The National Suicide Prevention Lifeline at 1-221.101.6139 or Text/Call 513       My Care Team Members  Patient Care Team       Relationship Specialty Notifications Start End    Yovany White MD PCP - General Family Practice  2/5/15     referring to ENT    Phone: [phone] Fax: [phone] [address] [facility] [address]    Bobo Renteria MD MD Otolaryngology  11/30/15     Phone: [phone] Fax: [phone]         [address] [place] [address] [address] [facility] [address]    Yovany White MD Assigned PCP   2/19/17     Phone: [phone] Fax: [phone] [address] [facility] [address]    Gail Gomez RN Lead Care Coordinator Primary Care - CC Admissions 12/1/21     Phone: [phone]         Chaya Coffman APRN CNP Assigned Heart and Vascular Provider   1/9/22     Phone: [phone] Fax: [phone]         [address], [address] [facility] [address]    Feliciano Schaefer MD Assigned Pulmonology Provider   8/27/22     Phone: [phone] Fax: [phone]         [address] [facility] [address] [facility] [address] [facility] [address]    Anjana Corcoran [facility] Pharmacist Pharmacist Ambulatory Care  10/28/22     Phone: [phone] [address] [facility] [address]            My Care Plans  Self Management and Treatment Plan  Care Plan  Care Plan: Housing and Support     Problem:  Insufficient In-home support I will get connected to resources/supports.     Priority: High Onset Date: 12/1/2021    Note:     Barriers: Access   Strengths: Motivated, family, friends, care team   Date to Achieve By: 5/1/22 extended date 12/20/2022  Patient expressed understanding of goal: Yes    Action steps to achieve this goal:  1. I will start application for social security disability income online. (8/31/202 In Process) Started application in April working with a disability person to complete forms. 8/31/2022 recently received a letter they need more medical information   2. I will use [employer] to medical appts.  3. I will talk to my PCP about a scooter when Social Security is approved   4. I will look into other resources as needed (housing, etc). (Working with [employer] for [employer], staying in [place])  5. I will work with care coordination as needed.        Goal: Establish adequate home support  Completed 8/30/2022    Start Date: 12/1/2021 Expected End Date: 9/1/2022    This Visit's Progress: 100%    Priority: Medium    Note:     Goal Statement: I will get connected to resources/supports.   Barriers: Access   Strengths: Motivated, family, friends, care team   Patient expressed understanding of goal: Yes    Action steps to achieve this goal:  1. I will start application for social security disability income online. (In Process)  2. I will use [employer] to medical appts.  3. I will talk to my PCP about a cane or other DME. (Had PCP OV 4/19/22)  4. I will look into other resources as needed (housing, etc). (Working with [employer] for [employer], staying in [place])  5. I will work with care coordination as needed.                      Care Plan: Heart Failure - RN only     Problem: Heart failure maintenance - see below     Goal: Patient will not experience any symptoms of shortness of breath or body swelling over the next 3 - 6 months     Start Date:  1/31/2022 Expected End Date: 12/29/2022    This Visit's Progress: 70% Recent Progress: 60%    Priority: Medium    Note:     Goal Statement: My CHF symptoms will be stable   Barriers: Poor understanding of CHF diagnosis   Strengths: Motivated to learn   Patient expressed understanding of goal: Yes    Action steps to achieve this goal:  1. I will check my weight daily and call if weight gain is 2-3# in a day or 5# in a week.  2. I will seek medical help if I have increased shortness of breath episodes,fever or coughing up a colored sputum.  3. I will take my medications as prescribed and keep future Cardiology /primary care appointments.  4. I will follow a low salt diet.                         Action Plans on File:                       Advance Care Plans/Directives Type:   No data recorded    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)     Non compliance with medical treatment     Anasarca     Elevated troponin     Lab test negative for COVID-19 virus     Mitral regurgitation - severe     Chronic heart failure with preserved ejection fraction (H)     S/P mitral valve clip implantation      Current Medications and Allergies:   Current Outpatient Medications   Medication     atorvastatin (LIPITOR) 40 MG tablet     buprenorphine HCl-naloxone HCl (SUBOXONE) 8-2 MG per film     buPROPion (WELLBUTRIN XL) 300 MG 24 hr tablet     cloNIDine (CATAPRES) 0.1 MG tablet     furosemide (LASIX) 20 MG tablet     gabapentin (NEURONTIN) 100 MG capsule     hydrOXYzine (ATARAX) 25 MG tablet     lisinopril (ZESTRIL) 20 MG tablet     metoprolol succinate ER (TOPROL XL) 25 MG 24 hr tablet     ondansetron (ZOFRAN) 4 MG tablet     potassium chloride ER (KLOR-CON M) 20 MEQ CR tablet     SM ASPIRIN LOW  DOSE 81 MG EC tablet     spironolactone (ALDACTONE) 25 MG tablet     SUBOXONE 12-3 MG FILM per film     No current facility-administered medications for this visit.     Facility-Administered Medications Ordered in Other Visits   Medication     Self Administer Medications: Behavioral Services       Care Coordination Start Date: 12/1/2021   Frequency of Care Coordination: monthly     Form Last Updated: 11/10/2022

## 2022-11-10 NOTE — PROGRESS NOTES
Clinic Care Coordination Contact    Follow Up Progress Note      Assessment: Patient continues Cardiac Rehabilitation 3 times a week  Weight stable at 171#  Continues to work with [facility] SW for disability application  Nick Rapp found him an apartment in [facility] and he will be moving in next week  Patient is working with an addiction specialist and is now on Suboxone and has weekly checks .  This plan is going very well per patient     Care Gaps:    Health Maintenance Due   Topic Date Due     HF ACTION PLAN  Never done     HEPATITIS B IMMUNIZATION (1 of 3 - 3-dose series) Never done     COVID-19 Vaccine (1) Never done     Pneumococcal Vaccine: Pediatrics (0 to 5 Years) and At-Risk Patients (6 to 64 Years) (1 - PCV) Never done     HIV SCREENING  Never done     ZOSTER IMMUNIZATION (1 of 2) Never done     INFLUENZA VACCINE (1) 09/01/2022     LUNG CANCER SCREENING  11/06/2022       Not discussed today     Care Plans  Care Plan: Housing and Support     Problem: Insufficient In-home support I will get connected to resources/supports.     Priority: High Onset Date: 12/1/2021    Note:     Barriers: Access   Strengths: Motivated, family, friends, care team   Date to Achieve By: 5/1/22 extended date 12/20/2022  Patient expressed understanding of goal: Yes    Action steps to achieve this goal:  1. I will start application for social security disability income online. (8/31/202 In Process) Started application in April working with a disability person to complete forms. 8/31/2022 recently received a letter they need more medical information   2. I will use [employer] to medical appts.  3. I will talk to my PCP about a scooter when Social Security is approved   4. I will look into other resources as needed (housing, etc). (Working with [facility] worker for housing, staying in motel)  5. I will work with care coordination as needed.        Goal: Establish adequate home support  Completed  8/30/2022    Start Date: 12/1/2021 Expected End Date: 9/1/2022    This Visit's Progress: 100%    Priority: Medium    Note:     Goal Statement: I will get connected to resources/supports.   Barriers: Access   Strengths: Motivated, family, friends, care team   Patient expressed understanding of goal: Yes    Action steps to achieve this goal:  1. I will start application for social security disability income online. (In Process)  2. I will use [employer] to medical appts.  3. I will talk to my PCP about a cane or other DME. (Had PCP OV 4/19/22)  4. I will look into other resources as needed (housing, etc). (Working with [facility] for housing, staying in [facility])  5. I will work with care coordination as needed.                      Care Plan: Heart Failure - RN only     Problem: Heart failure maintenance - see below     Goal: Patient will not experience any symptoms of shortness of breath or body swelling over the next 3 - 6 months     Start Date: 1/31/2022 Expected End Date: 12/29/2022    This Visit's Progress: 70% Recent Progress: 60%    Priority: Medium    Note:     Goal Statement: My CHF symptoms will be stable   Barriers: Poor understanding of CHF diagnosis   Strengths: Motivated to learn   Patient expressed understanding of goal: Yes    Action steps to achieve this goal:  1. I will check my weight daily and call if weight gain is 2-3# in a day or 5# in a week.  2. I will seek medical help if I have increased shortness of breath episodes,fever or coughing up a colored sputum.  3. I will take my medications as prescribed and keep future Cardiology /primary care appointments.  4. I will follow a low salt diet.                        Intervention/Education provided during outreach: CC available a needed for questions and concerns      Outreach Frequency: monthly        Plan:   Patient will continue CR 3 times a week and weekly checks with Addiction Specialist and to continue Suboxone   Care  Coordinator will follow up Monthly    [facility]   Gail Gomez RN, Care Coordinator   [facility]'s   E-mail [email or web address]   [phone]

## 2022-11-19 ENCOUNTER — HEALTH MAINTENANCE LETTER (OUTPATIENT)
Age: 57
End: 2022-11-19

## 2022-12-12 ENCOUNTER — PATIENT OUTREACH (OUTPATIENT)
Dept: CARE COORDINATION | Facility: CLINIC | Age: 57
End: 2022-12-12

## 2022-12-12 ASSESSMENT — ACTIVITIES OF DAILY LIVING (ADL): DEPENDENT_IADLS:: TRANSPORTATION

## 2022-12-12 NOTE — PROGRESS NOTES
Clinic Care Coordination Contact    Follow Up Progress Note   Clinical Data: Hospital admission  7/18-7/19/2022  # Severe mitral regurgitation s/p mitraclip x 1   # Chronic Diastolic Heart Failure  # HTN  # Hx of Polysubstance abuse   # Homelessness     Assessment:   Patient has been clean of all chemicals for 2.5 months   Patient continues to work with [employer] for housing and should be in an apartment first part of the year   Patient continues to work with [employer] for a disability appeal  Continues daily check ins and Suboxone daily   Weight is stable at 171 and no leg edema or increased SOB    Care Gaps:    Health Maintenance Due   Topic Date Due     HF ACTION PLAN  Never done     HEPATITIS B IMMUNIZATION (1 of 3 - 3-dose series) Never done     COVID-19 Vaccine (1) Never done     Pneumococcal Vaccine: Pediatrics (0 to 5 Years) and At-Risk Patients (6 to 64 Years) (1 - PCV) Never done     HIV SCREENING  Never done     ZOSTER IMMUNIZATION (1 of 2) Never done     INFLUENZA VACCINE (1) 09/01/2022     LUNG CANCER SCREENING  11/06/2022       Postponed to next RN cc outreach      Care Plans  Care Plan: Housing and Support     Problem: Insufficient In-home support I will get connected to resources/supports.     Priority: High Onset Date: 1/12/2022    Note:     Barriers: Access   Strengths: Motivated, family, friends, care team   Date to Achieve By: 5/1/22 extended date 12/20/2022  Patient expressed understanding of goal: Yes    Action steps to achieve this goal:  1. I will start application for social security disability income online. (8/31/202 In Process) Started application in April working with a disability person to complete forms. 8/31/2022 recently received a letter they need more medical information   2. I will use [employer] to medical appts.  3. I will talk to my PCP about a scooter when Social Security is approved   4. I will look into other resources as needed (housing, etc). (Working  with [facility] for [employer], staying in [employer])  5. I will work with care coordination as needed.        Goal: Establish adequate home support  Completed 8/30/2022    Start Date: 12/1/2021 Expected End Date: 9/1/2022    This Visit's Progress: 100%    Priority: Medium    Note:     Goal Statement: I will get connected to resources/supports.   Barriers: Access   Strengths: Motivated, family, friends, care team   Patient expressed understanding of goal: Yes    Action steps to achieve this goal:  1. I will start application for social security disability income online. (In Process)  2. I will use [employer] to medical appts.  3. I will talk to my PCP about a cane or other DME. (Had PCP OV 4/19/22)  4. I will look into other resources as needed (housing, etc). (Working with [facility] [employer] for [employer], staying in [employer])  5. I will work with care coordination as needed.                      Care Plan: Heart Failure - RN only     Problem: Heart failure maintenance - see below     Goal: Patient will not experience any symptoms of shortness of breath or body swelling over the next 3 - 6 months     Start Date: 1/31/2022 Expected End Date: 1/12/2023    This Visit's Progress: 80% Recent Progress: 70%    Priority: Medium    Note:     Goal Statement: My CHF symptoms will be stable   Barriers: Poor understanding of CHF diagnosis   Strengths: Motivated to learn   Patient expressed understanding of goal: Yes    Action steps to achieve this goal:  1. I will check my weight daily and call if weight gain is 2-3# in a day or 5# in a week.  2. I will seek medical help if I have increased shortness of breath episodes,fever or coughing up a colored sputum.  3. I will take my medications as prescribed and keep future Cardiology /primary care appointments.  4. I will follow a low salt diet.                        Intervention/Education provided during outreach: CC RN congratulated on patient success of being  chemical free for 2.5 months      Outreach Frequency: monthly        Plan:     Care Coordinator will follow up one more time in one month     [facility]   Gail Gomez RN, Care Coordinator   [facility]'s   E-mail [email or web address]   [phone]

## 2022-12-30 DIAGNOSIS — I34.0 MITRAL VALVE INSUFFICIENCY, UNSPECIFIED ETIOLOGY: ICD-10-CM

## 2022-12-30 DIAGNOSIS — I50.21 ACUTE SYSTOLIC CONGESTIVE HEART FAILURE (H): ICD-10-CM

## 2022-12-30 DIAGNOSIS — I34.0 SEVERE MITRAL REGURGITATION: ICD-10-CM

## 2022-12-30 DIAGNOSIS — I34.1 MVP (MITRAL VALVE PROLAPSE): ICD-10-CM

## 2022-12-30 RX ORDER — SPIRONOLACTONE 25 MG/1
25 TABLET ORAL DAILY
Qty: 90 TABLET | Refills: 1 | Status: SHIPPED | OUTPATIENT
Start: 2022-12-30 | End: 2023-01-30

## 2022-12-30 RX ORDER — LISINOPRIL 20 MG/1
20 TABLET ORAL DAILY
Qty: 90 TABLET | Refills: 1 | Status: SHIPPED | OUTPATIENT
Start: 2022-12-30 | End: 2023-01-24

## 2022-12-30 RX ORDER — FUROSEMIDE 20 MG
40 TABLET ORAL DAILY
Qty: 180 TABLET | Refills: 1 | Status: SHIPPED | OUTPATIENT
Start: 2022-12-30 | End: 2023-01-30

## 2023-01-13 ENCOUNTER — PATIENT OUTREACH (OUTPATIENT)
Dept: CARE COORDINATION | Facility: CLINIC | Age: 58
End: 2023-01-13

## 2023-01-13 ASSESSMENT — ACTIVITIES OF DAILY LIVING (ADL): DEPENDENT_IADLS:: TRANSPORTATION

## 2023-01-13 NOTE — LETTER
[facility]  Patient Centered Plan of Care   [facility] CARE COORDINATION  [address] [address]  January 13, 2023        Micah Earlson  [address]  [place] [facility] [address]          Dear Joaquina Obrien is an updated Patient Centered Plan of Care for your continued enrollment in Care Coordination. Please let us know if you have additional questions, concerns, or goals that we can assist with.    Sincerely,  [facility]   Gail Gomez RN, Care Coordinator   [facility]'s   E-mail [email or web address]   [phone]          About Me:        Patient Name:  Micah Nunez    YOB: 1965  Age:         57 year old   [place] MRN:    [id number] Telephone Information:  Home Phone [phone]   Mobile [phone]       Address:  [address]  [facility] [address] Email address:  [email or web address]      Emergency Contact(s)    Name Relationship Lgl Grd Work Phone Home Phone Mobile Phone   1. MELISA Mother    [phone]   2. BRONSON NUNEZ* Father    [phone]   3. ELLY Friend   [phone]   4. PADDLEFORT,KAY* Friend    [phone]           Primary language:  English     needed? No   [place] [facility]:  [phone] op. 1  Other communication barriers:None    Preferred Method of Communication:  Mail  Current living arrangement: I live in a private home with family; Other (w/ parents)    Mobility Status/ Medical Equipment: Independent w/Device        Health Maintenance  Health Maintenance Reviewed:   Health Maintenance Due   Topic Date Due     HF ACTION PLAN  Never done     HEPATITIS B IMMUNIZATION (1 of 3 - 3-dose series) Never done     COVID-19 Vaccine (1) Never done     Pneumococcal Vaccine: Pediatrics (0 to 5 Years) and At-Risk Patients (6 to 64 Years) (1 - PCV) Never done     HIV SCREENING  Never done     ZOSTER IMMUNIZATION (1 of 2) Never done     INFLUENZA VACCINE  (1) 09/01/2022     LUNG CANCER SCREENING  11/06/2022     PHQ-2 (once per calendar year)  01/01/2023     My Access Plan  Medical Emergency 911   Primary Clinic Line [facility] - [phone]   24 Hour Appointment Line [phone] or  [phone]) (toll-free)   24 Hour Nurse Line [phone] (toll-free)   Preferred Urgent Care [facility], [phone]     Preferred Hospital [place], [place]  [phone]     Preferred Pharmacy [facility] - [place], [place] - [address]     Behavioral Health Crisis Line The National Suicide Prevention Lifeline at 1-137.349.3127 or Text/Call 177             My Care Team Members  Patient Care Team       Relationship Specialty Notifications Start End    Yovany White MD PCP - General Family Practice  2/5/15     referring to ENT    Phone: [phone] Fax: [phone] [address] [facility] [address]    Bobo Renteria MD MD Otolaryngology  11/30/15     Phone: [phone] Fax: [phone]         [address] [place] [address] [address] [facility] [address]    Yovany White MD Assigned PCP   2/19/17     Phone: [phone] Fax: [phone] [address] [facility] [address]    Gail Gomez, RN Lead Care Coordinator Primary Care - CC Admissions 12/1/21     Phone: [phone]         Chaya Coffman APRN CNP Assigned Heart and Vascular Provider   1/9/22     Phone: [phone] Fax: [phone]         [address] [facility] [address] [facility], [address] [facility] [address]    Feliciano Schaefer MD Assigned Pulmonology Provider   8/27/22     Phone: [phone] Fax: [phone]         [address] [facility] [address] [facility] [address]    Anjana Corcoran RPH Pharmacist Pharmacist Ambulatory Care  10/28/22     Phone: [phone]          [address] [facility] [address]    Anjana Corcoran RPH Assigned [facility] Pharmacist   11/5/22     Phone: [phone]           [address] [facility] [address]            My Care Plans  Self Management and Treatment Plan  Care Plan  Care Plan: Housing and Support     Problem: Insufficient In-home support I will get connected to resources/supports.     Priority: High Onset Date: 2/25/2022    Note:     Barriers: Access   Strengths: Motivated, family, friends, care team   Date to Achieve By: 5/1/22 extended date 12/20/2022  Patient expressed understanding of goal: Yes    Action steps to achieve this goal:  1. I will start application for social security disability [employer] online. (8/31/202 In Process) Started application in April working with a disability person to complete forms. 8/31/2022 recently received a letter they need more medical information   2. I will use [employer] to medical appts.  3. I will talk to my PCP about a scooter when Social Security is approved   4. I will look into other resources as needed (housing, etc). (Working with [employer] [facility] [employer], staying in [employer])  5. I will work with care coordination as needed.    As of today's date 1/13/2023 goal is met at 0 - 25%.   Goal Status:  Active       Goal: Establish adequate home support  Completed 8/30/2022    Start Date: 12/1/2021 Expected End Date: 9/1/2022    This Visit's Progress: 100%    Priority: Medium    Note:     Goal Statement: I will get connected to resources/supports.   Barriers: Access   Strengths: Motivated, family, friends, care team   Patient expressed understanding of goal: Yes    Action steps to achieve this goal:  1. I will start application for social security disability income online. (In Process)  2. I will use [employer] to medical appts.  3. I will talk to my PCP about a cane or other DME. (Had PCP OV 4/19/22)  4. I will look into other resources as needed (housing, etc). (Working with [employer] [facility] [employer], staying in [employer])  5. I will work with care coordination as needed.                       Care Plan: Heart Failure - RN only     Problem: Heart failure maintenance - see below     Goal: Patient will not experience any symptoms of shortness of breath or body swelling over the next 3 - 6 months     Start Date: 1/31/2022 Expected End Date: 2/24/2023    This Visit's Progress: 80% Recent Progress: 80%    Priority: Medium    Note:     Goal Statement: My CHF symptoms will be stable   Barriers: Poor understanding of CHF diagnosis   Strengths: Motivated to learn   Patient expressed understanding of goal: Yes    Action steps to achieve this goal:  1. I will check my weight daily and call if weight gain is 2-3# in a day or 5# in a week.  2. I will seek medical help if I have increased shortness of breath episodes,fever or coughing up a colored sputum.  3. I will take my medications as prescribed and keep future Cardiology /primary care appointments.  4. I will follow a low salt diet.                         Action Plans on File:                       Advance Care Plans/Directives Type:   No data recorded    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)     Non compliance with medical treatment     Anasarca     Elevated troponin     Lab test negative for COVID-19 virus     Mitral regurgitation - severe     Chronic heart failure with preserved ejection fraction (H)     S/P mitral valve clip implantation      Current Medications and Allergies:    Current Outpatient Medications   Medication     atorvastatin (LIPITOR) 40 MG tablet     buprenorphine HCl-naloxone HCl (SUBOXONE) 8-2 MG per film     buPROPion (WELLBUTRIN XL) 300 MG 24 hr tablet     cloNIDine (CATAPRES) 0.1 MG tablet     furosemide (LASIX) 20 MG tablet     gabapentin (NEURONTIN) 100 MG capsule     hydrOXYzine  (ATARAX) 25 MG tablet     lisinopril (ZESTRIL) 20 MG tablet     metoprolol succinate ER (TOPROL XL) 25 MG 24 hr tablet     ondansetron (ZOFRAN) 4 MG tablet     potassium chloride ER (KLOR-CON M) 20 MEQ CR tablet     SM ASPIRIN LOW DOSE 81 MG EC tablet     spironolactone (ALDACTONE) 25 MG tablet     SUBOXONE 12-3 MG FILM per film     No current facility-administered medications for this visit.     Facility-Administered Medications Ordered in Other Visits   Medication     Self Administer Medications: Behavioral Services       Care Coordination Start Date: 12/1/2021   Frequency of Care Coordination: monthly     Form Last Updated: 01/13/2023

## 2023-01-13 NOTE — PROGRESS NOTES
Clinic Care Coordination Contact    Follow Up Progress Note   Clinical Data: Hospital admission  7/18-7/19/2022  # Severe mitral regurgitation s/p mitraclip x 1   # Chronic Diastolic Heart Failure  # HTN  # Hx of Polysubstance abuse   # Homelessness     Assessment:   Patient has been clean since November 6 2022   Patient continues daily Suboxone and monitoring.  Patient is moving into his new apartment Jan 31 and is very excited.  Patient has a  who is appealing his Social Security disability.   will get 25% of his back pay .  Patient has a upcoming Cardiology appointment on Monday.  Weight is 175# and he states he is now eating 3 meals a day . Patient denies leg edema but a little episodes of SOB   Patient will discuss further at Cardiology visit   Patient's  angiogram groin site caused some urinary problems and so has a future Urology visit     Care Gaps:    Health Maintenance Due   Topic Date Due     HF ACTION PLAN  Never done     HEPATITIS B IMMUNIZATION (1 of 3 - 3-dose series) Never done     COVID-19 Vaccine (1) Never done     Pneumococcal Vaccine: Pediatrics (0 to 5 Years) and At-Risk Patients (6 to 64 Years) (1 - PCV) Never done     HIV SCREENING  Never done     ZOSTER IMMUNIZATION (1 of 2) Never done     INFLUENZA VACCINE (1) 09/01/2022     LUNG CANCER SCREENING  11/06/2022     PHQ-2 (once per calendar year)  01/01/2023       Not discussed today     Care Plans  Care Plan: Housing and Support     Problem: Insufficient In-home support I will get connected to resources/supports.     Priority: High Onset Date: 2/25/2022    Note:     Barriers: Access   Strengths: Motivated, family, friends, care team   Date to Achieve By: 5/1/22 extended date 12/20/2022  Patient expressed understanding of goal: Yes    Action steps to achieve this goal:  1. I will start application for social security disability income online. (8/31/202 In Process) Started application in April working with a disability person to  complete forms. 8/31/2022 recently received a letter they need more medical information   2. I will use [employer] to medical appts.  3. I will talk to my PCP about a scooter when Social Security is approved   4. I will look into other resources as needed (housing, etc). (Working with [facility] worker for housing, staying in [employer])  5. I will work with care coordination as needed.    As of today's date 1/13/2023 goal is met at 0 - 25%.   Goal Status:  Active       Goal: Establish adequate home support  Completed 8/30/2022    Start Date: 12/1/2021 Expected End Date: 9/1/2022    This Visit's Progress: 100%    Priority: Medium    Note:     Goal Statement: I will get connected to resources/supports.   Barriers: Access   Strengths: Motivated, family, friends, care team   Patient expressed understanding of goal: Yes    Action steps to achieve this goal:  1. I will start application for social security disability income online. (In Process)  2. I will use [employer] to medical appts.  3. I will talk to my PCP about a cane or other DME. (Had PCP OV 4/19/22)  4. I will look into other resources as needed (housing, etc). (Working with [facility] worker for housing, staying in [employer])  5. I will work with care coordination as needed.                      Care Plan: Heart Failure - RN only     Problem: Heart failure maintenance - see below     Goal: Patient will not experience any symptoms of shortness of breath or body swelling over the next 3 - 6 months     Start Date: 1/31/2022 Expected End Date: 2/24/2023    This Visit's Progress: 80% Recent Progress: 80%    Priority: Medium    Note:     Goal Statement: My CHF symptoms will be stable   Barriers: Poor understanding of CHF diagnosis   Strengths: Motivated to learn   Patient expressed understanding of goal: Yes    Action steps to achieve this goal:  1. I will check my weight daily and call if weight gain is 2-3# in a day or 5# in a  week.  2. I will seek medical help if I have increased shortness of breath episodes,fever or coughing up a colored sputum.  3. I will take my medications as prescribed and keep future Cardiology /primary care appointments.  4. I will follow a low salt diet.                        Intervention/Education provided during outreach: CC available as needed for questions or concerns      Outreach Frequency: monthly      Plan:     Care Coordinator will follow up monthly    [facility]   Gail Gomez RN, Care Coordinator   [facility]'s   E-mail [email or web address]   [phone]   Acitretin Counseling:  I discussed with the patient the risks of acitretin including but not limited to hair loss, dry lips/skin/eyes, liver damage, hyperlipidemia, depression/suicidal ideation, photosensitivity.  Serious rare side effects can include but are not limited to pancreatitis, pseudotumor cerebri, bony changes, clot formation/stroke/heart attack.  Patient understands that alcohol is contraindicated since it can result in liver toxicity and significantly prolong the elimination of the drug by many years.

## 2023-01-16 ENCOUNTER — OFFICE VISIT (OUTPATIENT)
Dept: CARDIOLOGY | Facility: CLINIC | Age: 58
End: 2023-01-16
Attending: INTERNAL MEDICINE
Payer: COMMERCIAL

## 2023-01-16 VITALS
WEIGHT: 177.8 LBS | HEART RATE: 64 BPM | RESPIRATION RATE: 16 BRPM | DIASTOLIC BLOOD PRESSURE: 52 MMHG | BODY MASS INDEX: 22.11 KG/M2 | SYSTOLIC BLOOD PRESSURE: 88 MMHG | HEIGHT: 75 IN

## 2023-01-16 DIAGNOSIS — I50.32 CHRONIC HEART FAILURE WITH PRESERVED EJECTION FRACTION (H): ICD-10-CM

## 2023-01-16 DIAGNOSIS — I10 BENIGN ESSENTIAL HYPERTENSION: Primary | Chronic | ICD-10-CM

## 2023-01-16 DIAGNOSIS — F19.11 SUBSTANCE ABUSE IN REMISSION (H): Chronic | ICD-10-CM

## 2023-01-16 DIAGNOSIS — I34.0 NONRHEUMATIC MITRAL VALVE REGURGITATION: ICD-10-CM

## 2023-01-16 DIAGNOSIS — Z98.890 S/P MITRAL VALVE CLIP IMPLANTATION: ICD-10-CM

## 2023-01-16 DIAGNOSIS — Z95.818 S/P MITRAL VALVE CLIP IMPLANTATION: ICD-10-CM

## 2023-01-16 PROCEDURE — 99215 OFFICE O/P EST HI 40 MIN: CPT | Performed by: INTERNAL MEDICINE

## 2023-01-16 NOTE — LETTER
1/16/2023    Yovany White MD  [address] [facility] [address]    RE: Micah Earlson       Dear Colleague,     I had the pleasure of seeing Micah Nunez in the [facility].      Thank you, Dr. White, for asking the [facility] team to see . Micah Nunez to follow-up on MitraClip procedure for severe mitral regurgitation.    Assessment/Recommendations   Assessment:    1.  Mitral valve prolapse with severe mitral regurgitation, status post MitraClip x1 in July 2022.  Initial postprocedure echocardiogram demonstrated trace mitral regurgitation however follow-up echo in September showed mild to moderate mitral regurgitation.  He reports some symptoms of exertional dyspnea but no orthopnea, PND or lower extremity edema.  Blood pressure is running low today and he does mention waking up at night with numbness in his arms and legs.  Suggested we decrease lisinopril to 10 mg daily.  Did recommend a follow-up echo in 2 months (6 months from prior study).  2.  Coronary artery disease with 60 to 70% mid to distal LAD stenosis and 80% diagonal stenosis on pre-MitraClip angiography.  Decision was made to pursue medical management given his history of polysubstance abuse and poor follow-up.  Denies any current chest discomfort.  Continue medical therapy.  3.  Hypotension, likely medication related.  As above, will decrease lisinopril to 10 mg or half tablet daily.  All other medications will remain the same.  4.  Polysubstance abuse, resolved.  He tells me he has stopped utilizing drugs and alcohol over the past year and a half since I saw him and is currently undergoing drug addiction therapy which has been going well.  He does continue to smoke 2 cigarettes/day but will work on getting off those.    Plan:  1.  Decrease lisinopril to 10 mg daily.  Continue all other medications as is  2.  Follow-up echocardiogram in 2 months       History of Present  "Illness    Mr. Micah Nunez is a 57 year old male with history of mitral valve prolapse with severe mitral regurgitation, chronic diastolic heart failure, essential hypertension and polysubstance abuse who I initially met a year and a half ago following an admission to the hospital for acute congestive heart failure.  He was subsequently started on appropriate medical therapy and over the past year underwent evaluation for possible MitraClip procedure as he was felt to be too high risk for surgical intervention.  His preoperative angiography demonstrated significant disease in the first diagonal branch as well as the mid to distal LAD; however, due to his poor compliance/homelessness and polysubstance abuse, it was elected not to pursue percutaneous intervention.  He was placed on medical management and did undergo a MitraClip procedure x1 in July 2022.  His initial echocardiogram demonstrated trace mitral regurgitation; however, follow-up echocardiogram 2 months later suggested more mild to moderate mitral regurgitation.    Today, he tells me he is off all substances including alcohol and drugs.  His only vice is smoking 2 cigarettes/day.  Does report some mild shortness of breath with long walking but denies orthopnea, PND or lower extremity edema.  No lightheadedness or near syncope.  Does awaken several times at night with numbness in his arms and legs which causes him to get up and move around.  Also notes some blanching of his fingertips and cold weather.    ECG (personally reviewed): No ECG today    Cardiac Imaging Studies (personally reviewed): No new imaging     Physical Examination Review of Systems   BP (!) 88/52 (BP Location: Right arm, Patient Position: Sitting, Cuff Size: Adult Large)   Pulse 64   Resp 16   Ht 1.905 m (6' 3\")   Wt 80.6 kg (177 lb 12.8 oz)   BMI 22.22 kg/m    Body mass index is 22.22 kg/m .  Wt Readings from Last 3 Encounters:   01/16/23 80.6 kg (177 lb 12.8 oz)   09/26/22 " "77.6 kg (171 lb)   09/22/22 76.7 kg (169 lb)     General Appearance:   Awake, Alert, No acute distress.   HEENT:  No scleral icterus; the mucous membranes were pink and moist.   Neck: No cervical bruits or jugular venous distention    Chest: The spine was straight. The chest was symmetric.   Lungs:   Respirations unlabored; the lungs are clear to auscultation. No wheezing   Cardiovascular:    Regular rate and rhythm.  S1, S2 normal.  2/6 holosystolic murmur heard at the apex.   Abdomen:  No organomegaly, masses, bruits, or tenderness. Bowels sounds are present   Extremities:  No peripheral edema bilaterally   Skin: No xanthelasma. Warm, Dry.   Musculoskeletal: No tenderness.   Neurologic: Mood and affect are appropriate.    Enc Vitals  BP: (!) 88/52  Pulse: 64  Resp: 16  Weight: 80.6 kg (177 lb 12.8 oz)  Height: 190.5 cm (6' 3\")                                         Medical History  Surgical History Family History Social History   Past Medical History:   Diagnosis Date     Acute exacerbation of CHF (congestive heart failure) (H) 11/27/2021     Benign essential hypertension      Bleeding disorder (H)      Chemical dependency (H)      Closed displaced fracture of fifth metatarsal bone of left foot 07/26/2021     Gastroesophageal reflux disease      Hypertension      Nasal mass 12/17/2013     Papilloma of nose 03/10/2014     Skin disease      Traumatic avulsion of nail plate of toe 07/26/2021     Type I or II open fracture of left ulna 09/05/2020    Past Surgical History:   Procedure Laterality Date     ARTHRODESIS FOOT Left 2/17/2015    Procedure: ARTHRODESIS FOOT;  Surgeon: Cortez Hayward DPM;  Location: [facility]     ARTHRODESIS TOE(S)  12/20/2013    Procedure: ARTHRODESIS TOE(S);  Arthrodesis first metatarsophalangeal joint left foot;  Surgeon: Cortez Hayward DPM;  Location: [facility]     COLONOSCOPY N/A 1/12/2021    Procedure: COLONOSCOPY;  Surgeon: Dixon Sarabia MD;  Location: [facility]     CV CORONARY " ANGIOGRAM N/A 4/22/2022    Procedure: Coronary Angiogram;  Surgeon: Lamont Maloney MD;  Location: [facility] CATH LAB CV     CV LEFT HEART CATH N/A 4/22/2022    Procedure: Left Heart Catheterization;  Surgeon: Lamont Maloney MD;  Location: [facility] CATH LAB CV     ENDOSCOPIC SINUS SURGERY  1/6/2014    Procedure: ENDOSCOPIC SINUS SURGERY;  Functional Endoscopic Sinus Surgery;  Surgeon: Bobo Renteria MD;  Location: [facility]     ENDOSCOPIC SINUS SURGERY  7/21/2014    Procedure: ENDOSCOPIC SINUS SURGERY;  Surgeon: Bobo Renteria MD;  [facility]: [facility]     ENDOSCOPIC SINUS SURGERY N/A 4/6/2015    Procedure: ENDOSCOPIC SINUS SURGERY;  Surgeon: Bobo Renteria MD;  Location: [facility]     ENDOSCOPIC SINUS SURGERY N/A 8/17/2015    Procedure: ENDOSCOPIC SINUS SURGERY;  Surgeon: Bobo Renteria MD;  Location: [facility]     ENDOSCOPIC SINUS SURGERY Bilateral 8/19/2019    Procedure: Bilateral Functional Endoscopic Sinus Surgery, Right Nasal Endoscopy and Excision of Left Nasal Mass;  Surgeon: Bobo Renteria MD;  Location: [facility]     ENT SURGERY       EXCISE NASAL MASS Left 11/6/2017    Procedure: EXCISE NASAL MASS;  Excision of Left Nasal Papilloma;  Surgeon: Bobo Renteria MD;  Location: [facility]     LAPAROSCOPIC HERNIORRHAPHY INGUINAL BILATERAL Bilateral 4/12/2017    Procedure: LAPAROSCOPIC HERNIORRHAPHY INGUINAL BILATERAL;  Surgeon: Shay Mercado MD;  Location: [facility]     NASAL ENDOSCOPY Bilateral 2/6/2017    Procedure: NASAL ENDOSCOPY;  Surgeon: Bobo Renteria MD;  [facility]     NASAL ENDOSCOPY N/A 5/21/2018    Procedure: NASAL ENDOSCOPY;  Nasal Endoscopy, CO2 Laser Excision of Left Nasal Papilloma;  Surgeon: Bobo Renteria MD;  Location: [facility] OR     NASAL/SINUS POLYPECTOMY       PE TUBES       TRANSCATHETER MITRAL VALVE REPAIR N/A 7/18/2022    Procedure: Transcatheter mitral valve repair with mitraclip. Possible atrial septal defect closure.;  Surgeon: Nicolas Tobin MD;   Location:  [facility] CATH LAB    Family History   Problem Relation Age of Onset     Diabetes Mother 40     C.A.D. Father 72     Unknown/Adopted No family hx of      Depression No family hx of      Anxiety Disorder No family hx of      Schizophrenia No family hx of      Bipolar Disorder No family hx of      Suicide No family hx of      Substance Abuse No family hx of      Dementia No family hx of      [place] Disease No family hx of      Parkinsonism No family hx of      Autism Spectrum Disorder No family hx of      Intellectual Disability (Mental Retardation) No family hx of      Mental Illness No family hx of      Bleeding Disorder No family hx of     Social History     Socioeconomic History     Marital status: Single     Spouse name: Not on file     Number of children: Not on file     Years of education: Not on file     Highest education level: Not on file   Occupational History     Not on file   Tobacco Use     Smoking status: Every Day     Packs/day: 1.00     Years: 40.00     Pack years: 40.00     Types: Cigarettes     Start date: 1/1/1980     Smokeless tobacco: Never     Tobacco comments:     About 2 cigarettes per day, planning on quitting soon. 9-22-22 visit.   Vaping Use     Vaping Use: Never used   Substance and Sexual Activity     Alcohol use: Not Currently     Comment: occasional      Drug use: Not Currently     Types: Cocaine, Methamphetamines, Opiates, Marijuana     Comment: 7 years quit Cocaine/methamphetamines     Sexual activity: Yes     Partners: Female     Birth control/protection: None   Other Topics Concern     Parent/sibling w/ CABG, MI or angioplasty before 65F 55M? No      Service No     Blood Transfusions No     Caffeine Concern No     Occupational Exposure No     Hobby Hazards No     Sleep Concern No     Stress Concern No     Weight Concern No     Special Diet No     Back Care No     Exercise Yes     Bike Helmet No     Seat Belt Yes     Self-Exams Not Asked   Social History  Narrative     Not on file     Social Determinants of Health     Financial Resource Strain: Not on file   Food Insecurity: Not on file   Transportation Needs: Not on file   Physical Activity: Not on file   Stress: Not on file   Social Connections: Not on file   Intimate Partner Violence: Not on file   Housing Stability: Not on file          Medications  Allergies   Current Outpatient Medications   Medication Sig Dispense Refill     atorvastatin (LIPITOR) 40 MG tablet Take 1 tablet (40 mg) by mouth daily 90 tablet 3     buprenorphine HCl-naloxone HCl (SUBOXONE) 8-2 MG per film Place 1 strip under the tongue       buPROPion (WELLBUTRIN XL) 300 MG 24 hr tablet Take 1 tablet by mouth every morning       furosemide (LASIX) 20 MG tablet TAKE 2 TABLETS (40 MG) BY MOUTH DAILY 180 tablet 1     gabapentin (NEURONTIN) 100 MG capsule Take 100-200 mg by mouth       hydrOXYzine (ATARAX) 25 MG tablet Take 25 mg by mouth       lisinopril (ZESTRIL) 20 MG tablet TAKE 1 TABLET (20 MG) BY MOUTH DAILY 90 tablet 1     metoprolol succinate ER (TOPROL XL) 25 MG 24 hr tablet Take 1 tablet (25 mg) by mouth daily 90 tablet 3     potassium chloride ER (KLOR-CON M) 20 MEQ CR tablet TAKE 1 TABLET (20 MEQ) BY MOUTH DAILY 90 tablet 1     SM ASPIRIN LOW DOSE 81 MG EC tablet        spironolactone (ALDACTONE) 25 MG tablet TAKE 1 TABLET (25 MG) BY MOUTH DAILY 90 tablet 1     SUBOXONE 12-3 MG FILM per film        cloNIDine (CATAPRES) 0.1 MG tablet Take 0.1-0.2 mg by mouth (Patient not taking: Reported on 10/28/2022)       ondansetron (ZOFRAN) 4 MG tablet Take 4 mg by mouth (Patient not taking: Reported on 10/28/2022)        No Known Allergies      Lab Results    Chemistry/lipid CBC Cardiac Enzymes/BNP/TSH/INR   Recent Labs   Lab Test 09/26/22  1250 04/22/22  0955 04/19/22  1204   TRIG  --   --  117   LDL  --   --  113*   BUN 16.1   < >  --    *   < >  --    CO2 28   < >  --     < > = values in this interval not displayed.    Recent Labs   Lab Test  09/26/22  1250   WBC 8.4   HGB 11.7*   HCT 36.5*   MCV 96       Recent Labs   Lab Test 07/11/22  1328 04/19/22  1204 01/29/22  2151   TROPONINI  --   --  0.03   BNP 46  --  2,610*   TSH  --  0.89  --    INR 1.01  --   --         A total of 47 minutes was spent reviewing patient's medical records, obtaining history and performing examination, as well as discussing diagnoses/ recommendations with patient and answering all questions.                    Thank you for allowing me to participate in the care of your patient.      Sincerely,     Samantha Jackson MD     [facility]  cc:   Vicki Sosa PA-C  [address] [facility] [address]

## 2023-01-16 NOTE — PATIENT INSTRUCTIONS
Decrease lisinopril to 10 mg or half tablet daily given low blood pressure  Set up echo in 2 months to follow up on mitral valve leakage

## 2023-01-16 NOTE — PROGRESS NOTES
Thank you, Dr. White, for asking the [facility] team to see Mr. Micah Nunez to follow-up on MitraClip procedure for severe mitral regurgitation.    Assessment/Recommendations   Assessment:    1.  Mitral valve prolapse with severe mitral regurgitation, status post MitraClip x1 in July 2022.  Initial postprocedure echocardiogram demonstrated trace mitral regurgitation however follow-up echo in September showed mild to moderate mitral regurgitation.  He reports some symptoms of exertional dyspnea but no orthopnea, PND or lower extremity edema.  Blood pressure is running low today and he does mention waking up at night with numbness in his arms and legs.  Suggested we decrease lisinopril to 10 mg daily.  Did recommend a follow-up echo in 2 months (6 months from prior study).  2.  Coronary artery disease with 60 to 70% mid to distal LAD stenosis and 80% diagonal stenosis on pre-MitraClip angiography.  Decision was made to pursue medical management given his history of polysubstance abuse and poor follow-up.  Denies any current chest discomfort.  Continue medical therapy.  3.  Hypotension, likely medication related.  As above, will decrease lisinopril to 10 mg or half tablet daily.  All other medications will remain the same.  4.  Polysubstance abuse, resolved.  He tells me he has stopped utilizing drugs and alcohol over the past year and a half since I saw him and is currently undergoing drug addiction therapy which has been going well.  He does continue to smoke 2 cigarettes/day but will work on getting off those.    Plan:  1.  Decrease lisinopril to 10 mg daily.  Continue all other medications as is  2.  Follow-up echocardiogram in 2 months       History of Present Illness    Mr. Micah Nunez is a 57 year old male with history of mitral valve prolapse with severe mitral regurgitation, chronic diastolic heart failure, essential hypertension and polysubstance abuse who I  "initially met a year and a half ago following an admission to the hospital for acute congestive heart failure.  He was subsequently started on appropriate medical therapy and over the past year underwent evaluation for possible MitraClip procedure as he was felt to be too high risk for surgical intervention.  His preoperative angiography demonstrated significant disease in the first diagonal branch as well as the mid to distal LAD; however, due to his poor compliance/homelessness and polysubstance abuse, it was elected not to pursue percutaneous intervention.  He was placed on medical management and did undergo a MitraClip procedure x1 in July 2022.  His initial echocardiogram demonstrated trace mitral regurgitation; however, follow-up echocardiogram 2 months later suggested more mild to moderate mitral regurgitation.    Today, he tells me he is off all substances including alcohol and drugs.  His only vice is smoking 2 cigarettes/day.  Does report some mild shortness of breath with long walking but denies orthopnea, PND or lower extremity edema.  No lightheadedness or near syncope.  Does awaken several times at night with numbness in his arms and legs which causes him to get up and move around.  Also notes some blanching of his fingertips and cold weather.    ECG (personally reviewed): No ECG today    Cardiac Imaging Studies (personally reviewed): No new imaging     Physical Examination Review of Systems   BP (!) 88/52 (BP Location: Right arm, Patient Position: Sitting, Cuff Size: Adult Large)   Pulse 64   Resp 16   Ht 1.905 m (6' 3\")   Wt 80.6 kg (177 lb 12.8 oz)   BMI 22.22 kg/m    Body mass index is 22.22 kg/m .  Wt Readings from Last 3 Encounters:   01/16/23 80.6 kg (177 lb 12.8 oz)   09/26/22 77.6 kg (171 lb)   09/22/22 76.7 kg (169 lb)     General Appearance:   Awake, Alert, No acute distress.   HEENT:  No scleral icterus; the mucous membranes were pink and moist.   Neck: No cervical bruits or jugular " "venous distention    Chest: The spine was straight. The chest was symmetric.   Lungs:   Respirations unlabored; the lungs are clear to auscultation. No wheezing   Cardiovascular:    Regular rate and rhythm.  S1, S2 normal.  2/6 holosystolic murmur heard at the apex.   Abdomen:  No organomegaly, masses, bruits, or tenderness. Bowels sounds are present   Extremities:  No peripheral edema bilaterally   Skin: No xanthelasma. Warm, Dry.   Musculoskeletal: No tenderness.   Neurologic: Mood and affect are appropriate.    Enc Vitals  BP: (!) 88/52  Pulse: 64  Resp: 16  Weight: 80.6 kg (177 lb 12.8 oz)  Height: 190.5 cm (6' 3\")                                         Medical History  Surgical History Family History Social History   Past Medical History:   Diagnosis Date     Acute exacerbation of CHF (congestive heart failure) (H) 11/27/2021     Benign essential hypertension      Bleeding disorder (H)      Chemical dependency (H)      Closed displaced fracture of fifth metatarsal bone of left foot 07/26/2021     Gastroesophageal reflux disease      Hypertension      Nasal mass 12/17/2013     Papilloma of nose 03/10/2014     Skin disease      Traumatic avulsion of nail plate of toe 07/26/2021     Type I or II open fracture of left ulna 09/05/2020    Past Surgical History:   Procedure Laterality Date     ARTHRODESIS FOOT Left 2/17/2015    Procedure: ARTHRODESIS FOOT;  Surgeon: Cortez Hayward DPM;  Location: [facility] OR     ARTHRODESIS TOE(S)  12/20/2013    Procedure: ARTHRODESIS TOE(S);  Arthrodesis first metatarsophalangeal joint left foot;  Surgeon: Cortez Hayward DPM;  Location: [facility] OR     COLONOSCOPY N/A 1/12/2021    Procedure: COLONOSCOPY;  Surgeon: Dixon Sarabia MD;  Location: [facility] GI     CV CORONARY ANGIOGRAM N/A 4/22/2022    Procedure: Coronary Angiogram;  Surgeon: Lamont Maloney MD;  Location: [facility] CATH LAB CV     CV LEFT HEART CATH N/A 4/22/2022    Procedure: Left Heart Catheterization;  Surgeon: " Lamont Maloney MD;  Location: [facility] CATH LAB CV     ENDOSCOPIC SINUS SURGERY  1/6/2014    Procedure: ENDOSCOPIC SINUS SURGERY;  Functional Endoscopic Sinus Surgery;  Surgeon: Bobo Renteria MD;  Location:  OR     ENDOSCOPIC SINUS SURGERY  7/21/2014    Procedure: ENDOSCOPIC SINUS SURGERY;  Surgeon: Bobo Renteria MD;  Location:  OR     ENDOSCOPIC SINUS SURGERY N/A 4/6/2015    Procedure: ENDOSCOPIC SINUS SURGERY;  Surgeon: Bobo Renteria MD;  Location:  OR     ENDOSCOPIC SINUS SURGERY N/A 8/17/2015    Procedure: ENDOSCOPIC SINUS SURGERY;  Surgeon: Bobo Renteria MD;  Location:  OR     ENDOSCOPIC SINUS SURGERY Bilateral 8/19/2019    Procedure: Bilateral Functional Endoscopic Sinus Surgery, Right Nasal Endoscopy and Excision of Left Nasal Mass;  Surgeon: Bobo Renteria MD;  Location:  OR     ENT SURGERY       EXCISE NASAL MASS Left 11/6/2017    Procedure: EXCISE NASAL MASS;  Excision of Left Nasal Papilloma;  Surgeon: Bobo Renteria MD;  Location: [facility] OR     LAPAROSCOPIC HERNIORRHAPHY INGUINAL BILATERAL Bilateral 4/12/2017    Procedure: LAPAROSCOPIC HERNIORRHAPHY INGUINAL BILATERAL;  Surgeon: Shay Mercado MD;  Location: [facility] OR     NASAL ENDOSCOPY Bilateral 2/6/2017    Procedure: NASAL ENDOSCOPY;  Surgeon: Bobo Renteria MD;  Location: [facility] OR     NASAL ENDOSCOPY N/A 5/21/2018    Procedure: NASAL ENDOSCOPY;  Nasal Endoscopy, CO2 Laser Excision of Left Nasal Papilloma;  Surgeon: Bobo Renteria MD;  Location: [facility] OR     NASAL/SINUS POLYPECTOMY       PE TUBES       TRANSCATHETER MITRAL VALVE REPAIR N/A 7/18/2022    Procedure: Transcatheter mitral valve repair with mitraclip. Possible atrial septal defect closure.;  Surgeon: Nicolas Tobin MD;  Location:  HEART CARDIAC CATH LAB    Family History   Problem Relation Age of Onset     Diabetes Mother 40     C.A.D. Father 72     Unknown/Adopted No family hx of      Depression No family hx of      Anxiety  Disorder No family hx of      Schizophrenia No family hx of      Bipolar Disorder No family hx of      Suicide No family hx of      Substance Abuse No family hx of      Dementia No family hx of      [place] Disease No family hx of      Parkinsonism No family hx of      Autism Spectrum Disorder No family hx of      Intellectual Disability (Mental Retardation) No family hx of      Mental Illness No family hx of      Bleeding Disorder No family hx of     Social History     Socioeconomic History     Marital status: Single     Spouse name: Not on file     Number of children: Not on file     Years of education: Not on file     Highest education level: Not on file   Occupational History     Not on file   Tobacco Use     Smoking status: Every Day     Packs/day: 1.00     Years: 40.00     Pack years: 40.00     Types: Cigarettes     Start date: 1/1/1980     Smokeless tobacco: Never     Tobacco comments:     About 2 cigarettes per day, planning on quitting soon. 9-22-22 visit.   Vaping Use     Vaping Use: Never used   Substance and Sexual Activity     Alcohol use: Not Currently     Comment: occasional      Drug use: Not Currently     Types: Cocaine, Methamphetamines, Opiates, Marijuana     Comment: 7 years quit Cocaine/methamphetamines     Sexual activity: Yes     Partners: Female     Birth control/protection: None   Other Topics Concern     Parent/sibling w/ CABG, MI or angioplasty before 65F 55M? No      Service No     Blood Transfusions No     Caffeine Concern No     Occupational Exposure No     Hobby Hazards No     Sleep Concern No     Stress Concern No     Weight Concern No     Special Diet No     Back Care No     Exercise Yes     Bike Helmet No     Seat Belt Yes     Self-Exams Not Asked   Social History Narrative     Not on file     Social Determinants of Health     Financial Resource Strain: Not on file   Food Insecurity: Not on file   Transportation Needs: Not on file   Physical Activity: Not on file    Stress: Not on file   Social Connections: Not on file   Intimate Partner Violence: Not on file   Housing Stability: Not on file          Medications  Allergies   Current Outpatient Medications   Medication Sig Dispense Refill     atorvastatin (LIPITOR) 40 MG tablet Take 1 tablet (40 mg) by mouth daily 90 tablet 3     buprenorphine HCl-naloxone HCl (SUBOXONE) 8-2 MG per film Place 1 strip under the tongue       buPROPion (WELLBUTRIN XL) 300 MG 24 hr tablet Take 1 tablet by mouth every morning       furosemide (LASIX) 20 MG tablet TAKE 2 TABLETS (40 MG) BY MOUTH DAILY 180 tablet 1     gabapentin (NEURONTIN) 100 MG capsule Take 100-200 mg by mouth       hydrOXYzine (ATARAX) 25 MG tablet Take 25 mg by mouth       lisinopril (ZESTRIL) 20 MG tablet TAKE 1 TABLET (20 MG) BY MOUTH DAILY 90 tablet 1     metoprolol succinate ER (TOPROL XL) 25 MG 24 hr tablet Take 1 tablet (25 mg) by mouth daily 90 tablet 3     potassium chloride ER (KLOR-CON M) 20 MEQ CR tablet TAKE 1 TABLET (20 MEQ) BY MOUTH DAILY 90 tablet 1     SM ASPIRIN LOW DOSE 81 MG EC tablet        spironolactone (ALDACTONE) 25 MG tablet TAKE 1 TABLET (25 MG) BY MOUTH DAILY 90 tablet 1     SUBOXONE 12-3 MG FILM per film        cloNIDine (CATAPRES) 0.1 MG tablet Take 0.1-0.2 mg by mouth (Patient not taking: Reported on 10/28/2022)       ondansetron (ZOFRAN) 4 MG tablet Take 4 mg by mouth (Patient not taking: Reported on 10/28/2022)        No Known Allergies      Lab Results    Chemistry/lipid CBC Cardiac Enzymes/BNP/TSH/INR   Recent Labs   Lab Test 09/26/22  1250 04/22/22  0955 04/19/22  1204   TRIG  --   --  117   LDL  --   --  113*   BUN 16.1   < >  --    *   < >  --    CO2 28   < >  --     < > = values in this interval not displayed.    Recent Labs   Lab Test 09/26/22  1250   WBC 8.4   HGB 11.7*   HCT 36.5*   MCV 96       Recent Labs   Lab Test 07/11/22  1328 04/19/22  1204 01/29/22  2151   TROPONINI  --   --  0.03   BNP 46  --  2,610*   TSH  --  0.89   --    INR 1.01  --   --         A total of 47 minutes was spent reviewing patient's medical records, obtaining history and performing examination, as well as discussing diagnoses/ recommendations with patient and answering all questions.

## 2023-01-24 ENCOUNTER — OFFICE VISIT (OUTPATIENT)
Dept: UROLOGY | Facility: CLINIC | Age: 58
End: 2023-01-24
Payer: COMMERCIAL

## 2023-01-24 ENCOUNTER — OFFICE VISIT (OUTPATIENT)
Dept: FAMILY MEDICINE | Facility: CLINIC | Age: 58
End: 2023-01-24
Payer: COMMERCIAL

## 2023-01-24 ENCOUNTER — TELEPHONE (OUTPATIENT)
Dept: UROLOGY | Facility: CLINIC | Age: 58
End: 2023-01-24

## 2023-01-24 VITALS
DIASTOLIC BLOOD PRESSURE: 62 MMHG | TEMPERATURE: 97.9 F | RESPIRATION RATE: 16 BRPM | SYSTOLIC BLOOD PRESSURE: 94 MMHG | OXYGEN SATURATION: 99 % | BODY MASS INDEX: 21.39 KG/M2 | HEART RATE: 58 BPM | WEIGHT: 172 LBS | HEIGHT: 75 IN

## 2023-01-24 VITALS — HEART RATE: 67 BPM | SYSTOLIC BLOOD PRESSURE: 107 MMHG | OXYGEN SATURATION: 100 % | DIASTOLIC BLOOD PRESSURE: 65 MMHG

## 2023-01-24 DIAGNOSIS — Z12.5 SCREENING FOR PROSTATE CANCER: Primary | ICD-10-CM

## 2023-01-24 DIAGNOSIS — I34.0 MITRAL VALVE INSUFFICIENCY, UNSPECIFIED ETIOLOGY: ICD-10-CM

## 2023-01-24 DIAGNOSIS — N48.6 PEYRONIE'S DISEASE: ICD-10-CM

## 2023-01-24 DIAGNOSIS — I50.32 CHRONIC HEART FAILURE WITH PRESERVED EJECTION FRACTION (H): ICD-10-CM

## 2023-01-24 DIAGNOSIS — Z98.890 S/P MITRAL VALVE CLIP IMPLANTATION: Primary | ICD-10-CM

## 2023-01-24 DIAGNOSIS — F19.20 CHEMICAL DEPENDENCY (H): ICD-10-CM

## 2023-01-24 DIAGNOSIS — Z95.818 S/P MITRAL VALVE CLIP IMPLANTATION: Primary | ICD-10-CM

## 2023-01-24 DIAGNOSIS — I10 BENIGN ESSENTIAL HYPERTENSION: ICD-10-CM

## 2023-01-24 DIAGNOSIS — I25.10 CORONARY ARTERY DISEASE INVOLVING NATIVE HEART WITHOUT ANGINA PECTORIS, UNSPECIFIED VESSEL OR LESION TYPE: ICD-10-CM

## 2023-01-24 DIAGNOSIS — Z12.5 SCREENING FOR PROSTATE CANCER: ICD-10-CM

## 2023-01-24 LAB
ANION GAP SERPL CALCULATED.3IONS-SCNC: 10 MMOL/L (ref 7–15)
BASOPHILS # BLD AUTO: 0 10E3/UL (ref 0–0.2)
BASOPHILS NFR BLD AUTO: 0 %
BUN SERPL-MCNC: 36.2 MG/DL (ref 6–20)
CALCIUM SERPL-MCNC: 9.6 MG/DL (ref 8.6–10)
CHLORIDE SERPL-SCNC: 105 MMOL/L (ref 98–107)
CREAT SERPL-MCNC: 3.84 MG/DL (ref 0.67–1.17)
DEPRECATED HCO3 PLAS-SCNC: 27 MMOL/L (ref 22–29)
EOSINOPHIL # BLD AUTO: 0.2 10E3/UL (ref 0–0.7)
EOSINOPHIL NFR BLD AUTO: 4 %
ERYTHROCYTE [DISTWIDTH] IN BLOOD BY AUTOMATED COUNT: 12.6 % (ref 10–15)
GFR SERPL CREATININE-BSD FRML MDRD: 17 ML/MIN/1.73M2
GLUCOSE SERPL-MCNC: 111 MG/DL (ref 70–99)
HCT VFR BLD AUTO: 36.7 % (ref 40–53)
HGB BLD-MCNC: 10.9 G/DL (ref 13.3–17.7)
LYMPHOCYTES # BLD AUTO: 1.4 10E3/UL (ref 0.8–5.3)
LYMPHOCYTES NFR BLD AUTO: 21 %
MCH RBC QN AUTO: 30 PG (ref 26.5–33)
MCHC RBC AUTO-ENTMCNC: 29.7 G/DL (ref 31.5–36.5)
MCV RBC AUTO: 101 FL (ref 78–100)
MONOCYTES # BLD AUTO: 0.7 10E3/UL (ref 0–1.3)
MONOCYTES NFR BLD AUTO: 10 %
NEUTROPHILS # BLD AUTO: 4.4 10E3/UL (ref 1.6–8.3)
NEUTROPHILS NFR BLD AUTO: 65 %
PLATELET # BLD AUTO: 192 10E3/UL (ref 150–450)
POTASSIUM SERPL-SCNC: 5.8 MMOL/L (ref 3.4–5.3)
PSA SERPL-MCNC: 0.26 NG/ML (ref 0–3.5)
RBC # BLD AUTO: 3.63 10E6/UL (ref 4.4–5.9)
SODIUM SERPL-SCNC: 142 MMOL/L (ref 136–145)
WBC # BLD AUTO: 6.8 10E3/UL (ref 4–11)

## 2023-01-24 PROCEDURE — 99214 OFFICE O/P EST MOD 30 MIN: CPT | Mod: 25 | Performed by: FAMILY MEDICINE

## 2023-01-24 PROCEDURE — G0103 PSA SCREENING: HCPCS | Performed by: FAMILY MEDICINE

## 2023-01-24 PROCEDURE — 90677 PCV20 VACCINE IM: CPT | Performed by: FAMILY MEDICINE

## 2023-01-24 PROCEDURE — 99213 OFFICE O/P EST LOW 20 MIN: CPT | Performed by: STUDENT IN AN ORGANIZED HEALTH CARE EDUCATION/TRAINING PROGRAM

## 2023-01-24 PROCEDURE — 36415 COLL VENOUS BLD VENIPUNCTURE: CPT | Performed by: FAMILY MEDICINE

## 2023-01-24 PROCEDURE — 90471 IMMUNIZATION ADMIN: CPT | Performed by: FAMILY MEDICINE

## 2023-01-24 PROCEDURE — 85025 COMPLETE CBC W/AUTO DIFF WBC: CPT | Performed by: FAMILY MEDICINE

## 2023-01-24 PROCEDURE — 80048 BASIC METABOLIC PNL TOTAL CA: CPT | Performed by: FAMILY MEDICINE

## 2023-01-24 RX ORDER — LISINOPRIL 20 MG/1
10 TABLET ORAL DAILY
Qty: 45 TABLET | Refills: 3
Start: 2023-01-24 | End: 2023-01-30

## 2023-01-24 ASSESSMENT — ENCOUNTER SYMPTOMS
CHILLS: 0
EYE PAIN: 0
NERVOUS/ANXIOUS: 0
JOINT SWELLING: 0
NAUSEA: 0
CONSTIPATION: 0
HEARTBURN: 0
FEVER: 0
PALPITATIONS: 0
COUGH: 0
FREQUENCY: 0
ABDOMINAL PAIN: 0
SORE THROAT: 0
DIARRHEA: 0
HEMATURIA: 0
DIZZINESS: 0
HEADACHES: 0

## 2023-01-24 ASSESSMENT — PAIN SCALES - GENERAL: PAINLEVEL: NO PAIN (0)

## 2023-01-24 NOTE — PATIENT INSTRUCTIONS
"I am very happy about your success with the chemical addiction.    Way to go!     Please continue to work with Dr Minor    Your blood pressure has been running low and I noticed that you are on two \"water pills\" Furosemide also known as Lasixs and Spironolactone. Both have potential to lower blood pressure.    I will recommend you take the Spironolactone daily as it is prescribed and use the Furosemide ( Lasixs ) as needed when you notice that you are having water retension    I wish you the best with your sobriety, we are rooting for you.   "

## 2023-01-24 NOTE — PROGRESS NOTES
57 yr old male here for recheck of symptoms. He has a history of mitral valve insufficiency,cheical dependency,Hypertension,CAD. He reports that his chemical dependiceny program is going really well and he has been sober for about a year.  He also mentioned that his blood pressure has been low. He was seen by the cardiologist about a week ago and his lisinopril was cut in half. I reviewed his medication list and note that he is on spironolactone and furosemide which also could potentially lower his blood pressure. I asked that he takes the spironolactone daily as prescribed and the furosemide as needed. Labs ordered today.   Assessment & Plan     S/P mitral valve clip implantation  Patient seen recently by cardiologist ,recommendation was to lower lisinopril and repeat ECHO in three months.     Chemical dependency (H)  Patient says this is going really well . He has been sober for about a year     Mitral valve insufficiency, unspecified etiology  Emphasized decrease in lisinopril. Labs ordered   - lisinopril (ZESTRIL) 20 MG tablet; Take 0.5 tablets (10 mg) by mouth daily for 90 days  - CBC with platelets and differential  - Basic metabolic panel  (Ca, Cl, CO2, Creat, Gluc, K, Na, BUN)    Coronary artery disease involving native heart without angina pectoris, unspecified vessel or lesion type  No new symptoms. Completed cardiac rehab.     Benign essential hypertension  Blood pressure has been on the low end of normal. Recommend reducing lisinopril to 10 mg.     Chronic heart failure with preserved ejection fraction (H)  On spironolactone and furosemide. Recommend taking spironolactone daily and furosemide as needed.        [id number]}     FUTURE APPOINTMENTS:       - Follow-up visit in one month or sooner as needed.    Return in about 4 weeks (around 2/21/2023) for Follow up.    Yovany White MD  [facility]    Carla Obrien is a 57 year old, presenting for the following [facility]  issues:  Follow up from referrals (Here to follow up from the Providers he was referred to from a previous visit.  He was seen by Cardiology on 1-.  Also referred to an Opioid Specialist.  He was going to Cardiac Rehab for 4 months.  He states his blood pressure was lower at those visits.  He can feel dizzy when going to stand up.  He has been trying to increase his fluids with Gatorade and water.  Just wanting to update PCP today on how that is going.  He states he is not sure on some of the names of his medications. ) and Imm/Inj (Declined Covid and Flu shot today.  Mentioned about the Shingles vaccine and checking with his Insurance Company on coverage.)      HPI     Chief Complaint   Patient presents with     Follow up from referrals     Here to follow up from the Providers he was referred to from a previous visit.  He was seen by Cardiology on 1-.  Also referred to an Opioid Specialist.  He was going to Cardiac Rehab for 4 months.  He states his blood pressure was lower at those visits.  He can feel dizzy when going to stand up.  He has been trying to increase his fluids with Gatorade and water.  Just wanting to update PCP today on how that is going.  He states he is not sure on some of the names of his medications.      Imm/Inj     Declined Covid and Flu shot today.  Mentioned about the Shingles vaccine and checking with his Insurance Company on coverage.           Review of Systems   Constitutional: Negative for chills and fever.   HENT: Negative for congestion, ear pain and sore throat.    Eyes: Negative for pain and visual disturbance.   Respiratory: Negative for cough.    Cardiovascular: Negative for chest pain and palpitations.   Gastrointestinal: Negative for abdominal pain, constipation, diarrhea, heartburn and nausea.   Genitourinary: Negative for frequency and hematuria.   Musculoskeletal: Negative for joint swelling.   Skin: Negative for rash.   Neurological: Negative for dizziness and  "headaches.   Psychiatric/Behavioral: Negative for mood changes. The patient is not nervous/anxious.             Objective    BP 94/62 (BP Location: Right arm, Patient Position: Chair, Cuff Size: Adult Regular)   Pulse 58   Temp 97.9  F (36.6  C) (Tympanic)   Resp 16   Ht 1.905 m (6' 3\")   Wt 78 kg (172 lb)   SpO2 99%   BMI 21.50 kg/m    Body mass index is 21.5 kg/m .  Physical Exam  Constitutional:       Appearance: Normal appearance.   HENT:      Head: Normocephalic and atraumatic.      Mouth/Throat:      Mouth: Mucous membranes are moist.   Eyes:      Extraocular Movements: Extraocular movements intact.      Pupils: Pupils are equal, round, and reactive to light.   Cardiovascular:      Rate and Rhythm: Normal rate and regular rhythm.      Pulses: Normal pulses.      Heart sounds: Normal heart sounds.   Pulmonary:      Effort: Pulmonary effort is normal.      Breath sounds: Normal breath sounds.   Abdominal:      General: Abdomen is flat. Bowel sounds are normal.      Palpations: Abdomen is soft.   Musculoskeletal:         General: Normal range of motion.      Cervical back: Normal range of motion and neck supple.   Skin:     General: Skin is warm.   Neurological:      Mental Status: He is alert and oriented to person, place, and time.   Psychiatric:         Mood and Affect: Mood normal.         Behavior: Behavior normal.            No results found for this or any previous visit (from the past 24 hour(s)).                "

## 2023-01-24 NOTE — NURSING NOTE
"Initial /65 (BP Location: Left arm, Patient Position: Chair, Cuff Size: Adult Large)   Pulse 67   SpO2 100%  Estimated body mass index is 21.5 kg/m  as calculated from the following:    Height as of an earlier encounter on 1/24/23: 1.905 m (6' 3\").    Weight as of an earlier encounter on 1/24/23: 78 kg (172 lb). .    Prachi Koroma MA on 1/24/2023 at 1:15 PM  "

## 2023-01-24 NOTE — PROGRESS NOTES
Chief Complaint:   Penile curvature         History of Present Illness:   Micah Nunez is a 57 year old male with a history of HTN, CHF, tobacco use disorder, and HLD who presents for evaluation of penile curvature.     He noticed a leftward curvature of the penis with erections about 3-4 months ago. He recently had heart surgery and has not been sexually active since. He does not know if this will be impactful to intercourse. He denies pain with erections. He has no difficulty achieving or maintaining an erection.          Past Medical History:     Past Medical History:   Diagnosis Date     Acute exacerbation of CHF (congestive heart failure) (H) 11/27/2021     Benign essential hypertension      Bleeding disorder (H)      Chemical dependency (H)      Closed displaced fracture of fifth metatarsal bone of left foot 07/26/2021     Gastroesophageal reflux disease      Hypertension      Nasal mass 12/17/2013     Papilloma of nose 03/10/2014     Skin disease      Traumatic avulsion of nail plate of toe 07/26/2021     Type I or II open fracture of left ulna 09/05/2020            Past Surgical History:     Past Surgical History:   Procedure Laterality Date     ARTHRODESIS FOOT Left 2/17/2015    Procedure: ARTHRODESIS FOOT;  Surgeon: Cortez Hayward DPM;  Location: [facility]     ARTHRODESIS TOE(S)  12/20/2013    Procedure: ARTHRODESIS TOE(S);  Arthrodesis first metatarsophalangeal joint left foot;  Surgeon: Cortez Hayward DPM;  Location: [facility] OR     COLONOSCOPY N/A 1/12/2021    Procedure: COLONOSCOPY;  Surgeon: Dixon Sarabia MD;  Location: [facility] GI     CV CORONARY ANGIOGRAM N/A 4/22/2022    Procedure: Coronary Angiogram;  Surgeon: Lamont Maloney MD;  Location: [facility] LAB CV     CV LEFT HEART CATH N/A 4/22/2022    Procedure: Left Heart Catheterization;  Surgeon: Lamont Maloney MD;  Location: [facility] CV     ENDOSCOPIC SINUS SURGERY  1/6/2014    Procedure: ENDOSCOPIC SINUS  SURGERY;  Functional Endoscopic Sinus Surgery;  Surgeon: Bobo Renteria MD;  Location: [facility] OR     ENDOSCOPIC SINUS SURGERY  7/21/2014    Procedure: ENDOSCOPIC SINUS SURGERY;  Surgeon: Bobo Renteria MD;  Location:  OR     ENDOSCOPIC SINUS SURGERY N/A 4/6/2015    Procedure: ENDOSCOPIC SINUS SURGERY;  Surgeon: Bobo Renteria MD;  Location:  OR     ENDOSCOPIC SINUS SURGERY N/A 8/17/2015    Procedure: ENDOSCOPIC SINUS SURGERY;  Surgeon: Bobo Renteria MD;  Location: [facility] OR     ENDOSCOPIC SINUS SURGERY Bilateral 8/19/2019    Procedure: Bilateral Functional Endoscopic Sinus Surgery, Right Nasal Endoscopy and Excision of Left Nasal Mass;  Surgeon: Bobo Renteria MD;  Location:  OR     ENT SURGERY       EXCISE NASAL MASS Left 11/6/2017    Procedure: EXCISE NASAL MASS;  Excision of Left Nasal Papilloma;  Surgeon: Bobo Renteria MD;  Location: [facility] OR     LAPAROSCOPIC HERNIORRHAPHY INGUINAL BILATERAL Bilateral 4/12/2017    Procedure: LAPAROSCOPIC HERNIORRHAPHY INGUINAL BILATERAL;  Surgeon: Shay Mercado MD;  Location: [facility]     NASAL ENDOSCOPY Bilateral 2/6/2017    Procedure: NASAL ENDOSCOPY;  Surgeon: Bobo Renteria MD;  Location: [facility] OR     NASAL ENDOSCOPY N/A 5/21/2018    Procedure: NASAL ENDOSCOPY;  Nasal Endoscopy, CO2 Laser Excision of Left Nasal Papilloma;  Surgeon: Bobo Renteria MD;  Location: [facility] OR     NASAL/SINUS POLYPECTOMY       PE TUBES       TRANSCATHETER MITRAL VALVE REPAIR N/A 7/18/2022    Procedure: Transcatheter mitral valve repair with mitraclip. Possible atrial septal defect closure.;  Surgeon: Nicolas Tobin MD;  Location:  HEART CARDIAC CATH LAB            Medications     Current Outpatient Medications   Medication     atorvastatin (LIPITOR) 40 MG tablet     buprenorphine HCl-naloxone HCl (SUBOXONE) 8-2 MG per film     buPROPion (WELLBUTRIN XL) 300 MG 24 hr tablet     cloNIDine (CATAPRES) 0.1 MG tablet     furosemide (LASIX) 20 MG tablet      gabapentin (NEURONTIN) 100 MG capsule     hydrOXYzine (ATARAX) 25 MG tablet     lisinopril (ZESTRIL) 20 MG tablet     metoprolol succinate ER (TOPROL XL) 25 MG 24 hr tablet     ondansetron (ZOFRAN) 4 MG tablet     potassium chloride ER (KLOR-CON M) 20 MEQ CR tablet     SM ASPIRIN LOW DOSE 81 MG EC tablet     spironolactone (ALDACTONE) 25 MG tablet     SUBOXONE 12-3 MG FILM per film     No current facility-administered medications for this visit.     Facility-Administered Medications Ordered in Other Visits   Medication     Self Administer Medications: Behavioral Services            Allergies:   Patient has no known allergies.         Review of Systems:  From intake questionnaire   Negative 14 system review except as noted on HPI, nurse's note.         Physical Exam:   Patient is a 57 year old  male   Vitals: Blood pressure 107/65, pulse 67, SpO2 100 %.  General Appearance Adult: Alert, no acute distress, oriented.  Lungs: Non-labored breathing.  Heart: No obvious jugular venous distension present.  Neuro: Alert, oriented, speech and mentation normal  : Possible plaque on the dorsal aspect of the penis, slightly left of midline      Labs and Pathology:    I personally reviewed all applicable laboratory data and went over findings with patient  Significant for:     BMP RESULTS:  Recent Labs   Lab Test 09/26/22  1250 07/19/22  0721 07/18/22  2141 07/18/22  2054 07/18/22  1503 07/18/22  1046 10/25/21  1826 02/24/21  1057 11/08/20  1507 08/20/20  0057 05/28/20  1901   * 135*  --  137  --  140   < > 140 141 139 142   POTASSIUM 4.3 4.5  --  5.4*  --  4.5   < > 3.9 3.6 3.5 3.4   CHLORIDE 95* 100  --  102  --  104   < > 108 109 106 107   CO2 28 24  --  25  --  27   < > 28 28 25 28   ANIONGAP 11 11  --  10  --  9   < > 4 4 8 7   * 106* 124* 132*   < > 86   < > 113* 116* 158* 121*   BUN 16.1 18.4  --  16.7  --  12.0   < > 11 17 18 12   CR 1.52* 1.15  --  1.19*  --  1.18*   < > 0.90 1.09 1.15 1.01   GFRESTIMATED  53* 75  --  72  --  72   < > >90 76 71 84   GFRESTBLACK  --   --   --   --   --   --   --  >90 88 83 >90   KEVIN 9.5 9.4  --  9.4  --  9.3   < > 9.4 9.9 9.5 8.8    < > = values in this interval not displayed.       UA RESULTS:   Recent Labs   Lab Test 11/27/21  1108 02/24/21  1230 11/08/20  1730   SG 1.004 1.015 1.023   URINEPH 7.0 6.0 6.0   NITRITE Negative Negative Negative   RBCU <1 1 10*   WBCU <1 1 6*            Assessment and Plan:     Assessment: 57 year old male with a 3-4 month history of penile curvature. The patient has not been sexually active since his recent cardiac surgery. He does not think the curvature will be impactful to his sexual activity. He denies pain with erections.     We discussed Peyronie's disease as the likely cause of his symptoms. We discussed treatment options including observation, Xiaflex injections, and penile plication. The patient would like to proceed with observation.     Plan:  1. PSA today for prostate cancer screening. Recommend annual PSAs with PCP until age 75.   2. Follow up with urology as needed.     JAELYN DEY PA-C  Department of Urology

## 2023-01-24 NOTE — TELEPHONE ENCOUNTER
Called patient to inform him of PSA results. Voicemail left with request for patient to return call.     PSA (screening test for prostate cancer) was in the normal range. No further workup is required.     I would recommend annual screening with his PCP moving forward.     Maureen Dove PA-C  Department of Urology

## 2023-01-25 NOTE — TELEPHONE ENCOUNTER
Patient called back.  Read Maureen's message to him.  Patient states understanding. No further questions/concerns.  Preethi Gee RN on 1/25/2023 at 1:56 PM

## 2023-01-26 ENCOUNTER — HOSPITAL ENCOUNTER (OUTPATIENT)
Facility: CLINIC | Age: 58
Setting detail: OBSERVATION
Discharge: HOME | End: 2023-01-26
Attending: FAMILY MEDICINE | Admitting: FAMILY MEDICINE
Payer: COMMERCIAL

## 2023-01-26 VITALS
WEIGHT: 177 LBS | BODY MASS INDEX: 22.01 KG/M2 | TEMPERATURE: 98 F | OXYGEN SATURATION: 99 % | SYSTOLIC BLOOD PRESSURE: 120 MMHG | HEART RATE: 60 BPM | RESPIRATION RATE: 18 BRPM | DIASTOLIC BLOOD PRESSURE: 60 MMHG | HEIGHT: 75 IN

## 2023-01-26 DIAGNOSIS — F17.210 CIGARETTE SMOKER: ICD-10-CM

## 2023-01-26 DIAGNOSIS — N17.9 ACUTE KIDNEY INJURY (H): ICD-10-CM

## 2023-01-26 DIAGNOSIS — I50.32 CHRONIC HEART FAILURE WITH PRESERVED EJECTION FRACTION (H): ICD-10-CM

## 2023-01-26 DIAGNOSIS — N17.9 ACUTE RENAL FAILURE, UNSPECIFIED ACUTE RENAL FAILURE TYPE (H): Primary | ICD-10-CM

## 2023-01-26 DIAGNOSIS — F17.200 TOBACCO USE DISORDER: ICD-10-CM

## 2023-01-26 DIAGNOSIS — Z95.818 S/P MITRAL VALVE CLIP IMPLANTATION: ICD-10-CM

## 2023-01-26 DIAGNOSIS — Z98.890 S/P MITRAL VALVE CLIP IMPLANTATION: ICD-10-CM

## 2023-01-26 LAB
ALBUMIN MFR UR ELPH: 8 MG/DL (ref 1–14)
ALBUMIN SERPL BCG-MCNC: 4.2 G/DL (ref 3.5–5.2)
ALBUMIN UR-MCNC: NEGATIVE MG/DL
ALP SERPL-CCNC: 111 U/L (ref 40–129)
ALT SERPL W P-5'-P-CCNC: 14 U/L (ref 10–50)
ANION GAP SERPL CALCULATED.3IONS-SCNC: 8 MMOL/L (ref 7–15)
APPEARANCE UR: CLEAR
AST SERPL W P-5'-P-CCNC: 13 U/L (ref 10–50)
BASOPHILS # BLD AUTO: 0 10E3/UL (ref 0–0.2)
BASOPHILS NFR BLD AUTO: 0 %
BILIRUB SERPL-MCNC: 0.4 MG/DL
BILIRUB UR QL STRIP: NEGATIVE
BUN SERPL-MCNC: 26.9 MG/DL (ref 6–20)
CALCIUM SERPL-MCNC: 9.8 MG/DL (ref 8.6–10)
CHLORIDE SERPL-SCNC: 105 MMOL/L (ref 98–107)
COLOR UR AUTO: NORMAL
CREAT SERPL-MCNC: 2.96 MG/DL (ref 0.67–1.17)
CREAT UR-MCNC: 143.4 MG/DL
CREAT UR-MCNC: 143.4 MG/DL
DEPRECATED HCO3 PLAS-SCNC: 29 MMOL/L (ref 22–29)
EOSINOPHIL # BLD AUTO: 0.3 10E3/UL (ref 0–0.7)
EOSINOPHIL NFR BLD AUTO: 4 %
ERYTHROCYTE [DISTWIDTH] IN BLOOD BY AUTOMATED COUNT: 12.4 % (ref 10–15)
FRACT EXCRET NA UR+SERPL-RTO: 1.3 %
GFR SERPL CREATININE-BSD FRML MDRD: 24 ML/MIN/1.73M2
GLUCOSE SERPL-MCNC: 78 MG/DL (ref 70–99)
GLUCOSE UR STRIP-MCNC: NEGATIVE MG/DL
HCT VFR BLD AUTO: 34.1 % (ref 40–53)
HGB BLD-MCNC: 11 G/DL (ref 13.3–17.7)
HGB UR QL STRIP: NEGATIVE
HOLD SPECIMEN: NORMAL
HOLD SPECIMEN: NORMAL
IMM GRANULOCYTES # BLD: 0 10E3/UL
IMM GRANULOCYTES NFR BLD: 0 %
KETONES UR STRIP-MCNC: NEGATIVE MG/DL
LEUKOCYTE ESTERASE UR QL STRIP: NEGATIVE
LYMPHOCYTES # BLD AUTO: 1.5 10E3/UL (ref 0.8–5.3)
LYMPHOCYTES NFR BLD AUTO: 22 %
MAGNESIUM SERPL-MCNC: 2 MG/DL (ref 1.7–2.3)
MCH RBC QN AUTO: 30.3 PG (ref 26.5–33)
MCHC RBC AUTO-ENTMCNC: 32.3 G/DL (ref 31.5–36.5)
MCV RBC AUTO: 94 FL (ref 78–100)
MONOCYTES # BLD AUTO: 0.8 10E3/UL (ref 0–1.3)
MONOCYTES NFR BLD AUTO: 12 %
NEUTROPHILS # BLD AUTO: 4.3 10E3/UL (ref 1.6–8.3)
NEUTROPHILS NFR BLD AUTO: 62 %
NITRATE UR QL: NEGATIVE
NRBC # BLD AUTO: 0 10E3/UL
NRBC BLD AUTO-RTO: 0 /100
PH UR STRIP: 6 [PH] (ref 5–7)
PHOSPHATE SERPL-MCNC: 4.8 MG/DL (ref 2.5–4.5)
PLATELET # BLD AUTO: 188 10E3/UL (ref 150–450)
POTASSIUM SERPL-SCNC: 5.4 MMOL/L (ref 3.4–5.3)
PROT SERPL-MCNC: 7 G/DL (ref 6.4–8.3)
PROT/CREAT 24H UR: 0.06 MG/MG CR (ref 0–0.2)
RBC # BLD AUTO: 3.63 10E6/UL (ref 4.4–5.9)
SODIUM SERPL-SCNC: 142 MMOL/L (ref 136–145)
SODIUM UR-SCNC: 88 MMOL/L
SP GR UR STRIP: 1.02 (ref 1–1.03)
UROBILINOGEN UR STRIP-MCNC: NORMAL MG/DL
WBC # BLD AUTO: 7 10E3/UL (ref 4–11)

## 2023-01-26 PROCEDURE — 36415 COLL VENOUS BLD VENIPUNCTURE: CPT | Performed by: FAMILY MEDICINE

## 2023-01-26 PROCEDURE — 84100 ASSAY OF PHOSPHORUS: CPT | Performed by: FAMILY MEDICINE

## 2023-01-26 PROCEDURE — 81003 URINALYSIS AUTO W/O SCOPE: CPT | Performed by: FAMILY MEDICINE

## 2023-01-26 PROCEDURE — 85004 AUTOMATED DIFF WBC COUNT: CPT | Performed by: FAMILY MEDICINE

## 2023-01-26 PROCEDURE — 99285 EMERGENCY DEPT VISIT HI MDM: CPT | Mod: 25 | Performed by: FAMILY MEDICINE

## 2023-01-26 PROCEDURE — 93005 ELECTROCARDIOGRAM TRACING: CPT | Performed by: FAMILY MEDICINE

## 2023-01-26 PROCEDURE — 84156 ASSAY OF PROTEIN URINE: CPT | Performed by: FAMILY MEDICINE

## 2023-01-26 PROCEDURE — G0378 HOSPITAL OBSERVATION PER HR: HCPCS

## 2023-01-26 PROCEDURE — 80053 COMPREHEN METABOLIC PANEL: CPT | Performed by: FAMILY MEDICINE

## 2023-01-26 PROCEDURE — 84300 ASSAY OF URINE SODIUM: CPT | Performed by: FAMILY MEDICINE

## 2023-01-26 PROCEDURE — 93010 ELECTROCARDIOGRAM REPORT: CPT | Performed by: FAMILY MEDICINE

## 2023-01-26 PROCEDURE — 83735 ASSAY OF MAGNESIUM: CPT | Performed by: FAMILY MEDICINE

## 2023-01-26 RX ORDER — POLYETHYLENE GLYCOL 3350 17 G/17G
17 POWDER, FOR SOLUTION ORAL
COMMUNITY
Start: 2023-01-26 | End: 2023-01-30

## 2023-01-26 RX ORDER — AMOXICILLIN 250 MG
1 CAPSULE ORAL DAILY PRN
COMMUNITY
End: 2023-01-30

## 2023-01-26 RX ORDER — BUPRENORPHINE AND NALOXONE 4; 1 MG/1; MG/1
3 FILM, SOLUBLE BUCCAL; SUBLINGUAL 2 TIMES DAILY
Status: DISCONTINUED | OUTPATIENT
Start: 2023-01-26 | End: 2023-01-26 | Stop reason: HOSPADM

## 2023-01-26 ASSESSMENT — ENCOUNTER SYMPTOMS
SORE THROAT: 0
FREQUENCY: 0
VOMITING: 0
WHEEZING: 0
NAUSEA: 0
HEADACHES: 0
SINUS PRESSURE: 0
SHORTNESS OF BREATH: 0
BLOOD IN STOOL: 0
DIARRHEA: 0
CHILLS: 0
COUGH: 0
CONSTIPATION: 0
DYSURIA: 0
DIZZINESS: 1
DIAPHORESIS: 0
PALPITATIONS: 0
ABDOMINAL PAIN: 0
FEVER: 0

## 2023-01-26 ASSESSMENT — ACTIVITIES OF DAILY LIVING (ADL)
ADLS_ACUITY_SCORE: 35

## 2023-01-26 NOTE — ED TRIAGE NOTES
Pt states had heart surgery a few months ago is on new medications, feels his abnormal labs are due to medications. Pt had labs drawn 2 days ago, was told to come to ED for eval due to abnormal values. Pt c/o feeling dizzy x 1 month.      Triage Assessment     Row Name 01/26/23 1412       Triage Assessment (Adult)    Airway WDL WDL       Respiratory WDL    Respiratory WDL WDL       Skin Circulation/Temperature WDL    Skin Circulation/Temperature WDL WDL       Cardiac WDL    Cardiac WDL WDL       Peripheral/Neurovascular WDL    Peripheral Neurovascular WDL WDL       Cognitive/Neuro/Behavioral WDL    Cognitive/Neuro/Behavioral WDL WDL

## 2023-01-26 NOTE — ED PROVIDER NOTES
History     Chief Complaint   Patient presents with     Abnormal Labs     Coming from Dr. White's office with ARF elevated creatinine 3.8, elevated potassium 5.8. possibly needs IVfluids.          Micah Nunez is a 57 year old male who presents with a history of hypertension, history of chronic heart failure with preserved ejection fraction and mitral regurgitation that was severe underwent mitral clip 2022.  On multiple antihypertensives and diuretics.  He is on lisinopril spironolactone Lasix and potassium replacement.  He was called to come to the emergency department after on the 24th having labs demonstrating a potassium of 2.8 and a creatinine elevated to over 3.    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  Primarily opioid abuse is been smoked with heroin.  More distant history of methamphetamine abuse.      Allergies:  No Known Allergies    Problem List:    Patient Active Problem List    Diagnosis Date Noted     S/P mitral valve clip implantation 07/18/2022     Priority: Medium     July 18, 2022       Chronic heart failure with preserved ejection fraction (H) 04/18/2022     Priority: Medium     Mitral regurgitation - severe 11/29/2021     Priority: Medium     Non compliance with medical treatment 11/27/2021     Priority: Medium     Anasarca 11/27/2021     Priority: Medium     Elevated troponin 11/27/2021     Priority: Medium     Lab test negative for COVID-19 virus 11/27/2021     Priority: Medium     Benign essential hypertension 10/18/2017     Priority: Medium     Tobacco use disorder 08/12/2015     Priority: Medium     Chemical dependency (H) 07/27/2015     Priority: Medium     Papilloma of nose 03/10/2014     Priority: Medium     CARDIOVASCULAR SCREENING; LDL GOAL LESS THAN 130 12/11/2013     Priority: Medium     Substance abuse in remission (H) 05/19/2013     Priority: Medium        Past Medical History:    Past Medical  History:   Diagnosis Date     Acute exacerbation of CHF (congestive heart failure) (H) 11/27/2021     Benign essential hypertension      Bleeding disorder (H)      Chemical dependency (H)      Closed displaced fracture of fifth metatarsal bone of left foot 07/26/2021     Gastroesophageal reflux disease      Hypertension      Nasal mass 12/17/2013     Papilloma of nose 03/10/2014     Skin disease      Traumatic avulsion of nail plate of toe 07/26/2021     Type I or II open fracture of left ulna 09/05/2020       Past Surgical History:    Past Surgical History:   Procedure Laterality Date     ARTHRODESIS FOOT Left 2/17/2015    Procedure: ARTHRODESIS FOOT;  Surgeon: Cortez Hayward DPM;  Location: [facility] OR     ARTHRODESIS TOE(S)  12/20/2013    Procedure: ARTHRODESIS TOE(S);  Arthrodesis first metatarsophalangeal joint left foot;  Surgeon: Cortez Hayward DPM;  Location: [facility] OR     COLONOSCOPY N/A 1/12/2021    Procedure: COLONOSCOPY;  Surgeon: Dixon Sarabia MD;  Location: [facility] GI     CV CORONARY ANGIOGRAM N/A 4/22/2022    Procedure: Coronary Angiogram;  Surgeon: Lamont Maloney MD;  Location: [facility] CATH LAB CV     CV LEFT HEART CATH N/A 4/22/2022    Procedure: Left Heart Catheterization;  Surgeon: Lamont Maloney MD;  Location: [facility] CATH LAB CV     ENDOSCOPIC SINUS SURGERY  1/6/2014    Procedure: ENDOSCOPIC SINUS SURGERY;  Functional Endoscopic Sinus Surgery;  Surgeon: Bobo Renteria MD;  Location:  OR     ENDOSCOPIC SINUS SURGERY  7/21/2014    Procedure: ENDOSCOPIC SINUS SURGERY;  Surgeon: Bobo Renteria MD;  [facility]     ENDOSCOPIC SINUS SURGERY N/A 4/6/2015    Procedure: ENDOSCOPIC SINUS SURGERY;  Surgeon: Bobo Renteria MD;  [facility]     ENDOSCOPIC SINUS SURGERY N/A 8/17/2015    Procedure: ENDOSCOPIC SINUS SURGERY;  Surgeon: Bobo Renteria MD;  [facility]     ENDOSCOPIC SINUS SURGERY Bilateral 8/19/2019    Procedure: Bilateral Functional Endoscopic  Sinus Surgery, Right Nasal Endoscopy and Excision of Left Nasal Mass;  Surgeon: Bobo Renteria MD;  [facility]     ENT SURGERY       EXCISE NASAL MASS Left 11/6/2017    Procedure: EXCISE NASAL MASS;  Excision of Left Nasal Papilloma;  Surgeon: Bobo Renteria MD;  [facility]     LAPAROSCOPIC HERNIORRHAPHY INGUINAL BILATERAL Bilateral 4/12/2017    Procedure: LAPAROSCOPIC HERNIORRHAPHY INGUINAL BILATERAL;  Surgeon: Shay Mercado MD;  Location: [facility]     NASAL ENDOSCOPY Bilateral 2/6/2017    Procedure: NASAL ENDOSCOPY;  Surgeon: Bobo Renteria MD;  [facility]     NASAL ENDOSCOPY N/A 5/21/2018    Procedure: NASAL ENDOSCOPY;  Nasal Endoscopy, CO2 Laser Excision of Left Nasal Papilloma;  Surgeon: Bobo Renteria MD;  [facility]     NASAL/SINUS POLYPECTOMY       PE TUBES       TRANSCATHETER MITRAL VALVE REPAIR N/A 7/18/2022    Procedure: Transcatheter mitral valve repair with mitraclip. Possible atrial septal defect closure.;  Surgeon: Nicolas Tobin MD;  Location: [facility] CARDIAC CATH LAB       Family History:    Family History   Problem Relation Age of Onset     Diabetes Mother 40     C.A.D. Father 72     Unknown/Adopted No family hx of      Depression No family hx of      Anxiety Disorder No family hx of      Schizophrenia No family hx of      Bipolar Disorder No family hx of      Suicide No family hx of      Substance Abuse No family hx of      Dementia No family hx of      [place] Disease No family hx of      Parkinsonism No family hx of      Autism Spectrum Disorder No family hx of      Intellectual Disability (Mental Retardation) No family hx of      Mental Illness No family hx of      Bleeding Disorder No family hx of        Social History:  Marital Status:  Single [1]  Social History     Tobacco Use     Smoking status: Every Day     Packs/day: 1.00     Years: 40.00     Pack years: 40.00     Types: Cigarettes     Start date: 1/1/1980     Smokeless tobacco: Never  "    Tobacco comments:     About 2 cigarettes per day, planning on quitting soon. 9-22-22 visit.   Vaping Use     Vaping Use: Never used   Substance Use Topics     Alcohol use: Not Currently     Comment: occasional      Drug use: Not Currently     Types: Cocaine, Methamphetamines, Opiates, Marijuana     Comment: 7 years quit Cocaine/methamphetamines        Medications:    atorvastatin (LIPITOR) 40 MG tablet  buprenorphine HCl-naloxone HCl (SUBOXONE) 8-2 MG per film  buPROPion (WELLBUTRIN XL) 300 MG 24 hr tablet  cloNIDine (CATAPRES) 0.1 MG tablet  furosemide (LASIX) 20 MG tablet  gabapentin (NEURONTIN) 100 MG capsule  hydrOXYzine (ATARAX) 25 MG tablet  lisinopril (ZESTRIL) 20 MG tablet  metoprolol succinate ER (TOPROL XL) 25 MG 24 hr tablet  ondansetron (ZOFRAN) 4 MG tablet  potassium chloride ER (KLOR-CON M) 20 MEQ CR tablet  SM ASPIRIN LOW DOSE 81 MG EC tablet  spironolactone (ALDACTONE) 25 MG tablet  SUBOXONE 12-3 MG FILM per film          Review of Systems   Constitutional: Negative for chills, diaphoresis and fever.   HENT: Negative for ear pain, sinus pressure and sore throat.    Eyes: Negative for visual disturbance.   Respiratory: Negative for cough, shortness of breath and wheezing.    Cardiovascular: Negative for chest pain and palpitations.   Gastrointestinal: Negative for abdominal pain, blood in stool, constipation, diarrhea, nausea and vomiting.   Genitourinary: Negative for dysuria, frequency and urgency.   Skin: Negative for rash.   Neurological: Positive for dizziness. Negative for headaches.   All other systems reviewed and are negative.      Physical Exam   BP: 109/54  Pulse: 63  Temp: 98.2  F (36.8  C)  Height: 190.5 cm (6' 3\")  Weight: 80.3 kg (177 lb)  SpO2: 100 %      Physical Exam  Constitutional:       General: He is in acute distress.      Appearance: He is not diaphoretic.   HENT:      Head: Atraumatic.   Eyes:      Conjunctiva/sclera: Conjunctivae normal.   Cardiovascular:      Rate and " Rhythm: Normal rate.      Heart sounds: No murmur heard.  Pulmonary:      Effort: Pulmonary effort is normal. No respiratory distress.      Breath sounds: Normal breath sounds. No stridor. No wheezing or rhonchi.   Abdominal:      General: There is no distension.      Palpations: There is no mass.      Tenderness: There is no abdominal tenderness. There is no guarding.   Musculoskeletal:      Cervical back: Neck supple.      Right lower leg: No edema.      Left lower leg: No edema.   Skin:     Coloration: Skin is not pale.      Findings: No rash.   Neurological:      General: No focal deficit present.      Mental Status: He is alert.      Motor: No weakness.         ED Course                   Procedures                EKG Interpretation:      Interpreted by Trent Lyle MD  EKG done at 1553 hrs. from structural sinus rhythm 59 bpm normal axis.  No ST change.  No T wave changes.  Normal R progression and no Q waves.  Normal intervals.  Normal conduction.  No ectopy.  Impression sinus rhythm 59 bpm no acute change          Critical Care time:  none               Results for orders placed or performed during the hospital encounter of 01/26/23 (from the past 24 hour(s))   [place] Draw    Narrative    The following orders were created for panel order [place] Draw.  Procedure                               Abnormality         Status                     ---------                               -----------         ------                     Extra Blue Top Tube[[id number]]                              Final result               Extra Red Top Tube[[id number]]                               Final result                 Please view results for these tests on the individual orders.   CBC with platelets, differential    Narrative    The following orders were created for panel order CBC with platelets, differential.  Procedure                               Abnormality         Status                     ---------                                -----------         ------                     CBC with platelets and d...[[id number]]  Abnormal            Final result                 Please view results for these tests on the individual orders.   Comprehensive metabolic panel   Result Value Ref Range    Sodium 142 136 - 145 mmol/L    Potassium 5.4 (H) 3.4 - 5.3 mmol/L    Chloride 105 98 - 107 mmol/L    Carbon Dioxide (CO2) 29 22 - 29 mmol/L    Anion Gap 8 7 - 15 mmol/L    Urea Nitrogen 26.9 (H) 6.0 - 20.0 mg/dL    Creatinine 2.96 (H) 0.67 - 1.17 mg/dL    Calcium 9.8 8.6 - 10.0 mg/dL    Glucose 78 70 - 99 mg/dL    Alkaline Phosphatase 111 40 - 129 U/L    AST 13 10 - 50 U/L    ALT 14 10 - 50 U/L    Protein Total 7.0 6.4 - 8.3 g/dL    Albumin 4.2 3.5 - 5.2 g/dL    Bilirubin Total 0.4 <=1.2 mg/dL    GFR Estimate 24 (L) >60 mL/min/1.73m2   Extra Blue Top Tube   Result Value Ref Range    Hold Specimen JIC    Extra Red Top Tube   Result Value Ref Range    Hold Specimen JIC    CBC with platelets and differential   Result Value Ref Range    WBC Count 7.0 4.0 - 11.0 10e3/uL    RBC Count 3.63 (L) 4.40 - 5.90 10e6/uL    Hemoglobin 11.0 (L) 13.3 - 17.7 g/dL    Hematocrit 34.1 (L) 40.0 - 53.0 %    MCV 94 78 - 100 fL    MCH 30.3 26.5 - 33.0 pg    MCHC 32.3 31.5 - 36.5 g/dL    RDW 12.4 10.0 - 15.0 %    Platelet Count 188 150 - 450 10e3/uL    % Neutrophils 62 %    % Lymphocytes 22 %    % Monocytes 12 %    % Eosinophils 4 %    % Basophils 0 %    % Immature Granulocytes 0 %    NRBCs per 100 WBC 0 <1 /100    Absolute Neutrophils 4.3 1.6 - 8.3 10e3/uL    Absolute Lymphocytes 1.5 0.8 - 5.3 10e3/uL    Absolute Monocytes 0.8 0.0 - 1.3 10e3/uL    Absolute Eosinophils 0.3 0.0 - 0.7 10e3/uL    Absolute Basophils 0.0 0.0 - 0.2 10e3/uL    Absolute Immature Granulocytes 0.0 <=0.4 10e3/uL    Absolute NRBCs 0.0 10e3/uL   Phosphorus   Result Value Ref Range    Phosphorus 4.8 (H) 2.5 - 4.5 mg/dL   Magnesium   Result Value Ref Range    Magnesium 2.0 1.7 - 2.3 mg/dL   UA reflex to  Microscopic and Culture    Specimen: Urine, Clean Catch   Result Value Ref Range    Color Urine Straw Colorless, Straw, Light Yellow, Yellow    Appearance Urine Clear Clear    Glucose Urine Negative Negative mg/dL    Bilirubin Urine Negative Negative    Ketones Urine Negative Negative mg/dL    Specific Gravity Urine 1.020 1.003 - 1.035    Blood Urine Negative Negative    pH Urine 6.0 5.0 - 7.0    Protein Albumin Urine Negative Negative mg/dL    Urobilinogen Urine Normal Normal, 2.0 mg/dL    Nitrite Urine Negative Negative    Leukocyte Esterase Urine Negative Negative    Narrative    Microscopic not indicated   Protein  random urine   Result Value Ref Range    Total Protein Urine mg/dL 8.0 1.0 - 14.0 mg/dL    Total Protein UR MG/MG CR 0.06 0.00 - 0.20 mg/mg Cr    Creatinine Urine mg/dL 143.4 mg/dL   Fractional excretion of sodium    Narrative    The following orders were created for panel order Fractional excretion of sodium.  Procedure                               Abnormality         Status                     ---------                               -----------         ------                     Fractional Excretion of ...[[id number]]                      Final result                 Please view results for these tests on the individual orders.   Fractional Excretion of Sodium   Result Value Ref Range    Creatinine Urine mg/dL 143.4 mg/dL    Sodium Urine mmol/L 88 mmol/L    %FENA 1.3 %       Medications   buprenorphine HCl-naloxone HCl (SUBOXONE) 4-1 MG per film 3 Film (has no administration in time range)       Assessments & Plan (with Medical Decision Making)     MDM: Micah PEDRO Enrique is a 57 year old male presents with history of severe mitral regurgitation chronic heart failure with preserved ejection fraction status post mitral valve clip with now an acute kidney injury and hyperkalemia on spironolactone Lasix and lisinopril as well as potassium replacement.  Hold those medications.  Orally rehydrate  observe with recheck labs and call to cardiology to ask them about restarting medications when stable.  At this point will not IV hydrate with the risk of overhydration.  His vital signs are reassuring.  Also will order in addition to basic labs phosphorus potassium urine protein Fena.    I spoke with Dr. Jackson in cardiology.  I spoke with her about my plan to hold her diuretic Lasix spironolactone and lisinopril follow the labs overnight on's and recheck labs in the morning.  Have him orally hydrate.  He will need a follow-up echo but does not need to be performed here.  May consider restarting Lasix before discharge depending on his current symptoms.  We will plan a close interval follow-up with primary care for under a week after discharge and if needed cardiology can arrange a rapid access follow-up within about a week.      I had plan to observe the patient overnight and recheck chemistry in the morning.  Given his underlying history of congestive heart failure we are orally hydrating.  But the patient wrist to be at home and noted that were not doing any IV fluids so could need to check his chemistry panel tomorrow and I think that is reasonable.  He did not wish to stay tonight.  He promises to return tomorrow for this check.  I therefore will discharge him home with recheck labs tomorrow.  I placed the order.  I asked him after lab is obtained that he go to the clinic and let the clinic nurse know that he is awaiting that recheck lab.  I held his Lasix his spironolactone his lisinopril and would recommend holding these over the weekend and observing his respiratory status and any edema.  I placed a consult for him to see his primary care provider next week early preferably around Monday or Tuesday of this coming week.  Likely repeat lab at that time.  May need to initiate meds and when I spoke with Dr. Freire sounded as if we could reinitiate Lasix should his renal function returned normal.  May hold  lisinopril for now.  I have given him precautions for return.  We discussed continuing frequent oral hydration.    The patient's laboratory testing are most suggestive of prerenal azotemia,  however the FENa is 1.3%.  I think this is because he is on chronic diuretics and his Fena was obtained too soon after his last diuretic dose.  I cannot fully exclude an acute tubular necrosis but I think this is again most likely prerenal.  His urine protein is not increased      I have reviewed the nursing notes.    I have reviewed the findings, diagnosis, plan and need for follow up with the patient.       Medical Decision Making  The patient's presentation is strongly suggestive of a chronic illness mild to moderate exacerbation, progression, or side effect of treatment.    The patient's evaluation involved:  review of external note(s) from 2 sources (see separate area of note for details)  ordering and/or review of 3+ test(s) in this encounter (see separate area of note for details)  discussion of management or test interpretation with another health professional (see separate area of note for details)    The patient's management involved prescription drug management (including medications given in the ED) and a decision regarding hospitalization.        New Prescriptions    No medications on file       Final diagnoses:   Acute kidney injury (H) - I would have preferred your waiting here tonight, but we will work out a safe plan for home,.   Please stay hydrated with 64 oz clear fluid.   water is ok.  avoid caffeine.  hold your lisinopril,  lasix, spironolactone, potassium.  recheck  chem tomorrow am and check results with clinic on friday.  recheck in clinic by  monday   S/P mitral valve clip implantation   Tobacco use disorder   Chronic heart failure with preserved ejection fraction (H)       1/26/2023   [facility] EMERGENCY DEPT     Trent Lyle MD  01/26/23 [id number]

## 2023-01-27 ENCOUNTER — PATIENT OUTREACH (OUTPATIENT)
Dept: CARE COORDINATION | Facility: CLINIC | Age: 58
End: 2023-01-27

## 2023-01-27 ENCOUNTER — OFFICE VISIT (OUTPATIENT)
Dept: FAMILY MEDICINE | Facility: CLINIC | Age: 58
End: 2023-01-27
Payer: COMMERCIAL

## 2023-01-27 VITALS
HEIGHT: 75 IN | BODY MASS INDEX: 22.01 KG/M2 | DIASTOLIC BLOOD PRESSURE: 70 MMHG | WEIGHT: 177 LBS | TEMPERATURE: 98.1 F | HEART RATE: 58 BPM | RESPIRATION RATE: 20 BRPM | OXYGEN SATURATION: 99 % | SYSTOLIC BLOOD PRESSURE: 102 MMHG

## 2023-01-27 DIAGNOSIS — N17.9 ACUTE RENAL FAILURE, UNSPECIFIED ACUTE RENAL FAILURE TYPE (H): ICD-10-CM

## 2023-01-27 DIAGNOSIS — Z09 FOLLOW-UP EXAM: Primary | ICD-10-CM

## 2023-01-27 DIAGNOSIS — N17.9 ACUTE KIDNEY INJURY (H): ICD-10-CM

## 2023-01-27 LAB
ANION GAP SERPL CALCULATED.3IONS-SCNC: 8 MMOL/L (ref 7–15)
BUN SERPL-MCNC: 24.7 MG/DL (ref 6–20)
CALCIUM SERPL-MCNC: 9.8 MG/DL (ref 8.6–10)
CHLORIDE SERPL-SCNC: 103 MMOL/L (ref 98–107)
CREAT SERPL-MCNC: 2.68 MG/DL (ref 0.67–1.17)
DEPRECATED HCO3 PLAS-SCNC: 29 MMOL/L (ref 22–29)
GFR SERPL CREATININE-BSD FRML MDRD: 27 ML/MIN/1.73M2
GLUCOSE SERPL-MCNC: 94 MG/DL (ref 70–99)
POTASSIUM SERPL-SCNC: 5.2 MMOL/L (ref 3.4–5.3)
SODIUM SERPL-SCNC: 140 MMOL/L (ref 136–145)

## 2023-01-27 PROCEDURE — 99213 OFFICE O/P EST LOW 20 MIN: CPT | Performed by: FAMILY MEDICINE

## 2023-01-27 PROCEDURE — 80048 BASIC METABOLIC PNL TOTAL CA: CPT | Performed by: FAMILY MEDICINE

## 2023-01-27 PROCEDURE — 36415 COLL VENOUS BLD VENIPUNCTURE: CPT | Performed by: FAMILY MEDICINE

## 2023-01-27 ASSESSMENT — PAIN SCALES - GENERAL: PAINLEVEL: NO PAIN (0)

## 2023-01-27 ASSESSMENT — ENCOUNTER SYMPTOMS
CONSTITUTIONAL NEGATIVE: 1
ENDOCRINE NEGATIVE: 1
MUSCULOSKELETAL NEGATIVE: 1
GASTROINTESTINAL NEGATIVE: 1
EYES NEGATIVE: 1
HEMATOLOGIC/LYMPHATIC NEGATIVE: 1
RESPIRATORY NEGATIVE: 1
NEUROLOGICAL NEGATIVE: 1
CARDIOVASCULAR NEGATIVE: 1
PSYCHIATRIC NEGATIVE: 1
ALLERGIC/IMMUNOLOGIC NEGATIVE: 1

## 2023-01-27 ASSESSMENT — ACTIVITIES OF DAILY LIVING (ADL): DEPENDENT_IADLS:: TRANSPORTATION

## 2023-01-27 NOTE — PROGRESS NOTES
Patient is a 57-year-old male presenting for an emergency room follow-up.  He has a past medical history significant for hypertension history of chronic heart failure with preserved ejection fraction mitral regurgitation with status post MitraClip May 2022 he was seen in the emergency room following a clinic visit where he came in for lab work and found his creatinine was greater than 3 and has a potassium of 5.8.  He has been on multiple medications without much monitoring. He has been on lisinopril, spironolactone, Lasix and potassium replacement for months.     He was promptly sent to the emergency room and in the ER he was his labs were repeated and his potassium had actually come down to 5.4.  He was not given IV fluids and he was asked to orally hydrate.      Cardiology was consulted.  At this point he is off all his medications to give his kidneys time to recover and also to get his labs back to normal.  We discussed his labs again today and we will repeat the basic metabolic panel.     I will have him hold his meds he will come back on Monday to have a recheck of his symptoms and also for medication reconciliation.  He says he feels better since he stopped all his medications.  He is on Suboxone for chronic opioid use.  Denies any acute symptoms today.  Assessment & Plan     Follow-up exam  Patient here for an ER follow up . He says his symptoms have improved. He was asked to stop all his medication . Labs repeated today.      Acute kidney injury (H)  - Primary Care Referral  - Basic metabolic panel    Acute renal failure, unspecified acute renal failure type (H)  Improving.       Review of external notes as documented elsewhere in note     :[id number]}  Post Medication Reconciliation Status: discharge medications reconciled, continue medications without change  FUTURE APPOINTMENTS:       - Follow-up visit in 3 days    No follow-ups on file.    Yovany White MD  [facility]  CHELSIE Obrien is a 57 year old, presenting for the following health issues:  ER F/U (Follow up from the ER on 1-26-23.  He was going to be admitted but he said he would follow up in clinic today instead.)      HPI     ED/UC Followup:    Facility:  [facility] [place]  Date of visit: 1-26-23  Reason for visit: Abnormal labs for creatinine and potassium.  Called by clinic to go to the ER  ER NOTE IS COPIED BELOW:  Chief Complaint   Patient presents with     Abnormal Labs       Coming from Dr. White's office with ARF elevated creatinine 3.8, elevated potassium 5.8. possibly needs IVfluids.             Micah Nunez is a 57 year old male who presents with a history of hypertension, history of chronic heart failure with preserved ejection fraction and mitral regurgitation that was severe underwent mitral clip 2022.  On multiple antihypertensives and diuretics.  He is on lisinopril spironolactone Lasix and potassium replacement.  He was called to come to the emergency department after on the 24th having labs demonstrating a potassium of 2.8 and a creatinine elevated to over 3.     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  Primarily opioid abuse is been smoked with heroin.  More distant history of methamphetamine abuse.    Current Status: He states he is feeling good today.  Medications are on hold except for the Suboxone.        Review of Systems   Constitutional: Negative.    HENT: Negative.    Eyes: Negative.    Respiratory: Negative.    Cardiovascular: Negative.    Gastrointestinal: Negative.    Endocrine: Negative.    Genitourinary: Negative.    Musculoskeletal: Negative.    Skin: Negative.    Allergic/Immunologic: Negative.    Neurological: Negative.    Hematological: Negative.    Psychiatric/Behavioral: Negative.             Objective    /70 (BP Location: Left arm, Patient Position: Chair, Cuff Size: Adult  "Regular)   Pulse 58   Temp 98.1  F (36.7  C) (Tympanic)   Resp 20   Ht 1.905 m (6' 3\")   Wt 80.3 kg (177 lb)   SpO2 99%   BMI 22.12 kg/m    Body mass index is 22.12 kg/m .  Physical Exam  Constitutional:       Appearance: Normal appearance.   HENT:      Head: Normocephalic and atraumatic.      Right Ear: Tympanic membrane normal.      Left Ear: Tympanic membrane normal.      Nose: Nose normal.   Eyes:      Extraocular Movements: Extraocular movements intact.      Pupils: Pupils are equal, round, and reactive to light.   Cardiovascular:      Rate and Rhythm: Normal rate and regular rhythm.      Heart sounds: Murmur heard.   Pulmonary:      Effort: Pulmonary effort is normal. No respiratory distress.      Breath sounds: Normal breath sounds. No stridor. No wheezing, rhonchi or rales.   Chest:      Chest wall: No tenderness.   Abdominal:      General: Abdomen is flat. Bowel sounds are normal. There is no distension.      Palpations: Abdomen is soft. There is no mass.      Tenderness: There is no abdominal tenderness. There is no right CVA tenderness, left CVA tenderness, guarding or rebound.      Hernia: No hernia is present.   Musculoskeletal:         General: Normal range of motion.   Skin:     General: Skin is warm and dry.   Neurological:      Mental Status: He is alert and oriented to person, place, and time.   Psychiatric:         Mood and Affect: Mood normal.         Behavior: Behavior normal.          No results found for this or any previous visit (from the past 24 hour(s)).                "

## 2023-01-27 NOTE — PROGRESS NOTES
Clinic Care Coordination Contact    Clinic Care Coordination Contact  OUTREACH    Referral Information:  Referral Source: ED Follow-Up    Primary Diagnosis: Cardiovascular - other (CHF)       Universal Utilization: ED visit 1/26/2023   Acute kidney injury (H) +4 more  Clinical impression  Abnormal Labs        Clinic Utilization  Difficulty keeping appointments:: No  Compliance Concerns: No  No-Show Concerns: No  No PCP office visit in Past Year: No  Utilization    Hospital Admissions  2             ED Visits  2             No Show Count (past year)  3                Current as of: 1/27/2023  7:29 AM              Clinical Concerns:  Current Medical Concerns:  CC RN called patient and reminded him of 10:45 appointment with PCP and repeat lab work   Patient encouraged to drink lots of water and he agrees with this plan   Current Behavioral Concerns:No  Education Provided to patient:not discussed   Pain  Pain (GOAL):: No  Health Maintenance Reviewed:    Clinical Pathway: None    Medication Management:  Medication review status: Medications reviewed and no changes reported per patient.             Functional Status:  Dependent ADLs:: Ambulation-cane, Ambulation-walker, Wheelchair-independent  Dependent IADLs:: Transportation  Bed or wheelchair confined:: No  Mobility Status: Independent w/Device    Living Situation:  Current living arrangement:: I live in a private home with family, Other (w/ parents)  Type of residence:: Private home - no stairs    Lifestyle & Psychosocial Needs:    Social Determinants of Health     Tobacco Use: High Risk     Smoking Tobacco Use: Every Day     Smokeless Tobacco Use: Never     Passive Exposure: Not on file   Alcohol Use: Not on file   Financial Resource Strain: Not on file   Food Insecurity: Not on file   Transportation Needs: Not on file   Physical Activity: Not on file   Stress: Not on file   Social Connections: Not on file   Intimate Partner Violence: Not on file   Depression: Not at  risk     PHQ-2 Score: 0   Housing Stability: Not on file     Diet:: Regular  Inadequate nutrition (GOAL):: No  Tube Feeding: No  Inadequate activity/exercise (GOAL):: No  Significant changes in sleep pattern (GOAL): No  Transportation means:: Friend, Family     Islam or spiritual beliefs that impact treatment:: No  Mental health DX:: No  Mental health management concern (GOAL):: No  Informal Support system:: Family, Friends           Resources and Interventions:  Current Resources:      Community Resources: Other (see comment), [facility] Programs, Financial/Insurance, DME, County Worker (SNAP/[employer] [place])  Supplies Currently Used at Home: None  Equipment Currently Used at Home: wheelchair, power (left foot and left arm were shattered. His friend built him a power scooter since he could not afford one.)  Employment Status: disabled         Advance Care Plan/Directive  Advanced Care Plans/Directives on file:: No  Advanced Care Plan/Directive Status: Referral to [employer] Facilitator    Referrals Placed: Community Resources, [facility] Resources, Transportation, Disability Specialists, Disability Linkage line, [employer], Other          Care Plan:  Care Plan: Housing and Support     Problem: Insufficient In-home support I will get connected to resources/supports.     Priority: High Onset Date: 2/25/2022    Note:     Barriers: Access   Strengths: Motivated, family, friends, care team   Date to Achieve By: 5/1/22 extended date 12/20/2022  Patient expressed understanding of goal: Yes    Action steps to achieve this goal:  1. I will start application for social security disability income online. (8/31/202 In Process) Started application in April working with a disability person to complete forms. 8/31/2022 recently received a letter they need more medical information   2. I will use [facility] to medical appts.  3. I will talk to my PCP about a scooter when Social Security is approved   4. I will  look into other resources as needed (housing, etc). (Working with [facility] worker for housing, staying in mot)  5. I will work with care coordination as needed.    As of today's date 1/13/2023 goal is met at 0 - 25%.   Goal Status:  Active               Care Plan: Heart Failure - RN only     Problem: Heart failure maintenance - see below     Goal: Patient will not experience any symptoms of shortness of breath or body swelling over the next 3 - 6 months     Start Date: 1/31/2022 Expected End Date: 2/24/2023    This Visit's Progress: 80% Recent Progress: 80%    Priority: Medium    Note:     Goal Statement: My CHF symptoms will be stable   Barriers: Poor understanding of CHF diagnosis   Strengths: Motivated to learn   Patient expressed understanding of goal: Yes    Action steps to achieve this goal:  1. I will check my weight daily and call if weight gain is 2-3# in a day or 5# in a week.  2. I will seek medical help if I have increased shortness of breath episodes,fever or coughing up a colored sputum.  3. I will take my medications as prescribed and keep future Cardiology /primary care appointments.  4. I will follow a low salt diet.                        Patient/Caregiver understanding: Expresses good understanding of discharge instructions     Outreach Frequency: monthly  Future Appointments              Today Yovany White MD [facility]  Arrive at: Clinic A    In 1 month JN ECHO OP 2; JN  ECHO STAFF [facility]          Plan: CC RN will follow up monthly     [facility]   Gail Gomez RN, Care Coordinator   [facility]'s   E-mail [email or web address]   [phone]

## 2023-01-27 NOTE — DISCHARGE INSTRUCTIONS
ICD-10-CM    1. Acute kidney injury (H)  N17.9 Basic metabolic panel    I would have preferred your waiting here tonight, but we will work out a safe plan for home,.   Please stay hydrated with 64 oz clear fluid.   water is ok.  avoid caffeine.  hold your liusinopril,  lasix, spironolactone.  recheck  chem tomorrow am and check results with clinic.       2. S/P mitral valve clip implantation  Z98.890     Z95.818       3. Tobacco use disorder  F17.200       4. Chronic heart failure with preserved ejection fraction (H)  I50.32

## 2023-01-27 NOTE — RESULT ENCOUNTER NOTE
Discussed results with patient on phone and advised that he be seen in the ER.    Yovany White M.D.

## 2023-01-30 ENCOUNTER — OFFICE VISIT (OUTPATIENT)
Dept: FAMILY MEDICINE | Facility: CLINIC | Age: 58
End: 2023-01-30
Payer: COMMERCIAL

## 2023-01-30 VITALS
BODY MASS INDEX: 22.01 KG/M2 | SYSTOLIC BLOOD PRESSURE: 92 MMHG | HEART RATE: 58 BPM | DIASTOLIC BLOOD PRESSURE: 60 MMHG | RESPIRATION RATE: 16 BRPM | HEIGHT: 75 IN | TEMPERATURE: 97.5 F | WEIGHT: 177 LBS | OXYGEN SATURATION: 100 %

## 2023-01-30 DIAGNOSIS — I34.0 MITRAL VALVE INSUFFICIENCY, UNSPECIFIED ETIOLOGY: ICD-10-CM

## 2023-01-30 DIAGNOSIS — I50.21 ACUTE SYSTOLIC CONGESTIVE HEART FAILURE (H): ICD-10-CM

## 2023-01-30 DIAGNOSIS — N17.9 ACUTE KIDNEY INJURY (H): Primary | ICD-10-CM

## 2023-01-30 DIAGNOSIS — I34.0 NONRHEUMATIC MITRAL VALVE REGURGITATION: ICD-10-CM

## 2023-01-30 LAB
ALBUMIN SERPL BCG-MCNC: 4.3 G/DL (ref 3.5–5.2)
ALP SERPL-CCNC: 108 U/L (ref 40–129)
ALT SERPL W P-5'-P-CCNC: 14 U/L (ref 10–50)
ANION GAP SERPL CALCULATED.3IONS-SCNC: 7 MMOL/L (ref 7–15)
AST SERPL W P-5'-P-CCNC: 20 U/L (ref 10–50)
BILIRUB SERPL-MCNC: 0.4 MG/DL
BUN SERPL-MCNC: 17 MG/DL (ref 6–20)
CALCIUM SERPL-MCNC: 9.7 MG/DL (ref 8.6–10)
CHLORIDE SERPL-SCNC: 104 MMOL/L (ref 98–107)
CREAT SERPL-MCNC: 2 MG/DL (ref 0.67–1.17)
DEPRECATED HCO3 PLAS-SCNC: 30 MMOL/L (ref 22–29)
GFR SERPL CREATININE-BSD FRML MDRD: 38 ML/MIN/1.73M2
GLUCOSE SERPL-MCNC: 90 MG/DL (ref 70–99)
POTASSIUM SERPL-SCNC: 4.5 MMOL/L (ref 3.4–5.3)
PROT SERPL-MCNC: 7.1 G/DL (ref 6.4–8.3)
SODIUM SERPL-SCNC: 141 MMOL/L (ref 136–145)

## 2023-01-30 PROCEDURE — 36415 COLL VENOUS BLD VENIPUNCTURE: CPT | Performed by: FAMILY MEDICINE

## 2023-01-30 PROCEDURE — 99214 OFFICE O/P EST MOD 30 MIN: CPT | Performed by: FAMILY MEDICINE

## 2023-01-30 PROCEDURE — 80053 COMPREHEN METABOLIC PANEL: CPT | Performed by: FAMILY MEDICINE

## 2023-01-30 RX ORDER — LISINOPRIL 20 MG/1
10 TABLET ORAL DAILY
Qty: 45 TABLET | Refills: 3
Start: 2023-01-30 | End: 2023-11-27

## 2023-01-30 RX ORDER — METOPROLOL SUCCINATE 25 MG/1
25 TABLET, EXTENDED RELEASE ORAL DAILY
Qty: 90 TABLET | Refills: 3
Start: 2023-01-30 | End: 2023-10-10

## 2023-01-30 RX ORDER — FUROSEMIDE 20 MG
40 TABLET ORAL DAILY PRN
Qty: 180 TABLET | Refills: 1
Start: 2023-01-30 | End: 2023-03-10

## 2023-01-30 RX ORDER — ATORVASTATIN CALCIUM 40 MG/1
40 TABLET, FILM COATED ORAL DAILY
Qty: 90 TABLET | Refills: 3
Start: 2023-01-30 | End: 2023-06-21

## 2023-01-30 RX ORDER — ASPIRIN 81 MG/1
81 TABLET, DELAYED RELEASE ORAL DAILY
Qty: 90 TABLET | Refills: 3
Start: 2023-01-30

## 2023-01-30 ASSESSMENT — PAIN SCALES - GENERAL: PAINLEVEL: NO PAIN (0)

## 2023-01-30 NOTE — PROGRESS NOTES
"Patient here for follow up. Was seen last week, first in clinic and then at the ER for acute kidney injury. This was possibly from taking too many medications . Labs done today look better. Kidneys faring better. Potassium back within normal limits.   Patient denies any acute symptoms says the dizziness is getting better.   Assessment & Plan     Acute kidney injury (H)  Labs look better.  - Comprehensive metabolic panel (BMP + Alb, Alk Phos, ALT, AST, Total. Bili, TP)    Nonrheumatic mitral valve regurgitation  Medication list pruned.   - atorvastatin (LIPITOR) 40 MG tablet; Take 1 tablet (40 mg) by mouth daily    Mitral valve insufficiency, unspecified etiology  - furosemide (LASIX) 20 MG tablet; Take 2 tablets (40 mg) by mouth daily as needed (leg swelling)  - lisinopril (ZESTRIL) 20 MG tablet; Take 0.5 tablets (10 mg) by mouth daily    Acute systolic congestive heart failure (H)  - metoprolol succinate ER (TOPROL XL) 25 MG 24 hr tablet; Take 1 tablet (25 mg) by mouth daily  - SM ASPIRIN LOW DOSE 81 MG EC tablet; Take 1 tablet (81 mg) by mouth daily      FUTURE APPOINTMENTS:       - Follow-up visit in one month or sooner as needed.    Return in about 4 weeks (around 2/27/2023) for Follow up.    Yovany White MD  [facility]    Carla Obrien is a 57 year old, presenting for the following health issues:  Follow Up (Office visit for ER follow up 1/27/23)      HPI         Review of Systems   Constitutional, HEENT, cardiovascular, pulmonary, gi and gu systems are negative, except as otherwise noted.      Objective    BP 92/60 (BP Location: Left arm, Patient Position: Sitting, Cuff Size: Adult Regular)   Pulse 58   Temp 97.5  F (36.4  C) (Tympanic)   Resp 16   Ht 1.905 m (6' 3\")   Wt 80.3 kg (177 lb)   SpO2 100%   BMI 22.12 kg/m    Body mass index is 22.12 kg/m .  Physical Exam  Constitutional:       Appearance: Normal appearance.   HENT:      Head: Normocephalic and " atraumatic.   Eyes:      Pupils: Pupils are equal, round, and reactive to light.   Skin:     General: Skin is warm and dry.   Neurological:      Mental Status: He is alert and oriented to person, place, and time.   Psychiatric:         Mood and Affect: Mood normal.         Behavior: Behavior normal.

## 2023-03-07 ENCOUNTER — PATIENT OUTREACH (OUTPATIENT)
Dept: CARE COORDINATION | Facility: CLINIC | Age: 58
End: 2023-03-07
Payer: COMMERCIAL

## 2023-03-07 ASSESSMENT — ACTIVITIES OF DAILY LIVING (ADL): DEPENDENT_IADLS:: TRANSPORTATION

## 2023-03-07 NOTE — PROGRESS NOTES
Clinic Care Coordination Contact    Follow Up Progress Note     ED visit 1/26/2023   Acute kidney injury (H) +4 more  Clinical impression  Abnormal Labs        Clinical Data: Hospital admission  7/18-7/19/2022  # Severe mitral regurgitation s/p mitraclip x 1   # Chronic Diastolic Heart Failure  # HTN  # Hx of Polysubstance abuse   # Homelessness         Assessment:  Patient just had a recent visit with PCP and Addiction Medicine provider .    Patient states he is feeling healthy  Patient is finally in his new apartment .  Patient has a telephone appointment today with his  and Social Security to discuss his appeal     Care Gaps:    Health Maintenance Due   Topic Date Due     HF ACTION PLAN  Never done     HEPATITIS B IMMUNIZATION (1 of 3 - 3-dose series) Never done     COVID-19 Vaccine (1) Never done     Pneumococcal Vaccine: Pediatrics (0 to 5 Years) and At-Risk Patients (6 to 64 Years) (1 - PCV) Never done     HIV SCREENING  Never done     ZOSTER IMMUNIZATION (1 of 2) Never done     INFLUENZA VACCINE (1) 09/01/2022     LUNG CANCER SCREENING  11/06/2022       Not discussed today     Care Plans  Care Plan: Housing and Support     Problem: Insufficient In-home support I will get connected to resources/supports.     Priority: High Onset Date: 4/1/2022    Note:     Barriers: Access   Strengths: Motivated, family, friends, care team   Date to Achieve By: 5/1/22 extended date 12/20/2022  Patient expressed understanding of goal: Yes    Action steps to achieve this goal:  1. I will start application for social security disability income online. (8/31/202 In Process) Started application in April working with a disability person to complete forms. 8/31/2022 recently received a letter they need more medical information  Has a telephone  appointment 3/7/2023 with  Social Security and laywer to discuss appeal   2. I will use [employer] to medical appts.  3. I will talk to my PCP about a scooter when Social  Security is approved   4. I will look into other resources as needed (housing, etc). (Working with [facility] worker for housing, staying in motel) 3/7/2023 In a new apartment   5. I will work with care coordination as needed.    As of today's date 1/13/2023 goal is met at 0 - 25%.   Goal Status:  Active   As of today's date 3/7/2023 goal is met at 51 - 75%.   Goal Status:  Active               Care Plan: Heart Failure - RN only     Problem: Heart failure maintenance - see below     Goal: Patient will not experience any symptoms of shortness of breath or body swelling over the next 3 - 6 months     Start Date: 1/31/2022 Expected End Date: 4/1/2023    Recent Progress: 80%    Priority: Medium    Note:     Goal Statement: My CHF symptoms will be stable   Barriers: Poor understanding of CHF diagnosis   Strengths: Motivated to learn   Patient expressed understanding of goal: Yes    Action steps to achieve this goal:  1. I will check my weight daily and call if weight gain is 2-3# in a day or 5# in a week.  2. I will seek medical help if I have increased shortness of breath episodes,fever or coughing up a colored sputum.  3. I will take my medications as prescribed and keep future Cardiology /primary care appointments.  4. I will follow a low salt diet.                        Intervention/Education provided during outreach: CC available for questions or concerns      Outreach Frequency: monthly      Plan:     Care Coordinator will follow up monthly    [facility]   Gail Gomez RN, Care Coordinator   [facility]'s   E-mail [email or web address]   [phone]

## 2023-03-10 ENCOUNTER — OFFICE VISIT (OUTPATIENT)
Dept: FAMILY MEDICINE | Facility: CLINIC | Age: 58
End: 2023-03-10
Payer: COMMERCIAL

## 2023-03-10 VITALS
TEMPERATURE: 98.1 F | RESPIRATION RATE: 16 BRPM | OXYGEN SATURATION: 99 % | WEIGHT: 181 LBS | BODY MASS INDEX: 22.5 KG/M2 | HEART RATE: 70 BPM | SYSTOLIC BLOOD PRESSURE: 110 MMHG | DIASTOLIC BLOOD PRESSURE: 80 MMHG | HEIGHT: 75 IN

## 2023-03-10 DIAGNOSIS — I50.21 ACUTE SYSTOLIC CONGESTIVE HEART FAILURE (H): Primary | ICD-10-CM

## 2023-03-10 DIAGNOSIS — N18.30 STAGE 3 CHRONIC KIDNEY DISEASE, UNSPECIFIED WHETHER STAGE 3A OR 3B CKD (H): ICD-10-CM

## 2023-03-10 DIAGNOSIS — I34.0 NONRHEUMATIC MITRAL VALVE REGURGITATION: ICD-10-CM

## 2023-03-10 DIAGNOSIS — I34.0 MITRAL VALVE INSUFFICIENCY, UNSPECIFIED ETIOLOGY: ICD-10-CM

## 2023-03-10 LAB
ALBUMIN SERPL BCG-MCNC: 4.2 G/DL (ref 3.5–5.2)
ALP SERPL-CCNC: 133 U/L (ref 40–129)
ALT SERPL W P-5'-P-CCNC: 16 U/L (ref 10–50)
ANION GAP SERPL CALCULATED.3IONS-SCNC: 9 MMOL/L (ref 7–15)
AST SERPL W P-5'-P-CCNC: 19 U/L (ref 10–50)
BILIRUB SERPL-MCNC: 0.3 MG/DL
BUN SERPL-MCNC: 16.1 MG/DL (ref 6–20)
CALCIUM SERPL-MCNC: 9.7 MG/DL (ref 8.6–10)
CHLORIDE SERPL-SCNC: 103 MMOL/L (ref 98–107)
CREAT SERPL-MCNC: 1.5 MG/DL (ref 0.67–1.17)
DEPRECATED HCO3 PLAS-SCNC: 27 MMOL/L (ref 22–29)
GFR SERPL CREATININE-BSD FRML MDRD: 54 ML/MIN/1.73M2
GLUCOSE SERPL-MCNC: 86 MG/DL (ref 70–99)
POTASSIUM SERPL-SCNC: 4.2 MMOL/L (ref 3.4–5.3)
PROT SERPL-MCNC: 7.1 G/DL (ref 6.4–8.3)
SODIUM SERPL-SCNC: 139 MMOL/L (ref 136–145)

## 2023-03-10 PROCEDURE — 80053 COMPREHEN METABOLIC PANEL: CPT | Performed by: FAMILY MEDICINE

## 2023-03-10 PROCEDURE — 99214 OFFICE O/P EST MOD 30 MIN: CPT | Performed by: FAMILY MEDICINE

## 2023-03-10 PROCEDURE — 36415 COLL VENOUS BLD VENIPUNCTURE: CPT | Performed by: FAMILY MEDICINE

## 2023-03-10 RX ORDER — LISINOPRIL 20 MG/1
10 TABLET ORAL DAILY
Qty: 45 TABLET | Refills: 3 | Status: CANCELLED | OUTPATIENT
Start: 2023-03-10

## 2023-03-10 RX ORDER — FUROSEMIDE 20 MG
20 TABLET ORAL DAILY PRN
Qty: 90 TABLET | Refills: 1 | Status: SHIPPED | OUTPATIENT
Start: 2023-03-10 | End: 2024-06-21

## 2023-03-10 RX ORDER — ASPIRIN 81 MG/1
81 TABLET, DELAYED RELEASE ORAL DAILY
Qty: 90 TABLET | Refills: 3 | Status: CANCELLED | OUTPATIENT
Start: 2023-03-10

## 2023-03-10 RX ORDER — ATORVASTATIN CALCIUM 40 MG/1
40 TABLET, FILM COATED ORAL DAILY
Qty: 90 TABLET | Refills: 3 | Status: CANCELLED | OUTPATIENT
Start: 2023-03-10

## 2023-03-10 ASSESSMENT — PAIN SCALES - GENERAL: PAINLEVEL: NO PAIN (0)

## 2023-03-10 NOTE — PROGRESS NOTES
Assessment & Plan     Acute systolic congestive heart failure (H)  Patient will be notified of results-has an ECHO in a few weeks.  - Comprehensive metabolic panel (BMP + Alb, Alk Phos, ALT, AST, Total. Bili, TP)    Mitral valve insufficiency, unspecified etiology  - furosemide (LASIX) 20 MG tablet; Take 1 tablet (20 mg) by mouth daily as needed (leg swelling) Please fill at patient's request    Nonrheumatic mitral valve regurgitation  stable      Stage 3a chronic kidney disease (H)  Stable.      :[id number]}     FUTURE APPOINTMENTS:       - Follow-up visit in 3-4 months.    Return in about 3 months (around 6/10/2023), or if symptoms worsen or fail to improve, for Follow up.    Yovany White MD  [facility]    Carla Obrien is a 57 year old accompanied by his self, presenting for the following health issues:  Follow Up (Per provider from labs done on 1/30/23), Hypertension, and Lipids      HPI     Patient is a 57 yr old male here for recheck of chronic conditions. At his last visit , a lot of his medications were adjusted as he was having very low blood pressures and he also had an ER visit where he was seen for acute kidney injury. He says that he finally has a  on his medications. He continues to fare well with his sobriety.he is following with an addiction speciality for suboxone and that seems to be going well.    Chief Complaint   Patient presents with     Follow Up     Per provider from labs done on 1/30/23     Hypertension     Lipids         Hyperlipidemia Follow-Up      Are you regularly taking any medication or supplement to lower your cholesterol?   Yes- AM    Are you having muscle aches or other side effects that you think could be caused by your cholesterol lowering medication?  No    Hypertension Follow-up      Do you check your blood pressure regularly outside of the clinic? No     Are you following a low salt diet? Yes    Are your blood pressures ever more  "than 140 on the top number (systolic) OR more   than 90 on the bottom number (diastolic), for example 140/90? No      How many servings of fruits and vegetables do you eat daily?  2-3    On average, how many sweetened beverages do you drink each day (Examples: soda, juice, sweet tea, etc.  Do NOT count diet or artificially sweetened beverages)?   4    How many days per week do you exercise enough to make your heart beat faster? 3 or less    How many minutes a day do you exercise enough to make your heart beat faster? 60 or more    How many days per week do you miss taking your medication? 0        Review of Systems   Constitutional: Negative.    HENT: Negative.    Eyes: Negative.    Respiratory: Negative.    Cardiovascular: Negative.    Gastrointestinal: Negative.    Endocrine: Negative.    Genitourinary: Negative.    Musculoskeletal: Negative.    Skin: Negative.    Allergic/Immunologic: Negative.    Neurological: Negative.    Hematological: Negative.    Psychiatric/Behavioral: Negative.             Objective    /80 (BP Location: Right arm, Patient Position: Sitting, Cuff Size: Adult Regular)   Pulse 70   Temp 98.1  F (36.7  C) (Tympanic)   Resp 16   Ht 1.905 m (6' 3\")   Wt 82.1 kg (181 lb)   SpO2 99%   BMI 22.62 kg/m    Body mass index is 22.62 kg/m .  Physical Exam  Constitutional:       Appearance: Normal appearance.   HENT:      Head: Normocephalic and atraumatic.      Nose: Nose normal.      Mouth/Throat:      Mouth: Mucous membranes are moist.   Cardiovascular:      Rate and Rhythm: Normal rate and regular rhythm.      Pulses: Normal pulses.      Heart sounds: Normal heart sounds.   Pulmonary:      Effort: Pulmonary effort is normal.      Breath sounds: Normal breath sounds.   Abdominal:      General: Abdomen is flat. Bowel sounds are normal. There is no distension.      Palpations: Abdomen is soft. There is no mass.      Tenderness: There is no abdominal tenderness. There is no right CVA " tenderness, left CVA tenderness, guarding or rebound.      Hernia: No hernia is present.   Musculoskeletal:         General: Normal range of motion.      Cervical back: Normal range of motion.   Skin:     General: Skin is warm and dry.   Neurological:      Mental Status: He is alert and oriented to person, place, and time.   Psychiatric:         Mood and Affect: Mood normal.         Behavior: Behavior normal.            Results for orders placed or performed in visit on 03/10/23   Comprehensive metabolic panel (BMP + Alb, Alk Phos, ALT, AST, Total. Bili, TP)     Status: Abnormal   Result Value Ref Range    Sodium 139 136 - 145 mmol/L    Potassium 4.2 3.4 - 5.3 mmol/L    Chloride 103 98 - 107 mmol/L    Carbon Dioxide (CO2) 27 22 - 29 mmol/L    Anion Gap 9 7 - 15 mmol/L    Urea Nitrogen 16.1 6.0 - 20.0 mg/dL    Creatinine 1.50 (H) 0.67 - 1.17 mg/dL    Calcium 9.7 8.6 - 10.0 mg/dL    Glucose 86 70 - 99 mg/dL    Alkaline Phosphatase 133 (H) 40 - 129 U/L    AST 19 10 - 50 U/L    ALT 16 10 - 50 U/L    Protein Total 7.1 6.4 - 8.3 g/dL    Albumin 4.2 3.5 - 5.2 g/dL    Bilirubin Total 0.3 <=1.2 mg/dL    GFR Estimate 54 (L) >60 mL/min/1.73m2

## 2023-03-11 ASSESSMENT — ENCOUNTER SYMPTOMS
PSYCHIATRIC NEGATIVE: 1
MUSCULOSKELETAL NEGATIVE: 1
CONSTITUTIONAL NEGATIVE: 1
CARDIOVASCULAR NEGATIVE: 1
ENDOCRINE NEGATIVE: 1
NEUROLOGICAL NEGATIVE: 1
HEMATOLOGIC/LYMPHATIC NEGATIVE: 1
RESPIRATORY NEGATIVE: 1
ALLERGIC/IMMUNOLOGIC NEGATIVE: 1
EYES NEGATIVE: 1
GASTROINTESTINAL NEGATIVE: 1

## 2023-03-21 ENCOUNTER — HOSPITAL ENCOUNTER (OUTPATIENT)
Dept: CARDIOLOGY | Facility: HOSPITAL | Age: 58
Discharge: HOME | End: 2023-03-21
Attending: INTERNAL MEDICINE | Admitting: INTERNAL MEDICINE
Payer: COMMERCIAL

## 2023-03-21 DIAGNOSIS — I50.32 CHRONIC HEART FAILURE WITH PRESERVED EJECTION FRACTION (H): ICD-10-CM

## 2023-03-21 DIAGNOSIS — I34.0 NONRHEUMATIC MITRAL VALVE REGURGITATION: ICD-10-CM

## 2023-03-21 DIAGNOSIS — Z98.890 S/P MITRAL VALVE CLIP IMPLANTATION: ICD-10-CM

## 2023-03-21 DIAGNOSIS — Z95.818 S/P MITRAL VALVE CLIP IMPLANTATION: ICD-10-CM

## 2023-03-21 LAB — LVEF ECHO: NORMAL

## 2023-03-21 PROCEDURE — 93306 TTE W/DOPPLER COMPLETE: CPT | Mod: 26 | Performed by: INTERNAL MEDICINE

## 2023-03-21 PROCEDURE — 93306 TTE W/DOPPLER COMPLETE: CPT

## 2023-03-22 DIAGNOSIS — Z95.818 S/P MITRAL VALVE CLIP IMPLANTATION: Primary | ICD-10-CM

## 2023-03-22 DIAGNOSIS — Z98.890 S/P MITRAL VALVE CLIP IMPLANTATION: Primary | ICD-10-CM

## 2023-03-22 DIAGNOSIS — I50.32 CHRONIC HEART FAILURE WITH PRESERVED EJECTION FRACTION (H): ICD-10-CM

## 2023-03-28 ENCOUNTER — TELEPHONE (OUTPATIENT)
Dept: FAMILY MEDICINE | Facility: CLINIC | Age: 58
End: 2023-03-28
Payer: COMMERCIAL

## 2023-03-28 DIAGNOSIS — Z95.818 S/P MITRAL VALVE CLIP IMPLANTATION: Primary | ICD-10-CM

## 2023-03-28 DIAGNOSIS — Z98.890 S/P MITRAL VALVE CLIP IMPLANTATION: Primary | ICD-10-CM

## 2023-03-29 NOTE — TELEPHONE ENCOUNTER
[facility] professional statement of need for housing form prepared and routed to Dr. White for review and signature.

## 2023-04-03 NOTE — TELEPHONE ENCOUNTER
Please call patient once completed, he may  at clinic vs mailing to him.       Shelly DELGADO  Station

## 2023-04-06 NOTE — TELEPHONE ENCOUNTER
Form completed, signed, and My Chart note sent to patient indicating form is ready for . Copy of form sent to scan and copy placed in cabinet.

## 2023-04-13 ENCOUNTER — PATIENT OUTREACH (OUTPATIENT)
Dept: CARE COORDINATION | Facility: CLINIC | Age: 58
End: 2023-04-13
Payer: COMMERCIAL

## 2023-04-13 ASSESSMENT — ACTIVITIES OF DAILY LIVING (ADL): DEPENDENT_IADLS:: TRANSPORTATION

## 2023-04-13 NOTE — PROGRESS NOTES
Clinic Care Coordination Contact  [facility]    Referral Source: ED Follow-Up  Clinical Data:   ED visit 1/26/2023   Acute kidney injury (H) +4 more  Clinical impression  Abnormal Labs        Clinical Data: Hospital admission  7/18-7/19/2022  # Severe mitral regurgitation s/p mitraclip x 1   # Chronic Diastolic Heart Failure  # HTN  # Hx of Polysubstance abuse   # Homelessness      Care Coordinator Outreach x  1   Outre Care Coordinator will try to reach patient again in 3-5 business days.    [facility]   Gail Gomez RN, Care Coordinator   [facility]'s   E-mail [email or web address]   [phone]

## 2023-04-20 ENCOUNTER — PATIENT OUTREACH (OUTPATIENT)
Dept: CARE COORDINATION | Facility: CLINIC | Age: 58
End: 2023-04-20
Payer: COMMERCIAL

## 2023-04-27 ENCOUNTER — PATIENT OUTREACH (OUTPATIENT)
Dept: CARE COORDINATION | Facility: CLINIC | Age: 58
End: 2023-04-27
Payer: COMMERCIAL

## 2023-04-27 ASSESSMENT — ACTIVITIES OF DAILY LIVING (ADL): DEPENDENT_IADLS:: TRANSPORTATION

## 2023-04-27 NOTE — LETTER
[facility]  Patient Centered Plan of Care  [facility] CARE COORDINATION  [address] [facility] [address]     April 27, 2023        Micah MAYELA Nunez  [address]  [place] [address]          Dear Joaquina Obrien is an updated Patient Centered Plan of Care for your continued enrollment in Care Coordination. Please let us know if you have additional questions, concerns, or goals that we can assist with.    Sincerely,    [facility]   Gail Gomez RN, Care Coordinator   [facility]'s   E-mail [email or web address]   [phone]          About Me:        Patient Name:  Micah Nunez    YOB: 1965  Age:         57 year old   [facility] MRN:    [id number] Telephone Information:  Home Phone [phone]   Mobile [phone]       Address:  [address]  [place] [address] Email address:  [email or web address]      Emergency Contact(s)    Name Relationship Lgl Grd Work Phone Home Phone Mobile Phone   1. MELISA Mother    [phone]   2. BRONSON NUNEZ* Father    [phone]   3. ELLY Friend   [phone]   4. PADDLEFORT,KAY* Friend    [phone]           Primary language:  English     needed? No   [facility]:  [phone] op. 1  Other communication barriers:None    Preferred Method of Communication:  Mail  Current living arrangement: I live in a private home with family; Other (w/ parents)    Mobility Status/ Medical Equipment: Independent w/Device        Health Maintenance  Health Maintenance Reviewed:   Health Maintenance Due   Topic Date Due    HF ACTION PLAN  Never done    HEPATITIS B IMMUNIZATION (1 of 3 - 3-dose series) Never done    COVID-19 Vaccine (1) Never done    Pneumococcal Vaccine: Pediatrics (0 to 5 Years) and At-Risk Patients (6 to 64 Years) (1 - PCV) Never done    HIV SCREENING  Never done    ZOSTER IMMUNIZATION (1 of 2) Never done     INFLUENZA VACCINE (1) 09/01/2022    LUNG CANCER SCREENING  11/06/2022    LIPID  04/19/2023    YEARLY PREVENTIVE VISIT  04/19/2023    TSH W/FREE T4 REFLEX  04/19/2023          My Access Plan  Medical Emergency 911   Primary Clinic Line [facility] - [phone]   24 Hour Appointment Line [phone] or  [phone]) (toll-free)   24 Hour Nurse Line [phone] (toll-free)   Preferred Urgent Care [facility], [phone]       Preferred Hospital [place], [place]  [phone]       Preferred Pharmacy [facility] [place], [place], [place] - [address] [facility]     Behavioral Health Crisis Line The National Suicide Prevention Lifeline at 1-850.370.8370 or Text/Call 162       My Care Team Members  Patient Care Team         Relationship Specialty Notifications Start End    Yovany White MD PCP - General Family Practice  2/5/15     referring to ENT    Phone: [phone] Fax: [phone] [address] [facility] [address]    Bobo Renteria MD MD Otolaryngology  11/30/15     Phone: [phone] Fax: [phone]         [address] [place] [address] [address] [facility] [address]    Yovany White MD Assigned PCP   2/19/17     Phone: [phone] Fax: [phone] [address] [facility] [address]    Gail Gomez, RN Lead Care Coordinator Primary Care - CC Admissions 12/1/21     Phone: [phone]         Chaya Coffman APRN CNP Assigned Heart and Vascular Provider   1/9/22     Phone: [phone] Fax: [phone] [address] [facility], [address] [facility] [address]    Feliciano Schaefer MD Assigned Pulmonology Provider   8/27/22     Phone: [phone] Fax: [phone] [address] [facility] [address] [facility] [address]    Anjana Corcoran [facility] Pharmacist Pharmacist Ambulatory Care  10/28/22     Phone: [phone]          [address] [facility] [address]    Anjana Corcoran,  [facility] Assigned MTM Pharmacist   11/5/22     Phone: [phone] [address] [facility] [address]    Maureen Dove PA-C Assigned Surgical Provider   1/28/23     Phone: [phone] Fax: [phone] [address] [facility] [address]              My Care Plans  Self Management and Treatment Plan  Care Plan  Care Plan: Housing and Support       Problem: Insufficient In-home support I will get connected to resources/supports.       Priority: High Onset Date: 4/1/2022    Note:     Barriers: Access   Strengths: Motivated, family, friends, care team   Date to Achieve By: 5/1/22 extended date 12/20/2022 extended date to 6/27/2023  Patient expressed understanding of goal: Yes    Action steps to achieve this goal:  1. I will start application for social security disability income online. (8/31/202 In Process) Started application in April working with a disability person to complete forms. 8/31/2022 recently received a letter they need more medical information  Has a telephone  appointment 3/7/2023 with  Social Security and laywer to discuss appeal   2. I will use [employer] to medical appts.  3. I will talk to my PCP about a scooter when Social Security is approved   4. I will look into other resources as needed (housing, etc). (Working with [facility] worker for housing, staying in motel) 3/7/2023 In a new apartment   5. I will work with care coordination as needed.    As of today's date 1/13/2023 goal is met at 0 - 25%.   Goal Status:  Active   As of today's date 3/7/2023 goal is met at 51 - 75%.   Goal Status:  Active   As of today's date 4/27/2023 goal is met at 51 - 75%.   Goal Status:  Active                     Care Plan: Heart Failure - RN only       Problem: Heart failure maintenance - see below       Goal: Patient will not experience any symptoms of shortness of breath or body swelling over the next 3 - 6 months       Start Date: 1/31/2022 Expected End Date: 7/28/2023     This Visit's Progress: 80% Recent Progress: 80%    Priority: Medium    Note:     Goal Statement: My CHF symptoms will be stable   Barriers: Poor understanding of CHF diagnosis   Strengths: Motivated to learn   Patient expressed understanding of goal: Yes    Action steps to achieve this goal:  1. I will check my weight daily and call if weight gain is 2-3# in a day or 5# in a week.  2. I will seek medical help if I have increased shortness of breath episodes,fever or coughing up a colored sputum.  3. I will take my medications as prescribed and keep future Cardiology /primary care appointments.  4. I will follow a low salt diet.                            Care Plan: General       Problem: Use of tobacco products       Goal: Smoking Cessation       Start Date: 4/27/2023 Expected End Date: 6/28/2023    This Visit's Progress: 40%    Priority: Medium    Note:     Barriers: None identified   Strengths: motivated   Patient expressed understanding of goal: Yes  Action steps to achieve this goal:  1. I will set a date and go cold turkey  2. 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)    Non compliance with medical treatment    Anasarca    Elevated troponin    Lab test negative for COVID-19 virus    Mitral regurgitation - severe    Chronic heart failure with preserved ejection fraction (H)    S/P mitral valve clip implantation      Current Medications and Allergies:    Current Outpatient Medications   Medication    atorvastatin (LIPITOR) 40 MG tablet    furosemide (LASIX) 20 MG tablet     lisinopril (ZESTRIL) 20 MG tablet    metoprolol succinate ER (TOPROL XL) 25 MG 24 hr tablet    SM ASPIRIN LOW DOSE 81 MG EC tablet    SUBOXONE 12-3 MG FILM per film     No current facility-administered medications for this visit.     Facility-Administered Medications Ordered in Other Visits   Medication    Self Administer Medications: Behavioral Services        Care Coordination Start Date: 12/1/2021   Frequency of Care Coordination: monthly       Form Last Updated: 04/27/2023

## 2023-04-27 NOTE — PROGRESS NOTES
Clinic Care Coordination Contact    Follow Up Progress Note      Assessment:   Patient sounds happy and upbeat.  Patient states he feels much better now that the days are getting nicer.  PCP just completed some paperwork for Disability approval and feels it is getting closer  Patient is now getting $300.00 a month on top of food stamps  Patient continue to check weight and it is stable and no increase leg edema   Patient just had an echo done and to repeat in 6 months   Patient has cut down on smoking from a pack to 3-4 cigarettes a day  Patient plans to set a date to go cold turkey  Patient needs some dental  implants and the [facility] will not do until he quits smoking     Care Gaps:    Health Maintenance Due   Topic Date Due     HF ACTION PLAN  Never done     HEPATITIS B IMMUNIZATION (1 of 3 - 3-dose series) Never done     COVID-19 Vaccine (1) Never done     Pneumococcal Vaccine: Pediatrics (0 to 5 Years) and At-Risk Patients (6 to 64 Years) (1 - PCV) Never done     HIV SCREENING  Never done     ZOSTER IMMUNIZATION (1 of 2) Never done     INFLUENZA VACCINE (1) 09/01/2022     LUNG CANCER SCREENING  11/06/2022     LIPID  04/19/2023     YEARLY PREVENTIVE VISIT  04/19/2023     TSH W/FREE T4 REFLEX  04/19/2023       Postponed to next RN CC outreach      Care Plans  Care Plan: Housing and Support     Problem: Insufficient In-home support I will get connected to resources/supports.     Priority: High Onset Date: 4/1/2022    Note:     Barriers: Access   Strengths: Motivated, family, friends, care team   Date to Achieve By: 5/1/22 extended date 12/20/2022 extended date to 6/27/2023  Patient expressed understanding of goal: Yes    Action steps to achieve this goal:  1. I will start application for social security disability income online. (8/31/202 In Process) Started application in April working with a disability person to complete forms. 8/31/2022 recently received a letter they need more medical information  Has a telephone   appointment 3/7/2023 with  Social Security and laywer to discuss appeal   2. I will use [employer] to medical appts.  3. I will talk to my PCP about a scooter when Social Security is approved   4. I will look into other resources as needed (housing, etc). (Working with [facility] worker for housing, staying in motel) 3/7/2023 In a new apartment   5. I will work with care coordination as needed.    As of today's date 1/13/2023 goal is met at 0 - 25%.   Goal Status:  Active   As of today's date 3/7/2023 goal is met at 51 - 75%.   Goal Status:  Active   As of today's date 4/27/2023 goal is met at 51 - 75%.   Goal Status:  Active               Care Plan: Heart Failure - RN only     Problem: Heart failure maintenance - see below     Goal: Patient will not experience any symptoms of shortness of breath or body swelling over the next 3 - 6 months     Start Date: 1/31/2022 Expected End Date: 7/28/2023    This Visit's Progress: 80% Recent Progress: 80%    Priority: Medium    Note:     Goal Statement: My CHF symptoms will be stable   Barriers: Poor understanding of CHF diagnosis   Strengths: Motivated to learn   Patient expressed understanding of goal: Yes    Action steps to achieve this goal:  1. I will check my weight daily and call if weight gain is 2-3# in a day or 5# in a week.  2. I will seek medical help if I have increased shortness of breath episodes,fever or coughing up a colored sputum.  3. I will take my medications as prescribed and keep future Cardiology /primary care appointments.  4. I will follow a low salt diet.                    Care Plan: General     Problem: Use of tobacco products     Goal: Smoking Cessation     Start Date: 4/27/2023 Expected End Date: 6/28/2023    This Visit's Progress: 40%    Priority: Medium    Note:     Barriers: None identified   Strengths: motivated   Patient expressed understanding of goal: Yes  Action steps to achieve this goal:  1. I will set a date and go  cold turkey  2. I will continue to cut back from a full pack to 4 cigarettes a day                           Intervention/Education provided during outreach: CC available as needed for questions or concerns      Outreach Frequency: monthly        Plan:   Patient will think of a quit date to go cold turkey on smoking cigarettes   Patient continue to work with the [facility] to get disability approved   Care Coordinator will follow up monthly     [facility]   Gail Gomez RN, Care Coordinator   [facility]'s   E-mail [email or web address]   [phone]

## 2023-06-01 ENCOUNTER — HEALTH MAINTENANCE LETTER (OUTPATIENT)
Age: 58
End: 2023-06-01

## 2023-06-06 ENCOUNTER — PATIENT OUTREACH (OUTPATIENT)
Dept: CARE COORDINATION | Facility: CLINIC | Age: 58
End: 2023-06-06
Payer: COMMERCIAL

## 2023-06-06 ASSESSMENT — ACTIVITIES OF DAILY LIVING (ADL): DEPENDENT_IADLS:: TRANSPORTATION

## 2023-06-21 DIAGNOSIS — I34.0 NONRHEUMATIC MITRAL VALVE REGURGITATION: ICD-10-CM

## 2023-06-21 RX ORDER — ATORVASTATIN CALCIUM 40 MG/1
TABLET, FILM COATED ORAL
Qty: 90 TABLET | Refills: 3 | Status: SHIPPED | OUTPATIENT
Start: 2023-06-21 | End: 2024-06-17

## 2023-06-22 ENCOUNTER — LAB REQUISITION (OUTPATIENT)
Dept: LAB | Facility: CLINIC | Age: 58
End: 2023-06-22
Payer: COMMERCIAL

## 2023-06-22 DIAGNOSIS — F11.20 OPIOID DEPENDENCE, UNCOMPLICATED (H): ICD-10-CM

## 2023-06-22 LAB
ALBUMIN SERPL BCG-MCNC: 4.3 G/DL (ref 3.5–5.2)
ALP SERPL-CCNC: 155 U/L (ref 40–129)
ALT SERPL W P-5'-P-CCNC: 20 U/L (ref 0–70)
ANION GAP SERPL CALCULATED.3IONS-SCNC: 14 MMOL/L (ref 7–15)
AST SERPL W P-5'-P-CCNC: 21 U/L (ref 0–45)
BILIRUB SERPL-MCNC: 0.4 MG/DL
BUN SERPL-MCNC: 12.1 MG/DL (ref 6–20)
CALCIUM SERPL-MCNC: 9.3 MG/DL (ref 8.6–10)
CHLORIDE SERPL-SCNC: 102 MMOL/L (ref 98–107)
CREAT SERPL-MCNC: 1.39 MG/DL (ref 0.67–1.17)
DEPRECATED HCO3 PLAS-SCNC: 25 MMOL/L (ref 22–29)
GFR SERPL CREATININE-BSD FRML MDRD: 59 ML/MIN/1.73M2
GLUCOSE SERPL-MCNC: 114 MG/DL (ref 70–99)
POTASSIUM SERPL-SCNC: 3.8 MMOL/L (ref 3.4–5.3)
PROT SERPL-MCNC: 6.8 G/DL (ref 6.4–8.3)
SODIUM SERPL-SCNC: 141 MMOL/L (ref 136–145)

## 2023-06-22 PROCEDURE — 80053 COMPREHEN METABOLIC PANEL: CPT | Mod: ORL | Performed by: INTERNAL MEDICINE

## 2023-06-22 PROCEDURE — 87340 HEPATITIS B SURFACE AG IA: CPT | Mod: ORL | Performed by: INTERNAL MEDICINE

## 2023-06-22 PROCEDURE — 86780 TREPONEMA PALLIDUM: CPT | Mod: ORL | Performed by: INTERNAL MEDICINE

## 2023-06-22 PROCEDURE — 86706 HEP B SURFACE ANTIBODY: CPT | Mod: ORL | Performed by: INTERNAL MEDICINE

## 2023-06-22 PROCEDURE — 86708 HEPATITIS A ANTIBODY: CPT | Mod: ORL | Performed by: INTERNAL MEDICINE

## 2023-06-22 PROCEDURE — 87389 HIV-1 AG W/HIV-1&-2 AB AG IA: CPT | Mod: ORL | Performed by: INTERNAL MEDICINE

## 2023-06-23 LAB
HAV IGG SER QL IA: NONREACTIVE
HBV SURFACE AB SERPL IA-ACNC: 0 M[IU]/ML
HBV SURFACE AB SERPL IA-ACNC: NONREACTIVE M[IU]/ML
HBV SURFACE AG SERPL QL IA: NONREACTIVE
HIV 1+2 AB+HIV1 P24 AG SERPL QL IA: NONREACTIVE
T PALLIDUM AB SER QL: NONREACTIVE

## 2023-07-06 ENCOUNTER — LAB REQUISITION (OUTPATIENT)
Dept: LAB | Facility: CLINIC | Age: 58
End: 2023-07-06
Payer: COMMERCIAL

## 2023-07-06 DIAGNOSIS — F11.20 OPIOID DEPENDENCE, UNCOMPLICATED (H): ICD-10-CM

## 2023-07-06 LAB — CREAT UR-MCNC: 357 MG/DL

## 2023-07-06 PROCEDURE — 80359 METHYLENEDIOXYAMPHETAMINES: CPT | Mod: ORL | Performed by: INTERNAL MEDICINE

## 2023-07-10 LAB
6MAM UR CFM-MCNC: 72 NG/ML
6MAM/CREAT UR: 20 NG/MG {CREAT}
AMPHET UR CFM-MCNC: ABNORMAL NG/ML
AMPHET/CREAT UR: ABNORMAL
CODEINE UR CFM-MCNC: 388 NG/ML
CODEINE/CREAT UR: 109 NG/MG {CREAT}
FENTANYL UR CFM-MCNC: 320 NG/ML
FENTANYL/CREAT UR: 90 NG/MG {CREAT}
METHAMPHET UR CFM-MCNC: ABNORMAL NG/ML
METHAMPHET/CREAT UR: ABNORMAL
MORPHINE UR CFM-MCNC: >7000 NG/ML
MORPHINE/CREAT UR: ABNORMAL
NORBUPRENORPHINE UR CFM-MCNC: 13 NG/ML
NORBUPRENORPHINE/CREAT UR: 4 NG/MG {CREAT}
NORFENTANYL UR CFM-MCNC: 1680 NG/ML
NORFENTANYL/CREAT UR: 471 NG/MG {CREAT}
TRAMADOL CTO UR CFM-MCNC: 57 NG/ML
TRAMADOL/CREAT UR: 16 NG/MG {CREAT}

## 2023-07-17 ENCOUNTER — PATIENT OUTREACH (OUTPATIENT)
Dept: CARE COORDINATION | Facility: CLINIC | Age: 58
End: 2023-07-17
Payer: COMMERCIAL

## 2023-07-17 NOTE — PROGRESS NOTES
Clinic Care Coordination Contact  Community Health Worker Follow Up    Care Gaps:     Health Maintenance Due   Topic Date Due    HF ACTION PLAN  Never done    HEPATITIS B IMMUNIZATION (1 of 3 - 3-dose series) Never done    COVID-19 Vaccine (1) Never done    Pneumococcal Vaccine: Pediatrics (0 to 5 Years) and At-Risk Patients (6 to 64 Years) (1 - PCV) Never done    ZOSTER IMMUNIZATION (1 of 2) Never done    LUNG CANCER SCREENING  11/06/2022    YEARLY PREVENTIVE VISIT  04/19/2023    LIPID  04/19/2023    TSH W/FREE T4 REFLEX  04/19/2023     Postponed to future outreaches     Care Plan:   Care Plan: Housing and Support       Problem: Insufficient In-home support I will get connected to resources/supports.       Priority: High Onset Date: 4/1/2022    Note:     Barriers: Access   Strengths: Motivated, family, friends, care team   Date to Achieve By: 5/1/22 extended date 12/20/2022 extended date to 9/27/2023  Patient expressed understanding of goal: Yes    Action steps to achieve this goal:  1. I will start application for social security disability income online. (8/31/202 In Process) Started application in April working with a disability person to complete forms. 8/31/2022 recently received a letter they need more medical information  Has a telephone  appointment 3/7/2023 with  Social Security and laywer to discuss appeal   Patient is waiting for his  to call to set up a final hearing for Appeal   2. I will use [employer] to medical appts.  3. I will talk to my PCP about a scooter when Social Security is approved   4. I will look into other resources as needed (housing, etc).  Completed 6/6/2023  5. I will work with care coordination as needed.    As of today's date 1/13/2023 goal is met at 0 - 25%.   Goal Status:  Active   As of today's date 3/7/2023 goal is met at 51 - 75%.   Goal Status:  Active   As of today's date 4/27/2023 goal is met at 51 - 75%.   Goal Status:  Active   As of today's date  6/6/2023 goal is met at 51 - 75%.   Goal Status:  Active         Goal: Establish adequate home support  Completed 8/30/2022      Start Date: 12/1/2021 Expected End Date: 9/1/2022    This Visit's Progress: 100%    Priority: Medium    Note:     Goal Statement: I will get connected to resources/supports.   Barriers: Access   Strengths: Motivated, family, friends, care team   Patient expressed understanding of goal: Yes    Action steps to achieve this goal:  1. I will start application for social security disability income online. (In Process)  2. I will use [employer] to medical appts.  3. I will talk to my PCP about a cane or other DME. (Had PCP OV 4/19/22)  4. I will look into other resources as needed (housing, etc). (Working with [facility] for housing, staying in motel)  5. I will work with care coordination as needed.                              Care Plan: Heart Failure - RN only       Problem: Heart failure maintenance - see below       Goal: Patient will not experience any symptoms of shortness of breath or body swelling over the next 3 - 6 months       Start Date: 1/31/2022 Expected End Date: 7/28/2023    This Visit's Progress: 90% Recent Progress: 80%    Priority: Medium    Note:     Goal Statement: My CHF symptoms will be stable   Barriers: Poor understanding of CHF diagnosis   Strengths: Motivated to learn   Patient expressed understanding of goal: Yes    Action steps to achieve this goal:  1. I will check my weight daily and call if weight gain is 2-3# in a day or 5# in a week.  2. I will seek medical help if I have increased shortness of breath episodes,fever or coughing up a colored sputum.  3. I will take my medications as prescribed  4. I will keep future Cardiology /primary care appointments.  5. I will follow a low salt diet.                            Care Plan: General       Problem: Use of tobacco products       Goal: Smoking Cessation       Start Date: 4/27/2023  Expected End Date: 6/28/2023    This Visit's Progress: 40%    Priority: Medium    Note:     Barriers: None identified   Strengths: motivated   Patient expressed understanding of goal: Yes  Action steps to achieve this goal:  1. I will set a date and go cold turkey  2. I will continue to cut back from a full pack to 4 cigarettes a day                             Intervention and Education during outreach: Patient stated that it has been 21 months since he submitted paperwork for disability. Patient stated he has been working with a  to appeal the initial denial.     Patient stated he was able to get an air conditioner through a program that he applied for. Patient stated that he has had so much help recently and things are working out for the better.    Patient stated that he continues to receive food stamps and cash assistance.  Patient stated his rent and electric are both paid for with help from [facility] programs. Patient stated that he signed up for a program recently that is paying for his cell phone as well.     Disability Appeal-  Pending a final hearing date    CHW Plan: will outreach in 1 month to discuss goal progession and new needs, if any.     Chanel PEDRO  Community Health Worker  [facility]  Clinic Care Coordination  [facility]  [email or web address]   Office: [phone]

## 2023-07-18 ENCOUNTER — PATIENT OUTREACH (OUTPATIENT)
Dept: CARE COORDINATION | Facility: CLINIC | Age: 58
End: 2023-07-18
Payer: COMMERCIAL

## 2023-07-18 ASSESSMENT — ACTIVITIES OF DAILY LIVING (ADL): DEPENDENT_IADLS:: TRANSPORTATION

## 2023-07-18 NOTE — LETTER
[facility]  Patient Centered Plan of Care  [facility] CARE COORDINATION  [address] [facility] [address]     July 18, 2023        Micah MAYELA Nunez  [address]  [place] [address]          Dear Joaquina Obrien is an updated Patient Centered Plan of Care for your continued enrollment in Care Coordination. Please let us know if you have additional questions, concerns, or goals that we can assist with.    Sincerely,    [facility]   Gail Gomez RN, Care Coordinator   [facility]'s   E-mail [email or web address]   [phone]          About Me:        Patient Name:  Micah Nunez    YOB: 1965  Age:         57 year old   [facility] MRN:    [id number] Telephone Information:  Home Phone [phone]   Mobile [phone]       Address:  [address]  [place] [address] Email address:  [email or web address]      Emergency Contact(s)    Name Relationship Lgl Grd Work Phone Home Phone Mobile Phone   1. MELISA Mother    [phone]   2. PADDLEFORT,KAY* Friend    [phone]           Primary language:  English     needed? No   [facility] [employer] [facility]:  [phone] op. 1  Other communication barriers:None    Preferred Method of Communication:  Mail  Current living arrangement: I live in a private home with family; Other (w/ parents)    Mobility Status/ Medical Equipment: Independent w/Device        Health Maintenance  Health Maintenance Reviewed:   Health Maintenance Due   Topic Date Due    HF ACTION PLAN  Never done    HEPATITIS B IMMUNIZATION (1 of 3 - 3-dose series) Never done    COVID-19 Vaccine (1) Never done    Pneumococcal Vaccine: Pediatrics (0 to 5 Years) and At-Risk Patients (6 to 64 Years) (1 - PCV) Never done    ZOSTER IMMUNIZATION (1 of 2) Never done    LUNG CANCER SCREENING  11/06/2022    YEARLY PREVENTIVE VISIT  04/19/2023    LIPID  04/19/2023    TSH W/FREE T4 REFLEX   04/19/2023          My Access Plan  Medical Emergency 911   Primary Clinic Line [facility] - [phone]   24 Hour Appointment Line [phone] or  [phone]) (toll-free)   24 Hour Nurse Line [phone] (toll-free)   Preferred Urgent Care [facility], [phone]     Preferred Hospital [place], [place]  [phone]     Preferred Pharmacy [facility] - [place], [place] - [address]     Behavioral Health Crisis Line The National Suicide Prevention Lifeline at 1-940.731.6361 or Text/Call 306       My Care Team Members  Patient Care Team         Relationship Specialty Notifications Start End    Yovany White MD PCP - General Family Practice  2/5/15     referring to ENT    Phone: [phone] Fax: [phone] [address] [facility] [address]    Bobo Renteria MD MD Otolaryngology  11/30/15     Phone: [phone] Fax: [phone]         [address] [place] [address] [address] [facility] [address]    Yovany White MD Assigned PCP   2/19/17     Phone: [phone] Fax: [phone]         [address] [facility] [address]    Gail Gomez, RN Lead Care Coordinator Primary Care - CC Admissions 12/1/21     Phone: [phone]         Chaya Coffman APRN CNP Assigned Heart and Vascular Provider   1/9/22     Phone: [phone] Fax: [phone]         [address] [facility], [address] [facility] [address]    Feliciano Schaefer MD Assigned Pulmonology Provider   8/27/22     Phone: [phone] Fax: [phone]         [address] [facility] [address] [facility] [address]    Anjana Corcoran [facility] Pharmacist Pharmacist Ambulatory Care  10/28/22     Phone: [phone]          [address] [place] [address]    Anjana Corcoran RPH Assigned MTM Pharmacist   11/5/22     Phone: [phone] [address] [facility] [address]    Maureen Dove PA-C Assigned Surgical  Provider   1/28/23     Phone: [phone] Fax: [phone]         [address] [facility] [address]    Chanel Harris Community Health Worker Primary Care - CC Admissions 6/6/23     Phone: [phone]                   My Care Plans  Self Management and Treatment Plan  Care Plan  Care Plan: Housing and Support       Problem: Insufficient In-home support I will get connected to resources/supports.       Priority: High Onset Date: 4/1/2022    Note:     Barriers: Access   Strengths: Motivated, family, friends, care team   Date to Achieve By: 5/1/22 extended date 12/20/2022 extended date to 9/27/2023  Patient expressed understanding of goal: Yes    Action steps to achieve this goal:  1. I will start application for social security disability income online. (8/31/202 In Process) Started application in April working with a disability person to complete forms. 8/31/2022 recently received a letter they need more medical information  Has a telephone  appointment 3/7/2023 with  Social Security and laywer to discuss appeal   Patient is waiting for his  to call to set up a final hearing for Appeal   2. I will use [employer] to medical appts.  3. I will talk to my PCP about a scooter when Social Security is approved   4. I will look into other resources as needed (housing, etc).  Completed 6/6/2023  5. I will work with care coordination as needed.    As of today's date 1/13/2023 goal is met at 0 - 25%.   Goal Status:  Active   As of today's date 3/7/2023 goal is met at 51 - 75%.   Goal Status:  Active   As of today's date 4/27/2023 goal is met at 51 - 75%.   Goal Status:  Active   As of today's date 6/6/2023 goal is met at 51 - 75%.   Goal Status:  Active   As of today's date 7/18/2023 goal is met at 51 - 75%.   Goal Status:  Active 2                    Care Plan: Heart Failure - RN only       Problem: Heart failure maintenance - see below       Goal: Patient will not experience any symptoms of shortness  of breath or body swelling over the next 3 - 6 months       Start Date: 1/31/2022 Expected End Date: 7/28/2023    This Visit's Progress: 90% Recent Progress: 80%    Priority: Medium    Note:     Goal Statement: My CHF symptoms will be stable   Barriers: Poor understanding of CHF diagnosis   Strengths: Motivated to learn   Patient expressed understanding of goal: Yes    Action steps to achieve this goal:  1. I will check my weight daily and call if weight gain is 2-3# in a day or 5# in a week.  2. I will seek medical help if I have increased shortness of breath episodes,fever or coughing up a colored sputum.  3. I will take my medications as prescribed  4. I will keep future Cardiology /primary care appointments.  5. I will follow a low salt diet.                            Care Plan: General       Problem: Use of tobacco products       Goal: Smoking Cessation       Start Date: 4/27/2023 Expected End Date: 6/28/2023    This Visit's Progress: 40%    Priority: Medium    Note:     Barriers: None identified   Strengths: motivated   Patient expressed understanding of goal: Yes  Action steps to achieve this goal:  1. I will set a date and go cold turkey  2. 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)    Non compliance with medical treatment    Anasarca    Elevated troponin    Lab test negative for COVID-19 virus    Mitral regurgitation - severe    Chronic heart failure with preserved ejection fraction (H)    S/P mitral valve clip implantation      Current Medications and Allergies:    Current  Outpatient Medications   Medication    atorvastatin (LIPITOR) 40 MG tablet    furosemide (LASIX) 20 MG tablet    lisinopril (ZESTRIL) 20 MG tablet    metoprolol succinate ER (TOPROL XL) 25 MG 24 hr tablet    SM ASPIRIN LOW DOSE 81 MG EC tablet    SUBOXONE 12-3 MG FILM per film     No current facility-administered medications for this visit.     Facility-Administered Medications Ordered in Other Visits   Medication    Self Administer Medications: Behavioral Services        Care Coordination Start Date: 12/1/2021   Frequency of Care Coordination: monthly     Form Last Updated: 07/18/2023

## 2023-07-18 NOTE — PROGRESS NOTES
Clinic Care Coordination Contact  Care Coordination Clinician Chart Review    Situation: Patient chart reviewed by Care Coordinator.       Background: Care Coordination Program started: 12/1/2021. Initial assessment completed and patient-centered care plan(s) were developed with participation from patient. Lead CC handed patient off to CHW for continued outreaches.       Assessment: Per chart review, patient outreach completed by CC CHW on 7/17/2023.  Patient is actively working to accomplish goal(s). Patient's goal(s) appropriate and relevant at this time. Patient is due for updated Plan of Care.  Assessments will be completed annually or as needed/with change of patient status.      Care Plan: Housing and Support     Problem: Insufficient In-home support I will get connected to resources/supports.     Priority: High Onset Date: 4/1/2022    Note:     Barriers: Access   Strengths: Motivated, family, friends, care team   Date to Achieve By: 5/1/22 extended date 12/20/2022 extended date to 9/27/2023  Patient expressed understanding of goal: Yes    Action steps to achieve this goal:  1. I will start application for social security disability [employer] online. (8/31/202 In Process) Started application in April working with a disability person to complete forms. 8/31/2022 recently received a letter they need more medical information  Has a telephone  appointment 3/7/2023 with  Social Security and laywer to discuss appeal   Patient is waiting for his  to call to set up a final hearing for Appeal   2. I will use [email or web address] to medical appts.  3. I will talk to my PCP about a scooter when Social Security is approved   4. I will look into other resources as needed (housing, etc).  Completed 6/6/2023  5. I will work with care coordination as needed.    As of today's date 1/13/2023 goal is met at 0 - 25%.   Goal Status:  Active   As of today's date 3/7/2023 goal is met at 51 - 75%.   Goal Status:  Active   As of  today's date 4/27/2023 goal is met at 51 - 75%.   Goal Status:  Active   As of today's date 6/6/2023 goal is met at 51 - 75%.   Goal Status:  Active   As of today's date 7/18/2023 goal is met at 51 - 75%.   Goal Status:  Active 2              Care Plan: Heart Failure - RN only     Problem: Heart failure maintenance - see below     Goal: Patient will not experience any symptoms of shortness of breath or body swelling over the next 3 - 6 months     Start Date: 1/31/2022 Expected End Date: 7/28/2023    This Visit's Progress: 90% Recent Progress: 80%    Priority: Medium    Note:     Goal Statement: My CHF symptoms will be stable   Barriers: Poor understanding of CHF diagnosis   Strengths: Motivated to learn   Patient expressed understanding of goal: Yes    Action steps to achieve this goal:  1. I will check my weight daily and call if weight gain is 2-3# in a day or 5# in a week.  2. I will seek medical help if I have increased shortness of breath episodes,fever or coughing up a colored sputum.  3. I will take my medications as prescribed  4. I will keep future Cardiology /primary care appointments.  5. I will follow a low salt diet.                    Care Plan: General     Problem: Use of tobacco products     Goal: Smoking Cessation     Start Date: 4/27/2023 Expected End Date: 6/28/2023    This Visit's Progress: 40%    Priority: Medium    Note:     Barriers: None identified   Strengths: motivated   Patient expressed understanding of goal: Yes  Action steps to achieve this goal:  1. I will set a date and go cold turkey  2. I will continue to cut back from a full pack to 4 cigarettes a day                              Plan/Recommendations: The patient will continue working with Care Coordination to achieve goal(s) as above. CHW will continue outreaches at minimum every 30 days and will involve Lead CC as needed or if patient is ready to move to Maintenance. Lead CC will continue to monitor CHW outreaches and patient's  progress to goal(s) every 6 weeks.     Plan of Care updated and sent to patient: Yes, via [facility]     [facility]   Gail Gomez RN, Care Coordinator   [facility]'s   E-mail [email or web address]   [phone]

## 2023-07-31 ENCOUNTER — HOSPITAL ENCOUNTER (OUTPATIENT)
Dept: CARDIOLOGY | Facility: HOSPITAL | Age: 58
Discharge: HOME | End: 2023-07-31
Attending: INTERNAL MEDICINE | Admitting: INTERNAL MEDICINE
Payer: COMMERCIAL

## 2023-07-31 DIAGNOSIS — Z98.890 S/P MITRAL VALVE CLIP IMPLANTATION: ICD-10-CM

## 2023-07-31 DIAGNOSIS — Z95.818 S/P MITRAL VALVE CLIP IMPLANTATION: ICD-10-CM

## 2023-07-31 LAB — LVEF ECHO: NORMAL

## 2023-07-31 PROCEDURE — 93306 TTE W/DOPPLER COMPLETE: CPT | Mod: 26 | Performed by: INTERNAL MEDICINE

## 2023-07-31 PROCEDURE — 93306 TTE W/DOPPLER COMPLETE: CPT

## 2023-08-07 ENCOUNTER — OFFICE VISIT (OUTPATIENT)
Dept: CARDIOLOGY | Facility: CLINIC | Age: 58
End: 2023-08-07
Payer: COMMERCIAL

## 2023-08-07 ENCOUNTER — APPOINTMENT (OUTPATIENT)
Dept: CARDIOLOGY | Facility: CLINIC | Age: 58
End: 2023-08-07
Payer: COMMERCIAL

## 2023-08-07 VITALS
WEIGHT: 177.4 LBS | HEART RATE: 72 BPM | RESPIRATION RATE: 16 BRPM | SYSTOLIC BLOOD PRESSURE: 130 MMHG | DIASTOLIC BLOOD PRESSURE: 76 MMHG | BODY MASS INDEX: 22.06 KG/M2 | HEIGHT: 75 IN

## 2023-08-07 DIAGNOSIS — Z95.818 S/P MITRAL VALVE CLIP IMPLANTATION: Primary | ICD-10-CM

## 2023-08-07 DIAGNOSIS — Z98.890 S/P MITRAL VALVE CLIP IMPLANTATION: Primary | ICD-10-CM

## 2023-08-07 DIAGNOSIS — I50.32 CHRONIC HEART FAILURE WITH PRESERVED EJECTION FRACTION (H): ICD-10-CM

## 2023-08-07 DIAGNOSIS — Z95.818 S/P MITRAL VALVE CLIP IMPLANTATION: ICD-10-CM

## 2023-08-07 DIAGNOSIS — Z98.890 S/P MITRAL VALVE CLIP IMPLANTATION: ICD-10-CM

## 2023-08-07 LAB
ANION GAP SERPL CALCULATED.3IONS-SCNC: 7 MMOL/L (ref 7–15)
BUN SERPL-MCNC: 16.3 MG/DL (ref 6–20)
CALCIUM SERPL-MCNC: 9.7 MG/DL (ref 8.6–10)
CHLORIDE SERPL-SCNC: 108 MMOL/L (ref 98–107)
CREAT SERPL-MCNC: 1.22 MG/DL (ref 0.67–1.17)
DEPRECATED HCO3 PLAS-SCNC: 27 MMOL/L (ref 22–29)
ERYTHROCYTE [DISTWIDTH] IN BLOOD BY AUTOMATED COUNT: 13.1 % (ref 10–15)
GFR SERPL CREATININE-BSD FRML MDRD: 69 ML/MIN/1.73M2
GLUCOSE SERPL-MCNC: 91 MG/DL (ref 70–99)
HCT VFR BLD AUTO: 37.5 % (ref 40–53)
HGB BLD-MCNC: 11.7 G/DL (ref 13.3–17.7)
MCH RBC QN AUTO: 28.4 PG (ref 26.5–33)
MCHC RBC AUTO-ENTMCNC: 31.2 G/DL (ref 31.5–36.5)
MCV RBC AUTO: 91 FL (ref 78–100)
PLATELET # BLD AUTO: 249 10E3/UL (ref 150–450)
POTASSIUM SERPL-SCNC: 4.6 MMOL/L (ref 3.4–5.3)
RBC # BLD AUTO: 4.12 10E6/UL (ref 4.4–5.9)
SODIUM SERPL-SCNC: 142 MMOL/L (ref 136–145)
WBC # BLD AUTO: 7.3 10E3/UL (ref 4–11)

## 2023-08-07 PROCEDURE — 99215 OFFICE O/P EST HI 40 MIN: CPT | Performed by: INTERNAL MEDICINE

## 2023-08-07 PROCEDURE — 80048 BASIC METABOLIC PNL TOTAL CA: CPT | Performed by: INTERNAL MEDICINE

## 2023-08-07 PROCEDURE — 93000 ELECTROCARDIOGRAM COMPLETE: CPT | Performed by: GENERAL ACUTE CARE HOSPITAL

## 2023-08-07 PROCEDURE — 36415 COLL VENOUS BLD VENIPUNCTURE: CPT | Performed by: INTERNAL MEDICINE

## 2023-08-07 PROCEDURE — 85027 COMPLETE CBC AUTOMATED: CPT | Performed by: INTERNAL MEDICINE

## 2023-08-07 RX ORDER — FUROSEMIDE 20 MG
20 TABLET ORAL DAILY
Qty: 30 TABLET | Refills: 3 | Status: SHIPPED | OUTPATIENT
Start: 2023-08-07 | End: 2023-12-15
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
## 2023-08-07 NOTE — PATIENT INSTRUCTIONS
Micah Nunez,    It was a pleasure to see you today in the clinic regarding your 1 year visit for the MitraClip.     My recommendations after this visit include:     - start lasix 20 mg daily  - have kidney function checked the week of August 21    You should followup with Dr. Jackson in January    **Remember you will need prophylactic antibiotics for all dental visits in the future.  You can get the Rx from your dentist, your PCP or from us.  If possible, still refrain from going to the dentist until 6 months or more after your valve replacement      If you have questions or concerns, please call using the numbers below:    Valve Clinic Phone   [phone]    After Hours/Scheduling  [phone]    Otherwise you can dial the nurse directly at:    Silvia Moralez RN  [phone]    Monster Jensen RN  [phone]

## 2023-08-08 LAB
ATRIAL RATE - MUSE: 58 BPM
DIASTOLIC BLOOD PRESSURE - MUSE: NORMAL MMHG
INTERPRETATION ECG - MUSE: NORMAL
P AXIS - MUSE: 59 DEGREES
PR INTERVAL - MUSE: 164 MS
QRS DURATION - MUSE: 90 MS
QT - MUSE: 392 MS
QTC - MUSE: 384 MS
R AXIS - MUSE: 7 DEGREES
SYSTOLIC BLOOD PRESSURE - MUSE: NORMAL MMHG
T AXIS - MUSE: 33 DEGREES
VENTRICULAR RATE- MUSE: 58 BPM

## 2023-08-22 ENCOUNTER — PATIENT OUTREACH (OUTPATIENT)
Dept: CARE COORDINATION | Facility: CLINIC | Age: 58
End: 2023-08-22
Payer: COMMERCIAL

## 2023-08-22 NOTE — PROGRESS NOTES
Clinic Care Coordination Contact  Community Health Worker Follow Up    Care Gaps:     Health Maintenance Due   Topic Date Due    HF ACTION PLAN  Never done    HEPATITIS B IMMUNIZATION (1 of 3 - 3-dose series) Never done    COVID-19 Vaccine (1) Never done    Pneumococcal Vaccine: Pediatrics (0 to 5 Years) and At-Risk Patients (6 to 64 Years) (1 - PCV) Never done    HEPATITIS A IMMUNIZATION (1 of 2 - Risk 2-dose series) Never done    ZOSTER IMMUNIZATION (1 of 2) Never done    LUNG CANCER SCREENING  11/06/2022    YEARLY PREVENTIVE VISIT  04/19/2023    LIPID  04/19/2023     Postponed to future outreaches     Care Plan:   Care Plan: Housing and Support       Problem: Insufficient In-home support I will get connected to resources/supports.       Priority: High Onset Date: 4/1/2022    Note:     Barriers: Access   Strengths: Motivated, family, friends, care team   Date to Achieve By: 5/1/22 extended date 12/20/2022 extended date to 9/27/2023  Patient expressed understanding of goal: Yes    Action steps to achieve this goal:  1. I will start application for social security disability [employer] online. (Completed)  2. I will use [employer] to medical appts.  3. I will talk to my PCP about a scooter when Social Security is approved   4. I will look into other resources as needed (housing, etc).  Completed 6/6/2023  5. I will work with care coordination as needed.    As of today's date 8/22, goal is completed 80%.         Goal: Establish adequate home support  Completed 8/30/2022      Start Date: 12/1/2021 Expected End Date: 9/1/2022    This Visit's Progress: 100%    Priority: Medium    Note:     Goal Statement: I will get connected to resources/supports.   Barriers: Access   Strengths: Motivated, family, friends, care team   Patient expressed understanding of goal: Yes    Action steps to achieve this goal:  1. I will start application for social security disability [employer] online. (In Process)  2. I will use  [employer] to medical appts.  3. I will talk to my PCP about a cane or other DME. (Had PCP OV 4/19/22)  4. I will look into other resources as needed (housing, etc). (Working with [facility] worker for housing, staying in motel)  5. I will work with care coordination as needed.                              Care Plan: Heart Failure - RN only       Problem: Heart failure maintenance - see below       Goal: Patient will not experience any symptoms of shortness of breath or body swelling over the next 3 - 6 months       Start Date: 1/31/2022 Expected End Date: 7/28/2023    This Visit's Progress: 90% Recent Progress: 80%    Priority: Medium    Note:     Goal Statement: My CHF symptoms will be stable   Barriers: Poor understanding of CHF diagnosis   Strengths: Motivated to learn   Patient expressed understanding of goal: Yes    Action steps to achieve this goal:  1. I will check my weight daily and call if weight gain is 2-3# in a day or 5# in a week.  2. I will seek medical help if I have increased shortness of breath episodes,fever or coughing up a colored sputum.  3. I will take my medications as prescribed  4. I will keep future Cardiology /primary care appointments.  5. I will follow a low salt diet.                            Care Plan: General       Problem: Use of tobacco products       Goal: Smoking Cessation       Start Date: 4/27/2023 Expected End Date: 6/28/2023    This Visit's Progress: 60% Recent Progress: 40%    Priority: Medium    Note:     Barriers: None identified   Strengths: motivated   Patient expressed understanding of goal: Yes  Action steps to achieve this goal:  1. I will set a date and go cold turkey   2. I will continue to cut back from a full pack to 2 cigarettes a day                             Intervention and Education during outreach: Patient stated that he is down a couple cigarettes a day, no quit date as of yet.     Patient stated that he saw the cardiologist (  [facility] in [place]) and they recommended that he schedule an appt to get a blood test.  Patient asked if a message could be sent to PCP/clinic team to help him determine what type of appt he needs (lab only?)     Patient stated that the first week of November he will hear about the disability decision.     CHW Plan: will message [place] [facility] to request  assist patient with appt.   will outreach to patient in 1 month to discuss goal progression, care gaps and new needs, if any.     Chanel PEDRO  Community Health Worker  [facility]  Clinic Care Coordination  [facility]  [email or web address]   Office: [phone]

## 2023-08-28 ENCOUNTER — LAB (OUTPATIENT)
Dept: LAB | Facility: CLINIC | Age: 58
End: 2023-08-28
Payer: COMMERCIAL

## 2023-08-28 DIAGNOSIS — Z98.890 S/P MITRAL VALVE CLIP IMPLANTATION: ICD-10-CM

## 2023-08-28 DIAGNOSIS — I50.32 CHRONIC HEART FAILURE WITH PRESERVED EJECTION FRACTION (H): ICD-10-CM

## 2023-08-28 DIAGNOSIS — Z95.818 S/P MITRAL VALVE CLIP IMPLANTATION: ICD-10-CM

## 2023-08-28 LAB
ANION GAP SERPL CALCULATED.3IONS-SCNC: 8 MMOL/L (ref 7–15)
BUN SERPL-MCNC: 12.9 MG/DL (ref 6–20)
CALCIUM SERPL-MCNC: 9.5 MG/DL (ref 8.6–10)
CHLORIDE SERPL-SCNC: 104 MMOL/L (ref 98–107)
CREAT SERPL-MCNC: 1.45 MG/DL (ref 0.67–1.17)
DEPRECATED HCO3 PLAS-SCNC: 29 MMOL/L (ref 22–29)
GFR SERPL CREATININE-BSD FRML MDRD: 56 ML/MIN/1.73M2
GLUCOSE SERPL-MCNC: 95 MG/DL (ref 70–99)
POTASSIUM SERPL-SCNC: 3.9 MMOL/L (ref 3.4–5.3)
SODIUM SERPL-SCNC: 141 MMOL/L (ref 136–145)

## 2023-08-28 PROCEDURE — 80048 BASIC METABOLIC PNL TOTAL CA: CPT

## 2023-08-28 PROCEDURE — 36415 COLL VENOUS BLD VENIPUNCTURE: CPT

## 2023-08-29 ENCOUNTER — PATIENT OUTREACH (OUTPATIENT)
Dept: CARE COORDINATION | Facility: CLINIC | Age: 58
End: 2023-08-29
Payer: COMMERCIAL

## 2023-08-29 ASSESSMENT — ACTIVITIES OF DAILY LIVING (ADL): DEPENDENT_IADLS:: TRANSPORTATION

## 2023-08-29 NOTE — PROGRESS NOTES
Clinic Care Coordination Contact  Care Coordination Clinician Chart Review    Situation: Patient chart reviewed by Care Coordinator.       Background: Care Coordination Program started: 12/1/2021. Initial assessment completed and patient-centered care plan(s) were developed with participation from patient. Lead CC handed patient off to CHW for continued outreaches.       Assessment: Per chart review, patient outreach completed by CC CHW on 8/22/2023.  Patient is actively working to accomplish goal(s). Patient's goal(s) appropriate and relevant at this time. Patient is not due for updated Plan of Care.  Assessments will be completed annually or as needed/with change of patient status.      Care Plan: Housing and Support       Problem: Insufficient In-home support I will get connected to resources/supports.       Priority: High Onset Date: 4/1/2022    Note:     Barriers: Access   Strengths: Motivated, family, friends, care team   Date to Achieve By: 5/1/22 extended date 12/20/2022 extended date to 9/27/2023  Patient expressed understanding of goal: Yes    Action steps to achieve this goal:  1. I will start application for social security disability income online. (Completed)  2. I will use [employer] to medical appts.  3. I will talk to my PCP about a scooter when Social Security is approved   4. I will look into other resources as needed (housing, etc).  Completed 6/6/2023  5. I will work with care coordination as needed.    As of today's date 8/22, goal is completed 80%.                     Care Plan: Heart Failure - RN only       Problem: Heart failure maintenance - see below       Goal: Patient will not experience any symptoms of shortness of breath or body swelling over the next 3 - 6 months       Start Date: 1/31/2022 Expected End Date: 7/28/2023    This Visit's Progress: 90% Recent Progress: 80%    Priority: Medium    Note:     Goal Statement: My CHF symptoms will be stable   Barriers: Poor  understanding of CHF diagnosis   Strengths: Motivated to learn   Patient expressed understanding of goal: Yes    Action steps to achieve this goal:  1. I will check my weight daily and call if weight gain is 2-3# in a day or 5# in a week.  2. I will seek medical help if I have increased shortness of breath episodes,fever or coughing up a colored sputum.  3. I will take my medications as prescribed  4. I will keep future Cardiology /primary care appointments.  5. I will follow a low salt diet.                            Care Plan: General       Problem: Use of tobacco products       Goal: Smoking Cessation       Start Date: 4/27/2023 Expected End Date: 6/28/2023    This Visit's Progress: 60% Recent Progress: 40%    Priority: Medium    Note:     Barriers: None identified   Strengths: motivated   Patient expressed understanding of goal: Yes  Action steps to achieve this goal:  1. I will set a date and go cold turkey   2. I will continue to cut back from a full pack to 2 cigarettes a day                                    Plan/Recommendations: The patient will continue working with Care Coordination to achieve goal(s) as above. CHW will continue outreaches at minimum every 30 days and will involve Lead CC as needed or if patient is ready to move to Maintenance. Lead CC will continue to monitor CHW outreaches and patient's progress to goal(s) every 6 weeks.     Plan of Care updated and sent to patient: No    CC RN will do the next call for General assessment     [facility]   Gail Gomez RN, Care Coordinator   [facility]   E-mail [email or web address]   [phone]

## 2023-08-30 DIAGNOSIS — I50.21 ACUTE SYSTOLIC CONGESTIVE HEART FAILURE (H): ICD-10-CM

## 2023-08-31 RX ORDER — METOPROLOL SUCCINATE 25 MG/1
25 TABLET, EXTENDED RELEASE ORAL DAILY
Qty: 90 TABLET | Refills: 3 | OUTPATIENT
Start: 2023-08-31

## 2023-09-26 ENCOUNTER — PATIENT OUTREACH (OUTPATIENT)
Dept: CARE COORDINATION | Facility: CLINIC | Age: 58
End: 2023-09-26
Payer: COMMERCIAL

## 2023-09-26 ASSESSMENT — ACTIVITIES OF DAILY LIVING (ADL): DEPENDENT_IADLS:: TRANSPORTATION

## 2023-09-26 NOTE — PROGRESS NOTES
Clinic Care Coordination Contact  [facility]/Voicemail    Referral Source: ED Follow-Up  Clinical Data: Care Coordinator Outreach  Outreach attempted x 1.  Left message on patient's voicemail with call back information and requested return call. Left a message he is due for a preventative visit with PCP   Plan: . Care Coordinator will try to reach patient again in 10 business days.    [facility]   Gail Gomez RN, Care Coordinator   [facility]   E-mail [email or web address]   [phone]

## 2023-10-05 ENCOUNTER — LAB REQUISITION (OUTPATIENT)
Dept: LAB | Facility: CLINIC | Age: 58
End: 2023-10-05
Payer: COMMERCIAL

## 2023-10-05 DIAGNOSIS — F11.20 OPIOID DEPENDENCE, UNCOMPLICATED (H): ICD-10-CM

## 2023-10-05 LAB — CREAT UR-MCNC: 247 MG/DL

## 2023-10-05 PROCEDURE — 80354 DRUG SCREENING FENTANYL: CPT | Mod: ORL | Performed by: INTERNAL MEDICINE

## 2023-10-05 PROCEDURE — 80355 GABAPENTIN NON-BLOOD: CPT | Mod: ORL | Performed by: INTERNAL MEDICINE

## 2023-10-05 PROCEDURE — 80353 DRUG SCREENING COCAINE: CPT | Mod: ORL | Performed by: INTERNAL MEDICINE

## 2023-10-06 DIAGNOSIS — I50.21 ACUTE SYSTOLIC CONGESTIVE HEART FAILURE (H): ICD-10-CM

## 2023-10-10 RX ORDER — METOPROLOL SUCCINATE 25 MG/1
25 TABLET, EXTENDED RELEASE ORAL DAILY
Qty: 90 TABLET | Refills: 3 | Status: SHIPPED | OUTPATIENT
Start: 2023-10-10

## 2023-10-11 ENCOUNTER — TELEPHONE (OUTPATIENT)
Dept: FAMILY MEDICINE | Facility: CLINIC | Age: 58
End: 2023-10-11
Payer: COMMERCIAL

## 2023-10-11 ENCOUNTER — PATIENT OUTREACH (OUTPATIENT)
Dept: CARE COORDINATION | Facility: CLINIC | Age: 58
End: 2023-10-11
Payer: COMMERCIAL

## 2023-10-11 LAB
6MAM UR CFM-MCNC: 152 NG/ML
6MAM/CREAT UR: 62 NG/MG {CREAT}
AMPHET UR CFM-MCNC: ABNORMAL NG/ML
AMPHET/CREAT UR: 4089 NG/MG {CREAT}
BUPRENORPHINE UR CFM-MCNC: 20 NG/ML
BUPRENORPHINE/CREAT UR: 8 NG/MG {CREAT}
CODEINE UR CFM-MCNC: 233 NG/ML
CODEINE/CREAT UR: 94 NG/MG {CREAT}
FENTANYL UR CFM-MCNC: 754 NG/ML
FENTANYL/CREAT UR: 305 NG/MG {CREAT}
HYDROMORPHONE UR CFM-MCNC: 72 NG/ML
HYDROMORPHONE/CREAT UR: 29 NG/MG {CREAT}
METHAMPHET UR CFM-MCNC: ABNORMAL NG/ML
METHAMPHET/CREAT UR: ABNORMAL
MORPHINE UR CFM-MCNC: >7000 NG/ML
MORPHINE/CREAT UR: ABNORMAL
NALOXONE UR CFM-MCNC: 28 NG/ML
NALOXONE: 11 NG/MG {CREAT}
NORBUPRENORPHINE UR CFM-MCNC: 190 NG/ML
NORBUPRENORPHINE/CREAT UR: 77 NG/MG {CREAT}
NORFENTANYL UR CFM-MCNC: 2480 NG/ML
NORFENTANYL/CREAT UR: 1004 NG/MG {CREAT}

## 2023-10-11 ASSESSMENT — ACTIVITIES OF DAILY LIVING (ADL): DEPENDENT_IADLS:: TRANSPORTATION

## 2023-10-11 NOTE — PROGRESS NOTES
"No complaints.  Pain controlled with oral pain meds.    Smoking Intervention:  Patient was counseled today on the ill effects of smoking and it's effects on wound healing as well as other post-surgical morbidities.    BP (!) 143/94 (BP Location: Right arm, Patient Position: Chair, Cuff Size: Adult Large)  Pulse 83  Temp 98.1  F (36.7  C) (Oral)  Ht 1.93 m (6' 4\")  Wt 90.7 kg (200 lb)  BMI 24.34 kg/m2    Exam  [id number]  CTAB  RRR  S&NTND+BS, wounds - cdi s erythema  No CCE    A/P s/p lap BIH healing well.  No heavy lifting for 2 weeks.  RTC prn.    Shay Mercado MD   "
Ther Exercise 10 1   []  Manual Therapy     []  Ther Activities     []  Aquatics     []  Vasocompression     []  Other       TOTAL TREATMENT TIME: 50min      Time in:10:20am     Time out:11:10am    Electronically signed by: Sixto Kan PT        Physician Signature:________________________________Date:__________________  By signing above or cosigning this note, I have reviewed this plan of care and certify a need for medically necessary rehabilitation services.      *PLEASE SIGN ABOVE AND FAX BACK ALL PAGES*

## 2023-10-11 NOTE — LETTER
[facility]  Patient Centered Plan of Care  [facility] CARE COORDINATION  [address] [facility] [address]     October 11, 2023        Micah Nunez  [address]  [place] [address]          Dear Joaquina Obrien is an updated Patient Centered Plan of Care for your continued enrollment in Care Coordination. Please let us know if you have additional questions, concerns, or goals that we can assist with.    Just a reminder you are due for a yearly physical with Dr White     Sincerely,    [facility]   Gail Gomez RN, Care Coordinator   [facility]'s   E-mail [email or web address]   [phone]          About Me:        Patient Name:  Micah Nunez    YOB: 1965  Age:         57 year old   [place] MRN:    [id number] Telephone Information:  Home Phone [phone]   Mobile [phone]       Address:  [address]  [place] [address] Email address:  [email or web address]      Emergency Contact(s)    Name Relationship Lgl Grd Work Phone Home Phone Mobile Phone   1. MELISA Mother    [phone]   2. PADDLEFORT,KAY* Friend    [phone]           Primary language:  English     needed? No   [place] [employer] [facility]:  [phone] op. 1  Other communication barriers:None    Preferred Method of Communication:  Mail  Current living arrangement: I live in a private home with family; Other (w/ parents)    Mobility Status/ Medical Equipment: Independent w/Device        Health Maintenance  Health Maintenance Reviewed: Due/Overdue   Health Maintenance Due   Topic Date Due    HF ACTION PLAN  Never done    HEPATITIS B IMMUNIZATION (1 of 3 - 3-dose series) Never done    COVID-19 Vaccine (1) Never done    Pneumococcal Vaccine: Pediatrics (0 to 5 Years) and At-Risk Patients (6 to 64 Years) (1 - PCV) Never done    HEPATITIS A IMMUNIZATION (1 of 2 - Risk 2-dose series) Never done    ZOSTER  IMMUNIZATION (1 of 2) Never done    LUNG CANCER SCREENING  11/06/2022    YEARLY PREVENTIVE VISIT  04/19/2023    LIPID  04/19/2023    INFLUENZA VACCINE (1) 09/01/2023    NICOTINE/TOBACCO CESSATION COUNSELING Q 1 YR  10/28/2023          My Access Plan  Medical Emergency 911   Primary Clinic Line [facility] - [phone]   24 Hour Appointment Line [phone] or  [phone]) (toll-free)   24 Hour Nurse Line [phone] (toll-free)   Preferred Urgent Care [facility], [phone]     Preferred Hospital [place], [place]  [phone]     Preferred Pharmacy [facility] [address] [facility]     Behavioral Health Crisis Line The National Suicide Prevention Lifeline at 1-455.608.1676 or Text/Call 826         My Care Team Members  Patient Care Team         Relationship Specialty Notifications Start End    Yovany White MD PCP - General Family Practice  2/5/15     referring to ENT    Phone: [phone] Fax: [phone] [address] [facility] [address]    Bobo Renteria MD MD Otolaryngology  11/30/15     Phone: [phone] Fax: [phone]         [address] [place] [address]    Yovany White MD Assigned PCP   2/19/17     Phone: [phone] Fax: [phone] [address] [facility] [address]    Gail Gomez, RN Lead Care Coordinator Primary Care - CC Admissions 12/1/21     Phone: [phone]         Anjana Corcoran RPH Pharmacist Pharmacist Ambulatory Care  10/28/22     Phone: [phone]          [address] [facility] [address]    Anjana Corcoran RPH Assigned MTM Pharmacist   11/5/22     Phone: [phone] [address] [facility] [address]    Maureen Dove PA-C Assigned Surgical Provider   1/28/23     Phone: [phone] Fax: [phone] [address] [facility] [address]    Chanel Harris  Health Worker Primary Care - CC Admissions 6/6/23     Phone: [phone]         Vicki Sosa PA-C Assigned Heart and Vascular Provider   7/8/23     Phone: [phone] Fax: [phone] [address] [facility]  [facility] [address]              My Care Plans  Self Management and Treatment Plan  Care Plan  Care Plan: Housing and Support       Problem: Insufficient In-home support I will get connected to resources/supports.       Priority: High Onset Date: 4/1/2022    Note:     Barriers: Access   Strengths: Motivated, family, friends, care team   Date to Achieve By: 5/1/22 extended date 12/20/2022 extended date to 9/27/2023  Patient expressed understanding of goal: Yes    Action steps to achieve this goal:  1. I will start application for social security disability income online. (Completed) Nov 1 will hear about disability approval  2. I will use [employer] to medical appts.  3. I will talk to my PCP about a scooter when Social Security is approved   4. I will look into other resources as needed (housing, etc).  Completed 6/6/2023  5. I will work with care coordination as needed.    As of today's date 8/22, goal is completed 80%.   As of today's date 10/11/2023 goal is met at 76 - 100%.   Goal Status:  Active                     Care Plan: Heart Failure - RN only       Problem: Heart failure maintenance - see below       Goal: Patient will not experience any symptoms of shortness of breath or body swelling over the next 3 - 6 months       Start Date: 1/31/2022 Expected End Date: 12/20/2024    This Visit's Progress: 90% Recent Progress: 90%    Priority: Medium    Note:     Goal Statement: My CHF symptoms will be stable   Barriers: Poor understanding of CHF diagnosis   Strengths: Motivated to learn   Patient expressed understanding of goal: Yes    Action steps to achieve this goal:  1. I will check my weight daily and call if weight gain is 2-3# in a day or 5# in a week.  2. I will seek  medical help if I have increased shortness of breath episodes,fever or coughing up a colored sputum.  3. I will take my medications as prescribed  4. I will keep future Cardiology /primary care appointments.  5. I will follow a low salt diet.                            Care Plan: General       Problem: Use of tobacco products       Goal: Smoking Cessation       Start Date: 4/27/2023 Expected End Date: 1/20/2024    Recent Progress: 60%    Priority: Medium    Note:     Barriers: None identified   Strengths: motivated   Patient expressed understanding of goal: Yes  Action steps to achieve this goal:  1. I will set a date and go cold turkey   2. 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)    Non compliance with medical treatment    Anasarca    Elevated troponin    Lab test negative for COVID-19 virus    Mitral regurgitation - severe    Chronic heart failure with preserved ejection fraction (H)    S/P mitral valve clip implantation      Current Medications and Allergies:    Current Outpatient Medications   Medication    atorvastatin (LIPITOR) 40 MG tablet    furosemide (LASIX) 20 MG tablet    furosemide (LASIX) 20 MG tablet    lisinopril (ZESTRIL) 20 MG tablet    metoprolol succinate ER (TOPROL XL) 25 MG 24 hr tablet    SM ASPIRIN LOW DOSE 81 MG EC tablet    SUBOXONE 12-3 MG FILM per film     No current facility-administered medications for this visit.     Facility-Administered Medications Ordered in Other Visits   Medication    Self Administer Medications: Behavioral Services      Care  Coordination Start Date: 12/1/2021   Frequency of Care Coordination: monthly     Form Last Updated: 10/11/2023

## 2023-10-11 NOTE — PROGRESS NOTES
Clinic Care Coordination Contact  Follow Up Progress Note      Assessment:   Yearly assessment and goal review today   Brief conversation due to the patient is helping his friend move today   Housing support:  Patient has a hearing with his  November 8 and then he will know more about disability approval   Heart Failure management :  Patient denies any increase in SOB episodes or leg swelling and weight is stable  Patient just had a visit with his [place] Cardiologist .    Smoking cessation :  Patient continues to smoke 2 cigarettes a day   Patient states this will be his New Years resolution   Care Gaps:    Health Maintenance Due   Topic Date Due    HF ACTION PLAN  Never done    HEPATITIS B IMMUNIZATION (1 of 3 - 3-dose series) Never done    COVID-19 Vaccine (1) Never done    Pneumococcal Vaccine: Pediatrics (0 to 5 Years) and At-Risk Patients (6 to 64 Years) (1 - PCV) Never done    HEPATITIS A IMMUNIZATION (1 of 2 - Risk 2-dose series) Never done    ZOSTER IMMUNIZATION (1 of 2) Never done    LUNG CANCER SCREENING  11/06/2022    YEARLY PREVENTIVE VISIT  04/19/2023    LIPID  04/19/2023    INFLUENZA VACCINE (1) 09/01/2023    NICOTINE/TOBACCO CESSATION COUNSELING Q 1 YR  10/28/2023       Patient is due for a preventative visit     Care Plans  Care Plan: Housing and Support       Problem: Insufficient In-home support I will get connected to resources/supports.       Priority: High Onset Date: 4/1/2022    Note:     Barriers: Access   Strengths: Motivated, family, friends, care team   Date to Achieve By: 5/1/22 extended date 12/20/2022 extended date to 9/27/2023  Patient expressed understanding of goal: Yes    Action steps to achieve this goal:  1. I will start application for social security disability income online. (Completed) Nov 1 will hear about disability approval  2. I will use [employer] to medical appts.  3. I will talk to my PCP about a scooter when Social Security is approved   4. I will  look into other resources as needed (housing, etc).  Completed 6/6/2023  5. I will work with care coordination as needed.    As of today's date 8/22, goal is completed 80%.   As of today's date 10/11/2023 goal is met at 76 - 100%.   Goal Status:  Active                     Care Plan: Heart Failure - RN only       Problem: Heart failure maintenance - see below       Goal: Patient will not experience any symptoms of shortness of breath or body swelling over the next 3 - 6 months       Start Date: 1/31/2022 Expected End Date: 12/20/2024    This Visit's Progress: 90% Recent Progress: 90%    Priority: Medium    Note:     Goal Statement: My CHF symptoms will be stable   Barriers: Poor understanding of CHF diagnosis   Strengths: Motivated to learn   Patient expressed understanding of goal: Yes    Action steps to achieve this goal:  1. I will check my weight daily and call if weight gain is 2-3# in a day or 5# in a week.  2. I will seek medical help if I have increased shortness of breath episodes,fever or coughing up a colored sputum.  3. I will take my medications as prescribed  4. I will keep future Cardiology /primary care appointments.  5. I will follow a low salt diet.                            Care Plan: General       Problem: Use of tobacco products       Goal: Smoking Cessation       Start Date: 4/27/2023 Expected End Date: 1/20/2024    Recent Progress: 60%    Priority: Medium    Note:     Barriers: None identified   Strengths: motivated   Patient expressed understanding of goal: Yes  Action steps to achieve this goal:  1. I will set a date and go cold turkey   2. I will continue to cut back from a full pack to 2 cigarettes a day                                 Intervention/Education provided during outreach: CC available for any future questions or concerns      Outreach Frequency: monthly        Plan:   CHW will continue monthly calls   CC RN will review chart every 6 weeks   Care plan updated and a reminder  sent he is due for a preventative visit with PCP     [facility]   Gail Gomez RN, Care Coordinator   [facility]'s   E-mail [email or web address]   [phone]

## 2023-10-19 ENCOUNTER — LAB REQUISITION (OUTPATIENT)
Dept: LAB | Facility: CLINIC | Age: 58
End: 2023-10-19
Payer: COMMERCIAL

## 2023-10-19 DIAGNOSIS — F11.20 OPIOID DEPENDENCE, UNCOMPLICATED (H): ICD-10-CM

## 2023-10-19 LAB — CREAT UR-MCNC: 284 MG/DL

## 2023-10-19 PROCEDURE — 80354 DRUG SCREENING FENTANYL: CPT | Mod: ORL | Performed by: PEDIATRICS

## 2023-10-19 PROCEDURE — 80346 BENZODIAZEPINES1-12: CPT | Mod: ORL | Performed by: PEDIATRICS

## 2023-10-19 NOTE — TELEPHONE ENCOUNTER
Per Dr. White,  I don't do disability assessments. Melindachacortafartun needs to see an occupational medicine physician. Patient and law office notified.

## 2023-10-24 ENCOUNTER — TELEPHONE (OUTPATIENT)
Dept: CARDIOLOGY | Facility: CLINIC | Age: 58
End: 2023-10-24
Payer: COMMERCIAL

## 2023-10-24 NOTE — TELEPHONE ENCOUNTER
Noted patient last seen by Dr. Jackson 1-16-23 - seen by Vicki KHAN 8-7-23 s/p [employer] x 1 on July 18, 2022.    Left msg for patient requesting call back to address questions and sched yrly follow-up due 1/2024.  mg

## 2023-10-24 NOTE — TELEPHONE ENCOUNTER
[facility] Call Center    Phone Message    May a detailed message be left on voicemail: yes     Reason for Call: Other: Pt missed call, please reach out to pt again     Action Taken: Other: Cardiology    Travel Screening: Not Applicable    Thank you!  [facility]                                                                       suicide attempt

## 2023-10-24 NOTE — TELEPHONE ENCOUNTER
[facility] Call Center    Phone Message    May a detailed message be left on voicemail: yes     Reason for Call: Other: Please call patient with current health status for disability hearing. No forms needed, pt just needs to speak with someone.      Action Taken: Other: cardio    Travel Screening: Not Applicable

## 2023-10-25 ENCOUNTER — TELEPHONE (OUTPATIENT)
Dept: CARDIOLOGY | Facility: CLINIC | Age: 58
End: 2023-10-25
Payer: COMMERCIAL

## 2023-10-25 NOTE — TELEPHONE ENCOUNTER
[facility] Call Center    Phone Message    May a detailed message be left on voicemail: yes     Reason for Call: Other: Pt called in stating he has diablity hearing and would like for the cardiologist to update his mychart. Please call pt back for further discussion [phone]     Action Taken: Other: cardiology    Travel Screening: Not Applicable    Thank you!  [facility] Center

## 2023-10-25 NOTE — TELEPHONE ENCOUNTER
Return call to patient after receiving another phone encounter 10-25-23 @ 2336 for same reason.    Informed patient that he has not seen Dr. Jackson since 1/2023 - patient stated msg should have been sent to provider he last saw in August because he is requesting his medical records be updated to reflect most recent echo showing some leakage and plan for future echo so information available at his disability hearing.    Confirmed patient has disability  working his case - explained to patient that his  should be requesting his medical records thru HIS - patient response was that he doesn't know what  has done, does not see him until day before hearing and he is wanting to be proactive.    Attempted to explain process of obtaining medical records for disability hearing and patient abruptly hung up after stating he will call someone else.  mg

## 2023-10-26 LAB
6MAM UR CFM-MCNC: 72 NG/ML
6MAM/CREAT UR: 25 NG/MG {CREAT}
AMPHET UR CFM-MCNC: ABNORMAL NG/ML
AMPHET/CREAT UR: ABNORMAL
BUPRENORPHINE UR CFM-MCNC: 50 NG/ML
BUPRENORPHINE/CREAT UR: 18 NG/MG {CREAT}
CODEINE UR CFM-MCNC: 276 NG/ML
CODEINE/CREAT UR: 97 NG/MG {CREAT}
FENTANYL UR CFM-MCNC: 774 NG/ML
FENTANYL/CREAT UR: 273 NG/MG {CREAT}
HYDROMORPHONE UR CFM-MCNC: 61 NG/ML
HYDROMORPHONE/CREAT UR: 21 NG/MG {CREAT}
METHAMPHET UR CFM-MCNC: ABNORMAL NG/ML
METHAMPHET/CREAT UR: ABNORMAL
MORPHINE UR CFM-MCNC: >7000 NG/ML
MORPHINE/CREAT UR: ABNORMAL
NALOXONE UR CFM-MCNC: 66 NG/ML
NALOXONE: 23 NG/MG {CREAT}
NORBUPRENORPHINE UR CFM-MCNC: 338 NG/ML
NORBUPRENORPHINE/CREAT UR: 119 NG/MG {CREAT}
NORFENTANYL UR CFM-MCNC: >3000 NG/ML
NORFENTANYL/CREAT UR: ABNORMAL

## 2023-11-06 DIAGNOSIS — I50.21 ACUTE SYSTOLIC CONGESTIVE HEART FAILURE (H): ICD-10-CM

## 2023-11-09 RX ORDER — ASPIRIN 81 MG/1
81 TABLET, COATED ORAL DAILY
Qty: 90 TABLET | Refills: 3 | OUTPATIENT
Start: 2023-11-09

## 2023-11-16 ENCOUNTER — PATIENT OUTREACH (OUTPATIENT)
Dept: CARE COORDINATION | Facility: CLINIC | Age: 58
End: 2023-11-16
Payer: COMMERCIAL

## 2023-11-16 NOTE — PROGRESS NOTES
Clinic Care Coordination Contact  Community Health Worker Follow Up    Care Gaps:   Health Maintenance Due   Topic Date Due    HF ACTION PLAN  Never done    HEPATITIS B IMMUNIZATION (1 of 3 - 3-dose series) Never done    COVID-19 Vaccine (1) Never done    Pneumococcal Vaccine: Pediatrics (0 to 5 Years) and At-Risk Patients (6 to 64 Years) (1 - PCV) Never done    HEPATITIS A IMMUNIZATION (1 of 2 - Risk 2-dose series) Never done    ZOSTER IMMUNIZATION (1 of 2) Never done    LUNG CANCER SCREENING  11/06/2022    YEARLY PREVENTIVE VISIT  04/19/2023    LIPID  04/19/2023    INFLUENZA VACCINE (1) 09/01/2023    NICOTINE/TOBACCO CESSATION COUNSELING Q 1 YR  10/28/2023     Postponed to Next CHW Outreach - Patient had limited time to follow up with CHW     Care Plan:   Care Plan: Heart Failure - RN only       Problem: Heart failure maintenance - see below       Goal: Patient will not experience any symptoms of shortness of breath or body swelling over the next 3 - 6 months       Start Date: 1/31/2022 Expected End Date: 12/20/2024    This Visit's Progress: 90% Recent Progress: 90%    Priority: Medium    Note:     Goal Statement: My CHF symptoms will be stable   Barriers: Poor understanding of CHF diagnosis   Strengths: Motivated to learn   Patient expressed understanding of goal: Yes    Action steps to achieve this goal:  1. I will check my weight daily and call if weight gain is 2-3# in a day or 5# in a week.  2. I will seek medical help if I have increased shortness of breath episodes,fever or coughing up a colored sputum.  3. I will take my medications as prescribed  4. I will keep future Cardiology /primary care appointments.  5. I will follow a low salt diet.                            Care Plan: General       Problem: Use of tobacco products       Goal: Smoking Cessation       Start Date: 4/27/2023 Expected End Date: 1/20/2024    This Visit's Progress: 70% Recent Progress: 60%    Priority: Medium    Note:     Barriers:  None identified   Strengths: motivated   Patient expressed understanding of goal: Yes  Action steps to achieve this goal:  1. I will set a date and go cold turkey   2. I will continue to cut back from a full pack to 2 cigarettes a day                               Intervention and Education during outreach:     Spoke to Patient (Micah)  CHW Introduced intent of call regarding monthly follow up.   Patients reports he is doing good. Further expressing that Patient was approved for Social Security Disability last week. Patient further expressed that he has also been approved for Housing. Patient further expressed that he is feeling like things are getting better and moving along. Patient further expressing that he is still cutting back to a couple cigarettes a day. Patient further expressing that he believes January 1st will be a set date for him. Patient further expressed no additional support or resources is needed at this time.   CHW encourages Patient to contact CHW/ CCC Team if additional support is needed. CHW provides Patient with CHW's contact information. Patient acknowledged.    CHW Plan: Next CHW Outreach in One Month     ALLIE Ramos  Clinic Care Coordination   [facility]   Phone: [phone]  [email or web address]

## 2023-11-21 ENCOUNTER — PATIENT OUTREACH (OUTPATIENT)
Dept: CARE COORDINATION | Facility: CLINIC | Age: 58
End: 2023-11-21
Payer: COMMERCIAL

## 2023-11-21 ASSESSMENT — ACTIVITIES OF DAILY LIVING (ADL): DEPENDENT_IADLS:: TRANSPORTATION

## 2023-11-21 NOTE — PROGRESS NOTES
Clinic Care Coordination Contact  Care Coordination Clinician Chart Review    Situation: Patient chart reviewed by Care Coordinator.       Background: Care Coordination Program started: 12/1/2021. Initial assessment completed and patient-centered care plan(s) were developed with participation from patient. Lead CC handed patient off to CHW for continued outreaches.       Assessment: Per chart review, patient outreach completed by CC CHW on 11/16/2023.  Patient is actively working to accomplish goal(s). Patient's goal(s) appropriate and relevant at this time. Patient is not due for updated Plan of Care.  Assessments will be completed annually or as needed/with change of patient status.      Care Plan: Heart Failure - RN only       Problem: Heart failure maintenance - see below       Goal: Patient will not experience any symptoms of shortness of breath or body swelling over the next 3 - 6 months       Start Date: 1/31/2022 Expected End Date: 12/20/2024    This Visit's Progress: 90% Recent Progress: 90%    Priority: Medium    Note:     Goal Statement: My CHF symptoms will be stable   Barriers: Poor understanding of CHF diagnosis   Strengths: Motivated to learn   Patient expressed understanding of goal: Yes    Action steps to achieve this goal:  1. I will check my weight daily and call if weight gain is 2-3# in a day or 5# in a week.  2. I will seek medical help if I have increased shortness of breath episodes,fever or coughing up a colored sputum.  3. I will take my medications as prescribed  4. I will keep future Cardiology /primary care appointments.  5. I will follow a low salt diet.                            Care Plan: General       Problem: Use of tobacco products       Goal: Smoking Cessation       Start Date: 4/27/2023 Expected End Date: 1/20/2024    This Visit's Progress: 70% Recent Progress: 60%    Priority: Medium    Note:     Barriers: None identified   Strengths: motivated   Patient expressed understanding  of goal: Yes  Action steps to achieve this goal:  1. I will set a date and go cold turkey   2. I will continue to cut back from a full pack to 2 cigarettes a day                                    Plan/Recommendations: The patient will continue working with Care Coordination to achieve goal(s) as above. CHW will continue outreaches at minimum every 30 days and will involve Lead CC as needed or if patient is ready to move to Maintenance. Lead CC will continue to monitor CHW outreaches and patient's progress to goal(s) every 6 weeks.     Plan of Care updated and sent to patient: Shanae    [facility]   Gail Gomez RN, Care Coordinator   [facility]'s   E-mail [email or web address]   [phone]

## 2023-11-22 ENCOUNTER — MYC MEDICAL ADVICE (OUTPATIENT)
Dept: PHARMACY | Facility: OTHER | Age: 58
End: 2023-11-22
Payer: COMMERCIAL

## 2023-11-22 DIAGNOSIS — I34.0 MITRAL VALVE INSUFFICIENCY, UNSPECIFIED ETIOLOGY: ICD-10-CM

## 2023-11-27 RX ORDER — LISINOPRIL 20 MG/1
20 TABLET ORAL DAILY
Qty: 90 TABLET | Refills: 1 | Status: SHIPPED | OUTPATIENT
Start: 2023-11-27 | End: 2024-05-13

## 2023-12-11 DIAGNOSIS — I50.32 CHRONIC HEART FAILURE WITH PRESERVED EJECTION FRACTION (H): ICD-10-CM

## 2023-12-11 DIAGNOSIS — Z98.890 S/P MITRAL VALVE CLIP IMPLANTATION: ICD-10-CM

## 2023-12-11 DIAGNOSIS — Z95.818 S/P MITRAL VALVE CLIP IMPLANTATION: ICD-10-CM

## 2023-12-15 RX ORDER — FUROSEMIDE 20 MG
TABLET ORAL
Qty: 30 TABLET | Refills: 3 | Status: SHIPPED | OUTPATIENT
Start: 2023-12-15 | End: 2024-06-21

## 2023-12-20 ENCOUNTER — PATIENT OUTREACH (OUTPATIENT)
Dept: CARE COORDINATION | Facility: CLINIC | Age: 58
End: 2023-12-20
Payer: COMMERCIAL

## 2023-12-20 NOTE — PROGRESS NOTES
Clinic Care Coordination Contact  [facility]/Voicemail    Clinical Data: Care Coordinator Outreach    Outreach Documentation Number of Outreach Attempt   12/20/2023  10:33 AM 1       Left message on patient's voicemail with call back information and requested return call.    Plan: Care Coordinator will try to reach patient again in 10 business days.    ALLIE Richardson  [facility] Care Coordination  [facility]  [phone]

## 2024-01-08 ENCOUNTER — PATIENT OUTREACH (OUTPATIENT)
Dept: CARE COORDINATION | Facility: CLINIC | Age: 59
End: 2024-01-08
Payer: COMMERCIAL

## 2024-01-08 NOTE — LETTER
[facility]  Patient Centered Plan of Care  [facility] CARE COORDINATION  [address] [facility] [address]     January 9, 2024        Micah MAYELA Nunez  [address]  [place] [address]          Dear Joaquina Obrien is an updated Patient Centered Plan of Care for your continued enrollment in Care Coordination. Please let us know if you have additional questions, concerns, or goals that we can assist with.    Sincerely,    [facility]   Gail Gomez RN, Care Coordinator   [facility]'s   E-mail [email or web address]   [phone]          About Me:        Patient Name:  Micah Nunez    YOB: 1965  Age:         58 year old   [facility] MRN:    [id number] Telephone Information:  Home Phone [phone]   Mobile [phone]       Address:  [address]  [place] [address] Email address:  [email or web address]      Emergency Contact(s)    Name Relationship Lgl Grd Work Phone Home Phone Mobile Phone   1. MELISA Mother    [phone]   2. PADDLEFORT,KAY* Friend    [phone]           Primary language:  English     needed? No   [facility] [employer]:  [phone] op. 1  Other communication barriers:None    Preferred Method of Communication:  Mail  Current living arrangement: I live in a private home with family; Other (w/ parents)    Mobility Status/ Medical Equipment: Independent w/Device        Health Maintenance  Health Maintenance Reviewed:   Health Maintenance Due   Topic Date Due    HF ACTION PLAN  Never done    HEPATITIS B IMMUNIZATION (1 of 3 - 3-dose series) Never done    COVID-19 Vaccine (1) Never done    Pneumococcal Vaccine: Pediatrics (0 to 5 Years) and At-Risk Patients (6 to 64 Years) (1 of 2 - PCV) Never done    HEPATITIS A IMMUNIZATION (1 of 2 - Risk 2-dose series) Never done    ZOSTER IMMUNIZATION (1 of 2) Never done    LUNG CANCER SCREENING  11/06/2022     YEARLY PREVENTIVE VISIT  04/19/2023    LIPID  04/19/2023    INFLUENZA VACCINE (1) 09/01/2023    NICOTINE/TOBACCO CESSATION COUNSELING Q 1 YR  10/28/2023    PHQ-2 (once per calendar year)  01/01/2024          My Access Plan  Medical Emergency 911   Primary Clinic Line [facility] - [phone]   24 Hour Appointment Line [phone] or  [phone]) (toll-free)   24 Hour Nurse Line [phone] (toll-free)   Preferred Urgent Care [facility], [phone]     Preferred Hospital [place] [place]  [phone]     Preferred Pharmacy [facility] [address] [facility]     Behavioral Health Crisis Line The National Suicide Prevention Lifeline at 1-688.538.9704 or Text/Call 652           My Care Team Members  Patient Care Team         Relationship Specialty Notifications Start End    Yovany White MD PCP - General Family Practice  2/5/15     referring to ENT    Phone: [phone] Fax: [phone] [address] [facility] [address]    Bobo Renteria MD MD Otolaryngology  11/30/15     Phone: [phone] Fax: [phone]         [address] [place] [address]    Yovany White MD Assigned PCP   2/19/17     Phone: [phone] Fax: [phone]         [facility] [facility] [address]    Gail Gomez, RN Lead Care Coordinator Primary Care - CC Admissions 12/1/21     Phone: [phone]         Anjana Corcoran RPH Pharmacist Pharmacist Ambulatory Care  10/28/22     Phone: [phone]          [address] [facility] [address]    Anjana Corcoran RPH Assigned MTM Pharmacist   11/5/22     Phone: [phone] [address] [facility] [address]    Maureen Dove PA-C Assigned Surgical Provider   1/28/23     Phone: [phone] Fax: [phone] [address] [facility] [address]    Vicki Sosa PA-C Assigned Heart and Vascular  Provider   7/8/23     Phone: [phone] Fax: [phone] [address] [facility]  [facility] [address]    Daisha Mireles, W Community Health Worker  Admissions 11/20/23                 My Care Plans  Self Management and Treatment Plan    Care Plan  Care Plan: Heart Failure - RN only       Problem: Heart failure maintenance - see below       Goal: Patient will not experience any symptoms of shortness of breath or body swelling over the next 3 - 6 months       Start Date: 1/31/2022 Expected End Date: 2/23/2024    Recent Progress: 90%    Priority: Medium    Note:     Goal Statement: My CHF symptoms will be stable   Barriers: Poor understanding of CHF diagnosis   Strengths: Motivated to learn   Patient expressed understanding of goal: Yes    Action steps to achieve this goal:  1. I will check my weight daily and call if weight gain is 2-3# in a day or 5# in a week.  2. I will seek medical help if I have increased shortness of breath episodes,fever or coughing up a colored sputum.  3. I will take my medications as prescribed  4. I will keep future Cardiology /primary care appointments.  5. I will follow a low salt diet.                            Care Plan: General       Problem: Use of tobacco products       Goal: Smoking Cessation       Start Date: 4/27/2023 Expected End Date: 2/23/2024    Recent Progress: 70%    Priority: Medium    Note:     Barriers: None identified   Strengths: motivated   Patient expressed understanding of goal: Yes  Action steps to achieve this goal:  1. I will set a date and go cold turkey   2. 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)    Non compliance with medical treatment    Anasarca    Elevated troponin    Lab test negative for COVID-19 virus    Mitral regurgitation - severe    Chronic heart failure with preserved ejection fraction (H)    S/P mitral valve clip implantation      Current Medications and Allergies:    Current Outpatient Medications   Medication    atorvastatin (LIPITOR) 40 MG tablet    furosemide (LASIX) 20 MG tablet    furosemide (LASIX) 20 MG tablet    lisinopril (ZESTRIL) 20 MG tablet    metoprolol succinate ER (TOPROL XL) 25 MG 24 hr tablet    SM ASPIRIN LOW DOSE 81 MG EC tablet    SUBOXONE 12-3 MG FILM per film     No current facility-administered medications for this visit.     Facility-Administered Medications Ordered in Other Visits   Medication    Self Administer Medications: Behavioral Services        Care Coordination Start Date: 12/1/2021   Frequency of Care Coordination: monthly, more frequently as needed     Form Last Updated: 01/09/2024

## 2024-01-08 NOTE — PROGRESS NOTES
Clinic Care Coordination Contact  Community Health Worker Follow Up    Care Gaps:     Health Maintenance Due   Topic Date Due    HF ACTION PLAN  Never done    HEPATITIS B IMMUNIZATION (1 of 3 - 3-dose series) Never done    COVID-19 Vaccine (1) Never done    Pneumococcal Vaccine: Pediatrics (0 to 5 Years) and At-Risk Patients (6 to 64 Years) (1 of 2 - PCV) Never done    HEPATITIS A IMMUNIZATION (1 of 2 - Risk 2-dose series) Never done    ZOSTER IMMUNIZATION (1 of 2) Never done    LUNG CANCER SCREENING  11/06/2022    YEARLY PREVENTIVE VISIT  04/19/2023    LIPID  04/19/2023    INFLUENZA VACCINE (1) 09/01/2023    NICOTINE/TOBACCO CESSATION COUNSELING Q 1 YR  10/28/2023    PHQ-2 (once per calendar year)  01/01/2024       Care Gaps Last addressed on 01/08/2024.    Care Plan:   Care Plan: Housing and Support Completed 10/21/2023      Problem: Insufficient In-home support I will get connected to resources/supports.  Resolved 10/21/2023      Priority: High Onset Date: 4/1/2022    Note:     Barriers: Access   Strengths: Motivated, family, friends, care team   Date to Achieve By: 5/1/22 extended date 12/20/2022 extended date to 9/27/2023  Patient expressed understanding of goal: Yes    Action steps to achieve this goal:  1. I will start application for social security disability income online. (Completed) Nov 1 will hear about disability approval  2. I will use [employer] to medical appts.  3. I will talk to my PCP about a scooter when Social Security is approved   4. I will look into other resources as needed (housing, etc).  Completed 6/6/2023  5. I will work with care coordination as needed.    As of today's date 8/22, goal is completed 80%.   As of today's date 10/11/2023 goal is met at 76 - 100%.   Goal Status:  Active         Goal: Establish adequate home support  Completed 8/30/2022      Start Date: 12/1/2021 Expected End Date: 9/1/2022    This Visit's Progress: 100%    Priority: Medium    Note:     Goal  Statement: I will get connected to resources/supports.   Barriers: Access   Strengths: Motivated, family, friends, care team   Patient expressed understanding of goal: Yes    Action steps to achieve this goal:  1. I will start application for social security disability income online. (In Process)  2. I will use [employer] to medical appts.  3. I will talk to my PCP about a cane or other DME. (Had PCP [facility] 4/19/22)  4. I will look into other resources as needed (housing, etc). (Working with [facility], staying in [facility])  5. I will work with care coordination as needed.                              Care Plan: Heart Failure - RN only       Problem: Heart failure maintenance - see below       Goal: Patient will not experience any symptoms of shortness of breath or body swelling over the next 3 - 6 months       Start Date: 1/31/2022 Expected End Date: 12/20/2024    This Visit's Progress: 90% Recent Progress: 90%    Priority: Medium    Note:     Goal Statement: My CHF symptoms will be stable   Barriers: Poor understanding of CHF diagnosis   Strengths: Motivated to learn   Patient expressed understanding of goal: Yes    Action steps to achieve this goal:  1. I will check my weight daily and call if weight gain is 2-3# in a day or 5# in a week.  2. I will seek medical help if I have increased shortness of breath episodes,fever or coughing up a colored sputum.  3. I will take my medications as prescribed  4. I will keep future Cardiology /primary care appointments.  5. I will follow a low salt diet.                            Care Plan: General       Problem: Use of tobacco products       Goal: Smoking Cessation       Start Date: 4/27/2023 Expected End Date: 1/20/2024    This Visit's Progress: 70% Recent Progress: 70%    Priority: Medium    Note:     Barriers: None identified   Strengths: motivated   Patient expressed understanding of goal: Yes  Action steps to achieve this goal:  1. I  will set a date and go cold turkey   2. I will continue to cut back from a full pack to 2 cigarettes a day                               ** CHW spoke to patient who stated he has been doing well. Patient stated he was approved for social security disability and has been receiving those benefits. Patient has an appt this morning at social security office to discuss receiving back pay. Patient stated he is also searching for a new apartment with the help of his county worker, as his current apartment has a rodent infestation and his lease is up end of January. CHW let patient know to reach out if he needs any additional assistance with his search. [facility], patient stated he is doing well with appointment attendance and daily weight checks. Patient stated he has no completely quit tobacco use, but his usage has decreased significantly.    Intervention and Education during outreach: CHW encouraged patient to reach out to CCC if any needs arise.    CHW Plan: next CHW outreach in one month.    ALLIE Richardson  [facility] Care Coordination  [facility]  [phone]

## 2024-01-09 ASSESSMENT — ACTIVITIES OF DAILY LIVING (ADL): DEPENDENT_IADLS:: TRANSPORTATION

## 2024-01-09 NOTE — PROGRESS NOTES
Clinic Care Coordination Contact  Care Coordination Clinician Chart Review    Situation: Patient chart reviewed by Care Coordinator.       Background: Care Coordination Program started: 12/1/2021. Initial assessment completed and patient-centered care plan(s) were developed with participation from patient. Lead CC handed patient off to CHW for continued outreaches.       Assessment: Per chart review, patient outreach completed by CC CHW on 1/8/2024.  Patient is actively working to accomplish goal(s). Patient's goal(s) appropriate and relevant at this time. Patient is due for updated Plan of Care.  Assessments will be completed annually or as needed/with change of patient status.      Care Plan: Heart Failure - RN only       Problem: Heart failure maintenance - see below       Goal: Patient will not experience any symptoms of shortness of breath or body swelling over the next 3 - 6 months       Start Date: 1/31/2022 Expected End Date: 2/23/2024    Recent Progress: 90%    Priority: Medium    Note:     Goal Statement: My CHF symptoms will be stable   Barriers: Poor understanding of CHF diagnosis   Strengths: Motivated to learn   Patient expressed understanding of goal: Yes    Action steps to achieve this goal:  1. I will check my weight daily and call if weight gain is 2-3# in a day or 5# in a week.  2. I will seek medical help if I have increased shortness of breath episodes,fever or coughing up a colored sputum.  3. I will take my medications as prescribed  4. I will keep future Cardiology /primary care appointments.  5. I will follow a low salt diet.                            Care Plan: General       Problem: Use of tobacco products       Goal: Smoking Cessation       Start Date: 4/27/2023 Expected End Date: 2/23/2024    Recent Progress: 70%    Priority: Medium    Note:     Barriers: None identified   Strengths: motivated   Patient expressed understanding of goal: Yes  Action steps to achieve this goal:  1. I will  set a date and go cold turkey   2. I will continue to cut back from a full pack to 2 cigarettes a day                                    Plan/Recommendations: The patient will continue working with Care Coordination to achieve goal(s) as above. CHW will continue outreaches at minimum every 30 days and will involve Lead CC as needed or if patient is ready to move to Maintenance. Lead CC will continue to monitor CHW outreaches and patient's progress to goal(s) every 6 weeks.     Plan of Care updated and sent to patient: Yes, via Mikayla   [facility]   Gail Gomez RN, Care Coordinator   [facility]'s   E-mail [email or web address]   [phone]

## 2024-01-18 ENCOUNTER — TELEPHONE (OUTPATIENT)
Dept: FAMILY MEDICINE | Facility: CLINIC | Age: 59
End: 2024-01-18
Payer: COMMERCIAL

## 2024-01-19 NOTE — TELEPHONE ENCOUNTER
[place] Professional Statement of Need form prepared and routed to Dr. White for review and signature.

## 2024-01-21 NOTE — TELEPHONE ENCOUNTER
Form completed, signed, and copy sent to scan and copy placed in cabinet. Original placed at  for .     Please call patient and notify form is ready for . [phone].  Patient has not been active in My Chart since 10/9/23

## 2024-01-22 NOTE — TELEPHONE ENCOUNTER
Called and spoke with pt. Pt will  form sometime this week. No further questions at this time.    AICHA Packer.

## 2024-01-26 ENCOUNTER — LAB REQUISITION (OUTPATIENT)
Dept: LAB | Facility: CLINIC | Age: 59
End: 2024-01-26
Payer: COMMERCIAL

## 2024-01-26 DIAGNOSIS — F11.20 OPIOID DEPENDENCE, UNCOMPLICATED (H): ICD-10-CM

## 2024-01-26 LAB — CREAT UR-MCNC: 72 MG/DL

## 2024-01-26 PROCEDURE — 80365 DRUG SCREENING OXYCODONE: CPT | Mod: ORL | Performed by: INTERNAL MEDICINE

## 2024-01-26 PROCEDURE — 80367 DRUG SCREENING PROPOXYPHENE: CPT | Mod: ORL | Performed by: INTERNAL MEDICINE

## 2024-01-30 LAB
6MAM UR CFM-MCNC: 16 NG/ML
6MAM/CREAT UR: 22 NG/MG {CREAT}
AMPHET UR CFM-MCNC: 3780 NG/ML
AMPHET/CREAT UR: 5250 NG/MG {CREAT}
BUPRENORPHINE UR CFM-MCNC: 5 NG/ML
BUPRENORPHINE/CREAT UR: 7 NG/MG {CREAT}
FENTANYL UR CFM-MCNC: 306 NG/ML
FENTANYL/CREAT UR: 425 NG/MG {CREAT}
METHAMPHET UR CFM-MCNC: ABNORMAL NG/ML
METHAMPHET/CREAT UR: ABNORMAL
MORPHINE UR CFM-MCNC: 663 NG/ML
MORPHINE/CREAT UR: 921 NG/MG {CREAT}
NALOXONE UR CFM-MCNC: 35 NG/ML
NALOXONE: 49 NG/MG {CREAT}
NORBUPRENORPHINE UR CFM-MCNC: 25 NG/ML
NORBUPRENORPHINE/CREAT UR: 35 NG/MG {CREAT}
NORFENTANYL UR CFM-MCNC: 932 NG/ML
NORFENTANYL/CREAT UR: 1294 NG/MG {CREAT}

## 2024-02-12 ENCOUNTER — PATIENT OUTREACH (OUTPATIENT)
Dept: CARE COORDINATION | Facility: CLINIC | Age: 59
End: 2024-02-12
Payer: COMMERCIAL

## 2024-02-12 NOTE — PROGRESS NOTES
Clinic Care Coordination Contact  [facility]    Clinical Data: Care Coordinator Outreach    Outreach Documentation Number of Outreach Attempt   2/12/2024   9:37 AM 1       Left message on patient's voicemail with call back information and requested return call.    Plan: Care Coordinator will try to reach patient again in 10 business days.    ALLIE Richardson  [facility] Care Coordination  [facility]  [phone]

## 2024-02-14 NOTE — PATIENT INSTRUCTIONS
Scheduled over the counter pain meds.    Cold packs for comfort.   Ibuprofen 600mg orally every 6 hours scheduled for 10 days and/or   Tylenol 650 mg two orally every 12 hours scheduled for 10 days       Can take additional 1300 mg in a 24 hour time     Taking pain medication on a schedule will help to get ahead of the pain.  You are not waiting for the pain to take the medicine.  An example of scheduled routine for every 6 hours is to take 600 mg of ibuprofen at 6 am, 12 pm, 6 pm, 12 am. If this is not working to manage your pain add Tylenol 1300 mg at 6 am and 6 pm.      If you get good pain relief and it returns when stopped consider using these medications longer.  Risks from long term use of ibuprofen and tylenol  are minimal.  Ibuprofen long term can give you stomach inflammation and stomach ulcers, taking with food helps prevent this.  Consuming tylenol when using alcohol regularily can stress the liver.      Maximum dose of ibuprofen is 3200 mg daily.  Maximum dose of Tylenol is 4000 mg a day.  Those with kidney or liver disease need to use less or none at all.   It is safe to use both Tylenol and ibuprofen together scheduled.  Expect the pain relief effect to be more than additive.    Remember that most narcotic pain medications may have ibuprofen or acetaminophen in them and that must be counted into the total amount of either medication for the 24 hours.     Fall with Harm Risk

## 2024-02-16 ENCOUNTER — PATIENT OUTREACH (OUTPATIENT)
Dept: CARE COORDINATION | Facility: CLINIC | Age: 59
End: 2024-02-16
Payer: COMMERCIAL

## 2024-02-16 ASSESSMENT — ACTIVITIES OF DAILY LIVING (ADL): DEPENDENT_IADLS:: TRANSPORTATION

## 2024-02-16 NOTE — PROGRESS NOTES
Clinic Care Coordination Contact  Care Coordination Clinician Chart Review    Situation: Patient chart reviewed by Care Coordinator.       Background: Care Coordination Program started: 12/1/2021. Initial assessment completed and patient-centered care plan(s) were developed with participation from patient. Lead CC handed patient off to CHW for continued outreaches.       Assessment: Per chart review, patient outreach completed by CC CHW on 2/12/2024 left a message and will make another outreach in 10 business days .  Patient is actively working to accomplish goal(s). Patient's goal(s) appropriate and relevant at this time. Patient is not due for updated Plan of Care.  Assessments will be completed annually or as needed/with change of patient status.      Care Plan: Heart Failure - RN only       Problem: Heart failure maintenance - see below       Goal: Patient will not experience any symptoms of shortness of breath or body swelling over the next 3 - 6 months       Start Date: 1/31/2022 Expected End Date: 3/29/2024    Recent Progress: 90%    Priority: Medium    Note:     Goal Statement: My CHF symptoms will be stable   Barriers: Poor understanding of CHF diagnosis   Strengths: Motivated to learn   Patient expressed understanding of goal: Yes    Action steps to achieve this goal:  1. I will check my weight daily and call if weight gain is 2-3# in a day or 5# in a week.  2. I will seek medical help if I have increased shortness of breath episodes,fever or coughing up a colored sputum.  3. I will take my medications as prescribed  4. I will keep future Cardiology /primary care appointments.  5. I will follow a low salt diet.                            Care Plan: General       Problem: Use of tobacco products       Goal: Smoking Cessation       Start Date: 4/27/2023 Expected End Date: 2/23/2024    Recent Progress: 70%    Priority: Medium    Note:     Barriers: None identified   Strengths: motivated   Patient expressed  understanding of goal: Yes  Action steps to achieve this goal:  1. I will set a date and go cold turkey   2. I will continue to cut back from a full pack to 2 cigarettes a day                                    Plan/Recommendations: The patient will continue working with Care Coordination to achieve goal(s) as above. CHW will continue outreaches at minimum every 30 days and will involve Lead CC as needed or if patient is ready to move to Maintenance. Lead CC will continue to monitor CHW outreaches and patient's progress to goal(s) every 6 weeks.     Plan of Care updated and sent to patient: Shanae     [facility]   Gail Gomez RN, Care Coordinator   [facility]'s   E-mail [email or web address]   [phone]

## 2024-02-28 ENCOUNTER — PATIENT OUTREACH (OUTPATIENT)
Dept: CARE COORDINATION | Facility: CLINIC | Age: 59
End: 2024-02-28
Payer: COMMERCIAL

## 2024-02-28 NOTE — PROGRESS NOTES
Clinic Care Coordination Contact  [facility]/Voicemail    Clinical Data: Care Coordinator Outreach    Outreach Documentation Number of Outreach Attempt   2/12/2024   9:37 AM 1   2/28/2024   9:01 AM 2       Left message on patient's voicemail with call back information and requested return call.    Plan: Care Coordinator will send unable to contact letter with care coordinator contact information via [employer]. Care Coordinator will try to reach patient again in 1 month.    ALLIE Richardson  [facility] Care Coordination  [facility]  [phone]

## 2024-03-07 ENCOUNTER — PATIENT OUTREACH (OUTPATIENT)
Dept: CARE COORDINATION | Facility: CLINIC | Age: 59
End: 2024-03-07
Payer: COMMERCIAL

## 2024-03-07 ASSESSMENT — ACTIVITIES OF DAILY LIVING (ADL): DEPENDENT_IADLS:: TRANSPORTATION

## 2024-03-07 NOTE — PROGRESS NOTES
Clinic Care Coordination Contact  Care Coordination Clinician Chart Review    Situation: Patient chart reviewed by Care Coordinator.       Background: Care Coordination Program started: 12/1/2021. Initial assessment completed and patient-centered care plan(s) were developed with participation from patient. Lead CC handed patient off to CHW for continued outreaches.       Assessment: Per chart review, patient outreach completed by CC CHW on 2/28/2024 outreach done times 2 will follow up in another month .  Patient is actively working to accomplish goal(s). Patient's goal(s) appropriate and relevant at this time. Patient is not due for updated Plan of Care.  Assessments will be completed annually or as needed/with change of patient status.      Care Plan: Heart Failure - RN only       Problem: Heart failure maintenance - see below       Goal: Patient will not experience any symptoms of shortness of breath or body swelling over the next 3 - 6 months       Start Date: 1/31/2022 Expected End Date: 3/29/2024    Recent Progress: 90%    Priority: Medium    Note:     Goal Statement: My CHF symptoms will be stable   Barriers: Poor understanding of CHF diagnosis   Strengths: Motivated to learn   Patient expressed understanding of goal: Yes    Action steps to achieve this goal:  1. I will check my weight daily and call if weight gain is 2-3# in a day or 5# in a week.  2. I will seek medical help if I have increased shortness of breath episodes,fever or coughing up a colored sputum.  3. I will take my medications as prescribed  4. I will keep future Cardiology /primary care appointments.  5. I will follow a low salt diet.                            Care Plan: General       Problem: Use of tobacco products       Goal: Smoking Cessation       Start Date: 4/27/2023 Expected End Date: 2/23/2024    Recent Progress: 70%    Priority: Medium    Note:     Barriers: None identified   Strengths: motivated   Patient expressed understanding  of goal: Yes  Action steps to achieve this goal:  1. I will set a date and go cold turkey   2. I will continue to cut back from a full pack to 2 cigarettes a day                                    Plan/Recommendations: The patient will continue working with Care Coordination to achieve goal(s) as above. CHW will continue outreaches at minimum every 30 days and will involve Lead CC as needed or if patient is ready to move to Maintenance. Lead CC will continue to monitor CHW outreaches and patient's progress to goal(s) every 6 weeks.     Plan of Care updated and sent to patient: Shanae    [facility]   Gail Gomez RN, Care Coordinator   [facility]'s   E-mail [email or web address]   [phone]

## 2024-03-15 ENCOUNTER — TELEPHONE (OUTPATIENT)
Dept: PHARMACY | Facility: OTHER | Age: 59
End: 2024-03-15
Payer: COMMERCIAL

## 2024-03-15 NOTE — TELEPHONE ENCOUNTER
MTM Recruitment: [employer] insurance     Referral outreach attempt #1 on March 15, 2024      Outcome: left voicemail    Aldo [place] [facility]

## 2024-03-28 ENCOUNTER — PATIENT OUTREACH (OUTPATIENT)
Dept: CARE COORDINATION | Facility: CLINIC | Age: 59
End: 2024-03-28
Payer: COMMERCIAL

## 2024-03-28 NOTE — PROGRESS NOTES
Clinic Care Coordination Contact    Situation: Patient chart reviewed by care coordinator.         Background: Goals were smoking cessation and heart Failure management     Assessment: Daisha CHW made to outreach attempts and an [facility] letter and no return call from the patient     Plan/Recommendations: Closed to care coordination     [facility]   Gail Gomez RN, Care Coordinator   [facility]'s   E-mail [email or web address]   [phone]

## 2024-04-04 ENCOUNTER — TELEPHONE (OUTPATIENT)
Dept: PHARMACY | Facility: OTHER | Age: 59
End: 2024-04-04
Payer: COMMERCIAL

## 2024-04-04 NOTE — TELEPHONE ENCOUNTER
COTY Recruitment: [facility]     Referral outreach attempt #2 on April 4, 2024      Outcome: left voicemail- Call back number [phone]    COTY Poe

## 2024-04-17 ENCOUNTER — OFFICE VISIT (OUTPATIENT)
Dept: CARDIOLOGY | Facility: CLINIC | Age: 59
End: 2024-04-17
Payer: COMMERCIAL

## 2024-04-17 VITALS
SYSTOLIC BLOOD PRESSURE: 98 MMHG | DIASTOLIC BLOOD PRESSURE: 60 MMHG | WEIGHT: 168 LBS | RESPIRATION RATE: 16 BRPM | BODY MASS INDEX: 21 KG/M2 | HEART RATE: 80 BPM

## 2024-04-17 DIAGNOSIS — I34.1 MVP (MITRAL VALVE PROLAPSE): Primary | ICD-10-CM

## 2024-04-17 DIAGNOSIS — F19.11 SUBSTANCE ABUSE IN REMISSION (H): ICD-10-CM

## 2024-04-17 DIAGNOSIS — Z95.818 S/P MITRAL VALVE CLIP IMPLANTATION: ICD-10-CM

## 2024-04-17 DIAGNOSIS — I34.0 MITRAL VALVE INSUFFICIENCY, UNSPECIFIED ETIOLOGY: ICD-10-CM

## 2024-04-17 DIAGNOSIS — Z98.890 S/P MITRAL VALVE CLIP IMPLANTATION: ICD-10-CM

## 2024-04-17 DIAGNOSIS — I50.32 CHRONIC DIASTOLIC CONGESTIVE HEART FAILURE (H): ICD-10-CM

## 2024-04-17 PROCEDURE — 99214 OFFICE O/P EST MOD 30 MIN: CPT | Performed by: INTERNAL MEDICINE

## 2024-04-17 NOTE — LETTER
4/17/2024    Yovany White MD  [address] [facility] [address] [facility] [facility] [address]    RE: Micah Nunez       Dear Colleague,     I had the pleasure of seeing Micah Nunez in the [facility].      Thank you, Dr. White, for asking the [facility] team to see Mr. Micah Nunez to follow-up on mitral valve prolapse status post MitraClip, systolic congestive failure.    Assessment/Recommendations   Assessment:    1.  Mitral valve prolapse with severe mitral regurgitation, status post MitraClip x 1 in July 2022.  His initial postprocedure echocardiogram demonstrated trace mitral regurgitation; however, follow-up echoes in September and again 1 year ago demonstrated moderate eccentric jet of regurgitation.  He does report symptoms of exertional dyspnea over the course of the past year.  Would recommend repeat evaluation by transesophageal echo as it may be more significant than what we can document on transthoracic study.  If so, he may need to be considered for repeat MitraClip or even surgical repair.  2.  Coronary artery disease with documented 60 to 70% mid to distal LAD stenosis and 80% diagonal stenosis on pre-MitraClip coronary angiogram.  Decision at that point was to pursue medical management given his history of polysubstance abuse and poor follow-up.  Fortunately, he has been sober over the past year on Suboxone therapy.  Would be in better shape to undergo surgical repair if needed.  3.  Systolic congestive failure, resolved on current medical management and repair of mitral valve.  4.  Polysubstance and alcohol abuse, currently in remission on Suboxone therapy    Plan:  1.  Continue current medications  2.  Pursue transesophageal echo to reassess mitral valve prolapse and severity of mitral regurgitation       History of Present Illness    Mr. Micah Nunez is a 58 year old male with history of polysubstance abuse, mitral valve prolapse with  severe mitral regurgitation status post MitraClip x 1 in July 2022, congestive heart failure who presents today for a follow-up visit.  States he has done fairly well over the course of the year.  Has remained sober off both drugs and alcohol for the past year with Suboxone therapy.  Denies any relapse.  Also reports no symptoms of orthopnea, PND or lower extremity edema.  Does continue to have symptoms of exertional dyspnea with walking but denies any associated chest discomfort.    ECG (personally reviewed): No ECG today    Cardiac Imaging Studies (personally reviewed): No new imaging     Physical Examination Review of Systems   BP 98/60 (BP Location: Right arm, Patient Position: Sitting, Cuff Size: Adult Large)   Pulse 80   Resp 16   Wt 76.2 kg (168 lb)   BMI 21.00 kg/m    Body mass index is 21 kg/m .  Wt Readings from Last 3 Encounters:   04/17/24 76.2 kg (168 lb)   08/07/23 80.5 kg (177 lb 6.4 oz)   03/10/23 82.1 kg (181 lb)     General Appearance:   Awake, Alert, No acute distress.   HEENT:  No scleral icterus; the mucous membranes were pink and moist.   Neck: No cervical bruits or jugular venous distention    Chest: The spine was straight. The chest was symmetric.   Lungs:   Respirations unlabored; the lungs are clear to auscultation. No wheezing   Cardiovascular:   Regular rate and rhythm.  S1, S2 normal.  2/6 holosystolic murmur heard at the lower left sternal border to apex   Abdomen:  No organomegaly, masses, bruits, or tenderness. Bowels sounds are present   Extremities: No peripheral edema   Skin: No xanthelasma. Warm, Dry.   Musculoskeletal: No tenderness.   Neurologic: Mood and affect are appropriate.    Enc Vitals  BP: 98/60  Pulse: 80  Resp: 16  Weight: 76.2 kg (168 lb)                                         Medical History  Surgical History Family History Social History   Past Medical History:   Diagnosis Date    Acute exacerbation of CHF (congestive heart failure) (H) 11/27/2021    Benign  essential hypertension     Bleeding disorder (H24)     Chemical dependency (H)     Closed displaced fracture of fifth metatarsal bone of left foot 07/26/2021    Gastroesophageal reflux disease     Hypertension     Nasal mass 12/17/2013    Papilloma of nose 03/10/2014    Skin disease     Traumatic avulsion of nail plate of toe 07/26/2021    Type I or II open fracture of left ulna 09/05/2020    Past Surgical History:   Procedure Laterality Date    ARTHRODESIS FOOT Left 2/17/2015    Procedure: ARTHRODESIS FOOT;  Surgeon: Cortez Hayward DPM;  Location: [facility]    ARTHRODESIS TOE(S)  12/20/2013    Procedure: ARTHRODESIS TOE(S);  Arthrodesis first metatarsophalangeal joint left foot;  Surgeon: Cortez Hayward DPM;  Location: [facility]    COLONOSCOPY N/A 1/12/2021    Procedure: COLONOSCOPY;  Surgeon: Dixon Sarabia MD;  [facility]: [facility]    CV CORONARY ANGIOGRAM N/A 4/22/2022    Procedure: Coronary Angiogram;  Surgeon: Lamont Maloney MD;  Location: [facility] CATH LAB CV    CV LEFT HEART CATH N/A 4/22/2022    Procedure: Left Heart Catheterization;  Surgeon: Lamont Maloney MD;  Location: [facility] CATH LAB CV    ENDOSCOPIC SINUS SURGERY  1/6/2014    Procedure: ENDOSCOPIC SINUS SURGERY;  Functional Endoscopic Sinus Surgery;  Surgeon: Bobo Renteria MD;  Location:  OR    ENDOSCOPIC SINUS SURGERY  7/21/2014    Procedure: ENDOSCOPIC SINUS SURGERY;  Surgeon: Bobo Renteria MD;  [facility]    ENDOSCOPIC SINUS SURGERY N/A 4/6/2015    Procedure: ENDOSCOPIC SINUS SURGERY;  Surgeon: Bobo Renteria MD;  [facility]    ENDOSCOPIC SINUS SURGERY N/A 8/17/2015    Procedure: ENDOSCOPIC SINUS SURGERY;  Surgeon: Bobo Renteria MD;  [facility]    ENDOSCOPIC SINUS SURGERY Bilateral 8/19/2019    Procedure: Bilateral Functional Endoscopic Sinus Surgery, Right Nasal Endoscopy and Excision of Left Nasal Mass;  Surgeon: Bobo Renteria MD;  Location:  OR    ENT SURGERY      EXCISE NASAL  MASS Left 11/6/2017    Procedure: EXCISE NASAL MASS;  Excision of Left Nasal Papilloma;  Surgeon: Bobo Renteria MD;  [facility]    LAPAROSCOPIC HERNIORRHAPHY INGUINAL BILATERAL Bilateral 4/12/2017    Procedure: LAPAROSCOPIC HERNIORRHAPHY INGUINAL BILATERAL;  Surgeon: Shay Mercado MD;  [facility]    NASAL ENDOSCOPY Bilateral 2/6/2017    Procedure: NASAL ENDOSCOPY;  Surgeon: Bobo Renteria MD;  [facility]    NASAL ENDOSCOPY N/A 5/21/2018    Procedure: NASAL ENDOSCOPY;  Nasal Endoscopy, CO2 Laser Excision of Left Nasal Papilloma;  Surgeon: Bobo Renteria MD;  [facility]    NASAL/SINUS POLYPECTOMY      PE TUBES      TRANSCATHETER MITRAL VALVE REPAIR N/A 7/18/2022    Procedure: Transcatheter mitral valve repair with mitraclip. Possible atrial septal defect closure.;  Surgeon: Nicolas Tobin MD;  Location:  HEART CARDIAC CATH LAB    Family History   Problem Relation Age of Onset    Diabetes Mother 40    C.A.D. Father 72    Unknown/Adopted No family hx of     Depression No family hx of     Anxiety Disorder No family hx of     Schizophrenia No family hx of     Bipolar Disorder No family hx of     Suicide No family hx of     Substance Abuse No family hx of     Dementia No family hx of     Pinal Disease No family hx of     Parkinsonism No family hx of     Autism Spectrum Disorder No family hx of     Intellectual Disability No family hx of     Mental Illness No family hx of     Bleeding Disorder No family hx of     Social History     Socioeconomic History    Marital status: Single     Spouse name: Not on file    Number of children: Not on file    Years of education: Not on file    Highest education level: Not on file   Occupational History    Not on file   Tobacco Use    Smoking status: Every Day     Current packs/day: 0.50     Average packs/day: 0.5 packs/day for 44.3 years (22.1 ttl pk-yrs)     Types: Cigarettes     Start date: 1/1/1980    Smokeless tobacco: Never     Tobacco comments:     About 2 cigarettes per day, planning on quitting soon. 22 visit.   Vaping Use    Vaping status: Never Used   Substance and Sexual Activity    Alcohol use: Not Currently     Comment: occasional     Drug use: Not Currently     Types: Cocaine, Methamphetamines, Opiates, Marijuana     Comment: 7 years quit Cocaine/methamphetamines    Sexual activity: Yes     Partners: Female     Birth control/protection: None   Other Topics Concern    Parent/sibling w/ CABG, MI or angioplasty before 65F 55M? No     Service No    Blood Transfusions No    Caffeine Concern No    Occupational Exposure No    Hobby Hazards No    Sleep Concern No    Stress Concern No    Weight Concern No    Special Diet No    Back Care No    Exercise Yes    Bike Helmet No    Seat Belt Yes    Self-Exams Not Asked   Social History Narrative    Not on file     Social Determinants of Health     Financial Resource Strain: Not on File (10/14/2022)    Received from [facility]     Financial Resource Strain     Financial Resource Strain: 0   Food Insecurity: Not on File (10/14/2022)    Received from [facility]     Food Insecurity     Food: 0   Transportation Needs: Not on File (10/14/2022)    Received from [facility]     Transportation Needs     Transportation: 0   Physical Activity: Not on File (10/14/2022)    Received from [facility]     Physical Activity     Physical Activity: 0   Stress: Not on File (10/14/2022)    Received from [facility]     Stress     Stress: 0   Social Connections: Not on File (10/14/2022)    Received from [facility]     Social Connections     Social Connections and Isolation: 0   Interpersonal Safety: Not on file   Housing Stability: Not on File (10/14/2022)    Received from [facility]     Housing Stability     Housin          Medications  Allergies   Current Outpatient Medications   Medication Sig Dispense Refill    atorvastatin (LIPITOR) 40 MG tablet TAKE ONE TABLET BY MOUTH ONCE  DAILY 90 tablet 3    furosemide (LASIX) 20 MG tablet Take 1 tablet (20 mg) by mouth daily; call to schedule appt w/ provider for further refills 30 tablet 3    lisinopril (ZESTRIL) 20 MG tablet TAKE ONE TABLET BY MOUTH ONCE DAILY 90 tablet 1    metoprolol succinate ER (TOPROL XL) 25 MG 24 hr tablet TAKE 1 TABLET (25 MG) BY MOUTH DAILY 90 tablet 3    SM ASPIRIN LOW DOSE 81 MG EC tablet Take 1 tablet (81 mg) by mouth daily 90 tablet 3    SUBOXONE 12-3 MG FILM per film Place 1 Film under the tongue 2 times daily      furosemide (LASIX) 20 MG tablet Take 1 tablet (20 mg) by mouth daily as needed (leg swelling) Please fill at patient's request 90 tablet 1      No Known Allergies      Lab Results    Chemistry/lipid CBC Cardiac Enzymes/BNP/TSH/INR   Recent Labs   Lab Test 08/28/23  1106 04/22/22  0955 04/19/22  1204   TRIG  --   --  117   LDL  --   --  113*   BUN 12.9   < >  --       < >  --    CO2 29   < >  --     < > = values in this interval not displayed.    Recent Labs   Lab Test 08/07/23  1352   WBC 7.3   HGB 11.7*   HCT 37.5*   MCV 91       Recent Labs   Lab Test 07/11/22  1328 04/19/22  1204 01/29/22  2151   TROPONINI  --   --  0.03   BNP 46  --  2,610*   TSH  --  0.89  --    INR 1.01  --   --                                Thank you for allowing me to participate in the care of your patient.      Sincerely,     Samantha Jackson MD     [facility]  cc:   Samantha Jackson MD  [address], [address]  [place] [address]

## 2024-04-17 NOTE — PATIENT INSTRUCTIONS
Continue current medications  Would recommend re-look at mitral valve with either transesophageal echo or MRI. Will discuss with my partners and you will be contacted to set up the test we decide.

## 2024-04-17 NOTE — PROGRESS NOTES
Thank you, Dr. White, for asking the [facility] team to see Mr. Micah Nunez to follow-up on mitral valve prolapse status post MitraClip, systolic congestive failure.    Assessment/Recommendations   Assessment:    1.  Mitral valve prolapse with severe mitral regurgitation, status post MitraClip x 1 in July 2022.  His initial postprocedure echocardiogram demonstrated trace mitral regurgitation; however, follow-up echoes in September and again 1 year ago demonstrated moderate eccentric jet of regurgitation.  He does report symptoms of exertional dyspnea over the course of the past year.  Would recommend repeat evaluation by transesophageal echo as it may be more significant than what we can document on transthoracic study.  If so, he may need to be considered for repeat MitraClip or even surgical repair.  2.  Coronary artery disease with documented 60 to 70% mid to distal LAD stenosis and 80% diagonal stenosis on pre-MitraClip coronary angiogram.  Decision at that point was to pursue medical management given his history of polysubstance abuse and poor follow-up.  Fortunately, he has been sober over the past year on Suboxone therapy.  Would be in better shape to undergo surgical repair if needed.  3.  Systolic congestive failure, resolved on current medical management and repair of mitral valve.  4.  Polysubstance and alcohol abuse, currently in remission on Suboxone therapy    Plan:  1.  Continue current medications  2.  Pursue transesophageal echo to reassess mitral valve prolapse and severity of mitral regurgitation       History of Present Illness    Mr. Micah Nunez is a 58 year old male with history of polysubstance abuse, mitral valve prolapse with severe mitral regurgitation status post MitraClip x 1 in July 2022, congestive heart failure who presents today for a follow-up visit.  States he has done fairly well over the course of the year.  Has remained sober off both  drugs and alcohol for the past year with Suboxone therapy.  Denies any relapse.  Also reports no symptoms of orthopnea, PND or lower extremity edema.  Does continue to have symptoms of exertional dyspnea with walking but denies any associated chest discomfort.    ECG (personally reviewed): No ECG today    Cardiac Imaging Studies (personally reviewed): No new imaging     Physical Examination Review of Systems   BP 98/60 (BP Location: Right arm, Patient Position: Sitting, Cuff Size: Adult Large)   Pulse 80   Resp 16   Wt 76.2 kg (168 lb)   BMI 21.00 kg/m    Body mass index is 21 kg/m .  Wt Readings from Last 3 Encounters:   04/17/24 76.2 kg (168 lb)   08/07/23 80.5 kg (177 lb 6.4 oz)   03/10/23 82.1 kg (181 lb)     General Appearance:   Awake, Alert, No acute distress.   HEENT:  No scleral icterus; the mucous membranes were pink and moist.   Neck: No cervical bruits or jugular venous distention    Chest: The spine was straight. The chest was symmetric.   Lungs:   Respirations unlabored; the lungs are clear to auscultation. No wheezing   Cardiovascular:   Regular rate and rhythm.  S1, S2 normal.  2/6 holosystolic murmur heard at the lower left sternal border to apex   Abdomen:  No organomegaly, masses, bruits, or tenderness. Bowels sounds are present   Extremities: No peripheral edema   Skin: No xanthelasma. Warm, Dry.   Musculoskeletal: No tenderness.   Neurologic: Mood and affect are appropriate.    Enc Vitals  BP: 98/60  Pulse: 80  Resp: 16  Weight: 76.2 kg (168 lb)                                         Medical History  Surgical History Family History Social History   Past Medical History:   Diagnosis Date    Acute exacerbation of CHF (congestive heart failure) (H) 11/27/2021    Benign essential hypertension     Bleeding disorder (H24)     Chemical dependency (H)     Closed displaced fracture of fifth metatarsal bone of left foot 07/26/2021    Gastroesophageal reflux disease     Hypertension     Nasal mass  12/17/2013    Papilloma of nose 03/10/2014    Skin disease     Traumatic avulsion of nail plate of toe 07/26/2021    Type I or II open fracture of left ulna 09/05/2020    Past Surgical History:   Procedure Laterality Date    ARTHRODESIS FOOT Left 2/17/2015    Procedure: ARTHRODESIS FOOT;  Surgeon: Cortez Hayward DPM;  [facility]: [facility]    ARTHRODESIS TOE(S)  12/20/2013    Procedure: ARTHRODESIS TOE(S);  Arthrodesis first metatarsophalangeal joint left foot;  Surgeon: Cortez Hayward DPM;  [facility]: [facility]    COLONOSCOPY N/A 1/12/2021    Procedure: COLONOSCOPY;  Surgeon: Dixon Sarabia MD;  [facility]    CV CORONARY ANGIOGRAM N/A 4/22/2022    Procedure: Coronary Angiogram;  Surgeon: Lamont Maloney MD;  Location: [facility] CATH LAB CV    CV LEFT HEART CATH N/A 4/22/2022    Procedure: Left Heart Catheterization;  Surgeon: Lamont Maloney MD;  Location: [facility] CATH LAB CV    ENDOSCOPIC SINUS SURGERY  1/6/2014    Procedure: ENDOSCOPIC SINUS SURGERY;  Functional Endoscopic Sinus Surgery;  Surgeon: Bobo Renteria MD;  Location:  OR    ENDOSCOPIC SINUS SURGERY  7/21/2014    Procedure: ENDOSCOPIC SINUS SURGERY;  Surgeon: Bobo Renteria MD;  Location:  OR    ENDOSCOPIC SINUS SURGERY N/A 4/6/2015    Procedure: ENDOSCOPIC SINUS SURGERY;  Surgeon: Bobo Renteria MD;  [facility]    ENDOSCOPIC SINUS SURGERY N/A 8/17/2015    Procedure: ENDOSCOPIC SINUS SURGERY;  Surgeon: Bobo Renteria MD;  [facility]    ENDOSCOPIC SINUS SURGERY Bilateral 8/19/2019    Procedure: Bilateral Functional Endoscopic Sinus Surgery, Right Nasal Endoscopy and Excision of Left Nasal Mass;  Surgeon: Bobo Renteria MD;  Location:  OR    ENT SURGERY      EXCISE NASAL MASS Left 11/6/2017    Procedure: EXCISE NASAL MASS;  Excision of Left Nasal Papilloma;  Surgeon: Bobo Renteria MD;  Location:  OR    LAPAROSCOPIC HERNIORRHAPHY INGUINAL BILATERAL Bilateral 4/12/2017    Procedure:  LAPAROSCOPIC HERNIORRHAPHY INGUINAL BILATERAL;  Surgeon: Shay Mercado MD;  [facility]: [facility]    NASAL ENDOSCOPY Bilateral 2/6/2017    Procedure: NASAL ENDOSCOPY;  Surgeon: Bobo Renteria MD;  [facility]    NASAL ENDOSCOPY N/A 5/21/2018    Procedure: NASAL ENDOSCOPY;  Nasal Endoscopy, CO2 Laser Excision of Left Nasal Papilloma;  Surgeon: Bobo Renteria MD;  [facility]    NASAL/SINUS POLYPECTOMY      PE TUBES      TRANSCATHETER MITRAL VALVE REPAIR N/A 7/18/2022    Procedure: Transcatheter mitral valve repair with mitraclip. Possible atrial septal defect closure.;  Surgeon: Nicolas Tobin MD;  Location:  HEART CARDIAC CATH LAB    Family History   Problem Relation Age of Onset    Diabetes Mother 40    C.A.D. Father 72    Unknown/Adopted No family hx of     Depression No family hx of     Anxiety Disorder No family hx of     Schizophrenia No family hx of     Bipolar Disorder No family hx of     Suicide No family hx of     Substance Abuse No family hx of     Dementia No family hx of     Homero Disease No family hx of     Parkinsonism No family hx of     Autism Spectrum Disorder No family hx of     Intellectual Disability No family hx of     Mental Illness No family hx of     Bleeding Disorder No family hx of     Social History     Socioeconomic History    Marital status: Single     Spouse name: Not on file    Number of children: Not on file    Years of education: Not on file    Highest education level: Not on file   Occupational History    Not on file   Tobacco Use    Smoking status: Every Day     Current packs/day: 0.50     Average packs/day: 0.5 packs/day for 44.3 years (22.1 ttl pk-yrs)     Types: Cigarettes     Start date: 1/1/1980    Smokeless tobacco: Never    Tobacco comments:     About 2 cigarettes per day, planning on quitting soon. 9-22-22 visit.   Vaping Use    Vaping status: Never Used   Substance and Sexual Activity    Alcohol use: Not Currently     Comment: occasional     Drug  use: Not Currently     Types: Cocaine, Methamphetamines, Opiates, Marijuana     Comment: 7 years quit Cocaine/methamphetamines    Sexual activity: Yes     Partners: Female     Birth control/protection: None   Other Topics Concern    Parent/sibling w/ CABG, MI or angioplasty before 65F 55M? No     Service No    Blood Transfusions No    Caffeine Concern No    Occupational Exposure No    Hobby Hazards No    Sleep Concern No    Stress Concern No    Weight Concern No    Special Diet No    Back Care No    Exercise Yes    Bike Helmet No    Seat Belt Yes    Self-Exams Not Asked   Social History Narrative    Not on file     Social Determinants of Health     Financial Resource Strain: Not on File (10/14/2022)    Received from [facility]     Financial Resource Strain     Financial Resource Strain: 0   Food Insecurity: Not on File (10/14/2022)    Received from [facility]     Food Insecurity     Food: 0   Transportation Needs: Not on File (10/14/2022)    Received from [facility]     Transportation Needs     Transportation: 0   Physical Activity: Not on File (10/14/2022)    Received from [facility]     Physical Activity     Physical Activity: 0   Stress: Not on File (10/14/2022)    Received from [facility]     Stress     Stress: 0   Social Connections: Not on File (10/14/2022)    Received from [facility]     Social Connections     Social Connections and Isolation: 0   Interpersonal Safety: Not on file   Housing Stability: Not on File (10/14/2022)    Received from [facility]     Housing Stability     Housin          Medications  Allergies   Current Outpatient Medications   Medication Sig Dispense Refill    atorvastatin (LIPITOR) 40 MG tablet TAKE ONE TABLET BY MOUTH ONCE DAILY 90 tablet 3    furosemide (LASIX) 20 MG tablet Take 1 tablet (20 mg) by mouth daily; call to schedule appt w/ provider for further refills 30 tablet 3    lisinopril (ZESTRIL) 20 MG tablet TAKE ONE TABLET BY MOUTH ONCE DAILY  90 tablet 1    metoprolol succinate ER (TOPROL XL) 25 MG 24 hr tablet TAKE 1 TABLET (25 MG) BY MOUTH DAILY 90 tablet 3    SM ASPIRIN LOW DOSE 81 MG EC tablet Take 1 tablet (81 mg) by mouth daily 90 tablet 3    SUBOXONE 12-3 MG FILM per film Place 1 Film under the tongue 2 times daily      furosemide (LASIX) 20 MG tablet Take 1 tablet (20 mg) by mouth daily as needed (leg swelling) Please fill at patient's request 90 tablet 1      No Known Allergies      Lab Results    Chemistry/lipid CBC Cardiac Enzymes/BNP/TSH/INR   Recent Labs   Lab Test 08/28/23  1106 04/22/22  0955 04/19/22  1204   TRIG  --   --  117   LDL  --   --  113*   BUN 12.9   < >  --       < >  --    CO2 29   < >  --     < > = values in this interval not displayed.    Recent Labs   Lab Test 08/07/23  1352   WBC 7.3   HGB 11.7*   HCT 37.5*   MCV 91       Recent Labs   Lab Test 07/11/22  1328 04/19/22  1204 01/29/22  2151   TROPONINI  --   --  0.03   BNP 46  --  2,610*   TSH  --  0.89  --    INR 1.01  --   --

## 2024-04-30 ENCOUNTER — VIRTUAL VISIT (OUTPATIENT)
Dept: PHARMACY | Facility: CLINIC | Age: 59
End: 2024-04-30
Payer: COMMERCIAL

## 2024-04-30 DIAGNOSIS — E78.5 HYPERLIPIDEMIA LDL GOAL <100: Primary | ICD-10-CM

## 2024-04-30 DIAGNOSIS — F19.11 SUBSTANCE ABUSE IN REMISSION (H): Chronic | ICD-10-CM

## 2024-04-30 DIAGNOSIS — I25.10 CORONARY ARTERY DISEASE INVOLVING NATIVE CORONARY ARTERY OF NATIVE HEART WITHOUT ANGINA PECTORIS: ICD-10-CM

## 2024-04-30 DIAGNOSIS — I10 BENIGN ESSENTIAL HYPERTENSION: ICD-10-CM

## 2024-04-30 DIAGNOSIS — I50.32 CHRONIC HEART FAILURE WITH PRESERVED EJECTION FRACTION (H): ICD-10-CM

## 2024-04-30 PROCEDURE — 99605 MTMS BY PHARM NP 15 MIN: CPT | Mod: 93 | Performed by: PHARMACIST

## 2024-04-30 NOTE — PROGRESS NOTES
Medication Therapy Management (MTM) Encounter    ASSESSMENT:                            Medication Adherence/Access: No issues identified    Hyperlipidemia: due for labs - future lab order in Epic    HFpEF (>/=50%)/Hypertension: stable    Substance Abuse: stable      PLAN:                            1. Due for cholesterol labs - future lab order in Epic.    Follow-up: yearly    SUBJECTIVE/OBJECTIVE:                          Micah Nunez is a 58 year old male called for an initial visit. He was referred to me from his insurance.      Reason for visit: Comprehensive Medication Review (CMR).    Allergies/ADRs: Reviewed in chart  Past Medical History: Reviewed in chart  Tobacco: He reports that he has been smoking cigarettes. He started smoking about 44 years ago. He has a 22.2 pack-year smoking history. He has never used smokeless tobacco.  Nicotine/Tobacco Cessation Plan  Smoking 5 cigarettes a day - working on quitting. Currently taking care of Mom who is in a facility can't smoke in, goes out twice a day. He is not ready currently to pick a quit date.   Alcohol: not currently using    Medication Adherence/Access: no issues reported    Hyperlipidemia:   atorvastatin 40 mg daily     Patient reports no current medication side effects.  The 10-year ASCVD risk score (Steffen RAMOS, et al., 2019) is: 7.7%    Values used to calculate the score:      Age: 58 years      Sex: Male      Is Non- : No      Diabetic: No      Tobacco smoker: Yes      Systolic Blood Pressure: 98 mmHg      Is BP treated: Yes      HDL Cholesterol: 60 mg/dL      Total Cholesterol: 196 mg/dL  Recent Labs   Lab Test 04/19/22  1204 01/17/19  1013   CHOL 196 220*   HDL 60 53   * 115*   TRIG 117 259*     HFpEF (>/=50%)/Hypertension   Beta Blocker: metoprolol XL 25 mg daily  ACEi/ARB/ARNI: lisinopril 20 mg daily  Diuretic(s): furosemide 20 mg daily  Aspirin 81 mg daily    Patient reports no current medication side effects.      Patient reports these HF symptoms: none.      Followed by cardiology - sees every 6 months.      Potassium   Date Value Ref Range Status   08/28/2023 3.9 3.4 - 5.3 mmol/L Final   07/11/2022 4.1 3.5 - 5.0 mmol/L Final   02/24/2021 3.9 3.4 - 5.3 mmol/L Final     BP Readings from Last 3 Encounters:   04/17/24 98/60   08/07/23 130/76   03/10/23 110/80     Substance Abuse:  Suboxone 12-3 mg film twice daily    Tolerating well.   ----------------    I spent 13 minutes with this patient today. All changes were made via collaborative practice agreement with Yovany Whiet MD. A copy of the visit note was provided to the patient's provider(s).    A summary of these recommendations was mailed to the patient.    Aranza Macedo PharmD  Medication Therapy Management     Telemedicine Visit Details  Type of service:  Telephone visit  Start Time:  11:00 AM  End Time: 11:13 AM     Medication Therapy Recommendations  No medication therapy recommendations to display

## 2024-05-01 NOTE — PATIENT INSTRUCTIONS
"Recommendations from today's MTM visit:                                                    MTM (medication therapy management) is a service provided by a clinical pharmacist designed to help you get the most of out of your medicines.   Today we reviewed what your medicines are for, how to know if they are working, that your medicines are safe and how to make your medicine regimen as easy as possible.      Micah,    It was a pleasure talking with you! We discussed the followin. Your are due for cholesterol labs - there is a future lab order in Epic.    Follow-up: yearly    It was great speaking with you today.  I value your experience and would be very thankful for your time in providing feedback in our clinic survey. In the next few days, you may receive an email or text message from [employer] with a link to a survey related to your  clinical pharmacist.\"     To schedule another MTM appointment, please call the clinic directly or you may call the MTM scheduling line at [phone] or toll-free at [phone].     My Clinical Pharmacist's contact information:                                                      Please feel free to contact me with any questions or concerns you have.      Keep up the good work!!    Aranza Macedo, PharmD  [phone] in clinic on Tuesday and Wednesday        "

## 2024-05-11 DIAGNOSIS — I34.0 MITRAL VALVE INSUFFICIENCY, UNSPECIFIED ETIOLOGY: ICD-10-CM

## 2024-05-13 RX ORDER — LISINOPRIL 20 MG/1
20 TABLET ORAL DAILY
Qty: 90 TABLET | Refills: 0 | Status: SHIPPED | OUTPATIENT
Start: 2024-05-13 | End: 2024-08-08

## 2024-06-11 ENCOUNTER — APPOINTMENT (OUTPATIENT)
Dept: GENERAL RADIOLOGY | Facility: CLINIC | Age: 59
End: 2024-06-11
Attending: NURSE PRACTITIONER
Payer: COMMERCIAL

## 2024-06-11 ENCOUNTER — HOSPITAL ENCOUNTER (EMERGENCY)
Facility: CLINIC | Age: 59
Discharge: HOME | End: 2024-06-11
Attending: NURSE PRACTITIONER | Admitting: NURSE PRACTITIONER
Payer: COMMERCIAL

## 2024-06-11 VITALS
DIASTOLIC BLOOD PRESSURE: 84 MMHG | TEMPERATURE: 97.4 F | HEIGHT: 76 IN | RESPIRATION RATE: 18 BRPM | SYSTOLIC BLOOD PRESSURE: 133 MMHG | OXYGEN SATURATION: 100 % | BODY MASS INDEX: 21.92 KG/M2 | WEIGHT: 180 LBS | HEART RATE: 74 BPM

## 2024-06-11 DIAGNOSIS — M79.672 LEFT FOOT PAIN: ICD-10-CM

## 2024-06-11 DIAGNOSIS — L08.9 PUSTULE: ICD-10-CM

## 2024-06-11 DIAGNOSIS — L03.116 CELLULITIS OF LEFT FOOT: ICD-10-CM

## 2024-06-11 DIAGNOSIS — S90.852A FOREIGN BODY IN LEFT FOOT, INITIAL ENCOUNTER: ICD-10-CM

## 2024-06-11 DIAGNOSIS — I89.1 LYMPHANGITIS: ICD-10-CM

## 2024-06-11 LAB
ANION GAP SERPL CALCULATED.3IONS-SCNC: 9 MMOL/L (ref 7–15)
BASOPHILS # BLD AUTO: 0 10E3/UL (ref 0–0.2)
BASOPHILS NFR BLD AUTO: 1 %
BUN SERPL-MCNC: 19.3 MG/DL (ref 6–20)
CALCIUM SERPL-MCNC: 9.1 MG/DL (ref 8.6–10)
CHLORIDE SERPL-SCNC: 104 MMOL/L (ref 98–107)
CREAT SERPL-MCNC: 1.43 MG/DL (ref 0.67–1.17)
CRP SERPL-MCNC: 4.53 MG/L
DEPRECATED HCO3 PLAS-SCNC: 27 MMOL/L (ref 22–29)
EGFRCR SERPLBLD CKD-EPI 2021: 57 ML/MIN/1.73M2
EOSINOPHIL # BLD AUTO: 0.2 10E3/UL (ref 0–0.7)
EOSINOPHIL NFR BLD AUTO: 2 %
ERYTHROCYTE [DISTWIDTH] IN BLOOD BY AUTOMATED COUNT: 13.5 % (ref 10–15)
GLUCOSE SERPL-MCNC: 144 MG/DL (ref 70–99)
HCT VFR BLD AUTO: 35.6 % (ref 40–53)
HGB BLD-MCNC: 11.5 G/DL (ref 13.3–17.7)
IMM GRANULOCYTES # BLD: 0 10E3/UL
IMM GRANULOCYTES NFR BLD: 0 %
LYMPHOCYTES # BLD AUTO: 1.5 10E3/UL (ref 0.8–5.3)
LYMPHOCYTES NFR BLD AUTO: 23 %
MCH RBC QN AUTO: 29.3 PG (ref 26.5–33)
MCHC RBC AUTO-ENTMCNC: 32.3 G/DL (ref 31.5–36.5)
MCV RBC AUTO: 91 FL (ref 78–100)
MONOCYTES # BLD AUTO: 0.7 10E3/UL (ref 0–1.3)
MONOCYTES NFR BLD AUTO: 10 %
NEUTROPHILS # BLD AUTO: 4.3 10E3/UL (ref 1.6–8.3)
NEUTROPHILS NFR BLD AUTO: 65 %
NRBC # BLD AUTO: 0 10E3/UL
NRBC BLD AUTO-RTO: 0 /100
PLATELET # BLD AUTO: 216 10E3/UL (ref 150–450)
POTASSIUM SERPL-SCNC: 4.2 MMOL/L (ref 3.4–5.3)
PROCALCITONIN SERPL IA-MCNC: 0.06 NG/ML
RBC # BLD AUTO: 3.93 10E6/UL (ref 4.4–5.9)
SODIUM SERPL-SCNC: 140 MMOL/L (ref 135–145)
WBC # BLD AUTO: 6.6 10E3/UL (ref 4–11)

## 2024-06-11 PROCEDURE — 73630 X-RAY EXAM OF FOOT: CPT | Mod: LT

## 2024-06-11 PROCEDURE — 85025 COMPLETE CBC W/AUTO DIFF WBC: CPT | Performed by: NURSE PRACTITIONER

## 2024-06-11 PROCEDURE — 10120 INC&RMVL FB SUBQ TISS SMPL: CPT | Mod: LT | Performed by: NURSE PRACTITIONER

## 2024-06-11 PROCEDURE — 99284 EMERGENCY DEPT VISIT MOD MDM: CPT | Performed by: NURSE PRACTITIONER

## 2024-06-11 PROCEDURE — 99284 EMERGENCY DEPT VISIT MOD MDM: CPT | Mod: 25 | Performed by: NURSE PRACTITIONER

## 2024-06-11 PROCEDURE — 82374 ASSAY BLOOD CARBON DIOXIDE: CPT | Performed by: NURSE PRACTITIONER

## 2024-06-11 PROCEDURE — 84145 PROCALCITONIN (PCT): CPT | Performed by: NURSE PRACTITIONER

## 2024-06-11 PROCEDURE — 36415 COLL VENOUS BLD VENIPUNCTURE: CPT | Performed by: NURSE PRACTITIONER

## 2024-06-11 PROCEDURE — 86140 C-REACTIVE PROTEIN: CPT | Performed by: NURSE PRACTITIONER

## 2024-06-11 ASSESSMENT — COLUMBIA-SUICIDE SEVERITY RATING SCALE - C-SSRS
6. HAVE YOU EVER DONE ANYTHING, STARTED TO DO ANYTHING, OR PREPARED TO DO ANYTHING TO END YOUR LIFE?: NO
1. IN THE PAST MONTH, HAVE YOU WISHED YOU WERE DEAD OR WISHED YOU COULD GO TO SLEEP AND NOT WAKE UP?: NO
2. HAVE YOU ACTUALLY HAD ANY THOUGHTS OF KILLING YOURSELF IN THE PAST MONTH?: NO

## 2024-06-11 ASSESSMENT — ACTIVITIES OF DAILY LIVING (ADL)
ADLS_ACUITY_SCORE: 35
ADLS_ACUITY_SCORE: 33
ADLS_ACUITY_SCORE: 35

## 2024-06-11 NOTE — ED TRIAGE NOTES
Left foot pain x 3 days, hx of surgical procedures on left foot.     Triage Assessment (Adult)       Row Name 06/11/24 1020          Triage Assessment    Airway WDL WDL        Respiratory WDL    Respiratory WDL WDL

## 2024-06-11 NOTE — ED PROVIDER NOTES
History     Chief Complaint   Patient presents with    Foot Pain     HPI  Micah Nunez is a 58 year old male with past medical history of chemical dependency-former, hypertension, daily smoking marijuana use, tobacco use, mitral regurgitation with subsequent TAVR, chronic heart failure with preserved ejection fraction, previous foot surgery to his left foot x 2 with last foot surgery 2 years ago presents emergency department with acute left foot pain.  Patient states he has been doing a lot of walking for a requirement and on Sunday morning he noticed pain on the bottom of his foot between his first second and third digits.  He states this morning the pain is acutely worse and difficult to bear weight, with associated redness, swelling of the foot.  Patient denies fever, chills, sweats, nausea, vomiting, mental confusion, history of MRSA.    Patient denies any ear pain, throat pain, chest pain, cough, shortness of breath, difficulty breathing, nausea, vomiting, abdominal pain, constipation, diarrhea, dysuria, left or right-sided body weakness or thoughts of harming self.    Allergies:  No Known Allergies    Problem List:    Patient Active Problem List    Diagnosis Date Noted    S/P mitral valve clip implantation 07/18/2022     Priority: Medium     July 18, 2022      Chronic heart failure with preserved ejection fraction (H) 04/18/2022     Priority: Medium    Mitral regurgitation - severe 11/29/2021     Priority: Medium    Non compliance with medical treatment 11/27/2021     Priority: Medium    Anasarca 11/27/2021     Priority: Medium    Elevated troponin 11/27/2021     Priority: Medium    Lab test negative for COVID-19 virus 11/27/2021     Priority: Medium    Benign essential hypertension 10/18/2017     Priority: Medium    Tobacco use disorder 08/12/2015     Priority: Medium    Chemical dependency (H) 07/27/2015     Priority: Medium    Papilloma of nose 03/10/2014     Priority: Medium    CARDIOVASCULAR  SCREENING; LDL GOAL LESS THAN 130 12/11/2013     Priority: Medium    Substance abuse in remission (H) 05/19/2013     Priority: Medium        Past Medical History:    Past Medical History:   Diagnosis Date    Acute exacerbation of CHF (congestive heart failure) (H) 11/27/2021    Benign essential hypertension     Bleeding disorder (H24)     Chemical dependency (H)     Closed displaced fracture of fifth metatarsal bone of left foot 07/26/2021    Gastroesophageal reflux disease     Hypertension     Nasal mass 12/17/2013    Papilloma of nose 03/10/2014    Skin disease     Traumatic avulsion of nail plate of toe 07/26/2021    Type I or II open fracture of left ulna 09/05/2020       Past Surgical History:    Past Surgical History:   Procedure Laterality Date    ARTHRODESIS FOOT Left 2/17/2015    Procedure: ARTHRODESIS FOOT;  Surgeon: Cortez Hayward DPM;  Location: [facility]    ARTHRODESIS TOE(S)  12/20/2013    Procedure: ARTHRODESIS TOE(S);  Arthrodesis first metatarsophalangeal joint left foot;  Surgeon: Cortez Hayward DPM;  Location: [facility]    COLONOSCOPY N/A 1/12/2021    Procedure: COLONOSCOPY;  Surgeon: Dixon Sarabia MD;  Location: [facility]    CV CORONARY ANGIOGRAM N/A 4/22/2022    Procedure: Coronary Angiogram;  Surgeon: Lamont Maloney MD;  Location: [facility] CATH LAB CV    CV LEFT HEART CATH N/A 4/22/2022    Procedure: Left Heart Catheterization;  Surgeon: Lamont Maloney MD;  Location: [facility] CATH LAB CV    ENDOSCOPIC SINUS SURGERY  1/6/2014    Procedure: ENDOSCOPIC SINUS SURGERY;  Functional Endoscopic Sinus Surgery;  Surgeon: Bobo Renteria MD;  [facility]    ENDOSCOPIC SINUS SURGERY  7/21/2014    Procedure: ENDOSCOPIC SINUS SURGERY;  Surgeon: Bobo Renteria MD;  [facility]    ENDOSCOPIC SINUS SURGERY N/A 4/6/2015    Procedure: ENDOSCOPIC SINUS SURGERY;  Surgeon: Bobo Renteria MD;  [facility]    ENDOSCOPIC SINUS SURGERY N/A 8/17/2015    Procedure: ENDOSCOPIC  SINUS SURGERY;  Surgeon: Bobo Renteria MD;  Location:  OR    ENDOSCOPIC SINUS SURGERY Bilateral 8/19/2019    Procedure: Bilateral Functional Endoscopic Sinus Surgery, Right Nasal Endoscopy and Excision of Left Nasal Mass;  Surgeon: Bobo Renteria MD;  Location:  OR    ENT SURGERY      EXCISE NASAL MASS Left 11/6/2017    Procedure: EXCISE NASAL MASS;  Excision of Left Nasal Papilloma;  Surgeon: Bobo Renteria MD;  [facility]    LAPAROSCOPIC HERNIORRHAPHY INGUINAL BILATERAL Bilateral 4/12/2017    Procedure: LAPAROSCOPIC HERNIORRHAPHY INGUINAL BILATERAL;  Surgeon: Shay Mercado MD;  Location: [facility]    NASAL ENDOSCOPY Bilateral 2/6/2017    Procedure: NASAL ENDOSCOPY;  Surgeon: Bobo Renteria MD;  Location:  OR    NASAL ENDOSCOPY N/A 5/21/2018    Procedure: NASAL ENDOSCOPY;  Nasal Endoscopy, CO2 Laser Excision of Left Nasal Papilloma;  Surgeon: Bobo Renteria MD;  Location:  OR    NASAL/SINUS POLYPECTOMY      PE TUBES      TRANSCATHETER MITRAL VALVE REPAIR N/A 7/18/2022    Procedure: Transcatheter mitral valve repair with mitraclip. Possible atrial septal defect closure.;  Surgeon: Nicolas Tobin MD;  [facility]  [facility]       Family History:    Family History   Problem Relation Age of Onset    Diabetes Mother 40    C.A.D. Father 72    Unknown/Adopted No family hx of     Depression No family hx of     Anxiety Disorder No family hx of     Schizophrenia No family hx of     Bipolar Disorder No family hx of     Suicide No family hx of     Substance Abuse No family hx of     Dementia No family hx of     [place] Disease No family hx of     Parkinsonism No family hx of     Autism Spectrum Disorder No family hx of     Intellectual Disability No family hx of     Mental Illness No family hx of     Bleeding Disorder No family hx of        Social History:  Marital Status:  Single [1]  Social History     Tobacco Use    Smoking status: Every Day     Current  "packs/day: 0.50     Average packs/day: 0.5 packs/day for 44.4 years (22.2 ttl pk-yrs)     Types: Cigarettes     Start date: 1/1/1980    Smokeless tobacco: Never    Tobacco comments:     About 2 cigarettes per day, planning on quitting soon. 9-22-22 visit.   Vaping Use    Vaping status: Never Used   Substance Use Topics    Alcohol use: Not Currently     Comment: occasional     Drug use: Not Currently     Types: Cocaine, Methamphetamines, Opiates, Marijuana     Comment: 7 years quit Cocaine/methamphetamines        Medications:    amoxicillin-clavulanate (AUGMENTIN) 875-125 MG tablet  atorvastatin (LIPITOR) 40 MG tablet  furosemide (LASIX) 20 MG tablet  furosemide (LASIX) 20 MG tablet  lisinopril (ZESTRIL) 20 MG tablet  metoprolol succinate ER (TOPROL XL) 25 MG 24 hr tablet  SM ASPIRIN LOW DOSE 81 MG EC tablet          Review of Systems  As mentioned above in the history present illness. All other systems were reviewed and are negative.    Physical Exam   BP: 135/86  Pulse: 71  Temp: 97.4  F (36.3  C)  Resp: 18  Height: 193 cm (6' 4\")  Weight: 81.6 kg (180 lb)  SpO2: 100 %      Physical Exam  Vitals and nursing note reviewed.   Constitutional:       General: He is not in acute distress.     Appearance: Normal appearance. He is well-developed. He is not ill-appearing, toxic-appearing or diaphoretic.   HENT:      Head: Normocephalic and atraumatic.      Right Ear: Hearing and external ear normal.      Left Ear: Hearing and external ear normal.      Nose: Nose normal.   Eyes:      General: No scleral icterus.        Right eye: No discharge.         Left eye: No discharge.      Conjunctiva/sclera: Conjunctivae normal.   Cardiovascular:      Rate and Rhythm: Normal rate and regular rhythm.      Heart sounds: Normal heart sounds. No murmur heard.     No friction rub. No gallop.   Pulmonary:      Effort: Pulmonary effort is normal. No respiratory distress.      Breath sounds: Normal breath sounds. No stridor. No wheezing or " rales.   Musculoskeletal:         General: Tenderness (to sole of left foot, with erythema, swelling and a small pustule noted at the base of the proximal second phalanx- erythema extending dorsally to mid-foot - see photo -small 1 mm foreign body noted at 6 o clock) present.   Skin:     General: Skin is warm.      Capillary Refill: Capillary refill takes less than 2 seconds.      Findings: No rash.   Neurological:      Mental Status: He is alert and oriented to person, place, and time.   Psychiatric:         Behavior: Behavior is cooperative.               ED Course     ED Course as of 06/11/24 1307   Tue Jun 11, 2024   1106 CBC with platelets differential(!)  Unremarkable CBC comparable to recent and appears to be patient's baseline   1137 Procalcitonin  Procalcitonin reassuring at 0.06   1137 CRP inflammation  CRP reassuring at 4.53   1137 Basic metabolic panel(!)  Metabolic panel unremarkable with the exception of the creatinine that is 1.43 and comparable to previous 9 months ago.   1234 Foot XR, G/E 3 views, left  IMPRESSION: No definite evidence of acute osteomyelitis or fracture.  There is normal joint alignment. Status post healed first MTP joint  arthrodesis with plate and screw fixation. No hardware complication.       [facility]    Foreign Body Removal    Date/Time: 6/11/2024 12:45 PM    Performed by: Love Mackay APRN CNP  Authorized by: Love Mackay APRN CNP    Emergent condition/consent implied      LOCATION     Location:  Foot    Foot location:  L sole    Depth:  Intradermal    Tendon involvement:  None    PRE-PROCEDURE DETAILS     Imaging:  X-ray    Neurovascular status: intact    ANESTHESIA (see MAR for exact dosages)     Anesthesia method:  None  PROCEDURE TYPE     Procedure complexity:  Simple    PROCEDURE DETAILS     Scalpel size:  11    Incision length:  4    Localization method:  Visualized    Dissection of underlying tissues: no      Bloodless  field: yes      Removal mechanism:  Forceps    Foreign bodies recovered:  1    Description:  1 mm by 4 mm sliver    Intact foreign body removal: yes      POST-PROCEDURE DETAILS     Neurovascular status: intact      Confirmation:  No additional foreign bodies on visualization    Skin closure:  None    Dressing:  Antibiotic ointment and adhesive bandage    Patient tolerance of procedure:  Patient tolerated the procedure well with no immediate complications      PROCEDURE  Describe Procedure: Moderate purulent drainage noted as well with removal of intact foreign body  Patient Tolerance:  Patient tolerated the procedure well with no immediate complications        Results for orders placed or performed during the hospital encounter of 06/11/24 (from the past 24 hour(s))   CBC with platelets differential    Narrative    The following orders were created for panel order CBC with platelets differential.  Procedure                               Abnormality         Status                     ---------                               -----------         ------                     CBC with platelets and d...[[id number]]  Abnormal            Final result                 Please view results for these tests on the individual orders.   Procalcitonin   Result Value Ref Range    Procalcitonin 0.06 <0.50 ng/mL   CRP inflammation   Result Value Ref Range    CRP Inflammation 4.53 <5.00 mg/L   Basic metabolic panel   Result Value Ref Range    Sodium 140 135 - 145 mmol/L    Potassium 4.2 3.4 - 5.3 mmol/L    Chloride 104 98 - 107 mmol/L    Carbon Dioxide (CO2) 27 22 - 29 mmol/L    Anion Gap 9 7 - 15 mmol/L    Urea Nitrogen 19.3 6.0 - 20.0 mg/dL    Creatinine 1.43 (H) 0.67 - 1.17 mg/dL    GFR Estimate 57 (L) >60 mL/min/1.73m2    Calcium 9.1 8.6 - 10.0 mg/dL    Glucose 144 (H) 70 - 99 mg/dL   CBC with platelets and differential   Result Value Ref Range    WBC Count 6.6 4.0 - 11.0 10e3/uL    RBC Count 3.93 (L) 4.40 - 5.90 10e6/uL    Hemoglobin  11.5 (L) 13.3 - 17.7 g/dL    Hematocrit 35.6 (L) 40.0 - 53.0 %    MCV 91 78 - 100 fL    MCH 29.3 26.5 - 33.0 pg    MCHC 32.3 31.5 - 36.5 g/dL    RDW 13.5 10.0 - 15.0 %    Platelet Count 216 150 - 450 10e3/uL    % Neutrophils 65 %    % Lymphocytes 23 %    % Monocytes 10 %    % Eosinophils 2 %    % Basophils 1 %    % Immature Granulocytes 0 %    NRBCs per 100 WBC 0 <1 /100    Absolute Neutrophils 4.3 1.6 - 8.3 10e3/uL    Absolute Lymphocytes 1.5 0.8 - 5.3 10e3/uL    Absolute Monocytes 0.7 0.0 - 1.3 10e3/uL    Absolute Eosinophils 0.2 0.0 - 0.7 10e3/uL    Absolute Basophils 0.0 0.0 - 0.2 10e3/uL    Absolute Immature Granulocytes 0.0 <=0.4 10e3/uL    Absolute NRBCs 0.0 10e3/uL   Foot XR, G/E 3 views, left    Narrative    FOOT THREE VIEWS LEFT  6/11/2024 11:53 AM     HISTORY: left foot pustule with lymphangitis, rule out bony  involvement  COMPARISON: 10/25/2021      Impression    IMPRESSION: No definite evidence of acute osteomyelitis or fracture.  There is normal joint alignment. Status post healed first MTP joint  arthrodesis with plate and screw fixation. No hardware complication.    IBETH AVILA MD         SYSTEM ID:  [id number]       Medications - No data to display    Assessments & Plan (with Medical Decision Making)     I have reviewed the nursing notes.    I have reviewed the findings, diagnosis, plan and need for follow up with the patient.  58-year-old male with past medical history of congestive heart failure, tobacco use, daily marijuana use,  valve insufficiency with subsequent replacement presenting to the emergency room with acute left foot pain.  Patient reports 2 previous surgeries to this foot and last surgery was 2 years ago.  Patient states over the weekend he was walking extensively and on Sunday morning he woke up with some pain on the bottom of his foot.  He reports he is unable to see the location of the pain.  He denies any trauma.  However, he reports worsening pain this morning with  redness and swelling associated with this.    On exam blood pressure 135/86, temp 97.4, pulse 71, respiratory rate is 18, O2 saturation 100%.  Patient is alert, orientated and appears to be in some distress.  He appears nontoxic.  Examination of the foot is concerning for a small pustule on the sole of his foot at the base/proximal portion of the second phalanx with lymphangitis extending dorsally to mid foot.  Differential diagnosis pustule with cellulitis extending to the bone versus infected pustule with cellulitis localized without systemic symptoms.  Initiated workup with lab evaluation and x-rays.  Patient declining any medication for pain.  Workup reveals no signs of hardware infection or loosened hardware on x-ray no sign of osteomyelitis on x-ray.  Lab work is reassuring for no signs of systemic infection.  Evaluation of the foot and foreign body is noted and foreign body was removed without complication following application of Betadine and a small slit incision with an 11 blade a 3 mm was noted with moderate purulent drainage and grasping of the foreign body with the tweezers and removed in entirety.  Patient tolerated the procedure well.  Tetanus is updated previously and not indicated today.  Patient started on Augmentin and recommended Epsom salts twice daily with follow-up visit in 3 days for reevaluation for wound check discussed worrisome reasons to return and patient verbalized understanding and discharged in stable condition.      New Prescriptions    AMOXICILLIN-CLAVULANATE (AUGMENTIN) 875-125 MG TABLET    Take 1 tablet by mouth 2 times daily for 10 days       Final diagnoses:   Left foot pain   Foreign body in left foot, initial encounter   Pustule   Lymphangitis   Cellulitis of left foot       6/11/2024   [facility] EMERGENCY DEPT       Love Mackay, NEHEMIAS CNP  06/11/24 1844

## 2024-06-11 NOTE — DISCHARGE INSTRUCTIONS
Start gabapentin today and take this twice daily for 10 days.  Start soaking the foot twice daily with Epsom salts and warm water.  Keep the foot clean and dry.  Wear socks to protect it from any dirt.  Follow-up with primary care in 3 days for recheck of wound  Return to the emergency room if you should develop fever, chills, sweats, redness extending up to the shin or lower leg.

## 2024-06-11 NOTE — ED NOTES
Pt states left foot has been hurting for a few days. Pt has titanium madeleine in left foot from snowboarding accident 6 years ago, pt also has small blister between first and second toe that he believes is infected. Pt is in pleasant mood, in no acute distress, orientedx4

## 2024-06-15 DIAGNOSIS — I34.0 NONRHEUMATIC MITRAL VALVE REGURGITATION: ICD-10-CM

## 2024-06-16 ENCOUNTER — HEALTH MAINTENANCE LETTER (OUTPATIENT)
Age: 59
End: 2024-06-16

## 2024-06-17 RX ORDER — ATORVASTATIN CALCIUM 40 MG/1
TABLET, FILM COATED ORAL
Qty: 90 TABLET | Refills: 2 | Status: SHIPPED | OUTPATIENT
Start: 2024-06-17

## 2024-06-21 ENCOUNTER — TELEPHONE (OUTPATIENT)
Dept: CARDIOLOGY | Facility: CLINIC | Age: 59
End: 2024-06-21
Payer: COMMERCIAL

## 2024-06-21 DIAGNOSIS — Z98.890 S/P MITRAL VALVE CLIP IMPLANTATION: ICD-10-CM

## 2024-06-21 DIAGNOSIS — I50.32 CHRONIC HEART FAILURE WITH PRESERVED EJECTION FRACTION (H): ICD-10-CM

## 2024-06-21 DIAGNOSIS — Z95.818 S/P MITRAL VALVE CLIP IMPLANTATION: ICD-10-CM

## 2024-06-21 RX ORDER — FUROSEMIDE 20 MG
20 TABLET ORAL DAILY
Qty: 90 TABLET | Refills: 3 | Status: SHIPPED | OUTPATIENT
Start: 2024-06-21

## 2024-06-21 NOTE — TELEPHONE ENCOUNTER
Health Call Center    Phone Message    May a detailed message be left on voicemail: yes     Reason for Call: Other: Micah called because a refill request was sent to [facility] for his Furosemide and Micah is wondering if his prescription can be filled for 90 days instead of just 30 so he doesn't need to make his way to the pharmacy every month because he doesn't have a license. Please filled Micah's Rx for 90 days if possible and reach out to Micah with any questions. Thank you!     Action Taken: Other: Cardiology    Travel Screening: Not Applicable    Thank you!  [facility]       Date of Service:

## 2024-06-21 NOTE — TELEPHONE ENCOUNTER
"Patient has not seen [facility] since 8/7/23. Saw his primary cardiologist Dr. Jackson 4/17/24 with notes indicating \"continue current medications\"-- lasix listed as current medication. Updated per protocol, 90 day supply refilled per primary cardiologist.     Sheila Jensen RN on 6/21/2024 at 12:14 PM    "

## 2024-08-02 NOTE — PROGRESS NOTES
HISTORY OF PRESENT ILLNESS:  Micah Nunez is 52 years of age.  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  He has required multiple resections, but we have not wanted to do a destructive procedure inside of his nose due to the excessive crusting and things of this nature.  He has never had anything like cancer.  He is a heavy smoker.  He has tried many times to quit in the past.   He has no new complaints at the present time today except he is having a little bit of trouble breathing on the left side.      PHYSICAL EXAMINATION:  The patient is alert, oriented x3 and pleasant.  Skin of the face, lips, and neck on him is quite normal.  He has a tan today.  He was recently in [place].  No actinic changes.  Oral cavity and oropharynx is otherwise clear.  Neck exam shows no masses, adenopathy or thyromegaly.  On today's exam with an endoscope of the right nasal endoscopy is normal.  On the left side, there is clearly some papillomatosis that is blocking his nasal valve on the left side.  It is not ulcerating and it is not with poor appearing features.  It looks otherwise reasonable, but it does block his nasal valve.      ASSESSMENT:  Patient with left nasal papillomatosis. inverting papilloma.      PLAN:  We will remove this in the operating room at his convenience after he sees his family doctor about smoking cessation.      FO/ms        done

## 2024-08-06 DIAGNOSIS — I34.0 MITRAL VALVE INSUFFICIENCY, UNSPECIFIED ETIOLOGY: ICD-10-CM

## 2024-08-08 RX ORDER — LISINOPRIL 20 MG/1
20 TABLET ORAL DAILY
Qty: 90 TABLET | Refills: 0 | Status: SHIPPED | OUTPATIENT
Start: 2024-08-08

## 2025-02-16 ENCOUNTER — HOSPITAL ENCOUNTER (EMERGENCY)
Facility: CLINIC | Age: 60
Discharge: HOME | End: 2025-02-16
Attending: EMERGENCY MEDICINE | Admitting: EMERGENCY MEDICINE
Payer: COMMERCIAL

## 2025-02-16 ENCOUNTER — APPOINTMENT (OUTPATIENT)
Dept: GENERAL RADIOLOGY | Facility: CLINIC | Age: 60
End: 2025-02-16
Attending: EMERGENCY MEDICINE
Payer: COMMERCIAL

## 2025-02-16 VITALS
SYSTOLIC BLOOD PRESSURE: 149 MMHG | DIASTOLIC BLOOD PRESSURE: 92 MMHG | HEART RATE: 67 BPM | OXYGEN SATURATION: 97 % | RESPIRATION RATE: 18 BRPM | TEMPERATURE: 98.1 F

## 2025-02-16 DIAGNOSIS — Z91.148 NONCOMPLIANCE WITH MEDICATION REGIMEN: ICD-10-CM

## 2025-02-16 DIAGNOSIS — R06.02 SOB (SHORTNESS OF BREATH): ICD-10-CM

## 2025-02-16 LAB
ALBUMIN SERPL BCG-MCNC: 4.1 G/DL (ref 3.5–5.2)
ALP SERPL-CCNC: 120 U/L (ref 40–150)
ALT SERPL W P-5'-P-CCNC: 21 U/L (ref 0–70)
ANION GAP SERPL CALCULATED.3IONS-SCNC: 12 MMOL/L (ref 7–15)
AST SERPL W P-5'-P-CCNC: 19 U/L (ref 0–45)
ATRIAL RATE - MUSE: 88 BPM
BASE EXCESS BLDV CALC-SCNC: 4.8 MMOL/L (ref -3–3)
BASOPHILS # BLD AUTO: 0 10E3/UL (ref 0–0.2)
BASOPHILS NFR BLD AUTO: 0 %
BILIRUB SERPL-MCNC: 0.4 MG/DL
BUN SERPL-MCNC: 13.5 MG/DL (ref 8–23)
CALCIUM SERPL-MCNC: 9.1 MG/DL (ref 8.8–10.4)
CHLORIDE SERPL-SCNC: 100 MMOL/L (ref 98–107)
CREAT SERPL-MCNC: 1.29 MG/DL (ref 0.67–1.17)
D DIMER PPP FEU-MCNC: 0.5 UG/ML FEU (ref 0–0.5)
DIASTOLIC BLOOD PRESSURE - MUSE: NORMAL MMHG
EGFRCR SERPLBLD CKD-EPI 2021: 64 ML/MIN/1.73M2
EOSINOPHIL # BLD AUTO: 0.1 10E3/UL (ref 0–0.7)
EOSINOPHIL NFR BLD AUTO: 1 %
ERYTHROCYTE [DISTWIDTH] IN BLOOD BY AUTOMATED COUNT: 13 % (ref 10–15)
GLUCOSE SERPL-MCNC: 101 MG/DL (ref 70–99)
HCO3 BLDV-SCNC: 31 MMOL/L (ref 21–28)
HCO3 SERPL-SCNC: 27 MMOL/L (ref 22–29)
HCT VFR BLD AUTO: 38.1 % (ref 40–53)
HGB BLD-MCNC: 12.1 G/DL (ref 13.3–17.7)
HOLD SPECIMEN: NORMAL
IMM GRANULOCYTES # BLD: 0 10E3/UL
IMM GRANULOCYTES NFR BLD: 0 %
INTERPRETATION ECG - MUSE: NORMAL
LYMPHOCYTES # BLD AUTO: 2.4 10E3/UL (ref 0.8–5.3)
LYMPHOCYTES NFR BLD AUTO: 24 %
MCH RBC QN AUTO: 28.3 PG (ref 26.5–33)
MCHC RBC AUTO-ENTMCNC: 31.8 G/DL (ref 31.5–36.5)
MCV RBC AUTO: 89 FL (ref 78–100)
MONOCYTES # BLD AUTO: 0.7 10E3/UL (ref 0–1.3)
MONOCYTES NFR BLD AUTO: 7 %
NEUTROPHILS # BLD AUTO: 6.5 10E3/UL (ref 1.6–8.3)
NEUTROPHILS NFR BLD AUTO: 67 %
NRBC # BLD AUTO: 0 10E3/UL
NRBC BLD AUTO-RTO: 0 /100
NT-PROBNP SERPL-MCNC: 417 PG/ML (ref 0–900)
O2/TOTAL GAS SETTING VFR VENT: 0 %
OXYHGB MFR BLDV: 86 % (ref 70–75)
P AXIS - MUSE: 84 DEGREES
PCO2 BLDV: 50 MM HG (ref 40–50)
PH BLDV: 7.4 [PH] (ref 7.32–7.43)
PLATELET # BLD AUTO: 248 10E3/UL (ref 150–450)
PO2 BLDV: 54 MM HG (ref 25–47)
POTASSIUM SERPL-SCNC: 3.7 MMOL/L (ref 3.4–5.3)
PR INTERVAL - MUSE: 150 MS
PROT SERPL-MCNC: 6.5 G/DL (ref 6.4–8.3)
QRS DURATION - MUSE: 96 MS
QT - MUSE: 362 MS
QTC - MUSE: 438 MS
R AXIS - MUSE: 58 DEGREES
RBC # BLD AUTO: 4.28 10E6/UL (ref 4.4–5.9)
SAO2 % BLDV: 89.2 % (ref 70–75)
SODIUM SERPL-SCNC: 139 MMOL/L (ref 135–145)
SYSTOLIC BLOOD PRESSURE - MUSE: NORMAL MMHG
T AXIS - MUSE: 75 DEGREES
TROPONIN T SERPL HS-MCNC: 22 NG/L
TROPONIN T SERPL HS-MCNC: 23 NG/L
TSH SERPL DL<=0.005 MIU/L-ACNC: 3.49 UIU/ML (ref 0.3–4.2)
VENTRICULAR RATE- MUSE: 88 BPM
WBC # BLD AUTO: 9.7 10E3/UL (ref 4–11)

## 2025-02-16 PROCEDURE — 85025 COMPLETE CBC W/AUTO DIFF WBC: CPT | Performed by: EMERGENCY MEDICINE

## 2025-02-16 PROCEDURE — 99285 EMERGENCY DEPT VISIT HI MDM: CPT | Mod: 25

## 2025-02-16 PROCEDURE — 83880 ASSAY OF NATRIURETIC PEPTIDE: CPT | Performed by: EMERGENCY MEDICINE

## 2025-02-16 PROCEDURE — 82805 BLOOD GASES W/O2 SATURATION: CPT | Performed by: EMERGENCY MEDICINE

## 2025-02-16 PROCEDURE — 93308 TTE F-UP OR LMTD: CPT | Mod: 26 | Performed by: EMERGENCY MEDICINE

## 2025-02-16 PROCEDURE — 71046 X-RAY EXAM CHEST 2 VIEWS: CPT

## 2025-02-16 PROCEDURE — 36415 COLL VENOUS BLD VENIPUNCTURE: CPT | Performed by: EMERGENCY MEDICINE

## 2025-02-16 PROCEDURE — 84484 ASSAY OF TROPONIN QUANT: CPT | Performed by: EMERGENCY MEDICINE

## 2025-02-16 PROCEDURE — 250N000013 HC RX MED GY IP 250 OP 250 PS 637: Performed by: EMERGENCY MEDICINE

## 2025-02-16 PROCEDURE — 93010 ELECTROCARDIOGRAM REPORT: CPT | Performed by: EMERGENCY MEDICINE

## 2025-02-16 PROCEDURE — 99284 EMERGENCY DEPT VISIT MOD MDM: CPT | Performed by: EMERGENCY MEDICINE

## 2025-02-16 PROCEDURE — 93308 TTE F-UP OR LMTD: CPT

## 2025-02-16 PROCEDURE — 84443 ASSAY THYROID STIM HORMONE: CPT | Performed by: EMERGENCY MEDICINE

## 2025-02-16 PROCEDURE — 82040 ASSAY OF SERUM ALBUMIN: CPT | Performed by: EMERGENCY MEDICINE

## 2025-02-16 PROCEDURE — 85379 FIBRIN DEGRADATION QUANT: CPT | Performed by: EMERGENCY MEDICINE

## 2025-02-16 PROCEDURE — 93005 ELECTROCARDIOGRAM TRACING: CPT

## 2025-02-16 RX ORDER — ASPIRIN 325 MG
325 TABLET ORAL ONCE
Status: COMPLETED | OUTPATIENT
Start: 2025-02-16 | End: 2025-02-16

## 2025-02-16 RX ORDER — METOPROLOL SUCCINATE 25 MG/1
25 TABLET, EXTENDED RELEASE ORAL DAILY
Qty: 90 TABLET | Refills: 3 | Status: SHIPPED | OUTPATIENT
Start: 2025-02-16

## 2025-02-16 RX ADMIN — ASPIRIN 325 MG ORAL TABLET 325 MG: 325 PILL ORAL at 21:30

## 2025-02-16 ASSESSMENT — ACTIVITIES OF DAILY LIVING (ADL)
ADLS_ACUITY_SCORE: 48

## 2025-02-16 ASSESSMENT — COLUMBIA-SUICIDE SEVERITY RATING SCALE - C-SSRS
6. HAVE YOU EVER DONE ANYTHING, STARTED TO DO ANYTHING, OR PREPARED TO DO ANYTHING TO END YOUR LIFE?: NO
2. HAVE YOU ACTUALLY HAD ANY THOUGHTS OF KILLING YOURSELF IN THE PAST MONTH?: NO
1. IN THE PAST MONTH, HAVE YOU WISHED YOU WERE DEAD OR WISHED YOU COULD GO TO SLEEP AND NOT WAKE UP?: NO

## 2025-02-17 NOTE — ED TRIAGE NOTES
States he felt as though he stopped breathing for a minute and had to gasp for air multiple times after that episode. Hx of heart valve replacement per patient.

## 2025-02-17 NOTE — ED PROVIDER NOTES
[facility]  Emergency Department Visit Note    PATIENT:  Micah Nunez     59 year old     male      [id number]    Chief complaint:  No chief complaint on file.       History of present illness:  Patient is a 59 year old male with a medical history significant for mitral valve insufficiency, most recent echocardiogram in July 2023 which showed an EF of 60 to 65% with 2+ moderate mitral regurgitation status post mitral clip placement in 2022 presenting for evaluation of sudden onset shortness of breath earlier today.  Patient reports that he has been pretty healthy lately.  No recent illness including fevers, chills, shortness of breath.  Does note that he has not been taking his metoprolol because he has been unable to fill it and he is unable to get in to get refills of this medication.  He was moving furniture today when he noticed sudden onset shortness of breath.  He felt he was gasping for air.  These episodes happen while he was exerting himself.  No chest pain was associated.  He never fainted.  He became very anxious that maybe the shortness of breath is something serious so he decided to come in and be evaluated.  He notes that he recently had a friend die of shortness of breath.  Substance use includes smoking tobacco otherwise no substance use.  His medications that he has been taking include Lasix, lisinopril, atorvastatin and low-dose aspirin.  Peeing normally.  Eating normally.  He knew noticed that earlier when he was short of breath he was so short of breath he could not ask for help.    Review of Systems:  As in HPI above    BP (!) 151/85   Pulse 73   Resp 14   SpO2 97%     Physical Exam  Constitutional: laying in hospital bed, alert, oriented, in no apparent distress, conversant, and answering questions appropriately  HEENT: normocephalic, atraumatic, pupils 3mm, equal, round, and reactive to light, sclerae anicteric, extraocular motions intact, and moist mucous  membranes  Neck: able to fully range and no midline tenderness  Cardiovascular: regular rate and rhythm  Pulmonary: breathing comfortably on room air and lungs clear to auscultation bilaterally  Abdominal: soft, non-tender, non-distended  Genitourinary: deferred  Extremities/MSK: no peripheral edema, no cyanosis , and no calf tenderness to palpation  Skin: warm, dry and non-diaphoretic  Neurologic: moves all four extremities spontaneously and GCS 15  Psychiatric: calm, appropriate      MDM:  Patient is a 59 year old male with above history presenting for evaluation of episodes of shortness of breath earlier today.    Vitals reassuring and within normal limits.  Normal oxygen saturation. Exam reveals no evidence of fluid overload, clear lung sounds, no respiratory distress.    Differential diagnosis for shortness of breath includes was not limited to metabolic acidosis, flash pulmonary edema, reactive airway disease exacerbation, ACS, pneumothorax, unstable angina, worsening heart failure, pneumonia, valve failure.     Patients symptoms were associated with exacerbation and had no associated chest pain. Highest on my differential is heart failure exacerbation vs unstable angina.     Plan for laboratory work up, chest x-ray and EKG.     ECG:  - Ventricular rate 88 bpm, regular  - CA, QRS, QT intervals normal  - Axis normal  - no ST segment or T wave changes concerning for acute ischemia  - Comparison to prior ECGs: no changes  - My independent interpretation: overall reassuring in this contest against acute ischemia    Bedside echo reassuring, there is some mitral regurg but obvious significant cardiac output.     Remainder of ED course below.    ED COURSE:  ED Course as of 02/16/25 2223   Sun Feb 16, 2025 2057 Laboratory work up is reassuring at this time. Creatinine is close to baseline, hemoglobin is close to baseline, vbg shows changes congruent with chronic smoking which patient endorses. Awaiting delta  troponin    2220 Troponin T, High Sensitivity(!): 23  Essentially stable, plan for discharge    2221 Refilled patients metoprolol, plan for discharge home.    2222 D-Dimer Quantitative: 0.50  Reassuring and within normal limits, further lower pretest probability for PE.        Encounter Diagnoses:  Final diagnoses:   SOB (shortness of breath)       Final disposition: discharge    Eli Manzo DO  EM Physician  [facility]       Eli Manzo DO  02/16/25 2228

## 2025-02-17 NOTE — DISCHARGE INSTRUCTIONS
You were seen in the ED today with shortness of breath and in need of a medication refill.     Please take your metoprolol on a daily basis as prescribed. Please consider quitting smoking.     If you have any changing or worsening symptoms, please come back. Otherwise please follow up with your PCP within the next month for reevaluation.

## 2025-06-21 ENCOUNTER — HEALTH MAINTENANCE LETTER (OUTPATIENT)
Age: 60
End: 2025-06-21

## (undated) DEVICE — NDL COUNTER 20CT 31142493

## (undated) DEVICE — GUIDEWIRE JTIP 3MM .035 180CM IQ35F180J3

## (undated) DEVICE — GUIDEWIRE FORTE FLOPPY J TOP 34949-05J

## (undated) DEVICE — ELECTRODE ADULT PACING MULTI P-211-M1

## (undated) DEVICE — DRAPE U SPLIT 74X120" 29440

## (undated) DEVICE — PACK HEART LEFT CUSTOM

## (undated) DEVICE — SUCTION MANIFOLD NEPTUNE 2 SYS 1 PORT 702-025-000

## (undated) DEVICE — CATH TRANSSEPTAL VERSACROSS 45D 63X230CM VXSK0032

## (undated) DEVICE — SOL WATER IRRIG 1000ML BOTTLE 07139-09

## (undated) DEVICE — GLOVE PROTEXIS POWDER FREE SMT 8.0  2D72PT80X

## (undated) DEVICE — MITRACLIP CATHETER GUIDE STEERABLE SGC0701

## (undated) DEVICE — DRAPE WARMER 66X44" ORS-300

## (undated) DEVICE — SOL NACL 0.9% IRRIG 1000ML BOTTLE 07138-09

## (undated) DEVICE — CLOSURE DEVICE 6FR VASC PROGLIDE MEDICATED SUTURE 12673-03

## (undated) DEVICE — INTRO SHEATH 8FRX10CM PINNACLE RSS802

## (undated) DEVICE — PACK ENT ENDOSCOPY CUSTOM ASC

## (undated) DEVICE — NDL 25GA 2"  8881200441

## (undated) DEVICE — GLOVE PROTEXIS W/NEU-THERA 7.5  2D73TE75

## (undated) DEVICE — SOL NACL 0.9% INJ 1000ML BAG 2B1324X

## (undated) DEVICE — ENDO TROCAR OPTICAL 05MM VERSAPORT PLUS W/FIX CAN ONB5STF

## (undated) DEVICE — SPONGE RAY-TEC 4X8" 7318

## (undated) DEVICE — BLADE CLIPPER 4406

## (undated) DEVICE — PREP CHLORAPREP 26ML TINTED ORANGE  260815

## (undated) DEVICE — SOL NACL 0.9% IRRIG 1000ML BOTTLE 2F7124

## (undated) DEVICE — DECANTER VIAL 2006S

## (undated) DEVICE — GOWN XLG DISP 9545

## (undated) DEVICE — RX BACITRACIN OINTMENT 0.9G 1/32OZ 01680 11109

## (undated) DEVICE — ADHESIVE SWIFTSET 0.8ML OCTYL SS6

## (undated) DEVICE — GUIDEWIRE VASC SAFARI 0.035"X275CM CVD TIP H74939406S1

## (undated) DEVICE — INTRO SHEATH 6FRX10CM PINNACLE RSS602

## (undated) DEVICE — ESU GROUND PAD ADULT W/CORD E7507

## (undated) DEVICE — SU VICRYL 0 UR-6 27" J603H

## (undated) DEVICE — ENDO TROCAR BALLOON TIP 10MM OMS-T10BT

## (undated) DEVICE — INTRO MICRO MINI STICK 4FR STIFF NITINOL 45-753

## (undated) DEVICE — ESU SUCTION CAUTERY 10FR FOOT CONTROL E2505-10FR

## (undated) DEVICE — RX SURGIFLO HEMOSTATIC MATRIX W/THROMBIN 8ML 2994

## (undated) DEVICE — SU VICRYL 4-0 FS-2 27" J422-H

## (undated) DEVICE — INTRO SHEATH 4FRX10CM PINNACLE RSS402

## (undated) DEVICE — NDL 25GA 1.5" 305127

## (undated) DEVICE — CATH ANGIO INFINITI AR2 MOD 4FRX100CM 538443

## (undated) DEVICE — KIT HAND CONTROL ACIST 016795

## (undated) DEVICE — SPONGE COTTONOID 1/2X3" 20-07S

## (undated) DEVICE — STOCKING SLEEVE COMPRESSION CALF MED

## (undated) DEVICE — LINEN TOWEL PACK X5 5464

## (undated) DEVICE — SYR 10ML FINGER CONTROL W/O NDL 309695

## (undated) DEVICE — CUSTOM PACK CORONARY SAN5BCRHEA

## (undated) DEVICE — KIT HAND CONTROL ACIST 014644 AR-P54

## (undated) DEVICE — Device

## (undated) DEVICE — SHTH INTRO 0.021IN ID 6FR DIA

## (undated) DEVICE — CATH ANGIO INFINITI AL1 4FRX100CM 538445

## (undated) DEVICE — ENDO TROCAR DISSECTING BALLOON OMS-PDBS2

## (undated) DEVICE — DILATOR VASCULAR 20FRX20CM G01471

## (undated) DEVICE — SPECIMEN CONTAINER W/10% BUFFERED FORMALIN 120ML 591201

## (undated) DEVICE — CATH DIAG RADIAL 5FR TIG 4.5 100CM

## (undated) DEVICE — SLEEVE TR BAND RADIAL COMPRESSION DEVICE 24CM TRB24-REG

## (undated) DEVICE — MANIFOLD KIT ANGIO AUTOMATED 014613

## (undated) DEVICE — SYR ANGIOGRAPHY MULTIUSE KIT ACIST 014612

## (undated) DEVICE — EXCHANGE WIRE .035 260 STAR/JFC/035/260/ M001491681

## (undated) DEVICE — SOL NACL 0.9% INJ 1000ML BAG 07983-09

## (undated) DEVICE — SUCTION IRR STRYKERFLOW II W/TIP 250-070-520

## (undated) DEVICE — ENDO CANNULA 05MM VERSAONE UNIVERSAL UNVCA5STF

## (undated) DEVICE — DILATOR VASCULAR 16FRX20CM G01212

## (undated) RX ORDER — DIAZEPAM 5 MG
TABLET ORAL
Status: DISPENSED
Start: 2022-04-22

## (undated) RX ORDER — LIDOCAINE HYDROCHLORIDE AND EPINEPHRINE 10; 10 MG/ML; UG/ML
INJECTION, SOLUTION INFILTRATION; PERINEURAL
Status: DISPENSED
Start: 2017-02-06

## (undated) RX ORDER — DEXAMETHASONE SODIUM PHOSPHATE 4 MG/ML
INJECTION, SOLUTION INTRA-ARTICULAR; INTRALESIONAL; INTRAMUSCULAR; INTRAVENOUS; SOFT TISSUE
Status: DISPENSED
Start: 2017-04-12

## (undated) RX ORDER — ACETAMINOPHEN 325 MG/1
TABLET ORAL
Status: DISPENSED
Start: 2018-05-21

## (undated) RX ORDER — SODIUM CHLORIDE 450 MG/100ML
INJECTION, SOLUTION INTRAVENOUS
Status: DISPENSED
Start: 2022-07-18

## (undated) RX ORDER — ONDANSETRON 2 MG/ML
INJECTION INTRAMUSCULAR; INTRAVENOUS
Status: DISPENSED
Start: 2019-08-19

## (undated) RX ORDER — KETAMINE HYDROCHLORIDE 10 MG/ML
INJECTION, SOLUTION INTRAMUSCULAR; INTRAVENOUS
Status: DISPENSED
Start: 2017-02-06

## (undated) RX ORDER — PROPOFOL 10 MG/ML
INJECTION, EMULSION INTRAVENOUS
Status: DISPENSED
Start: 2019-08-19

## (undated) RX ORDER — LIDOCAINE HYDROCHLORIDE 20 MG/ML
INJECTION, SOLUTION EPIDURAL; INFILTRATION; INTRACAUDAL; PERINEURAL
Status: DISPENSED
Start: 2017-11-06

## (undated) RX ORDER — FENTANYL CITRATE 50 UG/ML
INJECTION, SOLUTION INTRAMUSCULAR; INTRAVENOUS
Status: DISPENSED
Start: 2022-07-18

## (undated) RX ORDER — LIDOCAINE HYDROCHLORIDE AND EPINEPHRINE 10; 10 MG/ML; UG/ML
INJECTION, SOLUTION INFILTRATION; PERINEURAL
Status: DISPENSED
Start: 2019-08-19

## (undated) RX ORDER — OXYCODONE HYDROCHLORIDE 5 MG/1
TABLET ORAL
Status: DISPENSED
Start: 2022-07-18

## (undated) RX ORDER — LIDOCAINE HYDROCHLORIDE 20 MG/ML
INJECTION, SOLUTION EPIDURAL; INFILTRATION; INTRACAUDAL; PERINEURAL
Status: DISPENSED
Start: 2019-08-19

## (undated) RX ORDER — OXYMETAZOLINE HYDROCHLORIDE 0.05 G/100ML
SPRAY NASAL
Status: DISPENSED
Start: 2017-02-06

## (undated) RX ORDER — GABAPENTIN 300 MG/1
CAPSULE ORAL
Status: DISPENSED
Start: 2019-08-19

## (undated) RX ORDER — PROPOFOL 10 MG/ML
INJECTION, EMULSION INTRAVENOUS
Status: DISPENSED
Start: 2017-02-06

## (undated) RX ORDER — LIDOCAINE HYDROCHLORIDE 10 MG/ML
INJECTION, SOLUTION EPIDURAL; INFILTRATION; INTRACAUDAL; PERINEURAL
Status: DISPENSED
Start: 2017-11-06

## (undated) RX ORDER — MEPERIDINE HYDROCHLORIDE 50 MG/ML
INJECTION INTRAMUSCULAR; INTRAVENOUS; SUBCUTANEOUS
Status: DISPENSED
Start: 2017-04-12

## (undated) RX ORDER — LIDOCAINE HYDROCHLORIDE 20 MG/ML
INJECTION, SOLUTION EPIDURAL; INFILTRATION; INTRACAUDAL; PERINEURAL
Status: DISPENSED
Start: 2018-05-21

## (undated) RX ORDER — OXYCODONE HYDROCHLORIDE 5 MG/1
TABLET ORAL
Status: DISPENSED
Start: 2018-05-21

## (undated) RX ORDER — ONDANSETRON 2 MG/ML
INJECTION INTRAMUSCULAR; INTRAVENOUS
Status: DISPENSED
Start: 2017-02-06

## (undated) RX ORDER — PROTAMINE SULFATE 10 MG/ML
INJECTION, SOLUTION INTRAVENOUS
Status: DISPENSED
Start: 2022-07-18

## (undated) RX ORDER — LIDOCAINE HYDROCHLORIDE 10 MG/ML
INJECTION, SOLUTION EPIDURAL; INFILTRATION; INTRACAUDAL; PERINEURAL
Status: DISPENSED
Start: 2017-04-12

## (undated) RX ORDER — FENTANYL CITRATE 50 UG/ML
INJECTION, SOLUTION INTRAMUSCULAR; INTRAVENOUS
Status: DISPENSED
Start: 2017-11-06

## (undated) RX ORDER — FENTANYL CITRATE 50 UG/ML
INJECTION, SOLUTION INTRAMUSCULAR; INTRAVENOUS
Status: DISPENSED
Start: 2017-04-12

## (undated) RX ORDER — BUPIVACAINE HYDROCHLORIDE AND EPINEPHRINE 2.5; 5 MG/ML; UG/ML
INJECTION, SOLUTION INFILTRATION; PERINEURAL
Status: DISPENSED
Start: 2017-04-12

## (undated) RX ORDER — ONDANSETRON 2 MG/ML
INJECTION INTRAMUSCULAR; INTRAVENOUS
Status: DISPENSED
Start: 2017-04-12

## (undated) RX ORDER — NEOSTIGMINE METHYLSULFATE 5 MG/5 ML
SYRINGE (ML) INTRAVENOUS
Status: DISPENSED
Start: 2017-04-12

## (undated) RX ORDER — PROPOFOL 10 MG/ML
INJECTION, EMULSION INTRAVENOUS
Status: DISPENSED
Start: 2018-05-21

## (undated) RX ORDER — ACETAMINOPHEN 325 MG/1
TABLET ORAL
Status: DISPENSED
Start: 2022-07-18

## (undated) RX ORDER — OXYCODONE AND ACETAMINOPHEN 5; 325 MG/1; MG/1
TABLET ORAL
Status: DISPENSED
Start: 2017-04-12

## (undated) RX ORDER — HYDRALAZINE HYDROCHLORIDE 20 MG/ML
INJECTION INTRAMUSCULAR; INTRAVENOUS
Status: DISPENSED
Start: 2018-05-21

## (undated) RX ORDER — ACETAMINOPHEN 325 MG/1
TABLET ORAL
Status: DISPENSED
Start: 2019-08-19

## (undated) RX ORDER — LIDOCAINE HYDROCHLORIDE 10 MG/ML
INJECTION, SOLUTION EPIDURAL; INFILTRATION; INTRACAUDAL; PERINEURAL
Status: DISPENSED
Start: 2021-01-12

## (undated) RX ORDER — HYDROMORPHONE HCL IN WATER/PF 6 MG/30 ML
PATIENT CONTROLLED ANALGESIA SYRINGE INTRAVENOUS
Status: DISPENSED
Start: 2022-07-18

## (undated) RX ORDER — PROPOFOL 10 MG/ML
INJECTION, EMULSION INTRAVENOUS
Status: DISPENSED
Start: 2017-11-06

## (undated) RX ORDER — DEXAMETHASONE SODIUM PHOSPHATE 10 MG/ML
INJECTION, SOLUTION INTRAMUSCULAR; INTRAVENOUS
Status: DISPENSED
Start: 2019-08-19

## (undated) RX ORDER — DEXAMETHASONE SODIUM PHOSPHATE 10 MG/ML
INJECTION, SOLUTION INTRAMUSCULAR; INTRAVENOUS
Status: DISPENSED
Start: 2018-05-21

## (undated) RX ORDER — GLYCOPYRROLATE 0.2 MG/ML
INJECTION, SOLUTION INTRAMUSCULAR; INTRAVENOUS
Status: DISPENSED
Start: 2021-01-12

## (undated) RX ORDER — ASPIRIN 325 MG
TABLET ORAL
Status: DISPENSED
Start: 2022-04-22

## (undated) RX ORDER — OXYMETAZOLINE HYDROCHLORIDE 0.05 G/100ML
SPRAY NASAL
Status: DISPENSED
Start: 2019-08-19

## (undated) RX ORDER — PROPOFOL 10 MG/ML
INJECTION, EMULSION INTRAVENOUS
Status: DISPENSED
Start: 2021-01-12

## (undated) RX ORDER — KETOROLAC TROMETHAMINE 30 MG/ML
INJECTION, SOLUTION INTRAMUSCULAR; INTRAVENOUS
Status: DISPENSED
Start: 2017-04-12

## (undated) RX ORDER — GABAPENTIN 300 MG/1
CAPSULE ORAL
Status: DISPENSED
Start: 2018-05-21

## (undated) RX ORDER — PROPOFOL 10 MG/ML
INJECTION, EMULSION INTRAVENOUS
Status: DISPENSED
Start: 2017-04-12

## (undated) RX ORDER — FENTANYL CITRATE 50 UG/ML
INJECTION, SOLUTION INTRAMUSCULAR; INTRAVENOUS
Status: DISPENSED
Start: 2018-05-21

## (undated) RX ORDER — EPINEPHRINE 1 MG/ML
INJECTION, SOLUTION, CONCENTRATE INTRAVENOUS
Status: DISPENSED
Start: 2017-11-06

## (undated) RX ORDER — LIDOCAINE HYDROCHLORIDE 10 MG/ML
INJECTION, SOLUTION EPIDURAL; INFILTRATION; INTRACAUDAL; PERINEURAL
Status: DISPENSED
Start: 2017-02-06

## (undated) RX ORDER — LIDOCAINE/PRILOCAINE 2.5 %-2.5%
CREAM (GRAM) TOPICAL
Status: DISPENSED
Start: 2017-02-06

## (undated) RX ORDER — ONDANSETRON 2 MG/ML
INJECTION INTRAMUSCULAR; INTRAVENOUS
Status: DISPENSED
Start: 2018-05-21

## (undated) RX ORDER — FENTANYL CITRATE 50 UG/ML
INJECTION, SOLUTION INTRAMUSCULAR; INTRAVENOUS
Status: DISPENSED
Start: 2019-08-19

## (undated) RX ORDER — DEXAMETHASONE SODIUM PHOSPHATE 4 MG/ML
INJECTION, SOLUTION INTRA-ARTICULAR; INTRALESIONAL; INTRAMUSCULAR; INTRAVENOUS; SOFT TISSUE
Status: DISPENSED
Start: 2017-11-06

## (undated) RX ORDER — HEPARIN SODIUM 1000 [USP'U]/ML
INJECTION, SOLUTION INTRAVENOUS; SUBCUTANEOUS
Status: DISPENSED
Start: 2022-07-18

## (undated) RX ORDER — OXYCODONE HYDROCHLORIDE 5 MG/1
TABLET ORAL
Status: DISPENSED
Start: 2017-02-06

## (undated) RX ORDER — OXYMETAZOLINE HYDROCHLORIDE 0.05 G/100ML
SPRAY NASAL
Status: DISPENSED
Start: 2018-05-21

## (undated) RX ORDER — ONDANSETRON 2 MG/ML
INJECTION INTRAMUSCULAR; INTRAVENOUS
Status: DISPENSED
Start: 2017-11-06

## (undated) RX ORDER — GLYCOPYRROLATE 0.2 MG/ML
INJECTION, SOLUTION INTRAMUSCULAR; INTRAVENOUS
Status: DISPENSED
Start: 2017-04-12

## (undated) RX ORDER — ONDANSETRON 2 MG/ML
INJECTION INTRAMUSCULAR; INTRAVENOUS
Status: DISPENSED
Start: 2022-07-18

## (undated) RX ORDER — OXYMETAZOLINE HYDROCHLORIDE 0.05 G/100ML
SPRAY NASAL
Status: DISPENSED
Start: 2017-11-06